# Patient Record
Sex: MALE | Race: WHITE | NOT HISPANIC OR LATINO | Employment: OTHER | URBAN - METROPOLITAN AREA
[De-identification: names, ages, dates, MRNs, and addresses within clinical notes are randomized per-mention and may not be internally consistent; named-entity substitution may affect disease eponyms.]

---

## 2020-09-07 ENCOUNTER — HOSPITAL ENCOUNTER (INPATIENT)
Facility: CLINIC | Age: 70
LOS: 17 days | Discharge: NURSING, REHAB OR LONG-TERM CARE | DRG: 064 | End: 2020-09-24
Attending: HOSPITALIST | Admitting: INTERNAL MEDICINE
Payer: COMMERCIAL

## 2020-09-07 ENCOUNTER — APPOINTMENT (OUTPATIENT)
Dept: CT IMAGING | Facility: CLINIC | Age: 70
End: 2020-09-07
Attending: EMERGENCY MEDICINE
Payer: COMMERCIAL

## 2020-09-07 ENCOUNTER — APPOINTMENT (OUTPATIENT)
Dept: GENERAL RADIOLOGY | Facility: CLINIC | Age: 70
End: 2020-09-07
Attending: EMERGENCY MEDICINE
Payer: COMMERCIAL

## 2020-09-07 ENCOUNTER — APPOINTMENT (OUTPATIENT)
Dept: ULTRASOUND IMAGING | Facility: CLINIC | Age: 70
End: 2020-09-07
Attending: EMERGENCY MEDICINE
Payer: COMMERCIAL

## 2020-09-07 ENCOUNTER — HOSPITAL ENCOUNTER (EMERGENCY)
Facility: CLINIC | Age: 70
Discharge: ANOTHER HOSPITAL | End: 2020-09-07
Attending: EMERGENCY MEDICINE | Admitting: EMERGENCY MEDICINE
Payer: COMMERCIAL

## 2020-09-07 VITALS
TEMPERATURE: 98 F | OXYGEN SATURATION: 91 % | DIASTOLIC BLOOD PRESSURE: 106 MMHG | RESPIRATION RATE: 14 BRPM | HEART RATE: 71 BPM | SYSTOLIC BLOOD PRESSURE: 130 MMHG

## 2020-09-07 DIAGNOSIS — I63.9 CEREBROVASCULAR ACCIDENT (CVA), UNSPECIFIED MECHANISM (H): ICD-10-CM

## 2020-09-07 DIAGNOSIS — I63.031 CEREBROVASCULAR ACCIDENT (CVA) DUE TO THROMBOSIS OF RIGHT CAROTID ARTERY (H): Primary | ICD-10-CM

## 2020-09-07 LAB
ABO + RH BLD: NORMAL
ABO + RH BLD: NORMAL
ANION GAP SERPL CALCULATED.3IONS-SCNC: 8 MMOL/L (ref 3–14)
APTT PPP: 26 SEC (ref 22–37)
BASE EXCESS BLDV CALC-SCNC: 0.7 MMOL/L
BASOPHILS # BLD AUTO: 0 10E9/L (ref 0–0.2)
BASOPHILS NFR BLD AUTO: 0.3 %
BLD GP AB SCN SERPL QL: NORMAL
BLOOD BANK CMNT PATIENT-IMP: NORMAL
BUN SERPL-MCNC: 13 MG/DL (ref 7–30)
CALCIUM SERPL-MCNC: 8.9 MG/DL (ref 8.5–10.1)
CHLORIDE SERPL-SCNC: 103 MMOL/L (ref 94–109)
CK SERPL-CCNC: 148 U/L (ref 30–300)
CO2 SERPL-SCNC: 25 MMOL/L (ref 20–32)
CREAT SERPL-MCNC: 0.85 MG/DL (ref 0.66–1.25)
DIFFERENTIAL METHOD BLD: ABNORMAL
EOSINOPHIL # BLD AUTO: 0 10E9/L (ref 0–0.7)
EOSINOPHIL NFR BLD AUTO: 0 %
ERYTHROCYTE [DISTWIDTH] IN BLOOD BY AUTOMATED COUNT: 12 % (ref 10–15)
GFR SERPL CREATININE-BSD FRML MDRD: 87 ML/MIN/{1.73_M2}
GLUCOSE BLDC GLUCOMTR-MCNC: 170 MG/DL (ref 70–99)
GLUCOSE BLDC GLUCOMTR-MCNC: 184 MG/DL (ref 70–99)
GLUCOSE SERPL-MCNC: 190 MG/DL (ref 70–99)
HCO3 BLDV-SCNC: 26 MMOL/L (ref 21–28)
HCT VFR BLD AUTO: 42.5 % (ref 40–53)
HGB BLD-MCNC: 14.5 G/DL (ref 13.3–17.7)
IMM GRANULOCYTES # BLD: 0.1 10E9/L (ref 0–0.4)
IMM GRANULOCYTES NFR BLD: 0.4 %
INR PPP: 1.03 (ref 0.86–1.14)
LABORATORY COMMENT REPORT: NORMAL
LYMPHOCYTES # BLD AUTO: 1 10E9/L (ref 0.8–5.3)
LYMPHOCYTES NFR BLD AUTO: 9.3 %
MCH RBC QN AUTO: 30.9 PG (ref 26.5–33)
MCHC RBC AUTO-ENTMCNC: 34.1 G/DL (ref 31.5–36.5)
MCV RBC AUTO: 91 FL (ref 78–100)
MONOCYTES # BLD AUTO: 0.6 10E9/L (ref 0–1.3)
MONOCYTES NFR BLD AUTO: 5.2 %
NEUTROPHILS # BLD AUTO: 9.5 10E9/L (ref 1.6–8.3)
NEUTROPHILS NFR BLD AUTO: 84.8 %
NRBC # BLD AUTO: 0 10*3/UL
NRBC BLD AUTO-RTO: 0 /100
O2/TOTAL GAS SETTING VFR VENT: NORMAL %
OXYHGB MFR BLDV: 79 %
PCO2 BLDV: 43 MM HG (ref 40–50)
PH BLDV: 7.39 PH (ref 7.32–7.43)
PLATELET # BLD AUTO: 219 10E9/L (ref 150–450)
PO2 BLDV: 45 MM HG (ref 25–47)
POTASSIUM SERPL-SCNC: 3.9 MMOL/L (ref 3.4–5.3)
RBC # BLD AUTO: 4.69 10E12/L (ref 4.4–5.9)
SARS-COV-2 RNA SPEC QL NAA+PROBE: NEGATIVE
SARS-COV-2 RNA SPEC QL NAA+PROBE: NORMAL
SODIUM SERPL-SCNC: 136 MMOL/L (ref 133–144)
SPECIMEN EXP DATE BLD: NORMAL
SPECIMEN SOURCE: NORMAL
SPECIMEN SOURCE: NORMAL
TROPONIN I SERPL-MCNC: <0.015 UG/L (ref 0–0.04)
WBC # BLD AUTO: 11.2 10E9/L (ref 4–11)

## 2020-09-07 PROCEDURE — 36415 COLL VENOUS BLD VENIPUNCTURE: CPT | Performed by: INTERNAL MEDICINE

## 2020-09-07 PROCEDURE — 25000128 H RX IP 250 OP 636: Performed by: EMERGENCY MEDICINE

## 2020-09-07 PROCEDURE — 82550 ASSAY OF CK (CPK): CPT | Performed by: EMERGENCY MEDICINE

## 2020-09-07 PROCEDURE — 86901 BLOOD TYPING SEROLOGIC RH(D): CPT | Performed by: EMERGENCY MEDICINE

## 2020-09-07 PROCEDURE — 96360 HYDRATION IV INFUSION INIT: CPT | Mod: 59

## 2020-09-07 PROCEDURE — 00000146 ZZHCL STATISTIC GLUCOSE BY METER IP

## 2020-09-07 PROCEDURE — 85730 THROMBOPLASTIN TIME PARTIAL: CPT | Performed by: EMERGENCY MEDICINE

## 2020-09-07 PROCEDURE — 85610 PROTHROMBIN TIME: CPT | Performed by: EMERGENCY MEDICINE

## 2020-09-07 PROCEDURE — 80076 HEPATIC FUNCTION PANEL: CPT | Performed by: INTERNAL MEDICINE

## 2020-09-07 PROCEDURE — 25000132 ZZH RX MED GY IP 250 OP 250 PS 637: Performed by: EMERGENCY MEDICINE

## 2020-09-07 PROCEDURE — 25800030 ZZH RX IP 258 OP 636: Performed by: EMERGENCY MEDICINE

## 2020-09-07 PROCEDURE — 0042T CT HEAD PERFUSION WITH CONTRAST: CPT

## 2020-09-07 PROCEDURE — 93005 ELECTROCARDIOGRAM TRACING: CPT

## 2020-09-07 PROCEDURE — 93970 EXTREMITY STUDY: CPT

## 2020-09-07 PROCEDURE — 85025 COMPLETE CBC W/AUTO DIFF WBC: CPT | Performed by: EMERGENCY MEDICINE

## 2020-09-07 PROCEDURE — 99223 1ST HOSP IP/OBS HIGH 75: CPT | Mod: AI | Performed by: INTERNAL MEDICINE

## 2020-09-07 PROCEDURE — 80048 BASIC METABOLIC PNL TOTAL CA: CPT | Performed by: EMERGENCY MEDICINE

## 2020-09-07 PROCEDURE — 99285 EMERGENCY DEPT VISIT HI MDM: CPT | Mod: 25

## 2020-09-07 PROCEDURE — 86850 RBC ANTIBODY SCREEN: CPT | Performed by: EMERGENCY MEDICINE

## 2020-09-07 PROCEDURE — 82805 BLOOD GASES W/O2 SATURATION: CPT | Performed by: EMERGENCY MEDICINE

## 2020-09-07 PROCEDURE — 20000003 ZZH R&B ICU

## 2020-09-07 PROCEDURE — 84484 ASSAY OF TROPONIN QUANT: CPT | Performed by: EMERGENCY MEDICINE

## 2020-09-07 PROCEDURE — 70450 CT HEAD/BRAIN W/O DYE: CPT

## 2020-09-07 PROCEDURE — 84484 ASSAY OF TROPONIN QUANT: CPT | Performed by: INTERNAL MEDICINE

## 2020-09-07 PROCEDURE — 86900 BLOOD TYPING SEROLOGIC ABO: CPT | Performed by: EMERGENCY MEDICINE

## 2020-09-07 PROCEDURE — 71045 X-RAY EXAM CHEST 1 VIEW: CPT

## 2020-09-07 PROCEDURE — 70498 CT ANGIOGRAPHY NECK: CPT

## 2020-09-07 PROCEDURE — U0003 INFECTIOUS AGENT DETECTION BY NUCLEIC ACID (DNA OR RNA); SEVERE ACUTE RESPIRATORY SYNDROME CORONAVIRUS 2 (SARS-COV-2) (CORONAVIRUS DISEASE [COVID-19]), AMPLIFIED PROBE TECHNIQUE, MAKING USE OF HIGH THROUGHPUT TECHNOLOGIES AS DESCRIBED BY CMS-2020-01-R: HCPCS | Performed by: EMERGENCY MEDICINE

## 2020-09-07 PROCEDURE — 83880 ASSAY OF NATRIURETIC PEPTIDE: CPT | Performed by: INTERNAL MEDICINE

## 2020-09-07 RX ORDER — LABETALOL HYDROCHLORIDE 5 MG/ML
10-40 INJECTION, SOLUTION INTRAVENOUS EVERY 10 MIN PRN
Status: DISCONTINUED | OUTPATIENT
Start: 2020-09-07 | End: 2020-09-08

## 2020-09-07 RX ORDER — NITROGLYCERIN 0.4 MG/1
0.4 TABLET SUBLINGUAL EVERY 5 MIN PRN
COMMUNITY
Start: 2018-10-03 | End: 2022-08-30

## 2020-09-07 RX ORDER — HYDRALAZINE HYDROCHLORIDE 20 MG/ML
10-20 INJECTION INTRAMUSCULAR; INTRAVENOUS
Status: DISCONTINUED | OUTPATIENT
Start: 2020-09-07 | End: 2020-09-10

## 2020-09-07 RX ORDER — ASPIRIN 300 MG/1
150 SUPPOSITORY RECTAL ONCE
Status: DISCONTINUED | OUTPATIENT
Start: 2020-09-07 | End: 2020-09-07 | Stop reason: HOSPADM

## 2020-09-07 RX ORDER — TRAZODONE HYDROCHLORIDE 50 MG/1
50 TABLET, FILM COATED ORAL
Status: ON HOLD | COMMUNITY
Start: 2020-08-25 | End: 2020-10-22

## 2020-09-07 RX ORDER — MULTIVIT WITH MINERALS/LUTEIN
1 TABLET ORAL DAILY
COMMUNITY

## 2020-09-07 RX ORDER — LEVOTHYROXINE SODIUM 50 UG/1
50 TABLET ORAL DAILY
COMMUNITY
Start: 2018-10-03

## 2020-09-07 RX ORDER — DEXTROSE MONOHYDRATE 25 G/50ML
25-50 INJECTION, SOLUTION INTRAVENOUS
Status: DISCONTINUED | OUTPATIENT
Start: 2020-09-07 | End: 2020-09-10

## 2020-09-07 RX ORDER — VALSARTAN 320 MG/1
320 TABLET ORAL DAILY
Status: ON HOLD | COMMUNITY
Start: 2019-01-04 | End: 2020-10-22

## 2020-09-07 RX ORDER — ASPIRIN 300 MG/1
300 SUPPOSITORY RECTAL ONCE
Status: COMPLETED | OUTPATIENT
Start: 2020-09-07 | End: 2020-09-07

## 2020-09-07 RX ORDER — FUROSEMIDE 20 MG
TABLET ORAL
Status: ON HOLD | COMMUNITY
Start: 2019-03-06 | End: 2020-10-22

## 2020-09-07 RX ORDER — LEVOTHYROXINE SODIUM 20 UG/ML
25 INJECTION, SOLUTION INTRAVENOUS DAILY
Status: DISCONTINUED | OUTPATIENT
Start: 2020-09-08 | End: 2020-09-09

## 2020-09-07 RX ORDER — ATORVASTATIN CALCIUM 80 MG/1
40 TABLET, FILM COATED ORAL DAILY
Status: ON HOLD | COMMUNITY
Start: 2019-02-27 | End: 2022-08-31

## 2020-09-07 RX ORDER — METOPROLOL SUCCINATE 50 MG/1
25 TABLET, EXTENDED RELEASE ORAL 2 TIMES DAILY
Status: ON HOLD | COMMUNITY
Start: 2019-10-24 | End: 2020-09-24

## 2020-09-07 RX ORDER — NALOXONE HYDROCHLORIDE 0.4 MG/ML
.1-.4 INJECTION, SOLUTION INTRAMUSCULAR; INTRAVENOUS; SUBCUTANEOUS
Status: DISCONTINUED | OUTPATIENT
Start: 2020-09-07 | End: 2020-09-24 | Stop reason: HOSPADM

## 2020-09-07 RX ORDER — IOPAMIDOL 755 MG/ML
500 INJECTION, SOLUTION INTRAVASCULAR ONCE
Status: COMPLETED | OUTPATIENT
Start: 2020-09-07 | End: 2020-09-07

## 2020-09-07 RX ORDER — ONDANSETRON 4 MG/1
4 TABLET, ORALLY DISINTEGRATING ORAL EVERY 6 HOURS PRN
Status: DISCONTINUED | OUTPATIENT
Start: 2020-09-07 | End: 2020-09-24 | Stop reason: HOSPADM

## 2020-09-07 RX ORDER — AMLODIPINE BESYLATE 10 MG/1
20 TABLET ORAL DAILY
Status: ON HOLD | COMMUNITY
Start: 2018-12-26 | End: 2020-10-22

## 2020-09-07 RX ORDER — SODIUM CHLORIDE 9 MG/ML
INJECTION, SOLUTION INTRAVENOUS CONTINUOUS
Status: DISCONTINUED | OUTPATIENT
Start: 2020-09-07 | End: 2020-09-08

## 2020-09-07 RX ORDER — NICOTINE POLACRILEX 4 MG
15-30 LOZENGE BUCCAL
Status: DISCONTINUED | OUTPATIENT
Start: 2020-09-07 | End: 2020-09-10

## 2020-09-07 RX ORDER — ONDANSETRON 2 MG/ML
4 INJECTION INTRAMUSCULAR; INTRAVENOUS EVERY 6 HOURS PRN
Status: DISCONTINUED | OUTPATIENT
Start: 2020-09-07 | End: 2020-09-24 | Stop reason: HOSPADM

## 2020-09-07 RX ORDER — ASPIRIN 81 MG/1
81 TABLET ORAL DAILY
Status: ON HOLD | COMMUNITY
End: 2020-10-23

## 2020-09-07 RX ORDER — ASPIRIN 300 MG/1
75 SUPPOSITORY RECTAL DAILY
Status: DISCONTINUED | OUTPATIENT
Start: 2020-09-07 | End: 2020-09-08

## 2020-09-07 RX ORDER — EZETIMIBE 10 MG/1
10 TABLET ORAL DAILY
COMMUNITY
Start: 2018-12-06

## 2020-09-07 RX ADMIN — ASPIRIN 300 MG: 300 SUPPOSITORY RECTAL at 19:32

## 2020-09-07 RX ADMIN — IOPAMIDOL 120 ML: 755 INJECTION, SOLUTION INTRAVENOUS at 18:46

## 2020-09-07 RX ADMIN — SODIUM CHLORIDE 95 ML: 9 INJECTION, SOLUTION INTRAVENOUS at 18:46

## 2020-09-07 ASSESSMENT — VISUAL ACUITY
OU: NOT TESTABLE
OU: NOT TESTABLE

## 2020-09-07 NOTE — ED NOTES
Bed: ED32  Expected date: 9/7/20  Expected time:   Means of arrival: Ambulance  Comments:  A316 stroke eval

## 2020-09-07 NOTE — CONSULTS
[facility]    Stroke Telephone Note    I was called by Dr. Eryn Mejia on 09/07/20 at 1826 regarding patient Jorje Teran. The patient is a 70 year old male with no known PMH as a stroke alert for left-sided weakness.    The patient was last known well at 12:30 PM on 9/6/2020.  He lives by himself, and was found by his son on 9/7/2020 at 530-6 PM with left face arm and leg weakness.    He was brought to [facility] ED, and a stroke alert was paged.  On arrival to [facility] ER:  mmHg, afebrile.  Vitals otherwise stable.  He was noted to be awake, slightly drowsy but answering simple questions.  He was reported to have left face arm and leg weakness.  A head CT showed right hyperdense MCA with established infarction with a CT aspect score of 2.  CTA head and neck showed a right M1 occlusion with poor collateral flow.  CT perfusion pending    Stroke Code Data  (for stroke code without tele)  Stroke code activated 09/07/20   1826   First stroke provider response 09/07/20   1828   Last known normal 09/06/20   1200   Time of discovery   (or onset of symptoms) 09/07/20   1750   Head CT read by me 09/07/20       Was stroke code de-escalated? Yes 09/07/20    patient is outside emergent treatment time parameters       TPA Treatment   Not given due to established infarct on imaging.    Endovascular Treatment  Proximal vessel occlusion present, but endovascular treatment not initiated due to Established infarction on head CT    Impression  Ischemic Stroke due to undetermined etiology     Recommendations  Acute Ischemic Stroke (without tPA) Recommendations  - Neurochecks Q 1 hour  - Permissive HTN; labetalol PRN for SBP > 220  - Avoid hypotonic fluid solutions.   - Daily aspirin 81 mg for secondary stroke prevention  - Statin: Atorvastatin 40 mg at bedtime  - MRI Stroke Protocol  - TTE with Bubble Study  - Telemetry, EKG  - Bedside Glucose Monitoring  - A1c, Lipid Panel, Troponin x 3  - PT/OT/SLP  -  "Stroke Education  - Depression Screen  - Apnea Screen  - Euthermia, Euglycemia    Patient will be transferred to [facility] for further monitoring and a ICU.    My [place]s are based my impression of patient's clinical presentation per discussion with the ED physician, and personal review of neuroimaging completed in the ED.  I was not requested to personally see or examine the patient at this time.    The Stroke Staff is Dr. Castaneda.    YANG DAVIS MD  Vascular Neurology Fellow  To page me or covering stroke neurology team member, click here: AMCOM   Choose \"On Call\" tab at top, then search dropdown box for \"Neurology Adult\", select location, press Enter, then look for stroke/neuro ICU/telestroke.         "

## 2020-09-07 NOTE — ED PROVIDER NOTES
History     Chief Complaint:  Weakness, slurred speech    HPI   Jorje Teran is a 70 year old male who presents with weakness and slurred speech.  Per son Donte, patient last seen at baseline yesterday at noon.  Son went to check on patient roughly an hour prior to arrival and found father on the ground, unable to move his L. Side and also slurring his words.  Patient provides minimal history at bedside.  No known history of blood thinners, possible baby ASA per son.     Allergies:  No known drug allergies    Medications:    Desyrel  Toprol-XL  Lasix  Lipitor  Entresto  Diovan  Norvasc  Zetia  Levothyroxine  Nitrostat  Aspirin 81 mg    Past Medical History:    Obstructive sleep apnea  Class 2 severe obesity  Hypertension  Coronary artery disease  Diabetes mellitus type II  Tubular adenoma of colon  Hyperlipidemia  Hypothyroid    Past Surgical History:    [place] teeth removal  CABG x3    Family History:    Heart Disease  Diabetes    Social History:  Smoking status: Former (Quit in 2009)  Alcohol use: Yes (4 drinks/week)  Patient presents with son.   Marital Status:   [2]    Review of Systems   Unable to perform ROS: Acuity of condition       Physical Exam     Patient Vitals for the past 24 hrs:   BP Pulse Resp SpO2   09/07/20 1826 (!) 144/78 72 22 97 %       Physical Exam  General: Well-nourished, nontoxic  Eyes: PERRL, patient unable to follow   ENT:  Moist mucus membranes, posterior oropharynx clear without erythema or exudates. TM normal bilaterally  Neck: Supple with full range of motion  Respiratory:  Lungs clear to auscultation bilaterally, no crackles/rubs/wheezes.  Good air movement  CV: Normal rate and rhythm, no murmurs/rubs/gallops  GI:  Abdomen soft and non-distended.  No tenderness, guarding or rebound  Skin: Warm, dry.  No rashes or petechiae  MSK: LLE greater circumference than RLE. +1 LE edema bilaterally; no calf tenderness. No c/t/l bony tenderness  Neuro: Alert to person only. L. Sided  facial droop; LUE contracture; LLE strength 2/5.  Noted aphasia and mild dysarthria.    Psychiatric: Blunt affect      Emergency Department Course   ECG (18:31:17):  Rate 69 bpm. MI interval 132. QRS duration 98. QT/QTc 444/475. P-R-T axes 71 19 32. Normal sinus rhythm. Inferior infarct, age undetermined. Abnormal EKG. Interpreted at 1836. by Eryn Clarke DO.    Imaging:  Radiology findings were communicated with the family who voiced understanding of the findings.    CTA Head Neck w/ contrast:  HEAD CTA:   1.  Filling defect within the distal right internal carotid artery extending into the right middle cerebral artery with occlusion of the right middle cerebral artery at the M1 segment.     NECK CTA:   1.  Severe plaquing at the right carotid bifurcation with filling defect in the proximal right internal carotid artery suggestive of thrombus contributing to approximately 80% stenosis.   2.  Approximately 60% stenosis of the proximal left internal carotid artery.  Result per radiology.    CT-scan Head w/o contrast:  1.  Dense right middle cerebral artery.   2.  Large area of acute/subacute infarct involving the right middle cerebral artery distribution.   3.  No evidence of hemorrhage.  Result per radiology.      X-ray Chest Port, 1 view:  Previous CABG. Mild cardiomegaly. Pulmonary vessels normal. Lung volumes are low but lungs appear clear.   Result per radiology.     Laboratory:  Laboratory findings were communicated with the family who voiced understanding of the findings.    BMP: Glucose 190 (H), o/w WNL (Creatinine 0.85)  CBC: WBC 11.2 (H), HGB 14.5,    INR: 1.03  Blood gas venous and oxyhgb: WNL  Partial thromboplastin time: 26  1831 Troponin: <0.015  ABO/Rh type and screen: A positive  CK total: 148  Glucose by meter: 184 (H)    Asymptomatic COVID-19 Virus by PCR: In process      Interventions:  1846: NS 95 mL IV Bolus   1932:  mg Rectal    Emergency Department Course:  The patient  arrived in the emergency department via EMS.  Past medical records, nursing notes, and vitals reviewed.   I performed an exam of the patient and obtained history, as documented above.    1830: The patient went to have a CT scan  1833: I talked with stroke Neuro regarding the patient, Dr. Alvares    1833: I talked to the patient's son    1856  I spoke to stroke neurology again; recommended ASA    1900: Family updated    Spoke to [facility] Dr. Phillips who was agreeable to accept the patient.     I personally reviewed the laboratory and imaging results with the Patient and son and answered all related questions prior to transfer.     Impression & Plan   [employer] Stroke Scale  Exam Interval: Baseline   Score    Level of consciousness: (0)   Alert, keenly responsive    LOC questions: (1)   Answers one question correctly    LOC commands: (1)   Performs one task correctly    Best gaze: (0)   Normal    Visual: (0)   No visual loss    Facial palsy: (2)   Partial paralysis (total/near total of lower face)    Motor arm (left): (4)   No movement    Motor arm (right): (0)  No drift    Motor leg (left): (4)   No movement    Motor leg (right): (0)   No drift    Limb ataxia: (2)   Present in two limbs    Sensory: (0)   Normal- no sensory loss    Best language: (1)   Mild to moderate aphasia    Dysarthria: (1)   Mild to moderate dysarthria    Extinction and inattention: (0)   No abnormality        Total Score:  16       Medical Decision Making:  Patient is a 70-year-old male presenting with reported weakness and slurred speech.  Code stroke called on arrival.  Patient noted to have significant left-sided weakness, facial droop and dysarthria.  EKG without focal ischemia.  CT head confirmed large right MCA ischemic stroke.  CTA shows filling defect within the distal right internal carotid artery extending into the right middle cerebral artery.  I spoke to stroke neurology and given last known normal >24 hours,  patient is not a candidate for TPA.  Stroke neurology recommended ASA only.  BPs were stable.  Patient protecting his airway, no indication for emergent intubation.  Patient also had noted calf discrepancy sizes concerning or DVT though fortunately no evidence to suggest DVT on ultrasound.  He was accepted for transfer to [facility] neuro ICU.  Patient's family updated and agreeable to transfer for continuity of care.      Diagnosis:    ICD-10-CM    1. Cerebrovascular accident (CVA), unspecified mechanism (H)  I63.9 Basic metabolic panel     INR     Partial thromboplastin time     Troponin I     ABO/Rh type and screen     CK total     Asymptomatic COVID-19 Virus (Coronavirus) by PCR       Disposition:  Transferred to [facility] ICU.    Scribe Disclosure:  Rogelio PERALTA, am serving as a scribe at 6:31 PM on 9/7/2020 to document services personally performed by Eryn Clarke DO based on my observations and the provider's statements to me.      Rogelio Rodriguez   9/7/2020   [facility] EMERGENCY DEPARTMENT     Eryn Clarke DO  09/07/20 [id number]

## 2020-09-07 NOTE — ED TRIAGE NOTES
A&O x4, ABCs intact. Pt presents with EMS for left sided deficits, and left side gaze. EMS states last known well time is yesterday at approx 1530.

## 2020-09-08 ENCOUNTER — APPOINTMENT (OUTPATIENT)
Dept: CARDIOLOGY | Facility: CLINIC | Age: 70
DRG: 064 | End: 2020-09-08
Attending: INTERNAL MEDICINE
Payer: COMMERCIAL

## 2020-09-08 ENCOUNTER — APPOINTMENT (OUTPATIENT)
Dept: GENERAL RADIOLOGY | Facility: CLINIC | Age: 70
DRG: 064 | End: 2020-09-08
Attending: STUDENT IN AN ORGANIZED HEALTH CARE EDUCATION/TRAINING PROGRAM
Payer: COMMERCIAL

## 2020-09-08 ENCOUNTER — APPOINTMENT (OUTPATIENT)
Dept: SPEECH THERAPY | Facility: CLINIC | Age: 70
DRG: 064 | End: 2020-09-08
Attending: INTERNAL MEDICINE
Payer: COMMERCIAL

## 2020-09-08 ENCOUNTER — APPOINTMENT (OUTPATIENT)
Dept: CT IMAGING | Facility: CLINIC | Age: 70
DRG: 064 | End: 2020-09-08
Attending: PHYSICIAN ASSISTANT
Payer: COMMERCIAL

## 2020-09-08 ENCOUNTER — APPOINTMENT (OUTPATIENT)
Dept: GENERAL RADIOLOGY | Facility: CLINIC | Age: 70
DRG: 064 | End: 2020-09-08
Attending: HOSPITALIST
Payer: COMMERCIAL

## 2020-09-08 ENCOUNTER — APPOINTMENT (OUTPATIENT)
Dept: CT IMAGING | Facility: CLINIC | Age: 70
DRG: 064 | End: 2020-09-08
Attending: HOSPITALIST
Payer: COMMERCIAL

## 2020-09-08 LAB
ALBUMIN SERPL-MCNC: 3.6 G/DL (ref 3.4–5)
ALP SERPL-CCNC: 67 U/L (ref 40–150)
ALT SERPL W P-5'-P-CCNC: 28 U/L (ref 0–70)
ANION GAP SERPL CALCULATED.3IONS-SCNC: 6 MMOL/L (ref 3–14)
AST SERPL W P-5'-P-CCNC: 23 U/L (ref 0–45)
BILIRUB DIRECT SERPL-MCNC: 0.2 MG/DL (ref 0–0.2)
BILIRUB SERPL-MCNC: 0.9 MG/DL (ref 0.2–1.3)
BUN SERPL-MCNC: 13 MG/DL (ref 7–30)
CALCIUM SERPL-MCNC: 8.4 MG/DL (ref 8.5–10.1)
CHLORIDE SERPL-SCNC: 107 MMOL/L (ref 94–109)
CO2 SERPL-SCNC: 26 MMOL/L (ref 20–32)
CREAT SERPL-MCNC: 0.85 MG/DL (ref 0.66–1.25)
ERYTHROCYTE [DISTWIDTH] IN BLOOD BY AUTOMATED COUNT: 12.3 % (ref 10–15)
GFR SERPL CREATININE-BSD FRML MDRD: 87 ML/MIN/{1.73_M2}
GLUCOSE BLDC GLUCOMTR-MCNC: 136 MG/DL (ref 70–99)
GLUCOSE BLDC GLUCOMTR-MCNC: 154 MG/DL (ref 70–99)
GLUCOSE BLDC GLUCOMTR-MCNC: 156 MG/DL (ref 70–99)
GLUCOSE BLDC GLUCOMTR-MCNC: 157 MG/DL (ref 70–99)
GLUCOSE BLDC GLUCOMTR-MCNC: 159 MG/DL (ref 70–99)
GLUCOSE SERPL-MCNC: 162 MG/DL (ref 70–99)
HCT VFR BLD AUTO: 39.8 % (ref 40–53)
HGB BLD-MCNC: 13.6 G/DL (ref 13.3–17.7)
INTERPRETATION ECG - MUSE: NORMAL
MCH RBC QN AUTO: 30.7 PG (ref 26.5–33)
MCHC RBC AUTO-ENTMCNC: 34.2 G/DL (ref 31.5–36.5)
MCV RBC AUTO: 90 FL (ref 78–100)
NT-PROBNP SERPL-MCNC: 228 PG/ML (ref 0–900)
PLATELET # BLD AUTO: 189 10E9/L (ref 150–450)
POTASSIUM SERPL-SCNC: 3.6 MMOL/L (ref 3.4–5.3)
PROT SERPL-MCNC: 7.4 G/DL (ref 6.8–8.8)
RBC # BLD AUTO: 4.43 10E12/L (ref 4.4–5.9)
SODIUM SERPL-SCNC: 139 MMOL/L (ref 133–144)
SODIUM SERPL-SCNC: 141 MMOL/L (ref 133–144)
TROPONIN I SERPL-MCNC: <0.015 UG/L (ref 0–0.04)
TROPONIN I SERPL-MCNC: <0.015 UG/L (ref 0–0.04)
WBC # BLD AUTO: 9.7 10E9/L (ref 4–11)

## 2020-09-08 PROCEDURE — 25000125 ZZHC RX 250: Performed by: INTERNAL MEDICINE

## 2020-09-08 PROCEDURE — 80048 BASIC METABOLIC PNL TOTAL CA: CPT | Performed by: INTERNAL MEDICINE

## 2020-09-08 PROCEDURE — 25000128 H RX IP 250 OP 636: Performed by: STUDENT IN AN ORGANIZED HEALTH CARE EDUCATION/TRAINING PROGRAM

## 2020-09-08 PROCEDURE — 36415 COLL VENOUS BLD VENIPUNCTURE: CPT | Performed by: INTERNAL MEDICINE

## 2020-09-08 PROCEDURE — 99221 1ST HOSP IP/OBS SF/LOW 40: CPT | Performed by: PSYCHIATRY & NEUROLOGY

## 2020-09-08 PROCEDURE — 71045 X-RAY EXAM CHEST 1 VIEW: CPT

## 2020-09-08 PROCEDURE — 25800030 ZZH RX IP 258 OP 636: Performed by: INTERNAL MEDICINE

## 2020-09-08 PROCEDURE — 40000264 ECHOCARDIOGRAM COMPLETE

## 2020-09-08 PROCEDURE — 70450 CT HEAD/BRAIN W/O DYE: CPT

## 2020-09-08 PROCEDURE — 99291 CRITICAL CARE FIRST HOUR: CPT | Mod: GC | Performed by: INTERNAL MEDICINE

## 2020-09-08 PROCEDURE — 00000146 ZZHCL STATISTIC GLUCOSE BY METER IP

## 2020-09-08 PROCEDURE — 99207 ZZC CDG-HISTORY COMP: MEETS EXP. PROBLEM FOCUSED-DOWN CODED LACK OF ROS: CPT | Performed by: PSYCHIATRY & NEUROLOGY

## 2020-09-08 PROCEDURE — 25000131 ZZH RX MED GY IP 250 OP 636 PS 637: Performed by: INTERNAL MEDICINE

## 2020-09-08 PROCEDURE — 92610 EVALUATE SWALLOWING FUNCTION: CPT | Mod: GN | Performed by: SPEECH-LANGUAGE PATHOLOGIST

## 2020-09-08 PROCEDURE — 25500064 ZZH RX 255 OP 636: Performed by: INTERNAL MEDICINE

## 2020-09-08 PROCEDURE — 25000132 ZZH RX MED GY IP 250 OP 250 PS 637: Performed by: STUDENT IN AN ORGANIZED HEALTH CARE EDUCATION/TRAINING PROGRAM

## 2020-09-08 PROCEDURE — 25000125 ZZHC RX 250

## 2020-09-08 PROCEDURE — 99221 1ST HOSP IP/OBS SF/LOW 40: CPT | Performed by: PHYSICIAN ASSISTANT

## 2020-09-08 PROCEDURE — 84295 ASSAY OF SERUM SODIUM: CPT | Performed by: STUDENT IN AN ORGANIZED HEALTH CARE EDUCATION/TRAINING PROGRAM

## 2020-09-08 PROCEDURE — 36556 INSERT NON-TUNNEL CV CATH: CPT | Mod: 25 | Performed by: INTERNAL MEDICINE

## 2020-09-08 PROCEDURE — 99233 SBSQ HOSP IP/OBS HIGH 50: CPT | Performed by: STUDENT IN AN ORGANIZED HEALTH CARE EDUCATION/TRAINING PROGRAM

## 2020-09-08 PROCEDURE — 93005 ELECTROCARDIOGRAM TRACING: CPT

## 2020-09-08 PROCEDURE — 20000003 ZZH R&B ICU

## 2020-09-08 PROCEDURE — 85027 COMPLETE CBC AUTOMATED: CPT | Performed by: INTERNAL MEDICINE

## 2020-09-08 PROCEDURE — 93306 TTE W/DOPPLER COMPLETE: CPT | Mod: 26 | Performed by: INTERNAL MEDICINE

## 2020-09-08 PROCEDURE — 84484 ASSAY OF TROPONIN QUANT: CPT | Performed by: INTERNAL MEDICINE

## 2020-09-08 PROCEDURE — 93010 ELECTROCARDIOGRAM REPORT: CPT | Performed by: INTERNAL MEDICINE

## 2020-09-08 PROCEDURE — 92526 ORAL FUNCTION THERAPY: CPT | Mod: GN | Performed by: SPEECH-LANGUAGE PATHOLOGIST

## 2020-09-08 PROCEDURE — 25000132 ZZH RX MED GY IP 250 OP 250 PS 637

## 2020-09-08 PROCEDURE — 70450 CT HEAD/BRAIN W/O DYE: CPT | Mod: 76

## 2020-09-08 PROCEDURE — 74018 RADEX ABDOMEN 1 VIEW: CPT

## 2020-09-08 RX ORDER — ASPIRIN 81 MG/1
81 TABLET, CHEWABLE ORAL DAILY
Status: DISCONTINUED | OUTPATIENT
Start: 2020-09-08 | End: 2020-09-24 | Stop reason: HOSPADM

## 2020-09-08 RX ORDER — ASPIRIN 300 MG/1
75 SUPPOSITORY RECTAL DAILY
Status: DISCONTINUED | OUTPATIENT
Start: 2020-09-08 | End: 2020-09-16

## 2020-09-08 RX ORDER — POTASSIUM CHLORIDE 1.5 G/1.58G
20-40 POWDER, FOR SOLUTION ORAL
Status: DISCONTINUED | OUTPATIENT
Start: 2020-09-08 | End: 2020-09-24 | Stop reason: HOSPADM

## 2020-09-08 RX ORDER — ASPIRIN 300 MG/1
75 SUPPOSITORY RECTAL DAILY
Status: DISCONTINUED | OUTPATIENT
Start: 2020-09-08 | End: 2020-09-08

## 2020-09-08 RX ORDER — ATORVASTATIN CALCIUM 40 MG/1
40 TABLET, FILM COATED ORAL DAILY
Status: DISCONTINUED | OUTPATIENT
Start: 2020-09-08 | End: 2020-09-24 | Stop reason: HOSPADM

## 2020-09-08 RX ORDER — POTASSIUM CHLORIDE 29.8 MG/ML
20 INJECTION INTRAVENOUS
Status: DISCONTINUED | OUTPATIENT
Start: 2020-09-08 | End: 2020-09-24 | Stop reason: HOSPADM

## 2020-09-08 RX ORDER — POTASSIUM CL/LIDO/0.9 % NACL 10MEQ/0.1L
10 INTRAVENOUS SOLUTION, PIGGYBACK (ML) INTRAVENOUS
Status: DISCONTINUED | OUTPATIENT
Start: 2020-09-08 | End: 2020-09-24 | Stop reason: HOSPADM

## 2020-09-08 RX ORDER — ASPIRIN 81 MG/1
81 TABLET, CHEWABLE ORAL DAILY
Status: DISCONTINUED | OUTPATIENT
Start: 2020-09-08 | End: 2020-09-08

## 2020-09-08 RX ORDER — GINSENG 100 MG
CAPSULE ORAL
Status: DISCONTINUED | OUTPATIENT
Start: 2020-09-08 | End: 2020-09-24 | Stop reason: HOSPADM

## 2020-09-08 RX ORDER — EZETIMIBE 10 MG/1
10 TABLET ORAL DAILY
Status: DISCONTINUED | OUTPATIENT
Start: 2020-09-08 | End: 2020-09-24 | Stop reason: HOSPADM

## 2020-09-08 RX ORDER — LIDOCAINE HYDROCHLORIDE 10 MG/ML
INJECTION, SOLUTION INFILTRATION; PERINEURAL
Status: COMPLETED
Start: 2020-09-08 | End: 2020-09-08

## 2020-09-08 RX ORDER — LABETALOL HYDROCHLORIDE 5 MG/ML
10-40 INJECTION, SOLUTION INTRAVENOUS EVERY 10 MIN PRN
Status: DISCONTINUED | OUTPATIENT
Start: 2020-09-08 | End: 2020-09-10

## 2020-09-08 RX ORDER — CARBOXYMETHYLCELLULOSE SODIUM 5 MG/ML
2 SOLUTION/ DROPS OPHTHALMIC
Status: DISCONTINUED | OUTPATIENT
Start: 2020-09-08 | End: 2020-09-24 | Stop reason: HOSPADM

## 2020-09-08 RX ORDER — MAGNESIUM SULFATE HEPTAHYDRATE 40 MG/ML
4 INJECTION, SOLUTION INTRAVENOUS EVERY 4 HOURS PRN
Status: DISCONTINUED | OUTPATIENT
Start: 2020-09-08 | End: 2020-09-24 | Stop reason: HOSPADM

## 2020-09-08 RX ORDER — METOPROLOL SUCCINATE 25 MG/1
25 TABLET, EXTENDED RELEASE ORAL 2 TIMES DAILY
Status: DISCONTINUED | OUTPATIENT
Start: 2020-09-08 | End: 2020-09-08

## 2020-09-08 RX ORDER — POTASSIUM CHLORIDE 7.45 MG/ML
10 INJECTION INTRAVENOUS
Status: DISCONTINUED | OUTPATIENT
Start: 2020-09-08 | End: 2020-09-24 | Stop reason: HOSPADM

## 2020-09-08 RX ORDER — METOPROLOL TARTRATE 25 MG/1
25 TABLET, FILM COATED ORAL 2 TIMES DAILY
Status: DISCONTINUED | OUTPATIENT
Start: 2020-09-08 | End: 2020-09-11

## 2020-09-08 RX ORDER — LIDOCAINE HYDROCHLORIDE 10 MG/ML
INJECTION, SOLUTION INFILTRATION; PERINEURAL
Status: DISCONTINUED
Start: 2020-09-08 | End: 2020-09-08 | Stop reason: WASHOUT

## 2020-09-08 RX ORDER — POTASSIUM CHLORIDE 1500 MG/1
20-40 TABLET, EXTENDED RELEASE ORAL
Status: DISCONTINUED | OUTPATIENT
Start: 2020-09-08 | End: 2020-09-24 | Stop reason: HOSPADM

## 2020-09-08 RX ORDER — 3% SODIUM CHLORIDE 3 G/100ML
INJECTION, SOLUTION INTRAVENOUS CONTINUOUS
Status: DISCONTINUED | OUTPATIENT
Start: 2020-09-08 | End: 2020-09-10

## 2020-09-08 RX ADMIN — INSULIN ASPART 1 UNITS: 100 INJECTION, SOLUTION INTRAVENOUS; SUBCUTANEOUS at 04:15

## 2020-09-08 RX ADMIN — EZETIMIBE 10 MG: 10 TABLET ORAL at 17:06

## 2020-09-08 RX ADMIN — CARBOXYMETHYLCELLULOSE SODIUM 2 DROP: 5 SOLUTION/ DROPS OPHTHALMIC at 18:32

## 2020-09-08 RX ADMIN — HUMAN ALBUMIN MICROSPHERES AND PERFLUTREN 9 ML: 10; .22 INJECTION, SOLUTION INTRAVENOUS at 15:00

## 2020-09-08 RX ADMIN — SODIUM CHLORIDE: 9 INJECTION, SOLUTION INTRAVENOUS at 00:31

## 2020-09-08 RX ADMIN — ATORVASTATIN CALCIUM 40 MG: 40 TABLET, FILM COATED ORAL at 17:06

## 2020-09-08 RX ADMIN — SODIUM CHLORIDE: 3 INJECTION, SOLUTION INTRAVENOUS at 22:56

## 2020-09-08 RX ADMIN — SODIUM CHLORIDE: 9 INJECTION, SOLUTION INTRAVENOUS at 09:12

## 2020-09-08 RX ADMIN — ASPIRIN 81 MG 81 MG: 81 TABLET ORAL at 17:06

## 2020-09-08 RX ADMIN — LIDOCAINE HYDROCHLORIDE: 10 INJECTION, SOLUTION INFILTRATION; PERINEURAL at 13:13

## 2020-09-08 RX ADMIN — METOPROLOL TARTRATE 25 MG: 25 TABLET, FILM COATED ORAL at 21:06

## 2020-09-08 RX ADMIN — INSULIN ASPART 1 UNITS: 100 INJECTION, SOLUTION INTRAVENOUS; SUBCUTANEOUS at 00:32

## 2020-09-08 RX ADMIN — SODIUM CHLORIDE: 3 INJECTION, SOLUTION INTRAVENOUS at 14:33

## 2020-09-08 RX ADMIN — INSULIN ASPART 1 UNITS: 100 INJECTION, SOLUTION INTRAVENOUS; SUBCUTANEOUS at 09:30

## 2020-09-08 RX ADMIN — LEVOTHYROXINE SODIUM 25 MCG: 20 INJECTION, SOLUTION INTRAVENOUS at 09:26

## 2020-09-08 ASSESSMENT — VISUAL ACUITY
OU: NOT TESTABLE

## 2020-09-08 ASSESSMENT — ACTIVITIES OF DAILY LIVING (ADL)
ADLS_ACUITY_SCORE: 20
ADLS_ACUITY_SCORE: 24
ADLS_ACUITY_SCORE: 20

## 2020-09-08 NOTE — PLAN OF CARE
OT: per discussion with RN, more lethargic. Attempted to see, difficult to wake, MD arrived. Will reschedule for tomorrow- 09/09/2020.

## 2020-09-08 NOTE — PROGRESS NOTES
09/08/20 0930   General Information   Onset Date 09/07/20   Start of Care Date 09/08/20   Referring Physician Dr. Rueda   Patient Profile Review/OT: Additional Occupational Profile Info See Profile for full history and prior level of function   Patient/Family Goals Statement No specific goal stated.  Pt/family agreed to POC goals.   Swallowing Evaluation Bedside swallow evaluation   Behaviorial Observations   (Drowsy)   Mode of current nutrition NPO   Comments Per MD note: Jorje Teran is a 70 year old male who presents with weakness and dysarthria.  He has a medical history markable for coronary disease, hypertension and diabetes.  He was last seen at is normal at noon the day prior to presentation.  The son reports that generally they hear from his father on a regular basis by phone.  However, the day of presentation nobody had heard from them.  His son went to check on him and found him on the ground.  He was not able to move his left side.  He also was slurring his words and could not talk.   CT with filling defect within the distal R ICA extending into the R MCA and occlusion of the R MCA at the M1 segment.   Clinical Swallow Evaluation   Oral Musculature unable to assess due to poor participation/comprehension  (Drooling water on left, tongue pull to left noted)   Laryngeal Function Cough;Throat clear;Swallow;Voicing initiated;Dry swallow palpated  (Hoarse weak voicing, weak cough on command, decreased elevation)   Clinical Swallow Eval: Thin Liquid Texture Trial   Mode of Presentation, Thin Liquids spoon;cup   Volume of Liquid or Food Presented ice chips x 5+, thin by cup x 5   Oral Phase of Swallow Premature pharyngeal entry   Pharyngeal Phase of Swallow reduction in laryngeal movement;repeated swallows  (delay)   Diagnostic Statement throat clearing with ice chips at times, throat clear x 1 during thin by cup trials then extensive overt coughing after all trials completed; suspect initial silent  aspiraton during trials   Clinical Swallow Eval: Nectar Thick Liquid Texture Trial   Mode of Presentation, Nectar spoon;cup   Volume of Nectar Presented tsps x 5, sips by cup x 4   Oral Phase, Nectar Premature pharyngeal entry   Pharyngeal Phase, Nectar reduction in laryngeal movement;repeated swallows  (delay)   Diagnostic Statement throat clear during cup trials   Swallow Compensations   Swallow Compensations Pacing;Reduce amounts;Effortful swallow   Esophageal Phase of Swallow   Patient reports or presents with symptoms of esophageal dysphagia No   Swallow Eval: Clinical Impressions   Skilled Criteria for Therapy Intervention Skilled criteria met.  Treatment indicated.   Functional Assessment Scale (FAS) 2   Treatment Diagnosis moderate-severe oral-pharyngeal dysphagia   Diet texture [place]s NPO   Therapy Frequency Daily   Predicted Duration of Therapy Intervention (days/wks) 1 week   Anticipated Discharge Disposition inpatient rehabilitation facility   Risks and Benefits of Treatment have been explained. Yes   Patient, family and/or staff in agreement with Plan of Care Yes   Clinical Impression Comments A bedside swallow evaluation was completed this am.  Pt presents with moderate-severe oral-pharyngeal dysphagia negatively impacted by fatigue.  Pt is at high risk for aspiration at this time.  Intermittent throat clearing was observed after ice chips and nectar by cup.  Delayed extensive coughing was noted after sips of thin water by cup.  Recommend NPO status except for 1-3 tsps of nectar thick by spoon with RN supervision, sit at 90 degrees, only when alert.  Hold trials if aspiration signs are noted/respiratory status declines.  Plan to continue Tx with po trials to determine safety to initiate a po diet.  Will further assess speech function as indicated.   Total Evaluation Time   Total Evaluation Time (Minutes) 15

## 2020-09-08 NOTE — PLAN OF CARE
No neuro changes tonight. The patient is alert and follows commands with his right side. He neglects the left. Left arm extends to painful stimuli. Left leg withdraws to pain. PERRLA. Patient has left sided visual neglect as well. One word answers. Unable to assess his orientation due to aphasia. He responded appropriately to his son Nacho coming to the bedside to help with admission questions and history. Gave Nacho a stroke book for family education and also left one on the patient's bedside table.   Sinus rhythm on monitor. BP within normal limits.   Lungs sound clear. Patient occasionally has a harsh cough which his son states is his baseline.   Turning Q2hrs to protect skin. Patient has abrasions on left forehead and left leg that were present on admission.   Patient had one very large urine incontinence.   Levi Calloway RN 7:42 AM 09/08/20

## 2020-09-08 NOTE — ED NOTES
Report given to [facility]. Pt transferred to [facility] without difficulty. Pt remained stable at transfer.

## 2020-09-08 NOTE — PHARMACY-ADMISSION MEDICATION HISTORY
Pharmacy Medication History  Admission medication history interview status for the 9/7/2020  admission is complete. See EPIC admission navigator for prior to admission medications     Medication history sources: Pharmacy ([facility] [phone]) and Care Everywhere  Medication history source reliability: Good  Adherence assessment: unable to assess    Additional medication history information:   All meds verified with [facility] [phone].  Confirmed all doses, and that pt has been taking BOTH Entresto and Valsartan for a while.  Pharmacist states they are prescribed by same MD, and they have called about it multiple times.    Medication reconciliation completed by provider prior to medication history? Yes    Time spent in this activity: 20 minutes      Prior to Admission medications    Medication Sig Last Dose Taking? Auth Provider   amLODIPine (NORVASC) 10 MG tablet Take 20 mg by mouth daily   Yes Reported, Patient   aspirin 81 MG EC tablet Take 81 mg by mouth daily  Yes Unknown, Entered By History   atorvastatin (LIPITOR) 80 MG tablet Take 40 mg by mouth daily   Yes Reported, Patient   ezetimibe (ZETIA) 10 MG tablet Take 10 mg by mouth daily   Yes Reported, Patient   furosemide (LASIX) 20 MG tablet TAKE 2 TABLETS BY MOUTH TWICE DAILY, FOR TREATMENT OF BLOOD PRESSURE AND FLUID RETENTION.  Yes Reported, Patient   levothyroxine (SYNTHROID/LEVOTHROID) 50 MCG tablet Take 50 mcg by mouth daily   Yes Reported, Patient   metoprolol succinate ER (TOPROL-XL) 50 MG 24 hr tablet Take 25 mg by mouth 2 times daily   Yes Reported, Patient   multivitamin (CENTRUM SILVER) tablet Take 1 tablet by mouth daily  Yes Unknown, Entered By History   nitroGLYcerin (NITROSTAT) 0.4 MG sublingual tablet Place 0.4 mg under the tongue every 5 minutes as needed   Yes Reported, Patient   sacubitril-valsartan (ENTRESTO) 49-51 MG per tablet Take 1 tablet by mouth 2 times daily   Yes Reported, Patient   traZODone (DESYREL) 50 MG tablet  Take 50 mg by mouth at bedtime as needed, may repeat once   Yes Reported, Patient   valsartan (DIOVAN) 320 MG tablet Take 320 mg by mouth daily   Yes Reported, Patient

## 2020-09-08 NOTE — H&P
[facility]    History and Physical  Hospitalist       Date of Admission:  9/7/2020  Date of Service (when I saw the patient): 09/07/20    Assessment & Plan   Jorje Teran is a 70 year old male who presents with weakness and slurred speech    Asymptomatic COVID-19 sent, low suspicion    CVA  Last baseline known noon on 9/6 (over 24 hours PTA). Son went to check on father afternoon/evening of presentation and he was on ground, unable to move his L side and slurring words. Vitals stable. Labs normal except 11.2. CXR clear.   *CT with filling defect within the distal R ICA extending into the R MCA and occlusion of the R MCA at the M1 segment. CTA with severe plaquing at R carotid bifurcation with filling defect in the prox R ICA suggestive of thrombus contributing to approximately 80% stenosis. 60% stenosis of the proximal L ICA.  - lipids 7/29/20- LDL 78, HDL 56, , T chol 164  - A1C 7/2020 7.1%   - TSH 7/2020 at 2.05   - given  mg x 1 on arrival to ED  - Q1 hour neuro checks  - permissive HTN, labetalol for SBP>220  - ASA 81 mg daily PO/NY  - atorvastatin 40 mg daily when able  - MRI stroke protocol  - TTE with bubble  - telemetry  - q4 hour BS monitoring  - troponin x 3  - PT/OT when able  - bedside swallow, SLP    CAD s/p CABG x 3, x 1 in 2015  HTN  HLD  PTA (needs med rec) amlodipine 20 mg daily, ASA 81 mg daiy, atorvastatin 40 mg daily, Zetia 10 mg daily, furosemide 40 mg BID, metoprolol 25 mg BID, Entresto 49-51 1 tab BID, valsartan 320 mg daily  Follows with Dr. Vincent with [facility]. Some concern re: medical compliance. Recent echo not available.   - hold meds while NPO  - prn labetalol, hydralazine SBP>220  - echo as above  - check BNP    DM II  Not on meds for this. Recent A1C at 7.1 7/2020.   - q4 hour blood sugar checks  - low sliding scale insulin, increase medium if needed    LE edema  US at [facility] negative for DVT    SHELDON  Intermittent treatment as outpatient   - hold CPAP  for now    Hypothyroidism  PTA (needs med rec) levothyroxine 50 mcg daily  TSH checked 7/29/2020 at 2.05  - change levothyroxine to 25 mg IV daily while NPO    Morbid obesity  BMI noted  7/202 at ~38. Will need to encourage healthy lifestyle and weight loss with recovery    DVT Prophylaxis: Pneumatic Compression Devices  Code Status: Full Code, pt was able to communicate this and son at bedside felt this would be within his wishes    Disposition: Expected discharge in 3-5 days pending CVA workup    Mandeep Rueda MD  [phone] (P)  Text Page     Primary Care Physician   Susana Esparza    Chief Complaint   Weakness, dysarthria    History is obtained from the patient's son and medical records.  Patient is unable to provide history at this time.    History of Present Illness   Jorje Teran is a 70 year old male who presents with weakness and dysarthria.  He has a medical history markable for coronary disease, hypertension and diabetes.  He was last seen at is normal at noon the day prior to presentation.  The son reports that generally they hear from his father on a regular basis by phone.  However, the day of presentation nobody had heard from them.  His son went to check on him and found him on the ground.  He was not able to move his left side.  He also was slurring his words and could not talk.  EMS was contacted.  At the time my visit, patient is somnolent but arousable.  He is not reliably able to answer questions.  It does not appear that he has any pain.  Further history is not obtainable    Past Medical History    I have reviewed this patient's medical history and updated it with pertinent information if needed.   Coronary artery disease status post CABG  Hypertension  Hyperlipidemia  Diabetes mellitus type 2  Obstructive sleep apnea  Hypothyroidism  Morbid obesity    Past Surgical History   I have reviewed this patient's surgical history and updated it with pertinent information if  needed.  CABG    Prior to Admission Medications   Prior to Admission Medications   Prescriptions Last Dose Informant Patient Reported? Taking?   Pediatric Multivitamins-Fl (MULTIVITAMINS/FL PO)   Yes No   Sig: Take 1 tablet by mouth   amLODIPine (NORVASC) 10 MG tablet   Yes No   Sig: Take 20 mg by mouth   atorvastatin (LIPITOR) 80 MG tablet   Yes No   Sig: Take 40 mg by mouth   ezetimibe (ZETIA) 10 MG tablet   Yes No   Sig: Take 10 mg by mouth   furosemide (LASIX) 20 MG tablet   Yes No   Sig: TAKE 2 TABLETS BY MOUTH TWICE DAILY, FOR TREATMENT OF BLOOD PRESSURE AND FLUID RETENTION.   levothyroxine (SYNTHROID/LEVOTHROID) 50 MCG tablet   Yes No   Sig: Take 50 mcg by mouth   metoprolol succinate ER (TOPROL-XL) 50 MG 24 hr tablet   Yes No   Sig: Take 25 mg by mouth   nitroGLYcerin (NITROSTAT) 0.4 MG sublingual tablet   Yes No   Sig: Place 0.4 mg under the tongue   sacubitril-valsartan (ENTRESTO) 49-51 MG per tablet   Yes No   Sig: Take 1 tablet by mouth   traZODone (DESYREL) 50 MG tablet   Yes No   Sig: Take 50 mg by mouth   valsartan (DIOVAN) 320 MG tablet   Yes No   Sig: Take 320 mg by mouth      Facility-Administered Medications: None     Allergies   No Known Allergies    Social History   I have reviewed this patient's social history and updated it with pertinent information if needed. Jorje ASHA Teran  reports current alcohol use.  Son states occasionally he will walk cigar.  He was living independently prior to this.    Family History   I have reviewed this patient's family history and updated it with pertinent information if needed.   Heart disease in father mother.  Diabetes in sister.  Son states that her sister recently had a stroke and heart attack.    Review of Systems   The 10 point Review of Systems is unable to be obtained 2/2 pt factors  Physical Exam     Vital Signs with Ranges  0 lbs 0 oz       BP: 131/66 Pulse: 68   Resp: 15 SpO2: 97 %        Constitutional: alert, unable to assess orientation  Eyes:  unable to assess EOM, pupils appear symmetric and reactive  HEENT: KIRK  Respiratory: CTA B without w/c  Cardiovascular: RRR without m/r/g. 1+ edema  GI: soft, nontender, Obese, no HSM  Lymph/Hematologic: no cervical LAD  Genitourinary: deferred  Skin: no rashes or lesions grossly  Musculoskeletal: no deformities or arthritis  Neurologic: unable to fully assess. Is moving his R side and generally following commands. Dense paresis on L  Psychiatric: mood and affect wnl    Data   Data reviewed today:  I personally reviewed the head CT image(s) showing R sided ischemic CVA. EKG without acute ischemia. CXR clear  Recent Labs   Lab 09/07/20  1831   WBC 11.2*   HGB 14.5   MCV 91      INR 1.03      POTASSIUM 3.9   CHLORIDE 103   CO2 25   BUN 13   CR 0.85   ANIONGAP 8   SELENA 8.9   *   TROPI <0.015       Recent Results (from the past 24 hour(s))   CT Head w/o Contrast    Narrative    EXAM: CT HEAD WITHOUT CONTRAST  LOCATION: [facility]  DATE/TIME: 09/07/2020, 6:35 PM    INDICATION: Code stroke.  COMPARISON: None.  TECHNIQUE: Routine without IV contrast. Multiplanar reformats. Dose reduction techniques were used.    FINDINGS:  INTRACRANIAL CONTENTS: Dense right middle cerebral artery is present. Hypoattenuation and loss of gray-white differentiation are present involving the right middle cerebral artery distribution involving a large territory. No evidence of hemorrhage.    VISUALIZED ORBITS/SINUSES/MASTOIDS: No intraorbital abnormality. No paranasal sinus mucosal disease. No middle ear or mastoid effusion.    BONES/SOFT TISSUES: No acute abnormality.      Impression    IMPRESSION:  1.  Dense right middle cerebral artery.  2.  Large area of acute/subacute infarct involving the right middle cerebral artery distribution.  3.  No evidence of hemorrhage.    Images were made available at 6:51 PM on 09/07/2020. Findings were discussed by phone between Dr. Diallo and Dr. Clarke at 6:54 PM on  09/07/2020.     CTA Head Neck with Contrast    Narrative    EXAM: CTA  HEAD NECK WITH CONTRAST  LOCATION: [facility]  DATE/TIME: 09/07/2020, 6:38 PM    INDICATION: Code stroke.  COMPARISON: None.  CONTRAST: 70 mL Isovue-370.  TECHNIQUE: Axial helical CT images of the head and neck vessels obtained during the arterial phase of intravenous contrast administration. Axial 2D reconstructed images and multiplanar 3D MIP reconstructed images of the head and neck vessels were   performed by the technologist. Dose reduction techniques were used. All stenosis measurements made according to NASCET criteria unless otherwise specified.    FINDINGS:     HEAD CTA:  ANTERIOR CIRCULATION: A filling defect is present within the right internal carotid artery extending into the right middle cerebral artery M1 segment with occlusion of the right middle cerebral artery at the M1 segment. The left internal carotid artery,   anterior cerebral arteries, and left middle cerebral artery are patent. Moderate atherosclerotic plaquing is present involving the carotid siphons. 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    Right middle cerebral artery distribution brain infarct is present. Refer to head CT report for additional details.    POSTERIOR CIRCULATION: The vertebral arteries, basilar artery, and posterior cerebral arteries are patent. Fetal origin of the right posterior cerebral artery with poor visualization of the origin of the posterior communicating artery. No aneurysms are   identified. Moderate atherosclerotic plaquing is present involving the vertebral arteries at the V4 segments.    DURAL VENOUS SINUSES: Limited evaluation. No definite abnormality. Linear attenuation within the central aspect of the posterosuperior sagittal sinus presumably represents a flow artifact.    NECK CTA:  RIGHT CAROTID: Severe plaquing at the right carotid bifurcation with filling defect within the proximal  aspect of the right internal carotid artery. Stenosis is approximately 80% related to the filling defect.    LEFT CAROTID: Severe plaquing at the left carotid bifurcation with approximately 60% stenosis of the proximal left internal carotid artery.    VERTEBRAL ARTERIES: Mild stenosis at the left vertebral artery origin. Balanced vertebral arteries.    AORTIC ARCH: Classic aortic arch anatomy with no significant stenosis at the origin of the great vessels.    NONVASCULAR STRUCTURES: CABG change noted. Moderate cervical spine degenerative change is present. At least moderate spinal canal stenosis at C3-C4.      Impression    IMPRESSION:     HEAD CTA:   1.  Filling defect within the distal right internal carotid artery extending into the right middle cerebral artery with occlusion of the right middle cerebral artery at the M1 segment.  2.  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    NECK CTA:  1.  Severe plaquing at the right carotid bifurcation with filling defect in the proximal right internal carotid artery suggestive of thrombus contributing to approximately 80% stenosis.  2.  Approximately 60% stenosis of the proximal left internal carotid artery.     CT Head Perfusion w Contrast    Narrative    EXAM: CT HEAD PERFUSION WITH CONTRAST  LOCATION: [facility]  DATE/TIME: 9/7/2020 6:38 PM    INDICATION: Code stroke  COMPARISON: None.  TECHNIQUE: CT cerebral perfusion was performed utilizing a second contrast bolus. Perfusion data were post processed with generation of standard perfusion maps and estimation of ischemic/infarcted volumes utilizing standard threshold values. Dose   reduction techniques were used.  All stenosis measurements made according to NASCET criteria unless otherwise specified.  CONTRAST: 50 mL Isovue-370  COMPARISON: None.    FINDINGS:     CT PERFUSION:  PERFUSION MAPS: Large right middle cerebral artery distribution perfusion defect is  present.    RAPID ANALYSIS:  CBF<30%: 152 mL  Tmax>6sec: 339 mL  Mismatch volume: 187 mL  Mismatch ratio: 2.2    CT PERFUSION:  1.  Large right middle cerebral artery distribution perfusion defect with significant volume of core infarct.   XR Chest Port 1 View    Narrative    EXAM: XR CHEST PORT 1 VW  LOCATION: [facility]  DATE/TIME: 9/7/2020 7:09 PM    INDICATION: Chest pain  COMPARISON: None.      Impression    IMPRESSION: Previous CABG. Mild cardiomegaly. Pulmonary vessels normal. Lung volumes are low but lungs appear clear.   US Lower Extremity Venous Bilateral Port    Narrative    EXAM: US LOWER EXTREMITY VENOUS DUPLEX BILATERAL PORT  LOCATION: [facility]  DATE/TIME: 9/7/2020 7:44 PM    INDICATION: Leg swelling.  COMPARISON: None.  TECHNIQUE: Venous Duplex ultrasound of bilateral lower extremities with and without compression, augmentation and duplex. Color flow and spectral Doppler with waveform analysis performed.    FINDINGS: Exam includes the common femoral, femoral, popliteal veins as well as segmentally visualized deep calf veins and greater saphenous vein.     RIGHT: No deep vein thrombosis. No superficial thrombophlebitis. No popliteal cyst.    LEFT: No deep vein thrombosis. No superficial thrombophlebitis. No popliteal cyst.      Impression    IMPRESSION:  1.  No deep venous thrombosis in the bilateral lower extremities.

## 2020-09-08 NOTE — PROCEDURES
Diagnosis:  Stroke with significant right hemispheric edema    Procedure:  Left femoral vein CVL    Date:  09/08/20    Description:  Informed consent was obtained.  The left chest and neck was prepped and draped per the usual sterile fashion for a subclavian CVL placement.  The area was anesthetized with Lidocaine.  Multiple attempts at access were made with a needle but were unsuccessful.  The left internal jugular was then attempted to be accessed under US guidance but the vein was extremely collapsible and even after access the wire could not be advanced.  The left groin was then prepped and draped in the usual sterile fashion and anesthetized with Lidocaine.  Using US guidance, the left femoral vein was cannulated using Seldinger technique.  A dilator was advanced followed by insertion of the catheter over the wire.  The catheter was then secured with suture.  All ports flushed and laura.  A sterile dressing was applied.  CXR was ordered to assess for any pneumothorax from the attempts in the internal jugular or subclavian veins.  Patient tolerated the procedure without immediate complications.      Richi White MD on 9/8/2020 at 1:39 PM

## 2020-09-08 NOTE — CONSULTS
[facility]    Neurosurgery Consultation     Date of Admission:  9/7/2020  Date of Consult (When I saw the patient): 09/08/20    Assessment & Plan   Jorje Teran is a 70 year old male who was admitted on 9/7/2020. I was asked to see the patient for CVA.    Active Problems:    CVA (cerebral vascular accident) (H)    Assessment: 70 yr male with right hemispheric stroke, CT showing associated edema. He was found down by his son yesterday evening and transferred to  from [facility]. Currently he is lethargic, does respond to verbal, able to grasp on the right, indicate thumbs up.     Plan: Would consider repeat CT this afternoon. At present, not recommending surgical intervention but will continue to follow closely.       I have discussed the following assessment and plan with Dr. Posada, who is in agreement with the initial plan and will follow up with further consultation [place]s.    Peter Fontaine PA-C  [facility] Neurosurgery  [address] [address]    Tel [phone]  Pager [phone]        Code Status    Full Code    Reason for Consult   Reason for consult: CVA    Primary Care Physician   No primary care provider on file.    Chief Complaint   CVA    History is obtained from the patient and electronic health record    History of Present Illness   Jorje Teran is a 70 year old male who presents with right hemispheric stroke, CT showing associated edema. He was found down by his son yesterday evening and transferred to  from [facility]. Currently he is lethargic, does respond to verbal, able to grasp on the right, indicate thumbs up.     Past Medical History   I have reviewed this patient's medical history and updated it with pertinent information if needed.   No past medical history on file.    Past Surgical History   I have reviewed this patient's surgical history and updated it with pertinent information if needed.  No  past surgical history on file.    Prior to Admission Medications   Prior to Admission Medications   Prescriptions Last Dose Informant Patient Reported? Taking?   amLODIPine (NORVASC) 10 MG tablet   Yes Yes   Sig: Take 20 mg by mouth daily    aspirin 81 MG EC tablet   Yes Yes   Sig: Take 81 mg by mouth daily   atorvastatin (LIPITOR) 80 MG tablet   Yes Yes   Sig: Take 40 mg by mouth daily    ezetimibe (ZETIA) 10 MG tablet   Yes Yes   Sig: Take 10 mg by mouth daily    furosemide (LASIX) 20 MG tablet   Yes Yes   Sig: TAKE 2 TABLETS BY MOUTH TWICE DAILY, FOR TREATMENT OF BLOOD PRESSURE AND FLUID RETENTION.   levothyroxine (SYNTHROID/LEVOTHROID) 50 MCG tablet   Yes Yes   Sig: Take 50 mcg by mouth daily    metoprolol succinate ER (TOPROL-XL) 50 MG 24 hr tablet   Yes Yes   Sig: Take 25 mg by mouth 2 times daily    multivitamin (CENTRUM SILVER) tablet   Yes Yes   Sig: Take 1 tablet by mouth daily   nitroGLYcerin (NITROSTAT) 0.4 MG sublingual tablet   Yes Yes   Sig: Place 0.4 mg under the tongue every 5 minutes as needed    sacubitril-valsartan (ENTRESTO) 49-51 MG per tablet   Yes Yes   Sig: Take 1 tablet by mouth 2 times daily    traZODone (DESYREL) 50 MG tablet   Yes Yes   Sig: Take 50 mg by mouth at bedtime as needed, may repeat once    valsartan (DIOVAN) 320 MG tablet   Yes Yes   Sig: Take 320 mg by mouth daily       Facility-Administered Medications: None     Allergies   No Known Allergies    Social History   I have reviewed this patient's social history and updated it with pertinent information if needed. Jorje Teran  reports current alcohol use.    Family History   I have reviewed this patient's family history and updated it with pertinent information if needed.   No family history on file.    Review of Systems   10 point review of systems is negative with the exception of HPI and PMH.       Physical Exam   Temp: 98.4  F (36.9  C) Temp src: Axillary BP: (!) 161/82 Pulse: 72   Resp: (!) 6 SpO2: 97 % O2 Device: None  (Room air)    Vital Signs with Ranges  Temp:  [98  F (36.7  C)-98.4  F (36.9  C)] 98.4  F (36.9  C)  Pulse:  [60-87] 72  Resp:  [6-29] 6  BP: (115-161)/() 161/82  SpO2:  [91 %-100 %] 97 %  247 lbs 12.75 oz     , Blood pressure (!) 161/82, pulse 72, temperature 98.4  F (36.9  C), temperature source Axillary, resp. rate (!) 6, weight 112.4 kg (247 lb 12.8 oz), SpO2 97 %.  247 lbs 12.75 oz  HEENT:  Normocephalic, atraumatic.  PERRLA.  EOM s intact.   Neck:  Supple, non-tender, without lymphadenopathy.  Heart:  No peripheral edema  Lungs:  No SOB  Abdomen:  Soft, non-tender, non-distended.  Skin:  Warm and dry, good capillary refill.  Extremities:  Good radial and dorsalis pedis pulses bilaterally, no edema, cyanosis or clubbing.    NEUROLOGICAL EXAMINATION:     Mental status:  Alerts to verbal, follows some basic commands  Motor: left paresis. Squeezes on the right.   Sensation:  intact  Reflexes:   Negative Babinski.  Negative Clonus.        Data   All new lab and imaging data was personally reviewed by me.  CT:1. Large evolving right MCA territory ischemic infarct again noted.  Increased edema within the infarcted tissue results in new effacement  of the frontal horn of the right lateral ventricle. Continued  surveillance recommended. No evidence for intracranial hemorrhage.  2. Diffuse cerebral volume loss and cerebral white matter changes  consistent with chronic small vessel ischemic disease.  MRI:  CBC RESULTS:   Recent Labs   Lab Test 09/08/20  0708   WBC 9.7   RBC 4.43   HGB 13.6   HCT 39.8*   MCV 90   MCH 30.7   MCHC 34.2   RDW 12.3        Basic Metabolic Panel:  Lab Results   Component Value Date     09/08/2020      Lab Results   Component Value Date    POTASSIUM 3.6 09/08/2020     Lab Results   Component Value Date    CHLORIDE 107 09/08/2020     Lab Results   Component Value Date    SELENA 8.4 09/08/2020     Lab Results   Component Value Date    CO2 26 09/08/2020     Lab Results   Component  Value Date    BUN 13 09/08/2020     Lab Results   Component Value Date    CR 0.85 09/08/2020     Lab Results   Component Value Date     09/08/2020     INR:  Lab Results   Component Value Date    INR 1.03 09/07/2020

## 2020-09-08 NOTE — CONSULTS
Critical Care Consultation    Name: Jorje Teran MRN#: [id number]   Age: 70 year old YOB: 1950     Reason for Consult: Hypertonic saline, cerebral edema s/p CVA       Requesting Physician: Dr. Castaneda       Level of Consult or Critical Care Time: Consult, follow and place orders  60  min          Assessment and Plan:   Jorje Teran is a 70 y.o. M w/ PMHx of CAD, HTN, prediabetes, admitted on 9/7 for weakness, slurred speech, found to have an evolving, large R MCA ischemic infarct.  Due to concern for malignant MCA syndrome, the invensivist team was consulted for assistance with management of airway and central line placement to facilitate hypertonic saline administration.     NEURO/PSYCH:   1. CVA (R MCA ischemic infarct): Last known normal 12pm 9/6 (>24h PTA), on presentation, unable to move L side, garbled speech. CT 9/8 showed large, multifocal area of acute infract involving R MCA territory.   Filling defect within distal R ICA extending into the R MCA & occlusion of the R MCA at M1. 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 CTA w/ 80% stenosis of prox R ICA @ R carotid bifurcation. 60% stenosis of proximal L ICA. Lipids 7/29: LDL 78, HDL 56, , Tchol 164. A1C 7/2020 7.1%, TSH WNL. Given ASA 300mg x1 @ ED. L side responsive to pain. Concern for worsening neuro exam and cerebral edema; will monitor closely. GCS 13-14. Neurology considering hemicraniectomy which is not indicated so far.  - Neuro following, appreciate recs  - q1h neuro checks  - HOB >30 degrees  - Permissive HTN: labetalol PRN for SBP >220  - Hypertonic saline 50ml/hr, q4h Na check, goal Na 145  - PT/OT when able    CV:  1. CAD s/p CABG x3, x1 (2015), HTN/HLD: PTA amlodipine 20mg, ASA 81mg, atorvastatin 40mg, ezetimibe 10mg, furosemide 40mg BID, metoprolol 25mg BID, sacubitril-valsartan BID, valsartan 320mg. Troponin, pro-BNP on admission WNL.  - Permissive HTN: labetalol PRN for SBP >220  - F/u TTE w/  bubble  - Unable to give PTA PO meds given NPO status (PO atorvastatin 40mg, ezetimibe 10mg, metoprolol succinate 25mg, sacubitril-valsartan ordered)  - ASA 75mg suppository  - Telemetry    PULM: Pt has h/o SHELDON; holding CPAP for now. H/o thick cough per family.  Aspiration likely going to be an issue with his deficits. So far does not require intubation for airway protection.   - CXR 9/8 s/p central line.    GI/FEN:  1. Nutrition: Swallow study 9/8 demonstrated moderate-severe oral-pharyngeal dysphagia; high risk for aspiration. Recommend NPO status except for 1-3 tsps nectar thick by spoon w/ RN supervision, 90 degrees, only when alert.  - NPO for now until NJ placed  - NJ placement  - SLP consulted, appreciate recs    ENDO:  1. DM2: Not on PTA meds, A1C 7/2020 7.1.   - q4h BG checks  - Low sliding scale insulin    2. Hypothyroidism: PTA levo 50mcg. TSH 7/29 2.05.  - Levothyroxine 25mg IV daily while NPO    RENAL/ELECTROLYTES: Hypovolemic on US 9/8, given 500mL NaCl bolus. Was found down, possibly >24hrs at home. CK WNL.  - Monitor I&Os (diaper weights, bedside urinal as able)  - Daily BMP, electrolyte replacement PRN  - Na q4h check    INFECTIOUS DISEASES: No acute issues. WBC on admission 11, pt afebrile.    IV/Access:   1. Central line: R. Femoral  2. Arterial access: None  3. Peripheral IVs: 2 peripheral IV  4. Hayes catheter: Diapers, bedside urinal  5. NG/OG tube: NJ ordered  6. Drains: None     ICU Prophylaxis:   1. DVT: SCDs  2. Feeding: See above.  3. Restraint: NA  4. Family Update: Spoke to son at bedside today  5. Code status: Full code  6. Disposition - TBD       Chief complain and HPI   History is obtained from the patient, pt's family, and chart review.     Pt was found down at home 9/7 by son with L-sided weakness, garbled speech. Last known normal was 9/6 per pt's son. Was supposed to attend a lunch, but missed this. Per son, pt was completely at baseline prior to admission/being found down. Had  just returned home from a hunting trip and was doing well. Upon arrival to the hospital, pt was found to have R MCA CVA; stroke protocol activated. Received ASA in the ED.   Was not a candidate for TPA or mechanical thrombectomy due to last known positive time and established infarct present on head CT.           Past Medical History:   I have reviewed this patient's past medical history.         Past Surgical History:   I have reviewed this patient's past surgical history.          Social History:   I have reviewed this patient's social history. Used to smoke cigars, quit 2009.         Family History:   I have reviewed this patient's family history.          Allergies:   All allergies reviewed and addressed  No Known Allergies          Medications:       aspirin  75 mg Rectal Daily     atorvastatin  40 mg Oral Daily     ezetimibe  10 mg Oral Daily     insulin aspart  1-4 Units Subcutaneous Q4H     levothyroxine  25 mcg Intravenous Daily     metoprolol succinate ER  25 mg Oral BID     sacubitril-valsartan  1 tablet Oral BID          Review of Systems:   Review of systems is limited by patient factors - mental status, speech         Physical Exam:   Vitals were reviewed  Temp:  [98  F (36.7  C)-98.4  F (36.9  C)] 98.4  F (36.9  C)  Pulse:  [60-87] 73  Resp:  [9-29] 17  BP: (115-153)/() 152/78  SpO2:  [91 %-100 %] 97 %    Intake/Output Summary (Last 24 hours) at 9/8/2020 1152  Last data filed at 9/8/2020 1100  Gross per 24 hour   Intake 1310.42 ml   Output --   Net 1310.42 ml     General: Laying in bed. Appears stated age.   HEENT: Normocephalic, atraumatic. Anicteric sclera.  CV: Strong radial pulses. Collapsible internal jugular vein on US.    Lungs: Normal chest rise.  Extremities: WWP, no rashes appreciated.  Neuro: GCS 13-14. L-sided upper and lower extremity weakness. Able to spontaneously move R-side, and occasionally L-ft. Speech coherent, but difficult to hear at times. L-sided facial droop present.  Coherent thought process; tracking conversations, responding accordingly.          Data:   All laboratory and imaging data in the past 24 hours reviewed  All cardiac studies reviewed by me.  All imaging studies reviewed by me.    Resp: 17    Recent Labs   Lab 09/07/20  1831   O2PER Room Air     I saw this patient and discussed the plan of care with Dr. Sow.    Wendy Millan  September 8, 2020    Physician Attestation   I, Meenu Sow, was present with the medical student who participated in the service and in the documentation of the note.  I have verified the history and personally performed the physical exam and medical decision making.  I agree with the assessment and plan of care as documented in the note.      I personally reviewed vital signs, medications, labs and imaging.    Critical care issues:   - acute MCA stroke with concern for malignant MCA syndrome  - altered mental status and concern for ability to protect airway    Critical care interventions  - so far, patient does not require intubation.  Per his son, his mental status is stable as compared to 24 hours prior.   - place central line to facilitate 3% saline, goal Na 145  - continue Q1H neuro checks, appreciate NCC and NSG input on if/when a hemicraniectomy would be indicated    CC time today exclusive of procedures and teaching 40 minutes    Meenu Sow MD  Date of Service (when I saw the patient): 09/08/20

## 2020-09-08 NOTE — PROGRESS NOTES
[facility]    Medicine Progress Note - Hospitalist Service       Date of Admission:  9/7/2020  Date of Service: 09/08/2020    Assessment & Plan The email address for your Yesmail[employer] account has been updated     Jorje Teran is a 70 year old male who presents with weakness and slurred speech    Asymptomatic COVID-19 sent, low suspicion    CVA  Last baseline known noon on 9/6 (over 24 hours PTA). Son went to check on father afternoon/evening of presentation and he was on ground, unable to move his L side and slurring words. Vitals stable. Labs normal except 11.2. CXR clear.   *CT with filling defect within the distal R ICA extending into the R MCA and occlusion of the R MCA at the M1 segment. CTA with severe plaquing at R carotid bifurcation with filling defect in the prox R ICA suggestive of thrombus contributing to approximately 80% stenosis. 60% stenosis of the proximal L ICA. TTE with bubble -> Normal left ventricular size and function. Left ventricular ejection fraction of 55-60%. No segmental wall motion abnormalities noted.  No shunting at the atrial level on suboptimal Bubble study.  - lipids 7/29/20- LDL 78, HDL 56, , T chol 164  - A1C 7/2020 7.1%   - TSH 7/2020 at 2.05   - given  mg x 1 on arrival to ED  Plan:  - Q1 hour neuro checks  - permissive HTN, labetalol for SBP>220  - Neurology and Neurosurgery following  - ASA 81 mg daily PO/AL  - atorvastatin 40 mg daily when able  - MRI stroke protocol and repeat CT head today  - telemetry  - 3% hypertonic saline with goal Na 145  - q4 hour BS monitoring  - PT/OT when able  - bedside swallow, SLP    CAD s/p CABG x 3, x 1 in 2015  HTN  HLD  PTA (needs med rec) amlodipine 20 mg daily, ASA 81 mg daiy, atorvastatin 40 mg daily, Zetia 10 mg daily, furosemide 40 mg BID, metoprolol 25 mg BID, Entresto 49-51 1 tab BID, valsartan 320 mg daily  Follows with Dr. Vincent with [facility]. Some concern re: medical compliance.  TTE with bubble  this admission -> Normal left ventricular size and function. Left ventricular ejection fraction of 55-60%. No segmental wall motion abnormalities noted.  No shunting at the atrial level on suboptimal Bubble study.  Plan:  - hold meds while NPO  - prn labetalol, hydralazine SBP>220    DM II  Not on meds for this. Recent A1C at 7.1 7/2020.   - q4 hour blood sugar checks  - low sliding scale insulin, increase medium if needed    LE edema  US at [facility] negative for DVT    SHELDON  Intermittent treatment as outpatient   - hold CPAP for now    Hypothyroidism  PTA (needs med rec) levothyroxine 50 mcg daily  TSH checked 7/29/2020 at 2.05  - change levothyroxine to 25 mg IV daily while NPO    Morbid obesity  BMI noted  7/202 at ~38. Will need to encourage healthy lifestyle and weight loss with recovery       Diet: NPO for Medical/Clinical Reasons Except for: No Exceptions  Adult Formula Drip Feeding: Continuous Impact Peptide 1.5; Nasojejunal; Goal Rate: 50; mL/hr; Medication - Feeding Tube Flush Frequency: At least 15-30 mL water before and after medication administration and with tube clogging    DVT Prophylaxis: Pneumatic Compression Devices  Hayes Catheter: not present  Code Status: Full Code           Disposition Plan   Expected discharge: 4 - 7 days, recommended to transitional care unit once mental status at baseline, safe disposition plan/ TCU bed available and neurology work up completed.  Entered: Michael Merritt MD 09/08/2020, 5:43 PM       The patient's care was discussed with the Bedside Nurse, Patient and Patient's Family.    Michael Merritt MD  Hospitalist Service  [facility]    ______________________________________________________________________    Interval History     No acute events overnight  CVC placed for 3% hypertonic saline  Patient lethargic but denies CP/SOB. No fevers or chills recorded  Patient has no new complaints  Son at bedside.     Data reviewed today: I reviewed all medications, new  labs and imaging results over the last 24 hours. I personally reviewed no images or EKG's today.    Physical Exam   Vital Signs: Temp: 98.4  F (36.9  C) Temp src: Axillary BP: (!) 147/77 Pulse: 80   Resp: 22 SpO2: 96 % O2 Device: None (Room air)    Weight: 247 lbs 12.75 oz    Constitutional: no apparent distress, lethargu  Respiratory: CTA B without w/c  Cardiovascular: RRR without m/r/g. 1+ edema  GI: soft, nontender, Obese, no HSM  Neurologic: unable to fully assess. Is moving his R side and generally following commands. Dense paresis on L  Psychiatric: mood and affect wnl      Data   Recent Labs   Lab 09/08/20  1517 09/08/20  0708 09/07/20  2355 09/07/20  1831   WBC  --  9.7  --  11.2*   HGB  --  13.6  --  14.5   MCV  --  90  --  91   PLT  --  189  --  219   INR  --   --   --  1.03    139  --  136   POTASSIUM  --  3.6  --  3.9   CHLORIDE  --  107  --  103   CO2  --  26  --  25   BUN  --  13  --  13   CR  --  0.85  --  0.85   ANIONGAP  --  6  --  8   SELENA  --  8.4*  --  8.9   GLC  --  162*  --  190*   ALBUMIN  --   --  3.6  --    PROTTOTAL  --   --  7.4  --    BILITOTAL  --   --  0.9  --    ALKPHOS  --   --  67  --    ALT  --   --  28  --    AST  --   --  23  --    TROPI  --  <0.015 <0.015 <0.015     Recent Results (from the past 24 hour(s))   CT Head w/o Contrast    Narrative    EXAM: CT HEAD WITHOUT CONTRAST  LOCATION: [facility]  DATE/TIME: 09/07/2020, 6:35 PM    INDICATION: Code stroke.  COMPARISON: None.  TECHNIQUE: Routine without IV contrast. Multiplanar reformats. Dose reduction techniques were used.    FINDINGS:  INTRACRANIAL CONTENTS: Dense right middle cerebral artery is present. Hypoattenuation and loss of gray-white differentiation are present involving the right middle cerebral artery distribution involving a large territory. No evidence of hemorrhage.    VISUALIZED ORBITS/SINUSES/MASTOIDS: No intraorbital abnormality. No paranasal sinus mucosal disease. No middle ear or mastoid  effusion.    BONES/SOFT TISSUES: No acute abnormality.      Impression    IMPRESSION:  1.  Dense right middle cerebral artery.  2.  Large area of acute/subacute infarct involving the right middle cerebral artery distribution.  3.  No evidence of hemorrhage.    Images were made available at 6:51 PM on 09/07/2020. Findings were discussed by phone between Dr. Diallo and Dr. Clarke at 6:54 PM on 09/07/2020.     CTA Head Neck with Contrast    Narrative    EXAM: CTA  HEAD NECK WITH CONTRAST  LOCATION: [facility]  DATE/TIME: 09/07/2020, 6:38 PM    INDICATION: Code stroke.  COMPARISON: None.  CONTRAST: 70 mL Isovue-370.  TECHNIQUE: Axial helical CT images of the head and neck vessels obtained during the arterial phase of intravenous contrast administration. Axial 2D reconstructed images and multiplanar 3D MIP reconstructed images of the head and neck vessels were   performed by the technologist. Dose reduction techniques were used. All stenosis measurements made according to NASCET criteria unless otherwise specified.    FINDINGS:     HEAD CTA:  ANTERIOR CIRCULATION: A filling defect is present within the right internal carotid artery extending into the right middle cerebral artery M1 segment with occlusion of the right middle cerebral artery at the M1 segment. The left internal carotid artery,   anterior cerebral arteries, and left middle cerebral artery are patent. Moderate atherosclerotic plaquing is present involving the carotid siphons. 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    Right middle cerebral artery distribution brain infarct is present. Refer to head CT report for additional details.    POSTERIOR CIRCULATION: The vertebral arteries, basilar artery, and posterior cerebral arteries are patent. Fetal origin of the right posterior cerebral artery with poor visualization of the origin of the posterior communicating artery. No aneurysms are   identified. Moderate  atherosclerotic plaquing is present involving the vertebral arteries at the V4 segments.    DURAL VENOUS SINUSES: Limited evaluation. No definite abnormality. Linear attenuation within the central aspect of the posterosuperior sagittal sinus presumably represents a flow artifact.    NECK CTA:  RIGHT CAROTID: Severe plaquing at the right carotid bifurcation with filling defect within the proximal aspect of the right internal carotid artery. Stenosis is approximately 80% related to the filling defect.    LEFT CAROTID: Severe plaquing at the left carotid bifurcation with approximately 60% stenosis of the proximal left internal carotid artery.    VERTEBRAL ARTERIES: Mild stenosis at the left vertebral artery origin. Balanced vertebral arteries.    AORTIC ARCH: Classic aortic arch anatomy with no significant stenosis at the origin of the great vessels.    NONVASCULAR STRUCTURES: CABG change noted. Moderate cervical spine degenerative change is present. At least moderate spinal canal stenosis at C3-C4.      Impression    IMPRESSION:     HEAD CTA:   1.  Filling defect within the distal right internal carotid artery extending into the right middle cerebral artery with occlusion of the right middle cerebral artery at the M1 segment.  2.  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    NECK CTA:  1.  Severe plaquing at the right carotid bifurcation with filling defect in the proximal right internal carotid artery suggestive of thrombus contributing to approximately 80% stenosis.  2.  Approximately 60% stenosis of the proximal left internal carotid artery.     CT Head Perfusion w Contrast    Narrative    EXAM: CT HEAD PERFUSION WITH CONTRAST  LOCATION: [facility]  DATE/TIME: 9/7/2020 6:38 PM    INDICATION: Code stroke  COMPARISON: None.  TECHNIQUE: CT cerebral perfusion was performed utilizing a second contrast bolus. Perfusion data were post processed with generation of standard  perfusion maps and estimation of ischemic/infarcted volumes utilizing standard threshold values. Dose   reduction techniques were used.  All stenosis measurements made according to NASCET criteria unless otherwise specified.  CONTRAST: 50 mL Isovue-370  COMPARISON: None.    FINDINGS:     CT PERFUSION:  PERFUSION MAPS: Large right middle cerebral artery distribution perfusion defect is present.    RAPID ANALYSIS:  CBF<30%: 152 mL  Tmax>6sec: 339 mL  Mismatch volume: 187 mL  Mismatch ratio: 2.2    CT PERFUSION:  1.  Large right middle cerebral artery distribution perfusion defect with significant volume of core infarct.   XR Chest Port 1 View    Narrative    EXAM: XR CHEST PORT 1 VW  LOCATION: [facility]  DATE/TIME: 9/7/2020 7:09 PM    INDICATION: Chest pain  COMPARISON: None.      Impression    IMPRESSION: Previous CABG. Mild cardiomegaly. Pulmonary vessels normal. Lung volumes are low but lungs appear clear.   US Lower Extremity Venous Bilateral Port    Narrative    EXAM: US LOWER EXTREMITY VENOUS DUPLEX BILATERAL PORT  LOCATION: [facility]  DATE/TIME: 9/7/2020 7:44 PM    INDICATION: Leg swelling.  COMPARISON: None.  TECHNIQUE: Venous Duplex ultrasound of bilateral lower extremities with and without compression, augmentation and duplex. Color flow and spectral Doppler with waveform analysis performed.    FINDINGS: Exam includes the common femoral, femoral, popliteal veins as well as segmentally visualized deep calf veins and greater saphenous vein.     RIGHT: No deep vein thrombosis. No superficial thrombophlebitis. No popliteal cyst.    LEFT: No deep vein thrombosis. No superficial thrombophlebitis. No popliteal cyst.      Impression    IMPRESSION:  1.  No deep venous thrombosis in the bilateral lower extremities.   CT Head w/o Contrast    Narrative    CT OF THE HEAD WITHOUT CONTRAST September 8, 2020 7:46 AM     HISTORY: Stroke, follow up.    TECHNIQUE: 5 mm thick axial CT images of the  head were acquired  without IV contrast material. Radiation dose for this scan was reduced  using automated exposure control, adjustment of the mA and/or kV  according to patient size, or iterative reconstruction technique.    COMPARISON: Head CT 9/7/2020.    FINDINGS: Large evolving ischemic infarct involving the majority of  the right middle cerebral artery territory including large portions of  the right frontal, right temporal and right parietal lobes is again  noted without definite change in extent. There has been an interval  increase in edema within the infarcted tissue resulting in new  effacement of the frontal horn of the right lateral ventricle.  Continued surveillance recommended.     There is moderate diffuse cerebral volume loss. There are subtle  patchy areas of decreased density in the cerebral white matter  bilaterally that are consistent with sequela of chronic small vessel  ischemic disease. The ventricles and basal cisterns are within normal  limits in configuration given the degree of cerebral volume loss.  There are no extra-axial fluid collections.    No intracranial hemorrhage or mass.    The visualized paranasal sinuses are well-aerated. There is no  mastoiditis. There are no fractures of the visualized bones.       Impression    IMPRESSION:   1. Large evolving right MCA territory ischemic infarct again noted.  Increased edema within the infarcted tissue results in new effacement  of the frontal horn of the right lateral ventricle. Continued  surveillance recommended. No evidence for intracranial hemorrhage.  2. Diffuse cerebral volume loss and cerebral white matter changes  consistent with chronic small vessel ischemic disease.         MARIBEL MORRISON MD   XR Chest Port 1 View    Narrative    CHEST ONE VIEW  9/8/2020 2:00 PM     HISTORY: Status post CVL placement, concern for pneumothorax.    COMPARISON: September 7, 2020      Impression    IMPRESSION: Orogastric tube tip below the margin  of the study. No  definite pneumothorax or pneumomediastinum demonstrated.    MICHAEL ROMERO MD   XR Abdomen Port 1 View    Narrative    ABDOMEN ONE VIEW PORTABLE  2020 2:10 PM     HISTORY: TF placement verification.    COMPARISON: None.       Impression    IMPRESSION: Feeding tube tip may be in the pylorus vs. distal stomach.  Minimal amount of stool.  Nonobstructed bowel gas pattern.    MICHAEL ROMERO MD   Echocardiogram Complete - Bubble study    Narrative    [id number]  [id number]  [id number]  [id number]^EDGAR^NAYE^VALENTINE           [facility]  Echocardiography Laboratory  [address] [id number]        Name: JENNIFER GANNON  MRN: [id number]  : 1950  Study Date: 2020 02:37 PM  Age: 70 yrs  Gender: Male  [facility]  Reason For Study: CVA  Ordering Physician: NAYE HERNÁNDEZ  Performed By: Jaky Grove     HR: 75  _____________________________________________________________________________  __        Procedure  Complete Portable Echo Adult. Optison (NDC #7573-1330) given intravenously.  _____________________________________________________________________________  __        Interpretation Summary     1. Normal left ventricular size and function. Left ventricular ejection  fraction of 55-60%. No segmental wall motion abnormalities noted.  2. No shunting at the atrial level on suboptimal Bubble study.  3. No hemodynamically significant valvular disease.  No prior study for comparison.  Technically difficult study.  _____________________________________________________________________________  __        Left Ventricle  The left ventricle is normal in size. There is normal left ventricular wall  thickness. Left ventricular systolic function is normal. The visual ejection  fraction is estimated at 55-60%. Left ventricular diastolic function is  indeterminate.     Right Ventricle  The right ventricle is not well visualized.     Atria  The left atrium is  not well visualized. Right atrial size is normal. There is  no color Doppler evidence of an atrial shunt.     Mitral Valve  The mitral valve leaflets appear normal. There is no evidence of stenosis,  fluttering, or prolapse. There is mild mitral annular calcification. There is  trace mitral regurgitation.     Tricuspid Valve  Normal tricuspid valve. There is trace tricuspid regurgitation.        Aortic Valve  Thickened aortic valve leaflets. The aortic valve is not well visualized.  There is trace aortic regurgitation. No hemodynamically significant valvular  aortic stenosis.     Pulmonic Valve  The pulmonic valve is not well visualized. There is trace pulmonic valvular  regurgitation.     Vessels  The aortic root is normal size. Normal size ascending aorta.     Pericardium  There is no pericardial effusion.     Rhythm  Sinus rhythm was noted.     _____________________________________________________________________________  __  MMode/2D Measurements & Calculations  IVSd: 1.1 cm  LVIDd: 5.1 cm  LVIDs: 3.8 cm  LVPWd: 0.91 cm  FS: 25.7 %     LV mass(C)d: 183.6 grams  Ao root diam: 3.2 cm  LA dimension: 4.2 cm  asc Aorta Diam: 3.4 cm  LA/Ao: 1.3  LVOT diam: 2.3 cm  LVOT area: 4.2 cm2  LA Volume (BP): 63.0 ml  RWT: 0.36        Doppler Measurements & Calculations  MV E max nathaniel: 82.3 cm/sec  MV A max nathaniel: 68.4 cm/sec  MV E/A: 1.2  PA acc time: 0.10 sec  E/E' av.8  Lateral E/e': 6.6  Medial E/e': 13.0              _____________________________________________________________________________  __        Report approved by: Julissa Booker 2020 03:33 PM        Medications     - MEDICATION INSTRUCTIONS -       sodium chloride 3% 50 mL/hr at 20 1433       aspirin  81 mg Oral or Feeding Tube Daily    Or     aspirin  75 mg Rectal Daily     atorvastatin  40 mg Oral Daily     ezetimibe  10 mg Oral Daily     insulin aspart  1-4 Units Subcutaneous Q4H     levothyroxine  25 mcg Intravenous Daily     metoprolol  tartrate  25 mg Oral or Feeding Tube BID

## 2020-09-08 NOTE — PROGRESS NOTES
"CLINICAL NUTRITION SERVICES  -  ASSESSMENT NOTE      RECOMMENDATIONS FOR MD/PROVIDER TO ORDER: Recommend change goal of Isosource 1.5 at 60 mL/hr to provide:  2160 kcal (27 kcal/kg), 98 g protein (1.2 g/kg), 253 g CHO, 22 g fiber, 1094 mL H2O  Recommend add flushes 60 mL every 4 hours    Malnutrition: % Weight Loss:  Up to 5% in 1 month (non-severe malnutrition)  % Intake:  Unable to determine   Subcutaneous Fat Loss:  None observed  Muscle Loss:  None observed  Fluid Retention:  None noted    Malnutrition Diagnosis: Unable to determine due to inability to obtain a nutrition history         REASON FOR ASSESSMENT  Jorje Teran is a 70 year old male seen by Registered Dietitian for MD ordered TF for patient       NUTRITION HISTORY  - Unable to obtain nutrition history as patient busy receiving cares.      CURRENT NUTRITION ORDERS  Diet Order:     NPO   Plan to start Impact Peptide 1.5 at 50 mL/hr= 1800 kcal (23 kcal/kg), 113 g protein (1.4 g/kg), 168 g CHO, no fiber, 924 mL H2O    Current Intake/Tolerance:  N/A      NUTRITION FOCUSED PHYSICAL ASSESSMENT FOR DIAGNOSING MALNUTRITION)  Yes (visual only)              Observed:    No nutrition-related physical findings observed    Obtained from Chart/Interdisciplinary Team:  None     ANTHROPOMETRICS  Height: 5'7\" (per Care Everywhere)  Weight: 112.4 kg (248#)(9/8)  BMI:  38.8 kg/m^2  Weight Status:  Obesity Grade II BMI 35-39.9  IBW: 67.3 kg   % IBW: 167%  Weight History:   Noted per Care Everywhere --> Patient was 252# in March 2019 and 255# in July 2020    LABS  Labs reviewed    MEDICATIONS  Medications reviewed      ASSESSED NUTRITION NEEDS PER APPROVED PRACTICE GUIDELINES:    Dosing Weight 78.6 kg (adjusted)  Estimated Energy Needs: 8665-2336 kcals (25-30 Kcal/Kg)  Justification: obese  Estimated Protein Needs:  grams protein (1.2-1.5 g pro/Kg)  Justification: hypercatabolism with acute illness  Estimated Fluid Needs: 0504-5234 mL (1 mL/Kcal)  Justification: " maintenance    MALNUTRITION:  % Weight Loss:  Up to 5% in 1 month (non-severe malnutrition)  % Intake:  Unable to determine   Subcutaneous Fat Loss:  None observed  Muscle Loss:  None observed  Fluid Retention:  None noted    Malnutrition Diagnosis: Unable to determine due to inability to obtain a nutrition history     NUTRITION DIAGNOSIS:  Inadequate protein-energy intake related to NPO, plans for TF to start today per MD order as evidenced by meeting 0% needs    NUTRITION INTERVENTIONS  Recommendations / Nutrition Prescription  Recommend change goal of Isosource 1.5 at 60 mL/hr to provide:  2160 kcal (27 kcal/kg), 98 g protein (1.2 g/kg), 253 g CHO, 22 g fiber, 1094 mL H2O  Recommend add flushes 60 mL every 4 hours     Implementation  Nutrition education: Not appropriate at this time due to patient condition  Collaboration and Referral of Nutrition care:  Patient discussed earlier this morning during interdisciplinary bedside rounds     Nutrition Goals  Patient will meet % needs via TF     MONITORING AND EVALUATION:  Progress towards goals will be monitored and evaluated per protocol and Practice Guidelines    Coby Burrell RD, LD, CNSC   Clinical Dietitian - [facility]

## 2020-09-08 NOTE — PLAN OF CARE
[id number]  Neuro: lethargic, difficult to open eyes. Left neglect. LUE/LLE w/d to pain. RUE/RLE purposeful. Disoriented to place and situation.   CV: SBP<220. SR. Pulses palpable. 2+ edema  Pulm: LS clear, RA. Scant secretions   GI: TF placed, NPO. Speech following  : Incontinent at times/voiding per urinal. No BM  Skin: Lt forehead abrasion  Access: Lt femoral CVC, PIVx2  Plan: q1h neuros, NSG/NCC following  Edilia Mendez RN 09/08/20 6:49 PM

## 2020-09-08 NOTE — ED NOTES
Pt incontinent of urine and stool. Pt cleaned, linens and gown changed. Pt tolerated well. Pt repositioned with pillows under left side.

## 2020-09-08 NOTE — PLAN OF CARE
Discharge Planner SLP   Patient plan for discharge: Did not discuss  Current status: A bedside swallow evaluation was completed this am.  Pt presents with moderate-severe oral-pharyngeal dysphagia negatively impacted by fatigue.  Pt is at high risk for aspiration at this time.  Intermittent throat clearing was observed after ice chips and nectar by cup.  Delayed extensive coughing was noted after sips of thin water by cup.      Recommend NPO status except for 1-3 tsps of nectar thick by spoon with RN supervision, sit at 90 degrees, only when alert.  Hold trials if aspiration signs are noted/respiratory status declines.      Plan to continue Tx with po trials to determine safety to initiate a po diet.  Will further assess speech function as indicated.  Barriers to return to prior living situation: Level of assist, fatigue, communication deficits due to dysarthria  Recommendations for discharge: ARU  Rationale for [place]s: ARU as anticipate pt able to tolerate 3 hours of therapy per day, pt well below baseline, SLP Tx to maximize swallow function for a least restrictive diet and cognitive-linguistic function for daily needs       Entered by: Ghazala Reed 09/08/2020 8:59 AM

## 2020-09-08 NOTE — PROGRESS NOTES
"  [facility]    Stroke Progress Note    History:  Additional history obtained from patient's son, Donte. Son states that patient was Left handed by birth, but learnt to use his right hand for most things. He was expected to meet with another sibling the afternoon of 9/7 for lunch. When patient did not arrive for lunch, Donte went over to his dad's house to check on him. He found him on the floor in his boxers. When Donte told his dad that he thought he was having a stroke, patient responded \"no stroke, no stroke.\" EMS was called and patient was brought to [facility] ED.    Interval Events  Patient was transferred from [facility] to [facility] at 2000 on 9/7.  Bedside speech evaluated on 9/8 with [place] to remain NPO with exception of 1-3 tsps of nectar thick by spoon. Patient's son, Donte, was at bedside of patient in AM (9/8) and updated regarding plan of care. Donte believes patient has a living will and will discuss with family regarding plan of action for patient. Central line placed in left subclavian at 1321 on 9/8. Repeat Head CT performed in AM (9/8) noting increased edema within infarcted tissue.    Impression  Ischemic Stroke due to large-artery atherosclerosis.  Etiology: CT brain without contrast demonstrative of complete R MCA infarction. CTA demonstrative of R M1 occlusion. Etiology likely 2/2 arthrosclerotic plaque from R ICA. Given extent of tissue damage from resulting ischemia, patient is at increased risk of malignant MCA syndrome, and herniation. Patient's son was updated at bedside regarding the tenuous clinical status of patient and potential need for intubation, DCH and potential PEG. Discussed with patient's son that current literature indicates that DCH, given patient's age, will likely not change the functional status of the patient but can reduce the risk of mortality. Given clinical worsening of patients mental status, Central line placed with administration of 3% NS to " "reduce edema.    Plan  - 3% NS, Na goal at 145-150  - Consult Neurosurgery re: potential candidate for craniotomy  - Neurochecks q 1 hour  - Avoid hypotonic fluid solutions  - Daily aspirin 81 mg for secondary stroke prevention  - Statin: Atorvastatin 40 mg at bedtime  - B MRI ordered 9/8  - CT non-contrast follow up on 9/9  - PT/OT/SLP  - Stroke Education  - Depression Screen  - Apnea Screen  - Euthermia, Euglycemia  - TTE with Bubble Study, in process (9/8)  - Bedside Glucose Monitoring      The Stroke Fellow is Dr. Alvares. The Stroke Staff is Dr. Castaneda.    Tod Antony, MS4  Medical Student  To page a member of the stroke/neurocritical care service, click here:  AMCOM   Choose \"On Call\" tab at top, then search dropdown box for \"Neurology Adult\", select location, press Enter, then look for stroke/neuro ICU/telestroke.    Resident/Fellow Attestation   I, YANG ALVARES, was present with the medical student who participated in the service and in the documentation of the note.  I have verified the history and personally performed the physical exam and medical decision making.  I agree with the assessment and plan of care as documented in the note.        YANG ALVARES MD  PGY5  Date of Service (when I saw the patient): 09/08/20  ______________________________________________________    Medications   Home Meds  Prior to Admission medications    Medication Sig Start Date End Date Taking? Authorizing Provider   amLODIPine (NORVASC) 10 MG tablet Take 20 mg by mouth daily  12/26/18  Yes Reported, Patient   aspirin 81 MG EC tablet Take 81 mg by mouth daily   Yes Unknown, Entered By History   atorvastatin (LIPITOR) 80 MG tablet Take 40 mg by mouth daily  2/27/19  Yes Reported, Patient   ezetimibe (ZETIA) 10 MG tablet Take 10 mg by mouth daily  12/6/18  Yes Reported, Patient   furosemide (LASIX) 20 MG tablet TAKE 2 TABLETS BY MOUTH TWICE DAILY, FOR TREATMENT OF BLOOD PRESSURE AND FLUID RETENTION. 3/6/19  Yes " Reported, Patient   levothyroxine (SYNTHROID/LEVOTHROID) 50 MCG tablet Take 50 mcg by mouth daily  10/3/18  Yes Reported, Patient   metoprolol succinate ER (TOPROL-XL) 50 MG 24 hr tablet Take 25 mg by mouth 2 times daily  10/24/19  Yes Reported, Patient   multivitamin (CENTRUM SILVER) tablet Take 1 tablet by mouth daily   Yes Unknown, Entered By History   nitroGLYcerin (NITROSTAT) 0.4 MG sublingual tablet Place 0.4 mg under the tongue every 5 minutes as needed  10/3/18  Yes Reported, Patient   sacubitril-valsartan (ENTRESTO) 49-51 MG per tablet Take 1 tablet by mouth 2 times daily  1/6/19  Yes Reported, Patient   traZODone (DESYREL) 50 MG tablet Take 50 mg by mouth at bedtime as needed, may repeat once  8/25/20  Yes Reported, Patient   valsartan (DIOVAN) 320 MG tablet Take 320 mg by mouth daily  1/4/19  Yes Reported, Patient       Scheduled Meds    aspirin  75 mg Rectal Daily     atorvastatin  40 mg Oral Daily     ezetimibe  10 mg Oral Daily     insulin aspart  1-4 Units Subcutaneous Q4H     levothyroxine  25 mcg Intravenous Daily     metoprolol succinate ER  25 mg Oral BID     sacubitril-valsartan  1 tablet Oral BID       Infusion Meds    - MEDICATION INSTRUCTIONS -       sodium chloride 3%         PRN Meds  glucose **OR** dextrose **OR** glucagon, hydrALAZINE, labetalol, - MEDICATION INSTRUCTIONS -, naloxone, ondansetron **OR** ondansetron       PHYSICAL EXAMINATION  Temp:  [98  F (36.7  C)-98.4  F (36.9  C)] 98.4  F (36.9  C)  Pulse:  [60-87] 78  Resp:  [9-29] 14  BP: (115-153)/() 143/74  SpO2:  [91 %-100 %] 97 %     General:  L handed,  male. in no apparent distress, somnolent on examination. Opens eyes to name and moans. Fluctuating interaction with occasional wincing to palpation on R forehead.    Neurologic  Mental Status: somnolent on examination, oriented to person, place, time when seen in AM. Unable to state name, place, or time when questioned later in AM when rounding with team.   Cranial  Nerves:  PERRL, facial movements symmetric, hearing not formally tested but intact to conversation  Motor: LUE/LLE 0/5, Withdraw to painful stimuli on R.   Sensory: L sided neglect with light touch on both LUE and LLE.  Coordination: deferred  Station/Gait:  deferred     Stroke Scales     NIHSS  Interval baseline (09/08/20 1859)   Interval Comments 9/8 (09/08/20 1859)   1a. Level of Consciousness 2-->Not alert, requires repeated stimulation to attend, or is obtunded and requires strong or painful stimulation to make movements (not stereotyped)   1b. LOC Questions 1-->Answers one question correctly   1c. LOC Commands 1-->Performs one task correctly   2.   Best Gaze 1-->Partial gaze palsy, gaze is abnormal in one or both eyes, but forced deviation or total gaze paresis is not present   3.   Visual 1-->Partial hemianopia   4.   Facial Palsy 2-->Partial paralysis (total or near-total paralysis of lower face)   5a. Motor Arm, Left 4-->No movement   5b. Motor Arm, Right 0-->No drift, limb holds 90 (or 45) degrees for full 10 secs   6a. Motor Leg, Left 4-->No movement   6b. Motor Leg, right 0-->No drift, leg holds 30 degree position for full 5 secs   7.   Limb Ataxia 0-->Absent   8.   Sensory 1-->Mild-to-moderate sensory loss, patient feels pinprick is less sharp or is dull on the affected side, or there is a loss of superficial pain with pinprick, but patient is aware of being touched   9.   Best Language 1-->Mild-to-moderate aphasia, some obvious loss of fluency or facility of comprehension, without significant limitation on ideas expressed or form of expression. Reduction of speech and/or comprehension, however, makes conversation. . . (see row details)   10. Dysarthria 1-->Mild-to-moderate dysarthria, patient slurs at least some words and, at worst, can be understood with some difficulty   11. Extinction and Inattention  0-->No abnormality   Total 19 (09/08/20 1859)       Imaging  I personally reviewed all imaging;  relevant findings per HPI.     Lab Results Data   CBC  Recent Labs   Lab 09/08/20  0708 09/07/20  1831   WBC 9.7 11.2*   RBC 4.43 4.69   HGB 13.6 14.5   HCT 39.8* 42.5    219     Basic Metabolic Panel    Recent Labs   Lab 09/08/20  0708 09/07/20  1831    136   POTASSIUM 3.6 3.9   CHLORIDE 107 103   CO2 26 25   BUN 13 13   CR 0.85 0.85   * 190*   SELENA 8.4* 8.9     Liver Panel  Recent Labs   Lab 09/07/20  2355   PROTTOTAL 7.4   ALBUMIN 3.6   BILITOTAL 0.9   ALKPHOS 67   AST 23   ALT 28     INR    Recent Labs   Lab Test 09/07/20  1831   INR 1.03      Lipid Profile  No lab results found.  A1C  No lab results found.  Troponin I    Recent Labs   Lab 09/08/20  0708 09/07/20  2355 09/07/20  1831   TROPI <0.015 <0.015 <0.015

## 2020-09-09 ENCOUNTER — APPOINTMENT (OUTPATIENT)
Dept: SPEECH THERAPY | Facility: CLINIC | Age: 70
DRG: 064 | End: 2020-09-09
Attending: HOSPITALIST
Payer: COMMERCIAL

## 2020-09-09 ENCOUNTER — APPOINTMENT (OUTPATIENT)
Dept: CT IMAGING | Facility: CLINIC | Age: 70
DRG: 064 | End: 2020-09-09
Attending: HOSPITALIST
Payer: COMMERCIAL

## 2020-09-09 ENCOUNTER — APPOINTMENT (OUTPATIENT)
Dept: PHYSICAL THERAPY | Facility: CLINIC | Age: 70
DRG: 064 | End: 2020-09-09
Attending: INTERNAL MEDICINE
Payer: COMMERCIAL

## 2020-09-09 ENCOUNTER — APPOINTMENT (OUTPATIENT)
Dept: OCCUPATIONAL THERAPY | Facility: CLINIC | Age: 70
DRG: 064 | End: 2020-09-09
Attending: INTERNAL MEDICINE
Payer: COMMERCIAL

## 2020-09-09 LAB
ANION GAP SERPL CALCULATED.3IONS-SCNC: 4 MMOL/L (ref 3–14)
BUN SERPL-MCNC: 9 MG/DL (ref 7–30)
CALCIUM SERPL-MCNC: 7.7 MG/DL (ref 8.5–10.1)
CHLORIDE SERPL-SCNC: 111 MMOL/L (ref 94–109)
CO2 SERPL-SCNC: 25 MMOL/L (ref 20–32)
CREAT SERPL-MCNC: 0.79 MG/DL (ref 0.66–1.25)
ERYTHROCYTE [DISTWIDTH] IN BLOOD BY AUTOMATED COUNT: 12.5 % (ref 10–15)
GFR SERPL CREATININE-BSD FRML MDRD: 90 ML/MIN/{1.73_M2}
GLUCOSE BLDC GLUCOMTR-MCNC: 148 MG/DL (ref 70–99)
GLUCOSE BLDC GLUCOMTR-MCNC: 163 MG/DL (ref 70–99)
GLUCOSE BLDC GLUCOMTR-MCNC: 167 MG/DL (ref 70–99)
GLUCOSE BLDC GLUCOMTR-MCNC: 171 MG/DL (ref 70–99)
GLUCOSE BLDC GLUCOMTR-MCNC: 205 MG/DL (ref 70–99)
GLUCOSE BLDC GLUCOMTR-MCNC: 236 MG/DL (ref 70–99)
GLUCOSE SERPL-MCNC: 178 MG/DL (ref 70–99)
HCT VFR BLD AUTO: 38.7 % (ref 40–53)
HGB BLD-MCNC: 13.3 G/DL (ref 13.3–17.7)
MCH RBC QN AUTO: 31.2 PG (ref 26.5–33)
MCHC RBC AUTO-ENTMCNC: 34.4 G/DL (ref 31.5–36.5)
MCV RBC AUTO: 91 FL (ref 78–100)
PLATELET # BLD AUTO: 181 10E9/L (ref 150–450)
POTASSIUM SERPL-SCNC: 3.5 MMOL/L (ref 3.4–5.3)
RBC # BLD AUTO: 4.26 10E12/L (ref 4.4–5.9)
SODIUM SERPL-SCNC: 140 MMOL/L (ref 133–144)
SODIUM SERPL-SCNC: 140 MMOL/L (ref 133–144)
SODIUM SERPL-SCNC: 141 MMOL/L (ref 133–144)
SODIUM SERPL-SCNC: 145 MMOL/L (ref 133–144)
SODIUM SERPL-SCNC: 147 MMOL/L (ref 133–144)
SODIUM SERPL-SCNC: 147 MMOL/L (ref 133–144)
WBC # BLD AUTO: 9 10E9/L (ref 4–11)

## 2020-09-09 PROCEDURE — 27210437 ZZH NUTRITION PRODUCT SEMIELEM INTERMED LITER

## 2020-09-09 PROCEDURE — 84295 ASSAY OF SERUM SODIUM: CPT | Performed by: STUDENT IN AN ORGANIZED HEALTH CARE EDUCATION/TRAINING PROGRAM

## 2020-09-09 PROCEDURE — 97161 PT EVAL LOW COMPLEX 20 MIN: CPT | Mod: GP | Performed by: PHYSICAL THERAPIST

## 2020-09-09 PROCEDURE — 85027 COMPLETE CBC AUTOMATED: CPT | Performed by: STUDENT IN AN ORGANIZED HEALTH CARE EDUCATION/TRAINING PROGRAM

## 2020-09-09 PROCEDURE — 70450 CT HEAD/BRAIN W/O DYE: CPT

## 2020-09-09 PROCEDURE — 99207 ZZC CDG-CUT & PASTE-POTENTIAL IMPACT ON LEVEL: CPT | Performed by: STUDENT IN AN ORGANIZED HEALTH CARE EDUCATION/TRAINING PROGRAM

## 2020-09-09 PROCEDURE — 25000128 H RX IP 250 OP 636: Performed by: STUDENT IN AN ORGANIZED HEALTH CARE EDUCATION/TRAINING PROGRAM

## 2020-09-09 PROCEDURE — 25000132 ZZH RX MED GY IP 250 OP 250 PS 637: Performed by: STUDENT IN AN ORGANIZED HEALTH CARE EDUCATION/TRAINING PROGRAM

## 2020-09-09 PROCEDURE — 97530 THERAPEUTIC ACTIVITIES: CPT | Mod: GP | Performed by: PHYSICAL THERAPIST

## 2020-09-09 PROCEDURE — 97166 OT EVAL MOD COMPLEX 45 MIN: CPT | Mod: GO

## 2020-09-09 PROCEDURE — 80048 BASIC METABOLIC PNL TOTAL CA: CPT | Performed by: STUDENT IN AN ORGANIZED HEALTH CARE EDUCATION/TRAINING PROGRAM

## 2020-09-09 PROCEDURE — 27210429 ZZH NUTRITION PRODUCT INTERMEDIATE LITER

## 2020-09-09 PROCEDURE — 20000003 ZZH R&B ICU

## 2020-09-09 PROCEDURE — 97530 THERAPEUTIC ACTIVITIES: CPT | Mod: GO

## 2020-09-09 PROCEDURE — 25000132 ZZH RX MED GY IP 250 OP 250 PS 637

## 2020-09-09 PROCEDURE — 00000146 ZZHCL STATISTIC GLUCOSE BY METER IP

## 2020-09-09 PROCEDURE — 99232 SBSQ HOSP IP/OBS MODERATE 35: CPT | Performed by: STUDENT IN AN ORGANIZED HEALTH CARE EDUCATION/TRAINING PROGRAM

## 2020-09-09 PROCEDURE — 99233 SBSQ HOSP IP/OBS HIGH 50: CPT | Performed by: INTERNAL MEDICINE

## 2020-09-09 PROCEDURE — 92526 ORAL FUNCTION THERAPY: CPT | Mod: GN | Performed by: SPEECH-LANGUAGE PATHOLOGIST

## 2020-09-09 RX ORDER — LEVOTHYROXINE SODIUM 50 UG/1
50 TABLET ORAL
Status: DISCONTINUED | OUTPATIENT
Start: 2020-09-09 | End: 2020-09-24 | Stop reason: HOSPADM

## 2020-09-09 RX ORDER — LEVOTHYROXINE SODIUM 50 UG/1
50 TABLET ORAL DAILY
Status: CANCELLED | OUTPATIENT
Start: 2020-09-09

## 2020-09-09 RX ORDER — 3% SODIUM CHLORIDE 3 G/100ML
INJECTION, SOLUTION INTRAVENOUS CONTINUOUS
Status: CANCELLED | OUTPATIENT
Start: 2020-09-09

## 2020-09-09 RX ADMIN — EZETIMIBE 10 MG: 10 TABLET ORAL at 09:29

## 2020-09-09 RX ADMIN — METOPROLOL TARTRATE 25 MG: 25 TABLET, FILM COATED ORAL at 09:29

## 2020-09-09 RX ADMIN — LEVOTHYROXINE SODIUM 50 MCG: 50 TABLET ORAL at 09:29

## 2020-09-09 RX ADMIN — SODIUM CHLORIDE 500 ML: 3 INJECTION, SOLUTION INTRAVENOUS at 17:30

## 2020-09-09 RX ADMIN — INSULIN ASPART 1 UNITS: 100 INJECTION, SOLUTION INTRAVENOUS; SUBCUTANEOUS at 09:55

## 2020-09-09 RX ADMIN — SODIUM CHLORIDE: 3 INJECTION, SOLUTION INTRAVENOUS at 12:10

## 2020-09-09 RX ADMIN — INSULIN ASPART 1 UNITS: 100 INJECTION, SOLUTION INTRAVENOUS; SUBCUTANEOUS at 00:15

## 2020-09-09 RX ADMIN — SODIUM CHLORIDE: 3 INJECTION, SOLUTION INTRAVENOUS at 05:53

## 2020-09-09 RX ADMIN — SODIUM CHLORIDE 500 ML: 3 INJECTION, SOLUTION INTRAVENOUS at 23:20

## 2020-09-09 RX ADMIN — METOPROLOL TARTRATE 25 MG: 25 TABLET, FILM COATED ORAL at 21:06

## 2020-09-09 RX ADMIN — ATORVASTATIN CALCIUM 40 MG: 40 TABLET, FILM COATED ORAL at 09:29

## 2020-09-09 RX ADMIN — ASPIRIN 81 MG 81 MG: 81 TABLET ORAL at 09:29

## 2020-09-09 RX ADMIN — INSULIN ASPART 1 UNITS: 100 INJECTION, SOLUTION INTRAVENOUS; SUBCUTANEOUS at 04:40

## 2020-09-09 ASSESSMENT — ACTIVITIES OF DAILY LIVING (ADL)
ADLS_ACUITY_SCORE: 20
ADLS_ACUITY_SCORE: 24
PREVIOUS_RESPONSIBILITIES: MEAL PREP;HOUSEKEEPING;LAUNDRY;SHOPPING;YARDWORK;MEDICATION MANAGEMENT;FINANCES;DRIVING
ADLS_ACUITY_SCORE: 24
ADLS_ACUITY_SCORE: 24

## 2020-09-09 NOTE — PROGRESS NOTES
Critical Care Progress Note      09/09/2020    Name: Jorje Teran MRN#: [id number]   Age: 70 year old YOB: 1950     Hsptl Day# 2  ICU DAY # 2            Key goals for next 24 hours:   1. Continue 3% hypertonic saline, q4h Na checks  2. Transition back to enteral levothyroxine (via FT)         Interim History:   Pt remains lethargic overnight, Na increased from 136->140 w/ 3% hypertonic saline. GCS 13->12.      Assessment and plan :   Jorje Teran is a 70 y.o. M w/ PMHx of CAD, HTN, prediabetes, admitted on 9/7 for weakness, slurred speech, found to have an evolving, large R MCA ischemic infarct (NIHSS 19). Due to concern for malignant MCA syndrome, the invensivist team was consulted for assistance w/ management of airway & central line placement to facilitate hypertonic saline administration.  He so far has had a reasonably stable neuro exam and does not require intubation at this point.      NEURO/PSYCH:   1. CVA (R MCA ischemic infarct): Last known normal 12pm 9/6 (>24h PTA), on presentation, unable to move L side, garbled speech. CT 9/8 showed large, multifocal area of acute infract involving R MCA territory.   Filling defect within distal R ICA extending into the R MCA & occlusion of the R MCA at M1. 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 CTA w/ 80% stenosis of prox R ICA @ R carotid bifurcation. 60% stenosis of proximal L ICA. Lipids 7/29: LDL 78, HDL 56, , Tchol 164. A1C 7/2020 7.1%, TSH WNL. Given ASA 300mg x1 @ ED. L side responsive to pain. Concern for cerebral edema; will monitor closely (NIHSS 19 9/8). GCS 13->12. Neurology considering hemicraniectomy should pt acutely decompensate; neurosurg following.  - Neuro following, appreciate recs  - q1h neuro checks  - HOB >30 degrees  - Permissive HTN: labetalol PRN for SBP >220  - Hypertonic saline ml/hr per neurology, goal Na 145 or higher  - PT/OT when able     CV:  1. CAD s/p CABG x3, x1 (2015), HTN/HLD: PTA amlodipine  20mg, ASA 81mg, atorvastatin 40mg, ezetimibe 10mg, furosemide 40mg BID, metoprolol 25mg BID, sacubitril-valsartan BID, valsartan 320mg. Troponin, pro-BNP on admission WNL. TTE WNL 9/8.  - Permissive HTN: labetalol PRN for SBP >220  - Continue Atorvastatin 40mg, ezetimibe 10mg, metoprolol succinate 25mg  - ASA 81mg  - Telemetry     PULM: Pt has h/o SHELDON; holding CPAP for now. H/o thick cough per family.  Aspiration likely going to be an issue with his deficits. So far does not require intubation for airway protection. Will continue to monitor.     GI/FEN:  1. Nutrition: Swallow study 9/8 demonstrated moderate-severe oral-pharyngeal dysphagia; high risk for aspiration. Recommend NPO status except for 1-3 tsps nectar thick by spoon w/ RN supervision, 90 degrees, only when alert. Pt now has NJ tube.   - NJ; goal rate 50-60   - Free water 30ml q4h  - SLP consulted, appreciate recs     ENDO:  1. DM2: Not on PTA meds, A1C 7/2020 7.1.   - q4h BG checks  - Increase sliding scale insulin from low -> medium today     2. Hypothyroidism: PTA levo 50mcg. TSH 7/29 2.05.  - Levothyroxine 25mg IV -> switch to 50mg enteral      RENAL/ELECTROLYTES: Hypovolemic on US 9/8, given 500mL NaCl bolus. Was found down, possibly >24hrs at home. CK WNL.  - Monitor I&Os (diaper weights, bedside urinal as able)  - Daily BMP, electrolyte replacement PRN  - Na q4h check  - Hayes placement today    INFECTIOUS DISEASES: No acute issues. WBC on admission 11, pt afebrile.     IV/Access:   1. Central line: R. Femoral  2. Arterial access: None  3. Peripheral IVs: 2 peripheral IV  4. Hayes catheter: Hayes  5. NG/OG tube: NJ  6. Drains: None     ICU Prophylaxis:   1. DVT: SCDs  2. Feeding: See above  3. Restraint: NA  4. Family Update: Spoke to son Donte today  5. Code status: Full code  6. Disposition - TBD          Problem List:   Active Problems:    CVA (cerebral vascular accident) (H)         Summary/Hospital Course:   Jorje Teran is a 70 y.o. M w/  PMHx of CAD, HTN, prediabetes, admitted on 9/7 for weakness, slurred speech, found to have an evolving, large R MCA ischemic infarct. Due to concern for malignant MCA syndrome, the invensivist team was consulted for assistance with management of airway and central line placement to facilitate hypertonic saline administration. So far his mental status has remained largely stable, and he has not required intubation at this time.  However, with his significant deficit, there is concern that aspiration may become an issue.          Key Medications:       aspirin  81 mg Oral or Feeding Tube Daily    Or     aspirin  75 mg Rectal Daily     atorvastatin  40 mg Oral Daily     ezetimibe  10 mg Oral Daily     insulin aspart  1-4 Units Subcutaneous Q4H     levothyroxine  50 mcg Oral Daily     metoprolol tartrate  25 mg Oral or Feeding Tube BID       - MEDICATION INSTRUCTIONS -       sodium chloride 3% 75 mL/hr at 09/09/20 0749             Physical Examination:   Temp:  [98.4  F (36.9  C)-99.4  F (37.4  C)] 99.1  F (37.3  C)  Pulse:  [63-89] 75  Resp:  [6-34] 19  BP: (132-179)/(69-91) 153/82  SpO2:  [94 %-99 %] 96 %    Intake/Output Summary (Last 24 hours) at 9/9/2020 0826  Last data filed at 9/9/2020 0600  Gross per 24 hour   Intake 1940 ml   Output 4611 ml   Net -2671 ml     Wt Readings from Last 4 Encounters:   09/09/20 111.9 kg (246 lb 11.1 oz)     BP - Mean:  [] 105  Resp: 19    Recent Labs   Lab 09/07/20  1831   O2PER Room Air     General: Laying in bed. Appears stated age. Responding to questions but less coherent.  HEENT: Normocephalic, atraumatic. Anicteric sclera.   CV: Strong radial pulses. RRR.   Lungs: Normal chest rise. Lungs largely clear to auscultation.  Extremities: WWP, no rashes appreciated.  Neuro: GCS 12 (3/6/3). Opens both eyes to command. L hemiplegia. Able to spontaneously move R-side. Speech no longer as coherent as yesterday. L-sided facial droop present.         Data:   All data and imaging  reviewed.     ROUTINE ICU LABS (Last four results)  CMP  Recent Labs   Lab 09/09/20  0556 09/09/20  0209 09/08/20  2212 09/08/20  1820  09/08/20  0708 09/07/20  2355 09/07/20  1831    140 141 139   < > 139  --  136   POTASSIUM 3.5  --   --   --   --  3.6  --  3.9   CHLORIDE 111*  --   --   --   --  107  --  103   CO2 25  --   --   --   --  26  --  25   ANIONGAP 4  --   --   --   --  6  --  8   *  --   --   --   --  162*  --  190*   BUN 9  --   --   --   --  13  --  13   CR 0.79  --   --   --   --  0.85  --  0.85   GFRESTIMATED 90  --   --   --   --  87  --  87   GFRESTBLACK >90  --   --   --   --  >90  --  >90   SELENA 7.7*  --   --   --   --  8.4*  --  8.9   PROTTOTAL  --   --   --   --   --   --  7.4  --    ALBUMIN  --   --   --   --   --   --  3.6  --    BILITOTAL  --   --   --   --   --   --  0.9  --    ALKPHOS  --   --   --   --   --   --  67  --    AST  --   --   --   --   --   --  23  --    ALT  --   --   --   --   --   --  28  --     < > = values in this interval not displayed.     CBC  Recent Labs   Lab 09/09/20  0556 09/08/20 0708 09/07/20  1831   WBC 9.0 9.7 11.2*   RBC 4.26* 4.43 4.69   HGB 13.3 13.6 14.5   HCT 38.7* 39.8* 42.5   MCV 91 90 91   MCH 31.2 30.7 30.9   MCHC 34.4 34.2 34.1   RDW 12.5 12.3 12.0    189 219     INR  Recent Labs   Lab 09/07/20  1831   INR 1.03     Arterial Blood Gas  Recent Labs   Lab 09/07/20  1831   O2PER Room Air       All cultures:  No results for input(s): CULT in the last 168 hours.  Recent Results (from the past 24 hour(s))   XR Chest Port 1 View    Narrative    CHEST ONE VIEW  9/8/2020 2:00 PM     HISTORY: Status post CVL placement, concern for pneumothorax.    COMPARISON: September 7, 2020      Impression    IMPRESSION: Orogastric tube tip below the margin of the study. No  definite pneumothorax or pneumomediastinum demonstrated.    MICHAEL ROMERO MD   XR Abdomen Port 1 View    Narrative    ABDOMEN ONE VIEW PORTABLE  9/8/2020 2:10 PM     HISTORY: TF  placement verification.    COMPARISON: None.       Impression    IMPRESSION: Feeding tube tip may be in the pylorus vs. distal stomach.  Minimal amount of stool.  Nonobstructed bowel gas pattern.    MICHAEL ROMERO MD   Echocardiogram Complete - Bubble study    Narrative    [id number]  [id number]  [id number]  [id number]^EDGAR^NAYE^VALENTINE           [facility]  Echocardiography Laboratory  [facility] [place] [address]        Name: JENNIFER GANNON  MRN: [id number]  : 1950  Study Date: 2020 02:37 PM  Age: 70 yrs  Gender: Male  Patient Location: [facility]  Reason For Study: CVA  Ordering Physician: NAYE HERNÁNDEZ  Performed By: Jaky Grove     HR: 75  _____________________________________________________________________________  __        Procedure  Complete Portable Echo Adult. Optison (NDC #2370-4320) given intravenously.  _____________________________________________________________________________  __        Interpretation Summary     1. Normal left ventricular size and function. Left ventricular ejection  fraction of 55-60%. No segmental wall motion abnormalities noted.  2. No shunting at the atrial level on suboptimal Bubble study.  3. No hemodynamically significant valvular disease.  No prior study for comparison.  Technically difficult study.  _____________________________________________________________________________  __        Left Ventricle  The left ventricle is normal in size. There is normal left ventricular wall  thickness. Left ventricular systolic function is normal. The visual ejection  fraction is estimated at 55-60%. Left ventricular diastolic function is  indeterminate.     Right Ventricle  The right ventricle is not well visualized.     Atria  The left atrium is not well visualized. Right atrial size is normal. There is  no color Doppler evidence of an atrial shunt.     Mitral Valve  The mitral valve leaflets appear normal. There is no evidence of  stenosis,  fluttering, or prolapse. There is mild mitral annular calcification. There is  trace mitral regurgitation.     Tricuspid Valve  Normal tricuspid valve. There is trace tricuspid regurgitation.        Aortic Valve  Thickened aortic valve leaflets. The aortic valve is not well visualized.  There is trace aortic regurgitation. No hemodynamically significant valvular  aortic stenosis.     Pulmonic Valve  The pulmonic valve is not well visualized. There is trace pulmonic valvular  regurgitation.     Vessels  The aortic root is normal size. Normal size ascending aorta.     Pericardium  There is no pericardial effusion.     Rhythm  Sinus rhythm was noted.     _____________________________________________________________________________  __  MMode/2D Measurements & Calculations  IVSd: 1.1 cm  LVIDd: 5.1 cm  LVIDs: 3.8 cm  LVPWd: 0.91 cm  FS: 25.7 %     LV mass(C)d: 183.6 grams  Ao root diam: 3.2 cm  LA dimension: 4.2 cm  asc Aorta Diam: 3.4 cm  LA/Ao: 1.3  LVOT diam: 2.3 cm  LVOT area: 4.2 cm2  LA Volume (BP): 63.0 ml  RWT: 0.36        Doppler Measurements & Calculations  MV E max nathaniel: 82.3 cm/sec  MV A max nathaniel: 68.4 cm/sec  MV E/A: 1.2  PA acc time: 0.10 sec  E/E' av.8  Lateral E/e': 6.6  Medial E/e': 13.0              _____________________________________________________________________________  __        Report approved by: Julissa Booker 2020 03:33 PM      CT Head w/o Contrast    Narrative    CT SCAN OF THE HEAD WITHOUT CONTRAST   2020 8:36 PM     HISTORY: Stroke, follow up    TECHNIQUE:  Axial images of the head and coronal reformations without  IV contrast material. Radiation dose for this scan was reduced using  automated exposure control, adjustment of the mA and/or kV according  to patient size, or iterative reconstruction technique.    COMPARISON: CT dated 2020 0735 hours.    FINDINGS:     Intracranial contents: Again noted is an evolving infarct in the right  middle cerebral  artery distribution. There is been no significant  change in the appearance when compared to the earlier study. There is  approximately 3 mm of midline shift. This is unchanged. No definite  hemorrhagic transformation.    Visualized orbits/sinuses/mastoids:  The visualized portions of the  sinuses and mastoids appear normal.    Osseous structures/soft tissues:  There is no evidence of trauma.      Impression    IMPRESSION: Large evolving right middle cerebral artery infarct. There  is mass effect on the right lateral ventricle and slight midline  shift. When compared to the earlier scan, there has not been any  significant change.    FELICIA BOJORQUEZ MD     I saw this patient and discussed the care plan with Dr. Sow.    Wendy Millan, MS4    Physician Attestation   I, Meenu Sow, was present with the medical student who participated in the service and in the documentation of the note.  I have verified the history and personally performed the physical exam and medical decision making.  I agree with the assessment and plan of care as documented in the note.      I personally reviewed vital signs, medications, labs and imaging.    Key findings:   - largely unchanged neuro exam today, no indications for intubation or hemicraniectomy at this point in time.   - will defer ongoing management to hospitalist team, NCC, and NSg.  We will continue to follow this patient peripherally should any issues come up.     Meenu Sow MD  Date of Service (when I saw the patient): 09/09/20

## 2020-09-09 NOTE — PROGRESS NOTES
[facility]    Neurosurgery Progress Note    Date of Service (when I saw the patient): 09/09/2020     Assessment & Plan   Jorje Teran is a 70 year old male who was admitted on 9/7/2020. He presented to [facility] ED after being found down by son. Imaging revealed large area of acute/subacute infarct with edema. Repeat scan this AM with evolving large right MCA infarct with edema and 4 mm of leftward midline shift, stable. Today he was seen lying in bed. He denies pain, dizziness, nausea/vomiting, and vision changes. Continues with left-sided hemiparesis. Follows commands on the right.     Active Problems:    CVA (cerebral vascular accident) (H)    Assessment: evolving large right MCA infarct with edema and 4 mm of leftward midline shift, stable    Plan:   -No surgical intervention recommended at this time  -Will continue to follow closely  -Appreciate assistance from other services      I have discussed the following assessment and plan Dr. Posada who is in agreement with initial plan and will follow up with further consultation [place]s.    Jaky Nair, [facility] Neurosurgery  [place] [address]    Tel [phone]  Pager [phone]      Interval History   Stable. Improving slowly.    Physical Exam   Temp: 99.4  F (37.4  C) Temp src: Axillary BP: (!) 148/99 Pulse: 73   Resp: 11 SpO2: 97 % O2 Device: None (Room air)    Vitals:    09/08/20 0000 09/09/20 0600   Weight: 112.4 kg (247 lb 12.8 oz) 111.9 kg (246 lb 11.1 oz)     Vital Signs with Ranges  Temp:  [98.4  F (36.9  C)-99.4  F (37.4  C)] 99.4  F (37.4  C)  Pulse:  [63-89] 73  Resp:  [6-34] 11  BP: (132-179)/(67-99) 148/99  SpO2:  [94 %-99 %] 97 %  I/O last 3 completed shifts:  In: 2875.42 [I.V.:2650.42; NG/GT:225]  Out: 4611 [Urine:4611]     , Blood pressure (!) 148/99, pulse 73, temperature 99.4  F (37.4  C), temperature source Axillary, resp. rate 11,  "height 1.778 m (5' 10\"), weight 111.9 kg (246 lb 11.1 oz), SpO2 97 %.  246 lbs 11.12 oz  HEENT:  Normocephalic.  PERRLA.  EOM s intact.    Heart:  No peripheral edema  Lungs:  No SOB  Skin:  Warm and dry, good capillary refill.  Extremities:  Good radial and dorsalis pedis pulses bilaterally, no edema, cyanosis or clubbing.    NEUROLOGICAL EXAMINATION:   Mental status:  Alert   Motor:  Left hemiparesis, follows commands on right     Medications     - MEDICATION INSTRUCTIONS -       sodium chloride 3% 100 mL/hr at 09/09/20 1041       aspirin  81 mg Oral or Feeding Tube Daily    Or     aspirin  75 mg Rectal Daily     atorvastatin  40 mg Oral Daily     ezetimibe  10 mg Oral Daily     insulin aspart  1-6 Units Subcutaneous Q4H     levothyroxine  50 mcg Oral QAM AC     metoprolol tartrate  25 mg Oral or Feeding Tube BID       Data     CBC RESULTS:   Recent Labs   Lab Test 09/09/20  0556   WBC 9.0   RBC 4.26*   HGB 13.3   HCT 38.7*   MCV 91   MCH 31.2   MCHC 34.4   RDW 12.5        Basic Metabolic Panel:  Lab Results   Component Value Date     09/09/2020      Lab Results   Component Value Date    POTASSIUM 3.5 09/09/2020     Lab Results   Component Value Date    CHLORIDE 111 09/09/2020     Lab Results   Component Value Date    SELENA 7.7 09/09/2020     Lab Results   Component Value Date    CO2 25 09/09/2020     Lab Results   Component Value Date    BUN 9 09/09/2020     Lab Results   Component Value Date    CR 0.79 09/09/2020     Lab Results   Component Value Date     09/09/2020     INR:  Lab Results   Component Value Date    INR 1.03 09/07/2020         "

## 2020-09-09 NOTE — CONSULTS
Chart reviewed, patient was seen for a complete assessment yesterday - see note dated 9/8/20 for details.    TF was initiated last night and is currently running as Impact Peptide 1.5 at 30 mL/hr with plans to increase to 50 mL/hr goal which would provide: 1800 kcal (23 kcal/kg), 113 g protein (1.4 g/kg), 168 g CHO, no fiber, 924 mL H2O   Flushes added today 30 mL every 4 hours     ASSESSED NUTRITION NEEDS PER APPROVED PRACTICE GUIDELINES:     Dosing Weight 78.6 kg (adjusted)  Estimated Energy Needs: 9818-9834 kcals (25-30 Kcal/Kg)  Justification: obese  Estimated Protein Needs:  grams protein (1.2-1.5 g pro/Kg)  Justification: hypercatabolism with acute illness    Discussed during rounds today - Dr. Cindy CAMP with us adjusting TF goal to better meet needs.  Orders placed to change TF formula to Isosource 1.5 and run at 30 mL/hr x 4 hours and then increase every 8 hours by 15 mL to goal of 60 mL/hr to provide:  2160 kcal (27 kcal/kg), 98 g protein (1.2 g/kg), 253 g CHO, 22 g fiber, 1094 mL H2O     Coby Burrell RD, LD, [facility]   Clinical Dietitian - [facility]

## 2020-09-09 NOTE — PROGRESS NOTES
09/09/20 0954   Quick Adds   Type of Visit Initial Occupational Therapy Evaluation   Living Environment   Lives With alone   Living Arrangements house  (rambler)   Home Accessibility stairs to enter home   Number of Stairs, Main Entrance 1   Transportation Anticipated family or friend will provide   Self-Care   Usual Activity Tolerance good   Current Activity Tolerance fair   Regular Exercise No   Equipment Currently Used at Home none   Functional Level   Ambulation 0-->independent   Transferring 0-->independent   Toileting 0-->independent   Bathing 0-->independent   Dressing 0-->independent   Eating 0-->independent   Communication 0-->understands/communicates without difficulty   Swallowing 0-->swallows foods/liquids without difficulty   Cognition 0 - no cognition issues reported   Fall history within last six months no   Which of the above functional risks had a recent onset or change? ambulation;transferring;toileting;bathing;dressing;eating;swallowing;cognition;communication/speech   General Information   Onset of Illness/Injury or Date of Surgery - Date 09/07/20   Referring Physician Mandeep Rueda MD    Patient/Family Goals Statement get stronger   Additional Occupational Profile Info/Pertinent History of Current Problem 70 year old male who presents with right hemispheric stroke, CT showing associated edema. He was found down by his son yesterday evening and transferred to  from [facility]   Precautions/Limitations fall precautions   General Observations Activity: Up with Assist    Cognitive Status Examination   Orientation person;place   Visual Perception   Visual Perception Comments Patient has contacts in, sensative to light. Eyes closed majoriity of sesion, unable to track object to L side past midline. Vision appears impaired    Sensory Examination   Sensory Comments deficits observed in L UE    Pain Assessment   Patient Currently in Pain No   Integumentary/Edema   Integumentary/Edema Comments  generalized swelling    Posture   Posture not impaired   Range of Motion (ROM)   ROM Comment L UE PROM=WFL, no purposeful or spontanous movement observed    Strength   Strength Comments L UE flaccid, no movement observed, R UE -WFL    Coordination   Upper Extremity Coordination Left UE impaired   Mobility   Bed Mobility Bed mobility skill: Sit to supine;Bed mobility skill: Supine to sit   Bed Mobility Skill: Sit to Supine   Level of [place]: Sit/Supine maximum assist (25% patients effort)   Physical Assist/Nonphysical Assist: Sit/Supine 2 persons   Bed Mobility Skill: Supine to Sit   Level of [place]: Supine/Sit maximum assist (25% patients effort)   Physical Assist/Nonphysical Assist: Supine/Sit set-up required;supervision;verbal cues;nonverbal cues (demo/gestures);1 person assist   Transfer Skill: Sit to Stand   Level of [place]: Sit/Stand dependent (less than 25% patients effort)   Upper Body Dressing   Level of [place]: Dress Upper Body maximum assist (25% patients effort)   Lower Body Dressing   Level of [place]: Dress Lower Body dependent (less than 25% patients effort)   Instrumental Activities of Daily Living (IADL)   Previous Responsibilities meal prep;housekeeping;laundry;shopping;yardwork;medication management;finances;driving   Activities of Daily Living Analysis   Impairments Contributing to Impaired Activities of Daily Living balance impaired;cognition impaired;coordination impaired;fear and anxiety;flexibility decreased;pain;motor control impaired;postural control impaired;ROM decreased;sensation decreased;sensory feedback impaired;strength decreased   General Therapy Interventions   Planned Therapy Interventions ADL retraining;IADL retraining;balance training;fine motor coordination training;cognition;neuromuscular re-education;ROM;strengthening;stretching;transfer training;visual perception   Clinical Impression   Criteria for Skilled Therapeutic Interventions Met yes,  "treatment indicated   OT Diagnosis decreased independence in ADLs/IADLS    Influenced by the following impairments deconditioning, decreased coordination, decreased postural control, cognitive deficits, visual deficits    Assessment of Occupational Performance 3-5 Performance Deficits   Identified Performance Deficits dressing, bathing, toileting, home management, social skills    Clinical Decision Making (Complexity) Moderate complexity   Therapy Frequency Daily   Predicted Duration of Therapy Intervention (days/wks) 4 days    Anticipated Discharge Disposition Acute Rehabilitation Facility   Risks and Benefits of Treatment have been explained. Yes   Patient, Family & other staff in agreement with plan of care Yes   [facility] TM \"6 Clicks\"   2016, Trustees of [facility], under license to [employer].  All rights reserved.   6 Clicks Short Forms Daily Activity Inpatient Short Form   [facility]  \"6 Clicks\" Daily Activity Inpatient Short Form   1. Putting on and taking off regular lower body clothing? 2 - A Lot   2. Bathing (including washing, rinsing, drying)? 2 - A Lot   3. Toileting, which includes using toilet, bedpan or urinal? 1 - Total   4. Putting on and taking off regular upper body clothing? 2 - A Lot   5. Taking care of personal grooming such as brushing teeth? 3 - A Little   6. Eating meals? 3 - A Little   Daily Activity Raw Score (Score out of 24.Lower scores equate to lower levels of function) 13   Total Evaluation Time   Total Evaluation Time (Minutes) 10     "

## 2020-09-09 NOTE — PROGRESS NOTES
"  [facility]    Stroke Progress Note    Interval Events  Clinically unchanged to slightly more readily responsive this am. Sons have discussed and would want to proceed with decompression if clinically indicated. Continues on 3% NaCl, not yet at goal >145.    Impression  Ischemic Stroke due to large-artery atherosclerosis.  Etiology: CT brain without contrast demonstrative of complete R MCA infarction. CTA demonstrative of R M1 occlusion. Repeat head CT 9/8, 9/9 - slight midline shift without significant changes. Etiology likely 2/2 arthrosclerotic plaque from R ICA. Given size of infarct, patient is at increased risk of malignant MCA syndrome. Now day 3 post-infarct, so at the height of potential edema risk.    Plan  - 3% NS, Na goal at 145-150  - Neurosurgery consulted, appreciate their assistance - surgery not recommended at this time  - Continue q1 hour neurochecks  - Avoid hypotonic fluid solutions  - Daily aspirin 81 mg for secondary stroke prevention  - Statin: Atorvastatin 40 mg at bedtime  - repeat non-con head CT 9/10  - PT/OT/SLP  - Stroke Education  - Depression Screen  - Apnea Screen  - Euthermia, Euglycemia  - TTE with Bubble Study - 55-60% EF - unremarkable study  - Bedside Glucose Monitoring    The Stroke Fellow is Dr. Prakash. The Stroke Staff is Dr. Castaneda.    Tod Antony, MS4  Medical Student functioned as a scribe in the preparation of this record. I interviewed and examined the patient independently and made edits to the record as necessary.    Selene Prakash MD  Vascular Neurology Fellow, PGY-5    To page a member of the stroke/neurocritical care service, click here:  [facility]   Choose \"On Call\" tab at top, then search dropdown box for \"Neurology Adult\", select location, press Enter, then look for stroke/neuro ICU/telestroke.    ______________________________________________________    Medications   Home Meds  Prior to Admission medications    Medication Sig Start Date End Date " Taking? Authorizing Provider   amLODIPine (NORVASC) 10 MG tablet Take 20 mg by mouth daily  12/26/18  Yes Reported, Patient   aspirin 81 MG EC tablet Take 81 mg by mouth daily   Yes Unknown, Entered By History   atorvastatin (LIPITOR) 80 MG tablet Take 40 mg by mouth daily  2/27/19  Yes Reported, Patient   ezetimibe (ZETIA) 10 MG tablet Take 10 mg by mouth daily  12/6/18  Yes Reported, Patient   furosemide (LASIX) 20 MG tablet TAKE 2 TABLETS BY MOUTH TWICE DAILY, FOR TREATMENT OF BLOOD PRESSURE AND FLUID RETENTION. 3/6/19  Yes Reported, Patient   levothyroxine (SYNTHROID/LEVOTHROID) 50 MCG tablet Take 50 mcg by mouth daily  10/3/18  Yes Reported, Patient   metoprolol succinate ER (TOPROL-XL) 50 MG 24 hr tablet Take 25 mg by mouth 2 times daily  10/24/19  Yes Reported, Patient   multivitamin (CENTRUM SILVER) tablet Take 1 tablet by mouth daily   Yes Unknown, Entered By History   nitroGLYcerin (NITROSTAT) 0.4 MG sublingual tablet Place 0.4 mg under the tongue every 5 minutes as needed  10/3/18  Yes Reported, Patient   sacubitril-valsartan (ENTRESTO) 49-51 MG per tablet Take 1 tablet by mouth 2 times daily  1/6/19  Yes Reported, Patient   traZODone (DESYREL) 50 MG tablet Take 50 mg by mouth at bedtime as needed, may repeat once  8/25/20  Yes Reported, Patient   valsartan (DIOVAN) 320 MG tablet Take 320 mg by mouth daily  1/4/19  Yes Reported, Patient       Scheduled Meds    aspirin  81 mg Oral or Feeding Tube Daily    Or     aspirin  75 mg Rectal Daily     atorvastatin  40 mg Oral Daily     ezetimibe  10 mg Oral Daily     insulin aspart  1-6 Units Subcutaneous Q4H     levothyroxine  50 mcg Oral QAM AC     metoprolol tartrate  25 mg Oral or Feeding Tube BID       Infusion Meds    - MEDICATION INSTRUCTIONS -       sodium chloride 3% 100 mL/hr at 09/09/20 1555       PRN Meds  bacitracin, artificial tears ophthalmic solution, glucose **OR** dextrose **OR** glucagon, hydrALAZINE, labetalol, magnesium sulfate, - MEDICATION  INSTRUCTIONS -, naloxone, ondansetron **OR** ondansetron, potassium chloride, potassium chloride with lidocaine, potassium chloride, potassium chloride, potassium chloride, potassium phosphate (KPHOS) in D5W IV, potassium phosphate (KPHOS) in D5W IV, potassium phosphate (KPHOS) in D5W IV, potassium phosphate (KPHOS) in D5W IV       PHYSICAL EXAMINATION  Temp:  [99.1  F (37.3  C)-99.9  F (37.7  C)] 99.9  F (37.7  C)  Pulse:  [63-89] 69  Resp:  [11-34] 15  BP: (136-179)/(67-99) 162/80  SpO2:  [94 %-99 %] 98 %     General:  L handed,  male, lying in bed in no acute distress. NG tube placed in R nare.    Neurologic  Mental Status: Eyes closed, briefly opens to voice but does not maintain alertness. Oriented to self and place. Minimal verbal output, but entirely sensical and fluent, intact comprehension of simple commands.  Cranial Nerves: VFF, PERRL, EOMI, face symmetric, sensation intact, hearing intact to conversation, tongue midline, moderately dysarthric.   Motor: LUE extension to noxious, LLE: weak non-antigravity withdrawal to noxious   RUE/RLE: 5/5  Reflexes: briskly 2+ and symmetric throughout; toes mute   Sensory: does not detect sensation on LUE and LLE, intact on R   Coordination: not tested  Station/Gait:  Not tested    Stroke Scales    NIHSS  Interval (post-stroke day 3) (09/09/20 1623)   Interval Comments daily exam (09/09/20 1623)   1a. Level of Consciousness 2-->Not alert, requires repeated stimulation to attend, or is obtunded and requires strong or painful stimulation to make movements (not stereotyped)   1b. LOC Questions 0-->Answers both questions correctly   1c. LOC Commands 0-->Performs both tasks correctly   2.   Best Gaze 0-->Normal   3.   Visual 1-->Partial hemianopia   4.   Facial Palsy 1-->Minor paralysis (flattened nasolabial fold, asymmetry on smiling)   5a. Motor Arm, Left 3-->No effort against gravity, limb falls   5b. Motor Arm, Right 0-->No drift, limb holds 90 (or 45) degrees  for full 10 secs   6a. Motor Leg, Left 3-->No effort against gravity, leg falls to bed immediately   6b. Motor Leg, right 0-->No drift, leg holds 30 degree position for full 5 secs   7.   Limb Ataxia 0-->Absent   8.   Sensory 1-->Mild-to-moderate sensory loss, patient feels pinprick is less sharp or is dull on the affected side, or there is a loss of superficial pain with pinprick, but patient is aware of being touched   9.   Best Language 0-->No aphasia, normal   10. Dysarthria 1-->Mild-to-moderate dysarthria, patient slurs at least some words and, at worst, can be understood with some difficulty   11. Extinction and Inattention  0-->No abnormality   Total 12 (09/09/20 1623)       Imaging  I personally reviewed all imaging; relevant findings per HPI.     Lab Results Data   CBC  Recent Labs   Lab 09/09/20  0556 09/08/20  0708 09/07/20  1831   WBC 9.0 9.7 11.2*   RBC 4.26* 4.43 4.69   HGB 13.3 13.6 14.5   HCT 38.7* 39.8* 42.5    189 219     Basic Metabolic Panel    Recent Labs   Lab 09/09/20  1415 09/09/20  0950 09/09/20  0556  09/08/20  0708 09/07/20  1831   * 141 140   < > 139 136   POTASSIUM  --   --  3.5  --  3.6 3.9   CHLORIDE  --   --  111*  --  107 103   CO2  --   --  25  --  26 25   BUN  --   --  9  --  13 13   CR  --   --  0.79  --  0.85 0.85   GLC  --   --  178*  --  162* 190*   SELENA  --   --  7.7*  --  8.4* 8.9    < > = values in this interval not displayed.     Liver Panel  Recent Labs   Lab 09/07/20  2355   PROTTOTAL 7.4   ALBUMIN 3.6   BILITOTAL 0.9   ALKPHOS 67   AST 23   ALT 28     INR    Recent Labs   Lab Test 09/07/20  1831   INR 1.03      Lipid Profile  No lab results found.  A1C  No lab results found.  Troponin I    Recent Labs   Lab 09/08/20  0708 09/07/20  2355 09/07/20  0803   TROPI <0.015 <0.015 <0.015

## 2020-09-09 NOTE — PROGRESS NOTES
[facility]    Medicine Progress Note - Hospitalist Service       Date of Admission:  9/7/2020  Date of Service: 09/09/2020    Assessment & Plan The email address for your creadst account has been updated     Jorje Teran is a 70 year old male who presents with weakness and slurred speech    Asymptomatic COVID-19 sent, low suspicion    CVA  Last baseline known noon on 9/6 (over 24 hours PTA). Son went to check on father afternoon/evening of presentation and he was on ground, unable to move his L side and slurring words. Vitals stable. Labs normal except 11.2. CXR clear.   *CT with filling defect within the distal R ICA extending into the R MCA and occlusion of the R MCA at the M1 segment. CTA with severe plaquing at R carotid bifurcation with filling defect in the prox R ICA suggestive of thrombus contributing to approximately 80% stenosis. 60% stenosis of the proximal L ICA. TTE with bubble -> Normal left ventricular size and function. Left ventricular ejection fraction of 55-60%. No segmental wall motion abnormalities noted.  No shunting at the atrial level on suboptimal Bubble study.  - lipids 7/29/20- LDL 78, HDL 56, , T chol 164  - A1C 7/2020 7.1%   - TSH 7/2020 at 2.05   - given  mg x 1 on arrival to ED  Plan:  - Keep in ICU today  - Q1 hour neuro checks  - permissive HTN, labetalol as needed for SBP>220  - Neurology and Neurosurgery following  - ASA 81 mg daily PO/DC  - atorvastatin 40 mg daily when able  - telemetry  - Continue TFs  - 3% hypertonic saline with goal Na 145 per Neuro, Q4H Na checks  - Intensivist following given hypertonic saline and CVC present.   - PT/OT and bedside swallow, SLP    REPEAT CT HEAD   IMPRESSION:   1. Evolving large right MCA territory ischemic infarct again noted.  Edema within the infarct results in effacement of the right lateral  ventricle and 4 mm leftward midline shift of the septum pellucidum  which is not appreciably changed from the  comparison study. No  definite new infarcts. No intracranial hemorrhage.  2. Diffuse cerebral volume loss and cerebral white matter changes  consistent with chronic small vessel ischemic disease.     CAD s/p CABG x 3, x 1 in 2015  HTN  HLD  PTA (needs med rec) amlodipine 20 mg daily, ASA 81 mg daiy, atorvastatin 40 mg daily, Zetia 10 mg daily, furosemide 40 mg BID, metoprolol 25 mg BID, Entresto 49-51 1 tab BID, valsartan 320 mg daily  Follows with Dr. Vincent with [facility]. Some concern re: medical compliance.  TTE with bubble this admission -> Normal left ventricular size and function. Left ventricular ejection fraction of 55-60%. No segmental wall motion abnormalities noted.  No shunting at the atrial level on suboptimal Bubble study.  Plan:  - hold meds while NPO  - prn labetalol, hydralazine SBP>220    DM II  Not on meds for this. Recent A1C at 7.1 7/2020.   - q4 hour blood sugar checks  - low sliding scale insulin, increase medium if needed    LE edema  US at [facility] negative for DVT    SHELDON  Intermittent treatment as outpatient   - hold CPAP for now    Hypothyroidism  PTA (needs med rec) levothyroxine 50 mcg daily  TSH checked 7/29/2020 at 2.05  - change levothyroxine to 25 mg IV daily while NPO    Morbid obesity  BMI noted  7/202 at ~38. Will need to encourage healthy lifestyle and weight loss with recovery     Diet: NPO for Medical/Clinical Reasons Except for: No Exceptions  Adult Formula Drip Feeding: Continuous Isosource 1.5; Nasojejunal; Goal Rate: 60; mL/hr; Medication - Feeding Tube Flush Frequency: At least 15-30 mL water before and after medication administration and with tube clogging; Amount to Send (Nutritio...    DVT Prophylaxis: Pneumatic Compression Devices  Hayes Catheter: in place, indication: Strict 1-2 Hour I&O  Code Status: Full Code           Disposition Plan   Expected discharge: 4 - 7 days, recommended to transitional care unit once mental status at baseline, safe disposition plan/ TCU bed  available and neurology work up completed.  Entered: Michael Merritt MD 09/09/2020, 4:15 PM       The patient's care was discussed with the Bedside Nurse, Patient and Patient's Family.    Michael Merritt MD  Hospitalist Service  [facility]    ______________________________________________________________________    Interval History     No acute events overnight  More alert today.   Patient denies CP/SOB. No fevers or chills recorded  Patient has no new complaints  Son at bedside.     Data reviewed today: I reviewed all medications, new labs and imaging results over the last 24 hours. I personally reviewed no images or EKG's today.    Physical Exam   Vital Signs: Temp: 99.9  F (37.7  C) Temp src: Axillary BP: (!) 162/80 Pulse: 69   Resp: 15 SpO2: 98 % O2 Device: None (Room air)    Weight: 246 lbs 11.12 oz    Constitutional: no apparent distress, lethargu  Respiratory: CTA B without w/c  Cardiovascular: RRR without m/r/g. 1+ edema  GI: soft, nontender, Obese, no HSM  Neurologic: unable to fully assess. Is moving his R side and generally following commands. Dense paresis on L  Psychiatric: mood and affect wnl    Data   Recent Labs   Lab 09/09/20  1415 09/09/20  0950 09/09/20  0556  09/08/20  0708 09/07/20  2355 09/07/20  1831   WBC  --   --  9.0  --  9.7  --  11.2*   HGB  --   --  13.3  --  13.6  --  14.5   MCV  --   --  91  --  90  --  91   PLT  --   --  181  --  189  --  219   INR  --   --   --   --   --   --  1.03   * 141 140   < > 139  --  136   POTASSIUM  --   --  3.5  --  3.6  --  3.9   CHLORIDE  --   --  111*  --  107  --  103   CO2  --   --  25  --  26  --  25   BUN  --   --  9  --  13  --  13   CR  --   --  0.79  --  0.85  --  0.85   ANIONGAP  --   --  4  --  6  --  8   SELENA  --   --  7.7*  --  8.4*  --  8.9   GLC  --   --  178*  --  162*  --  190*   ALBUMIN  --   --   --   --   --  3.6  --    PROTTOTAL  --   --   --   --   --  7.4  --    BILITOTAL  --   --   --   --   --  0.9  --    ALKPHOS  --    --   --   --   --  67  --    ALT  --   --   --   --   --  28  --    AST  --   --   --   --   --  23  --    TROPI  --   --   --   --  <0.015 <0.015 <0.015    < > = values in this interval not displayed.     Recent Results (from the past 24 hour(s))   CT Head w/o Contrast    Narrative    CT SCAN OF THE HEAD WITHOUT CONTRAST   9/8/2020 8:36 PM     HISTORY: Stroke, follow up    TECHNIQUE:  Axial images of the head and coronal reformations without  IV contrast material. Radiation dose for this scan was reduced using  automated exposure control, adjustment of the mA and/or kV according  to patient size, or iterative reconstruction technique.    COMPARISON: CT dated 9/8/2020 0735 hours.    FINDINGS:     Intracranial contents: Again noted is an evolving infarct in the right  middle cerebral artery distribution. There is been no significant  change in the appearance when compared to the earlier study. There is  approximately 3 mm of midline shift. This is unchanged. No definite  hemorrhagic transformation.    Visualized orbits/sinuses/mastoids:  The visualized portions of the  sinuses and mastoids appear normal.    Osseous structures/soft tissues:  There is no evidence of trauma.      Impression    IMPRESSION: Large evolving right middle cerebral artery infarct. There  is mass effect on the right lateral ventricle and slight midline  shift. When compared to the earlier scan, there has not been any  significant change.    FELICIA BOJORQUEZ MD   CT Head w/o Contrast    Narrative    CT OF THE HEAD WITHOUT CONTRAST 9/9/2020 7:04 AM     COMPARISON: Head CT 9/8/2020    HISTORY: Stroke, follow-up.    TECHNIQUE: 5 mm thick axial CT images of the head were acquired  without IV contrast material.    FINDINGS: Loss of gray-white differentiation and hypodensity involving  a large portion of the right cerebral hemisphere consistent with a  large right MCA territory ischemic infarct is again noted. This  infarct involves large portions of the  right frontal, right temporal  and right parietal lobes. Edema within the infarcted tissue is again  noted resulting in effacement of the right lateral ventricle and 4 mm  leftward midline shift of the septum pellucidum. No definite new  infarcts noted. No intracranial hemorrhage.    There is mild diffuse cerebral volume loss. There are subtle patchy  areas of decreased density in the cerebral white matter bilaterally  that are consistent with sequela of chronic small vessel ischemic  disease. The basal cisterns are within normal limits in configuration  given the degree of cerebral volume loss. There are no extra-axial  fluid collections.     No intracranial mass.    The visualized paranasal sinuses are well-aerated. There is no  mastoiditis. There are no fractures of the visualized bones.       Impression    IMPRESSION:   1. Evolving large right MCA territory ischemic infarct again noted.  Edema within the infarct results in effacement of the right lateral  ventricle and 4 mm leftward midline shift of the septum pellucidum  which is not appreciably changed from the comparison study. No  definite new infarcts. No intracranial hemorrhage.  2. Diffuse cerebral volume loss and cerebral white matter changes  consistent with chronic small vessel ischemic disease.       Radiation dose for this scan was reduced using automated exposure  control, adjustment of the mA and/or kV according to patient size, or  iterative reconstruction technique.    MARIBEL MORRISON MD     Medications     - MEDICATION INSTRUCTIONS -       sodium chloride 3% 100 mL/hr at 09/09/20 1065       aspirin  81 mg Oral or Feeding Tube Daily    Or     aspirin  75 mg Rectal Daily     atorvastatin  40 mg Oral Daily     ezetimibe  10 mg Oral Daily     insulin aspart  1-6 Units Subcutaneous Q4H     levothyroxine  50 mcg Oral QAM AC     metoprolol tartrate  25 mg Oral or Feeding Tube BID

## 2020-09-09 NOTE — PLAN OF CARE
The patient remains lethargic. He responds to voice and/or gentle touch stimulus to his right side. Right side follows commands and has no deficits. Left arm extends slightly to painful stimuli. Left leg withdraws from painful stimuli. Left sided neglect. PERRLA. Patient appears to understand questions and commands. He gives only yes and no answers or single word answers.   Lungs clear. O2 saturations>92% on room air. Occasional cough.   Sinus rhythm on monitor. SBP<180.   Blood sugars monitored.   Tube feeding started overnight. Started at trickle feed of 15cc's/hr.   Patient voiding large amounts of urine.   Levi Calloway RN 7:08 AM 09/09/20

## 2020-09-09 NOTE — PLAN OF CARE
"Initial OT evaluation completed and treatment initiated. Patient 70 year old male who presents with right hemispheric stroke, CT showing associated edema. He was found down by his son yesterday evening and transferred to  from [facility]. Per patient's son- patient independent in ADLs/IADLs at baseline. Patient lives alone in rambler style house with 1 KIERRA.     Discharge Planner OT   Patient plan for discharge: therapist educated pt's son on rehab after hospitalization and son appeared open to ARU    Current status: eyes closed majority of session, cues to attend to task and remain alert. Patient c/o pain and sensitivity to light in eyes, no tracking to L observed. Oriented to self, \"Fairivew\" and month. Following 1 step commands consistently with repetition and increased time. BP supine 152/61. Patient transferred supine-sit EOB maxA with increased time/effort, verbal cues for hand placement. Patient SBA for static seated balance with cues for hand placement and to maintain midline. BP seated 149/87. patient fearful, with fatigue static seated balance modA, leaning to affected L side and pushing. attempted x1 sit-stand totalA x2. sit-supine maxA x2. Patient appearing fatigued and lethargic after. BP supine 152/78    Barriers to return to prior living situation: deconditioning, increased level of A for ADLs/IADLs, visual deficits, cognitive deficits, decreased balance, decreased postural control, lives alone    Recommendations for discharge: ARU    Rationale for [place]s: Patient would benefit from daily/intensive therapy. Anticipate patient to tolerate 3+ hrs /day. Patient well below baseline. Has supportive family.        Entered by: Thalia Johnson 09/09/2020 10:09 AM       "

## 2020-09-09 NOTE — PLAN OF CARE
Discharge Planner PT   Patient plan for discharge: Not stated.  Current status: Evaluation completed and treatment initiated. Noted pt preferred to keep eyes closed throughout session requiring frequent cues to keep eyes open to participate in session. Pt transferred to bedside combilizer chair with use of reposition sheet and ceiling lift. Pt required assist for positioning of L UE on pillows. Pt unable to keep eyes open to participate in strengthening exercises once up in chair.   Barriers to return to prior living situation: Lives alone, Level of assist, L sided weakness, Fall risk   Recommendations for discharge: ARC  Rationale for [place]s: Pt will benefit from continued skilled PT intervention while IP and at [facility] in order to progress independence and safety with mobility. Pt was independent at baseline. Anticipate pt will be able to tolerate 3 hours of therapy/day at time of discharge.        Entered by: Rin Blancas 09/09/2020 3:45 PM

## 2020-09-09 NOTE — PROGRESS NOTES
09/09/20 1404   Quick Adds   Type of Visit Initial PT Evaluation   Living Environment   Lives With alone   Living Arrangements house   Home Accessibility stairs to enter home   Number of Stairs, Main Entrance 1   Transportation Anticipated car, drives self   Living Environment Comment Pt unable to provide subjective history.    Self-Care   Usual Activity Tolerance good   Current Activity Tolerance poor   Regular Exercise No   Equipment Currently Used at Home none   Functional Level Prior   Ambulation 0-->independent   Transferring 0-->independent   Fall history within last six months yes   Number of times patient has fallen within last six months 1  (Found down prior to admission)   General Information   Onset of Illness/Injury or Date of Surgery - Date 09/07/20   Referring Physician Mandeep Rueda MD    Patient/Family Goals Statement Not stated.    Pertinent History of Current Problem (include personal factors and/or comorbidities that impact the POC) 71 y/o male found down at home by son, noted to have evolving large R MCA infarct with edema and midline shift. PMH including SHELDON, class 2 severe obesity, HTN, CAD, DM type 2, hyperlipidemia.    Precautions/Limitations fall precautions   General Observations Pt in supine upon arrival of therapist.    General Info Comments HOB > 30 degrees, Up with assist.    Cognitive Status Examination   Orientation person;place   Level of Consciousness lethargic/somnolent   Follows Commands and Answers Questions 50% of the time   Personal Safety and Judgment impaired   Pain Assessment   Patient Currently in Pain No   Integumentary/Edema   Integumentary/Edema Comments No deficits noted.    Posture    Posture Comments Noted forward head and shoulder posture sitting up in comobilizer chair.    Range of Motion (ROM)   ROM Comment B LEs WFL.    Strength   Strength Comments Not formally assessed. R LE grossly 4/5, L LE 0/5.    Bed Mobility   Bed Mobility Comments NT.  "  Transfer Skills   Transfer Comments Pt transferred to bedside combilizer with use of reposition sheet and ceiling lift.    Gait   Gait Comments NT.   Balance   Balance Comments Noted good balance sitting up in combilizer chair.    Sensory Examination   Sensory Perception Comments NT.   General Therapy Interventions   Planned Therapy Interventions balance training;bed mobility training;gait training;ROM;strengthening;transfer training   Clinical Impression   Criteria for Skilled Therapeutic Intervention yes, treatment indicated   PT Diagnosis Difficulty with functional mobility.    Influenced by the following impairments L sided weakness, Decreased activity tolerance, Impaired balance   Functional limitations due to impairments Limited functional mobility requiring AD and assist.    Clinical Presentation Evolving/Changing   Clinical Presentation Rationale Based on PMH, current presentation, and social support.    Clinical Decision Making (Complexity) Low complexity   Therapy Frequency Daily   Predicted Duration of Therapy Intervention (days/wks) 4 days   Anticipated Discharge Disposition Acute Rehabilitation Facility   Risk & Benefits of therapy have been explained Yes   Patient, Family & other staff in agreement with plan of care Yes   [facility] TM \"6 Clicks\"   2016, Trustees of [facility], under license to [employer].  All rights reserved.   6 Clicks Short Forms Basic Mobility Inpatient Short Form   [facility] AM-PAC  \"6 Clicks\" V.2 Basic Mobility Inpatient Short Form   1. Turning from your back to your side while in a flat bed without using bedrails? 2 - A Lot   2. Moving from lying on your back to sitting on the side of a flat bed without using bedrails? 2 - A Lot   3. Moving to and from a bed to a chair (including a wheelchair)? 1 - Total   4. Standing up from a chair using your arms (e.g., wheelchair, or bedside chair)? 1 - Total   5. To walk in hospital room? 1 - Total   6. " Climbing 3-5 steps with a railing? 1 - Total   Basic Mobility Raw Score (Score out of 24.Lower scores equate to lower levels of function) 8   Total Evaluation Time   Total Evaluation Time (Minutes) 10

## 2020-09-09 NOTE — PLAN OF CARE
Discharge Planner SLP   Patient plan for discharge: Not addressed.   Current status: Patient continues to present with moderate severe to severe oral and pharyngeal dysphagia. Patient not tolerating own secretions at bedside. He was able to complete a few lingual exercises and did not have awareness of the spoon in his mouth and needed verbal cues to close his mouth around the spoon but bit down on it. Incomplete swallow response noted that was delayed.   Recommend: 1. NPO with TF and frequent oral cares with suctioning. Encourage patient to cough hard.   Barriers to return to prior living situation: Dysphagia  Recommendations for discharge: ARC  Rationale for [place]s: Anticipate patient will be able to tolerate 3 hours of therapy at time of discharge. He participates in therapy sessions and has good family support and will need intense interdisciplinary intervention. Dysphagia and speech and language intervention. Patient is significantly below his baseline.         Entered by: Jaky Egan 09/09/2020 4:21 PM

## 2020-09-10 ENCOUNTER — APPOINTMENT (OUTPATIENT)
Dept: OCCUPATIONAL THERAPY | Facility: CLINIC | Age: 70
DRG: 064 | End: 2020-09-10
Attending: HOSPITALIST
Payer: COMMERCIAL

## 2020-09-10 ENCOUNTER — APPOINTMENT (OUTPATIENT)
Dept: CT IMAGING | Facility: CLINIC | Age: 70
DRG: 064 | End: 2020-09-10
Attending: STUDENT IN AN ORGANIZED HEALTH CARE EDUCATION/TRAINING PROGRAM
Payer: COMMERCIAL

## 2020-09-10 ENCOUNTER — APPOINTMENT (OUTPATIENT)
Dept: SPEECH THERAPY | Facility: CLINIC | Age: 70
DRG: 064 | End: 2020-09-10
Attending: HOSPITALIST
Payer: COMMERCIAL

## 2020-09-10 LAB
ANION GAP SERPL CALCULATED.3IONS-SCNC: 5 MMOL/L (ref 3–14)
BUN SERPL-MCNC: 14 MG/DL (ref 7–30)
CALCIUM SERPL-MCNC: 8 MG/DL (ref 8.5–10.1)
CHLORIDE SERPL-SCNC: 119 MMOL/L (ref 94–109)
CHOLEST SERPL-MCNC: 119 MG/DL
CO2 SERPL-SCNC: 25 MMOL/L (ref 20–32)
CREAT SERPL-MCNC: 0.85 MG/DL (ref 0.66–1.25)
ERYTHROCYTE [DISTWIDTH] IN BLOOD BY AUTOMATED COUNT: 12.7 % (ref 10–15)
GFR SERPL CREATININE-BSD FRML MDRD: 87 ML/MIN/{1.73_M2}
GLUCOSE BLDC GLUCOMTR-MCNC: 226 MG/DL (ref 70–99)
GLUCOSE BLDC GLUCOMTR-MCNC: 231 MG/DL (ref 70–99)
GLUCOSE BLDC GLUCOMTR-MCNC: 235 MG/DL (ref 70–99)
GLUCOSE BLDC GLUCOMTR-MCNC: 239 MG/DL (ref 70–99)
GLUCOSE BLDC GLUCOMTR-MCNC: 240 MG/DL (ref 70–99)
GLUCOSE BLDC GLUCOMTR-MCNC: 244 MG/DL (ref 70–99)
GLUCOSE BLDC GLUCOMTR-MCNC: 244 MG/DL (ref 70–99)
GLUCOSE SERPL-MCNC: 256 MG/DL (ref 70–99)
HBA1C MFR BLD: 7.5 % (ref 0–5.6)
HCT VFR BLD AUTO: 36.3 % (ref 40–53)
HDLC SERPL-MCNC: 45 MG/DL
HGB BLD-MCNC: 12.4 G/DL (ref 13.3–17.7)
INTERPRETATION ECG - MUSE: NORMAL
LDLC SERPL CALC-MCNC: 53 MG/DL
MCH RBC QN AUTO: 31.6 PG (ref 26.5–33)
MCHC RBC AUTO-ENTMCNC: 34.2 G/DL (ref 31.5–36.5)
MCV RBC AUTO: 92 FL (ref 78–100)
NONHDLC SERPL-MCNC: 74 MG/DL
PLATELET # BLD AUTO: 189 10E9/L (ref 150–450)
POTASSIUM SERPL-SCNC: 3.6 MMOL/L (ref 3.4–5.3)
RBC # BLD AUTO: 3.93 10E12/L (ref 4.4–5.9)
SODIUM SERPL-SCNC: 148 MMOL/L (ref 133–144)
SODIUM SERPL-SCNC: 149 MMOL/L (ref 133–144)
SODIUM SERPL-SCNC: 153 MMOL/L (ref 133–144)
SODIUM SERPL-SCNC: 155 MMOL/L (ref 133–144)
SODIUM SERPL-SCNC: 156 MMOL/L (ref 133–144)
SODIUM SERPL-SCNC: 158 MMOL/L (ref 133–144)
TRIGL SERPL-MCNC: 105 MG/DL
WBC # BLD AUTO: 8.8 10E9/L (ref 4–11)

## 2020-09-10 PROCEDURE — 84295 ASSAY OF SERUM SODIUM: CPT | Performed by: STUDENT IN AN ORGANIZED HEALTH CARE EDUCATION/TRAINING PROGRAM

## 2020-09-10 PROCEDURE — 25000128 H RX IP 250 OP 636: Performed by: STUDENT IN AN ORGANIZED HEALTH CARE EDUCATION/TRAINING PROGRAM

## 2020-09-10 PROCEDURE — 70450 CT HEAD/BRAIN W/O DYE: CPT | Mod: 76

## 2020-09-10 PROCEDURE — 27211441 ZZ H KIT SHRLOCK 5FR POWER PICC TRIPLE LUMEN

## 2020-09-10 PROCEDURE — 92526 ORAL FUNCTION THERAPY: CPT | Mod: GN | Performed by: SPEECH-LANGUAGE PATHOLOGIST

## 2020-09-10 PROCEDURE — 36569 INSJ PICC 5 YR+ W/O IMAGING: CPT

## 2020-09-10 PROCEDURE — 25000132 ZZH RX MED GY IP 250 OP 250 PS 637: Performed by: STUDENT IN AN ORGANIZED HEALTH CARE EDUCATION/TRAINING PROGRAM

## 2020-09-10 PROCEDURE — 00000146 ZZHCL STATISTIC GLUCOSE BY METER IP

## 2020-09-10 PROCEDURE — 70450 CT HEAD/BRAIN W/O DYE: CPT

## 2020-09-10 PROCEDURE — 25000125 ZZHC RX 250: Performed by: HOSPITALIST

## 2020-09-10 PROCEDURE — 83036 HEMOGLOBIN GLYCOSYLATED A1C: CPT | Performed by: STUDENT IN AN ORGANIZED HEALTH CARE EDUCATION/TRAINING PROGRAM

## 2020-09-10 PROCEDURE — 25000131 ZZH RX MED GY IP 250 OP 636 PS 637: Performed by: STUDENT IN AN ORGANIZED HEALTH CARE EDUCATION/TRAINING PROGRAM

## 2020-09-10 PROCEDURE — 25000125 ZZHC RX 250: Performed by: STUDENT IN AN ORGANIZED HEALTH CARE EDUCATION/TRAINING PROGRAM

## 2020-09-10 PROCEDURE — 99233 SBSQ HOSP IP/OBS HIGH 50: CPT | Mod: GC | Performed by: PSYCHIATRY & NEUROLOGY

## 2020-09-10 PROCEDURE — 85027 COMPLETE CBC AUTOMATED: CPT | Performed by: STUDENT IN AN ORGANIZED HEALTH CARE EDUCATION/TRAINING PROGRAM

## 2020-09-10 PROCEDURE — 25000132 ZZH RX MED GY IP 250 OP 250 PS 637

## 2020-09-10 PROCEDURE — 99233 SBSQ HOSP IP/OBS HIGH 50: CPT | Performed by: STUDENT IN AN ORGANIZED HEALTH CARE EDUCATION/TRAINING PROGRAM

## 2020-09-10 PROCEDURE — 80048 BASIC METABOLIC PNL TOTAL CA: CPT | Performed by: STUDENT IN AN ORGANIZED HEALTH CARE EDUCATION/TRAINING PROGRAM

## 2020-09-10 PROCEDURE — 27210429 ZZH NUTRITION PRODUCT INTERMEDIATE LITER

## 2020-09-10 PROCEDURE — 99207 ZZC CDG-CUT & PASTE-POTENTIAL IMPACT ON LEVEL: CPT | Performed by: STUDENT IN AN ORGANIZED HEALTH CARE EDUCATION/TRAINING PROGRAM

## 2020-09-10 PROCEDURE — 97110 THERAPEUTIC EXERCISES: CPT | Mod: GO | Performed by: OCCUPATIONAL THERAPIST

## 2020-09-10 PROCEDURE — 20000003 ZZH R&B ICU

## 2020-09-10 PROCEDURE — 25000128 H RX IP 250 OP 636: Performed by: INTERNAL MEDICINE

## 2020-09-10 PROCEDURE — 25800030 ZZH RX IP 258 OP 636: Performed by: HOSPITALIST

## 2020-09-10 PROCEDURE — 80061 LIPID PANEL: CPT | Performed by: STUDENT IN AN ORGANIZED HEALTH CARE EDUCATION/TRAINING PROGRAM

## 2020-09-10 RX ORDER — HYDRALAZINE HYDROCHLORIDE 20 MG/ML
10-20 INJECTION INTRAMUSCULAR; INTRAVENOUS
Status: DISCONTINUED | OUTPATIENT
Start: 2020-09-10 | End: 2020-09-24 | Stop reason: HOSPADM

## 2020-09-10 RX ORDER — DEXTROSE MONOHYDRATE 25 G/50ML
25-50 INJECTION, SOLUTION INTRAVENOUS
Status: DISCONTINUED | OUTPATIENT
Start: 2020-09-10 | End: 2020-09-14

## 2020-09-10 RX ORDER — LIDOCAINE 40 MG/G
CREAM TOPICAL
Status: ACTIVE | OUTPATIENT
Start: 2020-09-10 | End: 2020-09-13

## 2020-09-10 RX ORDER — ACETAMINOPHEN 325 MG/1
650-1000 TABLET ORAL EVERY 6 HOURS PRN
Status: DISCONTINUED | OUTPATIENT
Start: 2020-09-10 | End: 2020-09-24 | Stop reason: HOSPADM

## 2020-09-10 RX ORDER — HEPARIN SODIUM,PORCINE 10 UNIT/ML
2-5 VIAL (ML) INTRAVENOUS
Status: ACTIVE | OUTPATIENT
Start: 2020-09-10 | End: 2020-09-13

## 2020-09-10 RX ORDER — HEPARIN SODIUM 10000 [USP'U]/ML
7500 INJECTION, SOLUTION INTRAVENOUS; SUBCUTANEOUS EVERY 12 HOURS
Status: DISCONTINUED | OUTPATIENT
Start: 2020-09-10 | End: 2020-09-24 | Stop reason: HOSPADM

## 2020-09-10 RX ORDER — NICOTINE POLACRILEX 4 MG
15-30 LOZENGE BUCCAL
Status: DISCONTINUED | OUTPATIENT
Start: 2020-09-10 | End: 2020-09-14

## 2020-09-10 RX ORDER — 3% SODIUM CHLORIDE 3 G/100ML
INJECTION, SOLUTION INTRAVENOUS CONTINUOUS
Status: DISCONTINUED | OUTPATIENT
Start: 2020-09-10 | End: 2020-09-12

## 2020-09-10 RX ORDER — LABETALOL HYDROCHLORIDE 5 MG/ML
10-40 INJECTION, SOLUTION INTRAVENOUS EVERY 10 MIN PRN
Status: DISCONTINUED | OUTPATIENT
Start: 2020-09-10 | End: 2020-09-24 | Stop reason: HOSPADM

## 2020-09-10 RX ADMIN — METOPROLOL TARTRATE 25 MG: 25 TABLET, FILM COATED ORAL at 09:50

## 2020-09-10 RX ADMIN — ACETAMINOPHEN 975 MG: 325 TABLET, FILM COATED ORAL at 20:00

## 2020-09-10 RX ADMIN — INSULIN GLARGINE 6 UNITS: 100 INJECTION, SOLUTION SUBCUTANEOUS at 22:27

## 2020-09-10 RX ADMIN — LEVOTHYROXINE SODIUM 50 MCG: 50 TABLET ORAL at 09:49

## 2020-09-10 RX ADMIN — HYDRALAZINE HYDROCHLORIDE 10 MG: 20 INJECTION INTRAMUSCULAR; INTRAVENOUS at 19:17

## 2020-09-10 RX ADMIN — HEPARIN SODIUM 7500 UNITS: 10000 INJECTION INTRAVENOUS; SUBCUTANEOUS at 14:53

## 2020-09-10 RX ADMIN — ACETAMINOPHEN 650 MG: 325 TABLET, FILM COATED ORAL at 13:18

## 2020-09-10 RX ADMIN — LIDOCAINE HYDROCHLORIDE ANHYDROUS 1 ML: 10 INJECTION, SOLUTION INFILTRATION at 14:44

## 2020-09-10 RX ADMIN — HYDRALAZINE HYDROCHLORIDE 10 MG: 20 INJECTION INTRAMUSCULAR; INTRAVENOUS at 19:06

## 2020-09-10 RX ADMIN — SODIUM CHLORIDE 2.5 MG/HR: 900 INJECTION, SOLUTION INTRAVENOUS at 21:08

## 2020-09-10 RX ADMIN — METOPROLOL TARTRATE 25 MG: 25 TABLET, FILM COATED ORAL at 20:00

## 2020-09-10 RX ADMIN — LABETALOL HYDROCHLORIDE 10 MG: 5 INJECTION, SOLUTION INTRAVENOUS at 19:53

## 2020-09-10 RX ADMIN — ASPIRIN 81 MG 81 MG: 81 TABLET ORAL at 09:49

## 2020-09-10 RX ADMIN — SODIUM CHLORIDE 500 ML: 3 INJECTION, SOLUTION INTRAVENOUS at 23:18

## 2020-09-10 RX ADMIN — LABETALOL HYDROCHLORIDE 20 MG: 5 INJECTION, SOLUTION INTRAVENOUS at 20:08

## 2020-09-10 RX ADMIN — EZETIMIBE 10 MG: 10 TABLET ORAL at 09:49

## 2020-09-10 RX ADMIN — SODIUM CHLORIDE 500 ML: 3 INJECTION, SOLUTION INTRAVENOUS at 10:09

## 2020-09-10 RX ADMIN — ONDANSETRON 4 MG: 2 INJECTION INTRAMUSCULAR; INTRAVENOUS at 21:03

## 2020-09-10 RX ADMIN — SODIUM CHLORIDE 500 ML: 3 INJECTION, SOLUTION INTRAVENOUS at 16:03

## 2020-09-10 RX ADMIN — LABETALOL HYDROCHLORIDE 40 MG: 5 INJECTION, SOLUTION INTRAVENOUS at 20:26

## 2020-09-10 RX ADMIN — SODIUM CHLORIDE 500 ML: 3 INJECTION, SOLUTION INTRAVENOUS at 04:43

## 2020-09-10 RX ADMIN — ATORVASTATIN CALCIUM 40 MG: 40 TABLET, FILM COATED ORAL at 09:50

## 2020-09-10 ASSESSMENT — ACTIVITIES OF DAILY LIVING (ADL)
ADLS_ACUITY_SCORE: 20

## 2020-09-10 ASSESSMENT — MIFFLIN-ST. JEOR: SCORE: 1845.25

## 2020-09-10 NOTE — PROCEDURES
[facility]    Triple Lumen PICC Placement    Date/Time: 9/10/2020 2:57 PM  Performed by: Joslyn Sheikh RN  Authorized by: Michael Merritt MD   Indications: vascular access    UNIVERSAL PROTOCOL   Site Marked: Yes  Prior Images Obtained and Reviewed:  Yes  Required items: Required blood products, implants, devices and special equipment available    Patient identity confirmed:  Arm band and hospital-assigned identification number  NA - No sedation, light sedation, or local anesthesia  Confirmation Checklist:  Patient's identity using two indicators, relevant allergies, procedure was appropriate and matched the consent or emergent situation and correct equipment/implants were available  Time out: Immediately prior to the procedure a time out was called    Universal Protocol: the Joint Commission Universal Protocol was followed    Preparation: Patient was prepped and draped in usual sterile fashion           ANESTHESIA    Local Anesthetic: Lidocaine 1% without epinephrine  Anesthetic Total (mL):  2      SEDATION    Patient Sedated: No        Preparation: skin prepped with 2% chlorhexidine  Skin prep agent: skin prep agent completely dried prior to procedure  Sterile barriers: maximum sterile barriers were used: cap, mask, sterile gown, sterile gloves, and large sterile sheet  Hand hygiene: hand hygiene performed prior to central venous catheter insertion  Type of line used: PICC  Catheter type: triple lumen  Lumen type: valved  Catheter size: 5 Fr  Brand: Bard  Lot number: [id number]  Placement method: MST and ultrasound  Number of attempts: 1  Successful placement: yes  Orientation: right  Location: basilic vein  Arm circumference: adults 10 cm  Extremity circumference: 36  Visible catheter length: 8  Internal length: 37 cm  Total catheter length: 45  Dressing and securement: blood removed, blood cleaned with CHG, antibiotic disc placed, chlorhexidine disc applied, dressing applied, occlusive dressing  applied, site cleaned and securement device  Post procedure assessment: blood return through all ports (Confirmed by ECG)  PROCEDURE Describe Procedure: Right upper arm picc placed  Ready for immediate use

## 2020-09-10 NOTE — PROGRESS NOTES
[facility]    Medicine Progress Note - Hospitalist Service       Date of Admission:  9/7/2020  Date of Service: 09/10/2020    Assessment & Plan The email address for your [employer] account has been updated       Jorje Teran is a 70 year old male who presents with weakness and slurred speech    CVA  Last baseline known noon on 9/6 (over 24 hours PTA). Son went to check on father afternoon/evening of presentation and he was on ground, unable to move his L side and slurring words. Vitals stable. Labs normal except 11.2. CXR clear.   *CT with filling defect within the distal R ICA extending into the R MCA and occlusion of the R MCA at the M1 segment. CTA with severe plaquing at R carotid bifurcation with filling defect in the prox R ICA suggestive of thrombus contributing to approximately 80% stenosis. 60% stenosis of the proximal L ICA. TTE with bubble -> Normal left ventricular size and function. Left ventricular ejection fraction of 55-60%. No segmental wall motion abnormalities noted.  No shunting at the atrial level on suboptimal Bubble study.  - lipids 7/29/20- LDL 78, HDL 56, , T chol 164  - A1C 7/2020 7.1%   - TSH 7/2020 at 2.05   - given  mg x 1 on arrival to ED  - Head CTs have been stable for the most part.  Plan:  - Keep in ICU today  - Q1 hour neuro checks continued today  - permissive HTN, labetalol as needed for SBP>220  - Neurology following  - ASA 81 mg daily PO/AL  - atorvastatin 40 mg daily when able  - telemetry  - Continue TFs  - 3% hypertonic saline with goal Na 145-155 per Neuro, Q4H Na checks  - PICC placed today and removed 09/10  - PT/OT and bedside swallow, SLP    CAD s/p CABG x 3, x 1 in 2015  HTN  HLD  PTA (needs med rec) amlodipine 20 mg daily, ASA 81 mg daiy, atorvastatin 40 mg daily, Zetia 10 mg daily, furosemide 40 mg BID, metoprolol 25 mg BID, Entresto 49-51 1 tab BID, valsartan 320 mg daily  Follows with Dr. Vincent with [facility]. Some concern re:  medical compliance.  TTE with bubble this admission -> Normal left ventricular size and function. Left ventricular ejection fraction of 55-60%. No segmental wall motion abnormalities noted.  No shunting at the atrial level on suboptimal Bubble study.  Plan:  - hold meds while NPO  - prn labetalol, hydralazine SBP>220    DM II  Not on meds for this. Recent A1C at 7.1 7/2020.   - q4 hour blood sugar checks  - Start Low lantus 6 U at bedtime  - medium sliding scale insulin as needed    LE edema  US at [facility] negative for DVT    SHELDON  Intermittent treatment as outpatient   - hold CPAP for now    Hypothyroidism  PTA (needs med rec) levothyroxine 50 mcg daily  TSH checked 7/29/2020 at 2.05  - change levothyroxine to 25 mg IV daily while NPO    Morbid obesity  BMI noted  7/202 at ~38. Will need to encourage healthy lifestyle and weight loss with recovery     Diet: NPO for Medical/Clinical Reasons Except for: No Exceptions  Adult Formula Drip Feeding: Continuous Isosource 1.5; Nasojejunal; Goal Rate: 60; mL/hr; Medication - Feeding Tube Flush Frequency: At least 15-30 mL water before and after medication administration and with tube clogging; Amount to Send (Nutritio...    DVT Prophylaxis: Pneumatic Compression Devices  Hayes Catheter: in place, indication: Strict 1-2 Hour I&O  Code Status: Full Code           Disposition Plan   Expected discharge: 4 - 7 days, recommended to transitional care unit once mental status at baseline, safe disposition plan/ TCU bed available and neurology work up completed.  Entered: Michael Merritt MD 09/10/2020, 5:14 PM       The patient's care was discussed with the Bedside Nurse, Patient and Patient's Family.    Michael Merritt MD  Hospitalist Service  [facility]    ______________________________________________________________________    Interval History     No acute events overnight  Son reports continued improvement  Some neck discomfort  Patient denies CP/SOB. No fevers or chills  recorded  Patient has no new complaints  Son at bedside.     Data reviewed today: I reviewed all medications, new labs and imaging results over the last 24 hours. I personally reviewed no images or EKG's today.    Physical Exam   Vital Signs: Temp: 98.8  F (37.1  C) Temp src: Oral BP: 138/76 Pulse: 68   Resp: (!) 31 SpO2: 98 % O2 Device: None (Room air)    Weight: 238 lbs 15.66 oz    Constitutional: no apparent distress, lethargu  Respiratory: CTA B without w/c  Cardiovascular: RRR without m/r/g. 1+ edema  GI: soft, nontender, Obese, no HSM  Neurologic: unable to fully assess. Is moving his R side and generally following commands. Dense paresis on L  Psychiatric: mood and affect wnl    Data   Recent Labs   Lab 09/10/20  1420 09/10/20  1015 09/10/20  0610  09/09/20  0556  09/08/20  0708 09/07/20  2355 09/07/20  1831   WBC  --   --  8.8  --  9.0  --  9.7  --  11.2*   HGB  --   --  12.4*  --  13.3  --  13.6  --  14.5   MCV  --   --  92  --  91  --  90  --  91   PLT  --   --  189  --  181  --  189  --  219   INR  --   --   --   --   --   --   --   --  1.03   * 153* 149*   < > 140   < > 139  --  136   POTASSIUM  --   --  3.6  --  3.5  --  3.6  --  3.9   CHLORIDE  --   --  119*  --  111*  --  107  --  103   CO2  --   --  25  --  25  --  26  --  25   BUN  --   --  14  --  9  --  13  --  13   CR  --   --  0.85  --  0.79  --  0.85  --  0.85   ANIONGAP  --   --  5  --  4  --  6  --  8   SELENA  --   --  8.0*  --  7.7*  --  8.4*  --  8.9   GLC  --   --  256*  --  178*  --  162*  --  190*   ALBUMIN  --   --   --   --   --   --   --  3.6  --    PROTTOTAL  --   --   --   --   --   --   --  7.4  --    BILITOTAL  --   --   --   --   --   --   --  0.9  --    ALKPHOS  --   --   --   --   --   --   --  67  --    ALT  --   --   --   --   --   --   --  28  --    AST  --   --   --   --   --   --   --  23  --    TROPI  --   --   --   --   --   --  <0.015 <0.015 <0.015    < > = values in this interval not displayed.     Recent Results  (from the past 24 hour(s))   CT Head w/o Contrast    Narrative    CT OF THE HEAD WITHOUT CONTRAST September 10, 2020 7:55 AM     HISTORY: Stroke, follow up. Malignant edema watch.    TECHNIQUE: 5 mm thick axial CT images of the head were acquired  without IV contrast material. Radiation dose for this scan was reduced  using automated exposure control, adjustment of the mA and/or kV  according to patient size, or iterative reconstruction technique.    COMPARISON: Head CT 9/9/2020.    FINDINGS: A recent large ischemic infarct involving the majority of  the right middle cerebral artery territory is again noted including  the majority of the right temporal lobe, large portions of the right  frontal and right parietal lobes and a large portion of the right  basal ganglia. Edema within the infarcted tissue continues to result  in mass effect with effacement of the right lateral ventricle and 5 mm  of leftward midline shift of the septum pellucidum which has increased  minimally from approximately 4 mm on the comparison study. Continued  surveillance recommended. No evidence for intracranial hemorrhage.  There is mild diffuse cerebral volume loss. There are subtle patchy  areas of decreased density in the cerebral white matter bilaterally  that are consistent with sequela of chronic small vessel ischemic  disease. The basal cisterns are within normal limits in configuration  given the degree of cerebral volume loss. There are no extra-axial  fluid collections.    No intracranial mass.    The visualized paranasal sinuses are well-aerated. There is no  mastoiditis. There are no fractures of the visualized bones.      Impression    IMPRESSION:   1. Evolving large right MCA territory ischemic infarct. Edema within  the infarcted tissue results in 5 mm leftward midline shift of the  septum pellucidum which has increased slightly from 4 mm on the  previous study. Continued surveillance recommended.  2. No evidence for  intracranial hemorrhage.  3. Diffuse cerebral volume loss and cerebral white matter changes  consistent with chronic small vessel ischemic disease.             MARIBEL MORRISON MD     Medications     - MEDICATION INSTRUCTIONS -       sodium chloride 3% 100 mL/hr at 09/10/20 1501       aspirin  81 mg Oral or Feeding Tube Daily    Or     aspirin  75 mg Rectal Daily     atorvastatin  40 mg Oral Daily     ezetimibe  10 mg Oral Daily     heparin ANTICOAGULANT  7,500 Units Subcutaneous Q12H     insulin aspart  1-7 Units Subcutaneous TID AC     insulin aspart  1-5 Units Subcutaneous At Bedtime     insulin glargine  6 Units Subcutaneous At Bedtime     levothyroxine  50 mcg Oral QAM AC     metoprolol tartrate  25 mg Oral or Feeding Tube BID

## 2020-09-10 NOTE — PLAN OF CARE
Discharge Planner SLP   Patient plan for discharge: Did not state  Current status: SLP: Pt alert, though kept his eyes shut and tolerated 60 minute SLP session targeting swallow exercises and PO trials. Continued delayed with honey thick liquids by teaspoon. Difficult to determine baseline cough vs penetration/aspiration. No change in respiratory status. Pt not ready to advance diet, however, anticipate continued improvement and hopeful to avoid PEG tube based on function today.     Continue NPO with 1-5 tsp of honey thick liquids with RN only to practice swallowing throughout the day.     Barriers to return to prior living situation: Dysphagia; dysarthria  Recommendations for discharge: ARU  Rationale for [place]s: Continue ST daily; Plan Video Swallow Study  in the next 2-4 days with continued progress in secretion management; speech/language evaluation when pt out of the ICU       Entered by: Li Johnson 09/10/2020 10:35 AM

## 2020-09-10 NOTE — PROGRESS NOTES
[facility]    Stroke Progress Note    Interval Events  Clinically unchanged to slightly improved today. Repeat scan minimally changed, now 5 mm midline shift compared to 4 yesterday.    Impression  #R frontal, parietal, and temporal lobe cerebral infarction secondary to R MCA M1 occlusion causing complete R MCA syndrome- stroke mechanism is atheroembolism from high grade R ICA stenosis (80%) with presumed associated thrombus    #Cerebral edema secondary to #1, now post stroke day 4, within highest risk window of time for malignant MCA syndrome and possible herniation. Since we are beyond the first 48 hours post-stroke for pre-emptive decompressive hemicraniectomy, it is reasonable to continue to monitor closely both clinically and radiographically to determine need for further ICP management, whether medical or surgical    Stroke Evaluation summarized:  MRI/Head CT: R MCA distribution infarct  Intracranial Vascular Imaging: R M1 occlusion, incidentally noted 3 mm L MCA bifurcation aneurysm, unruptured  Cervical Carotid and Vertebral Artery Vascular Imaging: R ICA stenosis 80%, L ICA stenosis 60%  Echocardiogram: No PFO, preserved LVEF, no RWMA, no appreciable intracardiac thrombus  EKG/Telemetry: NSR  LDL: 82 (7/2020)  A1c: 7.5  Troponin: <0.015  Other testing: Not Applicable    Stroke Education  -Patient/family advised regarding the signs and symptoms of stroke and that immediate presentation to an ED for evaluation is critical.    Plan  - continue 3% NS infusion, titrated by RN to maintain Na goal 145-155, currently at goal  - SBP goal normotension- stroke is completed, no penumbra to salvage. Will change PRNs to give for SBP>160, and appreciate restart/titration of PTA antihypertensive regimen to achieve eventual long term goal BP<130/80 or lower if tolerated for overall reduced cardiovascular risk.  - Neurosurgery consulted, appreciate their assistance - surgery not recommended at this time  -  "Continue q1 hour neurochecks  - Avoid hypotonic fluid solutions  - Daily aspirin 81 mg for secondary stroke prevention  - Statin: Atorvastatin 40 mg at bedtime  - no need for am head CT unless clinically worse  - PT/OT/SLP as tolerated  - Stroke Education  - Depression Screen  - Apnea Screen  - Euthermia, Euglycemia  - Bedside Glucose Monitoring    The Stroke Fellow is Dr. Prkaash. The Stroke Staff is Dr. Castaneda.    Tod Antony, MS4  Medical Student functioned as a scribe in the preparation of this record. I interviewed and examined the patient independently and made edits to the record as necessary.    Selene Prakash MD  Vascular Neurology Fellow, PGY-5    To page a member of the stroke/neurocritical care service, click here:  AMCOM   Choose \"On Call\" tab at top, then search dropdown box for \"Neurology Adult\", select location, press Enter, then look for stroke/neuro ICU/telestroke.    ______________________________________________________    Medications   Home Meds  Prior to Admission medications    Medication Sig Start Date End Date Taking? Authorizing Provider   amLODIPine (NORVASC) 10 MG tablet Take 20 mg by mouth daily  12/26/18  Yes Reported, Patient   aspirin 81 MG EC tablet Take 81 mg by mouth daily   Yes Unknown, Entered By History   atorvastatin (LIPITOR) 80 MG tablet Take 40 mg by mouth daily  2/27/19  Yes Reported, Patient   ezetimibe (ZETIA) 10 MG tablet Take 10 mg by mouth daily  12/6/18  Yes Reported, Patient   furosemide (LASIX) 20 MG tablet TAKE 2 TABLETS BY MOUTH TWICE DAILY, FOR TREATMENT OF BLOOD PRESSURE AND FLUID RETENTION. 3/6/19  Yes Reported, Patient   levothyroxine (SYNTHROID/LEVOTHROID) 50 MCG tablet Take 50 mcg by mouth daily  10/3/18  Yes Reported, Patient   metoprolol succinate ER (TOPROL-XL) 50 MG 24 hr tablet Take 25 mg by mouth 2 times daily  10/24/19  Yes Reported, Patient   multivitamin (CENTRUM SILVER) tablet Take 1 tablet by mouth daily   Yes Unknown, Entered By History "   nitroGLYcerin (NITROSTAT) 0.4 MG sublingual tablet Place 0.4 mg under the tongue every 5 minutes as needed  10/3/18  Yes Reported, Patient   sacubitril-valsartan (ENTRESTO) 49-51 MG per tablet Take 1 tablet by mouth 2 times daily  1/6/19  Yes Reported, Patient   traZODone (DESYREL) 50 MG tablet Take 50 mg by mouth at bedtime as needed, may repeat once  8/25/20  Yes Reported, Patient   valsartan (DIOVAN) 320 MG tablet Take 320 mg by mouth daily  1/4/19  Yes Reported, Patient       Scheduled Meds    aspirin  81 mg Oral or Feeding Tube Daily    Or     aspirin  75 mg Rectal Daily     atorvastatin  40 mg Oral Daily     ezetimibe  10 mg Oral Daily     heparin ANTICOAGULANT  7,500 Units Subcutaneous Q12H     insulin aspart  1-7 Units Subcutaneous TID AC     insulin aspart  1-5 Units Subcutaneous At Bedtime     insulin glargine  6 Units Subcutaneous At Bedtime     levothyroxine  50 mcg Oral QAM AC     metoprolol tartrate  25 mg Oral or Feeding Tube BID       Infusion Meds    - MEDICATION INSTRUCTIONS -       sodium chloride 3% 100 mL/hr at 09/10/20 1501       PRN Meds  acetaminophen, bacitracin, artificial tears ophthalmic solution, glucose **OR** dextrose **OR** glucagon, heparin lock flush, hydrALAZINE, labetalol, lidocaine 4%, lidocaine (buffered or not buffered), magnesium sulfate, - MEDICATION INSTRUCTIONS -, naloxone, ondansetron **OR** ondansetron, potassium chloride, potassium chloride with lidocaine, potassium chloride, potassium chloride, potassium chloride, potassium phosphate (KPHOS) in D5W IV, potassium phosphate (KPHOS) in D5W IV, potassium phosphate (KPHOS) in D5W IV, potassium phosphate (KPHOS) in D5W IV, sodium chloride (PF)       PHYSICAL EXAMINATION  Temp:  [98.7  F (37.1  C)-100.4  F (38  C)] 98.8  F (37.1  C)  Pulse:  [] 68  Resp:  [10-35] 31  BP: (131-178)/() 138/76  SpO2:  [93 %-100 %] 98 %     General:  L handed,  male, lying in bed in no acute distress. NG tube placed in R  nare.    Neurologic  Mental Status: Eyes closed, briefly opens to voice but does not maintain alertness. Oriented to self and place. Minimal verbal output, but entirely sensical and fluent, intact comprehension of simple commands.  Cranial Nerves: VFF, PERRL, EOMI, face symmetric, sensation intact, hearing intact to conversation, tongue midline, moderately dysarthric.   Motor: LUE extension to noxious, LLE: weak non-antigravity withdrawal to noxious   RUE/RLE: 5/5  Reflexes: briskly 2+ and symmetric throughout; toes mute   Sensory: does not detect sensation on LUE and LLE, intact on R   Coordination: not tested  Station/Gait:  Not tested    Stroke Scales    NIHSS  Interval (post-stroke day 4) (09/10/20 1709)   Interval Comments daily exam (09/10/20 1709)   1a. Level of Consciousness 1-->Not alert, but arousable by minor stimulation to obey, answer, or respond   1b. LOC Questions 0-->Answers both questions correctly   1c. LOC Commands 0-->Performs both tasks correctly   2.   Best Gaze 0-->Normal   3.   Visual 1-->Partial hemianopia   4.   Facial Palsy 1-->Minor paralysis (flattened nasolabial fold, asymmetry on smiling)   5a. Motor Arm, Left 3-->No effort against gravity, limb falls   5b. Motor Arm, Right 0-->No drift, limb holds 90 (or 45) degrees for full 10 secs   6a. Motor Leg, Left 3-->No effort against gravity, leg falls to bed immediately   6b. Motor Leg, right 0-->No drift, leg holds 30 degree position for full 5 secs   7.   Limb Ataxia 0-->Absent   8.   Sensory 1-->Mild-to-moderate sensory loss, patient feels pinprick is less sharp or is dull on the affected side, or there is a loss of superficial pain with pinprick, but patient is aware of being touched   9.   Best Language 0-->No aphasia, normal   10. Dysarthria 1-->Mild-to-moderate dysarthria, patient slurs at least some words and, at worst, can be understood with some difficulty   11. Extinction and Inattention  0-->No abnormality   Total 11 (09/10/20  1709)       Imaging  I personally reviewed all imaging; relevant findings per HPI.     Lab Results Data   CBC  Recent Labs   Lab 09/10/20  0610 09/09/20  0556 09/08/20  0708   WBC 8.8 9.0 9.7   RBC 3.93* 4.26* 4.43   HGB 12.4* 13.3 13.6   HCT 36.3* 38.7* 39.8*    181 189     Basic Metabolic Panel    Recent Labs   Lab 09/10/20  1420 09/10/20  1015 09/10/20  0610  09/09/20  0556  09/08/20  0708   * 153* 149*   < > 140   < > 139   POTASSIUM  --   --  3.6  --  3.5  --  3.6   CHLORIDE  --   --  119*  --  111*  --  107   CO2  --   --  25  --  25  --  26   BUN  --   --  14  --  9  --  13   CR  --   --  0.85  --  0.79  --  0.85   GLC  --   --  256*  --  178*  --  162*   SELENA  --   --  8.0*  --  7.7*  --  8.4*    < > = values in this interval not displayed.     Liver Panel  Recent Labs   Lab 09/07/20  2355   PROTTOTAL 7.4   ALBUMIN 3.6   BILITOTAL 0.9   ALKPHOS 67   AST 23   ALT 28     INR    Recent Labs   Lab Test 09/07/20  1831   INR 1.03      Lipid Profile  No lab results found.  A1C    Recent Labs   Lab Test 09/10/20  0610   A1C 7.5*     Troponin I    Recent Labs   Lab 09/08/20  0708 09/07/20  2355 09/07/20  1831   TROPI <0.015 <0.015 <0.015

## 2020-09-10 NOTE — PROGRESS NOTES
[facility]    Neurosurgery  Daily Note    Assessment & Plan   Jorje Teran is a 70 year old male who was admitted on 9/7/2020. He presented to [facility] ED after being found down by son. Imaging revealed large area of acute/subacute infarct with edema. Repeat scan this AM with evolving large right MCA infarct with edema and 4 mm of leftward midline shift, stable. Today he was seen lying in bed. He denies pain, dizziness, nausea/vomiting, and vision changes. Continues with left-sided hemiparesis. Follows commands on the right.   Denies headache. Head CT has been stable.     Plan:  -will defer to primary service and continue to follow patient.         Peter Fontaine    Interval History   Stable  No fevers.     Physical Exam   Temp: 98.8  F (37.1  C) Temp src: Oral BP: 138/76 Pulse: 68   Resp: (!) 31 SpO2: 98 % O2 Device: None (Room air)    Vitals:    09/08/20 0000 09/09/20 0600 09/10/20 0600   Weight: 112.4 kg (247 lb 12.8 oz) 111.9 kg (246 lb 11.1 oz) 108.4 kg (238 lb 15.7 oz)     Vital Signs with Ranges  Temp:  [98.7  F (37.1  C)-100.4  F (38  C)] 98.8  F (37.1  C)  Pulse:  [] 68  Resp:  [10-35] 31  BP: (131-178)/() 138/76  SpO2:  [93 %-100 %] 98 %  I/O last 3 completed shifts:  In: 4135 [I.V.:2400; NG/GT:380]  Out: 4725 [Urine:4725]    Alerts to voice and follows commands on the right.   Left paresis          Medications     - MEDICATION INSTRUCTIONS -       sodium chloride 3% 100 mL/hr at 09/10/20 1501        aspirin  81 mg Oral or Feeding Tube Daily    Or     aspirin  75 mg Rectal Daily     atorvastatin  40 mg Oral Daily     ezetimibe  10 mg Oral Daily     heparin ANTICOAGULANT  7,500 Units Subcutaneous Q12H     insulin aspart  1-7 Units Subcutaneous TID AC     insulin aspart  1-5 Units Subcutaneous At Bedtime     insulin glargine  6 Units Subcutaneous At Bedtime     levothyroxine  50 mcg Oral QAM AC     metoprolol tartrate  25 mg Oral or Feeding Tube BID       Data         Peter YORK  Zhen POE  [facility] Neurosurgery  [facility]  [address] [place] [address]  [address] [place] [id number]    Tel [phone]  Pager [phone]

## 2020-09-10 NOTE — PLAN OF CARE
No change in neuro status overnight.   Room air. Lungs coarse.   BP within normals. Sinus rhythm on monitor.  Hayes in place. Good output. Cares done.   Low grade temp max 100.5 F.   Tube feeding in use. Blood sugars elevated. Treating with subcutaneous insulin.  3% infusing. Serial sodium checks.   Levi Calloway RN 7:59 AM 09/10/20

## 2020-09-10 NOTE — PLAN OF CARE
"[id number]  Neuro: Awakens slowly.  Slow, slurred, garbled verbal response. Oriented to person, place, month, year and \"stroke.\"  Pt did not completely smile for me but did not see droop. Tongue midline.  LUE responds by flexion to pain only on thumb.  No response LLE to pain on nail bed.  Pt answered, responded more quickly as day progressed although remained lethargic.  When at 30 degrees, suctioned orally for scant amts in throat.  Complained of pain in R side of neck.  Tender to touch.  I: ice per request.  E: continued to moan at times and holding ice on muscle.  I: Tylenol and turned to back for PICC placement.  E: slept during procedure then asked for ice.  SBP< 180, up to 170 with pain, decreased with better pain control  GI: Tolerating tube feeding.  U/O adequate.  SonDonte at bedside and updated by physicians.    "

## 2020-09-10 NOTE — PLAN OF CARE
Discharge Planner OT   Patient plan for discharge: Not stated  Current status: Pt. sleeping upon arrival, unable to open eyes for more than a few seconds, son present. Pt. kept head turned to R throughout; Provided ed.to son/pt. in LUE positioning/edema reduction, as L hand edematous;Completed L scap mob. + PROM throughout  all planes X 10 reps.--tolerated well, noted some resistance/reflex response during movement, remained sleepy througout. LUE positioned w/ pillows in elevation to assist w/ edema reduction. Answered son's questons, provided furhter ed. in OT role.  Barriers to return to prior living situation: deconditioning, increased level of A for ADLs/IADLs, visual deficits, cognitive deficits, decreased balance, decreased postural control, lives alone  Recommendations for discharge: ARU  Rationale for [place]s: Patient would benefit from daily/intensive therapy. Anticipate patient to tolerate 3+ hrs /day. Patient well below baseline. Has supportive family.        Entered by: Qian Cowart 09/10/2020 12:56 PM

## 2020-09-10 NOTE — PROGRESS NOTES
Brief Consult F/U Note:      Patient stable and doing better today.  ICU team will follow peripherally if questions arise, otherwise hospitalist and neuro crit will continue to drive care.         Richi White MD on 9/10/2020 at 8:32 AM

## 2020-09-10 NOTE — PLAN OF CARE
Pt more responsive/cooperative today, worked with PT/OT/ST, up in chair with ceiling lift today, continue hourly neuro checks, no significant change, TF adjusted, 3% NS IV infusion adjusted per orders, see MAR, plan repeat CT in a.m. possible MRI, will stay in ICU overnight.

## 2020-09-11 ENCOUNTER — APPOINTMENT (OUTPATIENT)
Dept: PHYSICAL THERAPY | Facility: CLINIC | Age: 70
DRG: 064 | End: 2020-09-11
Attending: HOSPITALIST
Payer: COMMERCIAL

## 2020-09-11 ENCOUNTER — APPOINTMENT (OUTPATIENT)
Dept: OCCUPATIONAL THERAPY | Facility: CLINIC | Age: 70
DRG: 064 | End: 2020-09-11
Attending: HOSPITALIST
Payer: COMMERCIAL

## 2020-09-11 ENCOUNTER — APPOINTMENT (OUTPATIENT)
Dept: SPEECH THERAPY | Facility: CLINIC | Age: 70
DRG: 064 | End: 2020-09-11
Attending: HOSPITALIST
Payer: COMMERCIAL

## 2020-09-11 LAB
GLUCOSE BLDC GLUCOMTR-MCNC: 215 MG/DL (ref 70–99)
GLUCOSE BLDC GLUCOMTR-MCNC: 263 MG/DL (ref 70–99)
GLUCOSE BLDC GLUCOMTR-MCNC: 280 MG/DL (ref 70–99)
GLUCOSE BLDC GLUCOMTR-MCNC: 288 MG/DL (ref 70–99)
MAGNESIUM SERPL-MCNC: 2.5 MG/DL (ref 1.6–2.3)
PHOSPHATE SERPL-MCNC: 2.6 MG/DL (ref 2.5–4.5)
SODIUM SERPL-SCNC: 155 MMOL/L (ref 133–144)
SODIUM SERPL-SCNC: 155 MMOL/L (ref 133–144)
SODIUM SERPL-SCNC: 156 MMOL/L (ref 133–144)
SODIUM SERPL-SCNC: 156 MMOL/L (ref 133–144)
SODIUM SERPL-SCNC: 157 MMOL/L (ref 133–144)
SODIUM SERPL-SCNC: 159 MMOL/L (ref 133–144)

## 2020-09-11 PROCEDURE — 97530 THERAPEUTIC ACTIVITIES: CPT | Mod: GP

## 2020-09-11 PROCEDURE — 84100 ASSAY OF PHOSPHORUS: CPT | Performed by: STUDENT IN AN ORGANIZED HEALTH CARE EDUCATION/TRAINING PROGRAM

## 2020-09-11 PROCEDURE — 20000003 ZZH R&B ICU

## 2020-09-11 PROCEDURE — 25000131 ZZH RX MED GY IP 250 OP 636 PS 637: Performed by: STUDENT IN AN ORGANIZED HEALTH CARE EDUCATION/TRAINING PROGRAM

## 2020-09-11 PROCEDURE — 25000125 ZZHC RX 250: Performed by: HOSPITALIST

## 2020-09-11 PROCEDURE — 40000239 ZZH STATISTIC VAT ROUNDS

## 2020-09-11 PROCEDURE — 27210429 ZZH NUTRITION PRODUCT INTERMEDIATE LITER

## 2020-09-11 PROCEDURE — 25000132 ZZH RX MED GY IP 250 OP 250 PS 637

## 2020-09-11 PROCEDURE — 25000132 ZZH RX MED GY IP 250 OP 250 PS 637: Performed by: STUDENT IN AN ORGANIZED HEALTH CARE EDUCATION/TRAINING PROGRAM

## 2020-09-11 PROCEDURE — 84295 ASSAY OF SERUM SODIUM: CPT | Performed by: STUDENT IN AN ORGANIZED HEALTH CARE EDUCATION/TRAINING PROGRAM

## 2020-09-11 PROCEDURE — 83735 ASSAY OF MAGNESIUM: CPT | Performed by: STUDENT IN AN ORGANIZED HEALTH CARE EDUCATION/TRAINING PROGRAM

## 2020-09-11 PROCEDURE — 99232 SBSQ HOSP IP/OBS MODERATE 35: CPT | Mod: GC | Performed by: PSYCHIATRY & NEUROLOGY

## 2020-09-11 PROCEDURE — 97112 NEUROMUSCULAR REEDUCATION: CPT | Mod: GO | Performed by: OCCUPATIONAL THERAPIST

## 2020-09-11 PROCEDURE — 92526 ORAL FUNCTION THERAPY: CPT | Mod: GN | Performed by: SPEECH-LANGUAGE PATHOLOGIST

## 2020-09-11 PROCEDURE — 99233 SBSQ HOSP IP/OBS HIGH 50: CPT | Performed by: STUDENT IN AN ORGANIZED HEALTH CARE EDUCATION/TRAINING PROGRAM

## 2020-09-11 PROCEDURE — 00000146 ZZHCL STATISTIC GLUCOSE BY METER IP

## 2020-09-11 PROCEDURE — 99207 ZZC CDG-CUT & PASTE-POTENTIAL IMPACT ON LEVEL: CPT | Performed by: STUDENT IN AN ORGANIZED HEALTH CARE EDUCATION/TRAINING PROGRAM

## 2020-09-11 PROCEDURE — 93005 ELECTROCARDIOGRAM TRACING: CPT

## 2020-09-11 PROCEDURE — 25000128 H RX IP 250 OP 636: Performed by: STUDENT IN AN ORGANIZED HEALTH CARE EDUCATION/TRAINING PROGRAM

## 2020-09-11 PROCEDURE — 93010 ELECTROCARDIOGRAM REPORT: CPT | Performed by: INTERNAL MEDICINE

## 2020-09-11 PROCEDURE — 25800030 ZZH RX IP 258 OP 636: Performed by: HOSPITALIST

## 2020-09-11 RX ORDER — METOPROLOL SUCCINATE 25 MG/1
25 TABLET, EXTENDED RELEASE ORAL 2 TIMES DAILY
Status: DISCONTINUED | OUTPATIENT
Start: 2020-09-11 | End: 2020-09-12

## 2020-09-11 RX ORDER — AMLODIPINE BESYLATE 10 MG/1
20 TABLET ORAL DAILY
Status: DISCONTINUED | OUTPATIENT
Start: 2020-09-11 | End: 2020-09-24 | Stop reason: HOSPADM

## 2020-09-11 RX ORDER — VALSARTAN 160 MG/1
320 TABLET ORAL DAILY
Status: DISCONTINUED | OUTPATIENT
Start: 2020-09-11 | End: 2020-09-24 | Stop reason: HOSPADM

## 2020-09-11 RX ADMIN — HEPARIN SODIUM 7500 UNITS: 10000 INJECTION INTRAVENOUS; SUBCUTANEOUS at 12:52

## 2020-09-11 RX ADMIN — SODIUM CHLORIDE 10 MG/HR: 900 INJECTION, SOLUTION INTRAVENOUS at 20:55

## 2020-09-11 RX ADMIN — ACETAMINOPHEN 650 MG: 325 TABLET, FILM COATED ORAL at 20:55

## 2020-09-11 RX ADMIN — HEPARIN SODIUM 7500 UNITS: 10000 INJECTION INTRAVENOUS; SUBCUTANEOUS at 00:33

## 2020-09-11 RX ADMIN — EZETIMIBE 10 MG: 10 TABLET ORAL at 08:09

## 2020-09-11 RX ADMIN — ATORVASTATIN CALCIUM 40 MG: 40 TABLET, FILM COATED ORAL at 08:09

## 2020-09-11 RX ADMIN — SODIUM CHLORIDE 10 MG/HR: 900 INJECTION, SOLUTION INTRAVENOUS at 03:35

## 2020-09-11 RX ADMIN — ACETAMINOPHEN 975 MG: 325 TABLET, FILM COATED ORAL at 14:22

## 2020-09-11 RX ADMIN — SODIUM CHLORIDE 7.5 MG/HR: 900 INJECTION, SOLUTION INTRAVENOUS at 12:17

## 2020-09-11 RX ADMIN — SODIUM CHLORIDE 10 MG/HR: 900 INJECTION, SOLUTION INTRAVENOUS at 16:19

## 2020-09-11 RX ADMIN — METOPROLOL TARTRATE 25 MG: 25 TABLET, FILM COATED ORAL at 08:09

## 2020-09-11 RX ADMIN — ACETAMINOPHEN 975 MG: 325 TABLET, FILM COATED ORAL at 02:44

## 2020-09-11 RX ADMIN — ASPIRIN 81 MG 81 MG: 81 TABLET ORAL at 08:09

## 2020-09-11 RX ADMIN — SACUBITRIL AND VALSARTAN 1 TABLET: 49; 51 TABLET, FILM COATED ORAL at 20:55

## 2020-09-11 RX ADMIN — METOPROLOL SUCCINATE 25 MG: 25 TABLET, EXTENDED RELEASE ORAL at 20:55

## 2020-09-11 RX ADMIN — SODIUM CHLORIDE 12.5 MG/HR: 900 INJECTION, SOLUTION INTRAVENOUS at 06:28

## 2020-09-11 RX ADMIN — AMLODIPINE BESYLATE 20 MG: 10 TABLET ORAL at 10:10

## 2020-09-11 RX ADMIN — INSULIN GLARGINE 10 UNITS: 100 INJECTION, SOLUTION SUBCUTANEOUS at 21:58

## 2020-09-11 RX ADMIN — ACETAMINOPHEN 975 MG: 325 TABLET, FILM COATED ORAL at 08:08

## 2020-09-11 RX ADMIN — VALSARTAN 320 MG: 160 TABLET ORAL at 12:52

## 2020-09-11 RX ADMIN — LEVOTHYROXINE SODIUM 50 MCG: 50 TABLET ORAL at 08:09

## 2020-09-11 ASSESSMENT — ACTIVITIES OF DAILY LIVING (ADL)
ADLS_ACUITY_SCORE: 20

## 2020-09-11 NOTE — PLAN OF CARE
Discharge Planner SLP   Patient plan for discharge: Son in agreement with ARU  Current status: SLP: Improved secretion management with minimal dried secretions and improved vocal quality. Pt appeared more alert and was able to keep his eyes open/follow conversation. Reported difficulty with ice chips and poor oral control and coughing noted with trials of ice chips. Improved oral control with nectar thick liquids and puree textures. Anterior spillage and cough with impuslive/large gulping noted. Pt resistive to hand over hand assist, however, improved tolerance with constant cues. Education provided to pt and son on limited insight into deficits and impulsive behavior.     Cautiously started dysphagia diet level 1 and nectar thick liquids in small amounts/snacks throughout the day. Pt must be alert and fully upright. Hand over hand assist with cup and 1:1 to verify swallows. Avoid distractions and talking while eating. Hold PO if overt s/sx of aspiration.     Barriers to return to prior living situation: Dysphagia; dysarthria  Recommendations for discharge: ARU  Rationale for [place]s: Continue ST daily for swallowing. Evaluate speech/lang when pt out of the ICU and increase to BID as appropriate. Again, pt tolerated session over an hour long.        Entered by: Li Johnson 09/11/2020 1:26 PM

## 2020-09-11 NOTE — PROGRESS NOTES
[facility]    Stroke Progress Note    Interval Events  Significant HTN requiring initiation of nicardipine gtt, still requiring this am, despite some of PTA antihypertensives being restarted. Irregular breathing pattern and mildly worsened somnolence lead to repeat head CT overnight demonstrating minimal change in R to L midline shift(5.3mm --> 5.8 mm). Clinically more somnolent early this am but overall more consistently awake and interactivewhen evaluated later in am, the best he has looked this admission.     Impression  #R frontal, parietal, and temporal lobe cerebral infarction secondary to R MCA M1 occlusion causing complete R MCA syndrome- stroke mechanism is atheroembolism from high grade R ICA stenosis (80%) with presumed associated thrombus    #Cerebral edema secondary to #1, now post stroke day 5, within highest risk window of time for malignant MCA syndrome and possible herniation. Since we are beyond the first 48 hours post-stroke for pre-emptive decompressive hemicraniectomy, it is reasonable to continue to monitor closely both clinically and radiographically to determine need for further ICP management. Given clinical improvement today, it is likely that we have avoided the need for surgical intervention.    Stroke Evaluation summarized:  MRI/Head CT: R MCA distribution infarct (progression of midline shift 3mm (9/8) >  4mm (9/9) > 5 mm (9/10) > 5-6 mm in PM (9/10)  Intracranial Vascular Imaging: R M1 occlusion, incidentally noted 3 mm L MCA bifurcation aneurysm, unruptured  Cervical Carotid and Vertebral Artery Vascular Imaging: R ICA stenosis 80%, L ICA stenosis 60%  Echocardiogram: No PFO, preserved LVEF, no RWMA, no appreciable intracardiac thrombus  EKG/Telemetry: NSR  LDL: 82 (7/2020)  A1c: 7.5  Troponin: <0.015  Other testing: Not Applicable    Stroke Education  -Patient/family advised regarding the signs and symptoms of stroke and that immediate presentation to an ED for  "evaluation is critical.    Plan  - continue 3% NS infusion, titrated by RN to maintain Na goal 145-155, currently not infusing.  - SBP goal normotension- stroke is completed, no penumbra to salvage. PRNs to give for SBP>160, restart/titrate PTA antihypertensive regimen to achieve decreased dependence on PRNs and eventual long term goal BP <130/80 or lower if tolerated for overall reduced cardiovascular risk   - PTA antihypertensive meds include (amlodipine 10mg, furosemide 20mg, metoprolol 50mg, entresto 49mg, valsartan 320 mg  - Neurosurgery consulted, appreciate their assistance  - Okay to liberalize to q2 hour neurochecks  - Avoid hypotonic fluid solutions  - Continue aspirin 81 mg daily for secondary stroke prevention  - Statin: Atorvastatin 40 mg at bedtime  - PT/OT/SLP as tolerated  - Stroke Education  - Depression Screen  - Apnea Screen  - Euthermia, Euglycemia. Blood glucose goal < 180. Defer to hospitalist team for management  - Bedside Glucose Monitoring    The Stroke Fellow is Dr. Prakash. The Stroke Staff is Dr. Castaneda.    Tod Antony, MS4  Medical Student functioned as a scribe in the preparation of this record. I interviewed and examined the patient independently and made edits to the record as necessary.    Selene Prakash MD  Vascular Neurology Fellow, PGY-5    To page a member of the stroke/neurocritical care service, click here:  AMCOM   Choose \"On Call\" tab at top, then search dropdown box for \"Neurology Adult\", select location, press Enter, then look for stroke/neuro ICU/telestroke.    ______________________________________________________    Medications   Home Meds  Prior to Admission medications    Medication Sig Start Date End Date Taking? Authorizing Provider   amLODIPine (NORVASC) 10 MG tablet Take 20 mg by mouth daily  12/26/18  Yes Reported, Patient   aspirin 81 MG EC tablet Take 81 mg by mouth daily   Yes Unknown, Entered By History   atorvastatin (LIPITOR) 80 MG tablet Take 40 mg by mouth " daily  2/27/19  Yes Reported, Patient   ezetimibe (ZETIA) 10 MG tablet Take 10 mg by mouth daily  12/6/18  Yes Reported, Patient   furosemide (LASIX) 20 MG tablet TAKE 2 TABLETS BY MOUTH TWICE DAILY, FOR TREATMENT OF BLOOD PRESSURE AND FLUID RETENTION. 3/6/19  Yes Reported, Patient   levothyroxine (SYNTHROID/LEVOTHROID) 50 MCG tablet Take 50 mcg by mouth daily  10/3/18  Yes Reported, Patient   metoprolol succinate ER (TOPROL-XL) 50 MG 24 hr tablet Take 25 mg by mouth 2 times daily  10/24/19  Yes Reported, Patient   multivitamin (CENTRUM SILVER) tablet Take 1 tablet by mouth daily   Yes Unknown, Entered By History   nitroGLYcerin (NITROSTAT) 0.4 MG sublingual tablet Place 0.4 mg under the tongue every 5 minutes as needed  10/3/18  Yes Reported, Patient   sacubitril-valsartan (ENTRESTO) 49-51 MG per tablet Take 1 tablet by mouth 2 times daily  1/6/19  Yes Reported, Patient   traZODone (DESYREL) 50 MG tablet Take 50 mg by mouth at bedtime as needed, may repeat once  8/25/20  Yes Reported, Patient   valsartan (DIOVAN) 320 MG tablet Take 320 mg by mouth daily  1/4/19  Yes Reported, Patient       Scheduled Meds    amLODIPine  20 mg Oral Daily     aspirin  81 mg Oral or Feeding Tube Daily    Or     aspirin  75 mg Rectal Daily     atorvastatin  40 mg Oral Daily     ezetimibe  10 mg Oral Daily     heparin ANTICOAGULANT  7,500 Units Subcutaneous Q12H     insulin aspart  1-7 Units Subcutaneous TID AC     insulin aspart  1-5 Units Subcutaneous At Bedtime     insulin glargine  6 Units Subcutaneous At Bedtime     levothyroxine  50 mcg Oral QAM AC     metoprolol tartrate  25 mg Oral or Feeding Tube BID     sodium chloride (PF)  3 mL Intracatheter Q8H     sodium chloride (PF)  3 mL Intracatheter Q8H     valsartan  320 mg Oral Daily       Infusion Meds    - MEDICATION INSTRUCTIONS -       niCARdipine 40 mg in 200 mL 0.9% NaCl 10 mg/hr (09/11/20 1300)     sodium chloride 3% Stopped (09/11/20 0700)       PRN Meds  acetaminophen,  bacitracin, artificial tears ophthalmic solution, glucose **OR** dextrose **OR** glucagon, heparin lock flush, hydrALAZINE, labetalol, lidocaine 4%, lidocaine (buffered or not buffered), magnesium sulfate, - MEDICATION INSTRUCTIONS -, naloxone, ondansetron **OR** ondansetron, potassium chloride, potassium chloride with lidocaine, potassium chloride, potassium chloride, potassium chloride, potassium phosphate (KPHOS) in D5W IV, potassium phosphate (KPHOS) in D5W IV, potassium phosphate (KPHOS) in D5W IV, potassium phosphate (KPHOS) in D5W IV, sodium chloride (PF)       PHYSICAL EXAMINATION  Temp:  [98.4  F (36.9  C)-100.4  F (38  C)] 99.5  F (37.5  C)  Pulse:  [] 81  Resp:  [0-37] 19  BP: (131-188)/() 155/71  SpO2:  [89 %-100 %] 94 %     General:  L handed,  male, lying in bed in no acute distress. Feeding tube in L nare.    Neurologic  Mental Status: Eyes closed, opens to voice and maintains open, alertness to conversation in the room, and largely attentive. Oriented to self, place, season but not date, and circumstance. Minimal verbal output, but entirely sensical and fluent, intact comprehension of simple commands.  Cranial Nerves: L homonomous hemianopia, PERRL, EOMI, L lower facial droop, sensation intact, hearing intact to conversation, tongue midline, moderately dysarthric, mostly intelligible.   Motor: LUE extension to noxious, LLE: weak non-antigravity withdrawal to noxious   RUE/RLE: 5/5  Reflexes: 3+ LUE, 2+LLE, RUE, RLE, toes mute bilaterally  Sensory: does not detect sensation on LUE, grimaces to noxious in LLE, intact on R   Coordination: not tested, no ataxia of observable movements  Station/Gait:  Not tested    Stroke Scales    NIHSS     Interval (day 5 post stroke) (09/11/20 1119)   Interval Comments daily exam (09/11/20 1119)   1a. Level of Consciousness 1-->Not alert, but arousable by minor stimulation to obey, answer, or respond   1b. LOC Questions 0-->Answers both questions  correctly   1c. LOC Commands 0-->Performs both tasks correctly   2.   Best Gaze 0-->Normal   3.   Visual 1-->Partial hemianopia   4.   Facial Palsy 1-->Minor paralysis (flattened nasolabial fold, asymmetry on smiling)   5a. Motor Arm, Left 3-->No effort against gravity, limb falls   5b. Motor Arm, Right 0-->No drift, limb holds 90 (or 45) degrees for full 10 secs   6a. Motor Leg, Left 3-->No effort against gravity, leg falls to bed immediately   6b. Motor Leg, right 0-->No drift, leg holds 30 degree position for full 5 secs   7.   Limb Ataxia 0-->Absent   8.   Sensory 1-->Mild-to-moderate sensory loss, patient feels pinprick is less sharp or is dull on the affected side, or there is a loss of superficial pain with pinprick, but patient is aware of being touched   9.   Best Language 0-->No aphasia, normal   10. Dysarthria 1-->Mild-to-moderate dysarthria, patient slurs at least some words and, at worst, can be understood with some difficulty   11. Extinction and Inattention  0-->No abnormality   Total 11 (09/11/20 1119)       Imaging  I personally reviewed all imaging; relevant findings per HPI.     Lab Results Data   CBC  Recent Labs   Lab 09/10/20  0610 09/09/20  0556 09/08/20  0708   WBC 8.8 9.0 9.7   RBC 3.93* 4.26* 4.43   HGB 12.4* 13.3 13.6   HCT 36.3* 38.7* 39.8*    181 189     Basic Metabolic Panel    Recent Labs   Lab 09/11/20  0955 09/11/20  0610 09/11/20  0240  09/10/20  0610  09/09/20  0556  09/08/20  0708   * 159* 157*   < > 149*   < > 140   < > 139   POTASSIUM  --   --   --   --  3.6  --  3.5  --  3.6   CHLORIDE  --   --   --   --  119*  --  111*  --  107   CO2  --   --   --   --  25  --  25  --  26   BUN  --   --   --   --  14  --  9  --  13   CR  --   --   --   --  0.85  --  0.79  --  0.85   GLC  --   --   --   --  256*  --  178*  --  162*   SELENA  --   --   --   --  8.0*  --  7.7*  --  8.4*    < > = values in this interval not displayed.     Liver Panel  Recent Labs   Lab 09/07/20  6058    PROTTOTAL 7.4   ALBUMIN 3.6   BILITOTAL 0.9   ALKPHOS 67   AST 23   ALT 28     INR    Recent Labs   Lab Test 09/07/20  1831   INR 1.03      Lipid Profile    Recent Labs   Lab Test 09/10/20  1420   CHOL 119   HDL 45   LDL 53   TRIG 105     A1C    Recent Labs   Lab Test 09/10/20  0610   A1C 7.5*     Troponin I    Recent Labs   Lab 09/08/20  0708 09/07/20  2355 09/07/20  1831   TROPI <0.015 <0.015 <0.015

## 2020-09-11 NOTE — CONSULTS
[facility]    Stroke Consult Note    Reason for Consult: Stroke Code    Chief Complaint: No chief complaint on file.      HPI  Jorje Teran is a 70 year old male with no known PMH as a stroke alert for left-sided weakness.     The patient was last known well at 12:30 PM on 9/6/2020.  He lives by himself, and was found by his son on 9/7/2020 at 530-6 PM with left face arm and leg weakness.     He was brought to [facility] ED, and a stroke alert was paged.  On arrival to [facility] ER:  mmHg, afebrile.  Vitals otherwise stable.  He was noted to be awake, slightly drowsy but answering simple questions.  He was reported to have left face arm and leg weakness.  A head CT showed right hyperdense MCA with established infarction with a CT aspect score of 2.  CTA head and neck showed a right M1 occlusion with poor collateral flow.     Stroke Code Data  (for stroke code without tele)  Stroke code activated 09/07/20   1826   First stroke provider response 09/07/20   1828   Last known normal 09/06/20   1200   Time of discovery   (or onset of symptoms) 09/07/20   1750   Head CT read by me 09/07/20       Was stroke code de-escalated? Yes 09/07/20    patient is outside emergent treatment time parameters            TPA Treatment   Not given due to established infarct.    Endovascular Treatment  Proximal vessel occlusion present, but endovascular treatment not initiated due to established infarct    Impression  R MCA Stroke    Plan  - 3% NS, Na goal at 145-150  - Consult Neurosurgery re: potential candidate for craniotomy  - Neurochecks q 1 hour  - Avoid hypotonic fluid solutions  - Daily aspirin 81 mg for secondary stroke prevention  - Statin: Atorvastatin 40 mg at bedtime  - B MRI ordered 9/8  - CT non-contrast follow up on 9/9  - PT/OT/SLP  - Stroke Education  - Depression Screen  - Apnea Screen  - Euthermia, Euglycemia  - TTE with Bubble Study, in process (9/8)  - Bedside Glucose Monitoring       Thank you  "for this consult. We will continue to follow.     The Stroke Staff is Dr. Castaneda.    YANG DAVIS MD  Vascular Neurology Fellow  To page me or covering stroke neurology team member, click here: AMCOM   Choose \"On Call\" tab at top, then search dropdown box for \"Neurology Adult\", select location, press Enter, then look for stroke/neuro ICU/telestroke.    ______________________________________________________    Past Medical History   No past medical history on file.  Past Surgical History   No past surgical history on file.  Medications   Home Meds  Prior to Admission medications    Medication Sig Start Date End Date Taking? Authorizing Provider   amLODIPine (NORVASC) 10 MG tablet Take 20 mg by mouth daily  12/26/18  Yes Reported, Patient   aspirin 81 MG EC tablet Take 81 mg by mouth daily   Yes Unknown, Entered By History   atorvastatin (LIPITOR) 80 MG tablet Take 40 mg by mouth daily  2/27/19  Yes Reported, Patient   ezetimibe (ZETIA) 10 MG tablet Take 10 mg by mouth daily  12/6/18  Yes Reported, Patient   furosemide (LASIX) 20 MG tablet TAKE 2 TABLETS BY MOUTH TWICE DAILY, FOR TREATMENT OF BLOOD PRESSURE AND FLUID RETENTION. 3/6/19  Yes Reported, Patient   levothyroxine (SYNTHROID/LEVOTHROID) 50 MCG tablet Take 50 mcg by mouth daily  10/3/18  Yes Reported, Patient   metoprolol succinate ER (TOPROL-XL) 50 MG 24 hr tablet Take 25 mg by mouth 2 times daily  10/24/19  Yes Reported, Patient   multivitamin (CENTRUM SILVER) tablet Take 1 tablet by mouth daily   Yes Unknown, Entered By History   nitroGLYcerin (NITROSTAT) 0.4 MG sublingual tablet Place 0.4 mg under the tongue every 5 minutes as needed  10/3/18  Yes Reported, Patient   sacubitril-valsartan (ENTRESTO) 49-51 MG per tablet Take 1 tablet by mouth 2 times daily  1/6/19  Yes Reported, Patient   traZODone (DESYREL) 50 MG tablet Take 50 mg by mouth at bedtime as needed, may repeat once  8/25/20  Yes Reported, Patient   valsartan (DIOVAN) 320 MG tablet Take 320 mg " by mouth daily  1/4/19  Yes Reported, Patient       Scheduled Meds    amLODIPine  20 mg Oral Daily     aspirin  81 mg Oral or Feeding Tube Daily    Or     aspirin  75 mg Rectal Daily     atorvastatin  40 mg Oral Daily     ezetimibe  10 mg Oral Daily     heparin ANTICOAGULANT  7,500 Units Subcutaneous Q12H     insulin aspart  1-7 Units Subcutaneous TID AC     insulin aspart  1-5 Units Subcutaneous At Bedtime     insulin glargine  10 Units Subcutaneous At Bedtime     levothyroxine  50 mcg Oral QAM AC     metoprolol succinate ER  25 mg Oral BID     sacubitril-valsartan  1 tablet Oral BID     sodium chloride (PF)  10 mL Intracatheter Q8H     sodium chloride (PF)  10 mL Intracatheter Q8H     valsartan  320 mg Oral Daily       Infusion Meds    - MEDICATION INSTRUCTIONS -       niCARdipine 40 mg in 200 mL 0.9% NaCl 10 mg/hr (09/11/20 1619)     sodium chloride 3% Stopped (09/11/20 0700)       PRN Meds  acetaminophen, bacitracin, artificial tears ophthalmic solution, glucose **OR** dextrose **OR** glucagon, heparin lock flush, hydrALAZINE, labetalol, lidocaine 4%, lidocaine (buffered or not buffered), magnesium sulfate, - MEDICATION INSTRUCTIONS -, naloxone, ondansetron **OR** ondansetron, potassium chloride, potassium chloride with lidocaine, potassium chloride, potassium chloride, potassium chloride, potassium phosphate (KPHOS) in D5W IV, potassium phosphate (KPHOS) in D5W IV, potassium phosphate (KPHOS) in D5W IV, potassium phosphate (KPHOS) in D5W IV, sodium chloride (PF)    Allergies   No Known Allergies  Family History   No family history on file.  Social History   Social History     Tobacco Use     Smoking status: Not on file   Substance Use Topics     Alcohol use: Yes     Drug use: Not on file       Review of Systems   The 10 point Review of Systems is negative other than noted in the HPI or here.        PHYSICAL EXAMINATION  Temp:  [98.4  F (36.9  C)-100.4  F (38  C)] 99.7  F (37.6  C)  Pulse:  [] 105  Resp:   [0-37] 21  BP: (121-188)/() 145/71  SpO2:  [89 %-98 %] 95 %     Neurologic  Mental Status: somnolent on examination, oriented to person, place, time when seen in AM. Unable to state name, place, or time when questioned later in AM when rounding with team.   Cranial Nerves:  PERRL, facial movements symmetric, hearing not formally tested but intact to conversation  Motor: LUE/LLE 0/5, Withdraw to painful stimuli on R.   Sensory: L sided neglect with light touch on both LUE and LLE.  Coordination: deferred  Station/Gait:  deferred      Stroke Scales      NIHSS  Interval baseline (09/08/20 1859)   Interval Comments 9/8 (09/08/20 1859)   1a. Level of Consciousness 2-->Not alert, requires repeated stimulation to attend, or is obtunded and requires strong or painful stimulation to make movements (not stereotyped)   1b. LOC Questions 1-->Answers one question correctly   1c. LOC Commands 1-->Performs one task correctly   2.   Best Gaze 1-->Partial gaze palsy, gaze is abnormal in one or both eyes, but forced deviation or total gaze paresis is not present   3.   Visual 1-->Partial hemianopia   4.   Facial Palsy 2-->Partial paralysis (total or near-total paralysis of lower face)   5a. Motor Arm, Left 4-->No movement   5b. Motor Arm, Right 0-->No drift, limb holds 90 (or 45) degrees for full 10 secs   6a. Motor Leg, Left 4-->No movement   6b. Motor Leg, right 0-->No drift, leg holds 30 degree position for full 5 secs   7.   Limb Ataxia 0-->Absent   8.   Sensory 1-->Mild-to-moderate sensory loss, patient feels pinprick is less sharp or is dull on the affected side, or there is a loss of superficial pain with pinprick, but patient is aware of being touched   9.   Best Language 1-->Mild-to-moderate aphasia, some obvious loss of fluency or facility of comprehension, without significant limitation on ideas expressed or form of expression. Reduction of speech and/or comprehension, however, makes conversation. . . (see row  details)   10. Dysarthria 1-->Mild-to-moderate dysarthria, patient slurs at least some words and, at worst, can be understood with some difficulty   11. Extinction and Inattention  0-->No abnormality   Total 19 (09/08/20 1859)        Imaging  I personally reviewed all imaging; relevant findings per HPI.     Lab Results Data   CBC  Recent Labs   Lab 09/10/20  0610 09/09/20  0556 09/08/20  0708   WBC 8.8 9.0 9.7   RBC 3.93* 4.26* 4.43   HGB 12.4* 13.3 13.6   HCT 36.3* 38.7* 39.8*    181 189     Basic Metabolic Panel    Recent Labs   Lab 09/11/20  1743 09/11/20  1330 09/11/20  0955  09/10/20  0610  09/09/20  0556  09/08/20  0708   * 155* 156*   < > 149*   < > 140   < > 139   POTASSIUM  --   --   --   --  3.6  --  3.5  --  3.6   CHLORIDE  --   --   --   --  119*  --  111*  --  107   CO2  --   --   --   --  25  --  25  --  26   BUN  --   --   --   --  14  --  9  --  13   CR  --   --   --   --  0.85  --  0.79  --  0.85   GLC  --   --   --   --  256*  --  178*  --  162*   SELENA  --   --   --   --  8.0*  --  7.7*  --  8.4*    < > = values in this interval not displayed.     Liver Panel  Recent Labs   Lab 09/07/20 2355   PROTTOTAL 7.4   ALBUMIN 3.6   BILITOTAL 0.9   ALKPHOS 67   AST 23   ALT 28     INR    Recent Labs   Lab Test 09/07/20  1831   INR 1.03      Lipid Profile    Recent Labs   Lab Test 09/10/20  1420   CHOL 119   HDL 45   LDL 53   TRIG 105     A1C    Recent Labs   Lab Test 09/10/20  0610   A1C 7.5*     Troponin I    Recent Labs   Lab 09/08/20  0708 09/07/20  2355 09/07/20  1831   TROPI <0.015 <0.015 <0.015

## 2020-09-11 NOTE — PROGRESS NOTES
Neuro:Drowsy, answers some questions, mostly yes/no. Moans Follows commands inconsistently. Left side completely out. PERRLA. Due to change in VS and RR, CT obtained. No major changed. Updated MD.  CV:SR to ST. Fluctuating frequently at beginning of shift, stabilized since. BP elevated, uncontrolled with PRNs, Nicardipine started. BP improved.   Resp:Slightly course and diminished. 2L NC for sleeping.   GI/Nutrition:TF infusing, no residuals  :Hayes output good, MD paged about increased UO over 400/hr. Awaiting call back.   Skin: Intact  Critical Labs:Q4 Na to titrate 3%  ID:NA  Social/Psych:Spoke and updated son  Therapy:NA overnight  Plan:Continuing to monitor    Rachael Sahni RN

## 2020-09-11 NOTE — PROGRESS NOTES
CLINICAL NUTRITION SERVICES - REASSESSMENT NOTE      RECOMMENDATIONS FOR MD/PROVIDER TO ORDER:   - Consider bowel regimen while on TF (no BM since 9/06)   - Na is 155 (H), recommend IVF vs RD to increase in free water flushes vis TF   Recommendations Ordered by Registered Dietitian (RD):   - Add-on Mg/Phos for TF monitoring    Malnutrition: (9/8)  % Weight Loss:  Up to 5% in 1 month (non-severe malnutrition)  % Intake:  Unable to determine   Subcutaneous Fat Loss:  None observed  Muscle Loss:  None observed  Fluid Retention:  None noted     Malnutrition Diagnosis: Unable to determine due to inability to obtain a nutrition history        EVALUATION OF PROGRESS TOWARD GOALS   Diet:  DD1, NTL per SLP today     Nutrition Support:  TF started on 9/8 per MD. Formula and goal changed per RD on 9/9 and goal rate was achieved later that evening as ordered below ~    Nutrition Support Enteral:  Type of Feeding Tube: ?NGT   Enteral Frequency:  Continuous  Enteral Regimen: Isosource 1.5 at 60 mL/hr  Total Enteral Provisions:  2160 kcal (27 kcal/kg), 98 g protein (1.2 g/kg), 253 g CHO, 22 g fiber, 1094 mL H2O   Free Water Flush: 30 mL q 4 hrs       Intake/Tolerance:   Patient busy with swallow eval this AM, writer unable to touch base at bedside  Chart reviewed, patient has been somnolent yet consistently more awake each day    Labs reviewed: Na 155 (H), K 3.6 (NL) no baseline Mg/Phos --> add-ons ordered and received later = Mg 2.5 (H), Phos 2.6 (NL)   Recent Labs   Lab 09/11/20  1256 09/11/20  0750 09/10/20  2227 09/10/20  1738 09/10/20  1401 09/10/20  1019  09/10/20  0610  09/09/20  0556  09/08/20  0708  09/07/20  1831   GLC  --   --   --   --   --   --   --  256*  --  178*  --  162*  --  190*   * 263* 226* 231* 244* 240*   < >  --    < >  --    < >  --    < >  --     < > = values in this interval not displayed.     Medications reviewed: Nicardapine, no IVF running   Stooling: No BM since 9/06    Vitals:    09/08/20  0000 09/09/20 0600 09/10/20 0600   Weight: 112.4 kg (247 lb 12.8 oz) 111.9 kg (246 lb 11.1 oz) 108.4 kg (238 lb 15.7 oz)       ASSESSED NUTRITION NEEDS:  Dosing Weight 78.6 kg (adjusted)  Estimated Energy Needs: 9866-4071 kcals (25-30 Kcal/Kg)  Justification: obese  Estimated Protein Needs:  grams protein (1.2-1.5 g pro/Kg)  Justification: hypercatabolism with acute illness      NEW FINDINGS:   SLP eval today --> Cautiously started dysphagia diet level 1 and nectar thick liquids in small amounts/snacks throughout the day. Pt must be alert and fully upright. Hand over hand assist with cup and 1:1 to verify swallows. Avoid distractions and talking while eating. Hold PO if overt s/sx of aspiration    Patient was transferred to hospitalist service     Previous Goals:   Patient will meet % needs via TF   Evaluation: Met    Previous Nutrition Diagnosis:   Inadequate protein-energy intake related to NPO, plans for TF to start today per MD order as evidenced by meeting 0% needs  Evaluation: Completed       CURRENT NUTRITION DIAGNOSIS  Swallowing Difficulty related to dysphagia from CVA as evidenced by DD1 diet with NTL     INTERVENTIONS  Recommendations / Nutrition Prescription  Continue TF as ordered  If no IVF planned (sodium level 155), consider increasing free water flushes via TF  Recommend bowel regimen while on TF     Implementation  Collaboration and Referral of Nutrition care: patient was discussed during ICU rounds    Goals  EN will meet % estimated needs while taking minimal PO intake      MONITORING AND EVALUATION:  Progress towards goals will be monitored and evaluated per protocol and Practice Guidelines      Angela Alvarez, RD, LD  Clinical Dietitian

## 2020-09-11 NOTE — PLAN OF CARE
Discharge Planner PT     Patient plan for discharge: Not stated.    Current status: RN agreed to PT interventions. Pt found in bed, lethargic and son at bs. Pt was able to wake up to sternal rub. Required 3 person max assist and max cues with supine>sit, as pt tends to resist due to fear of falling. Required max assist x 2 with scooting to the EOB. Initially required max assist x 2 with maintaining static sitting balance due to pushing to the left. Pt was assisted to R forearm and elbow x 1 with max assist x 2 to achieve improved proprioception and balance. Pt was able to progress sitting balance to CGA-min assist x 1. Pt sat at EOB for a total of 12 min. Pt was assisted back to bed with 2 person max assist and scooted to the HOB with 2 person total assist. Pt required 2 person max assist to rolling to the left to reposition. Pt is left in the care of the RN.     Barriers to return to prior living situation: Lives alone, Heavy assist of 2-3, L sided weakness, Fall risk     Recommendations for discharge: ARC    Rationale for [place]s: Pt will benefit from continued skilled PT intervention while IP and at [facility] in order to progress independence and safety with mobility. Pt was independent at baseline. Anticipate pt will be able to tolerate 3 hours of therapy/day at time of discharge.          Entered by: Aly Hoskins 09/11/2020 12:50 PM

## 2020-09-11 NOTE — PLAN OF CARE
Discharge Planner OT   Patient plan for discharge: Not discussed  Current status: Pt in ICU, up in combilizer chair after PT, son present. Pt not able to demo any AROM of LUE; noted that he does not attend to LUE or L side. Not able to track objects with L eye; minimally able to track on R eye. Did a visual scan of room from R to L: unable to correctly identify any objects in room past midline. With R hand, able to reach to target on R or midline, but needs cues and assist to cross midline. OT completed PROM to LUE from fingers to shoulder. Inconsistent in reports of sensation on LUE. Instructed on Self ROM for LUE; needs max cues and at least Min A to perform it. Educated pt and son on L spatial inattention and dispensed educational handout.  Barriers to return to prior living situation: Significant L inattention, current level of assist, lift dependent, impaired activity tolerance, impaired cognition and command following  Recommendations for discharge: ARU  Rationale for [place]s: Pt is significantly below baseline post stroke. He is motivated and would tolerate 3+ hours of rehab, is currently appropriate for PT/OT/SLP. He has a supportive family who is involved in his care.       Entered by: Charleen Rascon 09/11/2020 3:02 PM

## 2020-09-11 NOTE — PROGRESS NOTES
[facility]    Medicine Progress Note - Hospitalist Service       Date of Admission:  9/7/2020  Date of Service: 09/11/2020    Assessment & Plan The email address for your [facility] account has been updated       Jorje Teran is a 70 year old male who presents with weakness and slurred speech    CVA  Last baseline known noon on 9/6 (over 24 hours PTA). Son went to check on father afternoon/evening of presentation and he was on ground, unable to move his L side and slurring words. Vitals stable. Labs normal except 11.2. CXR clear.   *CT with filling defect within the distal R ICA extending into the R MCA and occlusion of the R MCA at the M1 segment. CTA with severe plaquing at R carotid bifurcation with filling defect in the prox R ICA suggestive of thrombus contributing to approximately 80% stenosis. 60% stenosis of the proximal L ICA. TTE with bubble -> Normal left ventricular size and function. Left ventricular ejection fraction of 55-60%. No segmental wall motion abnormalities noted.  No shunting at the atrial level on suboptimal Bubble study.  - lipids 7/29/20- LDL 78, HDL 56, , T chol 164  - A1C 7/2020 7.1%   - TSH 7/2020 at 2.05   - given  mg x 1 on arrival to ED  - Head CTs have been stable for the most part.  Plan:  - Keep in ICU today  - Q2H hour neuro checks continued today  - permissive HTN, labetalol as needed for SBP>160  - Neurology following  - ASA 81 mg daily PO/SD  - atorvastatin 40 mg daily when able  - telemetry  - Continue TFs  - 3% hypertonic saline with goal Na 145-155 per Neuro, Q4H Na checks  - PICC placed 09/10 and CVC removed 09/10  - PT/OT and bedside swallow, SLP    CAD s/p CABG x 3, x 1 in 2015  HTN  HLD  PTA (needs med rec) amlodipine 20 mg daily, ASA 81 mg daiy, atorvastatin 40 mg daily, Zetia 10 mg daily, furosemide 40 mg BID, metoprolol 25 mg BID, Entresto 49-51 1 tab BID, valsartan 320 mg daily  Follows with Dr. Vincent with [facility]. Some  concern re: medical compliance.  TTE with bubble this admission -> Normal left ventricular size and function. Left ventricular ejection fraction of 55-60%. No segmental wall motion abnormalities noted.  No shunting at the atrial level on suboptimal Bubble study.  Plan:  - Resume PTA Norvasc /Entresto / Metoprolol and Valsartan  - Hold hydrochlorothiazide given Na goals  - Wean of Nicardipine gtt  - prn labetalol, hydralazine SBP>160    DM II  Not on meds for this. Recent A1C at 7.1 7/2020.  BG goal is < 180  - q4 hour blood sugar checks  - Continue lantus increased to 10 U at bedtime today, titrate as needed  - medium sliding scale insulin as needed    LE edema  US at [facility] negative for DVT    SHELDON  Intermittent treatment as outpatient   - hold CPAP for now    Hypothyroidism  PTA (needs med rec) levothyroxine 50 mcg daily  TSH checked 7/29/2020 at 2.05  - Continue PTA Levothyroxine    Morbid obesity  BMI noted  7/202 at ~38. Will need to encourage healthy lifestyle and weight loss with recovery     Diet: Adult Formula Drip Feeding: Continuous Isosource 1.5; Nasojejunal; Goal Rate: 60; mL/hr; Medication - Feeding Tube Flush Frequency: At least 15-30 mL water before and after medication administration and with tube clogging; Amount to Send (Nutritio...  Combination Diet Dysphagia Diet Level 1: Pureed; Nectar Thickened Liquids (pre-thickened or use instant food thickener)    DVT Prophylaxis: Pneumatic Compression Devices  Hayes Catheter: in place, indication: Strict 1-2 Hour I&O  Code Status: Full Code           Disposition Plan   Expected discharge: 4 - 7 days, recommended to transitional care unit once mental status at baseline, safe disposition plan/ TCU bed available and neurology work up completed.  Entered: Michael Merritt MD 09/11/2020, 5:28 PM       The patient's care was discussed with the Bedside Nurse, Patient and Patient's Family.    iMchael Merritt MD  Hospitalist Service  [facility]  Hospital    ______________________________________________________________________    Interval History     No acute events overnight  Son reports continued improvement  More alert and interactive  Patient denies CP/SOB. No fevers or chills recorded  Patient has no new complaints, no changes in Left sided weakness  Son at bedside.     Data reviewed today: I reviewed all medications, new labs and imaging results over the last 24 hours. I personally reviewed no images or EKG's today.    Physical Exam   Vital Signs: Temp: 99.7  F (37.6  C) Temp src: Axillary BP: 134/66 Pulse: 82   Resp: 10 SpO2: 95 % O2 Device: None (Room air)    Weight: 238 lbs 15.66 oz    Constitutional: no apparent distress, lethargu  Respiratory: CTA B without w/c  Cardiovascular: RRR without m/r/g. 1+ edema  GI: soft, nontender, Obese, no HSM  Neurologic: unable to fully assess. Is moving his R side and generally following commands. Dense paresis on L  Psychiatric: mood and affect wnl    Data   Recent Labs   Lab 09/11/20  1330 09/11/20  0955 09/11/20  0610  09/10/20  0610  09/09/20  0556  09/08/20  0708 09/07/20  2355 09/07/20  1831   WBC  --   --   --   --  8.8  --  9.0  --  9.7  --  11.2*   HGB  --   --   --   --  12.4*  --  13.3  --  13.6  --  14.5   MCV  --   --   --   --  92  --  91  --  90  --  91   PLT  --   --   --   --  189  --  181  --  189  --  219   INR  --   --   --   --   --   --   --   --   --   --  1.03   * 156* 159*   < > 149*   < > 140   < > 139  --  136   POTASSIUM  --   --   --   --  3.6  --  3.5  --  3.6  --  3.9   CHLORIDE  --   --   --   --  119*  --  111*  --  107  --  103   CO2  --   --   --   --  25  --  25  --  26  --  25   BUN  --   --   --   --  14  --  9  --  13  --  13   CR  --   --   --   --  0.85  --  0.79  --  0.85  --  0.85   ANIONGAP  --   --   --   --  5  --  4  --  6  --  8   SELENA  --   --   --   --  8.0*  --  7.7*  --  8.4*  --  8.9   GLC  --   --   --   --  256*  --  178*  --  162*  --  190*   ALBUMIN   --   --   --   --   --   --   --   --   --  3.6  --    PROTTOTAL  --   --   --   --   --   --   --   --   --  7.4  --    BILITOTAL  --   --   --   --   --   --   --   --   --  0.9  --    ALKPHOS  --   --   --   --   --   --   --   --   --  67  --    ALT  --   --   --   --   --   --   --   --   --  28  --    AST  --   --   --   --   --   --   --   --   --  23  --    TROPI  --   --   --   --   --   --   --   --  <0.015 <0.015 <0.015    < > = values in this interval not displayed.     Recent Results (from the past 24 hour(s))   CT Head w/o Contrast    Narrative    CT SCAN OF THE HEAD WITHOUT CONTRAST   9/10/2020 8:59 PM     HISTORY: Malignant cerebral edema, now irregular breathing.    TECHNIQUE:  Axial images of the head and coronal reformations without  IV contrast material. Radiation dose for this scan was reduced using  automated exposure control, adjustment of the mA and/or kV according  to patient size, or iterative reconstruction technique.    COMPARISON: CT head dated 9/10/2020 at 07:39 hours.    FINDINGS: Overall, there has been no significant change in the  appearance of the evolving large right middle cerebral artery  territory ischemic infarct with associated parenchymal edema, right  cerebral hemisphere sulcal effacement, and mass effect resulting in  5-6 mm of right-to-left midline shift. There is also medialization of  the right uncus/early right uncal herniation, unchanged from the  previous scan. Mild narrowing of the bilateral ambient cistern is  unchanged. Moderate narrowing of the right lateral ventricle and third  ventricle. No evidence for hydrocephalus or ventricular entrapment. No  gross significant intracranial hemorrhage. No extra-axial fluid  collection. Unchanged partial appearance of the sella.    Orbits appear within normal limits. The paranasal sinuses are free of  significant disease. The mastoid and middle ear cavities are clear.  Partially visualized left-sided nasoenteric tube.       Impression    IMPRESSION: No significant change in the evolving large  acute-to-subacute right middle cerebral artery territory ischemic  infarct, with similar degree of associated parenchymal edema, mass  effect, right-to-left midline shift, mild medialization of the right  uncus/early right uncal herniation, and narrowing of the right lateral  and third ventricles. No definite new abnormality.    MICHAEL GRACE MD     Medications     - MEDICATION INSTRUCTIONS -       niCARdipine 40 mg in 200 mL 0.9% NaCl 10 mg/hr (09/11/20 1619)     sodium chloride 3% Stopped (09/11/20 0700)       amLODIPine  20 mg Oral Daily     aspirin  81 mg Oral or Feeding Tube Daily    Or     aspirin  75 mg Rectal Daily     atorvastatin  40 mg Oral Daily     ezetimibe  10 mg Oral Daily     heparin ANTICOAGULANT  7,500 Units Subcutaneous Q12H     insulin aspart  1-7 Units Subcutaneous TID AC     insulin aspart  1-5 Units Subcutaneous At Bedtime     insulin glargine  10 Units Subcutaneous At Bedtime     levothyroxine  50 mcg Oral QAM AC     metoprolol tartrate  25 mg Oral or Feeding Tube BID     sodium chloride (PF)  10 mL Intracatheter Q8H     sodium chloride (PF)  10 mL Intracatheter Q8H     valsartan  320 mg Oral Daily

## 2020-09-11 NOTE — PROVIDER NOTIFICATION
Cyndee PHILLIPS of neurology team paged and called back regarding 156 NA value, no new orders received.

## 2020-09-11 NOTE — PROGRESS NOTES
"[facility]    Neurosurgery Progress Note    Date of Service (when I saw the patient): 09/11/2020     Assessment & Plan   Jorje Teran is a 70 year old male who was admitted on 9/7/2020. He presented to [facility] ED after being found down by son. Imaging revealed large area of acute/subacute infarct with edema. Repeat scan this AM with evolving large right MCA infarct with edema and 4 mm of leftward midline shift, stable. Today he was seen lying in bed. He denies pain, dizziness, nausea/vomiting, and vision changes. Continues with left-sided hemiparesis. Follows commands on the right.     Active Problems:    CVA (cerebral vascular accident) (H)    Assessment: evolving large right MCA infarct with edema and 4 mm of leftward midline shift, stable    Plan:   -No surgical intervention recommended at this time  -Will continue to follow closely  -Appreciate assistance from other services    Jaky Nair [facility]  [facility]  [address]  [address] [place] [id number]    Tel [phone]  Pager [phone]      Interval History   Stable    Physical Exam   Temp: 99.5  F (37.5  C) Temp src: Axillary BP: (!) 166/76 Pulse: 79   Resp: 23 SpO2: 92 % O2 Device: None (Room air)    Vitals:    09/08/20 0000 09/09/20 0600 09/10/20 0600   Weight: 112.4 kg (247 lb 12.8 oz) 111.9 kg (246 lb 11.1 oz) 108.4 kg (238 lb 15.7 oz)     Vital Signs with Ranges  Temp:  [98.4  F (36.9  C)-100.4  F (38  C)] 99.5  F (37.5  C)  Pulse:  [] 79  Resp:  [0-37] 23  BP: (131-188)/() 166/76  SpO2:  [89 %-100 %] 92 %  I/O last 3 completed shifts:  In: 3455.84 [I.V.:1635.84; NG/GT:500]  Out: 4800 [Urine:4800]     , Blood pressure (!) 166/76, pulse 79, temperature 99.5  F (37.5  C), temperature source Axillary, resp. rate 23, height 1.778 m (5' 10\"), weight 108.4 kg (238 lb 15.7 oz), SpO2 92 %.  238 lbs 15.66 oz  HEENT:  Normocephalic.  PERRLA.  EOM s intact.    Heart:  " No peripheral edema  Lungs:  No SOB  Skin:  Warm and dry, good capillary refill.  Extremities:  Good radial and dorsalis pedis pulses bilaterally, no edema, cyanosis or clubbing.    NEUROLOGICAL EXAMINATION:   Mental status:  Alert   Motor:  Left hemiparesis, follows commands on right     Medications     - MEDICATION INSTRUCTIONS -       niCARdipine 40 mg in 200 mL 0.9% NaCl 7.5 mg/hr (09/11/20 1217)     sodium chloride 3% Stopped (09/11/20 0700)       amLODIPine  20 mg Oral Daily     aspirin  81 mg Oral or Feeding Tube Daily    Or     aspirin  75 mg Rectal Daily     atorvastatin  40 mg Oral Daily     ezetimibe  10 mg Oral Daily     heparin ANTICOAGULANT  7,500 Units Subcutaneous Q12H     insulin aspart  1-7 Units Subcutaneous TID AC     insulin aspart  1-5 Units Subcutaneous At Bedtime     insulin glargine  6 Units Subcutaneous At Bedtime     levothyroxine  50 mcg Oral QAM AC     metoprolol tartrate  25 mg Oral or Feeding Tube BID     sodium chloride (PF)  3 mL Intracatheter Q8H     sodium chloride (PF)  3 mL Intracatheter Q8H     valsartan  320 mg Oral Daily       Data     CBC RESULTS:   Recent Labs   Lab Test 09/09/20  0556   WBC 9.0   RBC 4.26*   HGB 13.3   HCT 38.7*   MCV 91   MCH 31.2   MCHC 34.4   RDW 12.5        Basic Metabolic Panel:  Lab Results   Component Value Date     09/09/2020      Lab Results   Component Value Date    POTASSIUM 3.5 09/09/2020     Lab Results   Component Value Date    CHLORIDE 111 09/09/2020     Lab Results   Component Value Date    SELENA 7.7 09/09/2020     Lab Results   Component Value Date    CO2 25 09/09/2020     Lab Results   Component Value Date    BUN 9 09/09/2020     Lab Results   Component Value Date    CR 0.79 09/09/2020     Lab Results   Component Value Date     09/09/2020     INR:  Lab Results   Component Value Date    INR 1.03 09/07/2020

## 2020-09-11 NOTE — PLAN OF CARE
Pt more awake and able to follow commands. Slow and slurred with speech. Right facial droop present. Left sided neglect.  SEE [facility] for stroke assessment.   SBP goal is <160. Titrated Nicardipine gtt. Home b/p meds added today.   EKG done. NSR with BBB.   Speech consulted. DD3 diet with thickened liquids. Pt tires easily with meals.   Insulin sliding scale.   3% NA remains off. NA within goal. CTM  Up in chair via mechanical lift. Tolerated well.  Son at bedside and questions answered.

## 2020-09-11 NOTE — PLAN OF CARE
Pt. Remains in RA with neuro exam unchanged this 4H shift, clear lung sounds, good perfusion with BP in desired range, nicardipine drip unchanged, some PO intake with thickened fluid, good U/O through per pt's son at bedside and was updated.

## 2020-09-12 ENCOUNTER — APPOINTMENT (OUTPATIENT)
Dept: OCCUPATIONAL THERAPY | Facility: CLINIC | Age: 70
DRG: 064 | End: 2020-09-12
Attending: HOSPITALIST
Payer: COMMERCIAL

## 2020-09-12 ENCOUNTER — APPOINTMENT (OUTPATIENT)
Dept: SPEECH THERAPY | Facility: CLINIC | Age: 70
DRG: 064 | End: 2020-09-12
Attending: HOSPITALIST
Payer: COMMERCIAL

## 2020-09-12 ENCOUNTER — APPOINTMENT (OUTPATIENT)
Dept: PHYSICAL THERAPY | Facility: CLINIC | Age: 70
DRG: 064 | End: 2020-09-12
Attending: HOSPITALIST
Payer: COMMERCIAL

## 2020-09-12 LAB
GLUCOSE BLDC GLUCOMTR-MCNC: 253 MG/DL (ref 70–99)
GLUCOSE BLDC GLUCOMTR-MCNC: 260 MG/DL (ref 70–99)
GLUCOSE BLDC GLUCOMTR-MCNC: 275 MG/DL (ref 70–99)
GLUCOSE BLDC GLUCOMTR-MCNC: 280 MG/DL (ref 70–99)
SODIUM SERPL-SCNC: 152 MMOL/L (ref 133–144)
SODIUM SERPL-SCNC: 154 MMOL/L (ref 133–144)

## 2020-09-12 PROCEDURE — 25800030 ZZH RX IP 258 OP 636: Performed by: HOSPITALIST

## 2020-09-12 PROCEDURE — 25000125 ZZHC RX 250: Performed by: HOSPITALIST

## 2020-09-12 PROCEDURE — 12000000 ZZH R&B MED SURG/OB

## 2020-09-12 PROCEDURE — 97110 THERAPEUTIC EXERCISES: CPT | Mod: GO

## 2020-09-12 PROCEDURE — 99232 SBSQ HOSP IP/OBS MODERATE 35: CPT | Performed by: STUDENT IN AN ORGANIZED HEALTH CARE EDUCATION/TRAINING PROGRAM

## 2020-09-12 PROCEDURE — 25000132 ZZH RX MED GY IP 250 OP 250 PS 637

## 2020-09-12 PROCEDURE — 84295 ASSAY OF SERUM SODIUM: CPT | Performed by: STUDENT IN AN ORGANIZED HEALTH CARE EDUCATION/TRAINING PROGRAM

## 2020-09-12 PROCEDURE — 25000128 H RX IP 250 OP 636: Performed by: STUDENT IN AN ORGANIZED HEALTH CARE EDUCATION/TRAINING PROGRAM

## 2020-09-12 PROCEDURE — 25000131 ZZH RX MED GY IP 250 OP 636 PS 637: Performed by: STUDENT IN AN ORGANIZED HEALTH CARE EDUCATION/TRAINING PROGRAM

## 2020-09-12 PROCEDURE — 92507 TX SP LANG VOICE COMM INDIV: CPT | Mod: GN | Performed by: SPEECH-LANGUAGE PATHOLOGIST

## 2020-09-12 PROCEDURE — 92526 ORAL FUNCTION THERAPY: CPT | Mod: GN | Performed by: SPEECH-LANGUAGE PATHOLOGIST

## 2020-09-12 PROCEDURE — 99207 ZZC CDG-CUT & PASTE-POTENTIAL IMPACT ON LEVEL: CPT | Performed by: STUDENT IN AN ORGANIZED HEALTH CARE EDUCATION/TRAINING PROGRAM

## 2020-09-12 PROCEDURE — 25000132 ZZH RX MED GY IP 250 OP 250 PS 637: Performed by: STUDENT IN AN ORGANIZED HEALTH CARE EDUCATION/TRAINING PROGRAM

## 2020-09-12 PROCEDURE — 97530 THERAPEUTIC ACTIVITIES: CPT | Mod: GP | Performed by: PHYSICAL THERAPIST

## 2020-09-12 PROCEDURE — 40000239 ZZH STATISTIC VAT ROUNDS

## 2020-09-12 PROCEDURE — 92522 EVALUATE SPEECH PRODUCTION: CPT | Mod: GN | Performed by: SPEECH-LANGUAGE PATHOLOGIST

## 2020-09-12 PROCEDURE — 27210429 ZZH NUTRITION PRODUCT INTERMEDIATE LITER

## 2020-09-12 PROCEDURE — 00000146 ZZHCL STATISTIC GLUCOSE BY METER IP

## 2020-09-12 PROCEDURE — 99232 SBSQ HOSP IP/OBS MODERATE 35: CPT | Mod: GC | Performed by: PSYCHIATRY & NEUROLOGY

## 2020-09-12 RX ORDER — METOPROLOL SUCCINATE 50 MG/1
50 TABLET, EXTENDED RELEASE ORAL 2 TIMES DAILY
Status: DISCONTINUED | OUTPATIENT
Start: 2020-09-12 | End: 2020-09-13

## 2020-09-12 RX ORDER — POLYETHYLENE GLYCOL 3350 17 G/17G
17 POWDER, FOR SOLUTION ORAL 2 TIMES DAILY PRN
Status: DISCONTINUED | OUTPATIENT
Start: 2020-09-12 | End: 2020-09-24 | Stop reason: HOSPADM

## 2020-09-12 RX ORDER — HYDRALAZINE HYDROCHLORIDE 10 MG/1
10 TABLET, FILM COATED ORAL EVERY 8 HOURS SCHEDULED
Status: DISCONTINUED | OUTPATIENT
Start: 2020-09-12 | End: 2020-09-13

## 2020-09-12 RX ORDER — HYDRALAZINE HYDROCHLORIDE 10 MG/1
10 TABLET, FILM COATED ORAL 4 TIMES DAILY
Status: DISCONTINUED | OUTPATIENT
Start: 2020-09-12 | End: 2020-09-12

## 2020-09-12 RX ORDER — HYDRALAZINE HYDROCHLORIDE 10 MG/1
10 TABLET, FILM COATED ORAL EVERY 8 HOURS SCHEDULED
Status: DISCONTINUED | OUTPATIENT
Start: 2020-09-12 | End: 2020-09-12

## 2020-09-12 RX ORDER — SENNOSIDES 8.6 MG
1-2 TABLET ORAL 2 TIMES DAILY PRN
Status: DISCONTINUED | OUTPATIENT
Start: 2020-09-12 | End: 2020-09-24 | Stop reason: HOSPADM

## 2020-09-12 RX ADMIN — INSULIN GLARGINE 8 UNITS: 100 INJECTION, SOLUTION SUBCUTANEOUS at 23:03

## 2020-09-12 RX ADMIN — SODIUM CHLORIDE 10 MG/HR: 900 INJECTION, SOLUTION INTRAVENOUS at 00:57

## 2020-09-12 RX ADMIN — SACUBITRIL AND VALSARTAN 1 TABLET: 49; 51 TABLET, FILM COATED ORAL at 08:27

## 2020-09-12 RX ADMIN — HYDRALAZINE HYDROCHLORIDE 10 MG: 10 TABLET ORAL at 16:55

## 2020-09-12 RX ADMIN — ASPIRIN 81 MG 81 MG: 81 TABLET ORAL at 08:28

## 2020-09-12 RX ADMIN — POLYETHYLENE GLYCOL 3350 17 G: 17 POWDER, FOR SOLUTION ORAL at 16:27

## 2020-09-12 RX ADMIN — HEPARIN SODIUM 7500 UNITS: 10000 INJECTION INTRAVENOUS; SUBCUTANEOUS at 11:59

## 2020-09-12 RX ADMIN — EZETIMIBE 10 MG: 10 TABLET ORAL at 08:26

## 2020-09-12 RX ADMIN — INSULIN ASPART 5 UNITS: 100 INJECTION, SOLUTION INTRAVENOUS; SUBCUTANEOUS at 16:51

## 2020-09-12 RX ADMIN — VALSARTAN 320 MG: 160 TABLET ORAL at 08:27

## 2020-09-12 RX ADMIN — METOPROLOL SUCCINATE 25 MG: 25 TABLET, EXTENDED RELEASE ORAL at 08:41

## 2020-09-12 RX ADMIN — SODIUM CHLORIDE 7.5 MG/HR: 900 INJECTION, SOLUTION INTRAVENOUS at 06:20

## 2020-09-12 RX ADMIN — HYDRALAZINE HYDROCHLORIDE 10 MG: 10 TABLET ORAL at 21:46

## 2020-09-12 RX ADMIN — METOPROLOL SUCCINATE 50 MG: 50 TABLET, EXTENDED RELEASE ORAL at 21:46

## 2020-09-12 RX ADMIN — SACUBITRIL AND VALSARTAN 1 TABLET: 49; 51 TABLET, FILM COATED ORAL at 22:45

## 2020-09-12 RX ADMIN — AMLODIPINE BESYLATE 20 MG: 10 TABLET ORAL at 08:27

## 2020-09-12 RX ADMIN — HEPARIN SODIUM 7500 UNITS: 10000 INJECTION INTRAVENOUS; SUBCUTANEOUS at 00:24

## 2020-09-12 RX ADMIN — SENNOSIDES 1 TABLET: 8.6 TABLET, FILM COATED ORAL at 13:04

## 2020-09-12 RX ADMIN — LABETALOL HYDROCHLORIDE 10 MG: 5 INJECTION, SOLUTION INTRAVENOUS at 16:21

## 2020-09-12 RX ADMIN — ATORVASTATIN CALCIUM 40 MG: 40 TABLET, FILM COATED ORAL at 08:27

## 2020-09-12 RX ADMIN — HYDRALAZINE HYDROCHLORIDE 10 MG: 20 INJECTION INTRAMUSCULAR; INTRAVENOUS at 17:52

## 2020-09-12 RX ADMIN — LEVOTHYROXINE SODIUM 50 MCG: 50 TABLET ORAL at 08:26

## 2020-09-12 RX ADMIN — INSULIN ASPART 6 UNITS: 100 INJECTION, SOLUTION INTRAVENOUS; SUBCUTANEOUS at 12:12

## 2020-09-12 ASSESSMENT — ACTIVITIES OF DAILY LIVING (ADL)
ADLS_ACUITY_SCORE: 20

## 2020-09-12 ASSESSMENT — MIFFLIN-ST. JEOR: SCORE: 1843.25

## 2020-09-12 NOTE — PLAN OF CARE
Discharge Planner SLP   Patient plan for discharge: Did not state; pt reported that he lives alone in a rambler with 3 sons in close proximity   Current status: Swallow: Pt alert and up in the chair for SLP session. Improved oral control and timing with nectar thick liquids. Trialed ice chips with adequate mastication and no overt s/sx of aspiration. Increased anterior spillage and throat clear with water trials. Pt tolerating purees well. Trialed scrambled eggs with adequate mastication. Pt required max cues to alternate solids and liquids, clear oral residue and throat clearing noted as pt became fatigued.     Pt/son verbalized agreement that pt is not ready to advance liquids or solids, however, given improved tolerance today, would try more purees throughout the day. Will page Dietician to add supplements with pt/family wanting to remove NG asap.     Recommend: Continue dysphagia diet level 1 and nectar thick liquids with pt feeding himself as able. Pt will need 1:1 supervision and assist with all intake to slow rate of intake, take small sips and bites, alternate solids and liquids, and check mouth between bites. Hold PO when fatigued or pt demonstrating s/sx of aspiration.     Barriers to return to prior living situation: Dysphagia; dysarthria; ?cognition  Recommendations for discharge: ARU  Rationale for [place]s: Continue ST daily and increase to BID as indicated. Again, pt tolerated session over 60 minutes       Entered by: Li Johnson 09/12/2020 12:06 PM     Dysarthria evaluation completed. Pt presents with mild to moderate apraxia and dysarthria. Pt able to communicate all wants/needs, answer family history, medical history and participate in social conversation with no obvious aphasia. Speech intelligibility fluctuated with no awareness of communication breakdown. Pt was responsive to cues for dysarthria strateiges. Education provided on dysarthria and plan to complete formal WAB-R language  testing.

## 2020-09-12 NOTE — PLAN OF CARE
Pt. Remains in RA with neuro exam unchanged this 12H shift, clear lung sounds, good perfusion with BP in desired range with occasional borderline high that responded down mildy to labetalol X1 and hydralazine X1 after nicardipine drip was turned off earlier in shift., improved PO intake this shift with diet advanced to DD3, ate with speech service X1, too tired for lunch, and ate 100% of dinner, tube feeding remains and bowel regimen meds given to promote pt. Stooling, several attempts on bed pan this shift with no result, good U/O through per, pt's son at bedside and called to be aware of plan for pt. Transfer to the 7th floor as soon as tonight.

## 2020-09-12 NOTE — PLAN OF CARE
Discharge Planner PT   Patient plan for discharge: Not stated    Current status: Pt received in bed, son at bedside. Rolling to left, pt dependent to initiate but able to grasp bedrail and maintain L sidelying for dressing change by RN, CGA. Pt dependent for rolling to right and maintaining right sidelying. Overhead sling for dangling at EOB. Pt with significant left trunk lean, dependent to maintain midline. Reaching across midline. Dangled x10 minutes, progressing to Jose Alfredo x1 with use of bedrail. EOB>recliner with overhead sling. Propped with pillows to maintain midline. Pt in recliner with son and SLP present upon PT exit.    Barriers to return to prior living situation: Level of assist, lives alone, L sided weakness, fall risk    Recommendations for discharge: acute rehab    Rationale for [place]s: Pt will benefit from continued skilled PT services and anticipate will tolerate 3 hours of therapy/day in acute rehab to increase functional activity tolerance and independence. Pt independent at baseline.       Entered by: Evelin Neumann 09/12/2020 10:15 AM

## 2020-09-12 NOTE — PLAN OF CARE
Discharge Planner OT   Patient plan for discharge: did not state  Current status: pt seen in PM, required Max A of 2 for repositioning in bed to midline as leans far right. Participated in SROM teaching, requiring hand over hand assist to find L hand and to place hand/wrist in R hand. Mod-Max cues to move RUE across midline and to scan L to locate objects.   Barriers to return to prior living situation:  Significant L inattention, current level of assist, lift dependent, impaired activity tolerance, impaired cognition and command following  Recommendations for discharge: ARU  Rationale for [place]s: Pt is significantly below baseline post stroke. He is motivated and would tolerate 3+ hours of rehab, is currently appropriate for PT/OT/SLP. He has a supportive family who is involved in his care.       Entered by: Jermaine Beaver 09/12/2020 4:20 PM

## 2020-09-12 NOTE — PROGRESS NOTES
[facility]    Medicine Progress Note - Hospitalist Service       Date of Admission:  9/7/2020  Date of Service: 09/12/2020    Assessment & Plan The email address for your [employer] account has been updated       Jorje Teran is a 70 year old male who presents with weakness and slurred speech    CVA  Last baseline known noon on 9/6 (over 24 hours PTA). Son went to check on father afternoon/evening of presentation and he was on ground, unable to move his L side and slurring words. Vitals stable. Labs normal except 11.2. CXR clear.   *CT with filling defect within the distal R ICA extending into the R MCA and occlusion of the R MCA at the M1 segment. CTA with severe plaquing at R carotid bifurcation with filling defect in the prox R ICA suggestive of thrombus contributing to approximately 80% stenosis. 60% stenosis of the proximal L ICA. TTE with bubble -> Normal left ventricular size and function. Left ventricular ejection fraction of 55-60%. No segmental wall motion abnormalities noted.  No shunting at the atrial level on suboptimal Bubble study.  - lipids 7/29/20- LDL 78, HDL 56, , T chol 164  - A1C 7/2020 7.1%   - TSH 7/2020 at 2.05   - given  mg x 1 on arrival to ED  - Head CTs have been stable for the most part.  Plan:  - Can transfer to floor today if SBPs < 160 without nicardipine  - Q4H hour neuro checks starting this evening  - Labetalol as needed for SBP>160  - Neurology following  - ASA 81 mg daily PO/GA  - atorvastatin 40 mg daily when able  - telemetry  - Continue TFs   - 3% hypertonic stopped today  - PICC placed 09/10 and CVC removed 09/10  - PT/OT and bedside swallow, SLP    CAD s/p CABG x 3, x 1 in 2015  HTN  HLD  PTA (needs med rec) amlodipine 20 mg daily, ASA 81 mg daiy, atorvastatin 40 mg daily, Zetia 10 mg daily, furosemide 40 mg BID, metoprolol 25 mg BID, Entresto 49-51 1 tab BID, valsartan 320 mg daily  Follows with Dr. Vincent with [facility]. Some  concern re: medical compliance.  TTE with bubble this admission -> Normal left ventricular size and function. Left ventricular ejection fraction of 55-60%. No segmental wall motion abnormalities noted.  No shunting at the atrial level on suboptimal Bubble study.  Plan:  - Resume PTA Norvasc /Entresto / Metoprolol 50 mg bID and Valsartan  - Hydralazine PO started  - Hold hydrochlorothiazide given Na goals  - Wean of Nicardipine gtt  - prn labetalol, hydralazine SBP>160    DM II  Not on meds for this. Recent A1C at 7.1 7/2020.  BG goal is < 180  - q4 hour blood sugar checks  - Continue lantus increased to 10 U at bedtime today, titrate as needed  - medium sliding scale insulin as needed    LE edema  US at [facility] negative for DVT    SHELDON  Intermittent treatment as outpatient   - hold CPAP for now    Hypothyroidism  PTA (needs med rec) levothyroxine 50 mcg daily  TSH checked 7/29/2020 at 2.05  - Continue PTA Levothyroxine    Morbid obesity  BMI noted  7/202 at ~38. Will need to encourage healthy lifestyle and weight loss with recovery     Diet: Adult Formula Drip Feeding: Continuous Isosource 1.5; Nasojejunal; Goal Rate: 60; mL/hr; Medication - Feeding Tube Flush Frequency: At least 15-30 mL water before and after medication administration and with tube clogging; Amount to Send (Nutritio...  Combination Diet Dysphagia Diet Level 1: Pureed; Nectar Thickened Liquids (pre-thickened or use instant food thickener)  Snacks/Supplements Adult: Other; thickened smoothie with meals  (RD); With Meals    DVT Prophylaxis: Pneumatic Compression Devices  Hayes Catheter: in place, indication: Strict 1-2 Hour I&O  Code Status: Full Code           Disposition Plan   Expected discharge: 4 - 7 days, recommended to transitional care unit once mental status at baseline, safe disposition plan/ TCU bed available and neurology work up completed.  Entered: Michael Merritt MD 09/12/2020, 3:43 PM       The patient's care was discussed with the Bedside  Nurse, Patient and Patient's Family.    Michael Merritt MD  Hospitalist Service  [facility]    ______________________________________________________________________    Interval History     No acute events overnight  More alert and interactive  Patient denies CP/SOB. No fevers or chills recorded  Patient has no new complaints, no changes in Left sided weakness    Data reviewed today: I reviewed all medications, new labs and imaging results over the last 24 hours. I personally reviewed no images or EKG's today.    Physical Exam   Vital Signs: Temp: 99.6  F (37.6  C) Temp src: Axillary BP: (!) 146/123 Pulse: 81   Resp: 15 SpO2: 92 % O2 Device: Nasal cannula Oxygen Delivery: 1 LPM  Weight: 238 lbs 8.6 oz    Constitutional: no apparent distress, lethargu  Respiratory: CTABL without w/c  Cardiovascular: RRR without m/r/g. 1+ edema  GI: soft, nontender, Obese, no HSM  Neurologic: unable to fully assess. Is moving his R side and generally following commands. Dense paresis on L  Psychiatric: mood and affect wnl    Data   Recent Labs   Lab 09/12/20  0543 09/12/20  0200 09/11/20  1743  09/10/20  0610  09/09/20  0556  09/08/20  0708 09/07/20  2355 09/07/20  1831   WBC  --   --   --   --  8.8  --  9.0  --  9.7  --  11.2*   HGB  --   --   --   --  12.4*  --  13.3  --  13.6  --  14.5   MCV  --   --   --   --  92  --  91  --  90  --  91   PLT  --   --   --   --  189  --  181  --  189  --  219   INR  --   --   --   --   --   --   --   --   --   --  1.03   * 154* 156*   < > 149*   < > 140   < > 139  --  136   POTASSIUM  --   --   --   --  3.6  --  3.5  --  3.6  --  3.9   CHLORIDE  --   --   --   --  119*  --  111*  --  107  --  103   CO2  --   --   --   --  25  --  25  --  26  --  25   BUN  --   --   --   --  14  --  9  --  13  --  13   CR  --   --   --   --  0.85  --  0.79  --  0.85  --  0.85   ANIONGAP  --   --   --   --  5  --  4  --  6  --  8   SELENA  --   --   --   --  8.0*  --  7.7*  --  8.4*  --  8.9   GLC   --   --   --   --  256*  --  178*  --  162*  --  190*   ALBUMIN  --   --   --   --   --   --   --   --   --  3.6  --    PROTTOTAL  --   --   --   --   --   --   --   --   --  7.4  --    BILITOTAL  --   --   --   --   --   --   --   --   --  0.9  --    ALKPHOS  --   --   --   --   --   --   --   --   --  67  --    ALT  --   --   --   --   --   --   --   --   --  28  --    AST  --   --   --   --   --   --   --   --   --  23  --    TROPI  --   --   --   --   --   --   --   --  <0.015 <0.015 <0.015    < > = values in this interval not displayed.     No results found for this or any previous visit (from the past 24 hour(s)).  Medications     - MEDICATION INSTRUCTIONS -         amLODIPine  20 mg Oral Daily     aspirin  81 mg Oral or Feeding Tube Daily    Or     aspirin  75 mg Rectal Daily     atorvastatin  40 mg Oral Daily     ezetimibe  10 mg Oral Daily     heparin ANTICOAGULANT  7,500 Units Subcutaneous Q12H     hydrALAZINE  10 mg Oral Q8H TORI     insulin aspart  1-10 Units Subcutaneous TID AC     insulin aspart  1-7 Units Subcutaneous At Bedtime     insulin aspart  5 Units Subcutaneous Once     insulin glargine  8 Units Subcutaneous BID     levothyroxine  50 mcg Oral QAM AC     metoprolol succinate ER  50 mg Oral BID     sacubitril-valsartan  1 tablet Oral BID     sodium chloride (PF)  10 mL Intracatheter Q8H     sodium chloride (PF)  10 mL Intracatheter Q8H     valsartan  320 mg Oral Daily

## 2020-09-12 NOTE — PLAN OF CARE
Pt more awake and able to follow some commands. Slow and slurred with speech. Right facial droop present. Left sided neglect.  See Epic for stroke assessment. VSS, on 1 L O 2 overnight,  SBP goal is <160. Titrated Nicardipine gtt down. NSR with R BBB. Pt tolerate  DD3 thickened liquids well. Insulin sliding scale.  NA within goal, still Q 2 H recheck. Hayes in place, patent with adequate output. Was repositioning Q 2 H. Pt slept on and off overnight.

## 2020-09-12 NOTE — PROGRESS NOTES
[facility]    Stroke Progress Note    Interval Events  More awake in the afternoon, doing well with neurochecks spaced to 2 hours. Still requiring nicardipine gtt to achieve systolic BP goal.     Impression  #R frontal, parietal, and temporal lobe cerebral infarction secondary to R MCA M1 occlusion causing complete R MCA syndrome- stroke mechanism is atheroembolism from high grade R ICA stenosis (80%) with presumed associated thrombus    #Cerebral edema secondary to #1, now post stroke day 6, likely out of highest risk for worsening edema window.    Stroke Evaluation summarized:  MRI/Head CT: R MCA distribution infarct (progression of midline shift 3mm (9/8) >  4mm (9/9) > 5 mm (9/10) > 5-6 mm in PM (9/10)  Intracranial Vascular Imaging: R M1 occlusion, incidentally noted 3 mm L MCA bifurcation aneurysm, unruptured  Cervical Carotid and Vertebral Artery Vascular Imaging: R ICA stenosis 80%, L ICA stenosis 60%  Echocardiogram: No PFO, preserved LVEF, no RWMA, no appreciable intracardiac thrombus  EKG/Telemetry: NSR  LDL: 82 (7/2020)  A1c: 7.5  Troponin: <0.015  Other testing: Not Applicable    Stroke Education  -Patient/family advised regarding the signs and symptoms of stroke and that immediate presentation to an ED for evaluation is critical.    Plan  - discontinue 3% saline infusion, permit Na to drifting down slowly, goal for this 24 hours is >145, okay to change free water flushes or NS so long as this goal is met  - SBP goal normotension- stroke is completed, no penumbra to salvage. PRNs to give for SBP>160, restart/titrate PTA antihypertensive regimen to achieve decreased dependence on PRNs and eventual long term goal BP <130/80 or lower if tolerated for overall reduced cardiovascular risk   - PTA antihypertensive meds include (amlodipine 10mg, furosemide 20mg, metoprolol 50mg, entresto 49mg, valsartan 320 mg  - Neurosurgery consulted, appreciate their assistance  - Continue q2 hour  "neurochecks, will reevaluate later this afternoon and if doing well, okay to space to q4 hours starting at 2200  - Avoid hypotonic fluid solutions  - Continue aspirin 81 mg daily for secondary stroke prevention  - Continue subQ heparin for DVT ppx  - Statin: Atorvastatin 40 mg at bedtime  - PT/OT/SLP as tolerated  - Stroke Education  - Depression Screen  - Apnea Screen  - Euthermia, Euglycemia. Blood glucose goal < 180. Defer to hospitalist team for management  - Bedside Glucose Monitoring    The Stroke Staff is Dr. Castaneda.    Selene Prakash MD  Vascular Neurology Fellow, PGY-5  To page a member of the stroke/neurocritical care service, click here:  [facility]   Choose \"On Call\" tab at top, then search dropdown box for \"Neurology Adult\", select location, press Enter, then look for stroke/neuro ICU/telestroke.    ______________________________________________________    Medications   Home Meds  Prior to Admission medications    Medication Sig Start Date End Date Taking? Authorizing Provider   amLODIPine (NORVASC) 10 MG tablet Take 20 mg by mouth daily  12/26/18  Yes Reported, Patient   aspirin 81 MG EC tablet Take 81 mg by mouth daily   Yes Unknown, Entered By History   atorvastatin (LIPITOR) 80 MG tablet Take 40 mg by mouth daily  2/27/19  Yes Reported, Patient   ezetimibe (ZETIA) 10 MG tablet Take 10 mg by mouth daily  12/6/18  Yes Reported, Patient   furosemide (LASIX) 20 MG tablet TAKE 2 TABLETS BY MOUTH TWICE DAILY, FOR TREATMENT OF BLOOD PRESSURE AND FLUID RETENTION. 3/6/19  Yes Reported, Patient   levothyroxine (SYNTHROID/LEVOTHROID) 50 MCG tablet Take 50 mcg by mouth daily  10/3/18  Yes Reported, Patient   metoprolol succinate ER (TOPROL-XL) 50 MG 24 hr tablet Take 25 mg by mouth 2 times daily  10/24/19  Yes Reported, Patient   multivitamin (CENTRUM SILVER) tablet Take 1 tablet by mouth daily   Yes Unknown, Entered By History   nitroGLYcerin (NITROSTAT) 0.4 MG sublingual tablet Place 0.4 mg under the tongue every 5 " minutes as needed  10/3/18  Yes Reported, Patient   sacubitril-valsartan (ENTRESTO) 49-51 MG per tablet Take 1 tablet by mouth 2 times daily  1/6/19  Yes Reported, Patient   traZODone (DESYREL) 50 MG tablet Take 50 mg by mouth at bedtime as needed, may repeat once  8/25/20  Yes Reported, Patient   valsartan (DIOVAN) 320 MG tablet Take 320 mg by mouth daily  1/4/19  Yes Reported, Patient       Scheduled Meds    amLODIPine  20 mg Oral Daily     aspirin  81 mg Oral or Feeding Tube Daily    Or     aspirin  75 mg Rectal Daily     atorvastatin  40 mg Oral Daily     ezetimibe  10 mg Oral Daily     heparin ANTICOAGULANT  7,500 Units Subcutaneous Q12H     insulin aspart  1-7 Units Subcutaneous TID AC     insulin aspart  1-5 Units Subcutaneous At Bedtime     insulin glargine  10 Units Subcutaneous At Bedtime     levothyroxine  50 mcg Oral QAM AC     metoprolol succinate ER  25 mg Oral BID     sacubitril-valsartan  1 tablet Oral BID     sodium chloride (PF)  10 mL Intracatheter Q8H     sodium chloride (PF)  10 mL Intracatheter Q8H     valsartan  320 mg Oral Daily       Infusion Meds    - MEDICATION INSTRUCTIONS -       niCARdipine 40 mg in 200 mL 0.9% NaCl 7.5 mg/hr (09/12/20 0740)     sodium chloride 3% Stopped (09/11/20 0700)       PRN Meds  acetaminophen, bacitracin, artificial tears ophthalmic solution, glucose **OR** dextrose **OR** glucagon, heparin lock flush, hydrALAZINE, labetalol, lidocaine 4%, lidocaine (buffered or not buffered), magnesium sulfate, - MEDICATION INSTRUCTIONS -, naloxone, ondansetron **OR** ondansetron, potassium chloride, potassium chloride with lidocaine, potassium chloride, potassium chloride, potassium chloride, potassium phosphate (KPHOS) in D5W IV, potassium phosphate (KPHOS) in D5W IV, potassium phosphate (KPHOS) in D5W IV, potassium phosphate (KPHOS) in D5W IV, sodium chloride (PF)       PHYSICAL EXAMINATION  Temp:  [98.6  F (37  C)-99.8  F (37.7  C)] 99.6  F (37.6  C)  Pulse:  []  84  Resp:  [10-31] 17  BP: (121-180)/(57-95) 150/70  SpO2:  [90 %-99 %] 96 %     General:  L handed,  male, lying in bed in no acute distress. Feeding tube in L nare.    Neurologic  Mental Status: Awake, alert, attentive, oriented to self, time, place, and circumstance. Language is fluent and coherent. Mild visuospatial neglect, no alien limb phenomenon, neglects L with double simultaneous auditory stimuli.  Cranial Nerves: L homonomous hemianopia, PERRL, EOMI, L lower facial droop, sensation intact, hearing intact to single stimuli bilaterally, tongue midline, mildly dysarthric, entirely intelligible.   Motor: LUE extension to noxious, LLE: weak non-antigravity withdrawal to noxious   RUE/RLE: 5/5  Reflexes: 3+ LUE, 2+LLE, RUE, RLE, toes mute bilaterally  Sensory: does not detect sensation on LUE, detects sensation LLE, intact on R   Coordination: not tested, no ataxia of observable movements  Station/Gait:  Not tested    Stroke Scales    NIHSS     Interval (daily exam) (09/12/20 1039)   Interval Comments daily exam (09/12/20 1039)   1a. Level of Consciousness 0-->Alert, keenly responsive   1b. LOC Questions 0-->Answers both questions correctly   1c. LOC Commands 0-->Performs both tasks correctly   2.   Best Gaze 0-->Normal   3.   Visual 2-->Complete hemianopia   4.   Facial Palsy 2-->Partial paralysis (total or near-total paralysis of lower face)   5a. Motor Arm, Left 3-->No effort against gravity, limb falls   5b. Motor Arm, Right 0-->No drift, limb holds 90 (or 45) degrees for full 10 secs   6a. Motor Leg, Left 3-->No effort against gravity, leg falls to bed immediately   6b. Motor Leg, right 0-->No drift, leg holds 30 degree position for full 5 secs   7.   Limb Ataxia 0-->Absent   8.   Sensory 1-->Mild-to-moderate sensory loss, patient feels pinprick is less sharp or is dull on the affected side, or there is a loss of superficial pain with pinprick, but patient is aware of being touched   9.   Best  Language 0-->No aphasia, normal   10. Dysarthria 1-->Mild-to-moderate dysarthria, patient slurs at least some words and, at worst, can be understood with some difficulty   11. Extinction and Inattention  1-->Visual, tactile, auditory, spatial, or personal inattention or extinction to bilateral simultaneous stimulation in one of the sensory modalities   Total 13 (09/12/20 1039)       Imaging  I personally reviewed all imaging; relevant findings per HPI.     Lab Results Data   CBC  Recent Labs   Lab 09/10/20  0610 09/09/20  0556 09/08/20  0708   WBC 8.8 9.0 9.7   RBC 3.93* 4.26* 4.43   HGB 12.4* 13.3 13.6   HCT 36.3* 38.7* 39.8*    181 189     Basic Metabolic Panel    Recent Labs   Lab 09/12/20  0543 09/12/20  0200 09/11/20  1743  09/10/20  0610  09/09/20  0556  09/08/20  0708   * 154* 156*   < > 149*   < > 140   < > 139   POTASSIUM  --   --   --   --  3.6  --  3.5  --  3.6   CHLORIDE  --   --   --   --  119*  --  111*  --  107   CO2  --   --   --   --  25  --  25  --  26   BUN  --   --   --   --  14  --  9  --  13   CR  --   --   --   --  0.85  --  0.79  --  0.85   GLC  --   --   --   --  256*  --  178*  --  162*   SELENA  --   --   --   --  8.0*  --  7.7*  --  8.4*    < > = values in this interval not displayed.     Liver Panel  Recent Labs   Lab 09/07/20  2355   PROTTOTAL 7.4   ALBUMIN 3.6   BILITOTAL 0.9   ALKPHOS 67   AST 23   ALT 28     INR    Recent Labs   Lab Test 09/07/20  1831   INR 1.03      Lipid Profile    Recent Labs   Lab Test 09/10/20  1420   CHOL 119   HDL 45   LDL 53   TRIG 105     A1C    Recent Labs   Lab Test 09/10/20  0610   A1C 7.5*     Troponin I    Recent Labs   Lab 09/08/20  0708 09/07/20  2355 09/07/20  1836   TROPI <0.015 <0.015 <0.015

## 2020-09-13 ENCOUNTER — APPOINTMENT (OUTPATIENT)
Dept: PHYSICAL THERAPY | Facility: CLINIC | Age: 70
DRG: 064 | End: 2020-09-13
Attending: HOSPITALIST
Payer: COMMERCIAL

## 2020-09-13 ENCOUNTER — APPOINTMENT (OUTPATIENT)
Dept: OCCUPATIONAL THERAPY | Facility: CLINIC | Age: 70
DRG: 064 | End: 2020-09-13
Attending: HOSPITALIST
Payer: COMMERCIAL

## 2020-09-13 ENCOUNTER — APPOINTMENT (OUTPATIENT)
Dept: PHYSICAL THERAPY | Facility: CLINIC | Age: 70
DRG: 064 | End: 2020-09-13
Payer: COMMERCIAL

## 2020-09-13 ENCOUNTER — APPOINTMENT (OUTPATIENT)
Dept: SPEECH THERAPY | Facility: CLINIC | Age: 70
DRG: 064 | End: 2020-09-13
Attending: HOSPITALIST
Payer: COMMERCIAL

## 2020-09-13 LAB
GLUCOSE BLDC GLUCOMTR-MCNC: 297 MG/DL (ref 70–99)
GLUCOSE BLDC GLUCOMTR-MCNC: 305 MG/DL (ref 70–99)
GLUCOSE BLDC GLUCOMTR-MCNC: 314 MG/DL (ref 70–99)
GLUCOSE BLDC GLUCOMTR-MCNC: 320 MG/DL (ref 70–99)
GLUCOSE BLDC GLUCOMTR-MCNC: 343 MG/DL (ref 70–99)
GLUCOSE BLDC GLUCOMTR-MCNC: 354 MG/DL (ref 70–99)
PLATELET # BLD AUTO: 207 10E9/L (ref 150–450)
SODIUM SERPL-SCNC: 148 MMOL/L (ref 133–144)
SODIUM SERPL-SCNC: 149 MMOL/L (ref 133–144)

## 2020-09-13 PROCEDURE — 97112 NEUROMUSCULAR REEDUCATION: CPT | Mod: GP

## 2020-09-13 PROCEDURE — 99232 SBSQ HOSP IP/OBS MODERATE 35: CPT | Performed by: STUDENT IN AN ORGANIZED HEALTH CARE EDUCATION/TRAINING PROGRAM

## 2020-09-13 PROCEDURE — 25000128 H RX IP 250 OP 636: Performed by: STUDENT IN AN ORGANIZED HEALTH CARE EDUCATION/TRAINING PROGRAM

## 2020-09-13 PROCEDURE — 84295 ASSAY OF SERUM SODIUM: CPT | Performed by: STUDENT IN AN ORGANIZED HEALTH CARE EDUCATION/TRAINING PROGRAM

## 2020-09-13 PROCEDURE — 40000239 ZZH STATISTIC VAT ROUNDS

## 2020-09-13 PROCEDURE — 99232 SBSQ HOSP IP/OBS MODERATE 35: CPT | Mod: GC | Performed by: PSYCHIATRY & NEUROLOGY

## 2020-09-13 PROCEDURE — 25000132 ZZH RX MED GY IP 250 OP 250 PS 637

## 2020-09-13 PROCEDURE — 25000131 ZZH RX MED GY IP 250 OP 636 PS 637: Performed by: STUDENT IN AN ORGANIZED HEALTH CARE EDUCATION/TRAINING PROGRAM

## 2020-09-13 PROCEDURE — 97112 NEUROMUSCULAR REEDUCATION: CPT | Mod: GO | Performed by: REHABILITATION PRACTITIONER

## 2020-09-13 PROCEDURE — 36415 COLL VENOUS BLD VENIPUNCTURE: CPT | Performed by: STUDENT IN AN ORGANIZED HEALTH CARE EDUCATION/TRAINING PROGRAM

## 2020-09-13 PROCEDURE — 27210429 ZZH NUTRITION PRODUCT INTERMEDIATE LITER

## 2020-09-13 PROCEDURE — 92526 ORAL FUNCTION THERAPY: CPT | Mod: GN

## 2020-09-13 PROCEDURE — 85049 AUTOMATED PLATELET COUNT: CPT | Performed by: STUDENT IN AN ORGANIZED HEALTH CARE EDUCATION/TRAINING PROGRAM

## 2020-09-13 PROCEDURE — 25000132 ZZH RX MED GY IP 250 OP 250 PS 637: Performed by: STUDENT IN AN ORGANIZED HEALTH CARE EDUCATION/TRAINING PROGRAM

## 2020-09-13 PROCEDURE — 12000000 ZZH R&B MED SURG/OB

## 2020-09-13 PROCEDURE — 99207 ZZC CDG-CUT & PASTE-POTENTIAL IMPACT ON LEVEL: CPT | Performed by: STUDENT IN AN ORGANIZED HEALTH CARE EDUCATION/TRAINING PROGRAM

## 2020-09-13 PROCEDURE — 00000146 ZZHCL STATISTIC GLUCOSE BY METER IP

## 2020-09-13 RX ORDER — METOPROLOL TARTRATE 50 MG
50 TABLET ORAL 2 TIMES DAILY
Status: DISCONTINUED | OUTPATIENT
Start: 2020-09-13 | End: 2020-09-24 | Stop reason: HOSPADM

## 2020-09-13 RX ORDER — BISACODYL 10 MG
10 SUPPOSITORY, RECTAL RECTAL DAILY PRN
Status: DISCONTINUED | OUTPATIENT
Start: 2020-09-13 | End: 2020-09-24 | Stop reason: HOSPADM

## 2020-09-13 RX ORDER — HYDRALAZINE HYDROCHLORIDE 25 MG/1
25 TABLET, FILM COATED ORAL EVERY 8 HOURS SCHEDULED
Status: DISCONTINUED | OUTPATIENT
Start: 2020-09-13 | End: 2020-09-23

## 2020-09-13 RX ADMIN — LEVOTHYROXINE SODIUM 50 MCG: 50 TABLET ORAL at 08:44

## 2020-09-13 RX ADMIN — ASPIRIN 81 MG 81 MG: 81 TABLET ORAL at 08:44

## 2020-09-13 RX ADMIN — VALSARTAN 320 MG: 160 TABLET ORAL at 08:44

## 2020-09-13 RX ADMIN — HYDRALAZINE HYDROCHLORIDE 25 MG: 25 TABLET ORAL at 14:01

## 2020-09-13 RX ADMIN — INSULIN GLARGINE 16 UNITS: 100 INJECTION, SOLUTION SUBCUTANEOUS at 21:14

## 2020-09-13 RX ADMIN — INSULIN ASPART 7 UNITS: 100 INJECTION, SOLUTION INTRAVENOUS; SUBCUTANEOUS at 16:27

## 2020-09-13 RX ADMIN — HYDRALAZINE HYDROCHLORIDE 25 MG: 25 TABLET ORAL at 21:16

## 2020-09-13 RX ADMIN — SACUBITRIL AND VALSARTAN 1 TABLET: 49; 51 TABLET, FILM COATED ORAL at 21:15

## 2020-09-13 RX ADMIN — SENNOSIDES 2 TABLET: 8.6 TABLET, FILM COATED ORAL at 16:52

## 2020-09-13 RX ADMIN — HEPARIN SODIUM 7500 UNITS: 10000 INJECTION INTRAVENOUS; SUBCUTANEOUS at 00:38

## 2020-09-13 RX ADMIN — HEPARIN SODIUM 7500 UNITS: 10000 INJECTION INTRAVENOUS; SUBCUTANEOUS at 13:06

## 2020-09-13 RX ADMIN — INSULIN ASPART 9 UNITS: 100 INJECTION, SOLUTION INTRAVENOUS; SUBCUTANEOUS at 08:44

## 2020-09-13 RX ADMIN — SACUBITRIL AND VALSARTAN 1 TABLET: 49; 51 TABLET, FILM COATED ORAL at 08:44

## 2020-09-13 RX ADMIN — METOPROLOL TARTRATE 50 MG: 50 TABLET, FILM COATED ORAL at 10:23

## 2020-09-13 RX ADMIN — AMLODIPINE BESYLATE 20 MG: 10 TABLET ORAL at 08:44

## 2020-09-13 RX ADMIN — INSULIN ASPART 7 UNITS: 100 INJECTION, SOLUTION INTRAVENOUS; SUBCUTANEOUS at 11:33

## 2020-09-13 RX ADMIN — INSULIN GLARGINE 8 UNITS: 100 INJECTION, SOLUTION SUBCUTANEOUS at 10:23

## 2020-09-13 RX ADMIN — METOPROLOL TARTRATE 50 MG: 50 TABLET, FILM COATED ORAL at 21:16

## 2020-09-13 RX ADMIN — ATORVASTATIN CALCIUM 40 MG: 40 TABLET, FILM COATED ORAL at 08:44

## 2020-09-13 RX ADMIN — ACETAMINOPHEN 975 MG: 325 TABLET, FILM COATED ORAL at 16:52

## 2020-09-13 RX ADMIN — INSULIN GLARGINE 8 UNITS: 100 INJECTION, SOLUTION SUBCUTANEOUS at 08:45

## 2020-09-13 RX ADMIN — EZETIMIBE 10 MG: 10 TABLET ORAL at 08:44

## 2020-09-13 RX ADMIN — LABETALOL HYDROCHLORIDE 10 MG: 5 INJECTION, SOLUTION INTRAVENOUS at 04:16

## 2020-09-13 RX ADMIN — HYDRALAZINE HYDROCHLORIDE 10 MG: 10 TABLET ORAL at 06:07

## 2020-09-13 ASSESSMENT — ACTIVITIES OF DAILY LIVING (ADL)
ADLS_ACUITY_SCORE: 20

## 2020-09-13 NOTE — PROVIDER NOTIFICATION
"Paged Weekend dietian, \"Pt has a diet. BG have been elevated because he has TF running as well. Wondering if you want to adjust feedings. Was told he ate 100% of meal yesterday. Thanks  "

## 2020-09-13 NOTE — PLAN OF CARE
Nursing shift note   Patient here with Rt frontal, parietal and temporal CVA.Lethargic Oriented x3.forgetful.VSS except BP hydralazine givenx1 Tachy at times HR [id number] s but asymptomatic back to normal within seconds.Neuros intact except Lt side hemiplegic,Lt droop,Lt field cut, slurred speech.On DD1 pureed and nector thick diet.Tube feed running at goal rate 60 mi/hour with 30 ml flushes every 4 hours.Sodium checked back as 148, next recheck will be morning.BS elevated.On bed rest T&R every 2 hours.Hayes intact.Plan to continue to monitor.          Behavior & Aggression Tool color:green      Pt's belongings:  laptop,phone,contact lens,glass,rosary are in the room     Home medications:    (N/A)

## 2020-09-13 NOTE — PROGRESS NOTES
[facility]    Neurosurgery  Daily Note    Assessment & Plan   Patient with large right side ischemic stroke. CT scans have been stable, with no surgical [place].   Patient is alert and following commands on the right, continues with left neglect and paresis.     Plan:  -NSG will sign off for now, please do not hesitate to re consult for any additional concerns.     Peter Fontaine    Interval History   Stable.    Physical Exam   Temp: 98.5  F (36.9  C) Temp src: Oral BP: (!) 155/84 Pulse: 80   Resp: 18 SpO2: 93 % O2 Device: Nasal cannula Oxygen Delivery: 2 LPM  Vitals:    09/09/20 0600 09/10/20 0600 09/12/20 0400   Weight: 111.9 kg (246 lb 11.1 oz) 108.4 kg (238 lb 15.7 oz) 108.2 kg (238 lb 8.6 oz)     Vital Signs with Ranges  Temp:  [97.7  F (36.5  C)-100.1  F (37.8  C)] 98.5  F (36.9  C)  Pulse:  [70-85] 80  Resp:  [15-29] 18  BP: (137-166)/() 155/84  SpO2:  [92 %-96 %] 93 %  I/O last 3 completed shifts:  In: 3150 [P.O.:980; I.V.:300; NG/GT:1030]  Out: 2170 [Urine:2170]    Alert and oriented.  Left neglect and paresis        Medications     - MEDICATION INSTRUCTIONS -          amLODIPine  20 mg Oral Daily     aspirin  81 mg Oral or Feeding Tube Daily    Or     aspirin  75 mg Rectal Daily     atorvastatin  40 mg Oral Daily     ezetimibe  10 mg Oral Daily     heparin ANTICOAGULANT  7,500 Units Subcutaneous Q12H     hydrALAZINE  25 mg Oral Q8H TORI     insulin aspart  6 Units Subcutaneous Once     insulin aspart  1-10 Units Subcutaneous TID AC     insulin aspart  1-7 Units Subcutaneous At Bedtime     insulin glargine  16 Units Subcutaneous BID     insulin glargine  8 Units Subcutaneous Once     levothyroxine  50 mcg Oral QAM AC     metoprolol tartrate  50 mg Oral BID     sacubitril-valsartan  1 tablet Oral BID     sodium chloride (PF)  10 mL Intracatheter Q8H     sodium chloride (PF)  10 mL Intracatheter Q8H     valsartan  320 mg Oral Daily             Peter Fontaine PATylerAdams County Hospital  [facility]  [facility]  [address] [place] [address]  [address]  [place] [address]    Tel [phone]  Pager [phone]

## 2020-09-13 NOTE — PLAN OF CARE
Discharge Planner SLP   Patient plan for discharge: Not addressed this session  Current status: Swallow Treatment provided. This AM patient had significant episode of aspiration with coffee, per RN report. Swallow treatment provided. Patient tolerated 2 4oz cups of nectar thick apple juice with frequent cues for single sips taken slowly. one instance of throat clear with liquids noted, no other s/s of aspiration. Mild-mod anterior loss when drinking from cup, however was able to reduce with cues to hold cup more to left side of mouth.     Recommend: Continue dysphagia diet level 1 and nectar thick liquids with pt feeding himself as able. Pt will need 1:1 supervision and assist with all intake to slow rate of intake, take small sips and bites, alternate solids and liquids, and check mouth between bites. Hold PO when fatigued or pt demonstrating s/sx of aspiration.     Barriers to return to prior living situation: Cognition. Dysphagia. Weakness.   Recommendations for discharge: ARU  Rationale for [place]s: Continue ST daily and increase to BID as indicated. Again, pt tolerated session over 60 minutes       Entered by: Kathy Galicia 09/13/2020 4:11 PM

## 2020-09-13 NOTE — PROGRESS NOTES
[facility]    Stroke Progress Note    Interval Events  Paroxysms of tachycardia overnight, but denies symptoms. Later in the am was a little impulsive and aspirated some coffee. Denies complaints.    Impression  #R frontal, parietal, and temporal lobe cerebral infarction secondary to R MCA M1 occlusion causing complete R MCA syndrome- stroke mechanism is atheroembolism from high grade R ICA stenosis (80%) with presumed associated thrombus    #Cerebral edema secondary to #1, now post stroke day 7, likely out of most of the edema window.    Stroke Evaluation summarized:  MRI/Head CT: R MCA distribution infarct (progression of midline shift 3mm (9/8) >  4mm (9/9) > 5 mm (9/10) > 5-6 mm in PM (9/10)  Intracranial Vascular Imaging: R M1 occlusion, incidentally noted 3 mm L MCA bifurcation aneurysm, unruptured  Cervical Carotid and Vertebral Artery Vascular Imaging: R ICA stenosis 80%, L ICA stenosis 60%  Echocardiogram: No PFO, preserved LVEF, no RWMA, no appreciable intracardiac thrombus  EKG/Telemetry: NSR  LDL: 82 (7/2020)  A1c: 7.5  Troponin: <0.015  Other testing: Not Applicable    Stroke Education  -Patient/family advised regarding the signs and symptoms of stroke and that immediate presentation to an ED for evaluation is critical.    Plan  - continue to permit Na to drift down slowly, goal is to maintain normonatremia and no greater drop than 10 points in 1 24 hour period, okay to change free water flushes or NS so long as this goal is met  - SBP goal normotension- stroke is completed, no penumbra to salvage. PRNs to give for SBP>160, restart/titrate PTA antihypertensive regimen to achieve decreased dependence on PRNs and eventual long term goal BP <130/80 or lower if tolerated for overall reduced cardiovascular risk   - PTA antihypertensive meds include (amlodipine 10mg, furosemide 20mg, metoprolol 50mg, entresto 49mg, valsartan 320 mg  - Neurosurgery signed off  - okay for q4 hour  "neurochecks  - Avoid hypotonic fluid solutions  - Continue aspirin 81 mg daily for secondary stroke prevention  - Continue subQ heparin for DVT ppx  - Statin: Atorvastatin 40 mg at bedtime  - PT/OT/SLP as tolerated  - Stroke Education  - Depression Screen  - Apnea Screen  - Euthermia, Euglycemia. Blood glucose goal < 180. Defer to hospitalist team for management  - Bedside Glucose Monitoring    The Stroke Staff is Dr. Castaneda.    Selene Prakash MD  Vascular Neurology Fellow, PGY-5  To page a member of the stroke/neurocritical care service, click here:  AMCOM   Choose \"On Call\" tab at top, then search dropdown box for \"Neurology Adult\", select location, press Enter, then look for stroke/neuro ICU/telestroke.    ______________________________________________________    Medications   Home Meds  Prior to Admission medications    Medication Sig Start Date End Date Taking? Authorizing Provider   amLODIPine (NORVASC) 10 MG tablet Take 20 mg by mouth daily  12/26/18  Yes Reported, Patient   aspirin 81 MG EC tablet Take 81 mg by mouth daily   Yes Unknown, Entered By History   atorvastatin (LIPITOR) 80 MG tablet Take 40 mg by mouth daily  2/27/19  Yes Reported, Patient   ezetimibe (ZETIA) 10 MG tablet Take 10 mg by mouth daily  12/6/18  Yes Reported, Patient   furosemide (LASIX) 20 MG tablet TAKE 2 TABLETS BY MOUTH TWICE DAILY, FOR TREATMENT OF BLOOD PRESSURE AND FLUID RETENTION. 3/6/19  Yes Reported, Patient   levothyroxine (SYNTHROID/LEVOTHROID) 50 MCG tablet Take 50 mcg by mouth daily  10/3/18  Yes Reported, Patient   metoprolol succinate ER (TOPROL-XL) 50 MG 24 hr tablet Take 25 mg by mouth 2 times daily  10/24/19  Yes Reported, Patient   multivitamin (CENTRUM SILVER) tablet Take 1 tablet by mouth daily   Yes Unknown, Entered By History   nitroGLYcerin (NITROSTAT) 0.4 MG sublingual tablet Place 0.4 mg under the tongue every 5 minutes as needed  10/3/18  Yes Reported, Patient   sacubitril-valsartan (ENTRESTO) 49-51 MG per " tablet Take 1 tablet by mouth 2 times daily  1/6/19  Yes Reported, Patient   traZODone (DESYREL) 50 MG tablet Take 50 mg by mouth at bedtime as needed, may repeat once  8/25/20  Yes Reported, Patient   valsartan (DIOVAN) 320 MG tablet Take 320 mg by mouth daily  1/4/19  Yes Reported, Patient       Scheduled Meds    amLODIPine  20 mg Oral Daily     aspirin  81 mg Oral or Feeding Tube Daily    Or     aspirin  75 mg Rectal Daily     atorvastatin  40 mg Oral Daily     ezetimibe  10 mg Oral Daily     heparin ANTICOAGULANT  7,500 Units Subcutaneous Q12H     hydrALAZINE  25 mg Oral Q8H TORI     insulin aspart  1-10 Units Subcutaneous TID AC     insulin aspart  1-7 Units Subcutaneous At Bedtime     insulin glargine  16 Units Subcutaneous BID     levothyroxine  50 mcg Oral QAM AC     metoprolol tartrate  50 mg Oral BID     sacubitril-valsartan  1 tablet Oral BID     sodium chloride (PF)  10 mL Intracatheter Q8H     sodium chloride (PF)  10 mL Intracatheter Q8H     valsartan  320 mg Oral Daily       Infusion Meds    - MEDICATION INSTRUCTIONS -         PRN Meds  acetaminophen, bacitracin, artificial tears ophthalmic solution, glucose **OR** dextrose **OR** glucagon, hydrALAZINE, labetalol, magnesium sulfate, - MEDICATION INSTRUCTIONS -, naloxone, ondansetron **OR** ondansetron, polyethylene glycol, potassium chloride, potassium chloride with lidocaine, potassium chloride, potassium chloride, potassium chloride, potassium phosphate (KPHOS) in D5W IV, potassium phosphate (KPHOS) in D5W IV, potassium phosphate (KPHOS) in D5W IV, potassium phosphate (KPHOS) in D5W IV, sennosides       PHYSICAL EXAMINATION  Temp:  [97.7  F (36.5  C)-99.7  F (37.6  C)] 98.4  F (36.9  C)  Pulse:  [75-85] 76  Resp:  [15-29] 16  BP: (128-166)/() 128/82  SpO2:  [92 %-96 %] 94 %     General:  Adult male patient, lying in bed in no acute distress. Feeding tube in L nare.    Neurologic  Mental Status: Awake, alert, attentive, oriented to self, time,  place, and circumstance. Language is fluent and coherent. Mild visuospatial neglect, no alien limb phenomenon, neglects L with double simultaneous auditory stimuli.  Cranial Nerves: L homonomous hemianopia, PERRL, L horizontal gaze impaired, VOR able to cross midline, L lower facial droop, sensation intact, hearing intact to single stimuli bilaterally, tongue midline, mildly dysarthric, entirely intelligible.   Motor: LUE extension to noxious, LLE: weak non-antigravity withdrawal to noxious, no volitional movement or tone on L   RUE/RLE: 5/5  Reflexes: 3+ LUE, 2+LLE, RUE, RLE, toes mute bilaterally  Sensory: does not detect sensation on LUE, detects sensation LLE, intact on R   Coordination: not tested, no ataxia of observable movements  Station/Gait:  Not tested    Stroke Scales    NIHSS     Interval (daily exam) (09/13/20 1440)   Interval Comments daily exam (09/13/20 1440)   1a. Level of Consciousness 0-->Alert, keenly responsive   1b. LOC Questions 0-->Answers both questions correctly   1c. LOC Commands 0-->Performs both tasks correctly   2.   Best Gaze 1-->Partial gaze palsy, gaze is abnormal in one or both eyes, but forced deviation or total gaze paresis is not present   3.   Visual 2-->Complete hemianopia   4.   Facial Palsy 1-->Minor paralysis (flattened nasolabial fold, asymmetry on smiling)   5a. Motor Arm, Left 3-->No effort against gravity, limb falls   5b. Motor Arm, Right 0-->No drift, limb holds 90 (or 45) degrees for full 10 secs   6a. Motor Leg, Left 3-->No effort against gravity, leg falls to bed immediately   6b. Motor Leg, right 0-->No drift, leg holds 30 degree position for full 5 secs   7.   Limb Ataxia 0-->Absent   8.   Sensory 1-->Mild-to-moderate sensory loss, patient feels pinprick is less sharp or is dull on the affected side, or there is a loss of superficial pain with pinprick, but patient is aware of being touched   9.   Best Language 0-->No aphasia, normal   10. Dysarthria  1-->Mild-to-moderate dysarthria, patient slurs at least some words and, at worst, can be understood with some difficulty   11. Extinction and Inattention  1-->Visual, tactile, auditory, spatial, or personal inattention or extinction to bilateral simultaneous stimulation in one of the sensory modalities   Total 13 (09/13/20 1440)       Imaging  I personally reviewed all imaging; relevant findings per HPI.     Lab Results Data   CBC  Recent Labs   Lab 09/13/20  0828 09/10/20  0610 09/09/20  0556 09/08/20  0708   WBC  --  8.8 9.0 9.7   RBC  --  3.93* 4.26* 4.43   HGB  --  12.4* 13.3 13.6   HCT  --  36.3* 38.7* 39.8*    189 181 189     Basic Metabolic Panel    Recent Labs   Lab 09/13/20  0828 09/13/20  0127 09/12/20  0543  09/10/20  0610  09/09/20  0556  09/08/20  0708   * 148* 152*   < > 149*   < > 140   < > 139   POTASSIUM  --   --   --   --  3.6  --  3.5  --  3.6   CHLORIDE  --   --   --   --  119*  --  111*  --  107   CO2  --   --   --   --  25  --  25  --  26   BUN  --   --   --   --  14  --  9  --  13   CR  --   --   --   --  0.85  --  0.79  --  0.85   GLC  --   --   --   --  256*  --  178*  --  162*   SELENA  --   --   --   --  8.0*  --  7.7*  --  8.4*    < > = values in this interval not displayed.     Liver Panel  Recent Labs   Lab 09/07/20  2355   PROTTOTAL 7.4   ALBUMIN 3.6   BILITOTAL 0.9   ALKPHOS 67   AST 23   ALT 28     INR    Recent Labs   Lab Test 09/07/20  1831   INR 1.03      Lipid Profile    Recent Labs   Lab Test 09/10/20  1420   CHOL 119   HDL 45   LDL 53   TRIG 105     A1C    Recent Labs   Lab Test 09/10/20  0610   A1C 7.5*     Troponin I    Recent Labs   Lab 09/08/20  0708 09/07/20  2355 09/07/20  1831   TROPI <0.015 <0.015 <0.015

## 2020-09-13 NOTE — PROVIDER NOTIFICATION
Paged neurologist regarding weather we can change neuro checks to 4 hours ( per rounding MD note says change to 4 hours after 2200) and also clarification regarding sodium checks.

## 2020-09-13 NOTE — PROGRESS NOTES
[facility]    Medicine Progress Note - Hospitalist Service       Date of Admission:  9/7/2020  Date of Service: 09/13/2020    Assessment & Plan The email address for your [employer] account has been updated       Jorje Teran is a 70 year old male who presents with weakness and slurred speech    CVA  Last baseline known noon on 9/6 (over 24 hours PTA). Son went to check on father afternoon/evening of presentation and he was on ground, unable to move his L side and slurring words. Vitals stable. Labs normal except 11.2. CXR clear.   *CT with filling defect within the distal R ICA extending into the R MCA and occlusion of the R MCA at the M1 segment. CTA with severe plaquing at R carotid bifurcation with filling defect in the prox R ICA suggestive of thrombus contributing to approximately 80% stenosis. 60% stenosis of the proximal L ICA. TTE with bubble -> Normal left ventricular size and function. Left ventricular ejection fraction of 55-60%. No segmental wall motion abnormalities noted.  No shunting at the atrial level on suboptimal Bubble study.  - lipids 7/29/20- LDL 78, HDL 56, , T chol 164  - A1C 7/2020 7.1%   - TSH 7/2020 at 2.05   - given  mg x 1 on arrival to ED  - Head CTs have been stable for the most part.  Plan:  - Can transfer to floor today if SBPs < 160 without nicardipine  - Q4H hour neuro checks starting this evening  - Labetalol as needed for SBP>160  - Neurology following  - ASA 81 mg daily PO/NC  - atorvastatin 40 mg daily when able  - telemetry  - Continue TFs   - PICC placed 09/10 and CVC removed 09/10  - PT/OT and bedside swallow, SLP    CAD s/p CABG x 3, x 1 in 2015  HTN  HLD  PTA (needs med rec) amlodipine 20 mg daily, ASA 81 mg daiy, atorvastatin 40 mg daily, Zetia 10 mg daily, furosemide 40 mg BID, metoprolol 25 mg BID, Entresto 49-51 1 tab BID, valsartan 320 mg daily  Follows with Dr. Vincent with [facility]. Some concern re: medical compliance.  TTE with bubble  this admission -> Normal left ventricular size and function. Left ventricular ejection fraction of 55-60%. No segmental wall motion abnormalities noted.  No shunting at the atrial level on suboptimal Bubble study.  Plan:  - Resume PTA Norvasc /Entresto / Metoprolol 50 mg bID and Valsartan  - Hydralazine PO started  - Hold hydrochlorothiazide given Na goals  - prn labetalol, hydralazine SBP>160    DM II  Not on meds for this. Recent A1C at 7.1 7/2020.  BG goal is < 180. BG not currently well controlled  Plan:  - q4 hour blood sugar checks  - Continue lantus increased to 16 U BID today, titrate as needed  - highsliding scale insulin as needed    LE edema  US at [facility] negative for DVT    SHEDLON  Intermittent treatment as outpatient   - hold CPAP for now    Hypothyroidism  PTA (needs med rec) levothyroxine 50 mcg daily  TSH checked 7/29/2020 at 2.05  - Continue PTA Levothyroxine    Morbid obesity  BMI noted  7/202 at ~38. Will need to encourage healthy lifestyle and weight loss with recovery     Diet: Adult Formula Drip Feeding: Continuous Isosource 1.5; Nasojejunal; Goal Rate: 60; mL/hr; Medication - Feeding Tube Flush Frequency: At least 15-30 mL water before and after medication administration and with tube clogging; Amount to Send (Nutritio...  Combination Diet Dysphagia Diet Level 1: Pureed; Nectar Thickened Liquids (pre-thickened or use instant food thickener)  Snacks/Supplements Adult: Other; thickened smoothie with meals  (RD); With Meals    DVT Prophylaxis: Pneumatic Compression Devices  Hayes Catheter: in place, indication: Strict 1-2 Hour I&O  Code Status: Full Code           Disposition Plan   Expected discharge: 2 - 3 days, recommended to transitional care unit once mental status at baseline, safe disposition plan/ TCU bed available and neurology work up completed.  Entered: Michael Merritt MD 09/13/2020, 1:23 PM       The patient's care was discussed with the Bedside Nurse, Patient and Patient's Family.    Michael  MD Shaina  Hospitalist Service  [facility]    ______________________________________________________________________    Interval History     No acute events overnight  Patient denies CP/SOB. No fevers or chills recorded  Patient has no new complaints, no changes in Left sided weakness  Aspirated some coffee this morning    Data reviewed today: I reviewed all medications, new labs and imaging results over the last 24 hours. I personally reviewed no images or EKG's today.    Physical Exam   Vital Signs: Temp: 98.4  F (36.9  C) Temp src: Oral BP: 128/82 Pulse: 76   Resp: 16 SpO2: 94 % O2 Device: None (Room air) Oxygen Delivery: 2 LPM  Weight: 238 lbs 8.6 oz    Constitutional: no apparent distress, lethargu  Respiratory: CTABL without w/c  Cardiovascular: RRR without m/r/g. 1+ edema  GI: soft, nontender, Obese, no HSM  Neurologic: unable to fully assess. Is moving his R side and generally following commands. Dense paresis on L  Psychiatric: mood and affect wnl    Data   Recent Labs   Lab 09/13/20  0828 09/13/20  0127 09/12/20  0543  09/10/20  0610  09/09/20  0556  09/08/20  0708 09/07/20  2355 09/07/20  1831   WBC  --   --   --   --  8.8  --  9.0  --  9.7  --  11.2*   HGB  --   --   --   --  12.4*  --  13.3  --  13.6  --  14.5   MCV  --   --   --   --  92  --  91  --  90  --  91     --   --   --  189  --  181  --  189  --  219   INR  --   --   --   --   --   --   --   --   --   --  1.03   * 148* 152*   < > 149*   < > 140   < > 139  --  136   POTASSIUM  --   --   --   --  3.6  --  3.5  --  3.6  --  3.9   CHLORIDE  --   --   --   --  119*  --  111*  --  107  --  103   CO2  --   --   --   --  25  --  25  --  26  --  25   BUN  --   --   --   --  14  --  9  --  13  --  13   CR  --   --   --   --  0.85  --  0.79  --  0.85  --  0.85   ANIONGAP  --   --   --   --  5  --  4  --  6  --  8   SELENA  --   --   --   --  8.0*  --  7.7*  --  8.4*  --  8.9   GLC  --   --   --   --  256*  --  178*  --  162*   --  190*   ALBUMIN  --   --   --   --   --   --   --   --   --  3.6  --    PROTTOTAL  --   --   --   --   --   --   --   --   --  7.4  --    BILITOTAL  --   --   --   --   --   --   --   --   --  0.9  --    ALKPHOS  --   --   --   --   --   --   --   --   --  67  --    ALT  --   --   --   --   --   --   --   --   --  28  --    AST  --   --   --   --   --   --   --   --   --  23  --    TROPI  --   --   --   --   --   --   --   --  <0.015 <0.015 <0.015    < > = values in this interval not displayed.     No results found for this or any previous visit (from the past 24 hour(s)).  Medications     - MEDICATION INSTRUCTIONS -         amLODIPine  20 mg Oral Daily     aspirin  81 mg Oral or Feeding Tube Daily    Or     aspirin  75 mg Rectal Daily     atorvastatin  40 mg Oral Daily     ezetimibe  10 mg Oral Daily     heparin ANTICOAGULANT  7,500 Units Subcutaneous Q12H     hydrALAZINE  25 mg Oral Q8H TORI     insulin aspart  1-10 Units Subcutaneous TID AC     insulin aspart  1-7 Units Subcutaneous At Bedtime     insulin glargine  16 Units Subcutaneous BID     levothyroxine  50 mcg Oral QAM AC     metoprolol tartrate  50 mg Oral BID     sacubitril-valsartan  1 tablet Oral BID     sodium chloride (PF)  10 mL Intracatheter Q8H     sodium chloride (PF)  10 mL Intracatheter Q8H     valsartan  320 mg Oral Daily

## 2020-09-13 NOTE — PROVIDER NOTIFICATION
"Paged Katie RONDON, \"Attempted to page providerx2 before 1800 with no response. No documented BM since admission. Son said he hasn't had one. Could I get an order for a suppository? thanks \"  "

## 2020-09-13 NOTE — PLAN OF CARE
Discharge Planner PT     Large right ischemic ICA & MCA/M1 stroke.    Patient plan for discharge: Not stated.    Current status:   AM: Total assist supine to/from sitting to EOBed on right as pt resisting movement (strongly) despite cues, sitting balance maximum assist initially improving to min assist with instances of standby assist with biofeedback and use of chair armrest in front of his right hand, weaning right upper extremity assist & integrating gaze stabilization into his left field towards the end. Left lower extremity motor control re-ed in supine with leg over EOBed to facilitate recruitment via GTO with >= 3/5 strength DF, < 3/5 knee ext and adduction.  Finished with alarm armed, son edu to have HOBed up if having pt drink fluid to limit risk of aspiration.    PM: First BID PT rx. Supine-sit Max-total assist to left side of the bed with heavy guidance/facilitation given confusion. Now leaning right with standby assist-min assist sitting balance with attention to left visual field again, considered standing trial with Gretchen Steady & Ax2 but left knee ext MMT 1/5 this afternoon with pt fatigue with eyes closed part of session - held for pt & staff safety. Sit-supine Total assist with pt resisting, pt unable to lift left leg onto bed with less motor recruitment this afternoon (DF only), supine scoot total assist.    Barriers to return to prior living situation: Unsafe: Lives alone, Heavy assist of 2-3, L sided weakness, Fall risk     Recommendations for discharge: Acute rehab    Rationale for [place]s: Good rehab candidate. Strong gains, motivated, supportive family. Pt will benefit from continued skilled PT intervention on a full rehab team in order to progress independence and safety with mobility. Pt was independent at baseline. Anticipate pt will be able to tolerate 3 hours of therapy/day at time of discharge.          Entered by: Brittany Dressler 09/13/2020 1:00 PM

## 2020-09-13 NOTE — PLAN OF CARE
Nursing shift note  Pt here with stroke. A&Ox3, d/o time. Neuros intact ex L facial droop, L field cut, on tracks midline to right, L neglect, LUE/LLE hemiplegia & absent sensation, L tongue deviation, & slurred/garbled SLP. VSS ex HTN within parameters (SBP<160). Tele NSR. DD1 diet, NTL liquids. Takes pills through [place]. Had aspiration episode today when pt drank coffee by himself--MD & SLP aware. Tolerated lunch with total assistance via spoon with no coughing episodes. Albarado patent--plan to remove per neuro. BS hypoactive, -BM--plan for bowel meds this afternoon. Abdomen soft/nontender. L groin site WNL--dressing removed. Up with lift & Ax3 with repositioning. Denies pain. Pt scoring green on the Aggression Stop Light Tool. Discharge to ARU when medically cleared.      ADDENDUM [id number]: No changes in neuros. BP borderline with . Remains VSS on RA. Tolerated reassessment with SLP therapy no change in diet. Stool softener given. Awaiting orders for suppository. Tylenol given prior to albarado removal per pt request. Plan to ask dietian tomorrow regarding if TF rate could be changed d/t diet.   Behavior & Aggression Tool color: Green      Pt's belongings:   (Belongings from admission day present in pt's room)                09/12/20 2107   Initial Information   Patient Belongings Remaining with Patient cell phone/electronics;glasses;laptop computer;vision aids;other (see comments)  (contact lens, rosary; glassess; sunglasses; glasses case;)       Home medications: (N)

## 2020-09-13 NOTE — PROVIDER NOTIFICATION
"Paged Dr. Merritt, \"Can you please change metoprolol dosing so it can be crushed? Currently 24hr tab. Also pt BG 350s. been in the 300s overnight. Do you want to adjust insulin needs. Paged dietary if they want to adjust TF since he has a diet.\"    ADDENDUM: New metoprolol & insulin orders placed  "

## 2020-09-13 NOTE — PLAN OF CARE
Pt more awake and able to follow some commands. Slow and slurred with speech. Right facial droop present. Left sided neglect.  See Epic for stroke assessment. VSS, SBP goal is <160. NSR with R BBB. Pt tolerate  DD3 thickened liquids well. Insulin sliding scale.  NA within goal, still Q 2 H recheck. Hayes in place, patent with adequate output. Was repositioning Q 2 H. Order for transfer to step down unit place. Rapport to unit nurse Jose given. At  2030 pt was transfer to 73 room 13.

## 2020-09-13 NOTE — PROVIDER NOTIFICATION
"Paged Dr. Merritt, \"Pt has no recorded BM since admission. Could you please order suppository PRN?\"    ADDENDUM 9411: No response. Repaged.     No response.  "

## 2020-09-13 NOTE — PLAN OF CARE
Discharge Planner OT   Patient plan for discharge: Per son, is ARU  Current status: Pt able to reposition in bed to midline with Max A of 1 today, able to A therapist with rolling in both directions. Pt carried over teaching from yesterday (SROM), and was able to expand SROM techniques today. He also demonstrated some visual tracking across midline to the left, as well as able to maintain some brief convergence at midline.   Barriers to return to prior living situation:  Significant L inattention, current level of assist, lift dependent, impaired activity tolerance, impaired cognition and command following  Recommendations for discharge: ARU  Rationale for [place]s: Pt is significantly below baseline post stroke. He is motivated and would tolerate 3+ hours of rehab, is currently appropriate for PT/OT/SLP. He has a supportive family who is involved in his care.       Entered by: Chetna Bran 09/13/2020 9:07 AM

## 2020-09-14 ENCOUNTER — APPOINTMENT (OUTPATIENT)
Dept: OCCUPATIONAL THERAPY | Facility: CLINIC | Age: 70
DRG: 064 | End: 2020-09-14
Attending: HOSPITALIST
Payer: COMMERCIAL

## 2020-09-14 ENCOUNTER — APPOINTMENT (OUTPATIENT)
Dept: PHYSICAL THERAPY | Facility: CLINIC | Age: 70
DRG: 064 | End: 2020-09-14
Attending: HOSPITALIST
Payer: COMMERCIAL

## 2020-09-14 ENCOUNTER — APPOINTMENT (OUTPATIENT)
Dept: SPEECH THERAPY | Facility: CLINIC | Age: 70
DRG: 064 | End: 2020-09-14
Attending: HOSPITALIST
Payer: COMMERCIAL

## 2020-09-14 LAB
ERYTHROCYTE [DISTWIDTH] IN BLOOD BY AUTOMATED COUNT: 12.8 % (ref 10–15)
ERYTHROCYTE [DISTWIDTH] IN BLOOD BY AUTOMATED COUNT: NORMAL % (ref 10–15)
GLUCOSE BLDC GLUCOMTR-MCNC: 103 MG/DL (ref 70–99)
GLUCOSE BLDC GLUCOMTR-MCNC: 108 MG/DL (ref 70–99)
GLUCOSE BLDC GLUCOMTR-MCNC: 109 MG/DL (ref 70–99)
GLUCOSE BLDC GLUCOMTR-MCNC: 116 MG/DL (ref 70–99)
GLUCOSE BLDC GLUCOMTR-MCNC: 125 MG/DL (ref 70–99)
GLUCOSE BLDC GLUCOMTR-MCNC: 175 MG/DL (ref 70–99)
GLUCOSE BLDC GLUCOMTR-MCNC: 208 MG/DL (ref 70–99)
GLUCOSE BLDC GLUCOMTR-MCNC: 242 MG/DL (ref 70–99)
GLUCOSE BLDC GLUCOMTR-MCNC: 250 MG/DL (ref 70–99)
GLUCOSE BLDC GLUCOMTR-MCNC: 268 MG/DL (ref 70–99)
GLUCOSE BLDC GLUCOMTR-MCNC: 292 MG/DL (ref 70–99)
GLUCOSE BLDC GLUCOMTR-MCNC: 293 MG/DL (ref 70–99)
GLUCOSE BLDC GLUCOMTR-MCNC: 294 MG/DL (ref 70–99)
GLUCOSE BLDC GLUCOMTR-MCNC: 298 MG/DL (ref 70–99)
GLUCOSE BLDC GLUCOMTR-MCNC: 320 MG/DL (ref 70–99)
HCT VFR BLD AUTO: 38.5 % (ref 40–53)
HCT VFR BLD AUTO: NORMAL % (ref 40–53)
HGB BLD-MCNC: 12.4 G/DL (ref 13.3–17.7)
HGB BLD-MCNC: NORMAL G/DL (ref 13.3–17.7)
MAGNESIUM SERPL-MCNC: 2.7 MG/DL (ref 1.6–2.3)
MCH RBC QN AUTO: 30.8 PG (ref 26.5–33)
MCH RBC QN AUTO: NORMAL PG (ref 26.5–33)
MCHC RBC AUTO-ENTMCNC: 32.2 G/DL (ref 31.5–36.5)
MCHC RBC AUTO-ENTMCNC: NORMAL G/DL (ref 31.5–36.5)
MCV RBC AUTO: 96 FL (ref 78–100)
MCV RBC AUTO: NORMAL FL (ref 78–100)
PHOSPHATE SERPL-MCNC: 3.7 MG/DL (ref 2.5–4.5)
PLATELET # BLD AUTO: 192 10E9/L (ref 150–450)
PLATELET # BLD AUTO: NORMAL 10E9/L (ref 150–450)
POTASSIUM SERPL-SCNC: 4.6 MMOL/L (ref 3.4–5.3)
RBC # BLD AUTO: 4.02 10E12/L (ref 4.4–5.9)
RBC # BLD AUTO: NORMAL 10E12/L (ref 4.4–5.9)
SODIUM SERPL-SCNC: 146 MMOL/L (ref 133–144)
WBC # BLD AUTO: 9.1 10E9/L (ref 4–11)
WBC # BLD AUTO: NORMAL 10E9/L (ref 4–11)

## 2020-09-14 PROCEDURE — 99232 SBSQ HOSP IP/OBS MODERATE 35: CPT | Performed by: INTERNAL MEDICINE

## 2020-09-14 PROCEDURE — 25000132 ZZH RX MED GY IP 250 OP 250 PS 637: Performed by: PHYSICIAN ASSISTANT

## 2020-09-14 PROCEDURE — 84295 ASSAY OF SERUM SODIUM: CPT | Performed by: INTERNAL MEDICINE

## 2020-09-14 PROCEDURE — 12000000 ZZH R&B MED SURG/OB

## 2020-09-14 PROCEDURE — 25000132 ZZH RX MED GY IP 250 OP 250 PS 637: Performed by: STUDENT IN AN ORGANIZED HEALTH CARE EDUCATION/TRAINING PROGRAM

## 2020-09-14 PROCEDURE — 99232 SBSQ HOSP IP/OBS MODERATE 35: CPT | Mod: GC | Performed by: PSYCHIATRY & NEUROLOGY

## 2020-09-14 PROCEDURE — 25000131 ZZH RX MED GY IP 250 OP 636 PS 637: Performed by: INTERNAL MEDICINE

## 2020-09-14 PROCEDURE — 25000128 H RX IP 250 OP 636: Performed by: INTERNAL MEDICINE

## 2020-09-14 PROCEDURE — 00000146 ZZHCL STATISTIC GLUCOSE BY METER IP

## 2020-09-14 PROCEDURE — 97530 THERAPEUTIC ACTIVITIES: CPT | Mod: GO | Performed by: OCCUPATIONAL THERAPIST

## 2020-09-14 PROCEDURE — 40000239 ZZH STATISTIC VAT ROUNDS

## 2020-09-14 PROCEDURE — 97112 NEUROMUSCULAR REEDUCATION: CPT | Mod: GP

## 2020-09-14 PROCEDURE — 92526 ORAL FUNCTION THERAPY: CPT | Mod: GN | Performed by: SPEECH-LANGUAGE PATHOLOGIST

## 2020-09-14 PROCEDURE — 97535 SELF CARE MNGMENT TRAINING: CPT | Mod: GO | Performed by: OCCUPATIONAL THERAPIST

## 2020-09-14 PROCEDURE — 85027 COMPLETE CBC AUTOMATED: CPT | Performed by: INTERNAL MEDICINE

## 2020-09-14 PROCEDURE — 83735 ASSAY OF MAGNESIUM: CPT | Performed by: INTERNAL MEDICINE

## 2020-09-14 PROCEDURE — 25000125 ZZHC RX 250: Performed by: INTERNAL MEDICINE

## 2020-09-14 PROCEDURE — 97530 THERAPEUTIC ACTIVITIES: CPT | Mod: GP

## 2020-09-14 PROCEDURE — 27210429 ZZH NUTRITION PRODUCT INTERMEDIATE LITER

## 2020-09-14 PROCEDURE — 84132 ASSAY OF SERUM POTASSIUM: CPT | Performed by: INTERNAL MEDICINE

## 2020-09-14 PROCEDURE — 25000132 ZZH RX MED GY IP 250 OP 250 PS 637

## 2020-09-14 PROCEDURE — 25000131 ZZH RX MED GY IP 250 OP 636 PS 637: Performed by: STUDENT IN AN ORGANIZED HEALTH CARE EDUCATION/TRAINING PROGRAM

## 2020-09-14 PROCEDURE — 25800030 ZZH RX IP 258 OP 636: Performed by: INTERNAL MEDICINE

## 2020-09-14 PROCEDURE — 25000128 H RX IP 250 OP 636: Performed by: STUDENT IN AN ORGANIZED HEALTH CARE EDUCATION/TRAINING PROGRAM

## 2020-09-14 PROCEDURE — 36593 DECLOT VASCULAR DEVICE: CPT

## 2020-09-14 PROCEDURE — 84100 ASSAY OF PHOSPHORUS: CPT | Performed by: INTERNAL MEDICINE

## 2020-09-14 RX ORDER — DEXTROSE MONOHYDRATE 100 MG/ML
INJECTION, SOLUTION INTRAVENOUS CONTINUOUS PRN
Status: DISCONTINUED | OUTPATIENT
Start: 2020-09-14 | End: 2020-09-16

## 2020-09-14 RX ORDER — DEXTROSE MONOHYDRATE 25 G/50ML
25-50 INJECTION, SOLUTION INTRAVENOUS
Status: DISCONTINUED | OUTPATIENT
Start: 2020-09-14 | End: 2020-09-16

## 2020-09-14 RX ORDER — LIDOCAINE HYDROCHLORIDE 20 MG/ML
JELLY TOPICAL ONCE
Status: COMPLETED | OUTPATIENT
Start: 2020-09-14 | End: 2020-09-14

## 2020-09-14 RX ORDER — LIDOCAINE HYDROCHLORIDE 20 MG/ML
JELLY TOPICAL ONCE
Status: DISCONTINUED | OUTPATIENT
Start: 2020-09-14 | End: 2020-09-19 | Stop reason: CLARIF

## 2020-09-14 RX ORDER — NICOTINE POLACRILEX 4 MG
15-30 LOZENGE BUCCAL
Status: DISCONTINUED | OUTPATIENT
Start: 2020-09-14 | End: 2020-09-16

## 2020-09-14 RX ORDER — SODIUM CHLORIDE 9 MG/ML
INJECTION, SOLUTION INTRAVENOUS CONTINUOUS
Status: DISCONTINUED | OUTPATIENT
Start: 2020-09-14 | End: 2020-09-18

## 2020-09-14 RX ORDER — WATER 10 ML/10ML
INJECTION INTRAMUSCULAR; INTRAVENOUS; SUBCUTANEOUS
Status: DISPENSED
Start: 2020-09-14 | End: 2020-09-15

## 2020-09-14 RX ORDER — TAMSULOSIN HYDROCHLORIDE 0.4 MG/1
0.4 CAPSULE ORAL DAILY
Status: DISCONTINUED | OUTPATIENT
Start: 2020-09-14 | End: 2020-09-15

## 2020-09-14 RX ADMIN — BISACODYL 10 MG: 10 SUPPOSITORY RECTAL at 00:11

## 2020-09-14 RX ADMIN — ATORVASTATIN CALCIUM 40 MG: 40 TABLET, FILM COATED ORAL at 08:39

## 2020-09-14 RX ADMIN — METOPROLOL TARTRATE 50 MG: 50 TABLET, FILM COATED ORAL at 20:23

## 2020-09-14 RX ADMIN — INSULIN ASPART 8 UNITS: 100 INJECTION, SOLUTION INTRAVENOUS; SUBCUTANEOUS at 08:39

## 2020-09-14 RX ADMIN — LIDOCAINE HYDROCHLORIDE: 20 JELLY TOPICAL at 06:32

## 2020-09-14 RX ADMIN — SODIUM CHLORIDE 12 UNITS/HR: 9 INJECTION, SOLUTION INTRAVENOUS at 14:37

## 2020-09-14 RX ADMIN — HEPARIN SODIUM 7500 UNITS: 10000 INJECTION INTRAVENOUS; SUBCUTANEOUS at 12:24

## 2020-09-14 RX ADMIN — HYDRALAZINE HYDROCHLORIDE 25 MG: 25 TABLET ORAL at 06:08

## 2020-09-14 RX ADMIN — SODIUM CHLORIDE 12 UNITS/HR: 9 INJECTION, SOLUTION INTRAVENOUS at 23:15

## 2020-09-14 RX ADMIN — SODIUM CHLORIDE 8 UNITS/HR: 9 INJECTION, SOLUTION INTRAVENOUS at 12:15

## 2020-09-14 RX ADMIN — METOPROLOL TARTRATE 50 MG: 50 TABLET, FILM COATED ORAL at 08:38

## 2020-09-14 RX ADMIN — EZETIMIBE 10 MG: 10 TABLET ORAL at 08:38

## 2020-09-14 RX ADMIN — INSULIN GLARGINE 16 UNITS: 100 INJECTION, SOLUTION SUBCUTANEOUS at 08:38

## 2020-09-14 RX ADMIN — ALTEPLASE 2 MG: 2.2 INJECTION, POWDER, LYOPHILIZED, FOR SOLUTION INTRAVENOUS at 14:52

## 2020-09-14 RX ADMIN — SODIUM CHLORIDE 12 UNITS/HR: 9 INJECTION, SOLUTION INTRAVENOUS at 14:11

## 2020-09-14 RX ADMIN — ASPIRIN 81 MG 81 MG: 81 TABLET ORAL at 08:38

## 2020-09-14 RX ADMIN — SODIUM CHLORIDE 10 UNITS/HR: 9 INJECTION, SOLUTION INTRAVENOUS at 21:57

## 2020-09-14 RX ADMIN — HEPARIN SODIUM 7500 UNITS: 10000 INJECTION INTRAVENOUS; SUBCUTANEOUS at 00:05

## 2020-09-14 RX ADMIN — SODIUM CHLORIDE 10 UNITS/HR: 9 INJECTION, SOLUTION INTRAVENOUS at 22:41

## 2020-09-14 RX ADMIN — SACUBITRIL AND VALSARTAN 1 TABLET: 49; 51 TABLET, FILM COATED ORAL at 08:39

## 2020-09-14 RX ADMIN — HYDRALAZINE HYDROCHLORIDE 25 MG: 25 TABLET ORAL at 16:30

## 2020-09-14 RX ADMIN — LEVOTHYROXINE SODIUM 50 MCG: 50 TABLET ORAL at 08:39

## 2020-09-14 RX ADMIN — SACUBITRIL AND VALSARTAN 1 TABLET: 49; 51 TABLET, FILM COATED ORAL at 20:23

## 2020-09-14 RX ADMIN — SODIUM CHLORIDE 6 UNITS/HR: 9 INJECTION, SOLUTION INTRAVENOUS at 15:17

## 2020-09-14 RX ADMIN — VALSARTAN 320 MG: 160 TABLET ORAL at 08:38

## 2020-09-14 RX ADMIN — AMLODIPINE BESYLATE 20 MG: 10 TABLET ORAL at 08:38

## 2020-09-14 RX ADMIN — SODIUM CHLORIDE: 9 INJECTION, SOLUTION INTRAVENOUS at 10:55

## 2020-09-14 RX ADMIN — ACETAMINOPHEN 975 MG: 325 TABLET, FILM COATED ORAL at 12:24

## 2020-09-14 RX ADMIN — SODIUM CHLORIDE 3.5 UNITS/HR: 9 INJECTION, SOLUTION INTRAVENOUS at 11:05

## 2020-09-14 RX ADMIN — SODIUM CHLORIDE 10 UNITS/HR: 9 INJECTION, SOLUTION INTRAVENOUS at 13:21

## 2020-09-14 ASSESSMENT — ACTIVITIES OF DAILY LIVING (ADL)
ADLS_ACUITY_SCORE: 24
ADLS_ACUITY_SCORE: 24
ADLS_ACUITY_SCORE: 20
ADLS_ACUITY_SCORE: 19
ADLS_ACUITY_SCORE: 20
ADLS_ACUITY_SCORE: 24

## 2020-09-14 NOTE — PLAN OF CARE
Discharge Planner OT   Patient plan for discharge: Did not verbalize  Current status: Focus on sitting EOB, trunk balance, L neuro re-ed, independence in ADLs.  Patient total assist for ADLs- dressing, toileting.  Attempted standing with co-tx with PT- A x 3-4 with use of kecia steady, A x 3 to return to supine and repositioning.  Barriers to return to prior living situation: L hemiparesis, weakness  Recommendations for discharge: ARU  Rationale for [place]s: Patient would benefit from 3 hours of interdisciplinary therapy for maximum independence in ADLs/mobility.  Patient was independent prior to CVA, has good family support.       Entered by: Bianca Shankar 09/14/2020 10:56 AM

## 2020-09-14 NOTE — PROGRESS NOTES
CLINICAL NUTRITION SERVICES - REASSESSMENT NOTE      RECOMMENDATIONS FOR MD/PROVIDER TO ORDER:   - Consider PRN bowel regimen    Recommendations Ordered by Registered Dietitian (RD):   - Change to cyclic TF tonight   - Will increase fluid flushes via TF while on thickened liquids   - Start calorie counts to assess readiness to wean from TF (would justify discontinuation of TF if able to take ~60% estimated nutrient needs orally)   - Not appropriate for room service status    Malnutrition: (9/8)  % Weight Loss:  Up to 5% in 1 month (non-severe malnutrition)  % Intake:  Unable to determine   Subcutaneous Fat Loss:  None observed  Muscle Loss:  None observed  Fluid Retention:  None noted     Malnutrition Diagnosis: Unable to determine due to inability to obtain a nutrition history        EVALUATION OF PROGRESS TOWARD GOALS   Diet: DD1; NTL per SLP + thickened smoothie w/ all meals     Nutrition Support:  TF started on 9/09 and goal rate achieved later that evening as ordered below ~    Nutrition Support Enteral:  Type of Feeding Tube: NGT   Enteral Frequency:  Continuous  Enteral Regimen: Isosource 1.5 at 60 mL/hr  Total Enteral Provisions:  2160 kcal (27 kcal/kg), 98 g protein (1.2 g/kg), 253 g CHO, 22 g fiber, 1094 mL H2O   Free Water Flush: 30 mL q 4 hrs     Intake/Tolerance:    Sticky note left for MD on 9/11 regarding bowel regimen and increased fluid needs -- not addressed   Suppository requested per RN on 9/13 with good result     Diet advanced on 9/12 and patient has taken % of 2 meals/day per flow sheets   Patient requiring total feeding assistance      Labs reviewed: Na 149 (H), no other new labs --> add-on weekly monitoring q Monday (BMP, Mg, Phos) while on TF     Recent Labs   Lab 09/14/20  0745 09/14/20  0603 09/14/20  0229 09/13/20  2055 09/13/20  1620 09/13/20  1111  09/10/20  0610  09/09/20  0556  09/08/20  0708  09/07/20  1831   GLC  --   --   --   --   --   --   --  256*  --  178*  --  162*  --   190*   * 298* 294* 305* 297* 314*   < >  --    < >  --    < >  --    < >  --     < > = values in this interval not displayed.       ASSESSED NUTRITION NEEDS:  Dosing Weight 78.6 kg (adjusted)  Estimated Energy Needs: 0980-4534 kcals (25-30 Kcal/Kg)  Justification: obese  Estimated Protein Needs:  grams protein (1.2-1.5 g pro/Kg)  Justification: hypercatabolism with acute illness      NEW FINDINGS:   Discharge to ARU when medically ready     Previous Goals:   EN will meet % estimated needs while taking minimal PO intake  Evaluation: Met    Previous Nutrition Diagnosis:   Swallowing Difficulty related to dysphagia from CVA as evidenced by DD1 diet with NTL   Evaluation: No change      CURRENT NUTRITION DIAGNOSIS  Swallowing Difficulty related to dysphagia from CVA as evidenced by DD1 diet with NTL     INTERVENTIONS  Recommendations / Nutrition Prescription  - Change to cyclic TF tonight   - Will increase fluid flushes via TF while on thickened liquids   - Start calorie counts to assess readiness to wean from TF (would justify discontinuation of TF if able to take ~60% estimated nutrient needs orally)   - Not appropriate for room service status     Implementation  EN Schedule, Feeding Tube Flush: as above   Collaboration and Referral of Nutrition care: discussed plan with RN     Goals  Pt will meet % estimated needs via TF  Pt will take at least 50% meals and supplements TID, or at least 60% estimated nutrient needs orally       MONITORING AND EVALUATION:  Progress towards goals will be monitored and evaluated per protocol and Practice Guidelines      Angela Alvarez RD, LD  Clinical Dietitian

## 2020-09-14 NOTE — PROGRESS NOTES
[facility]    Medicine Progress Note - Hospitalist Service       Date of Admission:  9/7/2020  Assessment & Plan       Jorje Teran is a 70 year old male who presents with weakness and slurred speech     CVA  Last baseline known noon on 9/6 (over 24 hours PTA). Son went to check on father afternoon/evening of presentation and he was on ground, unable to move his L side and slurring words. Vitals stable. Labs normal except 11.2. CXR clear.   *CT with filling defect within the distal R ICA extending into the R MCA and occlusion of the R MCA at the M1 segment. CTA with severe plaquing at R carotid bifurcation with filling defect in the prox R ICA suggestive of thrombus contributing to approximately 80% stenosis. 60% stenosis of the proximal L ICA. TTE with bubble -> Normal left ventricular size and function. Left ventricular ejection fraction of 55-60%. No segmental wall motion abnormalities noted.  No shunting at the atrial level on suboptimal Bubble study.  - lipids 7/29/20- LDL 78, HDL 56, , T chol 164  - A1C 7/2020 7.1%   - TSH 7/2020 at 2.05   - given  mg x 1 on arrival to ED  - Head CTs have been stable for the most part.  Plan:  - Can transfer to floor today if SBPs < 160 without nicardipine  - Q4H hour neuro checks starting this evening  - Labetalol as needed for SBP>160  - Neurology following and appreciate their assistance   - ASA 81 mg daily PO/PA  - atorvastatin 40 mg daily when able  - telemetry  - Continue TFs   - PICC placed 09/10 and CVC removed 09/10.  Had some trouble with withdrawing today so Alteplase ordered for both lumens  - PT/OT and bedside swallow, SLP.  Recommending ARU      CAD s/p CABG x 3, x 1 in 2015  HTN  HLD  PTA (needs med rec) amlodipine 20 mg daily, ASA 81 mg daiy, atorvastatin 40 mg daily, Zetia 10 mg daily, furosemide 40 mg BID, metoprolol 25 mg BID, Entresto 49-51 1 tab BID, valsartan 320 mg daily  Follows with Dr. Vincent with [facility]. Some  concern re: medical compliance.  TTE with bubble this admission -> Normal left ventricular size and function. Left ventricular ejection fraction of 55-60%. No segmental wall motion abnormalities noted.  No shunting at the atrial level on suboptimal Bubble study.  Plan:  - PTA Norvasc /Entresto / Metoprolol 50 mg BID and Valsartan  - Hydralazine PO started  - Holding hydrochlorothiazide given Na goals  - PRN labetalol, hydralazine SBP>160    Hypernatremia  Sodiums peaked at 159.  Now down to 146 with free water/tube feeds  - Continue to monitor      DM II  Not on meds for this. Recent A1C at 7.1 7/2020.  BG goal is < 180. BG not currently well controlled.  Blood sugars have been difficult to control with sugars in the 300s while on TF   Plan:  - Switched to insulin drip to see what daily requirements are      LE edema  [facility] at [facility] negative for DVT     SHELDON  Intermittent treatment as outpatient   - hold CPAP for now     Hypothyroidism  PTA (needs med rec) levothyroxine 50 mcg daily  TSH checked 7/29/2020 at 2.05  - Continue PTA Levothyroxine     Morbid obesity  BMI noted  7/202 at ~38. Will need to encourage healthy lifestyle and weight loss with recovery       Diet: Combination Diet Dysphagia Diet Level 1: Pureed; Nectar Thickened Liquids (pre-thickened or use instant food thickener)  Snacks/Supplements Adult: Other; thickened smoothie with meals  (RD); With Meals  Adult Formula Drip Feeding: Continuous Isosource 1.5; Nasogastric tube; Goal Rate: 120; mL/hr; From: 8:00 PM; 8:00 AM; Medication - Feeding Tube Flush Frequency: At least 15-30 mL water before and after medication administration and with tube clog...  Calorie Counts  Room Service    DVT Prophylaxis: Heparin SQ  Hayes Catheter: not present  Code Status: Full Code           Disposition Plan   Expected discharge: 2 - 3 days, recommended to ARU once blood sugars are controlled and accepted to ARU with insurance authorization.  Entered: David Kirk DO  09/14/2020, 12:49 PM       The patient's care was discussed with the Bedside Nurse, Care Coordinator/ and Patient.    David Kirk, DO  Hospitalist Service  [facility]    ______________________________________________________________________    Interval History   Patient seen and examined. No acute events over night.  No fevers or chills.  No pain or difficulty breathing at this time.  Has started dysphagia diet and tolerating this well so far.    Data reviewed today: I reviewed all medications, new labs and imaging results over the last 24 hours. I personally reviewed no images or EKG's today.    Physical Exam   Vital Signs: Temp: 97.9  F (36.6  C) Temp src: Oral BP: (!) 158/87 Pulse: 77   Resp: 16 SpO2: 95 % O2 Device: None (Room air)    Weight: 238 lbs 8.6 oz  General Appearance: Resting comfortably.  NAD  Respiratory: Clear to auscultation.  No respiratory distress  Cardiovascular: RRR.  No murmurs  GI: Bowel sounds present.  Non-tender  Skin: No rashes.  No cyanosis  Other: Alert.  Pleasant      Data   Recent Labs   Lab 09/14/20  1005 09/13/20  0828 09/13/20  0127  09/10/20  0610  09/09/20  0556  09/08/20  0708 09/07/20  2355 09/07/20  1831   WBC Canceled, Test credited, specimen discarded  --   --   --  8.8  --  9.0  --  9.7  --  11.2*   HGB Canceled, Test credited, specimen discarded  --   --   --  12.4*  --  13.3  --  13.6  --  14.5   MCV Canceled, Test credited, specimen discarded  --   --   --  92  --  91  --  90  --  91   PLT Canceled, Test credited, specimen discarded 207  --   --  189  --  181  --  189  --  219   INR  --   --   --   --   --   --   --   --   --   --  1.03   * 149* 148*   < > 149*   < > 140   < > 139  --  136   POTASSIUM 4.6  --   --   --  3.6  --  3.5  --  3.6  --  3.9   CHLORIDE  --   --   --   --  119*  --  111*  --  107  --  103   CO2  --   --   --   --  25  --  25  --  26  --  25   BUN  --   --   --   --  14  --  9  --  13  --  13   CR  --    --   --   --  0.85  --  0.79  --  0.85  --  0.85   ANIONGAP  --   --   --   --  5  --  4  --  6  --  8   SELENA  --   --   --   --  8.0*  --  7.7*  --  8.4*  --  8.9   GLC  --   --   --   --  256*  --  178*  --  162*  --  190*   ALBUMIN  --   --   --   --   --   --   --   --   --  3.6  --    PROTTOTAL  --   --   --   --   --   --   --   --   --  7.4  --    BILITOTAL  --   --   --   --   --   --   --   --   --  0.9  --    ALKPHOS  --   --   --   --   --   --   --   --   --  67  --    ALT  --   --   --   --   --   --   --   --   --  28  --    AST  --   --   --   --   --   --   --   --   --  23  --    TROPI  --   --   --   --   --   --   --   --  <0.015 <0.015 <0.015    < > = values in this interval not displayed.     No results found for this or any previous visit (from the past 24 hour(s)).

## 2020-09-14 NOTE — PROVIDER NOTIFICATION
"Text page sent to hospitalist: \"Pt has been unable to void since albarado was removed yesterday. Do you want to re-order albarado or continue to straight cath PRN? Please advise. Thank you.\"  "

## 2020-09-14 NOTE — PLAN OF CARE
Pt here with stroke. A&Ox4. Neuros intact ex L facial droop, L field cut, on tracks midline to right, L neglect, LUE/LLE hemiplegia & absent sensation, L tongue deviation, & slurred/garbled/slow to respond. VSS ex HTN within parameters (SBP<160)- No PRN's given on this shift. Tele NSR. DD1 diet, Nectar thickened liquids.TF at goal rate of 60 with Q4- 30 ml/hour. Takes pills through [place]. Up with lift & Ax2-3 with repositioning, frequent oral cares. Denies pain. Straight cath'd at 0615: 850 ml. Bladder scan at 1015. Gave suppository- + BM. Talked to son (Nacho) on phone last night- would like to facetime patient via ipad today: OK sharing information per patient- phone number on patient whiteboard in room. Scoring green on the Aggression Stop Light Tool. Discharge to ARU when medically cleared.

## 2020-09-14 NOTE — PLAN OF CARE
Discharge Planner PT     Large right ischemic ICA & MCA/M1 stroke.    Patient plan for discharge: Not stated.    Current status:   AM: Total assist supine to/from sitting to EOBed on right as pt resisting movement given confusion, sitting balance min assist with right rail and cues. Then initiated co-rx with OT for EOB sitting balance while pt washed his face (min assist), 2 sit-stand Max-total assist with Gretchen Steady and Ax3-4 (one at right arm as moves it, one at trunk given left lean, one at hips with sheet for hip ext power assist to stand, one at right foot as pt moves it). Standing tolerance 10-20sec 2 reps with midline cues. Sitting in Gretchen Steady maximum assist with heavy left lean (bar in front less helpful). Finished with alarm armed and RN handoff for eating. PT rec of lift to recliner.    PM: Total assist supine to/from sitting again given confusion, sitting balance min assist with right rail and cues for technique & attention to task, total assist transitions into alternating resting on elbows with pt resisting then pt able to hold position standby assist right with perturbations and contact guard assist left statically with shoulder engaging.     Barriers to return to prior living situation: Unsafe: Lives alone, Heavy assist of 2-3, L sided weakness, Fall risk     Recommendations for discharge: Acute rehab    Rationale for [place]s: Good rehab candidate. Strong gains, motivated, supportive family. Pt will benefit from continued skilled PT intervention on a full rehab team in order to progress independence and safety with mobility. Pt was independent at baseline. Anticipate pt will be able to tolerate 3 hours of therapy/day at time of discharge.          Entered by: Brittany Dressler 09/14/2020 10:14 AM

## 2020-09-14 NOTE — PLAN OF CARE
Discharge Planner SLP   Patient plan for discharge: Did not discuss  Current status: Swallow Tx was provided this pm.  Pt was sleeping initially, but woke up and maintained alertness for swallow Tx.  Pt demonstrated delayed swallows across trials of nectar thick and puree, but no signs of aspiration were noted.  Mod-max assist/cues were provided.  Gradual progress made; however, length of swallow delay impacts safety for upgrades.      Recommend completion of a video swallow study to determine full aspiration risk and safety for upgrades.      Recommend a continued dysphagia diet level 1 and nectar thick liquids by spoon with 1:1 assist/supervision, sit at 90 degrees, only when alert, verify/cue swallows, crush meds and give with puree, hold po if aspiration signs noted/respiratory status declines.    Further results and [place]s to follow.  Will complete a speech-language evaluation as able.  Barriers to return to prior living situation: Dysphagia; ? cognition  Recommendations for discharge: ARC  Rationale for [place]s: Anticipate patient will be able to tolerate 3 hours of therapy at time of discharge. He participates in therapy sessions and has good family support and will need intense interdisciplinary intervention. Dysphagia and speech and language intervention. Patient is significantly below his baseline.           Entered by: Ghazala Reed 09/14/2020 2:40 PM

## 2020-09-14 NOTE — PROGRESS NOTES
[facility]    Stroke Progress Note    Interval Events  Required PRN antiHTNs x3 in the past 24 hours to maintain goal SBP<160. Hyperglycemia worsens, now consistently in 300s. Started on an insulin gtt.    Impression  #R frontal, parietal, and temporal lobe cerebral infarction secondary to R MCA M1 occlusion causing complete R MCA syndrome- stroke mechanism is atheroembolism from high grade R ICA stenosis (80%) with presumed associated thrombus    #Cerebral edema secondary to #1, now post stroke day 8, now beyond the edema window.    Stroke Evaluation summarized:  MRI/Head CT: complete R MCA distribution infarct (progression of midline shift 3mm (9/8) >  4mm (9/9) > 5 mm (9/10) > 5-6 mm in PM (9/10)  Intracranial Vascular Imaging: R M1 occlusion, incidentally noted 3 mm L MCA bifurcation aneurysm, unruptured  Cervical Carotid and Vertebral Artery Vascular Imaging: R ICA stenosis 80%, L ICA stenosis 60%  Echocardiogram: No PFO, preserved LVEF, no RWMA, no appreciable intracardiac thrombus  EKG/Telemetry: NSR  LDL: 82 (7/2020)  A1c: 7.5  Troponin: <0.015  Other testing: Not Applicable    Stroke Education  -Patient/family advised regarding the signs and symptoms of stroke and that immediate presentation to an ED for evaluation is critical.    Plan  - continue to permit Na to drift down slowly, goal is to maintain normonatremia and no greater drop than 10 points in 1 24 hour period, okay to change free water flushes or NS so long as this goal is met  - SBP goal normotension- stroke is completed, no penumbra to salvage. PRNs to give for SBP>160, restart/titrate PTA antihypertensive regimen to achieve decreased dependence on PRNs and eventual long term goal BP <130/80 or lower if tolerated for overall reduced cardiovascular risk   - PTA antihypertensive meds include (amlodipine 10mg, furosemide 20mg, metoprolol 50mg, entresto 49mg, valsartan 320 mg  - continue q4 hour neurochecks  - Avoid hypotonic  "fluid solutions  - Continue aspirin 81 mg daily for secondary stroke prevention  - Continue subQ heparin for DVT ppx  - Continue atorvastatin 40 mg at bedtime  - PT/OT/SLP as tolerated  - Stroke Education  - Depression Screen  - Apnea Screen  - Euthermia, Euglycemia. Blood glucose goal < 180. Defer to hospitalist team for management  - Bedside Glucose Monitoring    Vascular Neurology will sign off at this time. Please do not hesitate to contact us with questions or concerns, should they arise. We will be available to speak with family and check back in as his hospital stay progresses.    The Stroke Staff is Dr. Cano.    Selene Prakash MD  Vascular Neurology Fellow, PGY-5  To page a member of the stroke/neurocritical care service, click here:  [facility]   Choose \"On Call\" tab at top, then search dropdown box for \"Neurology Adult\", select location, press Enter, then look for stroke/neuro ICU/telestroke.    ______________________________________________________    Medications   Home Meds  Prior to Admission medications    Medication Sig Start Date End Date Taking? Authorizing Provider   amLODIPine (NORVASC) 10 MG tablet Take 20 mg by mouth daily  12/26/18  Yes Reported, Patient   aspirin 81 MG EC tablet Take 81 mg by mouth daily   Yes Unknown, Entered By History   atorvastatin (LIPITOR) 80 MG tablet Take 40 mg by mouth daily  2/27/19  Yes Reported, Patient   ezetimibe (ZETIA) 10 MG tablet Take 10 mg by mouth daily  12/6/18  Yes Reported, Patient   furosemide (LASIX) 20 MG tablet TAKE 2 TABLETS BY MOUTH TWICE DAILY, FOR TREATMENT OF BLOOD PRESSURE AND FLUID RETENTION. 3/6/19  Yes Reported, Patient   levothyroxine (SYNTHROID/LEVOTHROID) 50 MCG tablet Take 50 mcg by mouth daily  10/3/18  Yes Reported, Patient   metoprolol succinate ER (TOPROL-XL) 50 MG 24 hr tablet Take 25 mg by mouth 2 times daily  10/24/19  Yes Reported, Patient   multivitamin (CENTRUM SILVER) tablet Take 1 tablet by mouth daily   Yes Unknown, " Entered By History   nitroGLYcerin (NITROSTAT) 0.4 MG sublingual tablet Place 0.4 mg under the tongue every 5 minutes as needed  10/3/18  Yes Reported, Patient   sacubitril-valsartan (ENTRESTO) 49-51 MG per tablet Take 1 tablet by mouth 2 times daily  1/6/19  Yes Reported, Patient   traZODone (DESYREL) 50 MG tablet Take 50 mg by mouth at bedtime as needed, may repeat once  8/25/20  Yes Reported, Patient   valsartan (DIOVAN) 320 MG tablet Take 320 mg by mouth daily  1/4/19  Yes Reported, Patient       Scheduled Meds    alteplase  2 mg Intravenous Once     amLODIPine  20 mg Oral Daily     aspirin  81 mg Oral or Feeding Tube Daily    Or     aspirin  75 mg Rectal Daily     atorvastatin  40 mg Oral Daily     ezetimibe  10 mg Oral Daily     heparin ANTICOAGULANT  7,500 Units Subcutaneous Q12H     hydrALAZINE  25 mg Oral Q8H TORI     levothyroxine  50 mcg Oral QAM AC     lidocaine   Urethral Once     metoprolol tartrate  50 mg Oral BID     sacubitril-valsartan  1 tablet Oral BID     sodium chloride (PF)  10 mL Intracatheter Q8H     sodium chloride (PF)  10 mL Intracatheter Q8H     sterile water (preservative free)         tamsulosin  0.4 mg Oral Daily     valsartan  320 mg Oral Daily       Infusion Meds    dextrose       insulin (regular) 2 Units/hr (09/14/20 1817)     - MEDICATION INSTRUCTIONS -       sodium chloride 10 mL/hr at 09/14/20 1055       PRN Meds  acetaminophen, bacitracin, bisacodyl, artificial tears ophthalmic solution, dextrose, glucose **OR** dextrose **OR** glucagon, hydrALAZINE, labetalol, magnesium sulfate, - MEDICATION INSTRUCTIONS -, miconazole, naloxone, ondansetron **OR** ondansetron, polyethylene glycol, potassium chloride, potassium chloride with lidocaine, potassium chloride, potassium chloride, potassium chloride, potassium phosphate (KPHOS) in D5W IV, potassium phosphate (KPHOS) in D5W IV, potassium phosphate (KPHOS) in D5W IV, potassium phosphate (KPHOS) in D5W IV, sennosides       PHYSICAL  EXAMINATION  Temp:  [97.8  F (36.6  C)-100  F (37.8  C)] 98.6  F (37  C)  Pulse:  [67-78] 70  Resp:  [16-20] 16  BP: (138-176)/(72-90) 148/85  SpO2:  [93 %-95 %] 95 %     General:  Adult male patient, lying in bed in no acute distress. Feeding tube in L nare.    Neurologic  Mental Status: Awake, alert, attentive, oriented to self, time, place, and circumstance. Language is fluent and coherent. Mild visuospatial neglect, no alien limb phenomenon, neglects L with double simultaneous auditory stimuli.  Cranial Nerves: L homonomous hemianopia, PERRL, L horizontal gaze impaired, VOR able to cross midline, L lower facial droop, sensation intact, hearing intact to single stimuli bilaterally, tongue midline, mildly dysarthric, entirely intelligible.   Motor: LUE extension to noxious, LLE: weak non-antigravity withdrawal to noxious, no volitional movement or tone on L   RUE/RLE: 5/5  Reflexes: 3+ LUE, 2+LLE, RUE, RLE, toes mute bilaterally  Sensory: does not detect sensation on LUE, detects sensation LLE, intact on R   Coordination: not tested, no ataxia of observable movements  Station/Gait:  Not tested    Stroke Scales    NIHSS     Interval (daily exam) (09/14/20 1910)   Interval Comments daily exam (09/14/20 1910)   1a. Level of Consciousness 0-->Alert, keenly responsive   1b. LOC Questions 0-->Answers both questions correctly   1c. LOC Commands 0-->Performs both tasks correctly   2.   Best Gaze 1-->Partial gaze palsy, gaze is abnormal in one or both eyes, but forced deviation or total gaze paresis is not present   3.   Visual 2-->Complete hemianopia   4.   Facial Palsy 1-->Minor paralysis (flattened nasolabial fold, asymmetry on smiling)   5a. Motor Arm, Left 3-->No effort against gravity, limb falls   5b. Motor Arm, Right 0-->No drift, limb holds 90 (or 45) degrees for full 10 secs   6a. Motor Leg, Left 3-->No effort against gravity, leg falls to bed immediately   6b. Motor Leg, right 0-->No drift, leg holds 30 degree  position for full 5 secs   7.   Limb Ataxia 0-->Absent   8.   Sensory 1-->Mild-to-moderate sensory loss, patient feels pinprick is less sharp or is dull on the affected side, or there is a loss of superficial pain with pinprick, but patient is aware of being touched   9.   Best Language 0-->No aphasia, normal   10. Dysarthria 1-->Mild-to-moderate dysarthria, patient slurs at least some words and, at worst, can be understood with some difficulty   11. Extinction and Inattention  1-->Visual, tactile, auditory, spatial, or personal inattention or extinction to bilateral simultaneous stimulation in one of the sensory modalities   Total 13 (09/14/20 1910)       Imaging  I personally reviewed all imaging; relevant findings per HPI.     Lab Results Data   CBC  Recent Labs   Lab 09/14/20  1445 09/14/20  1005 09/13/20  0828 09/10/20  0610   WBC 9.1 Canceled, Test credited, specimen discarded  --  8.8   RBC 4.02* Canceled, Test credited, specimen discarded  --  3.93*   HGB 12.4* Canceled, Test credited, specimen discarded  --  12.4*   HCT 38.5* Canceled, Test credited, specimen discarded  --  36.3*    Canceled, Test credited, specimen discarded [id number]     Basic Metabolic Panel    Recent Labs   Lab 09/14/20  1005 09/13/20  0828 09/13/20  0127  09/10/20  0610  09/09/20  0556  09/08/20  0708   * 149* 148*   < > 149*   < > 140   < > 139   POTASSIUM 4.6  --   --   --  3.6  --  3.5  --  3.6   CHLORIDE  --   --   --   --  119*  --  111*  --  107   CO2  --   --   --   --  25  --  25  --  26   BUN  --   --   --   --  14  --  9  --  13   CR  --   --   --   --  0.85  --  0.79  --  0.85   GLC  --   --   --   --  256*  --  178*  --  162*   SELENA  --   --   --   --  8.0*  --  7.7*  --  8.4*    < > = values in this interval not displayed.     Liver Panel  Recent Labs   Lab 09/07/20  2355   PROTTOTAL 7.4   ALBUMIN 3.6   BILITOTAL 0.9   ALKPHOS 67   AST 23   ALT 28     INR    Recent Labs   Lab Test 09/07/20  1831   INR 1.03       Lipid Profile    Recent Labs   Lab Test 09/10/20  1420   CHOL 119   HDL 45   LDL 53   TRIG 105     A1C    Recent Labs   Lab Test 09/10/20  0610   A1C 7.5*     Troponin I    Recent Labs   Lab 09/08/20  0708 09/07/20  2355   TROPI <0.015 <0.015

## 2020-09-14 NOTE — PLAN OF CARE
Pt here with R frontal/temporal CVA. Alert and oriented x3, sleeping between cares. BP elevated in the morning (171/85), WDL after receiving scheduled antihypertensive meds. VS otherwise WDL on room air. Tele NSR. Neuros with L facial droop, L field cut, L neglect, R tongue deviation, insensate on L side of body, and L sided hemiplegia. Pt did move L foot 1x to command. JULIA and LLEs with +1-2 edema. Lung sounds diminshed/clear. Incontinent of bowel and bladder. Initially appeared unable to void, but now incontinent with post void bladder scans < 300 ml. Tube feeds changed to overnights. Tolerating DD1 diet with nectar thick liquids, requires supervision with meals. Repositioned q 2 hrs. BG elevated - started on insulin gtt. BGs now trending down. Plan to discharge to ARU pending better BG and BP control.

## 2020-09-15 ENCOUNTER — APPOINTMENT (OUTPATIENT)
Dept: GENERAL RADIOLOGY | Facility: CLINIC | Age: 70
DRG: 064 | End: 2020-09-15
Attending: INTERNAL MEDICINE
Payer: COMMERCIAL

## 2020-09-15 ENCOUNTER — APPOINTMENT (OUTPATIENT)
Dept: PHYSICAL THERAPY | Facility: CLINIC | Age: 70
DRG: 064 | End: 2020-09-15
Attending: HOSPITALIST
Payer: COMMERCIAL

## 2020-09-15 ENCOUNTER — APPOINTMENT (OUTPATIENT)
Dept: SPEECH THERAPY | Facility: CLINIC | Age: 70
DRG: 064 | End: 2020-09-15
Attending: HOSPITALIST
Payer: COMMERCIAL

## 2020-09-15 LAB
GLUCOSE BLDC GLUCOMTR-MCNC: 120 MG/DL (ref 70–99)
GLUCOSE BLDC GLUCOMTR-MCNC: 121 MG/DL (ref 70–99)
GLUCOSE BLDC GLUCOMTR-MCNC: 124 MG/DL (ref 70–99)
GLUCOSE BLDC GLUCOMTR-MCNC: 131 MG/DL (ref 70–99)
GLUCOSE BLDC GLUCOMTR-MCNC: 143 MG/DL (ref 70–99)
GLUCOSE BLDC GLUCOMTR-MCNC: 146 MG/DL (ref 70–99)
GLUCOSE BLDC GLUCOMTR-MCNC: 149 MG/DL (ref 70–99)
GLUCOSE BLDC GLUCOMTR-MCNC: 153 MG/DL (ref 70–99)
GLUCOSE BLDC GLUCOMTR-MCNC: 164 MG/DL (ref 70–99)
GLUCOSE BLDC GLUCOMTR-MCNC: 170 MG/DL (ref 70–99)
GLUCOSE BLDC GLUCOMTR-MCNC: 172 MG/DL (ref 70–99)
GLUCOSE BLDC GLUCOMTR-MCNC: 177 MG/DL (ref 70–99)
GLUCOSE BLDC GLUCOMTR-MCNC: 180 MG/DL (ref 70–99)
GLUCOSE BLDC GLUCOMTR-MCNC: 189 MG/DL (ref 70–99)
GLUCOSE BLDC GLUCOMTR-MCNC: 189 MG/DL (ref 70–99)
GLUCOSE BLDC GLUCOMTR-MCNC: 190 MG/DL (ref 70–99)
GLUCOSE BLDC GLUCOMTR-MCNC: 204 MG/DL (ref 70–99)
GLUCOSE BLDC GLUCOMTR-MCNC: 211 MG/DL (ref 70–99)
GLUCOSE BLDC GLUCOMTR-MCNC: 211 MG/DL (ref 70–99)
GLUCOSE BLDC GLUCOMTR-MCNC: 221 MG/DL (ref 70–99)
GLUCOSE BLDC GLUCOMTR-MCNC: 224 MG/DL (ref 70–99)
GLUCOSE BLDC GLUCOMTR-MCNC: 242 MG/DL (ref 70–99)
GLUCOSE BLDC GLUCOMTR-MCNC: 245 MG/DL (ref 70–99)
GLUCOSE BLDC GLUCOMTR-MCNC: 257 MG/DL (ref 70–99)
INTERPRETATION ECG - MUSE: NORMAL

## 2020-09-15 PROCEDURE — 25000132 ZZH RX MED GY IP 250 OP 250 PS 637: Performed by: INTERNAL MEDICINE

## 2020-09-15 PROCEDURE — 27210429 ZZH NUTRITION PRODUCT INTERMEDIATE LITER

## 2020-09-15 PROCEDURE — 74230 X-RAY XM SWLNG FUNCJ C+: CPT

## 2020-09-15 PROCEDURE — 25000132 ZZH RX MED GY IP 250 OP 250 PS 637

## 2020-09-15 PROCEDURE — 12000000 ZZH R&B MED SURG/OB

## 2020-09-15 PROCEDURE — 00000146 ZZHCL STATISTIC GLUCOSE BY METER IP

## 2020-09-15 PROCEDURE — 25000132 ZZH RX MED GY IP 250 OP 250 PS 637: Performed by: STUDENT IN AN ORGANIZED HEALTH CARE EDUCATION/TRAINING PROGRAM

## 2020-09-15 PROCEDURE — 25800030 ZZH RX IP 258 OP 636: Performed by: INTERNAL MEDICINE

## 2020-09-15 PROCEDURE — 25000131 ZZH RX MED GY IP 250 OP 636 PS 637: Performed by: INTERNAL MEDICINE

## 2020-09-15 PROCEDURE — 92526 ORAL FUNCTION THERAPY: CPT | Mod: GN | Performed by: SPEECH-LANGUAGE PATHOLOGIST

## 2020-09-15 PROCEDURE — 97530 THERAPEUTIC ACTIVITIES: CPT | Mod: GP | Performed by: PHYSICAL THERAPIST

## 2020-09-15 PROCEDURE — 40000239 ZZH STATISTIC VAT ROUNDS

## 2020-09-15 PROCEDURE — 25000128 H RX IP 250 OP 636: Performed by: STUDENT IN AN ORGANIZED HEALTH CARE EDUCATION/TRAINING PROGRAM

## 2020-09-15 PROCEDURE — 99231 SBSQ HOSP IP/OBS SF/LOW 25: CPT | Performed by: INTERNAL MEDICINE

## 2020-09-15 PROCEDURE — 97112 NEUROMUSCULAR REEDUCATION: CPT | Mod: GP | Performed by: PHYSICAL THERAPIST

## 2020-09-15 PROCEDURE — 92611 MOTION FLUOROSCOPY/SWALLOW: CPT | Mod: GN | Performed by: SPEECH-LANGUAGE PATHOLOGIST

## 2020-09-15 RX ORDER — BARIUM SULFATE 400 MG/ML
SUSPENSION ORAL ONCE
Status: COMPLETED | OUTPATIENT
Start: 2020-09-15 | End: 2020-09-15

## 2020-09-15 RX ADMIN — SODIUM CHLORIDE 12 UNITS/HR: 9 INJECTION, SOLUTION INTRAVENOUS at 06:40

## 2020-09-15 RX ADMIN — HYDRALAZINE HYDROCHLORIDE 25 MG: 25 TABLET ORAL at 00:21

## 2020-09-15 RX ADMIN — SODIUM CHLORIDE 6 UNITS/HR: 9 INJECTION, SOLUTION INTRAVENOUS at 16:11

## 2020-09-15 RX ADMIN — HYDRALAZINE HYDROCHLORIDE 25 MG: 25 TABLET ORAL at 17:30

## 2020-09-15 RX ADMIN — SODIUM CHLORIDE 14 UNITS/HR: 9 INJECTION, SOLUTION INTRAVENOUS at 01:31

## 2020-09-15 RX ADMIN — ASPIRIN 81 MG 81 MG: 81 TABLET ORAL at 08:42

## 2020-09-15 RX ADMIN — LEVOTHYROXINE SODIUM 50 MCG: 50 TABLET ORAL at 08:42

## 2020-09-15 RX ADMIN — METOPROLOL TARTRATE 50 MG: 50 TABLET, FILM COATED ORAL at 21:14

## 2020-09-15 RX ADMIN — SODIUM CHLORIDE 14 UNITS/HR: 9 INJECTION, SOLUTION INTRAVENOUS at 04:01

## 2020-09-15 RX ADMIN — HYDRALAZINE HYDROCHLORIDE 25 MG: 25 TABLET ORAL at 08:45

## 2020-09-15 RX ADMIN — HEPARIN SODIUM 7500 UNITS: 10000 INJECTION INTRAVENOUS; SUBCUTANEOUS at 00:25

## 2020-09-15 RX ADMIN — SODIUM CHLORIDE 8 UNITS/HR: 9 INJECTION, SOLUTION INTRAVENOUS at 14:05

## 2020-09-15 RX ADMIN — MICONAZOLE NITRATE: 20 POWDER TOPICAL at 08:46

## 2020-09-15 RX ADMIN — ATORVASTATIN CALCIUM 40 MG: 40 TABLET, FILM COATED ORAL at 08:45

## 2020-09-15 RX ADMIN — HEPARIN SODIUM 7500 UNITS: 10000 INJECTION INTRAVENOUS; SUBCUTANEOUS at 12:16

## 2020-09-15 RX ADMIN — SODIUM CHLORIDE 4 UNITS/HR: 9 INJECTION, SOLUTION INTRAVENOUS at 17:06

## 2020-09-15 RX ADMIN — METOPROLOL TARTRATE 50 MG: 50 TABLET, FILM COATED ORAL at 08:45

## 2020-09-15 RX ADMIN — SODIUM CHLORIDE 8 UNITS/HR: 9 INJECTION, SOLUTION INTRAVENOUS at 22:30

## 2020-09-15 RX ADMIN — VALSARTAN 320 MG: 160 TABLET ORAL at 08:45

## 2020-09-15 RX ADMIN — EZETIMIBE 10 MG: 10 TABLET ORAL at 08:43

## 2020-09-15 RX ADMIN — ACETAMINOPHEN 975 MG: 325 TABLET, FILM COATED ORAL at 19:32

## 2020-09-15 RX ADMIN — SACUBITRIL AND VALSARTAN 1 TABLET: 49; 51 TABLET, FILM COATED ORAL at 21:14

## 2020-09-15 RX ADMIN — AMLODIPINE BESYLATE 20 MG: 10 TABLET ORAL at 08:45

## 2020-09-15 RX ADMIN — SODIUM CHLORIDE 2 UNITS/HR: 9 INJECTION, SOLUTION INTRAVENOUS at 10:01

## 2020-09-15 RX ADMIN — BARIUM SULFATE 20 ML: 400 SUSPENSION ORAL at 09:47

## 2020-09-15 RX ADMIN — SACUBITRIL AND VALSARTAN 1 TABLET: 49; 51 TABLET, FILM COATED ORAL at 08:45

## 2020-09-15 ASSESSMENT — ACTIVITIES OF DAILY LIVING (ADL)
ADLS_ACUITY_SCORE: 24

## 2020-09-15 NOTE — PLAN OF CARE
Discharge Planner SLP   Patient plan for discharge: Not addressed.   Current status: Video swallow study completed. Patient presents with moderate to severe oral and pharyngeal dysphagia secondary to a large right MCA stroke, characterized by poor bolus control with premature entry of thin liquids to the pyriform sinuses, no epiglottic inversion and delayed swallow response. Delayed moderately deep laryngeal penetration and moderate vallecular pyriform sinus residue. First swallow of nectar thick liquids by the spoon was incomplete. Following swallows there was moderate vallecular and pyriform sinus residue and no epiglottic residue. Oral and increased pharyngeal residue with pudding texture without penetration. Patient is at a moderately high risk for aspiration given delayed swallow, no epilgottic inversion and oral/pharyngeal residue.     Recommend: Downgrade diet to a full liquid diet and continue with nectar thick liquids by the spoon. Feed only when fully alert, liquids by spoon, double swallow or alternate liquids/solids. Hold diet if Sx of aspiration present or changes in his respiratory status.   Barriers to return to prior living situation: Dysphagia and cognition.   Recommendations for discharge: ARC  Rationale for [place]s: Patient will need continued ST needs for dysphagia management for diet tolerance and advancement. Repeat video swallow study when indicated. Patient continues to make progress towards stated goals, and will be able to tolerate 3 hours of therapy at time of discharge. Patient is well below his baseline.        Entered by: Jaky Egan 09/15/2020 10:11 AM

## 2020-09-15 NOTE — PROVIDER NOTIFICATION
"Text page sent to hospitalist: \"FYI pt currently receiving 13 units/hr insulin on insulin pump. BG was well controlled when pump was off during the day, high again NOC during TF infusion. Is there a more appropriate TF product for pt with DM? Please advise. Thank you.\"  "

## 2020-09-15 NOTE — PLAN OF CARE
Pt here with R MCA CVA. A&Ox3, disoriented to time. Neuros with slurred speech, L field cut, L visual neglect, L hemiplegia, absent L sensation, does not track Left. VSS on RA. Tele SR with PACs.  TF infusing at 120 mL/hr- stop at 0800, otherwise NPO due to cough. Up with A2-3 lift. Turn and reposition. BG elevated- insulin drip infusing and titrated as needed, 0615 blood sugar 211. Denies pain. Pt scoring green on the Aggression Stop Light Tool. PICC to RUE. Incontinent of urine and stool. Multiple loose stools overnight. Plan for discharge to ARU when stable.

## 2020-09-15 NOTE — PROGRESS NOTES
09/15/20 0954   General Information   Onset Date 09/07/20   Start of Care Date 09/15/20   Referring Physician Dr. Kirk   Patient Profile Review/OT: Additional Occupational Profile Info See Profile for full history and prior level of function   Patient/Family Goals Statement Patient did not state.    Swallowing Evaluation Videofluoroscopic evaluation   Behaviorial Observations Confused;Distractible;Lethargic   Mode of current nutrition Oral diet   Type of oral diet Dysphagia diet level 1;Nectar - thick liquid   Respiratory Status Room air   Comments Per MD note: Jorje Teran is a 70 year old male who presents with weakness and dysarthria.  He has a medical history markable for coronary disease, hypertension and diabetes.  He was last seen at is normal at noon the day prior to presentation.  The son reports that generally they hear from his father on a regular basis by phone.  However, the day of presentation nobody had heard from them.  His son went to check on him and found him on the ground.  He was not able to move his left side.  He also was slurring his words and could not talk.   CT with filling defect within the distal R ICA extending into the R MCA and occlusion of the R MCA at the M1 segment.   VFSS Eval: Radiology   Radiologist Dr. Ureña   Views Taken left lateral   Physical Location of Procedure [facility]   VFSS Eval: Thin Liquid Texture Trial   Mode of Presentation, Thin Liquid cup;spoon;self-fed;fed by clinician   Order of Presentation 5 6   Preparatory Phase Poor bolus control   Oral Phase, Thin Liquid Poor AP movement;Residue in oral cavity;Premature pharyngeal entry   Pharyngeal Phase, Thin Liquid Delayed swallow reflex;Residue in valleculae;Residue in pyriform sinus   Rosenbek's Penetration Aspiration Scale: Thin Liquid Trial Results 3 - contrast remains above the vocal cords, visible residue remains (penetration)   Diagnostic Statement Delayed moderately deep penetration of the vestibule.    VFSS  Eval: Nectar Thick Liquid Texture Trial   Mode of Presentation, Nectar cup;spoon;fed by clinician;self-fed   Order of Presentation 1 2 3 4 8   Preparatory Phase Poor bolus control   Oral Phase, [place] Poor AP movement;Residue in oral cavity;Premature pharyngeal entry   Pharyngeal Phase, Nectar Delayed swallow reflex;Residue in valleculae;Residue in pyriform sinus   Rosenbek's Penetration Aspiration Scale: Nectar-Thick Liquid Trial Results 2 - contrast enters airway, remains above the vocal cords, no residue remains (penetration)   Diagnostic Statement Penetration via the cup after a pudding texture with residue. No definite penetration of nectar.    VFSS Eval: Puree Solid Texture Trial   Mode of Presentation, Puree spoon;self-fed   Order of Presentation 7   Preparatory Phase Poor bolus control   Oral Phase, Puree Poor AP movement;Residue in oral cavity;Premature pharyngeal entry   Pharyngeal Phase, Puree Delayed swallow reflex;Residue in valleculae;Residue in pyriform sinus   Rosenbek's Penetration Aspiration Scale: Puree Food Trial Results 1 - no aspiration, contrast does not enter airway   Diagnostic Statement Moderate oral, vallecular and pyriform sinus residue.    General Therapy Interventions   Planned Therapy Interventions Dysphagia Treatment   Dysphagia treatment Oropharyngeal exercise training;Modified diet education;Instruction of safe swallow strategies   Swallow Eval: Clinical Impressions   Skilled Criteria for Therapy Intervention Skilled criteria met.  Treatment indicated.   Functional Assessment Scale (FAS) 2   Dysphagia Outcome Severity Scale (GIOVANNY) Level 2 - GIOVANNY   Diet texture [place]s Full liquid;Nectar thick liquids   Recommended Feeding/Eating Techniques alternate between small bites and sips of food/liquid;check mouth frequently for oral residue/pocketing;hard swallow w/ each bite or sip;maintain upright posture during/after eating for 30 mins;no straws;small sips/bites   Therapy  Frequency Daily   Predicted Duration of Therapy Intervention (days/wks) 1 week   Anticipated Discharge Disposition inpatient rehabilitation facility   Risks and Benefits of Treatment have been explained. Yes   Patient, family and/or staff in agreement with Plan of Care Yes   Clinical Impression Comments Patient presents with moderate to severe oral and pharyngeal dysphagia secondary to a large right MCA stroke, characterized by poor bolus control with premature entry of thin liquids to the pyriform sinuses, no epiglottic inversion and delayed swallow response. Delayed moderately deep laryngeal penetration and moderate vallecular pyriform sinus residue. First swallow of nectar thick liquids by the spoon was incomplete. Following swallows there was moderate vallecular and pyriform sinus residue and no epiglottic residue. Oral and increased pharyngeal residue with pudding texture without penetration. Patient is at a moderately high risk for aspiration given delayed swallow, no epilgottic inversion and oral/pharyngeal residue. Recommend: Downgrade diet to a full liquid diet and continue with nectar thick liquids by the spoon. Feed only when fully alert, liquids by spoon, double swallow or alternate liquids/solids.    Total Evaluation Time   Total Evaluation Time (Minutes) 20

## 2020-09-15 NOTE — PLAN OF CARE
Pt here with R sided CVA. A&Ox4. Neuros garbled speech, L droop, garbled speech, L field cut, L tongue deviation, L hemiplegia, L leg moved slightly during first assessment, patient more lethargic for second assessment and did not move LLE. Sensation decreased on L side. Patient on insulin drip currently at 12 units/hr after starting tube feeding which is currently at 90. VSS. Tele SR. DD1 diet, nectar thick liquids. Takes pills crushed, or through [place] tube. Up with lift. Denies pain. Pt scoring green on the Aggression Stop Light Tool.

## 2020-09-15 NOTE — PLAN OF CARE
Discharge Planner PT     Patient plan for discharge: Not stated.  Current status: PT: Pt very lethargic at visit onset, slowly alerted. Pt required MODA x 2 rolling to L, attempted R roll but pt required very heavy total assist to initiate partial roll, unable to progress to full roll. With HOB raise and R lower rail which pt grasped with R UE pt was able to pull self to sitting in bed for sling placement and later removal. Total assist required for transfer to chair. Initial two standing trials from chair with R UE on R raised bedrail were unsuccessful but pt was able to progress to standing with need for assist of 2 at either side and 3rd person to help facilliated hip extension and buttocks rise off chair. Pt was able to stand at least 2 min, needed cues to lean to R to center (prone to heavy L lean consistently in sitting and standing). Hoping to locate recliner chair so pt can sit up later this PM out of bed.   Barriers to return to prior living situation: Unsafe: Lives alone, Heavy assist of 2-3, L sided weakness, Fall risk   Recommendations for discharge: ARU  Rationale for [place]s: Good rehab candidate. Strong gains, motivated, supportive family. Pt will benefit from continued skilled PT intervention on a full rehab team in order to progress independence and safety with mobility. Pt was independent at baseline. Anticipate pt will be able to tolerate 3 hours of therapy/day at time of discharge but may require longer course of therapy. Therefore if not meeting ARU criteria could be a good TCU candidate.             Entered by: Tami Espitia 09/15/2020 10:41 AM

## 2020-09-15 NOTE — PLAN OF CARE
OT:  Attempted OT, patient sleeping heavily.  Attempted to awaken with touch/voice.    Upon 2nd attempt, patient having nursing cares.

## 2020-09-15 NOTE — PLAN OF CARE
Nursing shift note  Pt Here with R MCA CVA. Alert and oriented x3 . VSS on RA. Tele NSR. Neuros stable with Left facial droop, field cut, neglect, JULIA and LLE hemiplegia, insensate on L side of body and slight R tongue deviation. Pt did move L foot a little in the morning 1x. On insulin gtt with hourly  Lung sounds clear. Diet downgraded to Full liquid nectar thick diet after video swallow. Incontinent of bowel and bladder. Denies pain. Repositioned q 2 hrs. Recliner chair ordered. Therapies recommending ARU at discharge.    Behavior & Aggression Tool color:      Pt's belongings:   (Belongings from admission day present in pt's room)               09/15/20 4523   Initial Information   Patient Belongings Remaining with Patient cell phone/electronics;glasses       Home medications:   (N)

## 2020-09-15 NOTE — PROGRESS NOTES
CALORIE COUNT      Approximate Oral Intake for:    (9/14)  Calories: 1262 cals  Protein: 52 gm pro    TF changed to cyclic last night:  9/14: Iso 1.5 at 120 mL/hr x 12 hrs (8p-8a) =  2160 kcal (27 kcal/kg), 98 g protein (1.2 g/kg), 253 g CHO (21 gm CHO/hr), 22 g fiber, 1094 mL H2O     9/14: 60 mL flush before and after cycle + 120 mL every 4 hrs while TF off     Estimated Needs:    Dosing Weight 78.6 kg (adjusted)  Estimated Energy Needs: 4614-9293 kcals (25-30 Kcal/Kg)  Estimated Protein Needs:  grams protein (1.2-1.5 g pro/Kg)      Summary:   Diet: DD1, NTL (per SLP)          Thickened smoothie with meals for added cals/pro    Note loose stools (pt did receive dulcolax yesterday)    BGM: 242, 245, 221, 204, 211, 224, 172  Pt on insulin drip    **  There is no special DM TF formula available.    As pt is tolerating po intake, will plan to decrease TF rate this evening:  Isosource 1.5 @ 60 mL/hr x 12 hrs (8pm-8am) = 1080 cals (55% est needs), 49 gm pro (52% est needs), 11 gm fiber, 547 ml free water, 126 gm CHO (10 gm/hr)      Jonna Banegas RD, LD  Clinical Dietitian - [facility]   Pager - [phone]

## 2020-09-15 NOTE — PROGRESS NOTES
[facility]    Medicine Progress Note - Hospitalist Service       Date of Admission:  9/7/2020  Assessment & Plan             Jorje Teran is a 70 year old male who presents with weakness and slurred speech     CVA  Last baseline known noon on 9/6 (over 24 hours PTA). Son went to check on father afternoon/evening of presentation and he was on ground, unable to move his L side and slurring words. Vitals stable. Labs normal except 11.2. CXR clear.   *CT with filling defect within the distal R ICA extending into the R MCA and occlusion of the R MCA at the M1 segment. CTA with severe plaquing at R carotid bifurcation with filling defect in the prox R ICA suggestive of thrombus contributing to approximately 80% stenosis. 60% stenosis of the proximal L ICA. TTE with bubble -> Normal left ventricular size and function. Left ventricular ejection fraction of 55-60%. No segmental wall motion abnormalities noted.  No shunting at the atrial level on suboptimal Bubble study.  - Lipids 7/29/20- LDL 78, HDL 56, , T chol 164  - A1C 7/2020 7.1%   - TSH 7/2020 at 2.05   - Given  mg x 1 on arrival to ED  - Head CTs have been stable for the most part.  Plan:  - Can transfer to floor today if SBPs < 160 without nicardipine  - Q4H hour neuro checks starting this evening  - Labetalol as needed for SBP>160  - Neurology following and appreciate their assistance   - ASA 81 mg daily PO/NC  - atorvastatin 40 mg daily when able  - telemetry  - Continue TFs.  Nutrition decreasing rate due to blood sugars   - PICC placed 09/10 and CVC removed 09/10   - PT/OT and bedside swallow, SLP.  Recommending ARU      CAD s/p CABG x 3, x 1 in 2015  HTN  HLD  PTA (needs med rec) amlodipine 20 mg daily, ASA 81 mg daiy, atorvastatin 40 mg daily, Zetia 10 mg daily, furosemide 40 mg BID, metoprolol 25 mg BID, Entresto 49-51 1 tab BID, valsartan 320 mg daily  Follows with Dr. Vincent with [facility]. Some concern re: medical  compliance.  TTE with bubble this admission -> Normal left ventricular size and function. Left ventricular ejection fraction of 55-60%. No segmental wall motion abnormalities noted.  No shunting at the atrial level on suboptimal Bubble study.  Plan:  - PTA Norvasc /Entresto / Metoprolol 50 mg BID and Valsartan  - Hydralazine PO started  - Holding hydrochlorothiazide given Na goals  - PRN labetalol, hydralazine SBP>160    Hypernatremia  Sodiums peaked at 159.  Now down to 146 with free water/tube feeds  - Continue to monitor   - Repeat BMP this AM pending      DM II  Not on meds for this. Recent A1C at 7.1 7/2020.  BG goal is < 180. BG not currently well controlled.  Blood sugars have been difficult to control with sugars in the 300s while on TF   Patient was having good control with insulin drip at 1-2 U/hr until TF started back up yesterday evening.  At that time he was requiring 14 U/hr.  TF being adjusted   - Continue insulin drip for now as patient seems to have significant requirements in the evenings when on TF      LE edema  US at [facility] negative for DVT     SHELDON  Intermittent treatment as outpatient   - hold CPAP for now     Hypothyroidism  PTA (needs med rec) levothyroxine 50 mcg daily  TSH checked 7/29/2020 at 2.05  - Continue PTA Levothyroxine     Morbid obesity  BMI noted  7/202 at ~38. Will need to encourage healthy lifestyle and weight loss with recovery         Diet: Snacks/Supplements Adult: Other; thickened smoothie with meals  (RD); With Meals  Calorie Counts  Room Service  Adult Formula Drip Feeding: Continuous Isosource 1.5; Nasogastric tube; Goal Rate: 60; mL/hr; From: 8:00 PM; 8:00 AM; Medication - Feeding Tube Flush Frequency: At least 15-30 mL water before and after medication administration and with tube clogg...  Combination Diet Full Liquid Diet    DVT Prophylaxis: Pneumatic Compression Devices  Hayes Catheter: not present  Code Status: Full Code           Disposition Plan   Expected  discharge: 2 - 3 days, recommended to ARU vs TCU once safe disposition plan/ TCU bed available and blood sugars stable off insulin drip   Entered: David Kirk DO 09/15/2020, 11:23 AM       The patient's care was discussed with the Patient and Patient's Family.    David Kirk DO  Hospitalist Service  [facility]    ______________________________________________________________________    Interval History   Patient seen and examined.  No acute events over night.  Sleeping on my evaluation and will briefly open eyes with verbal stimuli.  No complaints of pain.  Breathing stable.      Data reviewed today: I reviewed all medications, new labs and imaging results over the last 24 hours. I personally reviewed no images or EKG's today.    Physical Exam   Vital Signs: Temp: 99.5  F (37.5  C) Temp src: Oral BP: (!) 150/68 Pulse: 78   Resp: 20 SpO2: 94 % O2 Device: None (Room air)    Weight: 238 lbs 8.6 oz  General Appearance: Sleeping.  Minimally arousable to voice.  Will briefly open eyes but falls back asleep.  NAD  Respiratory: Clear to auscultation.  No respiratory distress  Cardiovascular: RRR.  No murmurs  GI: Bowel sounds present.  Non-distended.  Soft  Skin: No rashes.  No cyanosis  Other: NG in place.  No lower extremity edema     Data   Recent Labs   Lab 09/14/20  1445 09/14/20  1005 09/13/20  0828 09/13/20  0127  09/10/20  0610  09/09/20  0556   WBC 9.1 Canceled, Test credited, specimen discarded  --   --   --  8.8  --  9.0   HGB 12.4* Canceled, Test credited, specimen discarded  --   --   --  12.4*  --  13.3   MCV 96 Canceled, Test credited, specimen discarded  --   --   --  92  --  91    Canceled, Test credited, specimen discarded 207  --   --  189  --  181   NA  --  146* 149* 148*   < > 149*   < > 140   POTASSIUM  --  4.6  --   --   --  3.6  --  3.5   CHLORIDE  --   --   --   --   --  119*  --  111*   CO2  --   --   --   --   --  25  --  25   BUN  --   --   --   --   --  14   --  9   CR  --   --   --   --   --  0.85  --  0.79   ANIONGAP  --   --   --   --   --  5  --  4   SELENA  --   --   --   --   --  8.0*  --  7.7*   GLC  --   --   --   --   --  256*  --  178*    < > = values in this interval not displayed.     Recent Results (from the past 24 hour(s))   XR Video Swallow with SLP or OT    Narrative    VIDEO SWALLOWING EVALUATION   9/15/2020 9:48 AM     HISTORY: Dysphagia.    COMPARISON: None.    FLUOROSCOPY TIME: 2.9 minutes     FINDINGS:    Solid: Not tested.    Semisolid: Not tested.    Pudding: No aspiration or penetration.    Honey: Not tested.    Nectar: Decreased oral transit. Premature spillage. Moderate  retention. Delayed penetration. No aspiration.    Thin: Decreased oral transit. Premature spillage. Moderate retention.  Delayed penetration. No aspiration.    This study only includes the cervical esophagus. For more detailed  information, please see speech therapy report.

## 2020-09-16 ENCOUNTER — APPOINTMENT (OUTPATIENT)
Dept: SPEECH THERAPY | Facility: CLINIC | Age: 70
DRG: 064 | End: 2020-09-16
Attending: HOSPITALIST
Payer: COMMERCIAL

## 2020-09-16 ENCOUNTER — APPOINTMENT (OUTPATIENT)
Dept: OCCUPATIONAL THERAPY | Facility: CLINIC | Age: 70
DRG: 064 | End: 2020-09-16
Attending: HOSPITALIST
Payer: COMMERCIAL

## 2020-09-16 ENCOUNTER — APPOINTMENT (OUTPATIENT)
Dept: PHYSICAL THERAPY | Facility: CLINIC | Age: 70
DRG: 064 | End: 2020-09-16
Attending: HOSPITALIST
Payer: COMMERCIAL

## 2020-09-16 LAB
ANION GAP SERPL CALCULATED.3IONS-SCNC: 7 MMOL/L (ref 3–14)
BUN SERPL-MCNC: 28 MG/DL (ref 7–30)
CALCIUM SERPL-MCNC: 8.2 MG/DL (ref 8.5–10.1)
CHLORIDE SERPL-SCNC: 113 MMOL/L (ref 94–109)
CO2 SERPL-SCNC: 26 MMOL/L (ref 20–32)
CREAT SERPL-MCNC: 0.94 MG/DL (ref 0.66–1.25)
GFR SERPL CREATININE-BSD FRML MDRD: 81 ML/MIN/{1.73_M2}
GLUCOSE BLDC GLUCOMTR-MCNC: 110 MG/DL (ref 70–99)
GLUCOSE BLDC GLUCOMTR-MCNC: 114 MG/DL (ref 70–99)
GLUCOSE BLDC GLUCOMTR-MCNC: 115 MG/DL (ref 70–99)
GLUCOSE BLDC GLUCOMTR-MCNC: 115 MG/DL (ref 70–99)
GLUCOSE BLDC GLUCOMTR-MCNC: 121 MG/DL (ref 70–99)
GLUCOSE BLDC GLUCOMTR-MCNC: 122 MG/DL (ref 70–99)
GLUCOSE BLDC GLUCOMTR-MCNC: 125 MG/DL (ref 70–99)
GLUCOSE BLDC GLUCOMTR-MCNC: 127 MG/DL (ref 70–99)
GLUCOSE BLDC GLUCOMTR-MCNC: 139 MG/DL (ref 70–99)
GLUCOSE BLDC GLUCOMTR-MCNC: 139 MG/DL (ref 70–99)
GLUCOSE BLDC GLUCOMTR-MCNC: 149 MG/DL (ref 70–99)
GLUCOSE BLDC GLUCOMTR-MCNC: 153 MG/DL (ref 70–99)
GLUCOSE BLDC GLUCOMTR-MCNC: 154 MG/DL (ref 70–99)
GLUCOSE BLDC GLUCOMTR-MCNC: 159 MG/DL (ref 70–99)
GLUCOSE BLDC GLUCOMTR-MCNC: 161 MG/DL (ref 70–99)
GLUCOSE BLDC GLUCOMTR-MCNC: 162 MG/DL (ref 70–99)
GLUCOSE BLDC GLUCOMTR-MCNC: 165 MG/DL (ref 70–99)
GLUCOSE BLDC GLUCOMTR-MCNC: 166 MG/DL (ref 70–99)
GLUCOSE BLDC GLUCOMTR-MCNC: 175 MG/DL (ref 70–99)
GLUCOSE BLDC GLUCOMTR-MCNC: 180 MG/DL (ref 70–99)
GLUCOSE SERPL-MCNC: 176 MG/DL (ref 70–99)
LACTATE BLD-SCNC: 1.2 MMOL/L (ref 0.7–2)
PLATELET # BLD AUTO: 152 10E9/L (ref 150–450)
POTASSIUM SERPL-SCNC: 3.2 MMOL/L (ref 3.4–5.3)
POTASSIUM SERPL-SCNC: 3.7 MMOL/L (ref 3.4–5.3)
SODIUM SERPL-SCNC: 146 MMOL/L (ref 133–144)

## 2020-09-16 PROCEDURE — 25000128 H RX IP 250 OP 636: Performed by: STUDENT IN AN ORGANIZED HEALTH CARE EDUCATION/TRAINING PROGRAM

## 2020-09-16 PROCEDURE — 80048 BASIC METABOLIC PNL TOTAL CA: CPT | Performed by: INTERNAL MEDICINE

## 2020-09-16 PROCEDURE — 99233 SBSQ HOSP IP/OBS HIGH 50: CPT | Performed by: INTERNAL MEDICINE

## 2020-09-16 PROCEDURE — 92526 ORAL FUNCTION THERAPY: CPT | Mod: GN | Performed by: SPEECH-LANGUAGE PATHOLOGIST

## 2020-09-16 PROCEDURE — 25800030 ZZH RX IP 258 OP 636: Performed by: INTERNAL MEDICINE

## 2020-09-16 PROCEDURE — 27210429 ZZH NUTRITION PRODUCT INTERMEDIATE LITER

## 2020-09-16 PROCEDURE — 25000131 ZZH RX MED GY IP 250 OP 636 PS 637: Performed by: INTERNAL MEDICINE

## 2020-09-16 PROCEDURE — 25000128 H RX IP 250 OP 636: Performed by: INTERNAL MEDICINE

## 2020-09-16 PROCEDURE — 40000239 ZZH STATISTIC VAT ROUNDS

## 2020-09-16 PROCEDURE — 12000000 ZZH R&B MED SURG/OB

## 2020-09-16 PROCEDURE — 97110 THERAPEUTIC EXERCISES: CPT | Mod: GO | Performed by: OCCUPATIONAL THERAPY ASSISTANT

## 2020-09-16 PROCEDURE — 25000132 ZZH RX MED GY IP 250 OP 250 PS 637: Performed by: STUDENT IN AN ORGANIZED HEALTH CARE EDUCATION/TRAINING PROGRAM

## 2020-09-16 PROCEDURE — 85049 AUTOMATED PLATELET COUNT: CPT | Performed by: STUDENT IN AN ORGANIZED HEALTH CARE EDUCATION/TRAINING PROGRAM

## 2020-09-16 PROCEDURE — 97112 NEUROMUSCULAR REEDUCATION: CPT | Mod: GP | Performed by: PHYSICAL THERAPIST

## 2020-09-16 PROCEDURE — 00000146 ZZHCL STATISTIC GLUCOSE BY METER IP

## 2020-09-16 PROCEDURE — 97535 SELF CARE MNGMENT TRAINING: CPT | Mod: GO | Performed by: OCCUPATIONAL THERAPY ASSISTANT

## 2020-09-16 PROCEDURE — 97530 THERAPEUTIC ACTIVITIES: CPT | Mod: GP | Performed by: PHYSICAL THERAPIST

## 2020-09-16 PROCEDURE — 25000132 ZZH RX MED GY IP 250 OP 250 PS 637

## 2020-09-16 PROCEDURE — 84132 ASSAY OF SERUM POTASSIUM: CPT | Performed by: INTERNAL MEDICINE

## 2020-09-16 PROCEDURE — 83605 ASSAY OF LACTIC ACID: CPT | Performed by: INTERNAL MEDICINE

## 2020-09-16 RX ORDER — DEXTROSE MONOHYDRATE 25 G/50ML
25-50 INJECTION, SOLUTION INTRAVENOUS
Status: DISCONTINUED | OUTPATIENT
Start: 2020-09-16 | End: 2020-09-18

## 2020-09-16 RX ORDER — NICOTINE POLACRILEX 4 MG
15-30 LOZENGE BUCCAL
Status: DISCONTINUED | OUTPATIENT
Start: 2020-09-16 | End: 2020-09-18

## 2020-09-16 RX ADMIN — AMLODIPINE BESYLATE 20 MG: 10 TABLET ORAL at 09:07

## 2020-09-16 RX ADMIN — POTASSIUM CHLORIDE 20 MEQ: 29.8 INJECTION, SOLUTION INTRAVENOUS at 06:13

## 2020-09-16 RX ADMIN — SODIUM CHLORIDE 6 UNITS/HR: 9 INJECTION, SOLUTION INTRAVENOUS at 08:14

## 2020-09-16 RX ADMIN — METOPROLOL TARTRATE 50 MG: 50 TABLET, FILM COATED ORAL at 20:28

## 2020-09-16 RX ADMIN — ASPIRIN 81 MG 81 MG: 81 TABLET ORAL at 09:08

## 2020-09-16 RX ADMIN — HYDRALAZINE HYDROCHLORIDE 25 MG: 25 TABLET ORAL at 09:07

## 2020-09-16 RX ADMIN — ATORVASTATIN CALCIUM 40 MG: 40 TABLET, FILM COATED ORAL at 09:07

## 2020-09-16 RX ADMIN — SODIUM CHLORIDE 4 UNITS/HR: 9 INJECTION, SOLUTION INTRAVENOUS at 15:17

## 2020-09-16 RX ADMIN — SODIUM CHLORIDE 2 UNITS/HR: 9 INJECTION, SOLUTION INTRAVENOUS at 11:19

## 2020-09-16 RX ADMIN — METOPROLOL TARTRATE 50 MG: 50 TABLET, FILM COATED ORAL at 09:05

## 2020-09-16 RX ADMIN — SACUBITRIL AND VALSARTAN 1 TABLET: 49; 51 TABLET, FILM COATED ORAL at 09:06

## 2020-09-16 RX ADMIN — HEPARIN SODIUM 7500 UNITS: 10000 INJECTION INTRAVENOUS; SUBCUTANEOUS at 00:26

## 2020-09-16 RX ADMIN — POTASSIUM CHLORIDE 20 MEQ: 29.8 INJECTION, SOLUTION INTRAVENOUS at 05:11

## 2020-09-16 RX ADMIN — ACETAMINOPHEN 650 MG: 325 TABLET, FILM COATED ORAL at 09:06

## 2020-09-16 RX ADMIN — VALSARTAN 320 MG: 160 TABLET ORAL at 09:05

## 2020-09-16 RX ADMIN — EZETIMIBE 10 MG: 10 TABLET ORAL at 09:07

## 2020-09-16 RX ADMIN — LEVOTHYROXINE SODIUM 50 MCG: 50 TABLET ORAL at 09:07

## 2020-09-16 RX ADMIN — HYDRALAZINE HYDROCHLORIDE 25 MG: 25 TABLET ORAL at 15:59

## 2020-09-16 RX ADMIN — HYDRALAZINE HYDROCHLORIDE 25 MG: 25 TABLET ORAL at 00:26

## 2020-09-16 RX ADMIN — SODIUM CHLORIDE 4 UNITS/HR: 9 INJECTION, SOLUTION INTRAVENOUS at 14:30

## 2020-09-16 RX ADMIN — HEPARIN SODIUM 7500 UNITS: 10000 INJECTION INTRAVENOUS; SUBCUTANEOUS at 12:30

## 2020-09-16 RX ADMIN — INSULIN GLARGINE 14 UNITS: 100 INJECTION, SOLUTION SUBCUTANEOUS at 22:22

## 2020-09-16 RX ADMIN — SACUBITRIL AND VALSARTAN 1 TABLET: 49; 51 TABLET, FILM COATED ORAL at 20:28

## 2020-09-16 RX ADMIN — INSULIN HUMAN 8 UNITS: 100 INJECTION, SUSPENSION SUBCUTANEOUS at 19:03

## 2020-09-16 RX ADMIN — SODIUM CHLORIDE 6 UNITS/HR: 9 INJECTION, SOLUTION INTRAVENOUS at 03:06

## 2020-09-16 ASSESSMENT — ACTIVITIES OF DAILY LIVING (ADL)
ADLS_ACUITY_SCORE: 22
ADLS_ACUITY_SCORE: 24
ADLS_ACUITY_SCORE: 22

## 2020-09-16 ASSESSMENT — MIFFLIN-ST. JEOR: SCORE: 1868.03

## 2020-09-16 NOTE — PLAN OF CARE
Discharge Planner OT   Patient plan for discharge: none stated  Current status: pt sitting up in chair upon OT arrival, completed LUE AAROM all planes, no active movement noted, flaccid, educated on completing ADLS with hand over hand self assist, pt required setup of object in left hand and assist to clasp hands then pt participated with 2 ADL task with min/mod A.   Barriers to return to prior living situation: L hemiparesis, weakness  Recommendations for discharge: ARU per plan established by the Occupational Therapist  Rationale for [place]s: Patient would benefit from 3 hours of interdisciplinary therapy for maximum independence in ADLs/mobility.  Patient was independent prior to CVA, has good family support.       Entered by: Katerina Ardon 09/16/2020 3:31 PM

## 2020-09-16 NOTE — PLAN OF CARE
Nursing shift note  Pt Here with R MCA CVA. Alert and oriented x3 . VSS on RA. Tele NSR. Neuros stable with Left facial droop, field cut, neglect, JULIA and LLE hemiplegia, insensate on L side of body and slight L tongue deviation. Eyes do not track past the midline to the left.  On insulin gtt with hourly  BG checks. BG remained between 100-160s after tube feed was turned off at 0800. Lung sounds clear. Diet full liquid nectar thick - needs to be efed by spoon.  Incontinent of bowel and bladder. Denies pain. Repositioned q 2 hrs. Up to recliner chair 2x via mechanical lift. Therapies recommending ARU at discharge.     Behavior & Aggression Tool color:        Pt's belongings:   (Belongings from admission day present in pt's room)                09/15/20 1422   Initial Information   Patient Belongings Remaining with Patient cell phone/electronics;glasses         Home medications:   (N)

## 2020-09-16 NOTE — PLAN OF CARE
Discharge Planner SLP   Patient plan for discharge: Not addressed.   Current status: Patient seen for swallow treatment with his breakfast. he was fatigued and required moderate to max verbal cues to maintain alertness. He demonstrated decreased bolus coordination and anterior to posterior movement of the bolus. Delayed swallow response with max verbal cues to swallow hard x2. Suspect pharyngeal residue with intermittent cough response likely penetration to the cords. Patient continues with moderate to severe dysphagia and high risk for aspiration. Fatigue is impacting safety of his swallow function. Would not worry about calorie count at this time.   Recommend: 1. Cautiously continue on a full liquids nectar thick by spoon only. 2. Feed only when fully alert, hard double x2, slow pace, liquids by spoon, alternate liquids/solid. If increased coughing, wet vocal quality stop feeding. 3. SLP will continue to follow for PO tolerance and advancement as indicated.   Barriers to return to prior living situation: Dysphagia  Recommendations for discharge: ARC  Rationale for [place]s: Patient will need on going ST needs for dysphagia management for return to all PO diet. Barriers to ARC is endurance/fatigue, but anticipate improvement by time her is ready for discharge to tolerate 3 hours of therapy daily. He does have good participation and family support. Cognitive linguistic evaluation can be deferred to next level of care. Patient is significantly  below his baseline.        Entered by: Jaky Egan 09/16/2020 9:25 AM

## 2020-09-16 NOTE — PROGRESS NOTES
[facility]  Hospitalist Progress Note  Jose Murphy MD  09/16/2020    Assessment & Plan   Jorje Teran is a 70 year old male who presents with weakness and slurred speech     CVA  Last baseline known noon on 9/6 (over 24 hours PTA). Son went to check on father afternoon/evening of presentation and he was on ground, unable to move his L side and slurring words. Vitals stable. Labs normal except 11.2. CXR clear.   *CT with filling defect within the distal R ICA extending into the R MCA and occlusion of the R MCA at the M1 segment. CTA with severe plaquing at R carotid bifurcation with filling defect in the prox R ICA suggestive of thrombus contributing to approximately 80% stenosis. 60% stenosis of the proximal L ICA. TTE with bubble -> Normal left ventricular size and function. Left ventricular ejection fraction of 55-60%. No segmental wall motion abnormalities noted.  No shunting at the atrial level on suboptimal Bubble study.  - Lipids 7/29/20- LDL 78, HDL 56, , T chol 164  - A1C 7/2020 7.1%   - TSH 7/2020 at 2.05   - Given  mg x 1 on arrival to ED  - Head CTs have been stable for the most part.  Plan:  - goal for SBPs < 160    - Q4H hour neuro checks    - Labetalol as needed for SBP>160  - Neurology following and appreciate their assistance   - ASA 81 mg daily PO/MT  - atorvastatin 40 mg daily when able  - poor oral intake and significant dysphagia and on modified diet  - Continue TFs.    - PICC placed 09/10 and CVC removed 09/10   - PT/OT and bedside swallow, SLP.  Recommending ARU      CAD s/p CABG x 3, x 1 in 2015  HTN  HLD  PTA (needs med rec) amlodipine 20 mg daily, ASA 81 mg daiy, atorvastatin 40 mg daily, Zetia 10 mg daily, furosemide 40 mg BID, metoprolol 25 mg BID, Entresto 49-51 1 tab BID, valsartan 320 mg daily  Follows with Dr. Vincent with [facility]. Some concern re: medical compliance.  TTE with bubble this admission -> Normal left ventricular size and function.  Left ventricular ejection fraction of 55-60%. No segmental wall motion abnormalities noted.  No shunting at the atrial level on suboptimal Bubble study.  Plan:  - PTA Norvasc /Entresto / Metoprolol 50 mg BID and Valsartan  - Hydralazine PO started  - Holding hydrochlorothiazide given Na goals  - PRN labetalol, hydralazine SBP>160     Hypernatremia  Sodiums peaked at 159.  Now down to 146 with free water/tube feeds  - Continue to monitor   - Repeat BMP this AM pending      DM II  Not on meds for this. Recent A1C at 7.1 7/2020.  BG goal is < 180. BG not currently well controlled.  Blood sugars have been difficult to control with sugars in the 300s while on TF   Patient was having good control with insulin drip at 1-2 U/hr until  - TF started with hyperglycemia  - will transition with NPH and to lantus bid, will give 14 units at bedtime and 6 units q AM but will attempt BG q4 instead of with meals due to poor oral intake.     LE edema  US at [facility] negative for DVT     SHELDON  Intermittent treatment as outpatient   - hold CPAP for now     Hypothyroidism  PTA (needs med rec) levothyroxine 50 mcg daily  TSH checked 7/29/2020 at 2.05  - Continue PTA Levothyroxine     Morbid obesity  BMI noted  7/202 at ~38. Will need to encourage healthy lifestyle and weight loss with recovery     Diet: Snacks/Supplements Adult: Other; thickened smoothie with meals  (RD); With Meals  Calorie Counts  Room Service  Adult Formula Drip Feeding: Continuous Isosource 1.5; Nasogastric tube; Goal Rate: 60; mL/hr; From: 8:00 PM; 8:00 AM; Medication - Feeding Tube Flush Frequency: At least 15-30 mL water before and after medication administration and with tube clogg...  Combination Diet Full Liquid Diet    DVT Prophylaxis: Pneumatic Compression Devices  Hayes Catheter: not present  Code Status: Full Code         Disposition Plan     Expected discharge: 2 - 3 days, recommended to ARU vs TCU     Interval History   -- chart reviewed  -- discussed with  "RN  -- noted poor oral intake    -Data reviewed today: I reviewed all new labs and imaging over the last 24 hours. I personally reviewed no images or EKG's today.    Physical Exam    , Blood pressure (!) 169/80, pulse 65, temperature 98.3  F (36.8  C), temperature source Axillary, resp. rate 16, height 1.778 m (5' 10\"), weight 110.7 kg (244 lb), SpO2 97 %.  Vitals:    09/10/20 0600 09/12/20 0400 09/16/20 0629   Weight: 108.4 kg (238 lb 15.7 oz) 108.2 kg (238 lb 8.6 oz) 110.7 kg (244 lb)     Vital Signs with Ranges  Temp:  [97.7  F (36.5  C)-99.1  F (37.3  C)] 98.3  F (36.8  C)  Pulse:  [] 65  Resp:  [16-24] 16  BP: (130-169)/(74-86) 169/80  SpO2:  [93 %-97 %] 97 %  I/O's Last 24 hours  I/O last 3 completed shifts:  In: 1380 [P.O.:120; NG/GT:1260]  Out: -     Constitutional: Awake, alert, cooperative, dysarthria, facial droop and dense left sided hemiparesis  Respiratory: Clear to auscultation bilaterally, no crackles or wheezing  Cardiovascular: Regular rate and rhythm, normal S1 and S2, and no murmur noted  GI: Normal bowel sounds, soft, non-distended, non-tender  Skin/Integumen: No rashes, no cyanosis, ++ edema  Other:      Medications   All medications were reviewed.    dextrose       - MEDICATION INSTRUCTIONS -       sodium chloride 10 mL/hr at 09/14/20 1055       alteplase  2 mg Intravenous Once     amLODIPine  20 mg Oral Daily     aspirin  81 mg Oral or Feeding Tube Daily     atorvastatin  40 mg Oral Daily     ezetimibe  10 mg Oral Daily     heparin ANTICOAGULANT  7,500 Units Subcutaneous Q12H     hydrALAZINE  25 mg Oral Q8H TORI     insulin aspart  1-7 Units Subcutaneous TID AC     insulin aspart  1-5 Units Subcutaneous At Bedtime     insulin glargine  14 Units Subcutaneous At Bedtime     [START ON 9/17/2020] insulin glargine  6 Units Subcutaneous QAM AC     insulin isophane human  8 Units Subcutaneous Once     levothyroxine  50 mcg Oral QAM AC     lidocaine   Urethral Once     metoprolol tartrate  50 mg " Oral BID     sacubitril-valsartan  1 tablet Oral BID     sodium chloride (PF)  10 mL Intracatheter Q8H     sodium chloride (PF)  10 mL Intracatheter Q8H     valsartan  320 mg Oral Daily        Data   Recent Labs   Lab 09/16/20  0950 09/16/20  0623 09/16/20  0030 09/14/20  1445 09/14/20  1005 09/13/20  0828  09/10/20  0610   WBC  --   --   --  9.1 Canceled, Test credited, specimen discarded  --   --  8.8   HGB  --   --   --  12.4* Canceled, Test credited, specimen discarded  --   --  12.4*   MCV  --   --   --  96 Canceled, Test credited, specimen discarded  --   --  92   PLT  --  152  --  192 Canceled, Test credited, specimen discarded 207  --  189   NA  --   --  146*  --  146* 149*   < > 149*   POTASSIUM 3.7  --  3.2*  --  4.6  --   --  3.6   CHLORIDE  --   --  113*  --   --   --   --  119*   CO2  --   --  26  --   --   --   --  25   BUN  --   --  28  --   --   --   --  14   CR  --   --  0.94  --   --   --   --  0.85   ANIONGAP  --   --  7  --   --   --   --  5   SELENA  --   --  8.2*  --   --   --   --  8.0*   GLC  --   --  176*  --   --   --   --  256*    < > = values in this interval not displayed.       No results found for this or any previous visit (from the past 24 hour(s)).    Jose Murphy MD  Text Page  (7am to 6pm)

## 2020-09-16 NOTE — CONSULTS
Care Transition Initial Assessment -      Met with: Patient  And son Donte. Spoke with sonJeyson by phone   Active Problems:    CVA (cerebral vascular accident) (H)       DATA  Lives With: alone   Living Arrangements: house  Quality of Family Relationships: supportive  Description of Support System: Supportive  Who is your support system?: Children Donte lives in [place]; Jeyson lives in [place] and Kai lives in [place].   Support Assessment: Adequate family and caregiver support. Pt has a LTC policy and the three sons would assist and hire assistance to support pt at home post rehab.    Identified issues/concerns regarding health management:  Pt hospitalized as result of stroke. Pt is independent at baseline.   Pt's family agrees that pt's desire is to return to his home as soon as he is able. Pt's sons are committed to assist pt at home post rehab..  Quality of Family Relationships: supportive.    Transportation Anticipated: Will need transportation at discharge.    ASSESSMENT  Cognitive Status:  awake and alert. Currently pt is needing 2 people and lift for mobility.  Concerns to be addressed: Pt is recommended by therapies for ARU placement . Referral      PLAN  Financial costs for the patient includes TBD  Patient Goals and Preferences:  ARU vs TCU with eventual return home.  Patient anticipates discharging to: [facility]  [facility]  [phone]

## 2020-09-16 NOTE — PROGRESS NOTES
CALORIE COUNT      Approximate Oral Intake for:    9/15/20  Calories:  596 kcal  Protein:  24 grams       Intake from TF/PN:       Nocturnal TF continues as follows -->    Isosource 1.5 @ 60 mL/hr x 12 hrs (8pm-8am) = 1080 cals (55% est needs), 49 gm pro (52% est needs), 11 gm fiber, 547 ml free water, 126 gm CHO (10 gm/hr)   Flushes 60 mL before and after cycle + 120 mL every 4 hrs while TF off (480 mL)     Total (Oral + TF)= 1676 kcal (21 kcal/kg and 80% needs), 73 g protein (0.9 g/kg and 75% needs)    Noted blood sugars improved 149-176 on Insulin drip   BM x 2 today and x 3 yesterday       Estimated Needs:    DW: 78.6 kg   Energy: 8898-5405 kcal (25-30)   Protein:  g (1.2-1.5)     Coby Burrell RD, LD, CNSC   Clinical Dietitian - [facility]

## 2020-09-16 NOTE — PROVIDER NOTIFICATION
Brief update:    Paged regarding patient on 4+ algorithm for insulin drip    Patient had consecutive blood glucose increasing despite algorithm 4.  With increase to 4+ algorithm, blood glucose has started to trend down.    It appears that patient has had difficult to control blood glucose levels during tube feeding, though also note that he was on insulin drip throughout the day despite tube feedings being for 12 hours overnight.    Consider initiation of long-acting insulin today.  Will defer to rounding provider if this is preferred to initiate in a.m. or at night.  Patient will also likely require intermediate acting insulin at nighttime for tube feedings or transition to continuous tube feedings throughout the day.  Per nursing, disposition to ARU likely pending ability to better control blood glucose.    Lino Colindres MD  5:37 AM

## 2020-09-16 NOTE — PLAN OF CARE
Nursing shift note  Pt here with R MCA. A&Ox4. Neuros  L field cut L hemiplegia and absent sensation, L facial droop, L tongue deviation. Patient was up in chair for 1.5 hours today. Tube feeding infusing at 60 starting at 2000 which is goal rate. Insulin drip infusing. voiding adequate amount incontinent. VSS. Tele SR. Full liquid diet nectar thick. Up with lift. Denies pain.     Behavior & Aggression Tool color: green      Pt's belongings:   (Belongings from admission day present in pt's room)

## 2020-09-16 NOTE — PLAN OF CARE
Discharge Planner PT     Patient plan for discharge: Not stated.  Current status: PT: Pt was initially asleep, alerted during visit further though more tired for AM visit. Pt required less assist today rolling L: MACKENZIE x 2 rolling to L, MAXA-total assist to R. Pt was lifted to chair with total assist. Mirror used to provided pt feedback to reduce L lean. Pt required MAXA x 2 and 3rd person behind to assist hips with attempt to stand from chair with R railing. Unable today (pt potentially more fatigued. chair different than yesterday, surface height may be lower). Requested cushion for chair for next time. VSS. Pt up in recliner chair at visit end.   Barriers to return to prior living situation: Unsafe: Lives alone, Heavy assist of 2-3, L sided weakness, Fall risk   Recommendations for discharge: ARU  Rationale for [place]s: Good rehab candidate. Strong gains, motivated, supportive family. Pt will benefit from continued skilled PT intervention on a full rehab team in order to progress independence and safety with mobility. Pt was independent at baseline. Anticipate pt will be able to tolerate 3 hours of therapy/day at time of discharge but may require longer course of therapy therefore if not meeting ARU criteria could be a good TCU candidate.           Entered by: Tami Espitia 09/16/2020 9:46 AM

## 2020-09-16 NOTE — PLAN OF CARE
Pt here with R MCA CVA. A&Ox3, disoriented to time. Neuros with slurred speech, L field cut, L visual neglect, L hemiplegia, absent L sensation, does not track Left. VSS on RA. Tele SR with PACs.  TF infusing at 60 mL/hr- stop at 0800, otherwise nectar thick full liquid. Up with A2 lift. BG elevated- insulin drip infusing currently. 0715 , infusing adjusted appropriately. Denies pain. Pt scoring green on the Aggression Stop Light Tool. PICC to RUE. Potassium replaced- Recheck at 2100. Incontinent of urine. Incontinent of stool. Plan for discharge to ARU when stable.

## 2020-09-17 ENCOUNTER — APPOINTMENT (OUTPATIENT)
Dept: SPEECH THERAPY | Facility: CLINIC | Age: 70
DRG: 064 | End: 2020-09-17
Attending: HOSPITALIST
Payer: COMMERCIAL

## 2020-09-17 ENCOUNTER — APPOINTMENT (OUTPATIENT)
Dept: PHYSICAL THERAPY | Facility: CLINIC | Age: 70
DRG: 064 | End: 2020-09-17
Attending: HOSPITALIST
Payer: COMMERCIAL

## 2020-09-17 ENCOUNTER — APPOINTMENT (OUTPATIENT)
Dept: OCCUPATIONAL THERAPY | Facility: CLINIC | Age: 70
DRG: 064 | End: 2020-09-17
Attending: HOSPITALIST
Payer: COMMERCIAL

## 2020-09-17 LAB
ANION GAP SERPL CALCULATED.3IONS-SCNC: 6 MMOL/L (ref 3–14)
BUN SERPL-MCNC: 24 MG/DL (ref 7–30)
CALCIUM SERPL-MCNC: 8.3 MG/DL (ref 8.5–10.1)
CHLORIDE SERPL-SCNC: 111 MMOL/L (ref 94–109)
CO2 SERPL-SCNC: 23 MMOL/L (ref 20–32)
CREAT SERPL-MCNC: 0.87 MG/DL (ref 0.66–1.25)
ERYTHROCYTE [DISTWIDTH] IN BLOOD BY AUTOMATED COUNT: 12.5 % (ref 10–15)
GFR SERPL CREATININE-BSD FRML MDRD: 87 ML/MIN/{1.73_M2}
GLUCOSE BLDC GLUCOMTR-MCNC: 194 MG/DL (ref 70–99)
GLUCOSE BLDC GLUCOMTR-MCNC: 212 MG/DL (ref 70–99)
GLUCOSE BLDC GLUCOMTR-MCNC: 217 MG/DL (ref 70–99)
GLUCOSE BLDC GLUCOMTR-MCNC: 218 MG/DL (ref 70–99)
GLUCOSE BLDC GLUCOMTR-MCNC: 218 MG/DL (ref 70–99)
GLUCOSE BLDC GLUCOMTR-MCNC: 222 MG/DL (ref 70–99)
GLUCOSE BLDC GLUCOMTR-MCNC: 233 MG/DL (ref 70–99)
GLUCOSE SERPL-MCNC: 224 MG/DL (ref 70–99)
HCT VFR BLD AUTO: 37.9 % (ref 40–53)
HGB BLD-MCNC: 12.6 G/DL (ref 13.3–17.7)
MCH RBC QN AUTO: 30.9 PG (ref 26.5–33)
MCHC RBC AUTO-ENTMCNC: 33.2 G/DL (ref 31.5–36.5)
MCV RBC AUTO: 93 FL (ref 78–100)
PLATELET # BLD AUTO: 188 10E9/L (ref 150–450)
POTASSIUM SERPL-SCNC: 3.8 MMOL/L (ref 3.4–5.3)
RBC # BLD AUTO: 4.08 10E12/L (ref 4.4–5.9)
SODIUM SERPL-SCNC: 140 MMOL/L (ref 133–144)
WBC # BLD AUTO: 10 10E9/L (ref 4–11)

## 2020-09-17 PROCEDURE — 92526 ORAL FUNCTION THERAPY: CPT | Mod: GN | Performed by: SPEECH-LANGUAGE PATHOLOGIST

## 2020-09-17 PROCEDURE — 25000132 ZZH RX MED GY IP 250 OP 250 PS 637: Performed by: STUDENT IN AN ORGANIZED HEALTH CARE EDUCATION/TRAINING PROGRAM

## 2020-09-17 PROCEDURE — 80048 BASIC METABOLIC PNL TOTAL CA: CPT | Performed by: INTERNAL MEDICINE

## 2020-09-17 PROCEDURE — 40000239 ZZH STATISTIC VAT ROUNDS

## 2020-09-17 PROCEDURE — 97530 THERAPEUTIC ACTIVITIES: CPT | Mod: GP | Performed by: PHYSICAL THERAPIST

## 2020-09-17 PROCEDURE — 25000128 H RX IP 250 OP 636: Performed by: STUDENT IN AN ORGANIZED HEALTH CARE EDUCATION/TRAINING PROGRAM

## 2020-09-17 PROCEDURE — 12000000 ZZH R&B MED SURG/OB

## 2020-09-17 PROCEDURE — 00000146 ZZHCL STATISTIC GLUCOSE BY METER IP

## 2020-09-17 PROCEDURE — 25000125 ZZHC RX 250: Performed by: INTERNAL MEDICINE

## 2020-09-17 PROCEDURE — 92507 TX SP LANG VOICE COMM INDIV: CPT | Mod: GN | Performed by: SPEECH-LANGUAGE PATHOLOGIST

## 2020-09-17 PROCEDURE — 27210429 ZZH NUTRITION PRODUCT INTERMEDIATE LITER

## 2020-09-17 PROCEDURE — 97535 SELF CARE MNGMENT TRAINING: CPT | Mod: GO | Performed by: OCCUPATIONAL THERAPY ASSISTANT

## 2020-09-17 PROCEDURE — 97530 THERAPEUTIC ACTIVITIES: CPT | Mod: GO | Performed by: OCCUPATIONAL THERAPY ASSISTANT

## 2020-09-17 PROCEDURE — 85027 COMPLETE CBC AUTOMATED: CPT | Performed by: INTERNAL MEDICINE

## 2020-09-17 PROCEDURE — 25000132 ZZH RX MED GY IP 250 OP 250 PS 637

## 2020-09-17 PROCEDURE — 40000257 ZZH STATISTIC CONSULT NO CHARGE VASC ACCESS

## 2020-09-17 PROCEDURE — 97110 THERAPEUTIC EXERCISES: CPT | Mod: GO | Performed by: OCCUPATIONAL THERAPY ASSISTANT

## 2020-09-17 PROCEDURE — 99232 SBSQ HOSP IP/OBS MODERATE 35: CPT | Performed by: INTERNAL MEDICINE

## 2020-09-17 PROCEDURE — 25000131 ZZH RX MED GY IP 250 OP 636 PS 637: Performed by: INTERNAL MEDICINE

## 2020-09-17 RX ORDER — CARBOXYMETHYLCELLULOSE SODIUM 5 MG/ML
1 SOLUTION/ DROPS OPHTHALMIC
Status: DISCONTINUED | OUTPATIENT
Start: 2020-09-17 | End: 2020-09-17

## 2020-09-17 RX ORDER — LIDOCAINE HYDROCHLORIDE 20 MG/ML
JELLY TOPICAL ONCE
Status: COMPLETED | OUTPATIENT
Start: 2020-09-17 | End: 2020-09-17

## 2020-09-17 RX ADMIN — HEPARIN SODIUM 7500 UNITS: 10000 INJECTION INTRAVENOUS; SUBCUTANEOUS at 12:14

## 2020-09-17 RX ADMIN — INSULIN GLARGINE 6 UNITS: 100 INJECTION, SOLUTION SUBCUTANEOUS at 06:18

## 2020-09-17 RX ADMIN — LEVOTHYROXINE SODIUM 50 MCG: 50 TABLET ORAL at 08:21

## 2020-09-17 RX ADMIN — METOPROLOL TARTRATE 50 MG: 50 TABLET, FILM COATED ORAL at 21:02

## 2020-09-17 RX ADMIN — METOPROLOL TARTRATE 50 MG: 50 TABLET, FILM COATED ORAL at 08:22

## 2020-09-17 RX ADMIN — INSULIN GLARGINE 14 UNITS: 100 INJECTION, SOLUTION SUBCUTANEOUS at 21:49

## 2020-09-17 RX ADMIN — LIDOCAINE HYDROCHLORIDE: 20 JELLY TOPICAL at 02:47

## 2020-09-17 RX ADMIN — HYDRALAZINE HYDROCHLORIDE 25 MG: 25 TABLET ORAL at 00:47

## 2020-09-17 RX ADMIN — SACUBITRIL AND VALSARTAN 1 TABLET: 49; 51 TABLET, FILM COATED ORAL at 08:22

## 2020-09-17 RX ADMIN — HEPARIN SODIUM 7500 UNITS: 10000 INJECTION INTRAVENOUS; SUBCUTANEOUS at 00:47

## 2020-09-17 RX ADMIN — HYDRALAZINE HYDROCHLORIDE 25 MG: 25 TABLET ORAL at 08:18

## 2020-09-17 RX ADMIN — ACETAMINOPHEN 975 MG: 325 TABLET, FILM COATED ORAL at 18:06

## 2020-09-17 RX ADMIN — ATORVASTATIN CALCIUM 40 MG: 40 TABLET, FILM COATED ORAL at 08:18

## 2020-09-17 RX ADMIN — AMLODIPINE BESYLATE 20 MG: 10 TABLET ORAL at 08:18

## 2020-09-17 RX ADMIN — VALSARTAN 320 MG: 160 TABLET ORAL at 08:18

## 2020-09-17 RX ADMIN — EZETIMIBE 10 MG: 10 TABLET ORAL at 08:18

## 2020-09-17 RX ADMIN — ASPIRIN 81 MG 81 MG: 81 TABLET ORAL at 08:22

## 2020-09-17 RX ADMIN — SACUBITRIL AND VALSARTAN 1 TABLET: 49; 51 TABLET, FILM COATED ORAL at 21:02

## 2020-09-17 RX ADMIN — VALSARTAN 320 MG: 160 TABLET ORAL at 08:20

## 2020-09-17 RX ADMIN — HYDRALAZINE HYDROCHLORIDE 25 MG: 25 TABLET ORAL at 18:06

## 2020-09-17 RX ADMIN — ACETAMINOPHEN 975 MG: 325 TABLET, FILM COATED ORAL at 04:39

## 2020-09-17 ASSESSMENT — ACTIVITIES OF DAILY LIVING (ADL)
ADLS_ACUITY_SCORE: 24

## 2020-09-17 ASSESSMENT — MIFFLIN-ST. JEOR: SCORE: 1878.46

## 2020-09-17 NOTE — PLAN OF CARE
Discharge Planner PT   Patient plan for discharge: not stated  Current status: Patient needs max assist of 2 to transfers to sitting position. Once sitting eventually able to maintain sitting balance without assist for at least 8 min. Able to self correct by reaching with right arm when losing balance to the left. Unable to transfer to standing from edge of bed even with max assist of 2.   Barriers to return to prior living situation: Unsafe: Lives alone, Heavy assist of 2-3, L sided weakness, Fall risk   Recommendations for discharge: ARU  Rationale for [place]s: Good rehab candidate. Strong gains, motivated, supportive family. Pt will benefit from continued skilled PT intervention on a full rehab team in order to progress independence and safety with mobility. Pt was independent at baseline. Anticipate pt will be able to tolerate 3 hours of therapy/day at time of discharge but may require longer course of therapy therefore if not meeting ARU criteria could be a good TCU candidate.       Entered by: Crystal Mix 09/17/2020 10:06 AM

## 2020-09-17 NOTE — PROGRESS NOTES
[facility]  Hospitalist Progress Note  Jose Murphy MD  09/17/2020    Assessment & Plan   Jorje Teran is a 70 year old male who presents with weakness and slurred speech     CVA  Last baseline known noon on 9/6 (over 24 hours PTA). Son went to check on father afternoon/evening of presentation and he was on ground, unable to move his L side and slurring words. Vitals stable. Labs normal except 11.2. CXR clear.   *CT with filling defect within the distal R ICA extending into the R MCA and occlusion of the R MCA at the M1 segment. CTA with severe plaquing at R carotid bifurcation with filling defect in the prox R ICA suggestive of thrombus contributing to approximately 80% stenosis. 60% stenosis of the proximal L ICA. TTE with bubble -> Normal left ventricular size and function. Left ventricular ejection fraction of 55-60%. No segmental wall motion abnormalities noted.  No shunting at the atrial level on suboptimal Bubble study.  - Lipids 7/29/20- LDL 78, HDL 56, , T chol 164  - A1C 7/2020 7.1%   - TSH 7/2020 at 2.05   - Given  mg x 1 on arrival to ED  - Head CTs have been stable for the most part.  Plan:  - goal for SBPs < 160    - Q4H hour neuro checks    - Labetalol as needed for SBP>160  - Neurology following and appreciate their assistance   - ASA 81 mg daily PO/AL  - atorvastatin 40 mg daily when able  - poor oral intake and significant dysphagia and on modified diet  - Continue TFs.    - PICC placed 09/10 and CVC removed 09/10   - PT/OT and bedside swallow, SLP.  Recommending ARU      CAD s/p CABG x 3, x 1 in 2015  HTN  HLD  PTA (needs med rec) amlodipine 20 mg daily, ASA 81 mg daiy, atorvastatin 40 mg daily, Zetia 10 mg daily, furosemide 40 mg BID, metoprolol 25 mg BID, Entresto 49-51 1 tab BID, valsartan 320 mg daily  Follows with Dr. Vincent with [facility]. Some concern re: medical compliance.  TTE with bubble this admission -> Normal left ventricular size and function.  Left ventricular ejection fraction of 55-60%. No segmental wall motion abnormalities noted.  No shunting at the atrial level on suboptimal Bubble study.  Plan:  - continue Norvasc /Entresto / Metoprolol 50 mg BID and Valsartan  - Hydralazine PO started  - Holding hydrochlorothiazide given Na goals  - PRN labetalol, hydralazine SBP>160     Hypernatremia  Sodiums peaked at 159 > 146 > 140  with free water/tube feeds  - Continue to monitor      DM II  Not on meds for this. Recent A1C at 7.1 7/2020.  BG goal is < 180. BG not currently well controlled.  Blood sugars have been difficult to control with sugars in the 300s while on TF   - previously on insulin gtt  - TF started with hyperglycemia, 200+ range  - 6/14 units of lantus bid, will change to 14/14 units BID  - continue med SSI     LE edema  [facility] at [facility] negative for DVT     SHELDON  Intermittent treatment as outpatient   - hold CPAP for now     Hypothyroidism  PTA (needs med rec) levothyroxine 50 mcg daily  TSH checked 7/29/2020 at 2.05  - Continue PTA Levothyroxine     Morbid obesity  BMI noted  7/202 at ~38. Will need to encourage healthy lifestyle and weight loss with recovery     Diet: Snacks/Supplements Adult: Other; thickened smoothie with meals  (RD); With Meals  Calorie Counts  Room Service  Adult Formula Drip Feeding: Continuous Isosource 1.5; Nasogastric tube; Goal Rate: 60; mL/hr; From: 8:00 PM; 8:00 AM; Medication - Feeding Tube Flush Frequency: At least 15-30 mL water before and after medication administration and with tube clogg...  Combination Diet Full Liquid Diet    DVT Prophylaxis: Pneumatic Compression Devices  Hayes Catheter: not present  Code Status: Full Code         Disposition Plan     Expected discharge  to ARU on 9/18    Interval History   -- Donte harrison at bedside  -- discussed with RN  -- noted eye irritation    -Data reviewed today: I reviewed all new labs and imaging over the last 24 hours. I personally reviewed no images or EKG's  "today.    Physical Exam    , Blood pressure 139/76, pulse 68, temperature 97.9  F (36.6  C), temperature source Oral, resp. rate 16, height 1.778 m (5' 10\"), weight 111.7 kg (246 lb 4.8 oz), SpO2 97 %.  Vitals:    09/12/20 0400 09/16/20 0629 09/17/20 0624   Weight: 108.2 kg (238 lb 8.6 oz) 110.7 kg (244 lb) 111.7 kg (246 lb 4.8 oz)     Vital Signs with Ranges  Temp:  [97.3  F (36.3  C)-99.1  F (37.3  C)] 97.9  F (36.6  C)  Pulse:  [63-75] 68  Resp:  [16-18] 16  BP: (136-166)/(70-81) 139/76  SpO2:  [96 %-98 %] 97 %  I/O's Last 24 hours  I/O last 3 completed shifts:  In: 1230 [P.O.:780; NG/GT:450]  Out: 550 [Urine:550]    Constitutional: Awake, alert, cooperative, dysarthria, facial droop and dense left sided hemiparesis  Respiratory: Clear to auscultation bilaterally, no crackles or wheezing  Cardiovascular: Regular rate and rhythm, normal S1 and S2   GI: Normal bowel sounds, soft, non-distended, non-tender  Skin/Integumen: No rashes, no cyanosis, ++ edema  Other: Field cut, neglect     Medications   All medications were reviewed.    - MEDICATION INSTRUCTIONS -       sodium chloride 10 mL/hr at 09/14/20 1055       alteplase  2 mg Intravenous Once     amLODIPine  20 mg Oral Daily     aspirin  81 mg Oral or Feeding Tube Daily     atorvastatin  40 mg Oral Daily     ezetimibe  10 mg Oral Daily     heparin ANTICOAGULANT  7,500 Units Subcutaneous Q12H     hydrALAZINE  25 mg Oral Q8H TORI     insulin aspart  1-6 Units Subcutaneous Q4H     [START ON 9/18/2020] insulin glargine  14 Units Subcutaneous QAM AC     insulin glargine  14 Units Subcutaneous At Bedtime     levothyroxine  50 mcg Oral QAM AC     lidocaine   Urethral Once     metoprolol tartrate  50 mg Oral BID     sacubitril-valsartan  1 tablet Oral BID     sodium chloride (PF)  10 mL Intracatheter Q8H     sodium chloride (PF)  10 mL Intracatheter Q8H     valsartan  320 mg Oral Daily        Data   Recent Labs   Lab 09/17/20  0445 09/16/20  0950 09/16/20  0623 " 09/16/20  0030 09/14/20  1445 09/14/20  1005   WBC 10.0  --   --   --  9.1 Canceled, Test credited, specimen discarded   HGB 12.6*  --   --   --  12.4* Canceled, Test credited, specimen discarded   MCV 93  --   --   --  96 Canceled, Test credited, specimen discarded     --  152  --  192 Canceled, Test credited, specimen discarded     --   --  146*  --  146*   POTASSIUM 3.8 3.7  --  3.2*  --  4.6   CHLORIDE 111*  --   --  113*  --   --    CO2 23  --   --  26  --   --    BUN 24  --   --  28  --   --    CR 0.87  --   --  0.94  --   --    ANIONGAP 6  --   --  7  --   --    SELENA 8.3*  --   --  8.2*  --   --    *  --   --  176*  --   --        No results found for this or any previous visit (from the past 24 hour(s)).    Jose Murphy MD  Text Page  (7am to 6pm)

## 2020-09-17 NOTE — PLAN OF CARE
Pt here with righ MCA stroke. Disoriented to time. Left facial droop. Left side hemiplegia with weak dorsi and plantarflexion. Slow slurred speech. Left visual field cut and neglect. Tongue deviation to left. High SBP decreased with schedule bp med. BLE and BUE edema. Tele SR. Full liquid nectar diet with nectar liquids. Takes pills crush in NG tube. Tube feeding started at 2000. Tube feeding running at goal rate of 60ml/hr with water flush of 60ml q 4 hrs. Tube feeding to be stopped at 0800 tomorrow. Up with 2 with lift. Incontinent of bowel and bladder. Turned and repositioned q 2hrs. Posterior neck pain decreased with ice pack. Insulin pump discontinued. Bedtime blood sugar 180. Gave bedtime lantus. Rig arm picc line patent. Pt scoring green on the Aggression Stop Light Tool. Plan labs tomorrow. Discharge to ARU pending.

## 2020-09-17 NOTE — PROGRESS NOTES
"CLINICAL NUTRITION SERVICES - REASSESSMENT NOTE      Recommendations Ordered by Registered Dietitian (RD):     As po intake has decreased, will increase cycle of TF to 14 hrs ---> Isosource 1.5 @ 60 mL/hr x 14 hrs = 1260 cals (64% est needs), 57 gm pro (60% est needs), 13 gm fiber, 638 mL free water, 148 gm CHO (10.5 gm/hr).     Future/Additional Recommendations:     Encouraged pt to take meals as able to provide additional calories    Pt to consume at least 700 cals and 40 gm pro per day via meals    If pt unable to increase po intake, may need to resume continuous 24 hrs cycle to meet needs     Malnutrition:   % Weight Loss:  Up to 5% in 1 month (non-severe malnutrition) - 4% from 2 months ago  % Intake:  Decreased intake does not meet criteria for malnutrition  - po + TF meeting 80-90% est needs past 3 days  Subcutaneous Fat Loss:  None observed  Muscle Loss:  None observed  Fluid Retention:  None noted    Malnutrition Diagnosis: Patient does not meet two of the above criteria necessary for diagnosing malnutrition        EVALUATION OF PROGRESS TOWARD GOALS   Diet:    Full liquid, NTL (per SLP)  Thickened smoothie with meals    Nutrition Support:    9/15:  Isosource 1.5 @ 60 mL/hr x 12 hrs (8pm-8am) = 1080 cals (55% est needs), 49 gm pro (52% est needs), 11 gm fiber, 547 ml free water, 126 gm CHO (10 gm/hr)     9/14: 60 mL flush before and after cycle + 120 mL every 4 hrs while TF off (480 mL)    Intake/Tolerance:    Calorie Count:  9/14: 1262 cals, 52 gm pro   9/15: 596 carol, 24 g pro  9/16: 302 cals, 9 gm pro    3day AVERAGE = 720 cals (36% needs), 28 gm pro (29% needs)    Spoke briefly with pt this morning  States he does like the smoothies  Decreased appetite  Ordering only a few items at meals (broth, soup, yogurt)  Note that breakfast tray is sitting by the window - has not eaten yet    9/16: SLP --> \"Patient continues with moderate to severe dysphagia and high risk for aspiration. Fatigue is impacting safety " "of his swallow function\".     Pt is tolerating night TF  BM x5 past 24 hrs    BGM: 212, 224, 218  Medium sliding scale insulin, 14 units lantus at bedtime, 6 units lantus at breakfast      ASSESSED NUTRITION NEEDS:  Dosing Weight 78.6 kg (adjusted)  Estimated Energy Needs: 2112-5470 kcals (25-30 Kcal/Kg)  Estimated Protein Needs:  grams protein (1.2-1.5 g pro/Kg)        NEW FINDINGS:   Vitals:    09/08/20 0000 09/09/20 0600 09/10/20 0600 09/12/20 0400   Weight: 112.4 kg (247 lb 12.8 oz) 111.9 kg (246 lb 11.1 oz) 108.4 kg (238 lb 15.7 oz) 108.2 kg (238 lb 8.6 oz)    09/16/20 0629 09/17/20 0624   Weight: 110.7 kg (244 lb) 111.7 kg (246 lb 4.8 oz)     July 2020: 255 lbs  Wt is down ~4% past 2 months    Previous Goals(9/14) :   Pt will meet % estimated needs via TF  Evaluation: Not met - TF switched to noc cycle to promote incr po intake  Pt will take at least 50% meals and supplements TID, or at least 60% estimated nutrient needs orally   Evaluation: Not met    Previous Nutrition Diagnosis (9/14):   Swallowing Difficulty related to dysphagia from CVA as evidenced by DD1 diet with NTL  Evaluation: No change, modified below      MALNUTRITION  % Weight Loss:  Up to 5% in 1 month (non-severe malnutrition) - 4% from 2 months ago  % Intake:  Decreased intake does not meet criteria for malnutrition  - po + TF meeting 80-90% est needs past 3 days  Subcutaneous Fat Loss:  None observed  Muscle Loss:  None observed  Fluid Retention:  None noted    Malnutrition Diagnosis: Patient does not meet two of the above criteria necessary for diagnosing malnutrition      CURRENT NUTRITION DIAGNOSIS  Inadequate oral intake related to fatigue, dysphagia as evidenced by pt meeting 30-35% est needs orally and need for continued EN    INTERVENTIONS  Recommendations / Nutrition Prescription  Full liquids, NTL (per SLP)  Nutrition supplement    As po intake has decreased, will increase cycle of TF to 14 hrs ---> Isosource 1.5 @ 60 " mL/hr x 14 hrs = 1260 cals (64% est needs), 57 gm pro (60% est needs), 13 gm fiber, 638 mL free water, 148 gm CHO (10.5 gm/hr).    If pt unable to increase po intake, may need to resume continuous 24 hrs cycle to meet needs    Implementation  EN Schedule ---> orders modified in Epic  Continue to send a thickened smoothie with meals (each provides 245 cals, 16 gm pro)  Encouraged pt to take meals as able to provide additional calories     Goals  Pt to consume at least 700 cals and 40 gm pro per day via meals  EN + po intake to meet % est needs      MONITORING AND EVALUATION:  Progress towards goals will be monitored and evaluated per protocol and Practice Guidelines

## 2020-09-17 NOTE — PLAN OF CARE
Pt here with R MCA CVA. A&Ox3, disoriented to time. Neuros with slurred speech,L droop, L field cut, L visual neglect, L hemiplegia, absent L sensation, does not track Left. At 0430 pt was having painful L leg and L arm spasms. Pt reported that the spasms were painful and he felt tingling. Pt withdrew to pain in arm at this time but writer unsure if it was a spasm or withdraw. Pt reported all movements at this time were involuntary. VSS on RA-  BP parameters less than 160 systolic. Tele SR with PACs.  TF infusing at 60 mL/hr- stop at 0800, otherwise nectar thick full liquid. Up with A2 lift. BG elevated, s/s insulin changed to q4hr due to elevated blood sugars. Pain at PICC site- topical lidocaine applied- PICC to RUE flushes and draws. Pt scoring green on the Aggression Stop Light Tool.Incontinent of urine- condom cath applied. Incontinent of stool. Redness to groin and open in skin folds. Plan for discharge to ARU when stable.

## 2020-09-17 NOTE — PLAN OF CARE
Discharge Planner SLP   Patient plan for discharge: ARU  Current status: SLP: Pt alert and in a chair after PT session. Reviewed VFSS, diet levels, and SLP goals. Pt/son reported goal to return to pureed foods and pt requesting more assistance with feeding as he is hungry, but is neglecting any food/liquids on the left side. Pt reported previous aspiration event was with thin/improperly thickened coffee. Pt agreeable to trials of nectar thick liquids by cup with hand over hand assist and puree solids. Slow, but adequate a/p propulsion, no overt s/sx of aspiration and no overt s/sx of aspiration. Pt receptive to cues for double swallows with every bite.     Pt/son in agreement to upgrade to dysphagia diet level 1 and nectar thick liquids with 1:1 assist for feeding, hand over hand assist to drink by the cup, cues for double swallows. Please offer foods/liquids throughout the day when pt is alert.     Barriers to return to prior living situation: Dysphagia; dysarthria  Recommendations for discharge: ARU  Rationale for [place]s: Continue ST daily for swallow and speech       Entered by: Li Johnson 09/17/2020 3:40 PM       Addendum:Returned with meal this pm. Pt continued to demonstrate no overt s/sx of aspiration by teaspoon and cup. Good response to double swallows. Targeted dysarthria goals, strategies, and exercises. Encouraged strong breath support. Brush teeth 3x/day.

## 2020-09-17 NOTE — PLAN OF CARE
Discharge Planner OT   Patient plan for discharge: none stated  Current status: pt in bed, sleeping, lethargic, completed LUE PROM all planes, flaccid, pt with HOB elevated participated with facial hygiene task with cool cloth, pt became more alert with this task, pt completed supine to sit EOB with max A of 2, with cues and assist for BUE hand placement on bed pt able to maintain static sitting SBA, SBA to Jose Alfredo with dynamic sitting while pt participated with scanning to left and reaching past midline. Pt dependent of ceiling lift to complete bed to chair transfer.    Barriers to return to prior living situation: L hemiparesis, weakness  Recommendations for discharge: ARU per plan established by the Occupational Therapist  Rationale for [place]s: Patient would benefit from 3 hours of interdisciplinary therapy for maximum independence in ADLs/mobility.  Patient was independent prior to CVA, has good family support.       Entered by: Katerina Ardon 09/17/2020 2:25 PM

## 2020-09-17 NOTE — PROVIDER NOTIFICATION
"Brief update    Changed sliding scale insulin to q4h BG checks and correction given largely NPO status (tube feeds, mostly)    Has some \"pins and needles\" arm discomfort around PICC site. Flushing well, no redness or swelling per nursing    Try lidocaine cream to site of discomfort, further assessment per rounding team in AM    Lino Colindres MD  2:40 AM    "

## 2020-09-18 ENCOUNTER — APPOINTMENT (OUTPATIENT)
Dept: OCCUPATIONAL THERAPY | Facility: CLINIC | Age: 70
DRG: 064 | End: 2020-09-18
Attending: HOSPITALIST
Payer: COMMERCIAL

## 2020-09-18 ENCOUNTER — APPOINTMENT (OUTPATIENT)
Dept: SPEECH THERAPY | Facility: CLINIC | Age: 70
DRG: 064 | End: 2020-09-18
Attending: HOSPITALIST
Payer: COMMERCIAL

## 2020-09-18 ENCOUNTER — APPOINTMENT (OUTPATIENT)
Dept: PHYSICAL THERAPY | Facility: CLINIC | Age: 70
DRG: 064 | End: 2020-09-18
Attending: HOSPITALIST
Payer: COMMERCIAL

## 2020-09-18 LAB
ANION GAP SERPL CALCULATED.3IONS-SCNC: 6 MMOL/L (ref 3–14)
BUN SERPL-MCNC: 22 MG/DL (ref 7–30)
CALCIUM SERPL-MCNC: 7.9 MG/DL (ref 8.5–10.1)
CHLORIDE SERPL-SCNC: 109 MMOL/L (ref 94–109)
CO2 SERPL-SCNC: 23 MMOL/L (ref 20–32)
CREAT SERPL-MCNC: 0.8 MG/DL (ref 0.66–1.25)
ERYTHROCYTE [DISTWIDTH] IN BLOOD BY AUTOMATED COUNT: 12.5 % (ref 10–15)
GFR SERPL CREATININE-BSD FRML MDRD: 90 ML/MIN/{1.73_M2}
GLUCOSE BLDC GLUCOMTR-MCNC: 168 MG/DL (ref 70–99)
GLUCOSE BLDC GLUCOMTR-MCNC: 179 MG/DL (ref 70–99)
GLUCOSE BLDC GLUCOMTR-MCNC: 188 MG/DL (ref 70–99)
GLUCOSE BLDC GLUCOMTR-MCNC: 202 MG/DL (ref 70–99)
GLUCOSE BLDC GLUCOMTR-MCNC: 227 MG/DL (ref 70–99)
GLUCOSE BLDC GLUCOMTR-MCNC: 244 MG/DL (ref 70–99)
GLUCOSE SERPL-MCNC: 263 MG/DL (ref 70–99)
HCT VFR BLD AUTO: 34.6 % (ref 40–53)
HGB BLD-MCNC: 11.6 G/DL (ref 13.3–17.7)
MCH RBC QN AUTO: 30.9 PG (ref 26.5–33)
MCHC RBC AUTO-ENTMCNC: 33.5 G/DL (ref 31.5–36.5)
MCV RBC AUTO: 92 FL (ref 78–100)
PLATELET # BLD AUTO: 176 10E9/L (ref 150–450)
POTASSIUM SERPL-SCNC: 3.8 MMOL/L (ref 3.4–5.3)
RBC # BLD AUTO: 3.75 10E12/L (ref 4.4–5.9)
SODIUM SERPL-SCNC: 138 MMOL/L (ref 133–144)
WBC # BLD AUTO: 9.7 10E9/L (ref 4–11)

## 2020-09-18 PROCEDURE — 25000131 ZZH RX MED GY IP 250 OP 636 PS 637: Performed by: INTERNAL MEDICINE

## 2020-09-18 PROCEDURE — 00000146 ZZHCL STATISTIC GLUCOSE BY METER IP

## 2020-09-18 PROCEDURE — 25000132 ZZH RX MED GY IP 250 OP 250 PS 637: Performed by: STUDENT IN AN ORGANIZED HEALTH CARE EDUCATION/TRAINING PROGRAM

## 2020-09-18 PROCEDURE — 80048 BASIC METABOLIC PNL TOTAL CA: CPT | Performed by: INTERNAL MEDICINE

## 2020-09-18 PROCEDURE — 92507 TX SP LANG VOICE COMM INDIV: CPT | Mod: GN | Performed by: SPEECH-LANGUAGE PATHOLOGIST

## 2020-09-18 PROCEDURE — 97530 THERAPEUTIC ACTIVITIES: CPT | Mod: GO

## 2020-09-18 PROCEDURE — 40000239 ZZH STATISTIC VAT ROUNDS

## 2020-09-18 PROCEDURE — 97535 SELF CARE MNGMENT TRAINING: CPT | Mod: GO

## 2020-09-18 PROCEDURE — 85027 COMPLETE CBC AUTOMATED: CPT | Performed by: INTERNAL MEDICINE

## 2020-09-18 PROCEDURE — 25000128 H RX IP 250 OP 636: Performed by: STUDENT IN AN ORGANIZED HEALTH CARE EDUCATION/TRAINING PROGRAM

## 2020-09-18 PROCEDURE — 25000132 ZZH RX MED GY IP 250 OP 250 PS 637

## 2020-09-18 PROCEDURE — 12000000 ZZH R&B MED SURG/OB

## 2020-09-18 PROCEDURE — 92526 ORAL FUNCTION THERAPY: CPT | Mod: GN | Performed by: SPEECH-LANGUAGE PATHOLOGIST

## 2020-09-18 PROCEDURE — 97530 THERAPEUTIC ACTIVITIES: CPT | Mod: GP | Performed by: PHYSICAL THERAPIST

## 2020-09-18 PROCEDURE — 99233 SBSQ HOSP IP/OBS HIGH 50: CPT | Performed by: INTERNAL MEDICINE

## 2020-09-18 RX ORDER — NICOTINE POLACRILEX 4 MG
15-30 LOZENGE BUCCAL
Status: DISCONTINUED | OUTPATIENT
Start: 2020-09-18 | End: 2020-09-24 | Stop reason: HOSPADM

## 2020-09-18 RX ORDER — DEXTROSE MONOHYDRATE 25 G/50ML
25-50 INJECTION, SOLUTION INTRAVENOUS
Status: DISCONTINUED | OUTPATIENT
Start: 2020-09-18 | End: 2020-09-24 | Stop reason: HOSPADM

## 2020-09-18 RX ADMIN — SACUBITRIL AND VALSARTAN 1 TABLET: 49; 51 TABLET, FILM COATED ORAL at 09:05

## 2020-09-18 RX ADMIN — INSULIN ASPART 1 UNITS: 100 INJECTION, SOLUTION INTRAVENOUS; SUBCUTANEOUS at 13:02

## 2020-09-18 RX ADMIN — CARBOXYMETHYLCELLULOSE SODIUM 2 DROP: 5 SOLUTION/ DROPS OPHTHALMIC at 00:37

## 2020-09-18 RX ADMIN — METOPROLOL TARTRATE 50 MG: 50 TABLET, FILM COATED ORAL at 20:05

## 2020-09-18 RX ADMIN — INSULIN GLARGINE 14 UNITS: 100 INJECTION, SOLUTION SUBCUTANEOUS at 06:05

## 2020-09-18 RX ADMIN — HEPARIN SODIUM 7500 UNITS: 10000 INJECTION INTRAVENOUS; SUBCUTANEOUS at 13:03

## 2020-09-18 RX ADMIN — HYDRALAZINE HYDROCHLORIDE 25 MG: 25 TABLET ORAL at 17:50

## 2020-09-18 RX ADMIN — VALSARTAN 320 MG: 160 TABLET ORAL at 09:05

## 2020-09-18 RX ADMIN — HEPARIN SODIUM 7500 UNITS: 10000 INJECTION INTRAVENOUS; SUBCUTANEOUS at 00:37

## 2020-09-18 RX ADMIN — EZETIMIBE 10 MG: 10 TABLET ORAL at 09:04

## 2020-09-18 RX ADMIN — MICONAZOLE NITRATE: 20 POWDER TOPICAL at 00:23

## 2020-09-18 RX ADMIN — AMLODIPINE BESYLATE 20 MG: 10 TABLET ORAL at 09:05

## 2020-09-18 RX ADMIN — HYDRALAZINE HYDROCHLORIDE 25 MG: 25 TABLET ORAL at 09:04

## 2020-09-18 RX ADMIN — LEVOTHYROXINE SODIUM 50 MCG: 50 TABLET ORAL at 09:05

## 2020-09-18 RX ADMIN — HYDRALAZINE HYDROCHLORIDE 25 MG: 25 TABLET ORAL at 00:23

## 2020-09-18 RX ADMIN — INSULIN GLARGINE 10 UNITS: 100 INJECTION, SOLUTION SUBCUTANEOUS at 22:14

## 2020-09-18 RX ADMIN — SACUBITRIL AND VALSARTAN 1 TABLET: 49; 51 TABLET, FILM COATED ORAL at 20:05

## 2020-09-18 RX ADMIN — ATORVASTATIN CALCIUM 40 MG: 40 TABLET, FILM COATED ORAL at 09:04

## 2020-09-18 RX ADMIN — METOPROLOL TARTRATE 50 MG: 50 TABLET, FILM COATED ORAL at 09:05

## 2020-09-18 RX ADMIN — INSULIN ASPART 1 UNITS: 100 INJECTION, SOLUTION INTRAVENOUS; SUBCUTANEOUS at 17:50

## 2020-09-18 RX ADMIN — ASPIRIN 81 MG 81 MG: 81 TABLET ORAL at 09:04

## 2020-09-18 ASSESSMENT — ACTIVITIES OF DAILY LIVING (ADL)
ADLS_ACUITY_SCORE: 23
ADLS_ACUITY_SCORE: 25
ADLS_ACUITY_SCORE: 25
ADLS_ACUITY_SCORE: 24
ADLS_ACUITY_SCORE: 25
ADLS_ACUITY_SCORE: 23

## 2020-09-18 NOTE — CODE/RAPID RESPONSE
[facility]    House NP Note  9/18/2020   Time Called: 522pm    called for: fall    Assessment & Plan   IMPRESSION & PLAN:    Jorje Teran is a 70 year old male w/ PMH of HTN, DM2, SHELDON, hypothyroidism, morbid obesity, hypertension, hyperlipidemia, CAD with CABG x3 vessels who was admitted on 9/7/2020 for right ICA extending into the MCA occlusion of the M1 segment ischemic stroke presenting more than 24 hours prior to admission.  He has residual left hemiplegia.    Patient was sitting in the chair this afternoon when the nursing assistant went to go and check on him.  That staff member found patient essentially half off the chair sitting on the foot rest.  However there was enough weight still on the seat portion of the chair to prevent the chair alarm from sounding.  Staff assist button was pushed by nursing assistant.  RN came to assist and made the determination that the safest way to proceed to reposition patient would be to lower him to the floor so as then to be able to safely get the sling under the patient to utilize the ceiling lift.  Was called to evaluate the patient after an assisted lowering down to the ground.    On my arrival patient is awake.  His speech is difficult to comprehend though some words are comprehensible but RN staff tell me that this is his new baseline.  He denies any  complaints specifically shortness of breath, or chest pain.  Staff deny that there is any loss of consciousness or head strike and it was just a slow slide down the chair.  Heart rate is 77 sinus rhythm on telemetry, SPO2 96% on room air, /71.    Impression: Witnessed/assisted down to the ground type fall    INTERVENTIONS:  -Vital signs as above  -Glucose 168  - Nursing staff lifted patient back to bed using the ceiling lift  -I Examineed patient no obvious bony joint deformities or elicitable pain so will hold off on any imaging.     Working diagnosis: Controlled assisted lowering down to the  ground    At the end of the RRT patient was returned to bed and remains hemodynamically stable and without complaints    disposition: Patient may remain on this unit    Discussed with and defer further cares to rounding hospitalist attending physician .    Code Status: Full Code    Allergies   No Known Allergies    Physical Exam   Vital Signs with Ranges:  Temp:  [97.6  F (36.4  C)-98.6  F (37  C)] 98.6  F (37  C)  Pulse:  [65-72] 72  Resp:  [18] 18  BP: (103-143)/(55-79) 129/73  SpO2:  [95 %-97 %] 97 %  I/O last 3 completed shifts:  In: 480 [P.O.:20; NG/GT:460]  Out: 1150 [Urine:1150]    Constitutional:no acute distress  Neuro: +follows commands wiggle toes and show 2 fingers on the right only, slurred speech   HEENT:  mouth moist oral mucosa  Neck: supple  Heart: S1S2, no murmurs, sinus rhythm on the monitor, mild HTN  Lungs: CTAB upper lobes  Abd:normoactive bowel sounds, soft, nontender, nondisteded  Ext:+1 bilateral lower extremity edema    Data   CBC with Diff:  Recent Labs   Lab Test 09/18/20  0555  09/07/20  1831   WBC 9.7   < > 11.2*   HGB 11.6*   < > 14.5   MCV 92   < > 91      < > 219   INR  --   --  1.03    < > = values in this interval not displayed.        Comprehensive Metabolic Panel:  Recent Labs   Lab 09/18/20  0555  09/14/20  1005      < > 146*   POTASSIUM 3.8   < > 4.6   CHLORIDE 109   < >  --    CO2 23   < >  --    ANIONGAP 6   < >  --    *   < >  --    BUN 22   < >  --    CR 0.80   < >  --    GFRESTIMATED 90   < >  --    GFRESTBLACK >90   < >  --    SELENA 7.9*   < >  --    MAG  --   --  2.7*   PHOS  --   --  3.7    < > = values in this interval not displayed.       INR:    Recent Labs   Lab Test 09/07/20  1831   INR 1.03       Time Spent on this Encounter   I spent 10 minutes on the unit/floor managing the care of Jorje Teran. Over 50% of my time was spent counseling the patient and/or coordinating care regarding services listed in this note.    Nelly Delong,  [facility]  Hospitalist [place] [facility]  [phone]

## 2020-09-18 NOTE — PLAN OF CARE
Discharge Planner PT   Patient plan for discharge: Didn't state  Current status: Son present and very supportive. Patient able to plantarflex and dorsiflex left foot on command today. No other active movement noted left LE or UE.   Transferred sup to sit with multiple verbal and manual cues for technique (difficulty following so manual cues work best) and mod assist of 2. Sitting balance fluctuates but able to correct left lean by reaching right with cues (sometimes self corrects).   Practiced sit to stand from bed times 2 with therapist supporting hips with a blanket under his hips and the therapist blocking left knee. Able to fully extend right knee and get to full standing with max assist of 2 and with right hand on hemiwalker.   Returned to supine with lift and informed nurse patient needed to be cleaned up.   Barriers to return to prior living situation: Unsafe: Lives alone, Heavy assist of 2-3, L sided weakness, Fall risk   Recommendations for discharge: ARU  Rationale for [place]s: Patient has made consistent progress, has excellent family support and was independent prior to admission. Patient needs intense interdisciplinary approach of all 3 therapies to maximize his independence.        Entered by: Crystal Mix 09/18/2020 10:24 AM

## 2020-09-18 NOTE — PROGRESS NOTES
[facility]  Hospitalist Progress Note  Jose Murphy MD  09/18/2020    Assessment & Plan   Jorje Teran is a 70 year old male who presents with weakness and slurred speech     CVA with dense left hemiparesisLast baseline known noon on 9/6 (over 24 hours PTA). Son went to check on father afternoon/evening of presentation and he was on ground, unable to move his L side and slurring words. Vitals stable. Labs normal except 11.2. CXR clear.   *CT with filling defect within the distal R ICA extending into the R MCA and occlusion of the R MCA at the M1 segment. CTA with severe plaquing at R carotid bifurcation with filling defect in the prox R ICA suggestive of thrombus contributing to approximately 80% stenosis. 60% stenosis of the proximal L ICA. TTE with bubble -> Normal left ventricular size and function. Left ventricular ejection fraction of 55-60%. No segmental wall motion abnormalities noted.  No shunting at the atrial level on suboptimal Bubble study.  - Lipids 7/29/20- LDL 78, HDL 56, , T chol 164  - A1C 7/2020 7.1%   - TSH 7/2020 at 2.05   - Given  mg x 1 on arrival to ED  - Head CTs have been stable for the most part.  Plan:  - goal for SBPs < 160    - Q4H hour neuro checks    - Labetalol as needed for SBP>160  - Neurology following and appreciate their assistance   - ASA 81 mg daily PO/NY  - atorvastatin 40 mg daily when able    FEN  - has dysphagia and on modified diet and being followed by speech daily  - poor oral intake and significant dysphagia and on modified diet (eating 30%)  - discontinue NG FT as patient not able to tolerate  - calorie counts over the weekend, and if not eating 70% of daily requirements, then plan for PEG tube placement    - PICC placed 09/10 and CVC removed 09/10   - PT/OT and bedside swallow, SLP.  Recommending ARU   -  Patient still intermittently heavy assist of 2-3  -  Will monitor over the weekend and plan for ARU on 9/21     CAD s/p CABG  x 3, x 1 in 2015  HTN  HLD  PTA (needs med rec) amlodipine 20 mg daily, ASA 81 mg daiy, atorvastatin 40 mg daily, Zetia 10 mg daily, furosemide 40 mg BID, metoprolol 25 mg BID, Entresto 49-51 1 tab BID, valsartan 320 mg daily  Follows with Dr. Vincent with [facility]. Some concern re: medical compliance.  TTE with bubble this admission -> Normal left ventricular size and function. Left ventricular ejection fraction of 55-60%. No segmental wall motion abnormalities noted.  No shunting at the atrial level on suboptimal Bubble study.  Plan:  - continue Norvasc /Entresto / Metoprolol 50 mg BID and Valsartan  - Hydralazine PO started  - Holding hydrochlorothiazide given Na goals  - PRN labetalol, hydralazine SBP>160     Hypernatremia- resolved  Sodiums peaked at 159 > 146 > 140  with free water/tube feeds  - Continue to monitor      DM II  Not on meds for this. Recent A1C at 7.1 7/2020.  BG goal is < 180. BG not currently well controlled.  Blood sugars have been difficult to control with sugars in the 300s while on TF   - previously on insulin gtt  - TF started with hyperglycemia, 200+ range  - 6/14 units of lantus bid, will change to 10 units BID with stopping tube feedings and letting patient eat on his own.  - continue med SSI     LE edema  US at [facility] negative for DVT     SHELDON  Intermittent treatment as outpatient   - hold CPAP for now     Hypothyroidism  PTA (needs med rec) levothyroxine 50 mcg daily  TSH checked 7/29/2020 at 2.05  - Continue PTA Levothyroxine     Morbid obesity  BMI noted  7/202 at ~38. Will need to encourage healthy lifestyle and weight loss with recovery     Diet: Snacks/Supplements Adult: Other; thickened smoothie with meals  (RD); With Meals  Calorie Counts  Room Service  Adult Formula Drip Feeding: Continuous Isosource 1.5; Nasogastric tube; Goal Rate: 60; mL/hr; From: 8:00 PM; 8:00 AM; Medication - Feeding Tube Flush Frequency: At least 15-30 mL water before and after medication administration  "and with tube clogg...  Combination Diet Full Liquid Diet    DVT Prophylaxis: Pneumatic Compression Devices  Hayes Catheter: not present  Code Status: Full Code         Disposition Plan     Expected discharge  to ARU on 9/21 or 9/22    Interval History   -- patient states NGFT making it difficult for him to breath and have much of an appetite  -- discussed with RN, OT, Speech and nutrition    -Data reviewed today: I reviewed all new labs and imaging over the last 24 hours. I personally reviewed no images or EKG's today.    Physical Exam    , Blood pressure 129/73, pulse 72, temperature 98.6  F (37  C), temperature source Axillary, resp. rate 18, height 1.778 m (5' 10\"), weight 111.7 kg (246 lb 4.8 oz), SpO2 97 %.  Vitals:    09/12/20 0400 09/16/20 0629 09/17/20 0624   Weight: 108.2 kg (238 lb 8.6 oz) 110.7 kg (244 lb) 111.7 kg (246 lb 4.8 oz)     Vital Signs with Ranges  Temp:  [97.6  F (36.4  C)-98.6  F (37  C)] 98.6  F (37  C)  Pulse:  [65-72] 72  Resp:  [18] 18  BP: (103-143)/(55-79) 129/73  SpO2:  [95 %-97 %] 97 %  I/O's Last 24 hours  I/O last 3 completed shifts:  In: 480 [P.O.:20; NG/GT:460]  Out: 1150 [Urine:1150]    Constitutional: Awake, alert, cooperative, dysarthria, facial droop and dense left sided hemiparesis  Respiratory: Clear to auscultation bilaterally, no crackles or wheezing  Cardiovascular: Regular rate and rhythm, normal S1 and S2   GI: Normal bowel sounds, soft, non-distended, non-tender  Skin/Integumen: No rashes, no cyanosis, ++ edema  Other: Field cut, neglect     Medications   All medications were reviewed.    - MEDICATION INSTRUCTIONS -         alteplase  2 mg Intravenous Once     amLODIPine  20 mg Oral Daily     aspirin  81 mg Oral or Feeding Tube Daily     atorvastatin  40 mg Oral Daily     ezetimibe  10 mg Oral Daily     heparin ANTICOAGULANT  7,500 Units Subcutaneous Q12H     hydrALAZINE  25 mg Oral Q8H TORI     insulin aspart  1-7 Units Subcutaneous TID AC     insulin aspart  1-5 " Units Subcutaneous At Bedtime     insulin glargine  10 Units Subcutaneous At Bedtime     [START ON 9/19/2020] insulin glargine  10 Units Subcutaneous QAM AC     levothyroxine  50 mcg Oral QAM AC     lidocaine   Urethral Once     metoprolol tartrate  50 mg Oral BID     sacubitril-valsartan  1 tablet Oral BID     sodium chloride (PF)  10 mL Intracatheter Q8H     sodium chloride (PF)  10 mL Intracatheter Q8H     valsartan  320 mg Oral Daily        Data   Recent Labs   Lab 09/18/20  0555 09/17/20  0445 09/16/20  0950 09/16/20  0623 09/16/20  0030 09/14/20  1445   WBC 9.7 10.0  --   --   --  9.1   HGB 11.6* 12.6*  --   --   --  12.4*   MCV 92 93  --   --   --  96    188  --  152  --  192    140  --   --  146*  --    POTASSIUM 3.8 3.8 3.7  --  3.2*  --    CHLORIDE 109 111*  --   --  113*  --    CO2 23 23  --   --  26  --    BUN 22 24  --   --  28  --    CR 0.80 0.87  --   --  0.94  --    ANIONGAP 6 6  --   --  7  --    SELENA 7.9* 8.3*  --   --  8.2*  --    * 224*  --   --  176*  --        No results found for this or any previous visit (from the past 24 hour(s)).    Jose Murphy MD  Text Page  (7am to 6pm)

## 2020-09-18 NOTE — PLAN OF CARE
Discharge Planner SLP   Patient plan for discharge: ARU then home with as much assist from family/aides as needed  Current status: Swallow: Coordinated with Pt/son/RN/RD/MD. Pt wanting to remove NG asap. Pt currently consuming approximately 30% of needs with intake impacted by fatigue with BID sessions, not feeling as hungry, and left neglect with pt reporting if water/food in on the left side, he is not aware it is present. Pt accepted trials of nectar thick liquids by spoon and cup. Good timing and no overt s/sx of aspiration. Per MD, otilia for NG removal.     Continue dysphagia diet level 1 and nectar thick liquids with 1:1 assist - liquids by teaspoon or hand over hand assist with liquids by cup. Crush meds. Continue supplements/room service per GABI.     Speech: Pt quite lethargic initially and son concerned for potential neuro change. After MD notified, pt appeared to be more alert and back to similar performance as previous sessions. Targeted dysarthria strategies and education. Continued moderate dysarthria.     Barriers to return to prior living situation: Dysarthria; dysphagia  Recommendations for discharge: ARU  Rationale for [place]s: Given rest breaks, pt was able to tolerate 50 minute session. Great motivation and strong family support. Continue BID sessions for both swallow and speech.        Entered by: Li Johnson 09/18/2020 1:03 PM     Addendum: Excellent participation in PM session despite initially sleeping and being lethargic. Pt consumed 50% of supplement and 4oz of nectar thick water by cup with improved oral control, minimal spillage and no overt s/sx of aspiration. Improved tolerance and ability to participate in dysarthria strategies and exercises.

## 2020-09-18 NOTE — PLAN OF CARE
Pt here with R MCA on CT. A&ox3-4, difficulty with exact date. L field cut, R gaze preference/cannot overcome midline, L facial droop, slurred speech, L arm and leg hemiplegia/absent sensation. VSS. Tele NSR. DD1 diet, nectar thick liquids, supplemental tube feeding 6pm to 8am. Takes pills through tube, may be able to trial meds by mouth when more awake. Up with two and lift, turn and reposition q2. Male Bee replaced for incontinence. Denies pain. Pt scoring green on the Aggression Stop Light Tool. Plan to discharge to ARU when bed available/medically ready, possibly this weekend.

## 2020-09-18 NOTE — PROGRESS NOTES
JAMIL    I: SW spoke w/ealth [facility] liaison, updating her that family reports they will be hiring 24/7 assistance for pt post ARU. Per liaison, they still  require that pt advance to an assist of 2 before they could admit (currently a 3 person assist); pt will also need a plan regarding NG tube in place.    Per liaison, they will send for [employer] auth today, should pt reach their parameters over the weekend.    P: Continue to assist as needed.    GAMAL Vargas   [facility]  [phone]

## 2020-09-18 NOTE — PLAN OF CARE
Discharge Planner OT   Patient plan for discharge: ARU  Current status: Pt max A for sup>sit EOB, heavy CGA to min A for dynamic seated balance without supporting self. Pt able to maintain static seated balance w/SBA and support of RUE on bed. , hand over hand assist for washing face and shaving while seated EOB using pt's L hand. TYRONE lee, Pt max x2 for rolling side to side in bed for changing brief, dependent of ceiling lift to transfer to chair.   Barriers to return to prior living situation: L hemiparesis, weakness  Recommendations for discharge: ARU per plan established by the Occupational Therapist  Rationale for [place]s: Patient would benefit from 3 hours of interdisciplinary therapy for maximum independence in ADLs/mobility.  Patient was independent prior to CVA, has good family support.       Entered by: Desire Buchanan 09/18/2020 12:36 PM

## 2020-09-18 NOTE — PLAN OF CARE
A&Ox 3, disoriented to time  VSS on RA  Tele:NSR  Denies Pain  Abnormal Labs:   Neuros:Slurred/garbled speech, L hemiparesis, L visual neglect, L field cut, L droop  Lungs:dim  ABD/BS: obese, +BS, +flatus  Bowel and Bladder: Inc  Pulses/CMS:+2, Diminished to absent feeling on L side  Extremities/strength:L sided hemiparesis  Skin:Scrotum red, blanchable. Powder applied  Diet:DD1 with Nectar thick, Austin count, total feed. Encourage feeding self  Mobility:Ax2 with lift  Drains/Devices: RUE PICC  Procedures/Tests:None  Discharge Plan:Pending  Other:Pt had assisted fall to the floor from chair. Pt sliding down,  partially on seat and footrest. Chair alarm not engaged until Pt was lowered to the floor to safely lift with sling. House MD called. Hospitalist notified. Pt refused for family to be notified. No imaging needed. Compass report done.

## 2020-09-19 ENCOUNTER — APPOINTMENT (OUTPATIENT)
Dept: OCCUPATIONAL THERAPY | Facility: CLINIC | Age: 70
DRG: 064 | End: 2020-09-19
Attending: HOSPITALIST
Payer: COMMERCIAL

## 2020-09-19 ENCOUNTER — APPOINTMENT (OUTPATIENT)
Dept: SPEECH THERAPY | Facility: CLINIC | Age: 70
DRG: 064 | End: 2020-09-19
Attending: HOSPITALIST
Payer: COMMERCIAL

## 2020-09-19 ENCOUNTER — APPOINTMENT (OUTPATIENT)
Dept: PHYSICAL THERAPY | Facility: CLINIC | Age: 70
DRG: 064 | End: 2020-09-19
Attending: HOSPITALIST
Payer: COMMERCIAL

## 2020-09-19 LAB
ANION GAP SERPL CALCULATED.3IONS-SCNC: 6 MMOL/L (ref 3–14)
BUN SERPL-MCNC: 21 MG/DL (ref 7–30)
CALCIUM SERPL-MCNC: 8.2 MG/DL (ref 8.5–10.1)
CHLORIDE SERPL-SCNC: 109 MMOL/L (ref 94–109)
CO2 SERPL-SCNC: 24 MMOL/L (ref 20–32)
CREAT SERPL-MCNC: 0.75 MG/DL (ref 0.66–1.25)
ERYTHROCYTE [DISTWIDTH] IN BLOOD BY AUTOMATED COUNT: 12.5 % (ref 10–15)
GFR SERPL CREATININE-BSD FRML MDRD: >90 ML/MIN/{1.73_M2}
GLUCOSE BLDC GLUCOMTR-MCNC: 144 MG/DL (ref 70–99)
GLUCOSE BLDC GLUCOMTR-MCNC: 155 MG/DL (ref 70–99)
GLUCOSE BLDC GLUCOMTR-MCNC: 159 MG/DL (ref 70–99)
GLUCOSE BLDC GLUCOMTR-MCNC: 174 MG/DL (ref 70–99)
GLUCOSE BLDC GLUCOMTR-MCNC: 185 MG/DL (ref 70–99)
GLUCOSE SERPL-MCNC: 174 MG/DL (ref 70–99)
HCT VFR BLD AUTO: 33.9 % (ref 40–53)
HGB BLD-MCNC: 11.2 G/DL (ref 13.3–17.7)
MCH RBC QN AUTO: 30.5 PG (ref 26.5–33)
MCHC RBC AUTO-ENTMCNC: 33 G/DL (ref 31.5–36.5)
MCV RBC AUTO: 92 FL (ref 78–100)
PLATELET # BLD AUTO: 174 10E9/L (ref 150–450)
POTASSIUM SERPL-SCNC: 3.9 MMOL/L (ref 3.4–5.3)
RBC # BLD AUTO: 3.67 10E12/L (ref 4.4–5.9)
SODIUM SERPL-SCNC: 139 MMOL/L (ref 133–144)
WBC # BLD AUTO: 9.5 10E9/L (ref 4–11)

## 2020-09-19 PROCEDURE — 25000131 ZZH RX MED GY IP 250 OP 636 PS 637: Performed by: INTERNAL MEDICINE

## 2020-09-19 PROCEDURE — 25000132 ZZH RX MED GY IP 250 OP 250 PS 637

## 2020-09-19 PROCEDURE — 27210339 ZZH CIRCUIT HUMIDITY W/CPAP BIP

## 2020-09-19 PROCEDURE — 25000132 ZZH RX MED GY IP 250 OP 250 PS 637: Performed by: STUDENT IN AN ORGANIZED HEALTH CARE EDUCATION/TRAINING PROGRAM

## 2020-09-19 PROCEDURE — 97530 THERAPEUTIC ACTIVITIES: CPT | Mod: GP | Performed by: PHYSICAL THERAPIST

## 2020-09-19 PROCEDURE — 92526 ORAL FUNCTION THERAPY: CPT | Mod: GN | Performed by: SPEECH-LANGUAGE PATHOLOGIST

## 2020-09-19 PROCEDURE — 97110 THERAPEUTIC EXERCISES: CPT | Mod: GO | Performed by: OCCUPATIONAL THERAPY ASSISTANT

## 2020-09-19 PROCEDURE — 40000275 ZZH STATISTIC RCP TIME EA 10 MIN

## 2020-09-19 PROCEDURE — 99231 SBSQ HOSP IP/OBS SF/LOW 25: CPT | Performed by: INTERNAL MEDICINE

## 2020-09-19 PROCEDURE — 27210338 ZZH CIRCUIT HUMID FACE/TRACH MSK

## 2020-09-19 PROCEDURE — 12000000 ZZH R&B MED SURG/OB

## 2020-09-19 PROCEDURE — 80048 BASIC METABOLIC PNL TOTAL CA: CPT | Performed by: INTERNAL MEDICINE

## 2020-09-19 PROCEDURE — 85027 COMPLETE CBC AUTOMATED: CPT | Performed by: INTERNAL MEDICINE

## 2020-09-19 PROCEDURE — 25000128 H RX IP 250 OP 636: Performed by: STUDENT IN AN ORGANIZED HEALTH CARE EDUCATION/TRAINING PROGRAM

## 2020-09-19 PROCEDURE — 40000239 ZZH STATISTIC VAT ROUNDS

## 2020-09-19 PROCEDURE — 27211316 ZZ H MASK, CPAP TOTAL HEADGEAR, LATEX FREE

## 2020-09-19 PROCEDURE — 92507 TX SP LANG VOICE COMM INDIV: CPT | Mod: GN | Performed by: SPEECH-LANGUAGE PATHOLOGIST

## 2020-09-19 PROCEDURE — 00000146 ZZHCL STATISTIC GLUCOSE BY METER IP

## 2020-09-19 RX ADMIN — INSULIN ASPART 1 UNITS: 100 INJECTION, SOLUTION INTRAVENOUS; SUBCUTANEOUS at 16:32

## 2020-09-19 RX ADMIN — LEVOTHYROXINE SODIUM 50 MCG: 50 TABLET ORAL at 08:33

## 2020-09-19 RX ADMIN — INSULIN GLARGINE 10 UNITS: 100 INJECTION, SOLUTION SUBCUTANEOUS at 08:32

## 2020-09-19 RX ADMIN — INSULIN ASPART 1 UNITS: 100 INJECTION, SOLUTION INTRAVENOUS; SUBCUTANEOUS at 13:05

## 2020-09-19 RX ADMIN — ATORVASTATIN CALCIUM 40 MG: 40 TABLET, FILM COATED ORAL at 08:32

## 2020-09-19 RX ADMIN — SACUBITRIL AND VALSARTAN 1 TABLET: 49; 51 TABLET, FILM COATED ORAL at 08:32

## 2020-09-19 RX ADMIN — ASPIRIN 81 MG 81 MG: 81 TABLET ORAL at 08:33

## 2020-09-19 RX ADMIN — AMLODIPINE BESYLATE 20 MG: 10 TABLET ORAL at 08:32

## 2020-09-19 RX ADMIN — HEPARIN SODIUM 7500 UNITS: 10000 INJECTION INTRAVENOUS; SUBCUTANEOUS at 00:04

## 2020-09-19 RX ADMIN — HYDRALAZINE HYDROCHLORIDE 25 MG: 25 TABLET ORAL at 16:25

## 2020-09-19 RX ADMIN — VALSARTAN 320 MG: 160 TABLET ORAL at 08:32

## 2020-09-19 RX ADMIN — EZETIMIBE 10 MG: 10 TABLET ORAL at 08:33

## 2020-09-19 RX ADMIN — METOPROLOL TARTRATE 50 MG: 50 TABLET, FILM COATED ORAL at 20:40

## 2020-09-19 RX ADMIN — INSULIN ASPART 1 UNITS: 100 INJECTION, SOLUTION INTRAVENOUS; SUBCUTANEOUS at 08:32

## 2020-09-19 RX ADMIN — HYDRALAZINE HYDROCHLORIDE 25 MG: 25 TABLET ORAL at 08:33

## 2020-09-19 RX ADMIN — HEPARIN SODIUM 7500 UNITS: 10000 INJECTION INTRAVENOUS; SUBCUTANEOUS at 13:02

## 2020-09-19 RX ADMIN — METOPROLOL TARTRATE 50 MG: 50 TABLET, FILM COATED ORAL at 08:33

## 2020-09-19 RX ADMIN — INSULIN GLARGINE 10 UNITS: 100 INJECTION, SOLUTION SUBCUTANEOUS at 22:45

## 2020-09-19 RX ADMIN — SACUBITRIL AND VALSARTAN 1 TABLET: 49; 51 TABLET, FILM COATED ORAL at 20:40

## 2020-09-19 RX ADMIN — HYDRALAZINE HYDROCHLORIDE 25 MG: 25 TABLET ORAL at 00:04

## 2020-09-19 RX ADMIN — MICONAZOLE NITRATE: 20 POWDER TOPICAL at 10:19

## 2020-09-19 ASSESSMENT — ACTIVITIES OF DAILY LIVING (ADL)
ADLS_ACUITY_SCORE: 23
ADLS_ACUITY_SCORE: 24
ADLS_ACUITY_SCORE: 23
ADLS_ACUITY_SCORE: 24
ADLS_ACUITY_SCORE: 24
ADLS_ACUITY_SCORE: 23

## 2020-09-19 ASSESSMENT — MIFFLIN-ST. JEOR: SCORE: 1871.2

## 2020-09-19 NOTE — PROGRESS NOTES
CM    I: Per eal [facility] liaison, they have received insurance auth on pt from [facility]. Pt is reportedly still not ready for discharge.    P: Continue to assist as needed.    GAMAL Vargas   [facility]  [phone]

## 2020-09-19 NOTE — PROGRESS NOTES
CALORIE COUNT    Current Diet Order:   DD1 + NTL    Supplement Order:   Thickened smoothie TID w/ meals    Approximate Oral Intake for 9/18:   Calories: 1068 kcal  Protein: 43 g pro    Number of Meals Recorded: 2    Enteral NS Order:   Off since yesterday.     ASSESSED NUTRITION NEEDS:  Dosing Weight 78.6 kg (adjusted)  Estimated Energy Needs: 8674-8207 kcals (25-30 Kcal/Kg)  Estimated Protein Needs:  grams protein (1.2-1.5 g pro/Kg)    Summary:   - Met 54% est energy needs and 45% est protein needs yesterday in 2 recorded meals.     - Austin cts to continue through tomorrow, with 3-day average to be available on Monday 9/21.    - Recommend pt meet 66-75% est needs on average to avoid TF replacement.     Arlet Garza RD, LD  [facility] [place], 65, 54, [facility]   Pager: [phone]  Weekend Pager: [phone]

## 2020-09-19 NOTE — PLAN OF CARE
Discharge Planner SLP   Patient plan for discharge: Not addressed.   Current status: Patient seen for swallow and speech/language treatment attempted to complete oral motor exercises, but was unable due to suspect oral apraxia. He was thirsty and was given trials of nectar thick liquids and had delayed intermittent throat clearing via the spoon and self feed by the cup. No further trials given due to fatigue impacting his swallow function.   Speech/language tx focused on verbal goals to complete sentences with focus on compensatory strategies to improve overall speech intelligibility with max cues and 50%.   Recommend: 1. Cautiously continue on the DDL 1 with nectar thick liquids. 2. Feed only when fully alert, 1;1 assistance, small bites/sips, slow rate, alternate liquids/solids  Hold meals if to tired.   Barriers to return to prior living situation: Dysphagia   Recommendations for discharge: ARC  Rationale for [place]s: Patient will need continued ST needs for dysphagia management and speech/language deficits. Anticipate patient will be able to tolerate 3 hours of therapy at time of discharge. Patient is motivated and has good family support.         Entered by: Jaky Egan 09/19/2020 12:26 PM

## 2020-09-19 NOTE — PROGRESS NOTES
[facility]  Hospitalist Progress Note  Merle Arciniega MD  09/19/2020    Assessment & Plan   Jorje Teran is a 70 year old male who presents with weakness and slurred speech     CVA with dense left hemiparesis  - Last baseline known noon on 9/6 (over 24 hours PTA). Son went to check on father afternoon/evening of presentation and he was on ground, unable to move his L side and slurring words. Vitals stable. Labs normal except 11.2. CXR clear.   *CT with filling defect within the distal R ICA extending into the R MCA and occlusion of the R MCA at the M1 segment. CTA with severe plaquing at R carotid bifurcation with filling defect in the prox R ICA suggestive of thrombus contributing to approximately 80% stenosis. 60% stenosis of the proximal L ICA. TTE with bubble -> Normal left ventricular size and function. Left ventricular ejection fraction of 55-60%. No segmental wall motion abnormalities noted.  No shunting at the atrial level on suboptimal Bubble study.  - Lipids 7/29/20- LDL 78, HDL 56, , T chol 164  - A1C 7/2020 7.1%   - TSH 7/2020 at 2.05   - Given  mg x 1 on arrival to ED  - Head CTs have been stable for the most part.  Plan:  - goal for SBPs < 160    - Q4H hour neuro checks    - Labetalol as needed for SBP>160  - Neurology signed off  - ASA 81 mg daily PO/CT  - atorvastatin 40 mg daily when able  - PT/OT- rec ACU    Dysphagia  - SLP following  - had NG FT for few days- now removed  - calorie counts over the weekend, and if not eating 70% of daily requirements, then plan for PEG tube placement   - currently on DD1 with nectar thickened liquids   - SLP -rec ARU  - PICC placed 09/10 and CVC removed 09/10      CAD s/p CABG x 3, x 1 in 2015  HTN  HLD  PTA (needs med rec) amlodipine 20 mg daily, ASA 81 mg daiy, atorvastatin 40 mg daily, Zetia 10 mg daily, furosemide 40 mg BID, metoprolol 25 mg BID, Entresto 49-51 1 tab BID, valsartan 320 mg daily  Follows with Dr. Vincent  with [facility]. Some concern re: medical compliance.  TTE with bubble this admission -> Normal left ventricular size and function. Left ventricular ejection fraction of 55-60%. No segmental wall motion abnormalities noted.  No shunting at the atrial level on suboptimal Bubble study.  Plan:  - continue Norvasc /Entresto / Metoprolol 50 mg BID and Valsartan  - Hydralazine 25 mg po TID started  - Holding hydrochlorothiazide given Na goals  - PRN labetalol, hydralazine SBP>160     Hypernatremia- resolved  Sodiums peaked at 159 > 146 > 140  with free water/tube feeds  - Continue to monitor now since NGT is out     DM II  Not on meds for this. Recent A1C at 7.1 7/2020.  BG goal is < 180. BG not currently well controlled.  Blood sugars have been difficult to control with sugars in the 300s while on TF   - previously on insulin gtt  - TF started with hyperglycemia, 200+ range  - started on lantus 14 units BID- changed to 10 units BID  On 09/18 with stopping tube feedings and letting patient eat on his own.  - continue med sliding scale insulin  - BS- 185--155     LE edema  US at [facility] negative for DVT     SHELDON  - states he could not sleep with CPAP as outpatient  - he would benefit for treating SHELDON  - will order CPAP for now to see if he tolerates it    Hypothyroidism  - resumed PTA Levothyroxine 50 mcg daily  TSH checked 7/29/2020 at 2.05     Morbid obesity  BMI noted  7/202 at ~38. Will need to encourage healthy lifestyle and weight loss with recovery     Diet: Snacks/Supplements Adult: Other; thickened smoothie with meals  (RD); With Meals  Calorie Counts  Room Service  Adult Formula Drip Feeding: Continuous Isosource 1.5; Nasogastric tube; Goal Rate: 60; mL/hr; From: 8:00 PM; 8:00 AM; Medication - Feeding Tube Flush Frequency: At least 15-30 mL water before and after medication administration and with tube clogg...  Combination Diet Full Liquid Diet    DVT Prophylaxis: Pneumatic Compression Devices  Hayes Catheter: not  "present  Code Status: Full Code         Disposition Plan     Expected discharge  to ARU on 9/21 or 9/22    Interval History    - NGT removed; SLP rec DD1 with nectar thick liquids; was up in the recliner, now back in bed; looks tired; persistent left sided hemiparesis, garbled speech and left sided facial droop.  - denies chest pain, denies SOB, no N/V, no abd pain   - discussed with RN    -Data reviewed today: I reviewed all new labs and imaging over the last 24 hours. I personally reviewed no images or EKG's today.    Physical Exam    , Blood pressure 125/66, pulse 68, temperature 98.3  F (36.8  C), temperature source Oral, resp. rate 18, height 1.778 m (5' 10\"), weight 111 kg (244 lb 11.2 oz), SpO2 97 %.  Vitals:    09/16/20 0629 09/17/20 0624 09/19/20 0607   Weight: 110.7 kg (244 lb) 111.7 kg (246 lb 4.8 oz) 111 kg (244 lb 11.2 oz)     Vital Signs with Ranges  Temp:  [98.3  F (36.8  C)-99.4  F (37.4  C)] 98.3  F (36.8  C)  Pulse:  [63-69] 68  Resp:  [16-20] 18  BP: (125-155)/(64-85) 125/66  SpO2:  [95 %-98 %] 97 %  I/O's Last 24 hours  I/O last 3 completed shifts:  In: 100 [P.O.:100]  Out: -     Constitutional: Awake, alert, cooperative, dysarthria, facial droop and dense left sided hemiparesis  Respiratory: Clear to auscultation bilaterally, no crackles or wheezing  Cardiovascular: Regular rate and rhythm, normal S1 and S2   GI: Normal bowel sounds, soft, non-distended, non-tender  Skin/Integumen: No rashes, no cyanosis, ++ edema  Other: Field cut, neglect     Medications   All medications were reviewed.    - MEDICATION INSTRUCTIONS -         amLODIPine  20 mg Oral Daily     aspirin  81 mg Oral or Feeding Tube Daily     atorvastatin  40 mg Oral Daily     ezetimibe  10 mg Oral Daily     heparin ANTICOAGULANT  7,500 Units Subcutaneous Q12H     hydrALAZINE  25 mg Oral Q8H TORI     insulin aspart  1-7 Units Subcutaneous TID AC     insulin aspart  1-5 Units Subcutaneous At Bedtime     insulin glargine  10 Units " Subcutaneous At Bedtime     insulin glargine  10 Units Subcutaneous QAM AC     levothyroxine  50 mcg Oral QAM AC     metoprolol tartrate  50 mg Oral BID     sacubitril-valsartan  1 tablet Oral BID     sodium chloride (PF)  10 mL Intracatheter Q8H     sodium chloride (PF)  10 mL Intracatheter Q8H     valsartan  320 mg Oral Daily        Data   Recent Labs   Lab 09/19/20  0635 09/18/20  0555 09/17/20  0445   WBC 9.5 9.7 10.0   HGB 11.2* 11.6* 12.6*   MCV 92 92 93    176 188    138 140   POTASSIUM 3.9 3.8 3.8   CHLORIDE 109 109 111*   CO2 24 23 23   BUN 21 22 24   CR 0.75 0.80 0.87   ANIONGAP 6 6 6   SELENA 8.2* 7.9* 8.3*   * 263* 224*       No results found for this or any previous visit (from the past 24 hour(s)).    Jose Murphy MD  Text Page  (7am to 6pm)

## 2020-09-19 NOTE — PLAN OF CARE
Discharge Planner PT   Patient plan for discharge: Didn't state  Current status: Patient continues to need multiple verbal and manual cues for technique with sup to sidelying and mod to max assist for getting to full sitting position. Able to maintain static sitting balance with just CGA for several minutes at a time. Transferred to chair using overhead lift. Sit to stand from chair with max assist of 2 with therapist supporting patients hips with a blanket and blocking his left knee. Left up in chair with bed alarm on and legs extended.   Barriers to return to prior living situation: Unsafe: Lives alone, Heavy assist of 2-3, L sided weakness, Fall risk   Recommendations for discharge: ARU  Rationale for [place]s: Patient has made consistent progress, has excellent family support and was independent prior to admission. Patient needs intense interdisciplinary approach of all 3 therapies to maximize his independence.       Entered by: Crystal Mix 09/19/2020 3:55 PM

## 2020-09-19 NOTE — PLAN OF CARE
Discharge Planner OT   Patient plan for discharge: none stated  Current status:  pt lethargic, just got done with self cares from nursing and up to chair, pt opens eyes briefly when name spoken but then closes again, completed LUE PROM all planes, no active movement noted at this time.     Barriers to return to prior living situation: L hemiparesis, weakness  Recommendations for discharge: ARU per plan established by the Occupational Therapist  Rationale for [place]s: Patient would benefit from 3 hours of interdisciplinary therapy for maximum independence in ADLs/mobility.  Patient was independent prior to CVA, has good family support.       Entered by: Katerina Ardon 09/19/2020 10:58 AM

## 2020-09-19 NOTE — PROGRESS NOTES
SPIRITUAL HEALTH SERVICES Progress Note  [facility]    Visited pt during on-call. I entered room and introduced myself and pt was able to slightly nod in the affirmative when I asked if it was a good time to visit. I told him I was available for prayer, listening, or just to stay present. Pt was able to respond with some noises and head nod in what seemed like understanding but then his eyes would look off slowly and then back to me. I offered brief prayer of blessing. I check in with nurse following visit to update her on my visit and that I wasn't sure if I was able to convey information to pt.     [facility] remains available.      Samina Flores  Chaplain Resident

## 2020-09-19 NOTE — PLAN OF CARE
Pt here with R MCA CVA. A&Ox3 disoriented to date. Neuros L hemiplegia, L field cut, L facial droop, L neglect, slurred/garbled speech, absent sensation on left side. VSS. Tele NSR. DD1 diet, nectar thick liquids. Takes pills crushed in pudding. Up with A2/lift overnight. Incontinent of bladder/bowel. Turn and repo q2h. Denies pain. Pt scoring green on the Aggression Stop Light Tool. Continue to monitor. Plan to discharge to ARU after calorie count.  Belongings in room at bedside.

## 2020-09-19 NOTE — PLAN OF CARE
Pt here with R MCA. A&O x4. Neuros slurred/garbled speech, left facial droop, left hemiplegic, left visual neglect, left hemiplegic and absent sensation on left. VSS on RA. BP within parameters. Tele NSR. Denies chest pain or SOB. L/s diminished. Tolerating DD1 diet, nectar liquids. Takes pills crushed with pudding.Total feed. Up in chair for meals. Up with lift. +BS. Incontinent of urine/stool. Turn and repo q2hrs. Denies pain. JOSEF PICC SL. Pt scoring green on the Aggression Stop Light Tool. PT/OT/SLP following. Discharge ARU pending progress.

## 2020-09-20 ENCOUNTER — APPOINTMENT (OUTPATIENT)
Dept: SPEECH THERAPY | Facility: CLINIC | Age: 70
DRG: 064 | End: 2020-09-20
Attending: HOSPITALIST
Payer: COMMERCIAL

## 2020-09-20 ENCOUNTER — APPOINTMENT (OUTPATIENT)
Dept: PHYSICAL THERAPY | Facility: CLINIC | Age: 70
DRG: 064 | End: 2020-09-20
Attending: HOSPITALIST
Payer: COMMERCIAL

## 2020-09-20 LAB
ANION GAP SERPL CALCULATED.3IONS-SCNC: 5 MMOL/L (ref 3–14)
BUN SERPL-MCNC: 22 MG/DL (ref 7–30)
CALCIUM SERPL-MCNC: 8.5 MG/DL (ref 8.5–10.1)
CHLORIDE SERPL-SCNC: 110 MMOL/L (ref 94–109)
CO2 SERPL-SCNC: 25 MMOL/L (ref 20–32)
CREAT SERPL-MCNC: 0.76 MG/DL (ref 0.66–1.25)
GFR SERPL CREATININE-BSD FRML MDRD: >90 ML/MIN/{1.73_M2}
GLUCOSE BLDC GLUCOMTR-MCNC: 149 MG/DL (ref 70–99)
GLUCOSE BLDC GLUCOMTR-MCNC: 156 MG/DL (ref 70–99)
GLUCOSE BLDC GLUCOMTR-MCNC: 178 MG/DL (ref 70–99)
GLUCOSE BLDC GLUCOMTR-MCNC: 204 MG/DL (ref 70–99)
GLUCOSE BLDC GLUCOMTR-MCNC: 210 MG/DL (ref 70–99)
GLUCOSE SERPL-MCNC: 165 MG/DL (ref 70–99)
POTASSIUM SERPL-SCNC: 4 MMOL/L (ref 3.4–5.3)
SODIUM SERPL-SCNC: 140 MMOL/L (ref 133–144)

## 2020-09-20 PROCEDURE — 25000132 ZZH RX MED GY IP 250 OP 250 PS 637

## 2020-09-20 PROCEDURE — 97535 SELF CARE MNGMENT TRAINING: CPT | Mod: GO | Performed by: OCCUPATIONAL THERAPY ASSISTANT

## 2020-09-20 PROCEDURE — 25000132 ZZH RX MED GY IP 250 OP 250 PS 637: Performed by: STUDENT IN AN ORGANIZED HEALTH CARE EDUCATION/TRAINING PROGRAM

## 2020-09-20 PROCEDURE — 12000000 ZZH R&B MED SURG/OB

## 2020-09-20 PROCEDURE — 92507 TX SP LANG VOICE COMM INDIV: CPT | Mod: GN | Performed by: SPEECH-LANGUAGE PATHOLOGIST

## 2020-09-20 PROCEDURE — 99231 SBSQ HOSP IP/OBS SF/LOW 25: CPT | Performed by: INTERNAL MEDICINE

## 2020-09-20 PROCEDURE — 97530 THERAPEUTIC ACTIVITIES: CPT | Mod: GP

## 2020-09-20 PROCEDURE — 25000128 H RX IP 250 OP 636: Performed by: STUDENT IN AN ORGANIZED HEALTH CARE EDUCATION/TRAINING PROGRAM

## 2020-09-20 PROCEDURE — 00000146 ZZHCL STATISTIC GLUCOSE BY METER IP

## 2020-09-20 PROCEDURE — 25000131 ZZH RX MED GY IP 250 OP 636 PS 637: Performed by: INTERNAL MEDICINE

## 2020-09-20 PROCEDURE — 40000239 ZZH STATISTIC VAT ROUNDS

## 2020-09-20 PROCEDURE — 80048 BASIC METABOLIC PNL TOTAL CA: CPT | Performed by: INTERNAL MEDICINE

## 2020-09-20 PROCEDURE — 92526 ORAL FUNCTION THERAPY: CPT | Mod: GN | Performed by: SPEECH-LANGUAGE PATHOLOGIST

## 2020-09-20 RX ADMIN — LEVOTHYROXINE SODIUM 50 MCG: 50 TABLET ORAL at 09:11

## 2020-09-20 RX ADMIN — SACUBITRIL AND VALSARTAN 1 TABLET: 49; 51 TABLET, FILM COATED ORAL at 20:27

## 2020-09-20 RX ADMIN — INSULIN ASPART 1 UNITS: 100 INJECTION, SOLUTION INTRAVENOUS; SUBCUTANEOUS at 12:56

## 2020-09-20 RX ADMIN — EZETIMIBE 10 MG: 10 TABLET ORAL at 09:11

## 2020-09-20 RX ADMIN — VALSARTAN 320 MG: 160 TABLET ORAL at 09:12

## 2020-09-20 RX ADMIN — HYDRALAZINE HYDROCHLORIDE 25 MG: 25 TABLET ORAL at 09:12

## 2020-09-20 RX ADMIN — HEPARIN SODIUM 7500 UNITS: 10000 INJECTION INTRAVENOUS; SUBCUTANEOUS at 00:32

## 2020-09-20 RX ADMIN — INSULIN GLARGINE 10 UNITS: 100 INJECTION, SOLUTION SUBCUTANEOUS at 22:25

## 2020-09-20 RX ADMIN — METOPROLOL TARTRATE 50 MG: 50 TABLET, FILM COATED ORAL at 09:11

## 2020-09-20 RX ADMIN — METOPROLOL TARTRATE 50 MG: 50 TABLET, FILM COATED ORAL at 20:27

## 2020-09-20 RX ADMIN — SACUBITRIL AND VALSARTAN 1 TABLET: 49; 51 TABLET, FILM COATED ORAL at 09:11

## 2020-09-20 RX ADMIN — ASPIRIN 81 MG 81 MG: 81 TABLET ORAL at 09:12

## 2020-09-20 RX ADMIN — AMLODIPINE BESYLATE 20 MG: 10 TABLET ORAL at 09:11

## 2020-09-20 RX ADMIN — ATORVASTATIN CALCIUM 40 MG: 40 TABLET, FILM COATED ORAL at 09:12

## 2020-09-20 RX ADMIN — INSULIN GLARGINE 10 UNITS: 100 INJECTION, SOLUTION SUBCUTANEOUS at 10:12

## 2020-09-20 RX ADMIN — HYDRALAZINE HYDROCHLORIDE 25 MG: 25 TABLET ORAL at 00:32

## 2020-09-20 RX ADMIN — INSULIN ASPART 2 UNITS: 100 INJECTION, SOLUTION INTRAVENOUS; SUBCUTANEOUS at 18:38

## 2020-09-20 RX ADMIN — HEPARIN SODIUM 7500 UNITS: 10000 INJECTION INTRAVENOUS; SUBCUTANEOUS at 14:20

## 2020-09-20 RX ADMIN — HYDRALAZINE HYDROCHLORIDE 25 MG: 25 TABLET ORAL at 16:36

## 2020-09-20 RX ADMIN — INSULIN ASPART 1 UNITS: 100 INJECTION, SOLUTION INTRAVENOUS; SUBCUTANEOUS at 09:10

## 2020-09-20 ASSESSMENT — ACTIVITIES OF DAILY LIVING (ADL)
ADLS_ACUITY_SCORE: 24

## 2020-09-20 NOTE — PLAN OF CARE
"Discharge Planner PT   Patient plan for discharge: Didn't state  Current status: Patient slow to respond and mumbles, but agreeable to participate. He was seated in chair on PT arrival. Attempted sit to stand x2 reps from recliner and bariatric FWW, unable to clear glutes from chair despite maxA x2. Attempted sit to  SaraStedy x4 reps; progressed to clearing glutes 3\" from recliner with maxA x2 and max cues for patient to assist with transfer. Maintains upright sitting posture ~5 minutes with Jose Alfredo-CGA and R UE support. continues to need multiple verbal and manual cues for technique with sup to sidelying and mod to max assist for getting to full sitting position. Able to maintain static sitting balance with just CGA for several minutes at a time. Transferred to chair using overhead lift. Sit to stand from chair with max assist of 2 with therapist supporting patients hips with a blanket and blocking his left knee. Left up in chair with bed alarm on and legs extended.   Barriers to return to prior living situation: Unsafe: Lives alone, Heavy assist of 2-3, L sided weakness, Fall risk   Recommendations for discharge: ARU  Rationale for [place]s: Patient continues to make progress, has excellent family support, and was independent prior to admission. Unable to come to full standing despite maxA x2 this session but previously stood with therapist assisting hip extension with blanket and blocking L knee. Patient needs intense interdisciplinary approach of all 3 therapies to maximize his independence.       Entered by: Vandana Maurice 09/20/2020 11:17 AM     "

## 2020-09-20 NOTE — PROGRESS NOTES
[facility]  Hospitalist Progress Note  Merle Arciniega MD  09/20/2020    Assessment & Plan   Jorje Teran is a 70 year old male who presents with weakness and slurred speech     CVA with dense left hemiparesis  - Last baseline known noon on 9/6 (over 24 hours PTA). Son went to check on father afternoon/evening of presentation and he was on ground, unable to move his L side and slurring words. Vitals stable. Labs normal except 11.2. CXR clear.   *CT with filling defect within the distal R ICA extending into the R MCA and occlusion of the R MCA at the M1 segment. CTA with severe plaquing at R carotid bifurcation with filling defect in the prox R ICA suggestive of thrombus contributing to approximately 80% stenosis. 60% stenosis of the proximal L ICA. TTE with bubble -> Normal left ventricular size and function. Left ventricular ejection fraction of 55-60%. No segmental wall motion abnormalities noted.  No shunting at the atrial level on suboptimal Bubble study.  - Lipids 7/29/20- LDL 78, HDL 56, , T chol 164  - A1C 7/2020 7.1%   - TSH 7/2020 at 2.05   - Given  mg x 1 on arrival to ED  - Head CTs have been stable for the most part.  - remains with significant dysarthria, facial droop and dense left sided hemiparesis  - Neurology signed off  - ASA 81 mg daily PO/SD  - atorvastatin 40 mg daily, Zetia 10 mg po daily  - BP management as below; - goal for SBPs < 160   - Labetalol as needed for SBP>160  - PT/OT/SLP- rec ARU    Dysphagia  - SLP following  - had NG FT for few days- now removed  - calorie counts over the weekend, nutritionist recommends 66-75% est needs on average to avoid TF replacement.   - currently on DD1 with nectar thickened liquids   - SLP -rec ARU  - PICC placed 09/10 and CVC removed 09/10      CAD s/p CABG x 3, x 1 in 2015  HTN  HLD  PTA (needs med rec) amlodipine 20 mg daily, ASA 81 mg daiy, atorvastatin 40 mg daily, Zetia 10 mg daily, furosemide 40 mg BID,  metoprolol 25 mg BID, Entresto 49-51 1 tab BID, valsartan 320 mg daily  Follows with Dr. Vincent with [facility]. Some concern re: medical compliance.  TTE with bubble this admission -> Normal left ventricular size and function. Left ventricular ejection fraction of 55-60%. No segmental wall motion abnormalities noted.  No shunting at the atrial level on suboptimal Bubble study.  Plan:  - continue Norvasc /Entresto / Metoprolol 50 mg BID and Valsartan  - Hydralazine 25 mg po TID started  - Holding Lasix given Na goals  - PRN labetalol, hydralazine SBP>160     Hypernatremia- resolved  Sodiums peaked at 159 > 146 > 140  with free water/tube feeds  - Continue to monitor now since NGT is out     DM II  Not on meds for this. Recent A1C at 7.1 7/2020.  BG goal is < 180. BG not currently well controlled.  Blood sugars have been difficult to control with sugars in the 300s while on TF   - previously on insulin gtt  - TF started with hyperglycemia, 200+ range  - started on lantus 14 units BID- changed to 10 units BID on 09/18 with stopping tube feedings and letting patient eat on his own.  - continue med sliding scale insulin  - BS- 165--178     LE edema  US at [facility] negative for DVT     SHELDON  - states he could not sleep with CPAP as outpatient  - he would benefit for treating SHELDON  - CPAP ordered last night but he did not sleep well    Hypothyroidism  - resumed PTA Levothyroxine 50 mcg daily  TSH checked 7/29/2020 at 2.05     Morbid obesity  BMI noted  7/202 at ~38. Will need to encourage healthy lifestyle and weight loss with recovery     Diet: Snacks/Supplements Adult: Other; thickened smoothie with meals  (RD); With Meals  Calorie Counts  Room Service  Adult Formula Drip Feeding: Continuous Isosource 1.5; Nasogastric tube; Goal Rate: 60; mL/hr; From: 8:00 PM; 8:00 AM; Medication - Feeding Tube Flush Frequency: At least 15-30 mL water before and after medication administration and with tube clogg...  Combination Diet Full  "Liquid Diet    DVT Prophylaxis: Pneumatic Compression Devices  Hayes Catheter: not present  Code Status: Full Code         Disposition Plan     Expected discharge  to ARU on 9/21 or 9/22    Interval History    No events overnight, did not tolerate CPAP last night; remains with   dense left sided hemiparesis, garbled speech and left sided facial droop.  - denies chest pain, denies SOB, no N/V, no abd pain   - discussed with RN    -Data reviewed today: I reviewed all new labs and imaging over the last 24 hours. I personally reviewed no images or EKG's today.    Physical Exam    , Blood pressure 122/70, pulse 79, temperature 98.5  F (36.9  C), temperature source Oral, resp. rate 16, height 1.778 m (5' 10\"), weight 111 kg (244 lb 11.2 oz), SpO2 97 %.  Vitals:    09/16/20 0629 09/17/20 0624 09/19/20 0607   Weight: 110.7 kg (244 lb) 111.7 kg (246 lb 4.8 oz) 111 kg (244 lb 11.2 oz)     Vital Signs with Ranges  Temp:  [98  F (36.7  C)-98.8  F (37.1  C)] 98.5  F (36.9  C)  Pulse:  [63-79] 79  Resp:  [16-20] 16  BP: (122-159)/(66-72) 122/70  SpO2:  [97 %-99 %] 97 %  I/O's Last 24 hours  I/O last 3 completed shifts:  In: 100 [P.O.:100]  Out: -     Constitutional: Sleepy, NAD, dysarthria, facial droop and dense left sided hemiparesis  Respiratory:Clear to auscultation bilaterally, no crackles or wheezing  Cardiovascular: Regular rate and rhythm, normal S1 and S2   GI: Normal bowel sounds, soft, non-distended, non-tender  Skin/Integumen: No rashes, no cyanosis, ++ edema  Other: Field cut, neglect     Medications   All medications were reviewed.    - MEDICATION INSTRUCTIONS -         amLODIPine  20 mg Oral Daily     aspirin  81 mg Oral or Feeding Tube Daily     atorvastatin  40 mg Oral Daily     ezetimibe  10 mg Oral Daily     heparin ANTICOAGULANT  7,500 Units Subcutaneous Q12H     hydrALAZINE  25 mg Oral Q8H TORI     insulin aspart  1-7 Units Subcutaneous TID AC     insulin aspart  1-5 Units Subcutaneous At Bedtime     insulin " glargine  10 Units Subcutaneous At Bedtime     insulin glargine  10 Units Subcutaneous QAM AC     levothyroxine  50 mcg Oral QAM AC     metoprolol tartrate  50 mg Oral BID     sacubitril-valsartan  1 tablet Oral BID     sodium chloride (PF)  10 mL Intracatheter Q8H     sodium chloride (PF)  10 mL Intracatheter Q8H     valsartan  320 mg Oral Daily        Data   Recent Labs   Lab 09/20/20  0630 09/19/20  0635 09/18/20  0555 09/17/20  0445   WBC  --  9.5 9.7 10.0   HGB  --  11.2* 11.6* 12.6*   MCV  --  92 92 93   PLT  --  174 176 188    139 138 140   POTASSIUM 4.0 3.9 3.8 3.8   CHLORIDE 110* 109 109 111*   CO2 25 24 23 23   BUN 22 21 22 24   CR 0.76 0.75 0.80 0.87   ANIONGAP 5 6 6 6   SELENA 8.5 8.2* 7.9* 8.3*   * 174* 263* 224*       No results found for this or any previous visit (from the past 24 hour(s)).    Jose Murphy MD  Text Page  (7am to 6pm)

## 2020-09-20 NOTE — PLAN OF CARE
Discharge Planner SLP   Patient plan for discharge: Not addressed.   Current status: Patient seen for treatment at bedside and needed max verbal cues to implement compensatory strategies to improve speech intelligibility in structured tasks approximately 40% understood. He demonstrated difficulty with sentence and phrase completions with 40%. Not sure if he did not understand the task. Able to name common objects with 100%. Fatigue impacting performance.   Barriers to return to prior living situation: dysarthria, apraxia, aphasia and dysphagia.  Recommendations for discharge: ARC  Rationale for [place]s: Patient needs intense ST needs for communication and dysphagia. Patient participates despite fatigue. Anticipate he will be able to tolerate 3 hours of therapy daily. Patient is significantly below his baseline.        Entered by: Jaky Egan 09/20/2020 4:29 PM

## 2020-09-20 NOTE — PLAN OF CARE
A&OX4,VSS on RA except tachycardiac in the evening. Neuros unchanged, still with left facial droop, left sided hemiplegia, left neglect, left field cut.Speech very slurred and garbled, unable to track to the left. Denies pain. Up AX2 with lift.  Turned and repoed Q 2hrs. [place] male catheter on. DD1+ nectar thick diet. BG checks.Feeding assistance provided. On calorie count. Tele has been ST. PIV Sled. Discharge expected to ARU when clinically ready.Continue to monitor.

## 2020-09-20 NOTE — PROGRESS NOTES
CALORIE COUNT    Current Diet Order:   DD2 + NTL    Supplement Order:   Thickened smoothie w/ meals TID     Approximate Oral Intake for 9/19:   Calories: 791 kcal  Protein: 23 g pro     Number of Meals Recorded: 2    ASSESSED NUTRITION NEEDS:  Dosing Weight 78.6 kg (adjusted)  Estimated Energy Needs: 6239-7084 kcals (25-30 Kcal/Kg)  Estimated Protein Needs:  grams protein (1.2-1.5 g pro/Kg)    Summary:   - Met 40% of estimated energy needs and 24% estimated protein needs yesterday.     - [place] cts to continue through today, with 3-day average to be available on Monday 9/21.     - Recommend pt meet 66-75% est needs on average to avoid TF replacement.     Arlet Garza, GABI, LD

## 2020-09-20 NOTE — PLAN OF CARE
Discharge Planner SLP   Patient plan for discharge: Not addressed.   Current status: Patient seen for both swallowing and speech/language treatment. Patient seen with part of breakfast and was able to feed himself with assistance. He was able to tolerate nectar thick liquids via the cup with small single sips of a smoothie and orange juice. He needed moderate verbal cues to complete a double swallow. Slow pacing and verify each swallow. He was better able to complete oral motor exercises today with decreased ROM/strength.   He needs moderate cues to implement compensatory strategies to improve speech intelligibility to direct questions requiring 1-3 word responses at 60%.   Continue on the DDL 1 with nectar thick liquids. Upright 1;1 assistance with feeding, slow pace, double swallow and alternate liquids/solids.    Barriers to return to prior living situation: dysphagia and dysarthria.   Recommendations for discharge: ARC  Rationale for [place]s: Patient will need intense ST needs for dysphagia management and speech/language deficits. He is motivated and anticipate will be able to tolerate 3 hours of therapy daily. He is well below his baseline. Fatigue maybe his only barrier  to ARC.       Entered by: Jaky Egan 09/20/2020 9:11 AM

## 2020-09-20 NOTE — PLAN OF CARE
Pt here with R MCA CVA. A&Ox4. Neuros L hemiplegia, L field cut, L facial droop, L neglect, slurred/garbled speech, absent sensation on left side. VSS. Tele NSR. DD1 diet, nectar thick liquids. Takes pills crushed in pudding. Up with A2/lift overnight. Incontinent of bladder/bowel. Turn and repo q2h. PICC in RUE. Denies pain. Pt scoring green on the Aggression Stop Light Tool. Continue to monitor. Not able to sleep much overnight, only tolerated CPAP for a few hours in evening. Plan to discharge to ARU after calorie count. Belongings in room.

## 2020-09-20 NOTE — PLAN OF CARE
OT: pt was with nursing upon OT arrival, after locating a recliner chair and setup nursing stated pt needed to be cleaned up first.

## 2020-09-21 ENCOUNTER — APPOINTMENT (OUTPATIENT)
Dept: PHYSICAL THERAPY | Facility: CLINIC | Age: 70
DRG: 064 | End: 2020-09-21
Attending: HOSPITALIST
Payer: COMMERCIAL

## 2020-09-21 ENCOUNTER — APPOINTMENT (OUTPATIENT)
Dept: OCCUPATIONAL THERAPY | Facility: CLINIC | Age: 70
DRG: 064 | End: 2020-09-21
Attending: HOSPITALIST
Payer: COMMERCIAL

## 2020-09-21 ENCOUNTER — APPOINTMENT (OUTPATIENT)
Dept: SPEECH THERAPY | Facility: CLINIC | Age: 70
DRG: 064 | End: 2020-09-21
Attending: HOSPITALIST
Payer: COMMERCIAL

## 2020-09-21 LAB
ANION GAP SERPL CALCULATED.3IONS-SCNC: 5 MMOL/L (ref 3–14)
BUN SERPL-MCNC: 21 MG/DL (ref 7–30)
CALCIUM SERPL-MCNC: 8.6 MG/DL (ref 8.5–10.1)
CHLORIDE SERPL-SCNC: 111 MMOL/L (ref 94–109)
CO2 SERPL-SCNC: 25 MMOL/L (ref 20–32)
CREAT SERPL-MCNC: 0.78 MG/DL (ref 0.66–1.25)
GFR SERPL CREATININE-BSD FRML MDRD: >90 ML/MIN/{1.73_M2}
GLUCOSE BLDC GLUCOMTR-MCNC: 168 MG/DL (ref 70–99)
GLUCOSE BLDC GLUCOMTR-MCNC: 169 MG/DL (ref 70–99)
GLUCOSE BLDC GLUCOMTR-MCNC: 173 MG/DL (ref 70–99)
GLUCOSE BLDC GLUCOMTR-MCNC: 204 MG/DL (ref 70–99)
GLUCOSE BLDC GLUCOMTR-MCNC: 232 MG/DL (ref 70–99)
GLUCOSE SERPL-MCNC: 178 MG/DL (ref 70–99)
INR PPP: 1.17 (ref 0.86–1.14)
MAGNESIUM SERPL-MCNC: 2.4 MG/DL (ref 1.6–2.3)
PHOSPHATE SERPL-MCNC: 3.4 MG/DL (ref 2.5–4.5)
POTASSIUM SERPL-SCNC: 4.1 MMOL/L (ref 3.4–5.3)
SODIUM SERPL-SCNC: 141 MMOL/L (ref 133–144)

## 2020-09-21 PROCEDURE — 00000146 ZZHCL STATISTIC GLUCOSE BY METER IP

## 2020-09-21 PROCEDURE — 92526 ORAL FUNCTION THERAPY: CPT | Mod: GN | Performed by: SPEECH-LANGUAGE PATHOLOGIST

## 2020-09-21 PROCEDURE — 97530 THERAPEUTIC ACTIVITIES: CPT | Mod: GP | Performed by: PHYSICAL THERAPIST

## 2020-09-21 PROCEDURE — 83735 ASSAY OF MAGNESIUM: CPT | Performed by: INTERNAL MEDICINE

## 2020-09-21 PROCEDURE — 99233 SBSQ HOSP IP/OBS HIGH 50: CPT | Performed by: INTERNAL MEDICINE

## 2020-09-21 PROCEDURE — 25000131 ZZH RX MED GY IP 250 OP 636 PS 637: Performed by: INTERNAL MEDICINE

## 2020-09-21 PROCEDURE — 97535 SELF CARE MNGMENT TRAINING: CPT | Mod: GO | Performed by: OCCUPATIONAL THERAPY ASSISTANT

## 2020-09-21 PROCEDURE — 85610 PROTHROMBIN TIME: CPT | Performed by: NURSE PRACTITIONER

## 2020-09-21 PROCEDURE — 84100 ASSAY OF PHOSPHORUS: CPT | Performed by: INTERNAL MEDICINE

## 2020-09-21 PROCEDURE — 40000239 ZZH STATISTIC VAT ROUNDS

## 2020-09-21 PROCEDURE — 25000132 ZZH RX MED GY IP 250 OP 250 PS 637: Performed by: STUDENT IN AN ORGANIZED HEALTH CARE EDUCATION/TRAINING PROGRAM

## 2020-09-21 PROCEDURE — 97110 THERAPEUTIC EXERCISES: CPT | Mod: GO | Performed by: OCCUPATIONAL THERAPY ASSISTANT

## 2020-09-21 PROCEDURE — 12000000 ZZH R&B MED SURG/OB

## 2020-09-21 PROCEDURE — 36415 COLL VENOUS BLD VENIPUNCTURE: CPT | Performed by: NURSE PRACTITIONER

## 2020-09-21 PROCEDURE — 97112 NEUROMUSCULAR REEDUCATION: CPT | Mod: GP | Performed by: PHYSICAL THERAPIST

## 2020-09-21 PROCEDURE — 25000132 ZZH RX MED GY IP 250 OP 250 PS 637

## 2020-09-21 PROCEDURE — 25000128 H RX IP 250 OP 636: Performed by: STUDENT IN AN ORGANIZED HEALTH CARE EDUCATION/TRAINING PROGRAM

## 2020-09-21 PROCEDURE — 80048 BASIC METABOLIC PNL TOTAL CA: CPT | Performed by: INTERNAL MEDICINE

## 2020-09-21 RX ADMIN — SACUBITRIL AND VALSARTAN 1 TABLET: 49; 51 TABLET, FILM COATED ORAL at 09:45

## 2020-09-21 RX ADMIN — HYDRALAZINE HYDROCHLORIDE 25 MG: 25 TABLET ORAL at 09:45

## 2020-09-21 RX ADMIN — INSULIN ASPART 1 UNITS: 100 INJECTION, SOLUTION INTRAVENOUS; SUBCUTANEOUS at 09:47

## 2020-09-21 RX ADMIN — INSULIN GLARGINE 10 UNITS: 100 INJECTION, SOLUTION SUBCUTANEOUS at 09:47

## 2020-09-21 RX ADMIN — METOPROLOL TARTRATE 50 MG: 50 TABLET, FILM COATED ORAL at 20:31

## 2020-09-21 RX ADMIN — INSULIN GLARGINE 10 UNITS: 100 INJECTION, SOLUTION SUBCUTANEOUS at 21:35

## 2020-09-21 RX ADMIN — ATORVASTATIN CALCIUM 40 MG: 40 TABLET, FILM COATED ORAL at 09:45

## 2020-09-21 RX ADMIN — HYDRALAZINE HYDROCHLORIDE 25 MG: 25 TABLET ORAL at 17:29

## 2020-09-21 RX ADMIN — HYDRALAZINE HYDROCHLORIDE 25 MG: 25 TABLET ORAL at 00:23

## 2020-09-21 RX ADMIN — LEVOTHYROXINE SODIUM 50 MCG: 50 TABLET ORAL at 09:45

## 2020-09-21 RX ADMIN — AMLODIPINE BESYLATE 20 MG: 10 TABLET ORAL at 09:46

## 2020-09-21 RX ADMIN — EZETIMIBE 10 MG: 10 TABLET ORAL at 09:45

## 2020-09-21 RX ADMIN — INSULIN ASPART 2 UNITS: 100 INJECTION, SOLUTION INTRAVENOUS; SUBCUTANEOUS at 12:41

## 2020-09-21 RX ADMIN — ASPIRIN 81 MG 81 MG: 81 TABLET ORAL at 09:47

## 2020-09-21 RX ADMIN — VALSARTAN 320 MG: 160 TABLET ORAL at 09:46

## 2020-09-21 RX ADMIN — SACUBITRIL AND VALSARTAN 1 TABLET: 49; 51 TABLET, FILM COATED ORAL at 20:31

## 2020-09-21 RX ADMIN — HEPARIN SODIUM 7500 UNITS: 10000 INJECTION INTRAVENOUS; SUBCUTANEOUS at 12:41

## 2020-09-21 RX ADMIN — METOPROLOL TARTRATE 50 MG: 50 TABLET, FILM COATED ORAL at 09:47

## 2020-09-21 RX ADMIN — HEPARIN SODIUM 7500 UNITS: 10000 INJECTION INTRAVENOUS; SUBCUTANEOUS at 00:23

## 2020-09-21 RX ADMIN — INSULIN ASPART 1 UNITS: 100 INJECTION, SOLUTION INTRAVENOUS; SUBCUTANEOUS at 18:19

## 2020-09-21 ASSESSMENT — ACTIVITIES OF DAILY LIVING (ADL)
ADLS_ACUITY_SCORE: 24

## 2020-09-21 ASSESSMENT — MIFFLIN-ST. JEOR: SCORE: 1857.14

## 2020-09-21 NOTE — PROGRESS NOTES
[facility]  Hospitalist Progress Note  Bianca Hoskins MD  09/21/2020    Assessment & Plan   Jorje Teran is a 70 year old male who presents with weakness and slurred speech     CVA with dense left hemiparesis  - Last baseline known noon on 9/6 (over 24 hours PTA). Son went to check on father afternoon/evening of presentation and he was on ground, unable to move his L side and slurring words. Vitals stable. Labs normal except 11.2. CXR clear.   *CT with filling defect within the distal R ICA extending into the R MCA and occlusion of the R MCA at the M1 segment. CTA with severe plaquing at R carotid bifurcation with filling defect in the prox R ICA suggestive of thrombus contributing to approximately 80% stenosis. 60% stenosis of the proximal L ICA. TTE with bubble -> Normal left ventricular size and function. Left ventricular ejection fraction of 55-60%. No segmental wall motion abnormalities noted.  No shunting at the atrial level on suboptimal Bubble study.  - Lipids 7/29/20- LDL 78, HDL 56, , T chol 164, hemoglobin A1c 7.1% H, TSH is 2.05.  - Head CTs have been stable for the most part.  - remains with significant dysarthria, facial droop and dense left sided hemiparesis  - Neurology signed off  - ASA 81 mg daily PO/DC  - atorvastatin 40 mg daily, Zetia 10 mg po daily  - BP management as below; - goal for SBPs < 160   - Labetalol as needed for SBP>160  - PT/OT/SLP- rec ARU  -Patient was supposed to go to AR U, he had a improvement in his dysphagia and started on dysphagia diet with the poor oral intake, currently calorie counting going on, once that is complete and if he does not meet the requirement will have to discuss PEG tube placement.  Possibly discharge to AR U once that decision is made.    Dysphagia  - SLP following  - had NG FT for few days- now removed  - calorie counts over the weekend, nutritionist recommends 66-75% est needs on average to avoid TF replacement.   -  currently on DD1 with nectar thickened liquids   - SLP -rec ARU, discussed with family due to poor oral intake will request for PEG tube placement, order placed, n.p.o. from midnight.  - PICC placed 09/10 and CVC removed 09/10      CAD s/p CABG x 3, x 1 in 2015  HTN  HLD  PTA (needs med rec) amlodipine 20 mg daily, ASA 81 mg daiy, atorvastatin 40 mg daily, Zetia 10 mg daily, furosemide 40 mg BID, metoprolol 25 mg BID, Entresto 49-51 1 tab BID, valsartan 320 mg daily  Follows with Dr. Vincent with [facility]. Some concern re: medical compliance.  TTE with bubble this admission -> Normal left ventricular size and function. Left ventricular ejection fraction of 55-60%. No segmental wall motion abnormalities noted.  No shunting at the atrial level on suboptimal Bubble study.  - continue Norvasc /Entresto / Metoprolol 50 mg BID and Valsartan  - Hydralazine 25 mg po TID started  - Holding Lasix given Na goals  - PRN labetalol, hydralazine SBP>160     Hypernatremia- resolved       DM II  Not on meds for this. Recent A1C at 7.1 7/2020.  BG goal is < 180. BG not currently well controlled.  Blood sugars have been difficult to control with sugars in the 300s while on TF   - previously on insulin gtt  - TF started with hyperglycemia, 200+ range  - started on lantus 14 units BID- changed to 10 units BID on 09/18 with stopping tube feedings and letting patient eat on his own.  - continue med sliding scale insulin  - BS- 165--178  -Will discuss insulin once her G-tube is placed.  LE edema  US at [facility] negative for DVT     SHELDON  - states he could not sleep with CPAP as outpatient  - he would benefit for treating SHELDON  - CPAP ordered last night but he did not sleep well    Hypothyroidism  - resumed PTA Levothyroxine 50 mcg daily  TSH checked 7/29/2020 at 2.05     Morbid obesity  BMI noted  7/202 at ~38. Will need to encourage healthy lifestyle and weight loss with recovery     Diet: Snacks/Supplements Adult: Other; thickened smoothie  "with meals  (RD); With Meals  Calorie Counts    DVT Prophylaxis: Pneumatic Compression Devices  Code Status: Full Code         Disposition Plan   Total time spend 35 min >50% spend on coordination of care including discussion with social work, nursing, and family regarding PEG tube placement.    Expected discharge  to ARU on 9/22 or 23    Interval History    Patient was very sleepy in the morning, he later wake up and at the assisted feeding by his son, his nutritional intake does not meet his requirement, we discussed about G-tube placement, will place orders, no other concerns noted overnight.  Blood pressure is on the lower side.    -Data reviewed today: I reviewed all new labs and imaging over the last 24 hours. I personally reviewed no images or EKG's today.    Physical Exam    , Blood pressure 104/63, pulse 71, temperature 98.3  F (36.8  C), temperature source Oral, resp. rate 14, height 1.778 m (5' 10\"), weight 109.6 kg (241 lb 9.6 oz), SpO2 98 %.  Vitals:    09/17/20 0624 09/19/20 0607 09/21/20 0601   Weight: 111.7 kg (246 lb 4.8 oz) 111 kg (244 lb 11.2 oz) 109.6 kg (241 lb 9.6 oz)     Vital Signs with Ranges  Temp:  [98.3  F (36.8  C)-98.9  F (37.2  C)] 98.3  F (36.8  C)  Pulse:  [] 71  Resp:  [14-18] 14  BP: (104-146)/(63-80) 104/63  SpO2:  [91 %-98 %] 98 %  I/O's Last 24 hours  I/O last 3 completed shifts:  In: 330 [P.O.:330]  Out: 1025 [Urine:1025]    Constitutional: Sleepy in the morning  Respiratory: Clear to auscultation bilaterally, no crackles or wheezing  Cardiovascular: Regular rate and rhythm, normal S1 and S2, and no murmur noted  GI: Normal bowel sounds, soft, non-distended, non-tender  Skin/Integumen: No rashes, trace lower extremity edema noted  Neuro : Dense left-sided hemiplegia with neglect noted      Medications   All medications were reviewed.    - MEDICATION INSTRUCTIONS -         amLODIPine  20 mg Oral Daily     aspirin  81 mg Oral or Feeding Tube Daily     atorvastatin  40 mg " Oral Daily     ezetimibe  10 mg Oral Daily     heparin ANTICOAGULANT  7,500 Units Subcutaneous Q12H     hydrALAZINE  25 mg Oral Q8H TORI     insulin aspart  1-7 Units Subcutaneous TID AC     insulin aspart  1-5 Units Subcutaneous At Bedtime     insulin glargine  10 Units Subcutaneous At Bedtime     insulin glargine  10 Units Subcutaneous QAM AC     levothyroxine  50 mcg Oral QAM AC     metoprolol tartrate  50 mg Oral BID     sacubitril-valsartan  1 tablet Oral BID     sodium chloride (PF)  10 mL Intracatheter Q8H     sodium chloride (PF)  10 mL Intracatheter Q8H     valsartan  320 mg Oral Daily        Data   Recent Labs   Lab 09/21/20  0620 09/20/20  0630 09/19/20  0635 09/18/20  0555 09/17/20  0445   WBC  --   --  9.5 9.7 10.0   HGB  --   --  11.2* 11.6* 12.6*   MCV  --   --  92 92 93   PLT  --   --  174 176 188    140 139 138 140   POTASSIUM 4.1 4.0 3.9 3.8 3.8   CHLORIDE 111* 110* 109 109 111*   CO2 25 25 24 23 23   BUN 21 22 21 22 24   CR 0.78 0.76 0.75 0.80 0.87   ANIONGAP 5 5 6 6 6   SELENA 8.6 8.5 8.2* 7.9* 8.3*   * 165* 174* 263* 224*       No results found for this or any previous visit (from the past 24 hour(s)).

## 2020-09-21 NOTE — PLAN OF CARE
Pt here with R MCA CVA. A&Ox4. Neuros L hemiplegia, L field cut, L facial droop, L neglect, slurred/garbled speech, absent sensation on left side. VSS. Tele NSR. DD1 diet, nectar thick liquids, limited intake. Takes pills crushed in pudding. Up with A2/lift overnight. Incontinent of bladder/bowel. [place] catheter in place. Turn and repo q2h. PICC in [facility]. Denies pain. Pt scoring green on the Aggression Stop Light Tool. Continue to monitor. Refusing CPAP overnight. Plan to discharge to ARU after calorie count or PEG. Belongings in room.

## 2020-09-21 NOTE — PLAN OF CARE
Pt here with R MCA. A&O x4. Neuros slurred/garbled speech, left facial droop, left hemiplegic, left visual neglect, left hemiplegic and absent sensation on left. VSS on RA. BP within parameters. Tele NSR. Denies chest pain or SOB. L/s diminished. Tolerating DD1 diet, nectar liquids. Takes pills crushed with pudding. Total feed. Up with lift. Up in chair this morning. +BS. Incontinent of urine/stool. Turn and repo q2hrs. Denies pain. JOSEF PICC SL. Pt scoring green on the Aggression Stop Light Tool. PT/OT/SLP following. NPO at midnight.Possible PEG placement tomorrow.Discharge ARU pending progress.

## 2020-09-21 NOTE — PLAN OF CARE
Discharge Planner PT   Patient plan for discharge: Did not state    Current status: Pt received in bed. Slow to wake/respond, mumbles throughout session with some intelligible speech. Agreeable to PT and to get to chair for breakfast. Pt follows single step, verbal cues to turn turn head. MaxA x2 for bed mobility. Arjun-CGA with sitting balance at EOB x15 minutes. Pt with left trunk lean. Facilitation at back and shoulders for upright posture. Used mirror to promote forward gaze, correct left trunk lean. Support provided at left shoulder after pt report of pain. ModA for seated balance activities touching elbow to bed. Greater difficulty on left. Seated reaching activities with facilitation. Difficulty and increased time with left head turn and gaze. Pt dependent with pericares, requiring increased time. Overhead lift for bed to chair. Pt in chair, OT in room upon PT exit.     Barriers to return to prior living situation: Current level of assist, lives alone, fall risk, left sided weakness and neglect    Recommendations for discharge: Acute Rehab    Rationale for [place]s: Patient is significantly below baseline as was independent prior to admission. Strong family support. Patient would benefit from continued intense skilled rehab services with all 3 therapies to maximize independence and safety.         Entered by: Evelin Neumann 09/21/2020 9:42 AM

## 2020-09-21 NOTE — PLAN OF CARE
SLP: Attempted to see patient for swallow treatment, but was unable to arouse him from a sleep with voice and sternal rub.

## 2020-09-21 NOTE — PROGRESS NOTES
"CLINICAL NUTRITION SERVICES - REASSESSMENT NOTE      Future/Additional Recommendations:     Calorie Count:  9/18: 1068 kcal, 43 gm pro  9/19: 791 kcal, 23 gm pro  9/20: 889 cals, 23 gm pro  3 DAY AVERAGE = 916 cals (46% est needs), 30 gm pro (32% est needs)     Would consider alternate means of nutrition until able to support nutrition orally    Will modify supplement order to magic cup with meals, for variety     Malnutrition: (9/17)  % Weight Loss:  Up to 5% in 1 month (non-severe malnutrition) - 4% from 2 months ago  % Intake:  Decreased intake does not meet criteria for malnutrition  - po + TF meeting 80-90% est needs past 3 days  Subcutaneous Fat Loss:  None observed  Muscle Loss:  None observed  Fluid Retention:  None noted     Malnutrition Diagnosis: Patient does not meet two of the above criteria necessary for diagnosing malnutrition         EVALUATION OF PROGRESS TOWARD GOALS   Diet:    DD1, NTL  Thickened smoothie with meals  Not Appropriate for Room Service    Intake/Tolerance:    Chart reviewed  Per SLP, \"fatigue impacting performance\"    Pt sleeping soundly this morning  Note small amount of breakfast consumed    Calorie Count:  9/18: 1068 kcal, 43 gm pro  9/19: 791 kcal, 23 gm pro  9/20: 889 cals, 23 gm pro  3 DAY AVERAGE = 916 cals (46% est needs), 30 gm pro (32% est needs)       ASSESSED NUTRITION NEEDS:  Dosing Weight 78.6 kg (adjusted)  Estimated Energy Needs: 9317-9540 kcals (25-30 Kcal/Kg)  Estimated Protein Needs:  grams protein (1.2-1.5 g pro/Kg)      NEW FINDINGS:   9/21: Na 141           K 4.1           Mg 2.4 (H)           Phos 3.4    BGM: 169, 178, 173        Previous Goals (9/17):   Pt to consume at least 700 cals and 40 gm pro per day via meals  Evaluation: Met    EN + po intake to meet % est needs  Evaluation:Does not apply as FT pulled last Friday    Previous Nutrition Diagnosis (9/17):   Inadequate oral intake related to fatigue, dysphagia as evidenced by pt meeting 30-35% " est needs orally and need for continued EN  Evaluation: No change, modified below      CURRENT NUTRITION DIAGNOSIS  Inadequate oral intake related to decreased appetite and fatigue as evidenced by calorie count reflects pt averaging <50% est nutrition needs    INTERVENTIONS  Recommendations / Nutrition Prescription  DD1, NTL (per SLP)  Nutrition supplements    Pt unable to meet goal of consuming 66-75% est needs consistently  Would consider alternate means of nutrition until able to support nutrition orally    Implementation  Will modify supplement order to magic cup with meals, for variety    Goals  Pt to receive adequate nutrition in the next 2-3 days      MONITORING AND EVALUATION:  Progress towards goals will be monitored and evaluated per protocol and Practice Guidelines

## 2020-09-21 NOTE — PROGRESS NOTES
Patient is on IR schedule Tuesday 9/22/2020 for a gastrostomy tube placement.   -Labs WNL for procedure.    -Orders for NPO have been entered.  -Consent will be done prior to procedure.     The subcutaneous Heparin will be held at midnight and noon tomorrow.     Please contact the IR department at [phone] with questions. Procedure ~ 1300 9/22/2020.     Cathryn Leitschuh CNP Interventional Radiology ([phone])

## 2020-09-21 NOTE — PLAN OF CARE
Discharge Planner OT   Patient plan for discharge: none stated  Current status:  pt lethargic, just up to chair with PT, pt not responding to verbal cues to open eyes, did open eyes when had a cool washcloth placed in hand and assisted with bringing up to wash face, pt able to complete minimal facial hygiene task then eyes closed again and not responding, pt max to dependent for ADL task today, completed LUE PROM all planes, no movement noted.    Barriers to return to prior living situation: L hemiparesis, weakness  Recommendations for discharge: ARU per plan established by the Occupational Therapist  Rationale for [place]s: Patient would benefit from 3 hours of interdisciplinary therapy for maximum independence in ADLs/mobility.  Patient was independent prior to CVA, has good family support       Entered by: Katerina Ardon 09/21/2020 9:29 AM

## 2020-09-21 NOTE — PLAN OF CARE
Discharge Planner SLP   Patient plan for discharge: Not addressed.   Current status: Patient seen for swallow treatment with his son present. He was up in the chair and is able to feed himself with assistance. He continues to have a delayed swallow response with nectar thick liquids by the cup without overt Sx of aspiration. Decreased bolus control and manipulation of pureed texture, minimal to mild oral residue, that clears with a liquid rinse. Noted to have breath holding when swallowing. Mild throat clearing with pureed textures likely pharyngeal residue. Question possible thrush on his tongue, brush his tongue to see if it clears.  Recommend: Continue on the DDL 1 with nectar thick liquids. Upright 1;1 assistance with feeding, slow pace, double swallow and alternate liquids/solids.    Barriers to return to prior living situation: aphasia, dysarthria and dysphagia  Recommendations for discharge: ARC  Rationale for [place]s: Patient will need intense ST needs for both dysphagia and communication deficits. Patient with good participation and family support. Anticipate he will be able to tolerate 3 hours of therapy daily. He is significantly below his baseline.        Entered by: Jaky Egan 09/21/2020 3:03 PM

## 2020-09-22 ENCOUNTER — APPOINTMENT (OUTPATIENT)
Dept: INTERVENTIONAL RADIOLOGY/VASCULAR | Facility: CLINIC | Age: 70
DRG: 064 | End: 2020-09-22
Attending: INTERNAL MEDICINE
Payer: COMMERCIAL

## 2020-09-22 ENCOUNTER — APPOINTMENT (OUTPATIENT)
Dept: PHYSICAL THERAPY | Facility: CLINIC | Age: 70
DRG: 064 | End: 2020-09-22
Attending: HOSPITALIST
Payer: COMMERCIAL

## 2020-09-22 LAB
GLUCOSE BLDC GLUCOMTR-MCNC: 132 MG/DL (ref 70–99)
GLUCOSE BLDC GLUCOMTR-MCNC: 132 MG/DL (ref 70–99)
GLUCOSE BLDC GLUCOMTR-MCNC: 152 MG/DL (ref 70–99)
GLUCOSE BLDC GLUCOMTR-MCNC: 164 MG/DL (ref 70–99)
GLUCOSE BLDC GLUCOMTR-MCNC: 213 MG/DL (ref 70–99)
PLATELET # BLD AUTO: 295 10E9/L (ref 150–450)

## 2020-09-22 PROCEDURE — 40000239 ZZH STATISTIC VAT ROUNDS

## 2020-09-22 PROCEDURE — 27210735 ZZH ACCESSORY CR12

## 2020-09-22 PROCEDURE — 85049 AUTOMATED PLATELET COUNT: CPT | Performed by: STUDENT IN AN ORGANIZED HEALTH CARE EDUCATION/TRAINING PROGRAM

## 2020-09-22 PROCEDURE — 25000128 H RX IP 250 OP 636: Performed by: NURSE PRACTITIONER

## 2020-09-22 PROCEDURE — 25000132 ZZH RX MED GY IP 250 OP 250 PS 637: Performed by: STUDENT IN AN ORGANIZED HEALTH CARE EDUCATION/TRAINING PROGRAM

## 2020-09-22 PROCEDURE — 00000146 ZZHCL STATISTIC GLUCOSE BY METER IP

## 2020-09-22 PROCEDURE — 0DH63UZ INSERTION OF FEEDING DEVICE INTO STOMACH, PERCUTANEOUS APPROACH: ICD-10-PCS | Performed by: RADIOLOGY

## 2020-09-22 PROCEDURE — 25000131 ZZH RX MED GY IP 250 OP 636 PS 637: Performed by: INTERNAL MEDICINE

## 2020-09-22 PROCEDURE — 12000000 ZZH R&B MED SURG/OB

## 2020-09-22 PROCEDURE — 27210915 ZZ H TUBE GASTRO CR4

## 2020-09-22 PROCEDURE — 97110 THERAPEUTIC EXERCISES: CPT | Mod: GP | Performed by: PHYSICAL THERAPIST

## 2020-09-22 PROCEDURE — 25000128 H RX IP 250 OP 636: Performed by: RADIOLOGY

## 2020-09-22 PROCEDURE — 99232 SBSQ HOSP IP/OBS MODERATE 35: CPT | Performed by: INTERNAL MEDICINE

## 2020-09-22 PROCEDURE — 49440 PLACE GASTROSTOMY TUBE PERC: CPT

## 2020-09-22 PROCEDURE — 97530 THERAPEUTIC ACTIVITIES: CPT | Mod: GP | Performed by: PHYSICAL THERAPIST

## 2020-09-22 PROCEDURE — 97112 NEUROMUSCULAR REEDUCATION: CPT | Mod: GP | Performed by: PHYSICAL THERAPIST

## 2020-09-22 PROCEDURE — 25000132 ZZH RX MED GY IP 250 OP 250 PS 637

## 2020-09-22 PROCEDURE — 27210738 ZZH ACCESSORY CR2

## 2020-09-22 PROCEDURE — 25000125 ZZHC RX 250: Performed by: RADIOLOGY

## 2020-09-22 PROCEDURE — 25000125 ZZHC RX 250: Performed by: NURSE PRACTITIONER

## 2020-09-22 RX ORDER — FLUMAZENIL 0.1 MG/ML
0.2 INJECTION, SOLUTION INTRAVENOUS
Status: DISCONTINUED | OUTPATIENT
Start: 2020-09-22 | End: 2020-09-22

## 2020-09-22 RX ORDER — NALOXONE HYDROCHLORIDE 0.4 MG/ML
.1-.4 INJECTION, SOLUTION INTRAMUSCULAR; INTRAVENOUS; SUBCUTANEOUS
Status: DISCONTINUED | OUTPATIENT
Start: 2020-09-22 | End: 2020-09-22

## 2020-09-22 RX ORDER — FENTANYL CITRATE 50 UG/ML
25-50 INJECTION, SOLUTION INTRAMUSCULAR; INTRAVENOUS EVERY 5 MIN PRN
Status: DISCONTINUED | OUTPATIENT
Start: 2020-09-22 | End: 2020-09-22

## 2020-09-22 RX ADMIN — MIDAZOLAM HYDROCHLORIDE 1 MG: 1 INJECTION, SOLUTION INTRAMUSCULAR; INTRAVENOUS at 10:42

## 2020-09-22 RX ADMIN — VALSARTAN 320 MG: 160 TABLET ORAL at 17:46

## 2020-09-22 RX ADMIN — HYDRALAZINE HYDROCHLORIDE 25 MG: 25 TABLET ORAL at 17:46

## 2020-09-22 RX ADMIN — EZETIMIBE 10 MG: 10 TABLET ORAL at 17:46

## 2020-09-22 RX ADMIN — AMLODIPINE BESYLATE 20 MG: 10 TABLET ORAL at 17:46

## 2020-09-22 RX ADMIN — LIDOCAINE HYDROCHLORIDE 1 ML: 10; .005 INJECTION, SOLUTION EPIDURAL; INFILTRATION; INTRACAUDAL; PERINEURAL at 10:48

## 2020-09-22 RX ADMIN — INSULIN GLARGINE 12 UNITS: 100 INJECTION, SOLUTION SUBCUTANEOUS at 21:19

## 2020-09-22 RX ADMIN — HYDRALAZINE HYDROCHLORIDE 25 MG: 25 TABLET ORAL at 00:08

## 2020-09-22 RX ADMIN — FENTANYL CITRATE 50 MCG: 50 INJECTION, SOLUTION INTRAMUSCULAR; INTRAVENOUS at 10:30

## 2020-09-22 RX ADMIN — ATORVASTATIN CALCIUM 40 MG: 40 TABLET, FILM COATED ORAL at 17:46

## 2020-09-22 RX ADMIN — GLUCAGON HYDROCHLORIDE 1 MG: KIT at 10:40

## 2020-09-22 RX ADMIN — MIDAZOLAM HYDROCHLORIDE 1 MG: 1 INJECTION, SOLUTION INTRAMUSCULAR; INTRAVENOUS at 10:30

## 2020-09-22 RX ADMIN — METOPROLOL TARTRATE 50 MG: 50 TABLET, FILM COATED ORAL at 21:19

## 2020-09-22 RX ADMIN — LIDOCAINE HYDROCHLORIDE 10 ML: 10 INJECTION, SOLUTION INFILTRATION; PERINEURAL at 10:46

## 2020-09-22 RX ADMIN — ASPIRIN 81 MG 81 MG: 81 TABLET ORAL at 17:46

## 2020-09-22 RX ADMIN — LEVOTHYROXINE SODIUM 50 MCG: 50 TABLET ORAL at 17:46

## 2020-09-22 RX ADMIN — FENTANYL CITRATE 50 MCG: 50 INJECTION, SOLUTION INTRAMUSCULAR; INTRAVENOUS at 10:42

## 2020-09-22 RX ADMIN — SACUBITRIL AND VALSARTAN 1 TABLET: 49; 51 TABLET, FILM COATED ORAL at 21:19

## 2020-09-22 ASSESSMENT — ACTIVITIES OF DAILY LIVING (ADL)
ADLS_ACUITY_SCORE: 20

## 2020-09-22 ASSESSMENT — MIFFLIN-ST. JEOR: SCORE: 1858.95

## 2020-09-22 NOTE — PLAN OF CARE
SLP: Attempted to see patient for treatment, but was heading down to have a PEG tube placed. Will cancel am session.

## 2020-09-22 NOTE — PLAN OF CARE
Pt here with right MCA CVA. A&O x4. Neuros include garbled speech, left sided hemiplegia, left facial droop, and left field cut. VSS. Tele SR. NPO for PEG placement. Takes pills crushed through g tube. Up with A2 + lift. Denies pain. Pt scoring green on the Aggression Stop Light Tool. Plan to discharge to ARU when medically cleared.

## 2020-09-22 NOTE — CONSULTS
Patient was seen for a complete reassessment yesterday - see note dated 9/21/20 for details.     Diet = Currently NPO for FT placement, otherwise has been on a DD1, Nectar thick diet     Average of 3 day calorie count --> 916 kcal (46% needs) and 30 g protein (32% needs)    ASSESSED NUTRITION NEEDS:  Dosing Weight 78.6 kg (adjusted)  Estimated Energy Needs: 5937-8947 kcals (25-30 Kcal/Kg)  Estimated Protein Needs:  grams protein (1.2-1.5 g pro/Kg)    Will run TF at night in hopes to stimulate appetite during the day     Orders written for Isosource 1.5 at 60 mL per hour from 6 pm to 8 am (14 hours total) to provide:  1260 kcal (64% needs), 57 g protein (60% needs), 13 g fiber, 638 mL H2O, 148 g CHO   60 mL flush before and after cycle + 120 mL every 4 hrs while TF off (480 mL)     Will resume at 30 mL/hr x for the first 7 hours and then increase to goal as above.    Coby Burrell, RD, LD, [facility]   Clinical Dietitian - [facility]

## 2020-09-22 NOTE — IR NOTE
Interventional Radiology Intra-procedural Nursing Note    Patient Name: Jorje Teran  Medical Record Number: [id number]  Today's Date: September 22, 2020    Start Time: 1038  End of procedure time: 1049   Procedure: Gastrostomy Tube Placement  Report given to: Pao MCDONNELL, station 73  Time pt departs:  1101    Other Notes: Patient into IR suite 3 via cart. NGT placed to right nare. Patient to table in supine position, prepped and draped with 2% clorhexidine to abdomen. VSS, normal sinus rhythm. Dr. Gupta in room, timeout and procedure started. New 14Fr gastrostomy tube placed to LUQ. Okay to use at 1700. Patient tolerated procedure well. Debrief with Dr. Gupta, no complications. Dressing clean, dry and intact. Report called to station 73. Patient back to floor via cart and radiology transport.    MEDICATIONS:  Versed 2 mg  Fentanyl 100 mcg  Lidocaine 9 ml  Lidocaine with Epi 1 ml  Glucagon 1 mg    Michelle Callahan RN

## 2020-09-22 NOTE — PROGRESS NOTES
[facility]  Hospitalist Progress Note  Bianca Hoskins MD  09/22/2020    Assessment & Plan   Jorje Teran is a 70 year old male who presents with weakness and slurred speech     CVA with dense left hemiparesis  - Last baseline known noon on 9/6 (over 24 hours PTA). Son went to check on father afternoon/evening of presentation and he was on ground, unable to move his L side and slurring words. Vitals stable. Labs normal except 11.2. CXR clear.   *CT with filling defect within the distal R ICA extending into the R MCA and occlusion of the R MCA at the M1 segment. CTA with severe plaquing at R carotid bifurcation with filling defect in the prox R ICA suggestive of thrombus contributing to approximately 80% stenosis. 60% stenosis of the proximal L ICA. TTE with bubble -> Normal left ventricular size and function. Left ventricular ejection fraction of 55-60%. No segmental wall motion abnormalities noted.  No shunting at the atrial level on suboptimal Bubble study.  - Lipids 7/29/20- LDL 78, HDL 56, , T chol 164, hemoglobin A1c 7.1% H, TSH is 2.05.  - Head CTs have been stable for the most part.  - remains with significant dysarthria, facial droop and dense left sided hemiparesis  - Neurology signed off  - ASA 81 mg daily PO/DC  - atorvastatin 40 mg daily, Zetia 10 mg po daily  - BP management as below; - goal for SBPs < 160   - Labetalol as needed for SBP>160  - PT/OT/SLP- rec ARU  -Patient did not have adequate intake as per calorie count, discussed with family and the plan is to do PEG tube placement today 9/22.  Nutrition consulted for tube feeding, will start tube feeding later today and advance, possible transfer to [facility] once it is at goal.  Possibly 9/23.    Dysphagia  - SLP following  - had NG FT for few days- now removed  -Patient failed calorie counting requirements  - currently on DD1 with nectar thickened liquids   -PEG tube to be placed on 9/22, possible discharge to [facility] on  9/23     CAD s/p CABG x 3, x 1 in 2015  HTN  HLD  PTA (needs med rec) amlodipine 20 mg daily, ASA 81 mg daiy, atorvastatin 40 mg daily, Zetia 10 mg daily, furosemide 40 mg BID, metoprolol 25 mg BID, Entresto 49-51 1 tab BID, valsartan 320 mg daily  Follows with Dr. Vincent with [facility]. Some concern re: medical compliance.  TTE with bubble this admission -> Normal left ventricular size and function. Left ventricular ejection fraction of 55-60%. No segmental wall motion abnormalities noted.  No shunting at the atrial level on suboptimal Bubble study.  - continue Norvasc /Entresto / Metoprolol 50 mg BID and Valsartan  - Hydralazine 25 mg po TID started  - Holding Lasix given Na goals  - PRN labetalol, hydralazine SBP>160     Hypernatremia- resolved       DM II  Not on meds for this. Recent A1C at 7.1 7/2020.  BG goal is < 180. BG not currently well controlled.  Blood sugars have been difficult to control with sugars in the 300s while on TF   - previously on insulin gtt  - TF started with hyperglycemia, 200+ range  - started on lantus 14 units BID- changed to 10 units BID on 09/18   -Blood sugars are elevated today, we will have to adjust medications once tube feeding started.  Will increase  Lantus to 12 units twice daily  US at [facility] negative for DVT     SHELDON  - states he could not sleep with CPAP as outpatient  - he would benefit for treating SHELDON  - CPAP ordered last night but he did not sleep well    Hypothyroidism  - resumed PTA Levothyroxine 50 mcg daily  TSH checked 7/29/2020 at 2.05     Morbid obesity  BMI noted  7/202 at ~38. Will need to encourage healthy lifestyle and weight loss with recovery     Diet: Snacks/Supplements Adult: Other; thickened smoothie with meals  (RD); With Meals  Calorie Counts    DVT Prophylaxis: Pneumatic Compression Devices  Code Status: Full Code         Disposition Plan      Expected discharge  to ARU on 9/22 or 23    Interval History    Patient had PEG tube placement under moderate  "sedation, patient is sleepy, was n.p.o. for the procedure, no new issues noted.    -Data reviewed today: I reviewed all new labs and imaging over the last 24 hours. I personally reviewed no images or EKG's today.    Physical Exam    , Blood pressure 115/56, pulse 64, temperature 98.3  F (36.8  C), temperature source Oral, resp. rate 24, height 1.778 m (5' 10\"), weight 109.8 kg (242 lb), SpO2 95 %.  Vitals:    09/19/20 0607 09/21/20 0601 09/22/20 0322   Weight: 111 kg (244 lb 11.2 oz) 109.6 kg (241 lb 9.6 oz) 109.8 kg (242 lb)     Vital Signs with Ranges  Temp:  [97.8  F (36.6  C)-98.6  F (37  C)] 98.3  F (36.8  C)  Pulse:  [] 64  Resp:  [9-25] 24  BP: ()/(51-95) 115/56  SpO2:  [93 %-100 %] 95 %  I/O's Last 24 hours  I/O last 3 completed shifts:  In: 100 [P.O.:100]  Out: 925 [Urine:925]    Constitutional: Sleepy in the morning  Respiratory: Clear to auscultation bilaterally, no crackles or wheezing  Cardiovascular: Regular rate and rhythm, normal S1 and S2, and no murmur noted  GI: Normal bowel sounds, soft, non-distended, non-tender  Skin/Integumen: No rashes, trace lower extremity edema noted  Neuro : Dense left-sided hemiplegia with neglect noted      Medications   All medications were reviewed.    - MEDICATION INSTRUCTIONS -       - MEDICATION INSTRUCTIONS -       sodium chloride 0.9%         amLODIPine  20 mg Oral Daily     aspirin  81 mg Oral or Feeding Tube Daily     atorvastatin  40 mg Oral Daily     ezetimibe  10 mg Oral Daily     heparin ANTICOAGULANT  7,500 Units Subcutaneous Q12H     hydrALAZINE  25 mg Oral Q8H TORI     insulin aspart  1-7 Units Subcutaneous TID AC     insulin aspart  1-5 Units Subcutaneous At Bedtime     insulin glargine  10 Units Subcutaneous At Bedtime     insulin glargine  10 Units Subcutaneous QAM AC     levothyroxine  50 mcg Oral QAM AC     metoprolol tartrate  50 mg Oral BID     sacubitril-valsartan  1 tablet Oral BID     sodium chloride (PF)  10 mL Intracatheter Q8H     " sodium chloride (PF)  10 mL Intracatheter Q8H     valsartan  320 mg Oral Daily        Data   Recent Labs   Lab 09/22/20  0629 09/21/20  1553 09/21/20  0620 09/20/20  0630 09/19/20  0635 09/18/20  0555 09/17/20  0445   WBC  --   --   --   --  9.5 9.7 10.0   HGB  --   --   --   --  11.2* 11.6* 12.6*   MCV  --   --   --   --  92 92 93     --   --   --  174 176 188   INR  --  1.17*  --   --   --   --   --    NA  --   --  141 140 139 138 140   POTASSIUM  --   --  4.1 4.0 3.9 3.8 3.8   CHLORIDE  --   --  111* 110* 109 109 111*   CO2  --   --  25 25 24 23 23   BUN  --   --  21 22 21 22 24   CR  --   --  0.78 0.76 0.75 0.80 0.87   ANIONGAP  --   --  5 5 6 6 6   SELENA  --   --  8.6 8.5 8.2* 7.9* 8.3*   GLC  --   --  178* 165* 174* 263* 224*       Recent Results (from the past 24 hour(s))   IR Gastrostomy Tube Percutaneous Plcmnt    Narrative    G-TUBE PLACEMENT 9/22/2020 10:50 AM     HISTORY: Requires nutritional support. Unable to take adequate oral  intake.    DESCRIPTION OF PROCEDURE: After obtaining informed consent, the  patient was placed in a supine position on the fluoroscopy table. The  liver edge was marked. A nasogastric tube was placed into the stomach.  1 mg glucagon was given intravenously. The stomach was inflated with  air.     Two T-TAC suture anchors were used to enter the stomach. A small skin  incision was made in between the T-TAC entry sites. An 18-gauge needle  was used to enter the stomach through the incision. A wire was passed  and curled in the stomach. A tract for the G-tube was dilated. An 18  Persian peel-away sheath was placed. Through the peel-away sheath, a 14  Persian G-tube was placed. The balloon was inflated with a mixture of 1  mL contrast and 9 mL saline. Balloon was pulled back so that it was  against the anterior stomach wall. Contrast was injected to document  adequate positioning. A fluoroscopic image was saved to document tube  position.     I determined this patient to be an  appropriate candidate for the  planned sedation and procedure and reassessed the patient immediately  prior to sedation and procedure. Moderate intravenous conscious  sedation was supervised by me. The patient was independently monitored  by a registered nurse assigned to the Department of radiology using  automated blood pressure, EKG and pulse oximetry. The patient  tolerated the procedure well. There were no immediate postprocedure  complications. The patient's vital signs were monitored by radiology  nursing staff under my supervision and remained stable throughout the  study. Radiation dose for this scan was reduced using automated  exposure control, adjustment of the mA and/or kV according to patient  size, or iterative reconstruction technique.    MEDICATIONS: 1 mg glucagon, 2 mg Versed, 100 mcg fentanyl    SEDATION TIME: 11 minutes    FLUOROSCOPY TIME: 0.9 minutes    RADIATION DOSE: 26.84 mGy Air Kerma     NUMBER OF FLUOROSCOPIC IMAGES: 1      Impression    IMPRESSION: G-tube placed as above.    GRACIELA COUCH MD

## 2020-09-22 NOTE — PLAN OF CARE
SLP: Attempted to see patient for treatment, but was not able to arouse from G-tube procedure and sedation. Will reschedule for 9/23/20.

## 2020-09-22 NOTE — PLAN OF CARE
Discharge Planner PT   Patient plan for discharge: Did not state    Current status: Pt received in bed. MaxA to roll in bed. Overhead lift to combilizer chair. BP monitored throughout (see VS flowsheet). Pt upright in combilizer x15 min, initially 60 degrees. Drop in BP, decreased incline to 45 degrees. Denies lightheadedness or dizziness. LE exercises and UE reaching activities. In recliner, all needs in reach, alarm on upon PT exit.    Barriers to return to prior living situation: Current level of assist, llives alone, fall risk, left sided weakness and neglect    Recommendations for discharge: Acute rehab    Rationale for [place]s: Patient is significantly below baseline as was independent prior to admission. Strong family support. Patient would benefit from continued intense skilled rehab services with all 3 therapies to maximize independence and safety.         Entered by: Evelin Neumann 09/22/2020 9:40 AM

## 2020-09-22 NOTE — PROGRESS NOTES
[facility] Progress Note  Chart Reviewed, Pt discussed in Interdisciplinary Rounds.   Pt had PEG tube placed today. Anticipate pt will be ready for discharge as soon as tomorrow or Thursday.    Intervention:   Per bedside RN, pt's goal rate for tube feeding should be running by tonight.  [facility] called  ARU liaison to update;  ARU is continuing to follow pt.    BEFORE PT CAN ADMIT TO  ARU:  1. Pt needs to be able to transfer with the assist of 2. They cannot accommodate if pt needs 3 people for transfer.  2. Pt needs to be fully participating in all therapy sessions and able to tolerate this participation level.    Team Members notified:   MARTHA, RN,    Plan: Discharge to  ARU when able to transfer with two and participate in therapies consistently.  Anticipated discharge placement:  ARU. Pt's family fully supporting ARU placement. a  Follow up plan: [facility] continuing.    GAMAL Valdovinos  [facility]  [phone]

## 2020-09-22 NOTE — PLAN OF CARE
Nursing shift note  Pt here with R MCA. Lethargic in the starting of the of the shift then awake in between arouse to voice.A&O x3-4 Neuros slurred/garbled speech, left facial droop, left hemiplegic, left visual neglect and absent sensation on left. VSS on RA. BP within parameters. Tele NSR. Denies chest pain or SOB. L/s diminished.Up with lift. . +BS. Incontinent of urine/stool. Condam cath intact, loose stool x 2.Turn and repo q2hrs. Denies pain. RUE PICC SL. Pt scoring green on the Aggression Stop Light Tool. PT/OT/SLP following.NPO from midnight.Possible PEG placement today.Discharge ARU pending progress.    Behavior & Aggression Tool color:green      Pt's belongings:   (Belongings from admission day present in pt's room)              (    Home medications:   (N/A)

## 2020-09-22 NOTE — IR NOTE
G-TUBE PLACEMENT 9/22/2020 10:50 AM     HISTORY: Requires nutritional support. Unable to take adequate oral intake.    DESCRIPTION OF PROCEDURE: After obtaining informed consent, the patient was placed in a supine position on the fluoroscopy table. The liver edge was marked. A nasogastric tube was placed into the stomach. 1 mg glucagon was given intravenously. The stomach was inflated with air.     Two T-TAC suture anchors were used to enter the stomach. A small skin incision was made in between the T-TAC entry sites. An 18-gauge needle was used to enter the stomach through the incision. A wire was passed and curled in the stomach. A tract for the G-tube was dilated. An 18 [place]serratian peel-away sheath was placed. Through the peel-away sheath, a 14 [place]atian G-tube was placed. The balloon was inflated with a mixture of 1 mL contrast and 9 mL saline. Balloon was pulled back so that it was against the anterior stomach wall. Contrast was injected to document adequate positioning. A fluoroscopic image was saved to document tube position.     I determined this patient to be an appropriate candidate for the planned sedation and procedure and reassessed the patient immediately prior to sedation and procedure. Moderate intravenous conscious sedation was supervised by me. The patient was independently monitored by a registered nurse assigned to the Department of radiology using automated blood pressure, EKG and pulse oximetry. The patient tolerated the procedure well. There were no immediate postprocedure complications. The patient's vital signs were monitored by radiology nursing staff under my supervision and remained stable throughout the study. Radiation dose for this scan was reduced using automated exposure control, adjustment of the mA and/or kV according to patient size, or iterative reconstruction technique.    MEDICATIONS: 1 mg glucagon, 2 mg Versed, 100 mcg fentanyl    SEDATION TIME: 11 minutes    FLUOROSCOPY TIME: 0.9  minutes    RADIATION DOSE: 26.84 mGy Air Kerma     NUMBER OF FLUOROSCOPIC IMAGES: 1    IMPRESSION: G-tube placed as above.

## 2020-09-23 ENCOUNTER — APPOINTMENT (OUTPATIENT)
Dept: PHYSICAL THERAPY | Facility: CLINIC | Age: 70
DRG: 064 | End: 2020-09-23
Attending: HOSPITALIST
Payer: COMMERCIAL

## 2020-09-23 ENCOUNTER — APPOINTMENT (OUTPATIENT)
Dept: SPEECH THERAPY | Facility: CLINIC | Age: 70
DRG: 064 | End: 2020-09-23
Attending: HOSPITALIST
Payer: COMMERCIAL

## 2020-09-23 ENCOUNTER — APPOINTMENT (OUTPATIENT)
Dept: OCCUPATIONAL THERAPY | Facility: CLINIC | Age: 70
DRG: 064 | End: 2020-09-23
Attending: HOSPITALIST
Payer: COMMERCIAL

## 2020-09-23 LAB
GLUCOSE BLDC GLUCOMTR-MCNC: 155 MG/DL (ref 70–99)
GLUCOSE BLDC GLUCOMTR-MCNC: 166 MG/DL (ref 70–99)
GLUCOSE BLDC GLUCOMTR-MCNC: 169 MG/DL (ref 70–99)
GLUCOSE BLDC GLUCOMTR-MCNC: 197 MG/DL (ref 70–99)
GLUCOSE BLDC GLUCOMTR-MCNC: 235 MG/DL (ref 70–99)
GLUCOSE BLDC GLUCOMTR-MCNC: 246 MG/DL (ref 70–99)

## 2020-09-23 PROCEDURE — 12000000 ZZH R&B MED SURG/OB

## 2020-09-23 PROCEDURE — 25000132 ZZH RX MED GY IP 250 OP 250 PS 637: Performed by: STUDENT IN AN ORGANIZED HEALTH CARE EDUCATION/TRAINING PROGRAM

## 2020-09-23 PROCEDURE — 00000146 ZZHCL STATISTIC GLUCOSE BY METER IP

## 2020-09-23 PROCEDURE — 25000132 ZZH RX MED GY IP 250 OP 250 PS 637

## 2020-09-23 PROCEDURE — 92526 ORAL FUNCTION THERAPY: CPT | Mod: GN | Performed by: SPEECH-LANGUAGE PATHOLOGIST

## 2020-09-23 PROCEDURE — 97112 NEUROMUSCULAR REEDUCATION: CPT | Mod: GP

## 2020-09-23 PROCEDURE — 99232 SBSQ HOSP IP/OBS MODERATE 35: CPT | Performed by: INTERNAL MEDICINE

## 2020-09-23 PROCEDURE — 27210429 ZZH NUTRITION PRODUCT INTERMEDIATE LITER

## 2020-09-23 PROCEDURE — 97530 THERAPEUTIC ACTIVITIES: CPT | Mod: GO | Performed by: OCCUPATIONAL THERAPY ASSISTANT

## 2020-09-23 PROCEDURE — 97112 NEUROMUSCULAR REEDUCATION: CPT | Mod: GP | Performed by: PHYSICAL THERAPIST

## 2020-09-23 PROCEDURE — 97530 THERAPEUTIC ACTIVITIES: CPT | Mod: GP

## 2020-09-23 PROCEDURE — 92507 TX SP LANG VOICE COMM INDIV: CPT | Mod: GN | Performed by: SPEECH-LANGUAGE PATHOLOGIST

## 2020-09-23 PROCEDURE — 97530 THERAPEUTIC ACTIVITIES: CPT | Mod: GP | Performed by: PHYSICAL THERAPIST

## 2020-09-23 PROCEDURE — 25000131 ZZH RX MED GY IP 250 OP 636 PS 637: Performed by: INTERNAL MEDICINE

## 2020-09-23 PROCEDURE — 97535 SELF CARE MNGMENT TRAINING: CPT | Mod: GO | Performed by: OCCUPATIONAL THERAPY ASSISTANT

## 2020-09-23 PROCEDURE — 40000239 ZZH STATISTIC VAT ROUNDS

## 2020-09-23 PROCEDURE — 25000132 ZZH RX MED GY IP 250 OP 250 PS 637: Performed by: INTERNAL MEDICINE

## 2020-09-23 PROCEDURE — 97110 THERAPEUTIC EXERCISES: CPT | Mod: GO | Performed by: OCCUPATIONAL THERAPY ASSISTANT

## 2020-09-23 PROCEDURE — 25000128 H RX IP 250 OP 636: Performed by: STUDENT IN AN ORGANIZED HEALTH CARE EDUCATION/TRAINING PROGRAM

## 2020-09-23 RX ORDER — NYSTATIN 100000/ML
500000 SUSPENSION, ORAL (FINAL DOSE FORM) ORAL 4 TIMES DAILY
Status: DISCONTINUED | OUTPATIENT
Start: 2020-09-23 | End: 2020-09-24 | Stop reason: HOSPADM

## 2020-09-23 RX ADMIN — INSULIN ASPART 1 UNITS: 100 INJECTION, SOLUTION INTRAVENOUS; SUBCUTANEOUS at 12:39

## 2020-09-23 RX ADMIN — HYDRALAZINE HYDROCHLORIDE 25 MG: 25 TABLET ORAL at 08:53

## 2020-09-23 RX ADMIN — AMLODIPINE BESYLATE 20 MG: 10 TABLET ORAL at 08:53

## 2020-09-23 RX ADMIN — INSULIN GLARGINE 12 UNITS: 100 INJECTION, SOLUTION SUBCUTANEOUS at 08:51

## 2020-09-23 RX ADMIN — LEVOTHYROXINE SODIUM 50 MCG: 50 TABLET ORAL at 08:54

## 2020-09-23 RX ADMIN — HYDRALAZINE HYDROCHLORIDE 25 MG: 25 TABLET ORAL at 00:35

## 2020-09-23 RX ADMIN — HEPARIN SODIUM 7500 UNITS: 10000 INJECTION INTRAVENOUS; SUBCUTANEOUS at 00:35

## 2020-09-23 RX ADMIN — METOPROLOL TARTRATE 50 MG: 50 TABLET, FILM COATED ORAL at 20:42

## 2020-09-23 RX ADMIN — EZETIMIBE 10 MG: 10 TABLET ORAL at 08:53

## 2020-09-23 RX ADMIN — ACETAMINOPHEN 975 MG: 325 TABLET, FILM COATED ORAL at 14:21

## 2020-09-23 RX ADMIN — METOPROLOL TARTRATE 50 MG: 50 TABLET, FILM COATED ORAL at 08:53

## 2020-09-23 RX ADMIN — ACETAMINOPHEN 650 MG: 325 TABLET, FILM COATED ORAL at 20:42

## 2020-09-23 RX ADMIN — ATORVASTATIN CALCIUM 40 MG: 40 TABLET, FILM COATED ORAL at 08:53

## 2020-09-23 RX ADMIN — INSULIN ASPART 3 UNITS: 100 INJECTION, SOLUTION INTRAVENOUS; SUBCUTANEOUS at 08:51

## 2020-09-23 RX ADMIN — SACUBITRIL AND VALSARTAN 1 TABLET: 49; 51 TABLET, FILM COATED ORAL at 08:54

## 2020-09-23 RX ADMIN — NYSTATIN 500000 UNITS: 100000 SUSPENSION ORAL at 17:34

## 2020-09-23 RX ADMIN — INSULIN ASPART 1 UNITS: 100 INJECTION, SOLUTION INTRAVENOUS; SUBCUTANEOUS at 17:33

## 2020-09-23 RX ADMIN — NYSTATIN 500000 UNITS: 100000 SUSPENSION ORAL at 21:15

## 2020-09-23 RX ADMIN — VALSARTAN 320 MG: 160 TABLET ORAL at 08:53

## 2020-09-23 RX ADMIN — HEPARIN SODIUM 7500 UNITS: 10000 INJECTION INTRAVENOUS; SUBCUTANEOUS at 12:40

## 2020-09-23 RX ADMIN — SACUBITRIL AND VALSARTAN 1 TABLET: 49; 51 TABLET, FILM COATED ORAL at 20:42

## 2020-09-23 RX ADMIN — INSULIN GLARGINE 12 UNITS: 100 INJECTION, SOLUTION SUBCUTANEOUS at 21:15

## 2020-09-23 RX ADMIN — ASPIRIN 81 MG 81 MG: 81 TABLET ORAL at 08:53

## 2020-09-23 ASSESSMENT — ACTIVITIES OF DAILY LIVING (ADL)
ADLS_ACUITY_SCORE: 20

## 2020-09-23 ASSESSMENT — MIFFLIN-ST. JEOR: SCORE: 1852.15

## 2020-09-23 NOTE — PLAN OF CARE
Discharge Planner SLP   Patient plan for discharge: ARC  Current status: Patient seen for speech/language and swallow treatment with his son present. He was able to follow 2 step directions with 85%. Speech intelligibility  with short 2-3 word phrases needs mod to max cue to increase breath support overticulate and slow rate 70% if the phrase known. Attempted oral reading with left field cut. Could read some words but will asses with single words and phrases on a card. He continues to have possible oral thrush and nurse updated. Improved mastication and bolus control with single small size ice chips. Coughed x1 with larger chip size. Patient demonstrating improvement and better endurance/tolerance.   Barriers to return to prior living situation: Dysphagia, dysarthria and aphasia  Recommendations for discharge: ARC  Rationale for [place]s: Patient needs intense ST needs for both speech/language and dysphagia management. Patient is motivated and has strong family support. Tolerance is improving and would be able to tolerate 3 hours of therapy daily.        Entered by: Jaky Egan 09/23/2020 3:39 PM

## 2020-09-23 NOTE — INTERIM SUMMARY
[facility] Pre-Admission Screen    Referral Source:  [place] [address] [facility]  [address] [facility]  Admit date to referring facility: 9/7/2020    Physical Medicine and Rehab Consult Completed: No    Rehab Diagnosis:    Stroke Ischemic 01.1 (L) Body Involvement (R) Brain Stroke; L sided weakness due to MCA infarct, R MCA occlusion, and R carotid atherosclerosis    Justification for Acute Inpatient Rehabilitation  Jorje Teran is a 69 y/o male with past medical history of HTN, HLD, DM II, SHELDON, hypothyroidism, and morbid obesity who was found down by his son after not hearing from his father throughout the day. The patient was unable to move his L side, and EMS was activated. Pt was found to have a R MCA infarct and R carotid atherosclerosis. The patient was not appropriate for tPA or endovascular treatment as the infarct was already established. His hospital course is notable for dysphagia, BP management needs, hypernatremia (resolved), DM II management, and extensive therapies. He had PEG placed on 9/22/2020, and is now medically ready for discharge to [facility] for intensive therapies, ongoing medical management, and rehabilitative nursing care.  Patient requires an intensive inpatient rehab program to address the following acute impairments:impaired ROM, impaired strength, impaired activity tolerance, impaired tone, impaired balance, impaired coordination, impaired cognition, impaired judgement and safety awareness, impulsiveness, impaired weight shifting, dysphagia, aphasia and dysarthria    Current Active Medical Management Needs/Risks for Clinical Complications  The patient requires the high level of rehabilitation physician supervision that accompanies the provision of intensive rehabilitation therapy.  The patient needs the services of the rehabilitation physician to assess the patient medically and functionally and to modify the course of treatment as needed to  maximize the patient's capacity to benefit from the rehabilitation process. The patient requires physician involvement at least 3 days per week to manage:   Neurologic: assess neuro signs and symptoms in pt at risk for further strokes; manage anticoagulation therapy to prevent further strokes-- currently on aspirin, heparin  Cardiac: in setting of new stroke and pt with CAD, HTN, manage BP and HLD-- currently on Norvasc, hydralazine, metoprolol, Diovan, Lipitor, Zetia, Entresto  Endocrine: in patient with baseline DM II diet controlled but A1C of 7.1, swallow, nutrition, and activity deficits-- currently on sliding scale insulin and scheduled insulin   Pain: in patient with generalized pain, manage to optimize participation in therapies-- currently on Tylenol  Body Mass Index 34.51    Past Medical/Surgical History   Surgery in the past 100 days: No   Additional relevant past medical history:  HTN, HLD, DM II, SHELDON, hypothyroidism, and morbid obesity    Level of Functioning prior to Admission:  Home Environment  Lives with: alone  Living arrangements: house  Home accessibility: stairs to enter home  Stairs to enter home: 1  Stairs to negotiate within home:    Stair railings at home:    Living environment comments:Pt unable to provide subjective history.   Equipment currently used at home: none  Activity/exercise/self-care comment:      Ambulation: 0-->independent  Transferrin-->independent  Toiletin-->independent  Bathin-->independent  Dressin-->independent   Eatin-->independent  Communication: 0-->understands/communicates without difficulty  Swallowin-->swallows foods/liquids without difficulty  Cognition: 0 - no cognition issues reported  Prior Functional Level Comment:    Additional Comments: Pt has 3 sons who are very motivated to ensure pt will be able to return home. Per sons, they are able to provide or hire 24 hr assist upon discharge from [facility].     Level of Function: GG Scale (Section  GG Functional Ability and Goals; CMS's VASQUEZ Version 3.0 Manual effective 10.1.2019):  PT Current Function Goals for Rehab   Bed Rolling 1 Dependent 3 Partial/moderate assistance   Supine to Sit 1 Dependent 3 Partial/moderate assistance   Sit to Stand 1 Dependent 3 Partial/moderate assistance   Transfer 1 Dependent 3 Partial/moderate assistance   Ambulation 1 Dependent 3 Partial/moderate assistance   Stairs Not completed 3 Partial/moderate assistance     OT Current Function Goals for Rehab   Feeding 2 Substantial/maximal assistance 3 Partial/moderate assistance   Grooming 1 Dependent 3 Partial/moderate assistance   Bathing 1 Dependent 3 Partial/moderate assistance   Upper Body Dressing 1 Dependent 3 Partial/moderate assistance   Lower Body Dressing 1 Dependent 3 Partial/moderate assistance   Toileting 1 Dependent 3 Partial/moderate assistance   Toilet Transfer 1 Dependent 3 Partial/moderate assistance   Tub/Shower Transfer Not completed 3 Partial/moderate assistance   Cognition Impaired Supervision     SLP Current Function Goals for Rehab   Swallow Impaired Tolerate least restrictive diet without signs & symptoms of aspiration and adhere to safe swallow strategies   Communication Impaired Communicate basic needs effectively       Current Diet:  NDD1, Nectar thickened liquids and Cycled tube feeds    Summary Statement:  The patient is participating well in PT/OT/SLP and will tolerate 3 hours of therapy per day. The patient currently performs bed mobility with Max A x 2 and bed rail. Once sitting at EOB the pt is able to sit with anywhere from SBA/CGA, to Mod A x 2 depending on fatigue and task. The patient is able to use Gretchen Stedy with Max A x 2, however unable to achieve a full stand. The patient is currently dependent on mechanical lift for bed to and from chair. The patient's alertness and participation is improving and he will tolerate intensive therapies in the ARC setting. He is able to follow 2 step directions  with 85%, and intelligibility with short 2-3 word phrases needs mod/max cues to increase breath support    Expected Therapies and Services required during Inpatient Rehab admission  Intensity of Therapy: Patient requires intensive therapies not available in a lesser level of care. Patient is motivated, making gains, and can tolerate 3 hours of therapy a day.  Physical Therapy: 60 minutes per day, at least 5 days a week for 28 days  Occupational Therapy: 60 minutes per day, at least 5 days a week for 28 days  Speech and Language Therapy: 60 minutes per day, at least 5 days a week for 28 days  Rehabilitation Nursing Needs: Patient requires 24 hour Rehab Nursing to manage bowel program, bladder program, vitals, medication education, tone management, positioning, carryover of new rehab techniques, care coordination, skin integrity, blood sugar management, diabetes education, assess neurologic status, pain management, stroke education, provide safe environment for patient at falls risk and monitor nutritional intake.    Precautions/restrictions/special needs:   Precautions: fall precautions   Restrictions: DDL 1, nectar thick liquid diet   Special Needs: lift and TF via PEG tube; designated visitor: Patient's son Jeyson    Expected Level of Improvement: Anticipate with intensive therapies, close medical management, and rehabilitative nursing care the patient will improve strength, balance, tolerance to activity, communication, swallow, cognition, safety to ensure A x 1 with all mobility and ADLs for return home with 24 hr support of family and ongoing home therapies/cares  Expected Length of time to achieve: 28 days    Anticipated Discharge Needs:  Anticipated Discharge Destination: Home  Anticipated Discharge Support: Family member  24/7 support available : Yes  Identified caregiver(s):  3 sons - Donte, Jeyson, and Kai  Anticipated Discharge Needs: Home with homecare and Tube Feeds    Identified  challenges/barriers:  Will need 24 hour support, which sons are able to provide. 1 KIERRA home.     Admissions Liaison Signature/Date/Time:         Physician statement of review and agreement:  I have reviewed and am in agreement of the need for IRF stay to address above functional and medical needs. In addition to above statements address, Patient requires intensive active and ongoing therapeutic intervention and multiple therapies; Patient requires medical supervision; Expected to actively participate in the intensive rehab program; Sufficiently stable to actively participate; Expectation for measurable improvement in functional capacity or adaption to impairments.      Physician Signature/Date/Time:

## 2020-09-23 NOTE — PLAN OF CARE
Discharge Planner PT     Patient plan for discharge: Did not state     Current status: Pt received sitting in recliner chair. Pt required  max assist x 2 and max cues for activation of abd/trunk musculatures to reach forward to reach and grab bar on Gretchen steady. Attempted STS using Gretchen steady with 2 person max assist and max cues. However pt unable and noted poor initiation. Pt was assisted from recliner chair>sitting at EOB using overhead lift. Pt sat at EOB x 14 min with mod assist x 2 initially progressing to SBA. Pt was able to maintain static sitting balance for 1 min x 2 with verbal cues. Initially noted with heavy leaning to the left which improved with verbal and visual cues. Pt was assisted to supine using overhead lift and required mod assist x 2 with rolling to the left and 2 person total assist with rolling to the right. Pt is repositioned on his left side for pressure relief. Left with all needs within reach and alarm engaged.     PM Session: Pt able to stand x 4 reps with Max A x 2. Stood 20-30 seconds each time with Max A for balance, Min A for balance on th 4th trial. L knee does buckle- responded well to visual feedback with use of mirror.      Barriers to return to prior living situation: Current level of assist, llives alone, fall risk, left sided weakness and neglect     Recommendations for discharge: Acute rehab     Rationale for [place]s: Patient is significantly below baseline as was independent prior to admission. Strong family support. Patient would benefit from continued intense skilled rehab services with all 3 therapies to maximize independence and safety.           Entered by: Aly Hoskins 09/23/2020 12:04 PM

## 2020-09-23 NOTE — PLAN OF CARE
Discharge Planner OT   Patient plan for discharge: none stated  Current status:  Pt more alert today, completed supine to sit EOB with use of bed rail and max A of 2, once sitting balance established at EOB and LUE supported on bedside table pt sat with SBA to CGA, cues with assist needed to attend to left side and locate items past midline to left for ADL task. Pt sat x 10 mins. Pt dependent of ceiling lift for bed to chair transfer.   Barriers to return to prior living situation: L hemiparesis, weakness  Recommendations for discharge: ARU per plan established by the Occupational Therapist  Rationale for [place]s: Patient would benefit from 3 hours of interdisciplinary therapy for maximum independence in ADLs/mobility.  Patient was independent prior to CVA, has good family support       Entered by: Katerina Ardon 09/23/2020 9:46 AM

## 2020-09-23 NOTE — PROVIDER NOTIFICATION
MD Notification    Notified Person: MD    Notified Person Name: Gato    Notification Date/Time: 9/23 1530    Notification Interaction: web page    Purpose of Notification: Noted some possible thrush in patient's mouth- could you order nystatin    Orders Received: Nystatin ordered    Comments:

## 2020-09-23 NOTE — PLAN OF CARE
Nursing shift note  Pt here with R MCA CVA. A&Ox4. Neuros ex slurred speech, L facial droop, L field cut, L neglect, L hemiplegia, absent sensation in LUE/LLE. VSS on R/A. Tele NSR. DD1 diet, NT liquids. TF running at goal rate 60mL/hr, to be turned off at 0800. Takes pills crushed through PEG. Up with lift. Turn and repo. Condom cath in place. BM X1. RUE PICC in place, patent. Denies pain. Pt scoring green on the Aggression Stop Light Tool. Plan for potential discharge to ARU today.     Behavior & Aggression Tool color: Green      Pt's belongings:   (Belongings from admission day present in pt's room)        Home medications: N

## 2020-09-23 NOTE — PLAN OF CARE
Here for R MCA CVA- AOx4, lethargic and slow to respond. L sided hemiplegia, absent sensation LLE/LUE, L facial droop. Tele ST. C/o abdominal pain, Tylenol given x1 with relief. Up in chair x1 with PT. A2/lift and repositioning Q2. Pills crushed through PEG tube- dressing CDI. DD1 nectar thick, poor appetite. TF started at 1800 at goal rate of 60ml/hr, Q4hr 120ml flushes. Condom cath changed. JOSEF PICC GAVINO'd. Plan for discharge tomorrow to ARU.

## 2020-09-23 NOTE — PROGRESS NOTES
[facility]  Hospitalist Progress Note  Bianca Hoskins MD  09/23/2020    Assessment & Plan   Jorje Teran is a 70 year old male who presents with weakness and slurred speech     CVA with dense left hemiparesis  - Last baseline known noon on 9/6 (over 24 hours PTA). Son went to check on father afternoon/evening of presentation and he was on ground, unable to move his L side and slurring words. Vitals stable. Labs normal except 11.2. CXR clear.   *CT with filling defect within the distal R ICA extending into the R MCA and occlusion of the R MCA at the M1 segment. CTA with severe plaquing at R carotid bifurcation with filling defect in the prox R ICA suggestive of thrombus contributing to approximately 80% stenosis. 60% stenosis of the proximal L ICA. TTE with bubble -> Normal left ventricular size and function. Left ventricular ejection fraction of 55-60%. No segmental wall motion abnormalities noted.  No shunting at the atrial level on suboptimal Bubble study.  - Lipids 7/29/20- LDL 78, HDL 56, , T chol 164, hemoglobin A1c 7.1% H, TSH is 2.05.  - Head CTs have been stable for the most part.  - remains with significant dysarthria, facial droop and dense left sided hemiparesis  - Neurology signed off  - ASA 81 mg daily PO/TX  - atorvastatin 40 mg daily, Zetia 10 mg po daily  - BP management as below; - goal for SBPs < 160   - Labetalol as needed for SBP>160  - PT/OT/SLP- rec ARU  -Patient did not have adequate intake as per calorie count, discussed with family and the plan is to do PEG tube placement today 9/22.    -Appreciate input from nutrition team, patient is started on tube feeding, he would possibly reach his goal later today, discussed with social work, patient does not appear to be motivated to participate in PT for long time, they are yet to decide if they would have the patient otherwise will have to look into TCU options.    Dysphagia  - SLP following  - had NG FT for few days-  now removed  -Patient failed calorie counting requirements  - currently on DD1 with nectar thickened liquids   -PEG tube to be placed on 9/22, currently patient is on tube feeding     CAD s/p CABG x 3, x 1 in 2015  HTN  HLD  PTA (needs med rec) amlodipine 20 mg daily, ASA 81 mg daiy, atorvastatin 40 mg daily, Zetia 10 mg daily, furosemide 40 mg BID, metoprolol 25 mg BID, Entresto 49-51 1 tab BID, valsartan 320 mg daily  Follows with Dr. Vincent with [facility]. Some concern re: medical compliance.  TTE with bubble this admission -> Normal left ventricular size and function. Left ventricular ejection fraction of 55-60%. No segmental wall motion abnormalities noted.  No shunting at the atrial level on suboptimal Bubble study.  - continue Norvasc /Entresto / Metoprolol 50 mg BID and Valsartan  - Hydralazine 25 mg po TID started, blood pressure seems to be on the lower side on 9/23, will stop hydralazine.  - Holding Lasix given Na goals  - PRN labetalol, hydralazine SBP>160     Hypernatremia- resolved       DM II  Not on meds for this. Recent A1C at 7.1 7/2020.  BG goal is < 180. BG not currently well controlled.  Blood sugars have been difficult to control with sugars in the 300s while on TF   - previously on insulin gtt  - TF started with hyperglycemia, 200+ range  - started on lantus 14 units BID- changed to 10 units BID on 09/18   -Blood sugars are elevated today, we will have to adjust medications once tube feeding started.  Will increase  Lantus to 14 units twice daily  US at [facility] negative for DVT     SHELDON  - states he could not sleep with CPAP as outpatient  - he would benefit for treating SHELDON  - CPAP ordered last night but he did not sleep well    Hypothyroidism  - resumed PTA Levothyroxine 50 mcg daily  TSH checked 7/29/2020 at 2.05     Morbid obesity  BMI noted  7/202 at ~38. Will need to encourage healthy lifestyle and weight loss with recovery     Diet: Snacks/Supplements Adult: Other; thickened smoothie with  "meals  (RD); With Meals  Calorie Counts    DVT Prophylaxis: Pneumatic Compression Devices  Code Status: Full Code         Disposition Plan      Expected discharge  to ARU versus TCU on 9/24 if patient does not qualify for ARU.    Interval History    Patient is more alert, sitting in a chair, he does communicate but very slow to respond, he has neglect to the left side, tube feeds ongoing, it seems he is tolerating it well.    -Data reviewed today: I reviewed all new labs and imaging over the last 24 hours. I personally reviewed no images or EKG's today.    Physical Exam    , Blood pressure 96/69, pulse 113, temperature 98.3  F (36.8  C), temperature source Oral, resp. rate 18, height 1.778 m (5' 10\"), weight 109.1 kg (240 lb 8 oz), SpO2 96 %.  Vitals:    09/21/20 0601 09/22/20 0322 09/23/20 0556   Weight: 109.6 kg (241 lb 9.6 oz) 109.8 kg (242 lb) 109.1 kg (240 lb 8 oz)     Vital Signs with Ranges  Temp:  [97.9  F (36.6  C)-98.6  F (37  C)] 98.3  F (36.8  C)  Pulse:  [] 113  Resp:  [18-20] 18  BP: ()/(49-84) 96/69  SpO2:  [94 %-98 %] 96 %  I/O's Last 24 hours  I/O last 3 completed shifts:  In: 120 [NG/GT:120]  Out: 1050 [Urine:1050]    Constitutional: Alert oriented x3  Respiratory: Clear to auscultation bilaterally, no crackles or wheezing  Cardiovascular: Regular rate and rhythm, normal S1 and S2, and no murmur noted  GI: Normal bowel sounds, soft, non-distended, non-tender  Skin/Integumen: No rashes, trace lower extremity edema noted  Neuro : Dense left-sided hemiplegia with neglect noted      Medications   All medications were reviewed.    - MEDICATION INSTRUCTIONS -       - MEDICATION INSTRUCTIONS -       sodium chloride 0.9%         amLODIPine  20 mg Oral Daily     aspirin  81 mg Oral or Feeding Tube Daily     atorvastatin  40 mg Oral Daily     ezetimibe  10 mg Oral Daily     heparin ANTICOAGULANT  7,500 Units Subcutaneous Q12H     hydrALAZINE  25 mg Oral Q8H TORI     insulin aspart  1-7 Units " Subcutaneous TID AC     insulin aspart  1-5 Units Subcutaneous At Bedtime     insulin glargine  12 Units Subcutaneous At Bedtime     insulin glargine  12 Units Subcutaneous QAM AC     levothyroxine  50 mcg Oral QAM AC     metoprolol tartrate  50 mg Oral BID     sacubitril-valsartan  1 tablet Oral BID     sodium chloride (PF)  10 mL Intracatheter Q8H     sodium chloride (PF)  10 mL Intracatheter Q8H     valsartan  320 mg Oral Daily        Data   Recent Labs   Lab 09/22/20  0629 09/21/20  1553 09/21/20  0620 09/20/20  0630 09/19/20  0635 09/18/20  0555 09/17/20  0445   WBC  --   --   --   --  9.5 9.7 10.0   HGB  --   --   --   --  11.2* 11.6* 12.6*   MCV  --   --   --   --  92 92 93     --   --   --  174 176 188   INR  --  1.17*  --   --   --   --   --    NA  --   --  141 140 139 138 140   POTASSIUM  --   --  4.1 4.0 3.9 3.8 3.8   CHLORIDE  --   --  111* 110* 109 109 111*   CO2  --   --  25 25 24 23 23   BUN  --   --  21 22 21 22 24   CR  --   --  0.78 0.76 0.75 0.80 0.87   ANIONGAP  --   --  5 5 6 6 6   SELENA  --   --  8.6 8.5 8.2* 7.9* 8.3*   GLC  --   --  178* 165* 174* 263* 224*       No results found for this or any previous visit (from the past 24 hour(s)).

## 2020-09-23 NOTE — PROGRESS NOTES
CM    [facility], as long as pt's last therapy session goes well. ARU asks for a 1:30-2pm ride to be arranged. SW will await callback from [facility] before approaching family tomorrow.    P: Continue to assist as needed.    GAMAL Vargas   [facility]  [phone]

## 2020-09-23 NOTE — PROGRESS NOTES
CM    I: SW spoke w/ARU liaison who reports they may take patient tomorrow if pt remains a consistent 2 person assist today AND if pt participates w/therapy every session. SW updated bedside and charge RN.    P: Continue to assist as needed.      Samantha Foley [facility]  [phone]    UPDATE@[id number]: SW met w/patient and son, per request. They would like an additional referral sent to [facility] for possible placement as well as [facility], which was done thru [facility].

## 2020-09-24 ENCOUNTER — APPOINTMENT (OUTPATIENT)
Dept: PHYSICAL THERAPY | Facility: CLINIC | Age: 70
DRG: 064 | End: 2020-09-24
Attending: HOSPITALIST
Payer: COMMERCIAL

## 2020-09-24 ENCOUNTER — HOSPITAL ENCOUNTER (INPATIENT)
Facility: CLINIC | Age: 70
LOS: 29 days | Discharge: NURSING, REHAB OR LONG-TERM CARE | DRG: 057 | End: 2020-10-23
Attending: PHYSICAL MEDICINE & REHABILITATION | Admitting: PHYSICAL MEDICINE & REHABILITATION
Payer: COMMERCIAL

## 2020-09-24 ENCOUNTER — APPOINTMENT (OUTPATIENT)
Dept: SPEECH THERAPY | Facility: CLINIC | Age: 70
DRG: 064 | End: 2020-09-24
Attending: HOSPITALIST
Payer: COMMERCIAL

## 2020-09-24 ENCOUNTER — APPOINTMENT (OUTPATIENT)
Dept: OCCUPATIONAL THERAPY | Facility: CLINIC | Age: 70
DRG: 064 | End: 2020-09-24
Attending: HOSPITALIST
Payer: COMMERCIAL

## 2020-09-24 VITALS
RESPIRATION RATE: 18 BRPM | WEIGHT: 240.5 LBS | BODY MASS INDEX: 34.43 KG/M2 | DIASTOLIC BLOOD PRESSURE: 76 MMHG | OXYGEN SATURATION: 92 % | HEART RATE: 113 BPM | SYSTOLIC BLOOD PRESSURE: 118 MMHG | HEIGHT: 70 IN | TEMPERATURE: 98.5 F

## 2020-09-24 DIAGNOSIS — I63.031 CEREBROVASCULAR ACCIDENT (CVA) DUE TO THROMBOSIS OF RIGHT CAROTID ARTERY (H): ICD-10-CM

## 2020-09-24 DIAGNOSIS — E11.9 CONTROLLED TYPE 2 DIABETES MELLITUS WITHOUT COMPLICATION, WITHOUT LONG-TERM CURRENT USE OF INSULIN (H): ICD-10-CM

## 2020-09-24 DIAGNOSIS — R09.82 POST-NASAL DRIP: ICD-10-CM

## 2020-09-24 DIAGNOSIS — I10 ESSENTIAL HYPERTENSION: ICD-10-CM

## 2020-09-24 DIAGNOSIS — I63.511 RIGHT MIDDLE CEREBRAL ARTERY STROKE (H): ICD-10-CM

## 2020-09-24 DIAGNOSIS — N39.0 UTI (URINARY TRACT INFECTION), UNCOMPLICATED: Primary | ICD-10-CM

## 2020-09-24 DIAGNOSIS — K21.9 GASTROESOPHAGEAL REFLUX DISEASE WITHOUT ESOPHAGITIS: ICD-10-CM

## 2020-09-24 LAB
GLUCOSE BLDC GLUCOMTR-MCNC: 153 MG/DL (ref 70–99)
GLUCOSE BLDC GLUCOMTR-MCNC: 164 MG/DL (ref 70–99)
GLUCOSE BLDC GLUCOMTR-MCNC: 173 MG/DL (ref 70–99)
GLUCOSE BLDC GLUCOMTR-MCNC: 212 MG/DL (ref 70–99)
GLUCOSE BLDC GLUCOMTR-MCNC: 216 MG/DL (ref 70–99)

## 2020-09-24 PROCEDURE — 12800006 ZZH R&B REHAB

## 2020-09-24 PROCEDURE — 25000132 ZZH RX MED GY IP 250 OP 250 PS 637: Performed by: STUDENT IN AN ORGANIZED HEALTH CARE EDUCATION/TRAINING PROGRAM

## 2020-09-24 PROCEDURE — 25000131 ZZH RX MED GY IP 250 OP 636 PS 637: Performed by: PHYSICAL MEDICINE & REHABILITATION

## 2020-09-24 PROCEDURE — 92526 ORAL FUNCTION THERAPY: CPT | Mod: GN | Performed by: SPEECH-LANGUAGE PATHOLOGIST

## 2020-09-24 PROCEDURE — 25000132 ZZH RX MED GY IP 250 OP 250 PS 637: Performed by: PHYSICAL MEDICINE & REHABILITATION

## 2020-09-24 PROCEDURE — 25000128 H RX IP 250 OP 636: Performed by: STUDENT IN AN ORGANIZED HEALTH CARE EDUCATION/TRAINING PROGRAM

## 2020-09-24 PROCEDURE — 25000132 ZZH RX MED GY IP 250 OP 250 PS 637: Performed by: INTERNAL MEDICINE

## 2020-09-24 PROCEDURE — 97530 THERAPEUTIC ACTIVITIES: CPT | Mod: GO | Performed by: OCCUPATIONAL THERAPY ASSISTANT

## 2020-09-24 PROCEDURE — 00000146 ZZHCL STATISTIC GLUCOSE BY METER IP

## 2020-09-24 PROCEDURE — 25000128 H RX IP 250 OP 636: Performed by: PHYSICAL MEDICINE & REHABILITATION

## 2020-09-24 PROCEDURE — 92507 TX SP LANG VOICE COMM INDIV: CPT | Mod: GN | Performed by: SPEECH-LANGUAGE PATHOLOGIST

## 2020-09-24 PROCEDURE — 25000132 ZZH RX MED GY IP 250 OP 250 PS 637

## 2020-09-24 PROCEDURE — 40000257 ZZH STATISTIC CONSULT NO CHARGE VASC ACCESS

## 2020-09-24 PROCEDURE — 99239 HOSP IP/OBS DSCHRG MGMT >30: CPT | Performed by: INTERNAL MEDICINE

## 2020-09-24 PROCEDURE — 25000131 ZZH RX MED GY IP 250 OP 636 PS 637: Performed by: INTERNAL MEDICINE

## 2020-09-24 PROCEDURE — 97110 THERAPEUTIC EXERCISES: CPT | Mod: GO | Performed by: OCCUPATIONAL THERAPY ASSISTANT

## 2020-09-24 PROCEDURE — 40000239 ZZH STATISTIC VAT ROUNDS

## 2020-09-24 PROCEDURE — 97530 THERAPEUTIC ACTIVITIES: CPT | Mod: GP | Performed by: PHYSICAL THERAPIST

## 2020-09-24 RX ORDER — GINSENG 100 MG
CAPSULE ORAL
Status: ON HOLD | DISCHARGE
Start: 2020-09-24 | End: 2020-10-22

## 2020-09-24 RX ORDER — NICOTINE POLACRILEX 4 MG
15-30 LOZENGE BUCCAL
Status: DISCONTINUED | OUTPATIENT
Start: 2020-09-24 | End: 2020-09-28

## 2020-09-24 RX ORDER — NYSTATIN 100000/ML
500000 SUSPENSION, ORAL (FINAL DOSE FORM) ORAL 4 TIMES DAILY
Status: DISPENSED | OUTPATIENT
Start: 2020-09-24 | End: 2020-10-22

## 2020-09-24 RX ORDER — NITROGLYCERIN 0.4 MG/1
0.4 TABLET SUBLINGUAL EVERY 5 MIN PRN
Status: DISCONTINUED | OUTPATIENT
Start: 2020-09-24 | End: 2020-10-23 | Stop reason: HOSPADM

## 2020-09-24 RX ORDER — HEPARIN SODIUM,PORCINE 10 UNIT/ML
2-5 VIAL (ML) INTRAVENOUS
Status: DISPENSED | OUTPATIENT
Start: 2020-09-24 | End: 2020-09-27

## 2020-09-24 RX ORDER — DEXTROSE MONOHYDRATE 25 G/50ML
25-50 INJECTION, SOLUTION INTRAVENOUS
Status: DISCONTINUED | OUTPATIENT
Start: 2020-09-24 | End: 2020-09-28

## 2020-09-24 RX ORDER — FUROSEMIDE 20 MG
40 TABLET ORAL
Status: DISCONTINUED | OUTPATIENT
Start: 2020-09-24 | End: 2020-10-09

## 2020-09-24 RX ORDER — CARBOXYMETHYLCELLULOSE SODIUM 5 MG/ML
2 SOLUTION/ DROPS OPHTHALMIC
DISCHARGE
Start: 2020-09-24

## 2020-09-24 RX ORDER — DEXTROSE MONOHYDRATE 100 MG/ML
INJECTION, SOLUTION INTRAVENOUS CONTINUOUS PRN
Status: DISCONTINUED | OUTPATIENT
Start: 2020-09-24 | End: 2020-10-13

## 2020-09-24 RX ORDER — VALSARTAN 160 MG/1
320 TABLET ORAL DAILY
Status: DISCONTINUED | OUTPATIENT
Start: 2020-09-25 | End: 2020-10-09

## 2020-09-24 RX ORDER — METOPROLOL TARTRATE 50 MG
50 TABLET ORAL 2 TIMES DAILY
Status: DISCONTINUED | OUTPATIENT
Start: 2020-09-24 | End: 2020-10-09

## 2020-09-24 RX ORDER — LEVOTHYROXINE SODIUM 25 UG/1
50 TABLET ORAL DAILY
Status: DISCONTINUED | OUTPATIENT
Start: 2020-09-25 | End: 2020-10-23 | Stop reason: HOSPADM

## 2020-09-24 RX ORDER — CARBOXYMETHYLCELLULOSE SODIUM 5 MG/ML
2 SOLUTION/ DROPS OPHTHALMIC
Status: DISCONTINUED | OUTPATIENT
Start: 2020-09-24 | End: 2020-10-23 | Stop reason: HOSPADM

## 2020-09-24 RX ORDER — POLYETHYLENE GLYCOL 3350 17 G/17G
17 POWDER, FOR SOLUTION ORAL 2 TIMES DAILY PRN
Status: ON HOLD | DISCHARGE
Start: 2020-09-24 | End: 2022-08-31

## 2020-09-24 RX ORDER — TRAZODONE HYDROCHLORIDE 50 MG/1
50 TABLET, FILM COATED ORAL
Status: DISCONTINUED | OUTPATIENT
Start: 2020-09-24 | End: 2020-10-23 | Stop reason: HOSPADM

## 2020-09-24 RX ORDER — BISACODYL 10 MG
10 SUPPOSITORY, RECTAL RECTAL DAILY PRN
Status: DISCONTINUED | OUTPATIENT
Start: 2020-09-24 | End: 2020-10-23 | Stop reason: HOSPADM

## 2020-09-24 RX ORDER — METOPROLOL TARTRATE 50 MG
50 TABLET ORAL 2 TIMES DAILY
DISCHARGE
Start: 2020-09-24

## 2020-09-24 RX ORDER — HEPARIN SODIUM 10000 [USP'U]/ML
7500 INJECTION, SOLUTION INTRAVENOUS; SUBCUTANEOUS EVERY 12 HOURS
Status: DISCONTINUED | OUTPATIENT
Start: 2020-09-24 | End: 2020-10-01 | Stop reason: CLARIF

## 2020-09-24 RX ORDER — POLYETHYLENE GLYCOL 3350 17 G/17G
17 POWDER, FOR SOLUTION ORAL 2 TIMES DAILY PRN
Status: DISCONTINUED | OUTPATIENT
Start: 2020-09-24 | End: 2020-10-23 | Stop reason: HOSPADM

## 2020-09-24 RX ORDER — MULTIPLE VITAMINS W/ MINERALS TAB 9MG-400MCG
1 TAB ORAL DAILY
Status: DISCONTINUED | OUTPATIENT
Start: 2020-09-24 | End: 2020-10-06

## 2020-09-24 RX ORDER — EZETIMIBE 10 MG/1
10 TABLET ORAL DAILY
Status: DISCONTINUED | OUTPATIENT
Start: 2020-09-25 | End: 2020-10-23 | Stop reason: HOSPADM

## 2020-09-24 RX ORDER — GINSENG 100 MG
CAPSULE ORAL
Status: DISCONTINUED | OUTPATIENT
Start: 2020-09-24 | End: 2020-10-23 | Stop reason: HOSPADM

## 2020-09-24 RX ORDER — BISACODYL 10 MG
10 SUPPOSITORY, RECTAL RECTAL DAILY PRN
Status: ON HOLD | DISCHARGE
Start: 2020-09-24 | End: 2022-08-31

## 2020-09-24 RX ORDER — ATORVASTATIN CALCIUM 40 MG/1
40 TABLET, FILM COATED ORAL DAILY
Status: DISCONTINUED | OUTPATIENT
Start: 2020-09-25 | End: 2020-10-23 | Stop reason: HOSPADM

## 2020-09-24 RX ORDER — NYSTATIN 100000/ML
500000 SUSPENSION, ORAL (FINAL DOSE FORM) ORAL 4 TIMES DAILY
Status: ON HOLD | DISCHARGE
Start: 2020-09-24 | End: 2020-10-22

## 2020-09-24 RX ORDER — AMLODIPINE BESYLATE 10 MG/1
20 TABLET ORAL DAILY
Status: DISCONTINUED | OUTPATIENT
Start: 2020-09-25 | End: 2020-10-09

## 2020-09-24 RX ORDER — ASPIRIN 81 MG/1
81 TABLET ORAL DAILY
Status: DISCONTINUED | OUTPATIENT
Start: 2020-09-25 | End: 2020-10-23

## 2020-09-24 RX ADMIN — INSULIN GLARGINE 12 UNITS: 100 INJECTION, SOLUTION SUBCUTANEOUS at 10:00

## 2020-09-24 RX ADMIN — HEPARIN, PORCINE (PF) 10 UNIT/ML INTRAVENOUS SYRINGE 5 ML: at 21:45

## 2020-09-24 RX ADMIN — ASPIRIN 81 MG 81 MG: 81 TABLET ORAL at 09:19

## 2020-09-24 RX ADMIN — INSULIN ASPART 2 UNITS: 100 INJECTION, SOLUTION INTRAVENOUS; SUBCUTANEOUS at 10:00

## 2020-09-24 RX ADMIN — HEPARIN SODIUM 7500 UNITS: 10000 INJECTION INTRAVENOUS; SUBCUTANEOUS at 00:03

## 2020-09-24 RX ADMIN — FUROSEMIDE 40 MG: 20 TABLET ORAL at 17:01

## 2020-09-24 RX ADMIN — SACUBITRIL AND VALSARTAN 1 TABLET: 49; 51 TABLET, FILM COATED ORAL at 21:30

## 2020-09-24 RX ADMIN — HEPARIN SODIUM 7500 UNITS: 10000 INJECTION INTRAVENOUS; SUBCUTANEOUS at 14:22

## 2020-09-24 RX ADMIN — INSULIN GLARGINE 12 UNITS: 100 INJECTION, SOLUTION SUBCUTANEOUS at 21:30

## 2020-09-24 RX ADMIN — NYSTATIN 500000 UNITS: 500000 SUSPENSION ORAL at 20:38

## 2020-09-24 RX ADMIN — ACETAMINOPHEN 975 MG: 325 TABLET, FILM COATED ORAL at 02:28

## 2020-09-24 RX ADMIN — EZETIMIBE 10 MG: 10 TABLET ORAL at 09:19

## 2020-09-24 RX ADMIN — VALSARTAN 320 MG: 160 TABLET ORAL at 09:19

## 2020-09-24 RX ADMIN — METOPROLOL TARTRATE 50 MG: 50 TABLET, FILM COATED ORAL at 09:19

## 2020-09-24 RX ADMIN — MULTIPLE VITAMINS W/ MINERALS TAB 1 TABLET: TAB at 17:01

## 2020-09-24 RX ADMIN — INSULIN ASPART 1 UNITS: 100 INJECTION, SOLUTION INTRAVENOUS; SUBCUTANEOUS at 13:58

## 2020-09-24 RX ADMIN — LEVOTHYROXINE SODIUM 50 MCG: 50 TABLET ORAL at 09:19

## 2020-09-24 RX ADMIN — SACUBITRIL AND VALSARTAN 1 TABLET: 49; 51 TABLET, FILM COATED ORAL at 09:19

## 2020-09-24 RX ADMIN — HEPARIN SODIUM 7500 UNITS: 10000 INJECTION INTRAVENOUS; SUBCUTANEOUS at 21:45

## 2020-09-24 RX ADMIN — AMLODIPINE BESYLATE 20 MG: 10 TABLET ORAL at 09:19

## 2020-09-24 RX ADMIN — INSULIN ASPART 1 UNITS: 100 INJECTION, SOLUTION INTRAVENOUS; SUBCUTANEOUS at 18:34

## 2020-09-24 RX ADMIN — ACETAMINOPHEN 975 MG: 325 TABLET, FILM COATED ORAL at 10:09

## 2020-09-24 RX ADMIN — METOPROLOL TARTRATE 50 MG: 50 TABLET, FILM COATED ORAL at 20:38

## 2020-09-24 RX ADMIN — NYSTATIN 500000 UNITS: 500000 SUSPENSION ORAL at 17:01

## 2020-09-24 RX ADMIN — NYSTATIN 500000 UNITS: 100000 SUSPENSION ORAL at 10:01

## 2020-09-24 RX ADMIN — ATORVASTATIN CALCIUM 40 MG: 40 TABLET, FILM COATED ORAL at 09:19

## 2020-09-24 ASSESSMENT — ACTIVITIES OF DAILY LIVING (ADL)
RETIRED_EATING: 0-->INDEPENDENT
ADLS_ACUITY_SCORE: 18
ADLS_ACUITY_SCORE: 18
FALL_HISTORY_WITHIN_LAST_SIX_MONTHS: YES
TRANSFERRING: 0-->INDEPENDENT
ADLS_ACUITY_SCORE: 18
SWALLOWING: 0-->SWALLOWS FOODS/LIQUIDS WITHOUT DIFFICULTY
AMBULATION: 0-->INDEPENDENT
WHICH_OF_THE_ABOVE_FUNCTIONAL_RISKS_HAD_A_RECENT_ONSET_OR_CHANGE?: AMBULATION;TRANSFERRING;TOILETING;BATHING;DRESSING;EATING;SWALLOWING;COGNITION;COMMUNICATION/SPEECH;FALL HISTORY
COGNITION: 0 - NO COGNITION ISSUES REPORTED
TOILETING: 0-->INDEPENDENT
RETIRED_COMMUNICATION: 0-->UNDERSTANDS/COMMUNICATES WITHOUT DIFFICULTY
ADLS_ACUITY_SCORE: 20
BATHING: 0-->INDEPENDENT
NUMBER_OF_TIMES_PATIENT_HAS_FALLEN_WITHIN_LAST_SIX_MONTHS: 1
DRESS: 0-->INDEPENDENT

## 2020-09-24 ASSESSMENT — MIFFLIN-ST. JEOR: SCORE: 1809.97

## 2020-09-24 NOTE — PLAN OF CARE
Pt A/O x 4. Pain in abdomen covered by TYL. PEG tube in place. Tube feed at night. Poor appetite for DD1 w/ nectar liquids. No change in neuros. Hemeplegia in left side. Left facial droop. Left neglect. Left field cut. Absence of sensation on left side. Total care. Total feed. Incontinent of bowel and bladder. PICC in place RUE. Report given to ARU. All belongings with pt. Sent on stretcher to ARU.

## 2020-09-24 NOTE — PLAN OF CARE
Discharge Planner PT   Patient plan for discharge: ARU  Current status: Patient sitting in chair leaning to the left. Placed mirrow in front of patient and with mod assist to lean away from the back of the chair, patient able to self correct and shift shoulders to the right. Needed verbal cues to scan to the left to see himself in the mirror. Able to maintain this positiion for at least 3 min. Sit to stand times 3 with patinet tolerating standing for about 4 min each time. Assist of 2 with therapist in front of him providing mod assist at hips using a sheet. Needed repeated cues and manual assist to get shoulders forward. Mod assist of aide to get to full standing and patient now able to reach for the hemiwalker on his own. Once patient standing, therapist able to relax support at his hips and able to take stop blocking his left knee. Patient able to shift weight to the right with min assist and verbal cues.   Barriers to return to prior living situation: Current level of assist, llives alone, fall risk, left sided weakness and neglect  Recommendations for discharge: ARU  Rationale for [place]s:  Patient is significantly below baseline as was independent prior to admission. Strong family support. Patient would benefit from continued intense skilled rehab services with all 3 therapies to maximize independence and safety.         Entered by: Crystal Mix 09/24/2020 11:58 AM    Physical Therapy Discharge Summary    Reason for therapy discharge:    Discharged to acute rehabilitation facility.    Progress towards therapy goal(s). See goals on [facility] Plan [facility] health record for goal details.  Goals not met.  Barriers to achieving goals:   discharge from facility.    Therapy [place](s):    Continued therapy is recommended.  Rationale/Recommendations:  Patient would benefit form continued skilled PT to improve left UE and LE strength, increased independence in all functional mobility and intiate  w/c mobility. .

## 2020-09-24 NOTE — PLAN OF CARE
Discharge Planner SLP   Patient plan for discharge: ARC  Current status: Patient seen for swallowing and speech/language treatment. Patient seen for swallow treatment with improved ability to tolerate the DDL with nectar thick liquids and self feed with assistance to place fork in his hand (due to visual deficits). Improved bolus coordination and manipulation with minimal to no oral residue. Tolerating nectar thick liquids via the cup. Continues to tolerate small single ice chips up to 5 without Sx of aspiration. Anticipate DDL 2 in the next 1-2 days.   Continues to needs moderate to max verbal cues to implement compensatory strategies to improve speech intelligibility in words, sentences and conversation.   Barriers to return to prior living situation: Dysphagia, dysarthria and aphasia  Recommendations for discharge: ARC  Rationale for [place]s: Patient demonstrating the most improvement in the last 2 days. Will be able to tolerate 3 hours of therapy daily. Well below his baseline.     Speech Language Therapy Discharge Summary    Reason for therapy discharge:    Discharged to acute rehabilitation facility.    Progress towards therapy goal(s). See goals on Care Plan in [facility] electronic health record for goal details.  Goals not met.  Barriers to achieving goals:   discharge from facility.    Therapy [place](s):    Continued therapy is recommended.  Rationale/Recommendations:  Continue ST needs for dysphagia management and speech/language deficits. .           Entered by: Jaky Egan 09/24/2020 11:11 AM

## 2020-09-24 NOTE — PLAN OF CARE
Discharge Planner OT   Patient plan for discharge: none stated  Current status:  pt alert, completed supine to sit EOB with max A of 2 and cues to complete log roll and utilize bed rail with transfer, pt completed static sitting EOB with min to occasional SBA, when attempting to engage a ADL task when sitting EOB pt has LOB backward and cues with min/mod assist needed to bring self back to upright sitting. Attempted sit to stand with use of kecia stedy and max A of 2, pt not able to clear bottom off bed, pt dependent of ceiling lift for bed to chair transfer.   Barriers to return to prior living situation: L hemiparesis, weakness  Recommendations for discharge: ARU per plan established by the Occupational Therapist  Rationale for [place]s: Patient would benefit from 3 hours of interdisciplinary therapy for maximum independence in ADLs/mobility.  Patient was independent prior to CVA, has good family support       Entered by: Katerina Ardon 09/24/2020 9:45 AM      Pt has discharged to ARU, GOALS NOT MET, see discharge summary

## 2020-09-24 NOTE — DISCHARGE INSTRUCTIONS
You are discharging to:    [facility]  [facility] [address] [place]  [address] [phone]

## 2020-09-24 NOTE — PROGRESS NOTES
CM    I[facility], asking for a 1:30 or 2 pm ride to be arranged. PETER set up [facility] stretcher for: 2pm (trunk instability, requires supervision)  PCS form completed, faxed to [facility] and placed on front of chart. [facility] still reviewing. PETER will update son once Abbot returns call w/their answer about possible placement today.     P: Continue to assist as needed.    GAMAL Vargas   [facility]  [phone]    UPDATE@9:14: [facility] can take patient today but requires a COVID test prior. PETER spoke w/son Donte who agrees to [facility]U (based on visitor policies between facilities). PETER updated son of possible costs for stretcher transport; son understands and asks SW to proceed. PETER updated [facility] and staff.  Awaiting orders.     LATE ENTRY@1643: Orders and discharge summary completed. No further SW interventions anticipated.

## 2020-09-24 NOTE — H&P
[facility]   Acute Rehabilitation Unit  Admission History and Physical    CHIEF COMPLAINT   Stroke Ischemic 01.1 (L) Body Involvement (R) Brain Stroke; L sided weakness due to MCA infarct, R MCA occlusion, and R carotid atherosclerosis    HISTORY OF PRESENT ILLNESS  Jorje Teran is a 71 y/o male with past medical history of HTN, HLD, DM II, SHELODN, hypothyroidism, and morbid obesity who was found down by his son after not hearing from his father throughout the day. The patient was unable to move his L side, and EMS was activated. Pt was found to have a R MCA infarct and R carotid atherosclerosis. The patient was not appropriate for tPA or endovascular treatment as the infarct was already established. His hospital course is notable for dysphagia, BP management needs, hypernatremia (resolved), DM II management, and extensive therapies. He had PEG placed on 9/22/2020, and is now medically ready for discharge to [facility] for intensive therapies, ongoing medical management, and rehabilitative nursing care.  Patient requires an intensive inpatient rehab program to address the following acute impairments:impaired ROM, impaired strength, impaired activity tolerance, impaired tone, impaired balance, impaired coordination, impaired cognition, impaired judgement and safety awareness, impulsiveness, impaired weight shifting, dysphagia, aphasia and dysarthria    During his acute hospitalization, patient was seen and evaluated by  PT, OT, and SLP.  All specialties collectively recommended that patient would benefit from ongoing therapies in the acute inpatient rehabilitation setting.     The patient currently performs bed mobility with Max A x 2 and bed rail. Once sitting at EOB the pt is able to sit with anywhere from SBA/CGA, to Mod A x 2 depending on fatigue and task. The patient is able to use Gretchen Stedy with Max A x 2, however unable to achieve a full stand. The patient is currently  dependent on mechanical lift for bed to and from chair. The patient's alertness and participation is improving and he will tolerate intensive therapies in the ARC setting. He is able to follow 2 step directions with 85%, and intelligibility with short 2-3 word phrases needs mod/max cues to increase breath support    Currently, the patient is medically appropriate and is assessed to have needs and will benefit from an inpatient acute rehabilitation comprehensive program working with PT, OT and SLP, and will also benefit from supervision and management of Rehab Nursing and Rehab MD.       PAST MEDICAL HISTORY  CAD, HTN, manage BP and HLD-- currently on Norvasc, hydralazine, metoprolol, Diovan, Lipitor, Zetia, Entresto  Endocrine: in patient with baseline DM II diet controlled but A1C of 7.1, swallow, nutrition, and activity deficits-- currently on sliding scale insulin and scheduled insulin   Pain: in patient with generalized pain, manage to optimize participation in therapies-- currently on Tylenol  Body Mass Index 34.51    SURGICAL HISTORY  Past Surgical History:   Procedure Laterality Date     IR GASTROSTOMY TUBE PERCUTANEOUS PLCMNT  2020       SOCIAL HISTORY  Lives with: alone  Living arrangements: house  Home accessibility: stairs to enter home  Stairs to enter home: 1  Stairs to negotiate within home:    Stair railings at home:    Living environment comments:Pt unable to provide subjective history.   Equipment currently used at home: none  Activity/exercise/self-care comment:     Prior Functional Level   Ambulation: 0-->independent  Transferrin-->independent  Toiletin-->independent  Bathin-->independent  Dressin-->independent              Eatin-->independent  Communication: 0-->understands/communicates without difficulty  Swallowin-->swallows foods/liquids without difficulty  Cognition: 0 - no cognition issues reported      Social History     Socioeconomic History     Marital status:       Spouse name: Not on file     Number of children: Not on file     Years of education: Not on file     Highest education level: Not on file   Occupational History     Not on file   Social Needs     Financial resource strain: Not on file     Food insecurity     Worry: Not on file     Inability: Not on file     Transportation needs     Medical: Not on file     Non-medical: Not on file   Tobacco Use     Smoking status: Not on file   Substance and Sexual Activity     Alcohol use: Yes     Drug use: Not on file     Sexual activity: Not on file   Lifestyle     Physical activity     Days per week: Not on file     Minutes per session: Not on file     Stress: Not on file   Relationships     Social connections     Talks on phone: Not on file     Gets together: Not on file     Attends [employer] witness service: Not on file     Active member of club or organization: Not on file     Attends meetings of clubs or organizations: Not on file     Relationship status: Not on file     Intimate partner violence     Fear of current or ex partner: Not on file     Emotionally abused: Not on file     Physically abused: Not on file     Forced sexual activity: Not on file   Other Topics Concern     Not on file   Social History Narrative     Not on file         FAMILY HISTORY  No family history on file.  Unable to contribute.      MEDICATIONS  Medications Prior to Admission   Medication Sig Dispense Refill Last Dose     amLODIPine (NORVASC) 10 MG tablet Take 20 mg by mouth daily         aspirin 81 MG EC tablet Take 81 mg by mouth daily        atorvastatin (LIPITOR) 80 MG tablet Take 40 mg by mouth daily         bacitracin 500 UNIT/GM OINT Apply topically every hour as needed for other (topical dermatitis)        bisacodyl (DULCOLAX) 10 MG suppository Place 1 suppository (10 mg) rectally daily as needed for constipation        carboxymethylcellulose PF (REFRESH PLUS) 0.5 % ophthalmic solution Apply 2 drops to eye every hour as needed for dry  "eyes        ezetimibe (ZETIA) 10 MG tablet Take 10 mg by mouth daily         furosemide (LASIX) 20 MG tablet TAKE 2 TABLETS BY MOUTH TWICE DAILY, FOR TREATMENT OF BLOOD PRESSURE AND FLUID RETENTION.        insulin aspart (NOVOLOG PEN) 100 UNIT/ML pen Inject 1-7 Units Subcutaneous 3 times daily (before meals)        insulin aspart (NOVOLOG PEN) 100 UNIT/ML pen Inject 1-5 Units Subcutaneous At Bedtime        insulin glargine (LANTUS PEN) 100 UNIT/ML pen Inject 12 Units Subcutaneous At Bedtime        [START ON 9/25/2020] insulin glargine (LANTUS PEN) 100 UNIT/ML pen Inject 12 Units Subcutaneous every morning (before breakfast)        levothyroxine (SYNTHROID/LEVOTHROID) 50 MCG tablet Take 50 mcg by mouth daily         metoprolol tartrate (LOPRESSOR) 50 MG tablet Take 1 tablet (50 mg) by mouth 2 times daily        miconazole (MICATIN) 2 % external powder Apply topically every hour as needed for other (topical candidiasis)        multivitamin (CENTRUM SILVER) tablet Take 1 tablet by mouth daily        nitroGLYcerin (NITROSTAT) 0.4 MG sublingual tablet Place 0.4 mg under the tongue every 5 minutes as needed         nystatin (MYCOSTATIN) 608170 UNIT/ML suspension Swish and spit 5 mLs (500,000 Units) in mouth 4 times daily        polyethylene glycol (MIRALAX) 17 g packet Take 17 g by mouth 2 times daily as needed (constipation)        sacubitril-valsartan (ENTRESTO) 49-51 MG per tablet Take 1 tablet by mouth 2 times daily         traZODone (DESYREL) 50 MG tablet Take 50 mg by mouth at bedtime as needed, may repeat once         valsartan (DIOVAN) 320 MG tablet Take 320 mg by mouth daily           ALLERGIES   No Known Allergies      REVIEW OF SYSTEMS  A 10 point ROS was performed and negative unless otherwise noted in HPI.     As above otherwise unable to contribute.    PHYSICAL EXAM  VITAL SIGNS:  /86 (BP Location: Left arm)   Pulse 116   Temp 98.4  F (36.9  C) (Oral)   Resp 16   Ht 1.753 m (5' 9\")   Wt 106.5 kg " "(234 lb 11.2 oz)   SpO2 94%   BMI 34.66 kg/m    BMI:  Estimated body mass index is 34.51 kg/m  as calculated from the following:    Height as of 9/9/20: 1.778 m (5' 10\").    Weight as of 9/23/20: 109.1 kg (240 lb 8 oz).     Constitutional: Alert oriented x3  Respiratory: Clear to auscultation bilaterally, no crackles or wheezing  Cardiovascular: Regular rate and rhythm, normal S1 and S2, and no murmur noted  GI: Normal bowel sounds, soft, non-distended, non-tender  Skin/Integumen: No rashes, trace lower extremity edema noted  Neuro : Left lower Facial droop + Left Field cut +  Dense left-sided hemiplegia with neglect noted  Toes mute  Vocalizes, low volume, some dysarthria +  Extremities. Moves right side well. Edema +      LABS      Lab Results   Component Value Date    WBC 9.5 09/19/2020    HGB 11.2 (L) 09/19/2020    HCT 33.9 (L) 09/19/2020    MCV 92 09/19/2020     09/22/2020     Lab Results   Component Value Date     09/21/2020    POTASSIUM 4.1 09/21/2020    CHLORIDE 111 (H) 09/21/2020    CO2 25 09/21/2020     (H) 09/21/2020     Lab Results   Component Value Date    GFRESTIMATED >90 09/21/2020    GFRESTBLACK >90 09/21/2020     Lab Results   Component Value Date    AST 23 09/07/2020    ALT 28 09/07/2020    ALKPHOS 67 09/07/2020    BILITOTAL 0.9 09/07/2020     Lab Results   Component Value Date    INR 1.17 (H) 09/21/2020     Lab Results   Component Value Date    BUN 21 09/21/2020    CR 0.78 09/21/2020     Glucose Values Latest Ref Rng & Units 9/23/2020 9/23/2020 9/23/2020 9/23/2020 9/24/2020 9/24/2020 9/24/2020   Bedside Glucose (mg/dl )  - -- -- -- -- -- -- --   GLUCOSE 70 - 99 mg/dL 246(H) 155(H) 169(H) 197(H) 212(H) 216(H) 164(H)   Some recent data might be hidden   ASSESSMENT/PLAN:  Jorje Teran is a 70 year old  hand dominant male with Stroke Ischemic 01.1 (L) Body Involvement (R) Brain Stroke; L sided weakness due to MCA infarct, R MCA occlusion, and R carotid " atherosclerosis    1. PT, OT and SLP 60 minutes of each on a daily basis, in addition to rehab nursing and close management of physiatrist.      2. Impairment of ADL's: OT for 60 minutes daily to work on ADL re-training such as grooming, self cares and bathing.      3. Impairment of mobility:  PT for 60 minutes daily to work on neuromuscular re-education focusing on strength, balance, coordination, and endurance.      4. Impairment of cognition/language/swallow:  SLP for 60 minutes daily to work on cognitive and linguistic deficits.    5. Rehab RN for med administration, bowel regimen, glucose monitoring and wound care.      1. Medical Conditions  Jorje Teran is a 70 year old male who presents with weakness and slurred speech     CVA with dense left hemiparesis  - Last baseline known noon on 9/6 (over 24 hours PTA). Son went to check on father afternoon/evening of presentation and he was on ground, unable to move his L side and slurring words. Vitals stable. Labs normal except 11.2. CXR clear.   *CT with filling defect within the distal R ICA extending into the R MCA and occlusion of the R MCA at the M1 segment. CTA with severe plaquing at R carotid bifurcation with filling defect in the prox R ICA suggestive of thrombus contributing to approximately 80% stenosis. 60% stenosis of the proximal L ICA. TTE with bubble -> Normal left ventricular size and function. Left ventricular ejection fraction of 55-60%. No segmental wall motion abnormalities noted.  No shunting at the atrial level on suboptimal Bubble study.  - Lipids 7/29/20- LDL 78, HDL 56, , T chol 164, hemoglobin A1c 7.1% H, TSH is 2.05.  - Head CTs have been stable for the most part.  - remains with significant dysarthria, facial droop and dense left sided hemiparesis  - Neurology signed off  - ASA 81 mg daily PO/HI  - atorvastatin 40 mg daily, Zetia 10 mg po daily  - BP management as below; - goal for SBPs < 160   - Labetalol as needed for  SBP>160  - PT/OT/SLP- rec ARU  -Patient did not have adequate intake as per calorie count, discussed with family and the plan is to do PEG tube placement today 9/22.    -Appreciate input from nutrition team, patient is started on tube feeding, he would possibly reach his goal later today, discussed with social work, patient does not appear to be motivated to participate in PT for long time, they are yet to decide if they would have the patient otherwise will have to look into TCU options.     Dysphagia  - SLP following  - had NG FT for few days- now removed  -Patient failed calorie counting requirements  - currently on DD1 with nectar thickened liquids   -PEG tube to be placed on 9/22, currently patient is on tube feeding  If questions or problems arise regarding tube function (e.g. leaking, dislodges, etc.) Contact Interventional Radiology department 24 hours a day.      For procedures that were done at [facility]:  8 AM - 4 PM Monday through Friday Contact   [phone]  For afterhours and weekends: [phone]   Ask for the Interventional Radiologist on call.      CAD s/p CABG x 3, x 1 in 2015  HTN  HLD  PTA (needs med rec) amlodipine 20 mg daily, ASA 81 mg daiy, atorvastatin 40 mg daily, Zetia 10 mg daily, furosemide 40 mg BID, metoprolol 25 mg BID, Entresto 49-51 1 tab BID, valsartan 320 mg daily  Follows with Dr. Vincent with [facility]. Some concern re: medical compliance.  TTE with bubble this admission -> Normal left ventricular size and function. Left ventricular ejection fraction of 55-60%. No segmental wall motion abnormalities noted.  No shunting at the atrial level on suboptimal Bubble study.  - continue Norvasc /Entresto / Metoprolol 50 mg BID and Valsartan  - Hydralazine 25 mg po TID started, blood pressure seems to be on the lower side on 9/23, will stop hydralazine.  - Holding Lasix given Na goals  - PRN labetalol, hydralazine SBP>160     Hypernatremia- resolved        DM II  Not on meds for  this. Recent A1C at 7.1 7/2020.  BG goal is < 180. BG not currently well controlled.  Blood sugars have been difficult to control with sugars in the 300s while on TF   - previously on insulin gtt  - TF started with hyperglycemia, 200+ range  - started on lantus 14 units BID- changed to 10 units BID on 09/18   -Blood sugars are elevated today, we will have to adjust medications once tube feeding started.  Will increase  Lantus to 14 units twice daily  US at [facility] [place] negative for DVT     SHELDON  - states he could not sleep with CPAP as outpatient  - he would benefit for treating SHELDON  - CPAP ordered last night but he did not sleep well     Hypothyroidism  - resumed PTA Levothyroxine 50 mcg daily  TSH checked 7/29/2020 at 2.05     Morbid obesity  BMI noted  7/202 at ~38. Will need to encourage healthy lifestyle and weight loss with recovery     Diet: Snacks/Supplements Adult: Other; thickened smoothie with meals  (RD); With Meals  Calorie Counts     DVT Prophylaxis: Pneumatic Compression Devices  Code Status: Full Code     2. Adjustment to disability:  Clinical psychology to eval and treat when ready  3. FEN: On NDD 1 [place] +TF  4. Bowel: Javed meds  5. Bladder: Monitor  6. GI Prophylaxis:   7. Code: Full  8. Disposition: Home with family and home care  9. ELOS:  28 days  10. Rehab prognosis:  Fair  11. Follow up Appointments on Discharge: The following labs/tests are recommended: cbc ,basic metabolic panel in 4-5 days, also get mag and phos in 3-4 days .need follow up with neurology as scheduled.    Sudarshan Fuentes MD   Physical Medicine & Rehabilitation    I spent a total of 100 minutes face to face and coordinating care of Jorje Teran.  Over 50% of my time on the unit was spent counseling the patient and /or coordinating care regarding above issues.      Post Admission Physician Evaluation:    I have compared Jorje Teran 's condition on admission to acute rehabilitation to that outlined in the preadmission  screen. History and physical exam performed by me.  No significant differences are identified and the patient remains appropriate for an inpatient rehabilitation facility level of care to manage medical issues and address functional impairments due to Stroke Ischemic 01.1 (L) Body Involvement (R) Brain Stroke; L sided weakness due to MCA infarct, R MCA occlusion, and R carotid atherosclerosis    Comorbid medical conditions being managed: dysphagia, BP management needs, hypernatremia (resolved), DM II management, and extensive therapies. He had PEG placed on 2020    Prior functional level:     Present function: Dependent for cares, mobility, self cares and impaired swallowing and cognition. The patient is participating well in PT/OT/SLP and will tolerate 3 hours of therapy per day. The patient currently performs bed mobility with Max A x 2 and bed rail. Once sitting at EOB the pt is able to sit with anywhere from SBA/CGA, to Mod A x 2 depending on fatigue and task. The patient is able to use Gretchen Stedy with Max A x 2, however unable to achieve a full stand. The patient is currently dependent on mechanical lift for bed to and from chair.     Anticipated rehabilitation course: The patient's alertness and participation is improving and he will tolerate intensive therapies in the ARC setting. He is able to follow 2 step directions with 85%, and intelligibility with short 2-3 word phrases needs mod/max cues to increase breath support    Will benefit from intensive rehabilitation includin minutes each of PT, OT and SLP    Rehabilitation nursing  Close management by physiatry    Prognosis: fair    Estimated length of stay: 28 days

## 2020-09-24 NOTE — PLAN OF CARE
Nursing shift note  Pt here with R MCA CVA. A&Ox4. Neuros ex slurred speech, L facial droop, L field cut, L neglect, L hemiplegia, absent sensation in LUE/LLE. VSS on R/A. Tele NSR. DD1 diet, NT liquids. TF running at goal rate 60mL/hr, to be turned off at 0800. Takes pills crushed through PEG. Up with lift. Turn and repo. Condom cath in place. RUE PICC in place, patent. Tylenol given for comfort. Pt scoring green on the Aggression Stop Light Tool. Plan for discharge to ARU today.      Behavior & Aggression Tool color: Green        Pt's belongings:   (Belongings from admission day present in pt's room)          Home medications: N

## 2020-09-24 NOTE — DISCHARGE SUMMARY
[facility]    Discharge Summary  Hospitalist    Date of Admission:  9/7/2020  Date of Discharge:  9/24/2020  Discharging Provider: Bianca Hoskins MD    Discharge Diagnoses   CVA    History of Present Illness   Please review admission history and physical.    Hospital Course   Jorje Teran was admitted on 9/7/2020.  The following problems were addressed during his hospitalization:    Active Problems:    CVA (cerebral vascular accident) (H)    Jorje Teran is a 70 year old male who presents with weakness and slurred speech     CVA with dense left hemiparesis  - Last baseline known noon on 9/6 (over 24 hours PTA). Son went to check on father afternoon/evening of presentation and he was on ground, unable to move his L side and slurring words. Vitals stable. Labs normal except 11.2. CXR clear.   *CT with filling defect within the distal R ICA extending into the R MCA and occlusion of the R MCA at the M1 segment. CTA with severe plaquing at R carotid bifurcation with filling defect in the prox R ICA suggestive of thrombus contributing to approximately 80% stenosis. 60% stenosis of the proximal L ICA. TTE with bubble -> Normal left ventricular size and function. Left ventricular ejection fraction of 55-60%. No segmental wall motion abnormalities noted.  No shunting at the atrial level on suboptimal Bubble study.  His lipid profile was noted, hemoglobin A1c was 7.1, TSH 2.05.  His head CT was repeated and it was stable.  Patient continues to have dysarthria, facial droop and dense left sided hemiparesis with neglect.  Neurology had seen the patient and currently has signed off, plan is to continue aspirin 81 mg p.o. daily, statin 40 mg daily-atorvastatin and Zetia 10 mg p.o. daily.  His goal blood pressure is less than 160 systolic, need to continue PT/OT/speech therapy in the acute rehab facility.       Dysphagia  Patient was seen by speech therapy, he was placed on NG tube feedings, later on it  was removed and he was on dysphagia level 1 diet, he is showing good improvement on a regular basis, since his nutritional intake was not adequate as per calorie count PEG tube was placed on 9/22 and currently he is on tube feeding, taper it down depending on improvement of his dysphagia.    CAD s/p CABG x 3, x 1 in 2015  HTN  HLD  PTA (needs med rec) amlodipine 20 mg daily, ASA 81 mg daiy, atorvastatin 40 mg daily, Zetia 10 mg daily, furosemide 40 mg BID, metoprolol 25 mg BID, Entresto 49-51 1 tab BID, valsartan 320 mg dailyFollows with Dr. Vincent with [facility]. Some concern re: medical compliance.  TTE with bubble this admission -> Normal left ventricular size and function. Left ventricular ejection fraction of 55-60%. No segmental wall motion abnormalities noted.  No shunting at the atrial level on suboptimal Bubble study.continue his PTA medications Norvasc /Entresto / Metoprolol 50 mg BID and Valsartan.  He was started on hydralazine 25 mg 3 times daily due to low blood pressures that was discontinued.  Will restart Lasix upon discharge.  Blood pressures are well controlled at the time of discharge.       Hypernatremia- resolved        DM II  Not on meds for this. Recent A1C at 7.1 7/2020.  BG goal is < 180. BG not currently well controlled.  Blood sugars have been difficult to control with sugars in the 300s while on TF   - previously on insulin gtt, since tube feeds were started his blood sugars have been in 1  range, and adjust insulin as needed, currently he is on 14 units of Lantus twice daily and sliding scale coverage.    US at [facility] negative for DVT     SHELDON  - states he could not sleep with CPAP as outpatient, patient would benefit from getting treated for SHELDON, encouraged using CPAP.       Hypothyroidism  resumed PTA Levothyroxine 50 mcg daily  TSH checked 7/29/2020 at 2.05     Morbid obesity  BMI noted  7/202 at ~38. Will need to encourage healthy lifestyle and weight loss with  recovery      Bianca Hoskins MD, MD    Significant Results and Procedures       Pending Results     Unresulted Labs Ordered in the Past 30 Days of this Admission     No orders found from 8/8/2020 to 9/8/2020.          Code Status   Full Code       [facility] Physician   [facility]    Physical Exam   Temp: 98.9  F (37.2  C) Temp src: Oral BP: (!) 151/97 Pulse: 152   Resp: 18 SpO2: 92 % O2 Device: None (Room air)    Vitals:    09/21/20 0601 09/22/20 0322 09/23/20 0556   Weight: 109.6 kg (241 lb 9.6 oz) 109.8 kg (242 lb) 109.1 kg (240 lb 8 oz)     Vital Signs with Ranges  Temp:  [98.3  F (36.8  C)-98.9  F (37.2  C)] 98.9  F (37.2  C)  Pulse:  [] 152  Resp:  [18] 18  BP: ()/(65-97) 151/97  SpO2:  [92 %-99 %] 92 %  I/O last 3 completed shifts:  In: 420 [NG/GT:420]  Out: 200 [Urine:200]    The patient was examined on the day of discharge.    Discharge Disposition   Discharged to rehabilitation facility  Condition at discharge: Stable    Consultations This Hospital Stay   NEUROLOGY IP CONSULT  PHYSICAL THERAPY ADULT IP CONSULT  OCCUPATIONAL THERAPY ADULT IP CONSULT  SWALLOW EVAL SPEECH PATH AT BEDSIDE IP CONSULT  PATIENT LEARNING CENTER IP CONSULT  SPEECH LANGUAGE PATH ADULT IP CONSULT  NUTRITION SERVICES ADULT IP CONSULT  VASCULAR ACCESS ADULT IP CONSULT  CARE TRANSITION RN/SW IP CONSULT  SOCIAL WORK IP CONSULT  CARE TRANSITION RN/SW IP CONSULT  PHARMACY IP CONSULT  NUTRITION SERVICES ADULT IP CONSULT  PHYSICAL THERAPY ADULT IP CONSULT  OCCUPATIONAL THERAPY ADULT IP CONSULT  SPEECH LANGUAGE PATH ADULT IP CONSULT    Time Spent on this Encounter   IBianca MD, personally saw the patient today and spent greater than 30 minutes discharging this patient.    Discharge Orders      Follow-up and recommended labs and tests     Please follow up with neurology in 4-6 weeks. You may call one of the following neurology clinics for an appointment or a clinic of your choice. Tell them you were  recently hospitalized and need follow up as recommended at discharge.    1. [facility]   [address], [address]   [place] [address]   [phone]  2. [facility]   [address], [address]   [place] [address]   [phone]     Discharge Instructions    If questions or problems arise regarding tube function (e.g. leaking, dislodges, etc.) Contact Interventional Radiology department 24 hours a day.     For procedures that were done at [facility]:  8 AM - 4 PM Monday through Friday Contact   [phone]  For afterhours and weekends: [phone]   Ask for the Interventional Radiologist on call.     If DIRECTED by the RADIOLOGIST, related to specific problems with the tube functioning,  go to the Emergency Department.     Mantoux instructions    Give two-step Mantoux (PPD) Per Facility Policy Yes     Reason for your hospital stay    Cerebrovascular accident     Glucose monitor nursing POCT    Before meals and at bedtime     Intake and output    Every shift     Daily weights    Call Provider for weight gain of more than 2 pounds per day or 5 pounds per week.     Follow Up and recommended labs and tests    Follow up with rehab  physician.  The following labs/tests are recommended: cbc ,basic metabolic panel in 4-5 days, also get mag and phos in 3-4 days .need follow up with neurology as scheduled.     Activity - Up with nursing assistance     Activity - Up with assistive device     Physical Therapy Adult Consult    Evaluate and treat as clinically indicated.    Reason: cva     Occupational Therapy Adult Consult    Evaluate and treat as clinically indicated.    Reason:  cva     Speech Language Path Adult Consult    Evaluate and treat as clinically indicated.    Reason:  cva     Fall precautions     Advance Diet as Tolerated    Follow this diet upon discharge: Orders Placed This Encounter      Calorie Counts      Room Service      Calorie Counts       Snacks/Supplements Adult: Magic Cup; With Meals      Adult Formula Drip Feeding: Specified Time Isosource 1.5; Gastrostomy; Goal Rate: 60; mL/hr; From: 6:00 PM; 8:00 AM; Medication - Feeding Tube Flush Frequency: At least 15-30 mL water before and after medication administration and with tube cloggi...      Dysphagia Diet L     Discharge Medications   Current Discharge Medication List      START taking these medications    Details   bacitracin 500 UNIT/GM OINT Apply topically every hour as needed for other (topical dermatitis)  Qty:      Associated Diagnoses: 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 (H)      bisacodyl (DULCOLAX) 10 MG suppository Place 1 suppository (10 mg) rectally daily as needed for constipation  Qty:      Associated Diagnoses: 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 (H)      carboxymethylcellulose PF (REFRESH PLUS) 0.5 % ophthalmic solution Apply 2 drops to eye every hour as needed for dry eyes  Qty:      Associated Diagnoses: 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 (H)      !! insulin aspart (NOVOLOG PEN) 100 UNIT/ML pen Inject 1-7 Units Subcutaneous 3 times daily (before meals)  Qty:      Associated Diagnoses: 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 (H)      !! insulin aspart (NOVOLOG PEN) 100 UNIT/ML pen Inject 1-5 Units Subcutaneous At Bedtime  Qty:      Associated Diagnoses: 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 (H)      !! insulin glargine (LANTUS PEN) 100 UNIT/ML pen Inject 12 Units Subcutaneous At Bedtime  Qty:      Comments: If Lantus is not covered by insurance, may substitute Basaglar at same dose and frequency.    Associated Diagnoses: 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 (H)      !! insulin glargine (LANTUS PEN) 100 UNIT/ML pen Inject 12 Units Subcutaneous every morning (before  breakfast)  Qty:      Comments: If Lantus is not covered by insurance, may substitute Basaglar at same dose and frequency.    Associated Diagnoses: 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 (H)      metoprolol tartrate (LOPRESSOR) 50 MG tablet Take 1 tablet (50 mg) by mouth 2 times daily  Qty:      Associated Diagnoses: 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 (H)      miconazole (MICATIN) 2 % external powder Apply topically every hour as needed for other (topical candidiasis)  Qty:      Associated Diagnoses: 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 (H)      nystatin (MYCOSTATIN) 821823 UNIT/ML suspension Swish and spit 5 mLs (500,000 Units) in mouth 4 times daily  Qty:      Associated Diagnoses: 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 (H)      polyethylene glycol (MIRALAX) 17 g packet Take 17 g by mouth 2 times daily as needed (constipation)  Qty:      Associated Diagnoses: 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 (H)       !! - Potential duplicate medications found. Please discuss with provider.      CONTINUE these medications which have NOT CHANGED    Details   amLODIPine (NORVASC) 10 MG tablet Take 20 mg by mouth daily       aspirin 81 MG EC tablet Take 81 mg by mouth daily      atorvastatin (LIPITOR) 80 MG tablet Take 40 mg by mouth daily       ezetimibe (ZETIA) 10 MG tablet Take 10 mg by mouth daily       furosemide (LASIX) 20 MG tablet TAKE 2 TABLETS BY MOUTH TWICE DAILY, FOR TREATMENT OF BLOOD PRESSURE AND FLUID RETENTION.      levothyroxine (SYNTHROID/LEVOTHROID) 50 MCG tablet Take 50 mcg by mouth daily       multivitamin (CENTRUM SILVER) tablet Take 1 tablet by mouth daily      nitroGLYcerin (NITROSTAT) 0.4 MG sublingual tablet Place 0.4 mg under the tongue every 5 minutes as needed       sacubitril-valsartan (ENTRESTO) 49-51 MG per tablet Take 1  tablet by mouth 2 times daily       traZODone (DESYREL) 50 MG tablet Take 50 mg by mouth at bedtime as needed, may repeat once       valsartan (DIOVAN) 320 MG tablet Take 320 mg by mouth daily          STOP taking these medications       metoprolol succinate ER (TOPROL-XL) 50 MG 24 hr tablet Comments:   Reason for Stopping:             Allergies   No Known Allergies  Data   Most Recent 3 CBC's:  Recent Labs   Lab Test 09/22/20  0629 09/19/20  0635 09/18/20  0555 09/17/20  0445   WBC  --  9.5 9.7 10.0   HGB  --  11.2* 11.6* 12.6*   MCV  --  92 92 93    174 176 188      Most Recent 3 BMP's:  Recent Labs   Lab Test 09/21/20  0620 09/20/20  0630 09/19/20  0635    140 139   POTASSIUM 4.1 4.0 3.9   CHLORIDE 111* 110* 109   CO2 25 25 24   BUN 21 22 21   CR 0.78 0.76 0.75   ANIONGAP 5 5 6   SELENA 8.6 8.5 8.2*   * 165* 174*     Most Recent 2 LFT's:  Recent Labs   Lab Test 09/07/20  2355   AST 23   ALT 28   ALKPHOS 67   BILITOTAL 0.9     Most Recent INR's and Anticoagulation Dosing History:  Anticoagulation Dose History     Recent Dosing and Labs Latest Ref Rng & Units 9/7/2020 9/21/2020    INR 0.86 - 1.14 1.03 1.17(H)        Most Recent 3 Troponin's:  Recent Labs   Lab Test 09/08/20  0708 09/07/20  2355 09/07/20  1831   TROPI <0.015 <0.015 <0.015     Most Recent Cholesterol Panel:  Recent Labs   Lab Test 09/10/20  1420   CHOL 119   LDL 53   HDL 45   TRIG 105     Most Recent 6 Bacteria Isolates From Any Culture (See EPIC Reports for Culture Details):No lab results found.  Most Recent TSH, T4 and A1c Labs:  Recent Labs   Lab Test 09/10/20  0610   A1C 7.5*     Results for orders placed or performed during the hospital encounter of 09/07/20   CT Head w/o Contrast    Narrative    CT OF THE HEAD WITHOUT CONTRAST September 8, 2020 7:46 AM     HISTORY: Stroke, follow up.    TECHNIQUE: 5 mm thick axial CT images of the head were acquired  without IV contrast material. Radiation dose for this scan was reduced  using  automated exposure control, adjustment of the mA and/or kV  according to patient size, or iterative reconstruction technique.    COMPARISON: Head CT 9/7/2020.    FINDINGS: Large evolving ischemic infarct involving the majority of  the right middle cerebral artery territory including large portions of  the right frontal, right temporal and right parietal lobes is again  noted without definite change in extent. There has been an interval  increase in edema within the infarcted tissue resulting in new  effacement of the frontal horn of the right lateral ventricle.  Continued surveillance recommended.     There is moderate diffuse cerebral volume loss. There are subtle  patchy areas of decreased density in the cerebral white matter  bilaterally that are consistent with sequela of chronic small vessel  ischemic disease. The ventricles and basal cisterns are within normal  limits in configuration given the degree of cerebral volume loss.  There are no extra-axial fluid collections.    No intracranial hemorrhage or mass.    The visualized paranasal sinuses are well-aerated. There is no  mastoiditis. There are no fractures of the visualized bones.       Impression    IMPRESSION:   1. Large evolving right MCA territory ischemic infarct again noted.  Increased edema within the infarcted tissue results in new effacement  of the frontal horn of the right lateral ventricle. Continued  surveillance recommended. No evidence for intracranial hemorrhage.  2. Diffuse cerebral volume loss and cerebral white matter changes  consistent with chronic small vessel ischemic disease.         MARIBEL MORRISON MD   XR Chest Port 1 View    Narrative    CHEST ONE VIEW  9/8/2020 2:00 PM     HISTORY: Status post CVL placement, concern for pneumothorax.    COMPARISON: September 7, 2020      Impression    IMPRESSION: Orogastric tube tip below the margin of the study. No  definite pneumothorax or pneumomediastinum demonstrated.    MICHAEL ROMERO MD   XR  Abdomen Port 1 View    Narrative    ABDOMEN ONE VIEW PORTABLE  9/8/2020 2:10 PM     HISTORY: TF placement verification.    COMPARISON: None.       Impression    IMPRESSION: Feeding tube tip may be in the pylorus vs. distal stomach.  Minimal amount of stool.  Nonobstructed bowel gas pattern.    MICHAEL ROMERO MD   CT Head w/o Contrast    Narrative    CT SCAN OF THE HEAD WITHOUT CONTRAST   9/8/2020 8:36 PM     HISTORY: Stroke, follow up    TECHNIQUE:  Axial images of the head and coronal reformations without  IV contrast material. Radiation dose for this scan was reduced using  automated exposure control, adjustment of the mA and/or kV according  to patient size, or iterative reconstruction technique.    COMPARISON: CT dated 9/8/2020 0735 hours.    FINDINGS:     Intracranial contents: Again noted is an evolving infarct in the right  middle cerebral artery distribution. There is been no significant  change in the appearance when compared to the earlier study. There is  approximately 3 mm of midline shift. This is unchanged. No definite  hemorrhagic transformation.    Visualized orbits/sinuses/mastoids:  The visualized portions of the  sinuses and mastoids appear normal.    Osseous structures/soft tissues:  There is no evidence of trauma.      Impression    IMPRESSION: Large evolving right middle cerebral artery infarct. There  is mass effect on the right lateral ventricle and slight midline  shift. When compared to the earlier scan, there has not been any  significant change.    FELICIA BOJORQUEZ MD   CT Head w/o Contrast    Narrative    CT OF THE HEAD WITHOUT CONTRAST 9/9/2020 7:04 AM     COMPARISON: Head CT 9/8/2020    HISTORY: Stroke, follow-up.    TECHNIQUE: 5 mm thick axial CT images of the head were acquired  without IV contrast material.    FINDINGS: Loss of gray-white differentiation and hypodensity involving  a large portion of the right cerebral hemisphere consistent with a  large right MCA territory ischemic infarct  is again noted. This  infarct involves large portions of the right frontal, right temporal  and right parietal lobes. Edema within the infarcted tissue is again  noted resulting in effacement of the right lateral ventricle and 4 mm  leftward midline shift of the septum pellucidum. No definite new  infarcts noted. No intracranial hemorrhage.    There is mild diffuse cerebral volume loss. There are subtle patchy  areas of decreased density in the cerebral white matter bilaterally  that are consistent with sequela of chronic small vessel ischemic  disease. The basal cisterns are within normal limits in configuration  given the degree of cerebral volume loss. There are no extra-axial  fluid collections.     No intracranial mass.    The visualized paranasal sinuses are well-aerated. There is no  mastoiditis. There are no fractures of the visualized bones.       Impression    IMPRESSION:   1. Evolving large right MCA territory ischemic infarct again noted.  Edema within the infarct results in effacement of the right lateral  ventricle and 4 mm leftward midline shift of the septum pellucidum  which is not appreciably changed from the comparison study. No  definite new infarcts. No intracranial hemorrhage.  2. Diffuse cerebral volume loss and cerebral white matter changes  consistent with chronic small vessel ischemic disease.       Radiation dose for this scan was reduced using automated exposure  control, adjustment of the mA and/or kV according to patient size, or  iterative reconstruction technique.    MARIBEL MORRISON MD   CT Head w/o Contrast    Narrative    CT OF THE HEAD WITHOUT CONTRAST September 10, 2020 7:55 AM     HISTORY: Stroke, follow up. Malignant edema watch.    TECHNIQUE: 5 mm thick axial CT images of the head were acquired  without IV contrast material. Radiation dose for this scan was reduced  using automated exposure control, adjustment of the mA and/or kV  according to patient size, or iterative  reconstruction technique.    COMPARISON: Head CT 9/9/2020.    FINDINGS: A recent large ischemic infarct involving the majority of  the right middle cerebral artery territory is again noted including  the majority of the right temporal lobe, large portions of the right  frontal and right parietal lobes and a large portion of the right  basal ganglia. Edema within the infarcted tissue continues to result  in mass effect with effacement of the right lateral ventricle and 5 mm  of leftward midline shift of the septum pellucidum which has increased  minimally from approximately 4 mm on the comparison study. Continued  surveillance recommended. No evidence for intracranial hemorrhage.  There is mild diffuse cerebral volume loss. There are subtle patchy  areas of decreased density in the cerebral white matter bilaterally  that are consistent with sequela of chronic small vessel ischemic  disease. The basal cisterns are within normal limits in configuration  given the degree of cerebral volume loss. There are no extra-axial  fluid collections.    No intracranial mass.    The visualized paranasal sinuses are well-aerated. There is no  mastoiditis. There are no fractures of the visualized bones.      Impression    IMPRESSION:   1. Evolving large right MCA territory ischemic infarct. Edema within  the infarcted tissue results in 5 mm leftward midline shift of the  septum pellucidum which has increased slightly from 4 mm on the  previous study. Continued surveillance recommended.  2. No evidence for intracranial hemorrhage.  3. Diffuse cerebral volume loss and cerebral white matter changes  consistent with chronic small vessel ischemic disease.             MARIBEL MORRISON MD   CT Head w/o Contrast    Narrative    CT SCAN OF THE HEAD WITHOUT CONTRAST   9/10/2020 8:59 PM     HISTORY: Malignant cerebral edema, now irregular breathing.    TECHNIQUE:  Axial images of the head and coronal reformations without  IV contrast material.  Radiation dose for this scan was reduced using  automated exposure control, adjustment of the mA and/or kV according  to patient size, or iterative reconstruction technique.    COMPARISON: CT head dated 9/10/2020 at 07:39 hours.    FINDINGS: Overall, there has been no significant change in the  appearance of the evolving large right middle cerebral artery  territory ischemic infarct with associated parenchymal edema, right  cerebral hemisphere sulcal effacement, and mass effect resulting in  5-6 mm of right-to-left midline shift. There is also medialization of  the right uncus/early right uncal herniation, unchanged from the  previous scan. Mild narrowing of the bilateral ambient cistern is  unchanged. Moderate narrowing of the right lateral ventricle and third  ventricle. No evidence for hydrocephalus or ventricular entrapment. No  gross significant intracranial hemorrhage. No extra-axial fluid  collection. Unchanged partial appearance of the sella.    Orbits appear within normal limits. The paranasal sinuses are free of  significant disease. The mastoid and middle ear cavities are clear.  Partially visualized left-sided nasoenteric tube.      Impression    IMPRESSION: No significant change in the evolving large  acute-to-subacute right middle cerebral artery territory ischemic  infarct, with similar degree of associated parenchymal edema, mass  effect, right-to-left midline shift, mild medialization of the right  uncus/early right uncal herniation, and narrowing of the right lateral  and third ventricles. No definite new abnormality.    MICHAEL GRACE MD   XR Video Swallow with SLP or OT    Narrative    VIDEO SWALLOWING EVALUATION   9/15/2020 9:48 AM     HISTORY: Dysphagia.    COMPARISON: None.    FLUOROSCOPY TIME: 2.9 minutes     FINDINGS:    Solid: Not tested.    Semisolid: Not tested.    Pudding: No aspiration or penetration.    Honey: Not tested.    Nectar: Decreased oral transit. Premature spillage.  Moderate  retention. Delayed penetration. No aspiration.    Thin: Decreased oral transit. Premature spillage. Moderate retention.  Delayed penetration. No aspiration.    This study only includes the cervical esophagus. For more detailed  information, please see speech therapy report.    DORA WADE MD   IR Gastrostomy Tube Percutaneous Plcmnt    Narrative    G-TUBE PLACEMENT 9/22/2020 10:50 AM     HISTORY: Requires nutritional support. Unable to take adequate oral  intake.    DESCRIPTION OF PROCEDURE: After obtaining informed consent, the  patient was placed in a supine position on the fluoroscopy table. The  liver edge was marked. A nasogastric tube was placed into the stomach.  1 mg glucagon was given intravenously. The stomach was inflated with  air.     Two T-TAC suture anchors were used to enter the stomach. A small skin  incision was made in between the T-TAC entry sites. An 18-gauge needle  was used to enter the stomach through the incision. A wire was passed  and curled in the stomach. A tract for the G-tube was dilated. An 18  Northern Irish peel-away sheath was placed. Through the peel-away sheath, a 14  Northern Irish G-tube was placed. The balloon was inflated with a mixture of 1  mL contrast and 9 mL saline. Balloon was pulled back so that it was  against the anterior stomach wall. Contrast was injected to document  adequate positioning. A fluoroscopic image was saved to document tube  position.     I determined this patient to be an appropriate candidate for the  planned sedation and procedure and reassessed the patient immediately  prior to sedation and procedure. Moderate intravenous conscious  sedation was supervised by me. The patient was independently monitored  by a registered nurse assigned to the Department of radiology using  automated blood pressure, EKG and pulse oximetry. The patient  tolerated the procedure well. There were no immediate postprocedure  complications. The patient's vital signs were  monitored by radiology  nursing staff under my supervision and remained stable throughout the  study. Radiation dose for this scan was reduced using automated  exposure control, adjustment of the mA and/or kV according to patient  size, or iterative reconstruction technique.    MEDICATIONS: 1 mg glucagon, 2 mg Versed, 100 mcg fentanyl    SEDATION TIME: 11 minutes    FLUOROSCOPY TIME: 0.9 minutes    RADIATION DOSE: 26.84 mGy Air Kerma     NUMBER OF FLUOROSCOPIC IMAGES: 1      Impression    IMPRESSION: G-tube placed as above.    GRACIELA COUCH MD   Echocardiogram Complete - Bubble study    Narrative    [id number]  [id number]  [id number]  [id number]^EDGAR^NAYE^VALENTINE           [facility]  Echocardiography Laboratory  [address] [place] [address]        Name: JENNIFER GANNON  MRN: [id number]  : 1950  Study Date: 2020 02:37 PM  Age: 70 yrs  Gender: Male  Patient Location: [facility]  Reason For Study: CVA  Ordering Physician: NAYE HERNÁNDEZ  Performed By: Jaky Grove     HR: 75  _____________________________________________________________________________  __        Procedure  Complete Portable Echo Adult. Optison (NDC #5074-1735) given intravenously.  _____________________________________________________________________________  __        Interpretation Summary     1. Normal left ventricular size and function. Left ventricular ejection  fraction of 55-60%. No segmental wall motion abnormalities noted.  2. No shunting at the atrial level on suboptimal Bubble study.  3. No hemodynamically significant valvular disease.  No prior study for comparison.  Technically difficult study.  _____________________________________________________________________________  __        Left Ventricle  The left ventricle is normal in size. There is normal left ventricular wall  thickness. Left ventricular systolic function is normal. The visual ejection  fraction is estimated at 55-60%. Left  ventricular diastolic function is  indeterminate.     Right Ventricle  The right ventricle is not well visualized.     Atria  The left atrium is not well visualized. Right atrial size is normal. There is  no color Doppler evidence of an atrial shunt.     Mitral Valve  The mitral valve leaflets appear normal. There is no evidence of stenosis,  fluttering, or prolapse. There is mild mitral annular calcification. There is  trace mitral regurgitation.     Tricuspid Valve  Normal tricuspid valve. There is trace tricuspid regurgitation.        Aortic Valve  Thickened aortic valve leaflets. The aortic valve is not well visualized.  There is trace aortic regurgitation. No hemodynamically significant valvular  aortic stenosis.     Pulmonic Valve  The pulmonic valve is not well visualized. There is trace pulmonic valvular  regurgitation.     Vessels  The aortic root is normal size. Normal size ascending aorta.     Pericardium  There is no pericardial effusion.     Rhythm  Sinus rhythm was noted.     _____________________________________________________________________________  __  MMode/2D Measurements & Calculations  IVSd: 1.1 cm  LVIDd: 5.1 cm  LVIDs: 3.8 cm  LVPWd: 0.91 cm  FS: 25.7 %     LV mass(C)d: 183.6 grams  Ao root diam: 3.2 cm  LA dimension: 4.2 cm  asc Aorta Diam: 3.4 cm  LA/Ao: 1.3  LVOT diam: 2.3 cm  LVOT area: 4.2 cm2  LA Volume (BP): 63.0 ml  RWT: 0.36        Doppler Measurements & Calculations  MV E max nathaniel: 82.3 cm/sec  MV A max nathaniel: 68.4 cm/sec  MV E/A: 1.2  PA acc time: 0.10 sec  E/E' av.8  Lateral E/e': 6.6  Medial E/e': 13.0              _____________________________________________________________________________  __        Report approved by: Julissa Booker 2020 03:33 PM

## 2020-09-24 NOTE — PHARMACY-CONSULT NOTE
Pharmacy Tube Feeding Consult    Pharmacy Tube Feeding Consult    Medication reviewed for administration by feeding tube and for potential food/drug interactions.    Recommendation: Recommend changing the following medications to a liquid dosage form  if they are to be administered via FT:   Aspirin EC -or chewable form    Pharmacy will continue to follow as new medications are ordered.  Lidia [facility]j [facility]  PharmD,BCPS  September 24, 2020

## 2020-09-25 ENCOUNTER — APPOINTMENT (OUTPATIENT)
Dept: OCCUPATIONAL THERAPY | Facility: CLINIC | Age: 70
End: 2020-09-25
Payer: COMMERCIAL

## 2020-09-25 ENCOUNTER — APPOINTMENT (OUTPATIENT)
Dept: PHYSICAL THERAPY | Facility: CLINIC | Age: 70
End: 2020-09-25
Payer: COMMERCIAL

## 2020-09-25 ENCOUNTER — APPOINTMENT (OUTPATIENT)
Dept: SPEECH THERAPY | Facility: CLINIC | Age: 70
End: 2020-09-25
Payer: COMMERCIAL

## 2020-09-25 PROBLEM — I63.511 RIGHT MIDDLE CEREBRAL ARTERY STROKE (H): Status: ACTIVE | Noted: 2020-09-25

## 2020-09-25 LAB
GLUCOSE BLDC GLUCOMTR-MCNC: 147 MG/DL (ref 70–99)
GLUCOSE BLDC GLUCOMTR-MCNC: 163 MG/DL (ref 70–99)
GLUCOSE BLDC GLUCOMTR-MCNC: 203 MG/DL (ref 70–99)
GLUCOSE BLDC GLUCOMTR-MCNC: 205 MG/DL (ref 70–99)
GLUCOSE BLDC GLUCOMTR-MCNC: 218 MG/DL (ref 70–99)
GLUCOSE BLDC GLUCOMTR-MCNC: 247 MG/DL (ref 70–99)

## 2020-09-25 PROCEDURE — 25000131 ZZH RX MED GY IP 250 OP 636 PS 637: Performed by: PHYSICAL MEDICINE & REHABILITATION

## 2020-09-25 PROCEDURE — 12800006 ZZH R&B REHAB

## 2020-09-25 PROCEDURE — 92523 SPEECH SOUND LANG COMPREHEN: CPT | Mod: GN | Performed by: SPEECH-LANGUAGE PATHOLOGIST

## 2020-09-25 PROCEDURE — 25000132 ZZH RX MED GY IP 250 OP 250 PS 637: Performed by: PHYSICAL MEDICINE & REHABILITATION

## 2020-09-25 PROCEDURE — 00000146 ZZHCL STATISTIC GLUCOSE BY METER IP

## 2020-09-25 PROCEDURE — 97530 THERAPEUTIC ACTIVITIES: CPT | Mod: GP | Performed by: PHYSICAL THERAPIST

## 2020-09-25 PROCEDURE — 97535 SELF CARE MNGMENT TRAINING: CPT | Mod: GO

## 2020-09-25 PROCEDURE — 97163 PT EVAL HIGH COMPLEX 45 MIN: CPT | Mod: GP | Performed by: PHYSICAL THERAPIST

## 2020-09-25 PROCEDURE — 25000128 H RX IP 250 OP 636: Performed by: PHYSICAL MEDICINE & REHABILITATION

## 2020-09-25 PROCEDURE — 97110 THERAPEUTIC EXERCISES: CPT | Mod: GO

## 2020-09-25 PROCEDURE — 97167 OT EVAL HIGH COMPLEX 60 MIN: CPT | Mod: GO

## 2020-09-25 PROCEDURE — 97112 NEUROMUSCULAR REEDUCATION: CPT | Mod: GO

## 2020-09-25 PROCEDURE — 92610 EVALUATE SWALLOWING FUNCTION: CPT | Mod: GN | Performed by: SPEECH-LANGUAGE PATHOLOGIST

## 2020-09-25 RX ADMIN — AMLODIPINE BESYLATE 20 MG: 10 TABLET ORAL at 07:50

## 2020-09-25 RX ADMIN — EZETIMIBE 10 MG: 10 TABLET ORAL at 07:49

## 2020-09-25 RX ADMIN — METOPROLOL TARTRATE 50 MG: 50 TABLET, FILM COATED ORAL at 21:21

## 2020-09-25 RX ADMIN — ATORVASTATIN CALCIUM 40 MG: 40 TABLET, FILM COATED ORAL at 07:50

## 2020-09-25 RX ADMIN — NYSTATIN 500000 UNITS: 500000 SUSPENSION ORAL at 21:22

## 2020-09-25 RX ADMIN — NYSTATIN 500000 UNITS: 500000 SUSPENSION ORAL at 16:27

## 2020-09-25 RX ADMIN — LEVOTHYROXINE SODIUM 50 MCG: 25 TABLET ORAL at 07:49

## 2020-09-25 RX ADMIN — HEPARIN SODIUM 7500 UNITS: 10000 INJECTION INTRAVENOUS; SUBCUTANEOUS at 10:58

## 2020-09-25 RX ADMIN — ASPIRIN 81 MG: 81 TABLET, COATED ORAL at 07:50

## 2020-09-25 RX ADMIN — METOPROLOL TARTRATE 50 MG: 50 TABLET, FILM COATED ORAL at 08:02

## 2020-09-25 RX ADMIN — INSULIN GLARGINE 12 UNITS: 100 INJECTION, SOLUTION SUBCUTANEOUS at 21:22

## 2020-09-25 RX ADMIN — SACUBITRIL AND VALSARTAN 1 TABLET: 49; 51 TABLET, FILM COATED ORAL at 08:02

## 2020-09-25 RX ADMIN — INSULIN ASPART 2 UNITS: 100 INJECTION, SOLUTION INTRAVENOUS; SUBCUTANEOUS at 08:00

## 2020-09-25 RX ADMIN — HEPARIN SODIUM 7500 UNITS: 10000 INJECTION INTRAVENOUS; SUBCUTANEOUS at 21:22

## 2020-09-25 RX ADMIN — INSULIN ASPART 1 UNITS: 100 INJECTION, SOLUTION INTRAVENOUS; SUBCUTANEOUS at 21:22

## 2020-09-25 RX ADMIN — INSULIN ASPART 2 UNITS: 100 INJECTION, SOLUTION INTRAVENOUS; SUBCUTANEOUS at 18:48

## 2020-09-25 RX ADMIN — NYSTATIN 500000 UNITS: 500000 SUSPENSION ORAL at 13:41

## 2020-09-25 RX ADMIN — NYSTATIN 500000 UNITS: 500000 SUSPENSION ORAL at 07:50

## 2020-09-25 RX ADMIN — MULTIPLE VITAMINS W/ MINERALS TAB 1 TABLET: TAB at 18:47

## 2020-09-25 RX ADMIN — SACUBITRIL AND VALSARTAN 1 TABLET: 49; 51 TABLET, FILM COATED ORAL at 21:21

## 2020-09-25 RX ADMIN — INSULIN ASPART 1 UNITS: 100 INJECTION, SOLUTION INTRAVENOUS; SUBCUTANEOUS at 12:57

## 2020-09-25 RX ADMIN — VALSARTAN 320 MG: 160 TABLET ORAL at 07:49

## 2020-09-25 RX ADMIN — FUROSEMIDE 40 MG: 20 TABLET ORAL at 16:27

## 2020-09-25 RX ADMIN — FUROSEMIDE 40 MG: 20 TABLET ORAL at 07:50

## 2020-09-25 RX ADMIN — INSULIN GLARGINE 12 UNITS: 100 INJECTION, SOLUTION SUBCUTANEOUS at 07:49

## 2020-09-25 ASSESSMENT — ACTIVITIES OF DAILY LIVING (ADL): PREVIOUS_RESPONSIBILITIES: MEAL PREP;HOUSEKEEPING;LAUNDRY;SHOPPING;YARDWORK;MEDICATION MANAGEMENT;FINANCES;DRIVING

## 2020-09-25 ASSESSMENT — MIFFLIN-ST. JEOR: SCORE: 1794.55

## 2020-09-25 NOTE — PLAN OF CARE
Discharge Planner OT   Patient plan for discharge: to home with 24 hr supervision assist  Current status: pt in am unable to follw simple adl gross motor commands pt in pm able to follow all one step commands and able to give info on home environment work history and family  Barriers to return to prior living situation: non functional l U/E and L /e, decrease propreception to where body is in space with pushing to l, and dep bed mobility and tranfers  Recommendations for discharge: home with 24 hr supervision assist and home OT         Entered by: Mara Chappell 09/25/2020 4:55 PM

## 2020-09-25 NOTE — PROGRESS NOTES
09/25/20 1800   General Information, SLP   Type of Evaluation Speech and Language;Dysarthria   Type of Visit Initial   Start of Care Date 09/25/20   Onset of Illness/Injury or Date of Surgery - Date 09/07/20   Referring Physician    Pertinent History of Current Problem Jorje Teran is a 69 y/o male with past medical history of HTN, HLD, DM II, SHELDON, hypothyroidism, and morbid obesity who was found down by his son after not hearing from his father throughout the day. The patient was unable to move his L side, and EMS was activated. Pt was found to have a R MCA infarct and R carotid atherosclerosis. The patient was not appropriate for tPA or endovascular treatment as the infarct was already established. His hospital course is notable for dysphagia, BP management needs, hypernatremia (resolved), DM II management, and extensive therapies. He had PEG placed on 9/22/2020   Precautions/Limitations fall precautions;swallowing precautions   General Info Comments SLP: pt seen during hospitalization for swallowing and speech/language deficits   Oral Motor Sensory Function   Completed on Swallow Evaluation Completed Clinical Bedside Swallow Evaluation   Speech   Deficits in Phonation Breathy quality;Loudness (soft)   Deficits in Articulation Flaccid dysarthria;Other (Comment)  (?apraxia, limited output)   Speech Comments SLP: moderate severe dysarthria, short responses, often with reduced volume, significant impaired articulation   Language: Auditory Comprehension (understanding of spoken language)   Tests were administered at the following levels Basic (rote activities);Moderate (routine daily activities)   One Step Commands (out of 10 total) 10   Y/N Simple Questions (out of 10 total) 8   Two Step Commands (out of 5 total) 5   Yes/No Sentence and Simple Paragraph; Prosperity Diagnostic Aphasia Exam 3 short (out of 6 total) 3  (difficult to determine answer at times)   Functional Assessment Scale (Auditory  "Comprehension) Moderate Impairment   Comments (Auditory Comprehension) SLP: appears to have difficulty with more complex questions, but needing ongoing assessment, as verbal responses hard to determine, even when cued to nod head or just say \"yes/no\"   Language: Verbal Expression (use of spoken language to express information)   Comments (Verbal Expression) SLP: limited verbalizations, when intelligible to determine response, appears word finding grossly in tact    Reading Comprehension (understanding of written language)   Comments (Reading Comprehension) SLP: unable to read, did not point to letters written no yes/no, appears attends to right 1/4 of page at best   Written Expression (use of writing to express information)   Comments (Written Expression) SLP: illegible with non dominant hand, and right margin only   Cognitive Status Examination   Cognitive Status Exam Comments to be assessed as speech/language improve   General Therapy Interventions   Planned Therapy Interventions Dysphagia Treatment;Language;Communication   Dysphagia treatment Oropharyngeal exercise training;Modified diet education;Instruction of safe swallow strategies;Compensatory strategies for swallowing   Language Auditory comprehension;Reading comprehension   Communication Improve speech intelligibility   Clinical Impression, SLP Eval   Criteria for Skilled Therapeutic Interventions Met Yes   SLP Diagnosis moderate severe speech(dysarthria), moderate deficits for auditory comprehension, severe visual perceptual deficits 2/2 CVA   Influenced by the following factors/impairments   (vision deficits, speech impairment)   Rehab Potential Good, to achieve stated therapy goals   Therapy Frequency Daily   Predicted Duration of Therapy Intervention (days/wks) estimated 2-3 weeks   Anticipated Discharge Disposition Home with Home Therapy   Risks and Benefits of Treatment have been explained. Yes   Patient, Family & other staff in agreement with plan " of care Yes   Clinical Impression Comments SLP: pt seen for communication evaluation.  Presents with severely reduced speech intellibility - dysarthria with reduced volume/imprecise articulation (?apraxia as well,) with limited verbalizations.  Noting difficulty with auditory comprehension for more complex information, but also affected but difficulty understanding his responses. Visual perceptual skills impaired with difficulty perceiving items/pictures/words beyond right 1/4 of field.  Will further assess word finding, cognitive skills as able. pt is below baseline, recommending skilled intervention to improve communication skills for improved ability to understand/express self for increased safety and independence to return home.   Total Evaluation Time      Total Evaluation Time (Minutes) 20

## 2020-09-25 NOTE — PLAN OF CARE
SLP: pt seen for communication evaluation.  Presents with severely reduced speech intellibility - dysarthria with reduced volume/imprecise articulation (?apraxia as well,) with limited verbalizations.  Noting difficulty with auditory comprehension for more complex information, but also affected but difficulty understanding his responses. Visual perceptual skills impaired with difficulty perceiving items/pictures/words beyond right 1/4 of field.  Will further assess word finding, cognitive skills as able. pt is below baseline, recommending skilled intervention to improve communication skills for improved ability to understand/express self for increased safety and independence to return home.

## 2020-09-25 NOTE — PROGRESS NOTES
No SW consult placed. This note is in place of a consult. Son Jeyson is the designated visitor during ARU stay.      20 0800   Living Arrangements   Lives With alone   Living Arrangements house   Able to Return to Prior Arrangements yes   Living Arrangement Comments 1 [address]   Home Safety   Patient Feels Safe Living in Home? yes   Discharge Planning   Patient/Family Anticipates Transition to home;home with help/services   Disposition Comments Plan to hire in help, pt has LTC insurance policy    Concerns to be Addressed cognitive/perceptual;discharge planning   Plan   Plan See above    Patient/Family in Agreement with Plan unable to assess       Social Work: Initial Assessment with Discharge Plan    Patient Name: Jorje Teran  : 1950  Age: 70 year old  MRN: [id number]  Completed assessment with: Chart review and patient   Admitted to ARU: 2020    Presenting Information   Date of SW assessment: 2020  Health Care Directive: Health Care Directive Agent (if patient not able to make decisions)  Primary Health Care Agent: Patient/self until further assessed   Secondary Health Care Agent: Pt children, NOK   Living Situation: Alone in a home with 1 K[address]RA in [place], [place].   Previous Functional Status: Per H&P- indep with ADLs and IADLs PTA.   DME available: None reported  Patient and family understanding of hospitalization: Difficult to assess, slurred speech and delay in response.   Cultural/Language/Spiritual Considerations: 71 y/o male, english-speaking,  and Confucianism-rod.       Physical Health  Reason for admission: Stroke Ischemic 01.1 (L) Body Involvement (R) Brain Stroke; L sided weakness due to MCA infarct, R MCA occlusion, and R carotid atherosclerosis    Jorje Teran is a 71 y/o male with past medical history of HTN, HLD, DM II, SHELDON, hypothyroidism, and morbid obesity who was found down by his son after not hearing from his father throughout the day. The patient was  "unable to move his L side, and EMS was activated. Pt was found to have a R MCA infarct and R carotid atherosclerosis.    Provider Information   Primary Care Physician:Susana Esparza  [facility] [address] [facility] [address]  PH: [phone]  : None reported     Mental Health/Chemical Dependency:   Diagnosis: Denied   Alcohol/Tobacco/Narcotis: \"once in a while\"   Support/Services in Place: None reported   Services Needed/Recommended: Supportive services available by consult   Sexuality/Intimacy: Not discussed     Support System  Marital Status:    Family support: Pt adult sons--Donte lives in [place]; Jeyson lives in [place] and Kai lives in [place].   Other support available: Not discussed     Community Resources  Current in home services: None reported   Previous services: None reported     Financial/Employment/Education  Employment Status: Retired   Income Source: Social security income (also has LTC policy)   Education: Not discussed   Financial Concerns:  None reported   Insurance: [facility] Medicare       Discharge Plan   Patient and family discharge goal: Home with family support and hired in help. HHC vs OP. Possible TCU placement, pending pt progress.   Provided Education on discharge plan: NO--not discussed at this time. Early in rehab stay, anticipate 28 days.   Patient agreeable to discharge plan:  Not discussed.   Provided education and attained signature for Medicare IM and IRF Patient Rights and Privacy Information provided to patient : Discussed with pt, difficult to assess pt cognition at this time. Discuss with pt son.   Provided patient with [facility] Resources: Information added to AVS. Will discuss closer to discharge.   Barriers to discharge: Pt progress     Discharge Recommendations   Disposition: See above   Transportation Needs: Family A, if safe and appropriate   Name of Transportation Company and Phone: N/A at this " time     Additional comments   ELOS 28 days per admissions note. PEG tube placed. Will need RN CC consult. MD notified.     Per OSH SW note--Pt has a LTC policy and the three sons would assist and hire assistance to support pt at home post rehab. Pt is independent at baseline.   Pt's family agrees that pt's desire is to return to his home as soon as he is able. Pt's sons are committed to assist pt at home post rehab..    Please invite to Care Conference:  Pt three sons listed on face sheet       PU[facility] [place], [facility] Acute Rehab Unit   Phone: [phone]  I   Pager: [phone]

## 2020-09-25 NOTE — PROGRESS NOTES
09/25/20 1700   General Information   Onset Date 09/07/20   Start of Care Date 09/25/20   Referring Physician    Patient Profile Review/OT: Additional Occupational Profile Info See Profile for full history and prior level of function   Swallowing Evaluation Bedside swallow evaluation   Mode of current nutrition Oral diet;PEG   Type of oral diet Dysphagia diet level 1;Nectar - thick liquid   Respiratory Status Room air   Comments SLP: pt has been seen during hospitalization for swallowing and speech/language goalsJorje Teran is a 69 y/o male with past medical history of HTN, HLD, DM II, SHELDON, hypothyroidism, and morbid obesity who was found down by his son after not hearing from his father throughout the day. The patient was unable to move his L side, and EMS was activated. Pt was found to have a R MCA infarct and R carotid atherosclerosis. The patient was not appropriate for tPA or endovascular treatment as the infarct was already established. His hospital course is notable for dysphagia, BP management needs, hypernatremia (resolved), DM II management, and extensive therapies. He had PEG placed on 9/22/2020   Clinical Swallow Evaluation   Oral Musculature anomalies present   Structural Abnormalities present   Dentition present and adequate   Secretion Management left corner drooling   Mandibular Strength and Mobility impaired   Oral Labial Strength and Mobility impaired retraction;impaired pursing;impaired seal;impaired coordination  (left side weak)   Lingual Strength and Mobility impaired protrusion;impaired coordination   Laryngeal Function Cough;Throat clear;Swallow;Voicing initiated  (weak voice and cough)   Clinical Swallow Eval: Nectar Thick Liquid Texture Trial   Mode of Presentation, Nectar cup;self-fed   Oral Phase, Nectar Residue in oral cavity   Oral Residue, Nectar left lip drooling   Pharyngeal Phase, Nectar other (see comments)  (recent VFSS revealed pharyngeal residue)    Diagnostic Statement SLP: pt did not demonstrate overt sx aspiration, noting delayed swallow at times, as well as oral leakage, on left, and oral residue (prior VFSS revealed poor bolus control, oral and pharyngeal residue. did not trial thin 2/2 VFSS results, deep penetration   Clinical Swallow Eval: Puree Solid Texture Trial   Mode of Presentation, Puree spoon;self-fed;fed by clinician   Oral Phase, Puree Poor AP movement;Residue in oral cavity   Oral Residue, Puree left lip drooling;mid posterior tongue   Pharyngeal Phase, Puree other (see comments)  (VFSS revealed pharyngeal residue, premature entry)   Diagnostic Statement SLP: pt needed assist to feed, noting delayed swallow at times, with intermittent oral stasis/oral leakage out of mouth (vfss revealed poor bolus control, premature pharyngeal entry, oral and pharyngeal stasis    Esophageal Phase of Swallow   Patient reports or presents with symptoms of esophageal dysphagia No   General Therapy Interventions   Planned Therapy Interventions Dysphagia Treatment;Language;Communication   Dysphagia treatment Oropharyngeal exercise training;Modified diet education;Instruction of safe swallow strategies;Compensatory strategies for swallowing   Language Auditory comprehension;Reading comprehension   Communication Improve speech intelligibility   Swallow Eval: Clinical Impressions   Skilled Criteria for Therapy Intervention Skilled criteria met.  Treatment indicated.   Functional Assessment Scale (FAS) 2   Treatment Diagnosis moderate severe oropharyngeal dysphagia 2/2 CVA   Diet texture [place]s Dysphagia diet level 1;Nectar thick liquids   Recommended Feeding/Eating Techniques check mouth frequently for oral residue/pocketing;maintain upright posture during/after eating for 30 mins;no straws;small sips/bites;other (see comments)  (assist to feed, items to right visual field)   Therapy Frequency Daily   Anticipated Discharge Disposition home w/ home health    Risks and Benefits of Treatment have been explained. Yes   Patient, family and/or staff in agreement with Plan of Care Yes   Clinical Impression Comments SLP: Pt demonstates moderate severe dysphagia during bedside evaluation and recent VFSS   Pt on NDD1/nectar diet, noting difficulty with self feeding (reduced initiation, use of utensil non dominant hand, visual perceptual deficits) nectar swallowed with intermittent delay, oral leakage, no overt sx aspiration.  Puree/NDD1, also with intermittent hold/ing/delayed swallow, oral leakage/residue at times without overt sx aspiration.  REcommend continue current NDD1/nectar diet , will work towards diet advancement, with likely repeat VFSS to r/o silent aspiration especially for thin fluids.  PT to have 1:1 for po, sitting up, assist to feed, slow pace, small bites and sips, check for oral residue.  PT is below baseline and would benefit from skilled intervention to improve swallowing on least restrictive diet.   Total Evaluation Time   Total Evaluation Time (Minutes) 40

## 2020-09-25 NOTE — PROGRESS NOTES
Individualized Overall Plan Of Care (IOPOC)      Rehab diagnosis/Impairment Group Code: Stroke ischemic 01.1 (l) body involvement (r) brain stroke; l sided weakness due to mca infarct, r mca occlusion, and r carotid atherosclerosis  Right middle cerebral artery stroke (h)     Expected functional outcome:Anticipate with intensive therapies, close medical management, and rehabilitative nursing care the patient will improve strength, balance, tolerance to activity, communication, swallow, cognition, safety to ensure A x 1 with all mobility and ADLs for return home with 24 hr support of family and ongoing home therapies/cares  Expected Length of time to achieve: 28 days    Clinical Impression Comments:    Mobility:     Barriers to return to prior living situation: Current level of assist, llives alone, fall risk, left sided weakness and neglect    ADL:      Communication/Cognition/Swallow:        Intensity of therapy:   PT 60 minutes 5 days per week 28 days  OT 60 minutes 5 days per week 28 days  SLP 60 minutes 5 days per week 28 days    Orthotics AFO  Education stroke  Neuropsychology Testing: No  Other:  TBD      Medical Prognosis: Anticipate that he will remain stable, tolerate therapies, and improve function to return home with assistance.    Physician summary statement: The patient is participating well in PT/OT/SLP and will tolerate 3 hours of therapy per day. The patient currently performs bed mobility with Max A x 2 and bed rail. Once sitting at EOB the pt is able to sit with anywhere from SBA/CGA, to Mod A x 2 depending on fatigue and task. The patient is able to use Gretchen Stedy with Max A x 2, however unable to achieve a full stand. The patient is currently dependent on mechanical lift for bed to and from chair. The patient's alertness and participation is improving and he will tolerate intensive therapies in the ARC setting. He is able to follow 2 step directions with 85%, and intelligibility with short 2-3  word phrases needs mod/max cues to increase breath support       Discharge destination: family  Discharge rehabilitation needs: home care, RN, PT, OT, SLP and HHA, SW      Estimated length of stay: 28  days      Rehabilitation Physician Sudarshan Fuentes MD

## 2020-09-25 NOTE — PROGRESS NOTES
[facility]   Acute Rehabilitation Unit  Daily progress note  CC:   Stroke Ischemic 01.1 (L) Body Involvement (R) Brain Stroke; L sided weakness due to MCA infarct, R MCA occlusion, and R carotid atherosclerosis    The patient currently performs bed mobility with Max A x 2 and bed rail. Once sitting at EOB the pt is able to sit with anywhere from SBA/CGA, to Mod A x 2 depending on fatigue and task. The patient is able to use Gretchen Stedy with Max A x 2, however unable to achieve a full stand. The patient is currently dependent on mechanical lift for bed to and from chair. The patient's alertness and participation is improving and he will tolerate intensive therapies in the [facility] setting. He is able to follow 2 step directions with 85%, and intelligibility with short 2-3 word phrases needs mod/max cues to increase breath support    S:   Speaking better. Dense left hemiplegia, left field cut +. On TF.     ROS: Denies HA, nausea, pain. Hayes +      [unfilled]   Scheduled meds    amLODIPine  20 mg Oral Daily     aspirin  81 mg Oral Daily     atorvastatin  40 mg Oral Daily     ezetimibe  10 mg Oral Daily     furosemide  40 mg Oral BID     heparin ANTICOAGULANT  7,500 Units Subcutaneous Q12H     insulin aspart  1-5 Units Subcutaneous At Bedtime     insulin aspart  1-7 Units Subcutaneous TID w/meals     insulin glargine  12 Units Subcutaneous At Bedtime     insulin glargine  12 Units Subcutaneous QAM AC     levothyroxine  50 mcg Oral Daily     metoprolol tartrate  50 mg Oral BID     multivitamin w/minerals  1 tablet Oral Daily     nystatin  500,000 Units Swish & Spit 4x Daily     sacubitril-valsartan  1 tablet Oral BID     valsartan  320 mg Oral Daily       PRN meds:  bacitracin, bisacodyl, carboxymethylcellulose PF, dextrose, glucose **OR** dextrose **OR** glucagon, heparin lock flush, miconazole, nitroGLYcerin, - MEDICATION INSTRUCTIONS -, polyethylene glycol, sodium chloride (PF),  "traZODone      PHYSICAL EXAM  /56 (BP Location: Left arm)   Pulse 82   Temp 99  F (37.2  C) (Oral)   Resp 16   Ht 1.753 m (5' 9\")   Wt 104.9 kg (231 lb 4.8 oz)   SpO2 95%   BMI 34.16 kg/m    Alert oriented x3  Respiratory: Clear to auscultation bilaterally, no crackles or wheezing  Cardiovascular: Regular rate and rhythm, normal S1 and S2, and no murmur noted  GI: Normal bowel sounds, soft, non-distended, non-tender. G tube +  Skin/Integumen: No rashes, trace lower extremity edema noted  Neuro : Left lower Facial droop + Left Field cut +  Dense left-sided hemiplegia with neglect noted  Toes mute  Vocalizes, low volume   Extremities. Moves right side well. Edema +  LABS  Last Comprehensive Metabolic Panel:  Sodium   Date Value Ref Range Status   09/21/2020 141 133 - 144 mmol/L Final     Potassium   Date Value Ref Range Status   09/21/2020 4.1 3.4 - 5.3 mmol/L Final     Chloride   Date Value Ref Range Status   09/21/2020 111 (H) 94 - 109 mmol/L Final     Carbon Dioxide   Date Value Ref Range Status   09/21/2020 25 20 - 32 mmol/L Final     Anion Gap   Date Value Ref Range Status   09/21/2020 5 3 - 14 mmol/L Final     Glucose   Date Value Ref Range Status   09/21/2020 178 (H) 70 - 99 mg/dL Final     Urea Nitrogen   Date Value Ref Range Status   09/21/2020 21 7 - 30 mg/dL Final     Creatinine   Date Value Ref Range Status   09/21/2020 0.78 0.66 - 1.25 mg/dL Final     GFR Estimate   Date Value Ref Range Status   09/21/2020 >90 >60 mL/min/[1.73_m2] Final     Comment:     Non  GFR Calc  Starting 12/18/2018, serum creatinine based estimated GFR (eGFR) will be   calculated using the Chronic Kidney Disease Epidemiology Collaboration   (CKD-EPI) equation.       Calcium   Date Value Ref Range Status   09/21/2020 8.6 8.5 - 10.1 mg/dL Final        CBC RESULTS:   Recent Labs   Lab Test 09/22/20  0629 09/19/20  0635   WBC  --  9.5   RBC  --  3.67*   HGB  --  11.2*   HCT  --  33.9*   MCV  --  92   MCH  -- "  30.5   MCHC  --  33.0   RDW  --  12.5    174        ASSESSMENT AND PLAN    Jorje Teran is a 70 year old  hand dominant male with Stroke Ischemic 01.1 (L) Body Involvement (R) Brain Stroke; L sided weakness due to MCA infarct, R MCA occlusion, and R carotid atherosclerosis  1. PT, OT and SLP 60 minutes of each on a daily basis, in addition to rehab nursing and close management of physiatrist.       2. Impairment of ADL's: OT for 60 minutes daily to work on ADL re-training such as grooming, self cares and bathing.       3. Impairment of mobility:  PT for 60 minutes daily to work on neuromuscular re-education focusing on strength, balance, coordination, and endurance.       4. Impairment of cognition/language/swallow:  SLP for 60 minutes daily to work on cognitive and linguistic deficits.     1. Rehab RN for med administration, bowel regimen, glucose monitoring and wound care.  2.   3. Medical Conditions  CVA with dense left hemiparesis  CT with filling defect within the distal R ICA extending into the R MCA and occlusion of the R MCA at the M1 segment. CTA with severe plaquing at R carotid bifurcation with filling defect in the prox R ICA suggestive of thrombus contributing to approximately 80% stenosis. 60% stenosis of the proximal L ICA. TTE with bubble -> Normal left ventricular size and function. Left ventricular ejection fraction of 55-60%. No segmental wall motion abnormalities noted.   - Lipids 7/29/20- LDL 78, HDL 56, , T chol 164, hemoglobin A1c 7.1% H, TSH is 2.05.  - remains with significant dysarthria, facial droop and dense left sided hemiparesis  - ASA 81 mg daily PO/VA  - atorvastatin 40 mg daily, Zetia 10 mg po daily  - BP management as below; - goal for SBPs < 160     -Patient did not have adequate intake as per calorie count, PEG tube placement 9/22.       Dysphagia  - currently on DD1 with nectar thickened liquids   -PEG tube to be placed on 9/22, currently patient is on tube  feeding  If questions or problems arise regarding tube function (e.g. leaking, dislodges, etc.) Contact Interventional Radiology department 24 hours a day.   For procedures that were done at [facility]:  8 AM - 4 PM Monday through Friday Contact   [phone]  For afterhours and weekends: [phone]   Ask for the Interventional Radiologist on call.      CAD s/p CABG x 3, x 1 in 2015  HTN  HLD  Follows with Dr. Vincent with [facility].   TTE Normal left ventricular size and function. Left ventricular ejection fraction of 55-60%. No segmental wall motion abnormalities noted.  No shunting at the atrial level on suboptimal Bubble study.  - continue Norvasc /Entresto / Metoprolol 50 mg BID and Valsartan      Hypernatremia- resolved     DM II  Recent A1C at 7.1 7/2020.  BG goal is < 180.- started on lantus   US at [facility] negative for DVT     SHELDON  - states he could not sleep with CPAP as outpatient  - he would benefit for treating SHELDON  - CPAP ordered last night but he did not sleep well     Hypothyroidism  - resumed PTA Levothyroxine 50 mcg daily  TSH checked 7/29/2020 at 2.05     Morbid obesity  BMI noted  7/202 at ~38. Will need to encourage healthy lifestyle and weight loss with recovery     Diet: Snacks/Supplements Adult: Other; thickened smoothie with meals  (RD); With Meals  Calorie Counts     DVT Prophylaxis: Pneumatic Compression Devices  Code Status: Full Code      2. Adjustment to disability:  Clinical psychology to eval and treat when ready  3. FEN: On NDD 1 [place] +TF  4. Bowel: Javed meds  5. Bladder: Monitor  6. GI Prophylaxis:   7. Code: Full  8. Disposition: Home with family and home care  9. ELOS:  28 days  10. Rehab prognosis:  Fair  11. Follow up Appointments on Discharge: The following labs/tests are recommended: cbc ,basic metabolic panel in 4-5 days, also get mag and phos in 3-4 days .need follow up with neurology as scheduled.     Sudarshan Fuentes MD   Physical Medicine &  Rehabilitation

## 2020-09-25 NOTE — PROGRESS NOTES
Called pt son Donte PH: [phone] with intention on introducing self, confirming information about pt PTA, discussing ARU and also notifying son about IMM. Pt son stated that he is at a doctor apt and asked to call SW back. SW provided son with direct number. Will wait for return call.     ADDENDUM: spoke with pt son Donte on the phone. Discussed IMM, Donte denied questions or concerns and provided SW with verbal consent. Discussed assessing pt progress week-by-week and team rounds. Notified Donte that if family not at bedside that therapy/MD would call with updates and SW will be available if/when needed. Donte stated that he has a copy of HCD/POA at work and can scan and email it to [facility]. SW sent Donte an email ([email or web address]) so that he can respond with scanned copy and also has SW information to follow up if needed. Donte appreciative of call, denied additional needs. Donte in agreement with being the primary contact for updates and he will update the rest of his family. No additional needs at this time.     Luann Mayen, PUMA, [facility]  [place] Acute [facility] Unit   Phone: [phone]  I   Pager: [phone]

## 2020-09-25 NOTE — PROGRESS NOTES
09/25/20 1700   General Information   Onset Date 09/07/20   Start of Care Date 09/25/20   Referring Physician    Patient Profile Review/OT: Additional Occupational Profile Info See Profile for full history and prior level of function   Swallowing Evaluation Bedside swallow evaluation   Mode of current nutrition Oral diet;PEG   Type of oral diet Dysphagia diet level 1;Nectar - thick liquid   Respiratory Status Room air   Comments SLP: pt has been seen during hospitalization for swallowing and speech/language goals   Clinical Swallow Evaluation   Oral Musculature anomalies present   Structural Abnormalities present   Dentition present and adequate   Secretion Management left corner drooling   Mandibular Strength and Mobility impaired   Oral Labial Strength and Mobility impaired retraction;impaired pursing;impaired seal;impaired coordination  (left side weak)   Lingual Strength and Mobility impaired protrusion;impaired coordination   Laryngeal Function Cough;Throat clear;Swallow;Voicing initiated  (weak voice and cough)   Clinical Swallow Eval: Nectar Thick Liquid Texture Trial   Mode of Presentation, Nectar cup;self-fed   Oral Phase, Nectar Residue in oral cavity   Oral Residue, Nectar left lip drooling   Pharyngeal Phase, Nectar other (see comments)  (recent VFSS revealed pharyngeal residue)   Diagnostic Statement SLP: pt did not demonstrate overt sx aspiration, noting delayed swallow at times, as well as oral leakage, on left, and oral residue (prior VFSS revealed poor bolus control, oral and pharyngeal residue. did not trial thin 2/2 VFSS results, deep penetration   Clinical Swallow Eval: Puree Solid Texture Trial   Mode of Presentation, Puree spoon;self-fed;fed by clinician   Oral Phase, Puree Poor AP movement;Residue in oral cavity   Oral Residue, Puree left lip drooling;mid posterior tongue   Pharyngeal Phase, Puree other (see comments)  (VFSS revealed pharyngeal residue, premature entry)    Diagnostic Statement SLP: pt needed assist to feed, noting delayed swallow at times, with intermittent oral stasis/oral leakage out of mouth (vfss revealed poor bolus control, premature pharyngeal entry, oral and pharyngeal stasis    Esophageal Phase of Swallow   Patient reports or presents with symptoms of esophageal dysphagia No   General Therapy Interventions   Planned Therapy Interventions Dysphagia Treatment;Language;Communication   Dysphagia treatment Oropharyngeal exercise training;Modified diet education;Instruction of safe swallow strategies;Compensatory strategies for swallowing   Language Auditory comprehension;Reading comprehension   Communication Improve speech intelligibility   Swallow Eval: Clinical Impressions   Skilled Criteria for Therapy Intervention Skilled criteria met.  Treatment indicated.   Functional Assessment Scale (FAS) 2   Treatment Diagnosis moderate severe oropharyngeal dysphagia 2/2 CVA   Diet texture [place]s Dysphagia diet level 1;Nectar thick liquids   Recommended Feeding/Eating Techniques check mouth frequently for oral residue/pocketing;maintain upright posture during/after eating for 30 mins;no straws;small sips/bites;other (see comments)  (assist to feed, items to right visual field)   Therapy Frequency Daily   Anticipated Discharge Disposition home w/ home health   Risks and Benefits of Treatment have been explained. Yes   Patient, family and/or staff in agreement with Plan of Care Yes   Clinical Impression Comments SLP: Pt demonstates moderate severe dysphagia during bedside evaluation and recent VFSS   Pt on NDD1/nectar diet, noting difficulty with self feeding (reduced initiation, use of utensil non dominant hand, visual perceptual deficits) nectar swallowed with intermittent delay, oral leakage, no overt sx aspiration.  Puree/NDD1, also with intermittent hold/ing/delayed swallow, oral leakage/residue at times without overt sx aspiration.  REcommend continue  current NDD1/nectar diet , will work towards diet advancement, with likely repeat VFSS to r/o silent aspiration especially for thin fluids.  PT to have 1:1 for po, sitting up, assist to feed, slow pace, small bites and sips, check for oral residue.  PT is below baseline and would benefit from skilled intervention to improve swallowing on least restrictive diet.   Total Evaluation Time   Total Evaluation Time (Minutes) 40

## 2020-09-25 NOTE — PROGRESS NOTES
CLINICAL NUTRITION SERVICES - ASSESSMENT NOTE     Nutrition Prescription    RECOMMENDATIONS FOR MDs/PROVIDERS TO ORDER:  Diet per SLP     Malnutrition Status:    Patient does not meet two of the established criteria necessary for diagnosing malnutrition    Recommendations already ordered by Registered Dietitian (RD):  Magic cup with lunch and dinner  Calorie count  Continue current EN order     Future/Additional Recommendations:  Monitor intakes and wt  Adjust EN pending calorie counts with goal of discontinuing once meeting 75% of minimum calorie and protein needs (1513kcal, 61g protein)     REASON FOR ASSESSMENT  Jorje Teran is a 70 year old male assessed by the dietitian for Provider Order - Registered Dietitian to Assess and Order TF per Medical Nutrition Therapy Protocol  PMH significant for HTN, HLD, DM II, SHELDON, hypothyroidism admitted for MCA infarct and R carotid atherosclerosis    NUTRITION HISTORY  Difficult to obtain full nutrition history from pt, aphasic and soft spoken . Unknown intakes prior to hospitalization. Noted 24# wt loss in 2 months. Pt started on EN via NGT 9/8, transitioned to cycled feeding 9/14. Started pureed diet with NTL 9/11. G tube placed 9/22. Calorie count completed 9/14-9/16 and 9/18-9/20:     9/14: 1262 cals, 52 gm pro    9/15: 596 carol, 24 g pro   9/16: 302 cals, 9 gm pro     3day AVERAGE = 720 cals (36% needs), 28 gm pro (29% needs)   9/18: 1068 kcal, 43 gm pro   9/19: 791 kcal, 23 gm pro   9/20: 889 cals, 23 gm pro   3 DAY AVERAGE = 916 cals (46% est needs), 30 gm pro (32% est needs)     CURRENT NUTRITION ORDERS  Diet: Dysphagia Level 1:  Pureed and Nectar Thickened Liquids   Nutrition Support: Isosource 1.5 via g-tube @ 60 mL/hr x 14 hrs (6pm-8am) providing 840 mL, 1260 kcal (15.6 kcal/kg), 57 g protein (0.7 g/kg), 148 g CHO, 13 g fiber, and 638 mL free water per DW of 80.7 kg.  Water Flushes: 100 mL q 4 hrs (TF + Flushes = 1238 mL water; meeting 61% hydration  "needs).    Intake/Tolerance: Ate 25% of dinner last night, only a few bites of breakfast per RN. Pt enjoys milk per RN. Average oral intakes and EN meeting 100% minimum energy and protein needs.     LABS  Labs reviewed:  Results for JENNIFER GANNON (MRN [id number]) as of 9/25/2020 08:10   Ref. Range 9/21/2020 06:20   Sodium Latest Ref Range: 133 - 144 mmol/L 141   Potassium Latest Ref Range: 3.4 - 5.3 mmol/L 4.1   Chloride Latest Ref Range: 94 - 109 mmol/L 111 (H)   Carbon Dioxide Latest Ref Range: 20 - 32 mmol/L 25   Urea Nitrogen Latest Ref Range: 7 - 30 mg/dL 21   Creatinine Latest Ref Range: 0.66 - 1.25 mg/dL 0.78   GFR Estimate Latest Ref Range: >60 mL/min/1.73_m2 >90   GFR Estimate If Black Latest Ref Range: >60 mL/min/1.73_m2 >90   Calcium Latest Ref Range: 8.5 - 10.1 mg/dL 8.6   Anion Gap Latest Ref Range: 3 - 14 mmol/L 5   Magnesium Latest Ref Range: 1.6 - 2.3 mg/dL 2.4 (H)   Phosphorus Latest Ref Range: 2.5 - 4.5 mg/dL 3.4     MEDICATIONS  Medications reviewed:  Lasix  novolog  lantus  thera-vit-m  PRN: Dulcolax, miralax    ANTHROPOMETRICS  Height: 175.3 cm (5' 9\")  Most Recent Weight: 104.9 kg (231 lb 4.8 oz)    IBW: 72.7 kg  BMI: 34.16 Obesity Grade I BMI 30-34.9  Weight History: 24# ( 9.4%) wt loss in 2 months.   Wt Readings from Last 10 Encounters:   09/25/20 104.9 kg (231 lb 4.8 oz)   09/24/20 106.5 kg (234 lb 11.2 oz)-admit wt   09/23/20 109.1 kg (240 lb 8 oz)   07/29/20 115.8 kg (255 lb 3.2 oz)   10/24/19 114.1 kg (251 lb 9.6 oz)     Dosing Weight: 80.7 kg    ASSESSED NUTRITION NEEDS  Estimated Energy Needs: 1359-6743 kcals/day (25 - 30 kcals/kg)  Justification: Maintenance  Estimated Protein Needs: 81-97 grams protein/day (1 - 1.2 grams of pro/kg)  Justification: Obesity guidelines  Estimated Fluid Needs: 8567-2260 mL/day (1 mL/kcal)   Justification: Maintenance or Per provider pending fluid status    PHYSICAL FINDINGS  See malnutrition section below.  No abnormal nutrition-related physical " findings observed.     MALNUTRITION  % Intake: Decreased intake does not meet criteria, EN and PO meeting needs  % Weight Loss: > 7.5% in 3 months (severe)  Subcutaneous Fat Loss: None observed  Muscle Loss: None observed  Fluid Accumulation/Edema: 1+ (BUE and BLE)  Malnutrition Diagnosis: Patient does not meet two of the established criteria necessary for diagnosing malnutrition    NUTRITION DIAGNOSIS  Inadequate oral intake related to decreased appetite, fatigue, and altered consistency diet and  as evidenced by calorie count results and need for EN to meet nutritional needs.       INTERVENTIONS  Implementation  Magic cup with lunch and dinner  Calorie count  Continue current EN order:Isosource 1.5 via g-tube @ 60 mL/hr x 14 hrs (6pm-8am) providing 840 mL, 1260 kcal (15.6 kcal/kg), 57 g protein (0.7 g/kg), 148 g CHO, 13 g fiber, and 638 mL free water per DW of 80.7 kg.  Water Flushes: 100 mL q 4 hrs (TF + Flushes = 1238 mL water; meeting 61% hydration needs).    Goals  Total avg nutritional intake to meet a minimum of 25 kcal/kg and 1 g PRO/kg daily (per dosing wt 80.7 kg).     Monitoring/Evaluation  Progress toward goals will be monitored and evaluated per protocol.    Annamaria Castano MS, RD, LDN  Unit Pager [phone]

## 2020-09-25 NOTE — PLAN OF CARE
Patient is alert and oriented x3, disoriented to place this shift. Denies pain when asked. Is aphasic and speaks very quietly. Is Ax2 using golvo lift for transfers, needs max Ax2 for cares. Is incontinent of bladder, condom cath is in place this shift draining tiffany urine. No BM this shift. Tolerating diet with poor appetite. Needs feeding assistance and encouragement to eat and drink. BG noted in flowsheets. PEG is patent and flushes given per orders. Triple lumen PICC to RUE patent and saline locked. Bed alarm is on for safety, call light is within reach of patient. Continue with POC.

## 2020-09-25 NOTE — PLAN OF CARE
FOCUS/GOAL  Bowel management, Bladder management, Pain management, Mobility, Skin integrity, and Safety management    ASSESSMENT, INTERVENTIONS AND CONTINUING PLAN FOR GOAL:  A/O x3. Disoriented to place.  Severe aphasia, pt talks softly and able to only say a few words at a time.  VSS on RA.  No c/o pain. No prn meds given.  Left side flaccid w/ left facial droop and left side neglect.  Up with lift assist two.  Incont of bowel and bladder.  Last BM today prior to arriving at our unit.  DDI diet with nectar thick liquids. Pills were crushed w/ applesauce.  PEG tube intact.  Continues tube feeds from 2116-6968 at rate of 60 mls/hr.  Bgs were 153 and .  Triple lumen PICC to right arm, blood returned noted, flushed and heparin locked.  Blanchable redness to coccyx, sacrum mepilx placed.  Cont w/ POC.

## 2020-09-25 NOTE — PLAN OF CARE
FOCUS/GOAL  Bladder management, Medication management, and Medical management    ASSESSMENT, INTERVENTIONS AND CONTINUING PLAN FOR GOAL:  Alert, dioriented to place. Makes needs known, A2 w/ lift. Alarms on.  Pt denied pain during night.  Incontinent of B/B, Smear BM 9/25. Condom cath on.  TF running until 0800AM.  Continue with POC.

## 2020-09-25 NOTE — PHARMACY-MEDICATION REGIMEN REVIEW
Pharmacy Medication Regimen Review  Jorje Teran is a 70 year old male who is currently in the Acute Rehab Unit.    Assessment: All medications have an appropriate indications and no unnecessary use was found.  Nystatin oral suspension: started on 9/23/2020    Plan:   Continue current treatment.  Please consider stopping Nystatin oral suspension 7-14 days after it was started.  Attending provider will be sent this note for review.  If there are any emergent issues noted above, pharmacist will contact provider directly by phone.      Pharmacy will periodically review the resident's medication regimen for any PRN medications not administered in > 72 hours and discontinue them. The pharmacist will discuss gradual dose reductions of psychopharmacologic medications with interdisciplinary team on a regular basis.    Please contact pharmacy if the above does not answer specific medication questions/concerns.    Background:  A pharmacist has reviewed all medications and pertinent medical history today.  Medications were reviewed for appropriate use and any irregularities found are listed with [place]s.      Current Facility-Administered Medications:      amLODIPine (NORVASC) tablet 20 mg, 20 mg, Oral, Daily, Kai Cooper DO, 20 mg at 09/25/20 0750     aspirin EC tablet 81 mg, 81 mg, Oral, Daily, Sudarshan Fuentes MD, 81 mg at 09/25/20 0750     atorvastatin (LIPITOR) tablet 40 mg, 40 mg, Oral, Daily, Sudarshan Fuentes MD, 40 mg at 09/25/20 0750     bacitracin ointment, , Topical, Q1H PRN, Sudarshan Fuentes MD     bisacodyl (DULCOLAX) Suppository 10 mg, 10 mg, Rectal, Daily PRN, Sudarshan Fuentes MD     carboxymethylcellulose PF (REFRESH PLUS) 0.5 % ophthalmic solution 2 drop, 2 drop, Ophthalmic, Q1H PRN, Sudarshan Fuentes MD     dextrose 10% infusion, , Intravenous, Continuous PRN, Sudarshan Fuentes MD     glucose gel 15-30 g, 15-30 g, Oral, Q15 Min PRN **OR** dextrose 50 %  injection 25-50 mL, 25-50 mL, Intravenous, Q15 Min PRN **OR** glucagon injection 1 mg, 1 mg, Subcutaneous, Q15 Min PRN, Sudarshan Fuentes MD     ezetimibe (ZETIA) tablet 10 mg, 10 mg, Oral, Daily, Sudarshan Fuentes MD, 10 mg at 09/25/20 0749     furosemide (LASIX) tablet 40 mg, 40 mg, Oral, BID, Kai Cooper DO, 40 mg at 09/25/20 0750     heparin ANTICOAGULANT injection 7,500 Units, 7,500 Units, Subcutaneous, Q12H, Sudarshan Fuentes MD, 7,500 Units at 09/25/20 1058     heparin lock flush 10 UNIT/ML injection 2-5 mL, 2-5 mL, Intracatheter, Q1H PRN, Sudarshan Fuentes MD, 5 mL at 09/24/20 2145     insulin aspart (NovoLOG) injection (RAPID ACTING), 1-5 Units, Subcutaneous, At Bedtime, Sudarshan Fuentes MD     insulin aspart (NovoLOG) injection (RAPID ACTING), 1-7 Units, Subcutaneous, TID w/meals, Sudarshan Fuentes MD, 2 Units at 09/25/20 0800     insulin glargine (LANTUS PEN) injection 12 Units, 12 Units, Subcutaneous, At Bedtime, Sudarshan Fuentes MD, 12 Units at 09/24/20 2130     insulin glargine (LANTUS PEN) injection 12 Units, 12 Units, Subcutaneous, QAM AC, Sudarshan Fuentes MD, 12 Units at 09/25/20 0749     levothyroxine (SYNTHROID/LEVOTHROID) tablet 50 mcg, 50 mcg, Oral, Daily, Sudarshan Fuentes MD, 50 mcg at 09/25/20 0749     metoprolol tartrate (LOPRESSOR) tablet 50 mg, 50 mg, Oral, BID, Kai Cooper DO, 50 mg at 09/25/20 0802     miconazole (MICATIN) 2 % powder, , Topical, Q1H PRN, Sudarshan Fuentes MD     multivitamin w/minerals (THERA-VIT-M) tablet 1 tablet, 1 tablet, Oral, Daily, Sudarshan Fuentes MD, 1 tablet at 09/24/20 1701     nitroGLYcerin (NITROSTAT) sublingual tablet 0.4 mg, 0.4 mg, Sublingual, Q5 Min PRN, Sudarshan Fuentes MD     nystatin (MYCOSTATIN) suspension 500,000 Units, 500,000 Units, Swish & Spit, 4x Daily, Sudarshan Fuentes MD, 500,000 Units at 09/25/20 0750     Patient is already receiving anticoagulation with  heparin, enoxaparin (LOVENOX), warfarin (COUMADIN)  or other anticoagulant medication, , Does not apply, Continuous PRN, Sudarshan Fuentes MD     polyethylene glycol (MIRALAX) Packet 17 g, 17 g, Oral, BID PRN, Sudarshan Fuentes MD     sacubitril-valsartan (ENTRESTO) 49-51 MG per tablet 1 tablet, 1 tablet, Oral, BID, Kai Cooper DO, 1 tablet at 09/25/20 0802     sodium chloride (PF) 0.9% PF flush 5-50 mL, 5-50 mL, Intracatheter, Q1H PRN, Sudarshan Fuentes MD     traZODone (DESYREL) tablet 50 mg, 50 mg, Oral, At Bedtime PRN, Sudarshan Fuentes MD     valsartan (DIOVAN) tablet 320 mg, 320 mg, Oral, Daily, Kai Cooper DO, 320 mg at 09/25/20 0749  No current outpatient prescriptions on file.   PMH: CVA with dense left hemiparesis, dysphagia, CAD s/p CABG x 3, x 1 in 2015, HTN, HLD, DM II, SHELDON, hypothyroidism, and morbid obesity

## 2020-09-25 NOTE — PROGRESS NOTES
09/25/20 1143   Quick Adds   Type of Visit Initial Occupational Therapy Evaluation   Living Environment   Lives With alone   Living Arrangements house   Home Accessibility stairs to enter home   Living Environment Comment pm per pt report lives in [facility] home and has been a  since 2000, all living on main level with bathroom remodeled withhigher toilet and walkin shower, pt drove did cooking, and hired out outdoor tasks mowing and sholving, pt is a retired    Self-Care   Usual Activity Tolerance good   Current Activity Tolerance poor   Equipment Currently Used at Home none   Functional Level   Ambulation 0-->independent   Transferring 0-->independent   Toileting 0-->independent   Bathing 0-->independent   Dressing 0-->independent   Eating 0-->independent   Communication 0-->understands/communicates without difficulty   Swallowing 0-->swallows foods/liquids without difficulty   Cognition 0 - no cognition issues reported   Fall history within last six months yes   Number of times patient has fallen within last six months 1   Which of the above functional risks had a recent onset or change? ambulation;transferring;toileting;bathing;dressing;eating;swallowing;cognition;communication/speech   General Information   Additional Occupational Profile Info/Pertinent History of Current Problem per MD report pt is a 71 y/o male with past medical history of HTN, HLD, DM II, SHELDON, hypothyroidism, and morbid obesity who was found down by his son after not hearing from his father throughout the day. The patient was unable to move his L side, and EMS was activated. Pt was found to have a R MCA infarct and R carotid atherosclerosis. The patient was not appropriate for tPA or endovascular treatment as the infarct was already established. His hospital course is notable for dysphagia, BP management needs, hypernatremia (resolved), DM II management, and extensive therapies. He had PEG placed on 9/22/2020,    Weight-Bearing Status - LUE full weight-bearing   Weight-Bearing Status - RUE full weight-bearing   Weight-Bearing Status - LLE full weight-bearing   Weight-Bearing Status - RLE full weight-bearing   Cognitive Status Examination   Orientation person   Level of Consciousness alert;confused   Follows Commands (Cognition)   (follows one step commands;0-24% accuracy, pm tx increased co)   Visual Perception   Visual Perception Comments pt demonstrates l neglect with OT eval pt pushing with l hand  bringing body to leaning to L   Sensory Examination   Sensory Comments pt with decrease cognition pt unable to state appears decreae sensation and awareness of JULIA/E   Pain Assessment   Patient Currently in Pain No   Posture   Posture forward head position   Range of Motion (ROM)   ROM Comment R wfl arom. Prom L U/E  wfl with  Shoulder  flex 120     Strength   Strength Comments r wfl l flacid   Hand Strength   Left hand  (pounds) 0 pounds   Hand Strength Comments r functional not  formally tested   Coordination   Coordination Comments none l r wfl diffcut following commands decrease cognition   ARC Assessment Only   Acute Rehab Functional Assessment See IP Rehab Daily Documentation Flowsheet for Functional Mobility/ADL Assessment   Instrumental Activities of Daily Living (IADL)   Previous Responsibilities meal prep;housekeeping;laundry;shopping;yardwork;medication management;finances;driving   Activities of Daily Living Analysis   Impairments Contributing to Impaired Activities of Daily Living balance impaired;cognition impaired;coordination impaired;flexibility decreased;motor control impaired;muscle tone abnormal;sensation decreased;strength decreased   General Therapy Interventions   Planned Therapy Interventions ADL retraining;IADL retraining;balance training;bed mobility training;cognition;fine motor coordination training;neuromuscular re-education;orthotic fitting/training;ROM;strengthening   Clinical Impression    Criteria for Skilled Therapeutic Interventions Met yes, treatment indicated   OT Diagnosis decrease adls and I adls   Influenced by the following impairments decrease cog flacid l U/E and L L/E   Assessment of Occupational Performance 5 or more Performance Deficits   Identified Performance Deficits dressing , bathing meal prep home management, toileting   Clinical Decision Making (Complexity) High complexity   Therapy Frequency Daily   Predicted Duration of Therapy Intervention (days/wks) 30 days   Anticipated Equipment Needs at Discharge bathing equipment;tub bench;wheelchair;raised toilet seat   Anticipated Discharge Disposition Home with Home Therapy  (and 24 hr care)   Risks and Benefits of Treatment have been explained. Yes   Patient, Family & other staff in agreement with plan of care Yes   Clinical Impression Comments Pt will benifit from OT to increase core balance faciliation of L u/E and L L /E to increae I with adls pt prior I with adls and I adls   Total Evaluation Time   Total Evaluation Time (Minutes) 30

## 2020-09-25 NOTE — PLAN OF CARE
SLP: Pt demonstates moderate severe dysphagia during bedside evaluation and recent VFSS   Pt on NDD1/nectar diet, noting difficulty with self feeding (reduced initiation, use of utensil non dominant hand, visual perceptual deficits) nectar swallowed with intermittent delay, oral leakage, no overt sx aspiration.  Puree/NDD1, also with intermittent hold/ing/delayed swallow, oral leakage/residue at times without overt sx aspiration.  REcommend continue current NDD1/nectar diet , will work towards diet advancement, with likely repeat VFSS to r/o silent aspiration especially for thin fluids.  PT to have 1:1 for po, sitting up, assist to feed, slow pace, small bites and sips, check for oral residue.  PT is below baseline and would benefit from skilled intervention to improve swallowing on least restrictive diet.

## 2020-09-25 NOTE — PLAN OF CARE
Discharge Planner PT   Patient plan for discharge: home w/ 24 hour assist  Current status: dependent w/c and lift  Barriers to return to prior living situation: level of physical assist  Recommendations for discharge: progress IP care as tolerated and clinically appropriate  Rationale for [place]s: pt presentation       Entered by: Becky Sharma 09/25/2020 5:06 PM

## 2020-09-26 ENCOUNTER — APPOINTMENT (OUTPATIENT)
Dept: OCCUPATIONAL THERAPY | Facility: CLINIC | Age: 70
End: 2020-09-26
Payer: COMMERCIAL

## 2020-09-26 ENCOUNTER — APPOINTMENT (OUTPATIENT)
Dept: SPEECH THERAPY | Facility: CLINIC | Age: 70
End: 2020-09-26
Payer: COMMERCIAL

## 2020-09-26 ENCOUNTER — APPOINTMENT (OUTPATIENT)
Dept: PHYSICAL THERAPY | Facility: CLINIC | Age: 70
End: 2020-09-26
Payer: COMMERCIAL

## 2020-09-26 LAB
GLUCOSE BLDC GLUCOMTR-MCNC: 270 MG/DL (ref 70–99)
GLUCOSE BLDC GLUCOMTR-MCNC: 305 MG/DL (ref 70–99)
GLUCOSE BLDC GLUCOMTR-MCNC: 309 MG/DL (ref 70–99)
GLUCOSE BLDC GLUCOMTR-MCNC: 362 MG/DL (ref 70–99)

## 2020-09-26 PROCEDURE — 92526 ORAL FUNCTION THERAPY: CPT | Mod: GN

## 2020-09-26 PROCEDURE — 97110 THERAPEUTIC EXERCISES: CPT | Mod: GP

## 2020-09-26 PROCEDURE — 97535 SELF CARE MNGMENT TRAINING: CPT | Mod: GO

## 2020-09-26 PROCEDURE — 25000132 ZZH RX MED GY IP 250 OP 250 PS 637: Performed by: PHYSICAL MEDICINE & REHABILITATION

## 2020-09-26 PROCEDURE — 00000146 ZZHCL STATISTIC GLUCOSE BY METER IP

## 2020-09-26 PROCEDURE — 97530 THERAPEUTIC ACTIVITIES: CPT | Mod: GP

## 2020-09-26 PROCEDURE — 12800006 ZZH R&B REHAB

## 2020-09-26 PROCEDURE — 92507 TX SP LANG VOICE COMM INDIV: CPT | Mod: GN

## 2020-09-26 PROCEDURE — 25000128 H RX IP 250 OP 636: Performed by: PHYSICAL MEDICINE & REHABILITATION

## 2020-09-26 PROCEDURE — 27210429 ZZH NUTRITION PRODUCT INTERMEDIATE LITER

## 2020-09-26 RX ADMIN — VALSARTAN 320 MG: 160 TABLET ORAL at 09:40

## 2020-09-26 RX ADMIN — INSULIN ASPART 3 UNITS: 100 INJECTION, SOLUTION INTRAVENOUS; SUBCUTANEOUS at 09:43

## 2020-09-26 RX ADMIN — ATORVASTATIN CALCIUM 40 MG: 40 TABLET, FILM COATED ORAL at 09:40

## 2020-09-26 RX ADMIN — MULTIPLE VITAMINS W/ MINERALS TAB 1 TABLET: TAB at 17:00

## 2020-09-26 RX ADMIN — INSULIN GLARGINE 12 UNITS: 100 INJECTION, SOLUTION SUBCUTANEOUS at 09:44

## 2020-09-26 RX ADMIN — EZETIMIBE 10 MG: 10 TABLET ORAL at 09:41

## 2020-09-26 RX ADMIN — INSULIN ASPART 4 UNITS: 100 INJECTION, SOLUTION INTRAVENOUS; SUBCUTANEOUS at 21:03

## 2020-09-26 RX ADMIN — HEPARIN SODIUM 7500 UNITS: 10000 INJECTION INTRAVENOUS; SUBCUTANEOUS at 21:07

## 2020-09-26 RX ADMIN — NYSTATIN 500000 UNITS: 500000 SUSPENSION ORAL at 09:49

## 2020-09-26 RX ADMIN — FUROSEMIDE 40 MG: 20 TABLET ORAL at 09:42

## 2020-09-26 RX ADMIN — INSULIN GLARGINE 12 UNITS: 100 INJECTION, SOLUTION SUBCUTANEOUS at 21:05

## 2020-09-26 RX ADMIN — AMLODIPINE BESYLATE 20 MG: 10 TABLET ORAL at 09:43

## 2020-09-26 RX ADMIN — SACUBITRIL AND VALSARTAN 1 TABLET: 49; 51 TABLET, FILM COATED ORAL at 09:41

## 2020-09-26 RX ADMIN — METOPROLOL TARTRATE 50 MG: 50 TABLET, FILM COATED ORAL at 09:42

## 2020-09-26 RX ADMIN — ASPIRIN 81 MG: 81 TABLET, COATED ORAL at 09:43

## 2020-09-26 RX ADMIN — SACUBITRIL AND VALSARTAN 1 TABLET: 49; 51 TABLET, FILM COATED ORAL at 20:46

## 2020-09-26 RX ADMIN — HEPARIN SODIUM 7500 UNITS: 10000 INJECTION INTRAVENOUS; SUBCUTANEOUS at 12:07

## 2020-09-26 RX ADMIN — NYSTATIN 500000 UNITS: 500000 SUSPENSION ORAL at 16:57

## 2020-09-26 RX ADMIN — LEVOTHYROXINE SODIUM 50 MCG: 25 TABLET ORAL at 09:41

## 2020-09-26 RX ADMIN — NYSTATIN 500000 UNITS: 500000 SUSPENSION ORAL at 20:46

## 2020-09-26 RX ADMIN — INSULIN ASPART 4 UNITS: 100 INJECTION, SOLUTION INTRAVENOUS; SUBCUTANEOUS at 18:02

## 2020-09-26 RX ADMIN — INSULIN ASPART 4 UNITS: 100 INJECTION, SOLUTION INTRAVENOUS; SUBCUTANEOUS at 12:07

## 2020-09-26 RX ADMIN — FUROSEMIDE 40 MG: 20 TABLET ORAL at 16:57

## 2020-09-26 RX ADMIN — NYSTATIN 500000 UNITS: 500000 SUSPENSION ORAL at 12:08

## 2020-09-26 RX ADMIN — METOPROLOL TARTRATE 50 MG: 50 TABLET, FILM COATED ORAL at 20:46

## 2020-09-26 ASSESSMENT — MIFFLIN-ST. JEOR: SCORE: 1799.99

## 2020-09-26 NOTE — PLAN OF CARE
PT: Focus of session was on achieving standing in standing frame using Golvo lift to achieve out of bed position. Pt requires Max A x 2 for all mobility at this point given poor motor control and L side neglect/pushing. In standing,  Pt's BP in standing is 100/62 mmHg, carter to tolerate ~ 1.5 minutes.  Pt tolerated the entire session with good spirits, did have coughing fit once in sitting, resolved with therapist providing sponge with nectar liquids. Pt should continue to be challenged out of his bed to encourage return of function.

## 2020-09-26 NOTE — PROGRESS NOTES
FOCUS/GOAL  Bowel management, Bladder management, Nutrition/Feeding/Swallowing precautions, Medical management, Mobility, Cognition/Memory/Judgment/Problem solving and Safety management    ASSESSMENT, INTERVENTIONS AND CONTINUING PLAN FOR GOAL:  Patient is alert and oriented to self, place and situation,`disoriented to date, slurred and slow to to respond. Denied CP, pain, and SOB. AS2 with Golvo lift for transfer and heavy AS2 for bed mobility as well. Pt is on cycle tube feeding, stopped around 8:20 am, PEG dressing changed per plan of care. Pt assisted in feeding, take pills with applesauce. Incontinent of bowel and bladder x3 during the shift, uses condom cath at night. RUE triple lumen PICC in placed. Pt is turned and repositioned frequently in bed, heels elevated off bed, needs to be anticipated. Had shower with OT. Will continue to monitor patient as POC.   1330: Pt able to connect with family via IPad video call and face time via pt phone.

## 2020-09-26 NOTE — PLAN OF CARE
FOCUS/GOAL  Bowel management, Bladder management, and Wound care management    ASSESSMENT, INTERVENTIONS AND CONTINUING PLAN FOR GOAL:  Pt was A/oriented x 3, was not using calling today but able to make needs known to staff. Denied pain. On cycle TF at 60ml/hr with 100ml flushes of water q 4hrs, tolerated well. PEG tube in place, dressing CDI. Encouraged to eat. PICC line intact and patent. Incon of BL/BM, on condom catheter, LBM-9/25/20. Faom dressing on coccyx changed. Will continue with current POC.

## 2020-09-26 NOTE — PROGRESS NOTES
"  [facility]   Acute Rehabilitation Unit  Weekend Progress Note    INTERVAL HISTORY:     No acute events overnight.    He continues to be dependent on nursing for transfers with heavy cares.  He continues to have difficulty making more than very short sentences with noted slurring of speech.  He denies headaches, shortness of breath, chest pain, or abdominal pain.    Functional status update:  OT: Able to follow one-step cues but requires max assist for bathing and mechanical lift for transfers with max assist.  _________________________________________________     OBJECTIVE:     Physical Exam:  /61 (BP Location: Left arm)   Pulse 78   Temp 97.3  F (36.3  C) (Oral)   Resp 18   Ht 1.753 m (5' 9\")   Wt 105.5 kg (232 lb 8 oz)   SpO2 96%   BMI 34.33 kg/m    GEN:               No acute distress, lying in bed comfortably  HEENT:           no conjunctival injection, moist mucous membranes  CV:                 Limbs are well perfused  PULM:            Breathing comfortably on room air  ABD:               soft, nondistended  EXT:               No overt edema  SKIN:              No rashes or bruises visualized on exposed skin  NEURO/MSK: Alert, speech dysarthria, right gaze preference, left field cut, does not move left upper or lower extremity against gravity  _________________________________________________  Pertinent Labs/Imaging:    Recent Labs   Lab Test 09/26/20  1146 09/26/20  0930 09/25/20  2109 09/25/20  1813   * 270* 247* 205*   ________________________________________________     ASSESSMENT:  Jorje Teran is a 70 year old  hand dominant male with Stroke Ischemic 01.1 (L) Body Involvement (R) Brain Stroke; L sided weakness due to MCA infarct, R MCA occlusion, and R carotid atherosclerosis.    Persistent speech dysarthria, left hemiparesis, and left neglect.     PLAN:  #Rehabilitation  - Continue current appropriate rehabilitation plan of care    Patient " was staffed with Dr. Kenneth Coello MD  Physical Medicine & Rehabilitation

## 2020-09-26 NOTE — PLAN OF CARE
Patient initially requiring full assistance for feeding.  Was then able to transition out to just being presented a loaded utensil.  With some verbal cues, patient did take a couple of independent bites that generally still benefiting from being presented with a loaded utensil.  Patient tolerating NDD-1 food textures and nectar thick liquids without any overt signs symptoms of aspiration or changes in vocal quality and oral residue staying at a consistent level.  Patient requiring high amount of verbal cues and reminders to utilize compensatory strategy of speaking louder in order to improve intelligibility.  Patient generally just intelligible at 1-2 word level.

## 2020-09-26 NOTE — PLAN OF CARE
OT: Th facilitated showering and dressing, pt able to follow 1-step cues but max assist for bathing, mechanical lift between bed and rolling/dependent shower chair and max assist rolling in bed to L/total assist to the R.  Max assist for UB dressing and total assist for LB dressing.  Pt able to use swab with set-up and cues. L sided, profound weakness persists but pt trying to assist with upright posture vs pushing the the L. Max cues to look to the L. Pt pleasant, grateful and conversational with occ cues to speak more loudly/with more effort. Con't OT per POC.

## 2020-09-26 NOTE — PLAN OF CARE
FOCUS/GOAL  Bowel management, Bladder management, Pain management and Cognition/Memory/Judgment/Problem solving    ASSESSMENT, INTERVENTIONS AND CONTINUING PLAN FOR GOAL:  Pt is alert and oriented, with slow garbled speech that requires extended time for pt to respond. Pt denied pain, sob, fever, chills, n/v, or new numbness and tingling. Grunting and coughing was audible when in pt room and out, cough was non productive. residual was 60 ml when checked at 230, pt being turned ever 2 hours, using condom cath overnight with effect, PEG dressing CDI, no further care concerns at this time continue with POC.

## 2020-09-27 ENCOUNTER — APPOINTMENT (OUTPATIENT)
Dept: PHYSICAL THERAPY | Facility: CLINIC | Age: 70
End: 2020-09-27
Payer: COMMERCIAL

## 2020-09-27 ENCOUNTER — APPOINTMENT (OUTPATIENT)
Dept: SPEECH THERAPY | Facility: CLINIC | Age: 70
End: 2020-09-27
Payer: COMMERCIAL

## 2020-09-27 ENCOUNTER — APPOINTMENT (OUTPATIENT)
Dept: OCCUPATIONAL THERAPY | Facility: CLINIC | Age: 70
End: 2020-09-27
Payer: COMMERCIAL

## 2020-09-27 PROBLEM — E66.812 CLASS 2 SEVERE OBESITY DUE TO EXCESS CALORIES WITH SERIOUS COMORBIDITY AND BODY MASS INDEX (BMI) OF 38.0 TO 38.9 IN ADULT (H): Status: ACTIVE | Noted: 2018-10-05

## 2020-09-27 PROBLEM — E78.5 HYPERLIPIDEMIA: Status: ACTIVE | Noted: 2020-09-27

## 2020-09-27 PROBLEM — G47.33 OBSTRUCTIVE SLEEP APNEA SYNDROME: Status: ACTIVE | Noted: 2019-04-22

## 2020-09-27 PROBLEM — E66.01 CLASS 2 SEVERE OBESITY DUE TO EXCESS CALORIES WITH SERIOUS COMORBIDITY AND BODY MASS INDEX (BMI) OF 38.0 TO 38.9 IN ADULT (H): Status: ACTIVE | Noted: 2018-10-05

## 2020-09-27 PROBLEM — E03.9 HYPOTHYROID: Status: ACTIVE | Noted: 2020-09-27

## 2020-09-27 LAB
GLUCOSE BLDC GLUCOMTR-MCNC: 245 MG/DL (ref 70–99)
GLUCOSE BLDC GLUCOMTR-MCNC: 280 MG/DL (ref 70–99)
GLUCOSE BLDC GLUCOMTR-MCNC: 309 MG/DL (ref 70–99)
GLUCOSE BLDC GLUCOMTR-MCNC: 339 MG/DL (ref 70–99)
GLUCOSE BLDC GLUCOMTR-MCNC: 341 MG/DL (ref 70–99)
GLUCOSE BLDC GLUCOMTR-MCNC: 365 MG/DL (ref 70–99)
GLUCOSE BLDC GLUCOMTR-MCNC: 397 MG/DL (ref 70–99)
PLATELET # BLD AUTO: 279 10E9/L (ref 150–450)

## 2020-09-27 PROCEDURE — 25000132 ZZH RX MED GY IP 250 OP 250 PS 637: Performed by: PHYSICAL MEDICINE & REHABILITATION

## 2020-09-27 PROCEDURE — 27210429 ZZH NUTRITION PRODUCT INTERMEDIATE LITER

## 2020-09-27 PROCEDURE — 25000128 H RX IP 250 OP 636: Performed by: PHYSICAL MEDICINE & REHABILITATION

## 2020-09-27 PROCEDURE — 97530 THERAPEUTIC ACTIVITIES: CPT | Mod: GP

## 2020-09-27 PROCEDURE — 85049 AUTOMATED PLATELET COUNT: CPT | Performed by: PHYSICAL MEDICINE & REHABILITATION

## 2020-09-27 PROCEDURE — 92526 ORAL FUNCTION THERAPY: CPT | Mod: GN

## 2020-09-27 PROCEDURE — 12800006 ZZH R&B REHAB

## 2020-09-27 PROCEDURE — 97112 NEUROMUSCULAR REEDUCATION: CPT | Mod: GO

## 2020-09-27 PROCEDURE — 97535 SELF CARE MNGMENT TRAINING: CPT | Mod: GO

## 2020-09-27 PROCEDURE — 97110 THERAPEUTIC EXERCISES: CPT | Mod: GP

## 2020-09-27 PROCEDURE — 97112 NEUROMUSCULAR REEDUCATION: CPT | Mod: GP

## 2020-09-27 PROCEDURE — 92507 TX SP LANG VOICE COMM INDIV: CPT | Mod: GN

## 2020-09-27 PROCEDURE — 00000146 ZZHCL STATISTIC GLUCOSE BY METER IP

## 2020-09-27 PROCEDURE — 36416 COLLJ CAPILLARY BLOOD SPEC: CPT | Performed by: PHYSICAL MEDICINE & REHABILITATION

## 2020-09-27 RX ADMIN — INSULIN ASPART 3 UNITS: 100 INJECTION, SOLUTION INTRAVENOUS; SUBCUTANEOUS at 21:39

## 2020-09-27 RX ADMIN — ASPIRIN 81 MG: 81 TABLET, COATED ORAL at 08:33

## 2020-09-27 RX ADMIN — METOPROLOL TARTRATE 50 MG: 50 TABLET, FILM COATED ORAL at 08:33

## 2020-09-27 RX ADMIN — NYSTATIN 500000 UNITS: 500000 SUSPENSION ORAL at 08:33

## 2020-09-27 RX ADMIN — INSULIN GLARGINE 12 UNITS: 100 INJECTION, SOLUTION SUBCUTANEOUS at 08:29

## 2020-09-27 RX ADMIN — INSULIN ASPART 3 UNITS: 100 INJECTION, SOLUTION INTRAVENOUS; SUBCUTANEOUS at 11:14

## 2020-09-27 RX ADMIN — INSULIN ASPART 5 UNITS: 100 INJECTION, SOLUTION INTRAVENOUS; SUBCUTANEOUS at 08:27

## 2020-09-27 RX ADMIN — FUROSEMIDE 40 MG: 20 TABLET ORAL at 17:16

## 2020-09-27 RX ADMIN — HEPARIN SODIUM 7500 UNITS: 10000 INJECTION INTRAVENOUS; SUBCUTANEOUS at 11:05

## 2020-09-27 RX ADMIN — MULTIPLE VITAMINS W/ MINERALS TAB 1 TABLET: TAB at 17:16

## 2020-09-27 RX ADMIN — METOPROLOL TARTRATE 50 MG: 50 TABLET, FILM COATED ORAL at 21:50

## 2020-09-27 RX ADMIN — AMLODIPINE BESYLATE 20 MG: 10 TABLET ORAL at 08:32

## 2020-09-27 RX ADMIN — LEVOTHYROXINE SODIUM 50 MCG: 25 TABLET ORAL at 08:33

## 2020-09-27 RX ADMIN — ATORVASTATIN CALCIUM 40 MG: 40 TABLET, FILM COATED ORAL at 08:33

## 2020-09-27 RX ADMIN — NYSTATIN 500000 UNITS: 500000 SUSPENSION ORAL at 11:05

## 2020-09-27 RX ADMIN — NYSTATIN 500000 UNITS: 500000 SUSPENSION ORAL at 17:16

## 2020-09-27 RX ADMIN — SACUBITRIL AND VALSARTAN 1 TABLET: 49; 51 TABLET, FILM COATED ORAL at 21:50

## 2020-09-27 RX ADMIN — FUROSEMIDE 40 MG: 20 TABLET ORAL at 08:33

## 2020-09-27 RX ADMIN — NYSTATIN 500000 UNITS: 500000 SUSPENSION ORAL at 21:52

## 2020-09-27 RX ADMIN — INSULIN GLARGINE 12 UNITS: 100 INJECTION, SOLUTION SUBCUTANEOUS at 21:41

## 2020-09-27 RX ADMIN — INSULIN ASPART 3 UNITS: 100 INJECTION, SOLUTION INTRAVENOUS; SUBCUTANEOUS at 17:43

## 2020-09-27 RX ADMIN — EZETIMIBE 10 MG: 10 TABLET ORAL at 08:33

## 2020-09-27 RX ADMIN — HEPARIN SODIUM 7500 UNITS: 10000 INJECTION INTRAVENOUS; SUBCUTANEOUS at 21:48

## 2020-09-27 RX ADMIN — VALSARTAN 320 MG: 160 TABLET ORAL at 08:33

## 2020-09-27 RX ADMIN — SACUBITRIL AND VALSARTAN 1 TABLET: 49; 51 TABLET, FILM COATED ORAL at 08:33

## 2020-09-27 NOTE — PLAN OF CARE
Patient tolerating trials of NDD-2 food textures and nectar thick liquids without any overt signs and symptoms of aspiration or change in vocal quality.  Also only noting minimal to mild oral residue.  Recommend diet advancement to NDD-2 food textures with nectar thick liquids.  Patient easily distracted during meal and requiring some more direct assistance to eat his meal. PM session, patient more fatigued and presenting with very limited intelligibility and not as good today being able to use louder voice despite verbal cues.

## 2020-09-27 NOTE — PLAN OF CARE
PT: HOB > 30 degrees. Pt did well with sitting balance and reaching activities in w/c. Pt is max A/total assistance for rolling, dependent for bed to w/c transfer. Pt able to transfer rooms to have a ceiling lift. Pt's /74. Pt limited by L sided weakness and L neglect.

## 2020-09-27 NOTE — PROGRESS NOTES
"  [facility]   Acute Rehabilitation Unit  Weekend Progress Note    INTERVAL HISTORY:     No acute events overnight.  His blood glucose continues to be high.  He feels like his speech and energy have slightly improved from yesterday.  Nursing reported heartburn with greasy foods and marinara, and diet was adjusted.    Functional status update:  PT: max assist / total assist for rolling, dependent for bed/wheelchair transfers.  SLP: NDD2 diet with nectar thick liquids  _________________________________________________     OBJECTIVE:     Physical Exam:  BP (!) 155/84   Pulse 82   Temp 96.8  F (36  C) (Oral)   Resp 20   Ht 1.753 m (5' 9\")   Wt 105.5 kg (232 lb 8 oz)   SpO2 94%   BMI 34.33 kg/m    GEN:               No acute distress, sitting up in chair  HEENT:           no conjunctival injection, moist mucous membranes  CV:                 Limbs are well perfused  PULM:            Breathing comfortably on room air  ABD:               soft, nondistended  EXT:               No overt edema  SKIN:              No rashes or bruises visualized on exposed skin  NEURO/MSK: Alert, speech dysarthria, right gaze preference but able to overcome with max cuing  _________________________________________________  Pertinent Labs/Imaging:  CBC:   Recent Labs   Lab Test 09/27/20  0902  09/19/20  0635   WBC  --   --  9.5   RBC  --   --  3.67*   HGB  --   --  11.2*   MCV  --   --  92   MCH  --   --  30.5   MCHC  --   --  33.0   RDW  --   --  12.5      < > 174    < > = values in this interval not displayed.     Recent Labs   Lab Test 09/27/20  1108 09/27/20  0823 09/27/20  0752 09/27/20  0616   * 341* 397* 339*     _________________________________________________     ASSESSMENT:  Jorje Teran is a 70 year old  hand dominant male with Stroke Ischemic 01.1 (L) Body Involvement (R) Brain Stroke; L sided weakness due to MCA infarct, R MCA occlusion, and R carotid " atherosclerosis.    His blood glucose control is currently poor secondary to his needs for tube feeding.  His A1C was 7.5, indicating that his non-insulin home control was not optimized.     PLAN:  #Rehabilitation  - Continue current appropriate rehabilitation plan of care    #DM2  - Endocrine consult for diabetes management and education    Patient was staffed with Dr. Kenneth Coello MD  Physical Medicine & Rehabilitation

## 2020-09-27 NOTE — PLAN OF CARE
FOCUS/GOAL  Bowel management, Bladder management, and Skin integrity    ASSESSMENT, INTERVENTIONS AND CONTINUING PLAN FOR GOAL:  Pt was A/O x3, able to use call light and made needs known to staff. Denied pain. On cycle TF at 60ml/hr with water flushes every 4 hrs, pt tolerated well. Gastric tube in place, patent, dressing CDI. Incontinent of Bl/BM, LBM-9/26/20, loose, endorse to NOC nurse for monitoring of BM. Repositioned q 2hrs, alarms on. Will continue with current POC.

## 2020-09-27 NOTE — PROGRESS NOTES
Pt is alert and oriented to self, place, situation, disoriented to date. Pt has slow and slurred speech. Changed pt room from 505 to 502. AS2 Liko lift for transfers. Pt is on cycle tube feeds stopped at 0800 this am. Pt was not hungry for breakfast and does not like the gravy, only ate a couple bites. I called dietary and they said they have to put gravy on per NDD1 and DD2. Pt changed to DD2 diet today by speech therapy. Pt is still having loose stools, has condom catheter to be used PRN. I took off for this AM. BG high on shift and doctor consulted endocrine. Heels elevated off bed. Continue to monitor POC.

## 2020-09-27 NOTE — PLAN OF CARE
FOCUS/GOAL  Medical management    ASSESSMENT, INTERVENTIONS AND CONTINUING PLAN FOR GOAL:  Pt is alert, unable to assess orientation due to severe aphasia. Unable to express needs, encouragement needed. Inappropriate words used in response to writer. Difficulty in word finding. No verbal and non-verbal signs of pain. Is incontinent in bowel and of bladder with a condom catheter on at night. A moderate assist of two with rolling from side to side in bed. PICC line on right arm with intact dressing on. With a PEG tube on continuous feeding at 60 ml/hr from 6pm to 8am, patent. Charge nurse updated PMR of BG in the evenings and is now on BG monitoring every 4 hours. To update PMR if BG is >400. Will continue to monitor.

## 2020-09-27 NOTE — SIGNIFICANT EVENT
Significant Event Note    Time of event: 12:27 AM September 27, 2020    Description of event:  Nursing noted that patient was on tube feeds with an order for limited daily glucose monitoring only, and his FSG levels have been climbing in the 300s    Plan:  Changed FSG order to Q4H (used subcutaneous insulin order set)  Instructed nursing to page me if FSG > 400  If the patient is not eating, adjust ISS for NPO tube feeds    Janak Coello MD

## 2020-09-28 ENCOUNTER — APPOINTMENT (OUTPATIENT)
Dept: SPEECH THERAPY | Facility: CLINIC | Age: 70
End: 2020-09-28
Payer: COMMERCIAL

## 2020-09-28 ENCOUNTER — APPOINTMENT (OUTPATIENT)
Dept: PHYSICAL THERAPY | Facility: CLINIC | Age: 70
End: 2020-09-28
Payer: COMMERCIAL

## 2020-09-28 ENCOUNTER — APPOINTMENT (OUTPATIENT)
Dept: OCCUPATIONAL THERAPY | Facility: CLINIC | Age: 70
End: 2020-09-28
Payer: COMMERCIAL

## 2020-09-28 LAB
GLUCOSE BLDC GLUCOMTR-MCNC: 273 MG/DL (ref 70–99)
GLUCOSE BLDC GLUCOMTR-MCNC: 303 MG/DL (ref 70–99)
GLUCOSE BLDC GLUCOMTR-MCNC: 311 MG/DL (ref 70–99)
GLUCOSE BLDC GLUCOMTR-MCNC: 316 MG/DL (ref 70–99)
GLUCOSE BLDC GLUCOMTR-MCNC: 374 MG/DL (ref 70–99)
GLUCOSE BLDC GLUCOMTR-MCNC: 374 MG/DL (ref 70–99)

## 2020-09-28 PROCEDURE — 25000132 ZZH RX MED GY IP 250 OP 250 PS 637: Performed by: PHYSICAL MEDICINE & REHABILITATION

## 2020-09-28 PROCEDURE — 92526 ORAL FUNCTION THERAPY: CPT | Mod: GN

## 2020-09-28 PROCEDURE — 12800006 ZZH R&B REHAB

## 2020-09-28 PROCEDURE — 97110 THERAPEUTIC EXERCISES: CPT | Mod: GP | Performed by: PHYSICAL THERAPIST

## 2020-09-28 PROCEDURE — 92526 ORAL FUNCTION THERAPY: CPT | Mod: GN | Performed by: SPEECH-LANGUAGE PATHOLOGIST

## 2020-09-28 PROCEDURE — 25000131 ZZH RX MED GY IP 250 OP 636 PS 637: Performed by: NURSE PRACTITIONER

## 2020-09-28 PROCEDURE — 97530 THERAPEUTIC ACTIVITIES: CPT | Mod: GP | Performed by: PHYSICAL THERAPIST

## 2020-09-28 PROCEDURE — 97112 NEUROMUSCULAR REEDUCATION: CPT | Mod: GO | Performed by: OCCUPATIONAL THERAPIST

## 2020-09-28 PROCEDURE — 97535 SELF CARE MNGMENT TRAINING: CPT | Mod: GO | Performed by: OCCUPATIONAL THERAPIST

## 2020-09-28 PROCEDURE — 27210429 ZZH NUTRITION PRODUCT INTERMEDIATE LITER

## 2020-09-28 PROCEDURE — 97530 THERAPEUTIC ACTIVITIES: CPT | Mod: GO | Performed by: OCCUPATIONAL THERAPIST

## 2020-09-28 PROCEDURE — 25000128 H RX IP 250 OP 636: Performed by: PHYSICAL MEDICINE & REHABILITATION

## 2020-09-28 PROCEDURE — 00000146 ZZHCL STATISTIC GLUCOSE BY METER IP

## 2020-09-28 PROCEDURE — 92507 TX SP LANG VOICE COMM INDIV: CPT | Mod: GN | Performed by: SPEECH-LANGUAGE PATHOLOGIST

## 2020-09-28 RX ORDER — NICOTINE POLACRILEX 4 MG
15-30 LOZENGE BUCCAL
Status: DISCONTINUED | OUTPATIENT
Start: 2020-09-28 | End: 2020-10-23 | Stop reason: HOSPADM

## 2020-09-28 RX ORDER — FAMOTIDINE 20 MG/1
20 TABLET, FILM COATED ORAL 2 TIMES DAILY
Status: DISCONTINUED | OUTPATIENT
Start: 2020-09-28 | End: 2020-10-16

## 2020-09-28 RX ORDER — DEXTROSE MONOHYDRATE 25 G/50ML
25-50 INJECTION, SOLUTION INTRAVENOUS
Status: DISCONTINUED | OUTPATIENT
Start: 2020-09-28 | End: 2020-10-23 | Stop reason: HOSPADM

## 2020-09-28 RX ADMIN — NYSTATIN 500000 UNITS: 500000 SUSPENSION ORAL at 12:49

## 2020-09-28 RX ADMIN — METOPROLOL TARTRATE 50 MG: 50 TABLET, FILM COATED ORAL at 08:42

## 2020-09-28 RX ADMIN — NYSTATIN 500000 UNITS: 500000 SUSPENSION ORAL at 08:41

## 2020-09-28 RX ADMIN — INSULIN GLARGINE 12 UNITS: 100 INJECTION, SOLUTION SUBCUTANEOUS at 09:01

## 2020-09-28 RX ADMIN — FUROSEMIDE 40 MG: 20 TABLET ORAL at 08:42

## 2020-09-28 RX ADMIN — INSULIN ASPART 5 UNITS: 100 INJECTION, SOLUTION INTRAVENOUS; SUBCUTANEOUS at 09:00

## 2020-09-28 RX ADMIN — HEPARIN SODIUM 7500 UNITS: 10000 INJECTION INTRAVENOUS; SUBCUTANEOUS at 11:30

## 2020-09-28 RX ADMIN — EZETIMIBE 10 MG: 10 TABLET ORAL at 08:42

## 2020-09-28 RX ADMIN — INSULIN HUMAN 20 UNITS: 100 INJECTION, SUSPENSION SUBCUTANEOUS at 21:53

## 2020-09-28 RX ADMIN — LEVOTHYROXINE SODIUM 50 MCG: 25 TABLET ORAL at 08:42

## 2020-09-28 RX ADMIN — NYSTATIN 500000 UNITS: 500000 SUSPENSION ORAL at 21:37

## 2020-09-28 RX ADMIN — HEPARIN SODIUM 7500 UNITS: 10000 INJECTION INTRAVENOUS; SUBCUTANEOUS at 21:37

## 2020-09-28 RX ADMIN — INSULIN ASPART 4 UNITS: 100 INJECTION, SOLUTION INTRAVENOUS; SUBCUTANEOUS at 12:00

## 2020-09-28 RX ADMIN — FUROSEMIDE 40 MG: 20 TABLET ORAL at 16:22

## 2020-09-28 RX ADMIN — METOPROLOL TARTRATE 50 MG: 50 TABLET, FILM COATED ORAL at 21:37

## 2020-09-28 RX ADMIN — ASPIRIN 81 MG: 81 TABLET, COATED ORAL at 08:42

## 2020-09-28 RX ADMIN — NYSTATIN 500000 UNITS: 500000 SUSPENSION ORAL at 16:25

## 2020-09-28 RX ADMIN — SACUBITRIL AND VALSARTAN 1 TABLET: 49; 51 TABLET, FILM COATED ORAL at 21:37

## 2020-09-28 RX ADMIN — AMLODIPINE BESYLATE 20 MG: 10 TABLET ORAL at 08:42

## 2020-09-28 RX ADMIN — SACUBITRIL AND VALSARTAN 1 TABLET: 49; 51 TABLET, FILM COATED ORAL at 08:41

## 2020-09-28 RX ADMIN — FAMOTIDINE 20 MG: 20 TABLET ORAL at 14:00

## 2020-09-28 RX ADMIN — VALSARTAN 320 MG: 160 TABLET ORAL at 08:41

## 2020-09-28 RX ADMIN — MULTIPLE VITAMINS W/ MINERALS TAB 1 TABLET: TAB at 18:04

## 2020-09-28 RX ADMIN — FAMOTIDINE 20 MG: 20 TABLET ORAL at 21:37

## 2020-09-28 RX ADMIN — ATORVASTATIN CALCIUM 40 MG: 40 TABLET, FILM COATED ORAL at 08:41

## 2020-09-28 ASSESSMENT — MIFFLIN-ST. JEOR: SCORE: 1798.18

## 2020-09-28 NOTE — PROGRESS NOTES
Calorie Counts    9/25: 1482 kcal and 56 g protein (3 meals, 3 Magic Cups)  9/26: 1448 kcal and 40 g protein (3 meals, 2 Magic Cups)  9/27: 878 kcal and 33 g protein (3 meals, 1 Magic Cup)    3 day average PO intake = 1269 kcal (63% minimum energy needs) and 43 g protein (53% minimum protein needs)      Jonna Miller RD, ELVIRA  [facility] Pager: [phone]

## 2020-09-28 NOTE — PLAN OF CARE
"SLP: worked on phrase repetition, focusing on \"big/loud\" increased volume, moving mouth more. with slight improved speech.  conversational speech often not understood.   RN gave pills crushed, swabbed tongue for thrush, had coughing episode few minutes later, also had few sips nectar earlier in session.  Unsure if pt had delayed response to small amount po, secretions. Noting some difficulty initiating/producing cough   "

## 2020-09-28 NOTE — CONSULTS
"Diabetes/Hyperglycemia Management Consult    Chief Complaint \"poor FSG control in Type II diabetic with tube feeding post stroke.   Consult requested by: Janak Coello MD    History of Present Illness:  Stroke Ischemic 01.1 (L) Body Involvement (R) Brain Stroke; L sided weakness due to MCA infarct, R MCA occlusion, and R carotid atherosclerosis     HISTORY OF PRESENT ILLNESS  Jorje Teran is a 71 y/o male with past medical history of HTN, HLD, DM II, SHELDON, hypothyroidism, and morbid obesity who was found down by his son after not hearing from his father throughout the day. The patient was unable to move his L side, and EMS was activated. Pt was found to have a R MCA infarct and R carotid atherosclerosis. The patient was not appropriate for tPA or endovascular treatment as the infarct was already established. His hospital course is notable for dysphagia, BP management needs, hypernatremia (resolved), DM II management, and extensive therapies. He had PEG placed on 9/22/2020, and is now medically ready for discharge to [facility] for intensive therapies, ongoing medical management, and rehabilitative nursing care.  Patient requires an intensive inpatient rehab program to address the following acute impairments:impaired ROM, impaired strength, impaired activity tolerance, impaired tone, impaired balance, impaired coordination, impaired cognition, impaired judgement and safety awareness, impulsiveness, impaired weight shifting, dysphagia, aphasia and dysarthria  Admitted to ARU 9/24/2020.    Diabetes:    Patient diagnosed with type 2 diabetes mellitus approximately 1.5 years ago.  Chart review reveals a first elevated A1c of 6.9% 9/12/2019.   He denies starting on medications and has been diet controlled PTA.  He was given a glucometer per PCP noted however did not routinely check BG. A1c on 7/29 was 7.1% slightly up from previous of 6.9% 9/12/2019.  Family hx:  Son with diabetes, no other family members listed.  BG not " "currently well controlled.  Blood sugars have been difficult to control with sugars in the 300s while on TF   - previously on insulin gtt, since tube feeds were started his blood sugars were in the 150-200 range, and adjust insulin as needed.      BG at time of consult:  311  Regimen:  Lantus 12 units BID  Novolog sliding scale insulin    Confounding factors:  Tube feedings, no steroids or dextrose fluids    Recent Labs   Lab 09/28/20  1116 09/28/20  0811 09/28/20  0608 09/28/20  0204 09/27/20  2128 09/27/20  1739   * 374* 374* 316* 309* 245*       Diabetes Type:   Type 2 Diabetes  Diabetes Duration: Diagnosed 9/2019  (1 year)    Usual Diabetes Regimen:   No regular BG monitoring at home  No restricted diet  No diabetic medications PTA - diet controlled    Ability to [place] Prescribed Regimen: s/p stroke with sig L-sided deficit    Diabetes Control:   Lab Results   Component Value Date    A1C 7.5 09/10/2020     Diabetes Complications: no retinopathy, no peripheral neuropathy, no nephropathy    History of DKA:   No   Able to Detect Hypoglycemia: no - has not happened    Usual Diabetes Care Provider: PCP Dr. Esparza, Susana Serrano MD of [facility]    Factors Impacting Glucose Control: Tube feedings    Review of Systems  Constitutional:  No fever, no chills  ENT/Mouth:  no hearing changes, no ear pain, no sore throat, no rhinorrhea, no difficulty swallowing - intermittent coughing  Eyes:  no eye pain, no discharge, vision limited to L side  CV:  No CP/SOB, no new edema  Resp:  No cough, no wheezing  GI:  No nausea, no vomiting, denies constipation, denies diarrhea  : denies  Musk:  No joint swelling/pain, back pain - intermittent \"from laying in bed too much\"  Skin/heme:  No new rashes/bruises/open areas.  No pruritis  Neuro:  No new weakness, denies numbness/tingling, (per note noted hx of ulnar neuropathy) no headache  Psych:  No new anxiety, denies depression  Endocrine:  No polyuria, no " "polydipsia    Past medical, family and social histories are reviewed and updated.    Past Medical History  No past medical history on file.    Family History  No family history on file.    Social History  Social History     Socioeconomic History     Marital status:      Spouse name: Not on file     Number of children: Not on file     Years of education: Not on file     Highest education level: Not on file   Occupational History     Not on file   Social Needs     Financial resource strain: Not on file     Food insecurity     Worry: Not on file     Inability: Not on file     Transportation needs     Medical: Not on file     Non-medical: Not on file   Tobacco Use     Smoking status: Not on file   Substance and Sexual Activity     Alcohol use: Yes     Drug use: Not on file     Sexual activity: Not on file   Lifestyle     Physical activity     Days per week: Not on file     Minutes per session: Not on file     Stress: Not on file   Relationships     Social connections     Talks on phone: Not on file     Gets together: Not on file     Attends Rastafarian service: Not on file     Active member of club or organization: Not on file     Attends meetings of clubs or organizations: Not on file     Relationship status: Not on file     Intimate partner violence     Fear of current or ex partner: Not on file     Emotionally abused: Not on file     Physically abused: Not on file     Forced sexual activity: Not on file   Other Topics Concern     Not on file   Social History Narrative     Not on file       Physical Exam  /64 (BP Location: Left arm)   Pulse 78   Temp 98.1  F (36.7  C) (Oral)   Resp 20   Ht 1.753 m (5' 9\")   Wt 105.3 kg (232 lb 1.6 oz)   SpO2 95%   BMI 34.28 kg/m      General: pleasant, in no acute distress.  Laying in bed.  HEENT: NC/AT. MMM, sclera not injected  Lungs: unlabored, no cough, no SOB  ABD: rounded, soft, obese - non-tender - no rigidity or rebound  Skin: warm and dry, no obvious " lesions  Feet: skin intact. Strong PP, warm.   MSK:  moving R-sided extremities, L deficit  Lymp:  trace LE edema  Mental Status: Alert and oriented x2  Psych:  Cooperative, good eye contact    Laboratory  Recent Labs   Lab Test 09/21/20  0620 09/20/20  0630    140   POTASSIUM 4.1 4.0   CHLORIDE 111* 110*   CO2 25 25   ANIONGAP 5 5   * 165*   BUN 21 22   CR 0.78 0.76   SELENA 8.6 8.5     CBC RESULTS:   Recent Labs   Lab Test 09/27/20  0902  09/19/20  0635   WBC  --   --  9.5   RBC  --   --  3.67*   HGB  --   --  11.2*   HCT  --   --  33.9*   MCV  --   --  92   MCH  --   --  30.5   MCHC  --   --  33.0   RDW  --   --  12.5      < > 174    < > = values in this interval not displayed.       Liver Function Studies -   Recent Labs   Lab Test 09/07/20  2355   PROTTOTAL 7.4   ALBUMIN 3.6   BILITOTAL 0.9   ALKPHOS 67   AST 23   ALT 28       Active Medications  Current Facility-Administered Medications   Medication     amLODIPine (NORVASC) tablet 20 mg     aspirin EC tablet 81 mg     atorvastatin (LIPITOR) tablet 40 mg     bacitracin ointment     bisacodyl (DULCOLAX) Suppository 10 mg     carboxymethylcellulose PF (REFRESH PLUS) 0.5 % ophthalmic solution 2 drop     dextrose 10% infusion     glucose gel 15-30 g    Or     dextrose 50 % injection 25-50 mL    Or     glucagon injection 1 mg     ezetimibe (ZETIA) tablet 10 mg     furosemide (LASIX) tablet 40 mg     heparin ANTICOAGULANT injection 7,500 Units     insulin aspart (NovoLOG) injection (RAPID ACTING)     insulin aspart (NovoLOG) injection (RAPID ACTING)     insulin aspart (NovoLOG) injection (RAPID ACTING)     insulin aspart (NovoLOG) injection (RAPID ACTING)     insulin isophane human (HumuLIN N PEN) injection 10 Units     [START ON 9/29/2020] insulin isophane human (HumuLIN N PEN) injection 15 Units     levothyroxine (SYNTHROID/LEVOTHROID) tablet 50 mcg     metoprolol tartrate (LOPRESSOR) tablet 50 mg     miconazole (MICATIN) 2 % powder      multivitamin w/minerals (THERA-VIT-M) tablet 1 tablet     nitroGLYcerin (NITROSTAT) sublingual tablet 0.4 mg     nystatin (MYCOSTATIN) suspension 500,000 Units     Patient is already receiving anticoagulation with heparin, enoxaparin (LOVENOX), warfarin (COUMADIN)  or other anticoagulant medication     polyethylene glycol (MIRALAX) Packet 17 g     sacubitril-valsartan (ENTRESTO) 49-51 MG per tablet 1 tablet     sodium chloride (PF) 0.9% PF flush 10 mL     sodium chloride (PF) 0.9% PF flush 10-20 mL     traZODone (DESYREL) tablet 50 mg     valsartan (DIOVAN) tablet 320 mg     No current outpatient medications on file.       Current Diet  Orders Placed This Encounter      Combination Diet Dysphagia Diet Level 2: Mechan Altered; Nectar Thickened Liquids (pre-thickened or use instant food thickener)      Assessment:    1)  Type 2 Diabetes Mellitus; poorly controlled - A1c 7.1%. Worsened by tube feedings.    2)  Tube feeding (starts 1800 - off 0800)    3)  Obesity;  BMI 34.28    Plan:      -  Start NPH BID     -  AM NPH 15 units    -  PM NPH 20 units today, will likely increase to 25 units tomorrow (has Lantus onboard today)     -  Novolog 1:12 g CHO TID AC, HS    -  Novolog 1 per 30 correction sliding scale TID AC, HS AND 0200    -  BG monitoring TID AC, HS and 0200    -  Hypoglycemia protocol    -  Will follow daily    CADE Sahu CNP   Inpatient Diabetes Management Service  Pager - [phone]  Diabetes Management Team job code: [id number]      I spent a total of 80 minutes bedside and on the inpatient unit managing glycemic care.  Over 50% of my time on the unit was spent counseling the patient and/or coordinating care regarding acute hyperglycemic management.  See note for details.

## 2020-09-28 NOTE — PLAN OF CARE
FOCUS/GOAL  Bowel management, Bladder management, and Wound care management    ASSESSMENT, INTERVENTIONS AND CONTINUING PLAN FOR GOAL:  Pt was A/O x 3, did not use call light this shift but able to make needs known to staff. Denied pain when asked. On cycle TF at 60ml/hr with 100ml water flushes q 4hrs, pt tolerated well. PEG tube dressing done. Incont ob BL/BM, LBM-9/27/20, loose, small. PICC line removed by  Chi) at 2100. BG monitored. Alarms on. Will continue with current POC.

## 2020-09-28 NOTE — PROGRESS NOTES
[facility]   Acute Rehabilitation Unit  Daily progress note  CC:   Stroke Ischemic 01.1 (L) Body Involvement (R) Brain Stroke; L sided weakness due to MCA infarct, R MCA occlusion, and R carotid atherosclerosis    The patient currently performs bed mobility with Max A x 2 and bed rail. Once sitting at EOB the pt is able to sit with anywhere from SBA/CGA, to Mod A x 2 depending on fatigue and task. The patient is able to use Gretchen Stedy with Max A x 2, however unable to achieve a full stand. The patient is currently dependent on mechanical lift for bed to and from chair. The patient's alertness and participation is improving and he will tolerate intensive therapies in the ARC setting. He is able to follow 2 step directions with 85%, and intelligibility with short 2-3 word phrases needs mod/max cues to increase breath support    S:   Speaking better. Dense left hemiplegia, left field cut +. On TF. BS higher. Endo following.    IS and Pneumoboots added.    ROS: Denies HA, nausea, pain. Abigail +      [unfilled]   Scheduled meds    amLODIPine  20 mg Oral Daily     aspirin  81 mg Oral Daily     atorvastatin  40 mg Oral Daily     ezetimibe  10 mg Oral Daily     furosemide  40 mg Oral BID     heparin ANTICOAGULANT  7,500 Units Subcutaneous Q12H     insulin aspart   Subcutaneous TID w/meals     insulin aspart  1-6 Units Subcutaneous TID AC     insulin aspart  1-6 Units Subcutaneous At Bedtime     insulin isophane human  10 Units Subcutaneous QPM     [START ON 9/29/2020] insulin isophane human  15 Units Subcutaneous QAM     levothyroxine  50 mcg Oral Daily     metoprolol tartrate  50 mg Oral BID     multivitamin w/minerals  1 tablet Oral Daily     nystatin  500,000 Units Swish & Spit 4x Daily     sacubitril-valsartan  1 tablet Oral BID     sodium chloride (PF)  10 mL Intracatheter Q7 Days     valsartan  320 mg Oral Daily       PRN meds:  bacitracin, bisacodyl, carboxymethylcellulose  "PF, dextrose, glucose **OR** dextrose **OR** glucagon, insulin aspart, miconazole, nitroGLYcerin, - MEDICATION INSTRUCTIONS -, polyethylene glycol, sodium chloride (PF), traZODone      PHYSICAL EXAM  /64 (BP Location: Left arm)   Pulse 78   Temp 98.1  F (36.7  C) (Oral)   Resp 20   Ht 1.753 m (5' 9\")   Wt 105.3 kg (232 lb 1.6 oz)   SpO2 95%   BMI 34.28 kg/m    Alert oriented x3  Respiratory: Clear to auscultation bilaterally, no crackles or wheezing  Cardiovascular: S1 and S2, RRR, and no murmur noted  GI: soft, non-distended, non-tender. G tube + BS +  Skin/Integumen: No rashes, trace lower extremity edema noted  Neuro : Left lower Facial droop + Left Field cut +  Dense left-sided hemiplegia with neglect noted  Toes mute  Vocalizes, low volume   Extremities. Moves right side well. Edema +  LABS  Last Comprehensive Metabolic Panel:  Sodium   Date Value Ref Range Status   09/21/2020 141 133 - 144 mmol/L Final     Potassium   Date Value Ref Range Status   09/21/2020 4.1 3.4 - 5.3 mmol/L Final     Chloride   Date Value Ref Range Status   09/21/2020 111 (H) 94 - 109 mmol/L Final     Carbon Dioxide   Date Value Ref Range Status   09/21/2020 25 20 - 32 mmol/L Final     Anion Gap   Date Value Ref Range Status   09/21/2020 5 3 - 14 mmol/L Final     Glucose   Date Value Ref Range Status   09/21/2020 178 (H) 70 - 99 mg/dL Final     Urea Nitrogen   Date Value Ref Range Status   09/21/2020 21 7 - 30 mg/dL Final     Creatinine   Date Value Ref Range Status   09/21/2020 0.78 0.66 - 1.25 mg/dL Final     GFR Estimate   Date Value Ref Range Status   09/21/2020 >90 >60 mL/min/[1.73_m2] Final     Comment:     Non  GFR Calc  Starting 12/18/2018, serum creatinine based estimated GFR (eGFR) will be   calculated using the Chronic Kidney Disease Epidemiology Collaboration   (CKD-EPI) equation.       Calcium   Date Value Ref Range Status   09/21/2020 8.6 8.5 - 10.1 mg/dL Final        CBC RESULTS:   Recent Labs "   Lab Test 09/22/20  0629 09/19/20  0635   WBC  --  9.5   RBC  --  3.67*   HGB  --  11.2*   HCT  --  33.9*   MCV  --  92   MCH  --  30.5   MCHC  --  33.0   RDW  --  12.5    174      Glucose Values Latest Ref Rng & Units 9/27/2020 9/27/2020 9/27/2020 9/28/2020 9/28/2020 9/28/2020 9/28/2020   Bedside Glucose (mg/dl )  - -- -- -- -- -- -- --   GLUCOSE 70 - 99 mg/dL 280(H) 245(H) 309(H) 316(H) 374(H) 374(H) 311(H)   Some recent data might be hidden     ASSESSMENT AND PLAN    Jorje Teran is a 70 year old  hand dominant male with Stroke Ischemic 01.1 (L) Body Involvement (R) Brain Stroke; L sided weakness due to MCA infarct, R MCA occlusion, and R carotid atherosclerosis  1. PT, OT and SLP 60 minutes of each on a daily basis, in addition to rehab nursing and close management of physiatrist.       2. Impairment of ADL's: OT for 60 minutes daily to work on ADL re-training such as grooming, self cares and bathing.       3. Impairment of mobility:  PT for 60 minutes daily to work on neuromuscular re-education focusing on strength, balance, coordination, and endurance.       4. Impairment of cognition/language/swallow:  SLP for 60 minutes daily to work on cognitive and linguistic deficits.     1. Rehab RN for med administration, bowel regimen, glucose monitoring and wound care.  2.   3. Medical Conditions  CVA with dense left hemiparesis  CT with filling defect within the distal R ICA extending into the R MCA and occlusion of the R MCA at the M1 segment. CTA with severe plaquing at R carotid bifurcation with filling defect in the prox R ICA suggestive of thrombus contributing to approximately 80% stenosis. 60% stenosis of the proximal L ICA. TTE with bubble -> Normal left ventricular size and function. Left ventricular ejection fraction of 55-60%. No segmental wall motion abnormalities noted.   - Lipids 7/29/20- LDL 78, HDL 56, , T chol 164, hemoglobin A1c 7.1% H, TSH is 2.05.  - remains with  significant dysarthria, facial droop and dense left sided hemiparesis  - ASA 81 mg daily PO/IA  - atorvastatin 40 mg daily, Zetia 10 mg po daily  - BP management as below; - goal for SBPs < 160       Dysphagia  - currently on DD1 with nectar thickened liquids   -PEG tube to be placed on 9/22, currently patient is on tube feeding  If questions or problems arise regarding tube function (e.g. leaking, dislodges, etc.) Contact Interventional Radiology department 24 hours a day.   For procedures that were done at [facility]:  8 AM - 4 PM Monday through Friday Contact   [phone]  For afterhours and weekends: [phone]   Ask for the Interventional Radiologist on call.   BS high. Joaquín following.  CAD s/p CABG x 3, x 1 in 2015  HTN continue Norvasc /Entresto / Metoprolol 50 mg BID and Valsartan   HLD: On Lipitor  Hypernatremia- resolved     DM II  Recent A1C at 7.1 7/2020.  BG goal is < 180.- started on lantus Endo following.  [facility] at [facility] negative for DVT     SHELDON  - states he could not sleep with CPAP as outpatient  - he would benefit for treating SHELDON  - CPAP ordered last night but he did not sleep well     Hypothyroidism  - resumed PTA Levothyroxine 50 mcg daily  TSH checked 7/29/2020 at 2.05     Morbid obesity  BMI noted  7/202 at ~38. Will need to encourage healthy lifestyle and weight loss with recovery     Diet: Snacks/Supplements Adult: Other; thickened smoothie with meals  (RD); With Meals  Calorie Counts     DVT Prophylaxis: Pneumatic Compression Devices  Code Status: Full Code      2. Adjustment to disability:  Clinical psychology to eval and treat when ready  3. FEN: On NDD 1 [place] +TF  4. Bowel: Javed meds  5. Bladder: Monitor  6. GI Prophylaxis:   7. Code: Full  8. Disposition: Home with family and home care  9. ELOS:  28 days  10. Rehab prognosis:  Fair  11. Follow up Appointments on Discharge: The following labs/tests are recommended: cbc ,basic metabolic panel in 4-5 days, also get mag and phos  in 3-4 days .need follow up with neurology as scheduled. Follows with Dr. Vincent with [facility].      Sudarshan Fuentes MD   Physical Medicine & Rehabilitation

## 2020-09-28 NOTE — PLAN OF CARE
Patient generally requiring full assistance in order to eat his meal.  Tolerating NDD-2 food textures without difficulties.  Did have a hard cough x1 but was with a puréed texture.  When taking drinks of nectar thick liquids, patient taking very very small sips of 1 to 2 mL per sip.  Patient provided with max verbal cues to take larger swallows but still only taking very small sips.  Seem to go faster when doing it via teaspoon which is 3 to 5 ml.

## 2020-09-28 NOTE — PROGRESS NOTES
Writer removed triple lumen PICC (45cm) from RUE per order.  Patient tolerated well, pressure applied, minimal bleeding and dressing was applied to cover and protect.

## 2020-09-28 NOTE — PLAN OF CARE
FOCUS/GOAL  Bowel management, Bladder management, Pain management and Cognition/Memory/Judgment/Problem solving    ASSESSMENT, INTERVENTIONS AND CONTINUING PLAN FOR GOAL:  Pt is alert and oriented, aphasic with slow speech and extended response times. Left sided facial droop present in unison with left sided hemiparesis, Pt denies SOB, fever, chills, dizziness, n/v, or new numbness and tingling but needed assistance with suction due to oral secretions, cyclic TF from 6-8am running without issue through PEG overnight. Traditional condom catheter replaced with flower pedal condom cath after falling off leading to urinary incontinence overnight. Incontinence of bowl movement occurring at the same time, dressing on sacrum changed post incontinent bowel movement, no further care concerns at this time continue with POC.

## 2020-09-28 NOTE — PLAN OF CARE
OT Mod A H/G using R UE with some [place] assist seated at sink. Tolerating sitting up in wheelchair with L lap tray in place. Needs adjustment of L UE at times when patient attempts to reposition self in the chair and arm dangles, unless supervised.

## 2020-09-28 NOTE — PLAN OF CARE
FOCUS/GOAL  Bowel management, Bladder management, Nutrition/Feeding/Swallowing precautions, and Medication management    ASSESSMENT, INTERVENTIONS AND CONTINUING PLAN FOR GOAL:  Pt is alert and oriented x4, has occasional expressive aphasia. Does not call to make needs known, but can answer questions when asked. Condom catheter removed during day, urine very cloudy so sticky note left for MD. Pt is asymptomatic. Has incont BM today. Endocrinology met with pt today and modified insulin orders including increasing insulin doses and adding NPH insulin. Pt needing assist with meals. Left field cut limiting pt still. No complaints of pain.

## 2020-09-28 NOTE — PLAN OF CARE
Discharge Planner PT   Patient plan for discharge: home w/ hired assist  Current status: lift and w/c dependent  Barriers to return to prior living situation: dense (L) hemiplegia  Recommendations for discharge: ongoing IP care  Rationale for [place]s: pt presentation       Entered by: Becky Sharma 09/28/2020 5:10 PM

## 2020-09-29 ENCOUNTER — APPOINTMENT (OUTPATIENT)
Dept: SPEECH THERAPY | Facility: CLINIC | Age: 70
End: 2020-09-29
Payer: COMMERCIAL

## 2020-09-29 ENCOUNTER — APPOINTMENT (OUTPATIENT)
Dept: PHYSICAL THERAPY | Facility: CLINIC | Age: 70
End: 2020-09-29
Payer: COMMERCIAL

## 2020-09-29 ENCOUNTER — APPOINTMENT (OUTPATIENT)
Dept: OCCUPATIONAL THERAPY | Facility: CLINIC | Age: 70
End: 2020-09-29
Payer: COMMERCIAL

## 2020-09-29 LAB
GLUCOSE BLDC GLUCOMTR-MCNC: 249 MG/DL (ref 70–99)
GLUCOSE BLDC GLUCOMTR-MCNC: 272 MG/DL (ref 70–99)
GLUCOSE BLDC GLUCOMTR-MCNC: 298 MG/DL (ref 70–99)
GLUCOSE BLDC GLUCOMTR-MCNC: 299 MG/DL (ref 70–99)
GLUCOSE BLDC GLUCOMTR-MCNC: 321 MG/DL (ref 70–99)
GLUCOSE BLDC GLUCOMTR-MCNC: 323 MG/DL (ref 70–99)
GLUCOSE BLDC GLUCOMTR-MCNC: 324 MG/DL (ref 70–99)

## 2020-09-29 PROCEDURE — 97112 NEUROMUSCULAR REEDUCATION: CPT | Mod: GO

## 2020-09-29 PROCEDURE — 97530 THERAPEUTIC ACTIVITIES: CPT | Mod: GP

## 2020-09-29 PROCEDURE — 25000132 ZZH RX MED GY IP 250 OP 250 PS 637: Performed by: PHYSICAL MEDICINE & REHABILITATION

## 2020-09-29 PROCEDURE — 25000128 H RX IP 250 OP 636: Performed by: PHYSICAL MEDICINE & REHABILITATION

## 2020-09-29 PROCEDURE — 92526 ORAL FUNCTION THERAPY: CPT | Mod: GN

## 2020-09-29 PROCEDURE — 27210429 ZZH NUTRITION PRODUCT INTERMEDIATE LITER

## 2020-09-29 PROCEDURE — 12800006 ZZH R&B REHAB

## 2020-09-29 PROCEDURE — 00000146 ZZHCL STATISTIC GLUCOSE BY METER IP

## 2020-09-29 PROCEDURE — 92507 TX SP LANG VOICE COMM INDIV: CPT | Mod: GN | Performed by: SPEECH-LANGUAGE PATHOLOGIST

## 2020-09-29 PROCEDURE — 97535 SELF CARE MNGMENT TRAINING: CPT | Mod: GO

## 2020-09-29 RX ADMIN — VALSARTAN 320 MG: 160 TABLET ORAL at 09:34

## 2020-09-29 RX ADMIN — METOPROLOL TARTRATE 50 MG: 50 TABLET, FILM COATED ORAL at 09:36

## 2020-09-29 RX ADMIN — FUROSEMIDE 40 MG: 20 TABLET ORAL at 09:39

## 2020-09-29 RX ADMIN — ATORVASTATIN CALCIUM 40 MG: 40 TABLET, FILM COATED ORAL at 09:37

## 2020-09-29 RX ADMIN — ASPIRIN 81 MG: 81 TABLET, COATED ORAL at 09:36

## 2020-09-29 RX ADMIN — NYSTATIN 500000 UNITS: 500000 SUSPENSION ORAL at 20:43

## 2020-09-29 RX ADMIN — NYSTATIN 500000 UNITS: 500000 SUSPENSION ORAL at 12:09

## 2020-09-29 RX ADMIN — EZETIMIBE 10 MG: 10 TABLET ORAL at 09:39

## 2020-09-29 RX ADMIN — LEVOTHYROXINE SODIUM 50 MCG: 25 TABLET ORAL at 09:39

## 2020-09-29 RX ADMIN — FUROSEMIDE 40 MG: 20 TABLET ORAL at 16:16

## 2020-09-29 RX ADMIN — FAMOTIDINE 20 MG: 20 TABLET ORAL at 20:43

## 2020-09-29 RX ADMIN — MULTIPLE VITAMINS W/ MINERALS TAB 1 TABLET: TAB at 18:14

## 2020-09-29 RX ADMIN — AMLODIPINE BESYLATE 20 MG: 10 TABLET ORAL at 09:34

## 2020-09-29 RX ADMIN — HEPARIN SODIUM 7500 UNITS: 10000 INJECTION INTRAVENOUS; SUBCUTANEOUS at 21:23

## 2020-09-29 RX ADMIN — FAMOTIDINE 20 MG: 20 TABLET ORAL at 09:37

## 2020-09-29 RX ADMIN — NYSTATIN 500000 UNITS: 500000 SUSPENSION ORAL at 16:15

## 2020-09-29 RX ADMIN — METOPROLOL TARTRATE 50 MG: 50 TABLET, FILM COATED ORAL at 20:43

## 2020-09-29 RX ADMIN — SACUBITRIL AND VALSARTAN 1 TABLET: 49; 51 TABLET, FILM COATED ORAL at 09:39

## 2020-09-29 RX ADMIN — HEPARIN SODIUM 7500 UNITS: 10000 INJECTION INTRAVENOUS; SUBCUTANEOUS at 10:07

## 2020-09-29 RX ADMIN — NYSTATIN 500000 UNITS: 500000 SUSPENSION ORAL at 09:40

## 2020-09-29 RX ADMIN — SACUBITRIL AND VALSARTAN 1 TABLET: 49; 51 TABLET, FILM COATED ORAL at 20:43

## 2020-09-29 ASSESSMENT — MIFFLIN-ST. JEOR: SCORE: 1790.01

## 2020-09-29 NOTE — PLAN OF CARE
Patient seen for meal of NDD2 and NTL with trial of NDD3 sandwich. Patient had apparent lack of initiation and low independence for self-feeding. SLP to assist w/loading utensil and placing in pt's hand. Patient had some residue, specifically on R. side and prolonged mastication on NDD3 food item. Liquid wash assisted in clearing all residue from the oral cavity. Patient to continue on NDD2 and NTLs. Continue to assess as appropriate to upgrade diet.

## 2020-09-29 NOTE — PLAN OF CARE
"sLP: worked on visual scanning seeing playing cards,moved in line vision right to center, needing moderate cueing to move head/eyes PRN, when saw card, could accurately name. Also incorporated speech into above task, stating card for listener to understand, understood 70% of time, cued for \"big and loud\" with single word/sentence repetition, needing to say several times,  for increased loudness/articulation. No sx aspiration noted with sips nectar, continue to set up, offer liquids to help meet hydration needs. (also requested from dietary 2 each per meal, and 2 between meals, minimal.  "

## 2020-09-29 NOTE — PROGRESS NOTES
"                          Diabetes Consult Daily  Progress Note          Assessment/Plan:   Jorje Teran is a 71 y/o male with past medical history of HTN, HLD, DM II, SHELDON, hypothyroidism, and morbid obesity who was found down, found to have a R MCA infarct and R carotid atherosclerosis, now admitted to ARU 9/24 for intensive therapies, ongoing medical management, and rehabilitative nursing care.  Hyperglycemia exacerbated by enteral feeds.      (glargine was discontinued)  - NPH 15 units this morning  - aspart increase to high resistace QAC  - aspart increase to 1 unit per 9 grams carb  - NPH increase from 20 to 40 units for TF tonight--> TF cycle will shorten to 12 h so reduce NPH to 35 units  - aspart increase to 2 units per 30 mg/dL during TF tonight, 2200, 0200  -BG QAC, HS 0200  - hypoglycemia protocol       Outpatient diabetes follow up: TBD  Plan discussed with patient, bedside RN, and dietician           Interval History:     The last 24 hours progress and nursing notes reviewed.  Glucose still persisting in 300s.    Pt eating better, 1200 kcal.  Thus, will have cycle Iso 1.5 at 60 ml/h decreased from 14 to 12 hours, new sched 4039-0430.  Pt says not too much appetite.  [place] TF cycle may help w/ bfast intake.    \"Patient requires an intensive inpatient rehab program to address the following acute impairments:impaired ROM, impaired strength, impaired activity tolerance, impaired tone, impaired balance, impaired coordination, impaired cognition, impaired judgement and safety awareness, impulsiveness, impaired weight shifting, dysphagia, aphasia and dysarthria\"      Recent Labs   Lab 09/29/20  0924 09/29/20  0732 09/29/20  0201 09/28/20  2143 09/28/20  1754 09/28/20  1116   * 321* 323* 303* 273* 311*           Nutrition:     Orders Placed This Encounter      Combination Diet Dysphagia Diet Level 2: Mechan Altered; Nectar Thickened Liquids (pre-thickened or use instant food thickener)            PTA " "Regimen:   No regular BG monitoring at home  No restricted diet  No diabetic medications PTA - diet controlled            Review of Systems:   See interval hx          Medications:   No steroid         Physical Exam:     Gen: Alert, in NAD, sitting up in bed  HEENT: NC/AT, hearing intact to conversational volume  Resp: Unlabored at rest  Neuro: alert, answering questions  Psych: calm mood, offers a smile  /71 (BP Location: Right arm)   Pulse 80   Temp 96.2  F (35.7  C) (Oral)   Resp 18   Ht 1.753 m (5' 9\")   Wt 104.5 kg (230 lb 4.8 oz)   SpO2 92%   BMI 34.01 kg/m               Data:     Lab Results   Component Value Date    A1C 7.5 09/10/2020              CBC RESULTS:   Recent Labs   Lab Test 09/27/20  0902 09/19/20  0635   WBC  --   --  9.5   RBC  --   --  3.67*   HGB  --   --  11.2*   HCT  --   --  33.9*   MCV  --   --  92   MCH  --   --  30.5   MCHC  --   --  33.0   RDW  --   --  12.5      < > 174    < > = values in this interval not displayed.     Recent Labs   Lab Test 09/21/20  0620 09/20/20  0630    140   POTASSIUM 4.1 4.0   CHLORIDE 111* 110*   CO2 25 25   ANIONGAP 5 5   * 165*   BUN 21 22   CR 0.78 0.76   SELENA 8.6 8.5     Liver Function Studies -   Recent Labs   Lab Test 09/07/20  2355   PROTTOTAL 7.4   ALBUMIN 3.6   BILITOTAL 0.9   ALKPHOS 67   AST 23   ALT 28     Lab Results   Component Value Date    INR 1.17 09/21/2020    INR 1.03 09/07/2020         Rin Izquierdo APRN [id number]  Diabetes Management job code [id number]                "

## 2020-09-29 NOTE — PLAN OF CARE
Patient is alert and oriented x4. Does not call for help. Staff anticipates needs. Aphasia present. VSS. Denied any pain, denied sob, denied N/V, denied, denied fever or chills, denied new numbness or tingling. Pt coughed way after taking crushed pills with applesauce and mouth swab with nystatin. Pt was also working with SLP and had nectar drink prior. Hard to tell what caused. Pt did not cough after for the rest of the shift.  Tolerated DD2 diet, nectar fluid at dinner. Pt requires a total feed at meal times.  TF going 60ml/hr with free water flush 100ml every 4 hrs. PEG tube site cleaned and new dressing applied. BGs were 273 and 303, covered with insulin per order. Liko lift with 2 assist for transfers. Maximum assist of 2 with bed mobility and incontinent care. incontinent of bladder x1 this shift. Condom cath on for the night. LBM 9/28/20. PCD in place, turned and repositioned. frequnt rounds provided. Alarm on for saftey. Will continue with POC.

## 2020-09-29 NOTE — PLAN OF CARE
"PT: Pt tolerated standing frame for 13:30 minutes, initial sitting BP 92/68, and BP in standing 99/58 at 1:30 and 98/64 at 10 minutes. Pt reported standing to \"feel good\". Notable L lean in sitting and standing needing assist, A of 2 throughout entire session.   "

## 2020-09-29 NOTE — PLAN OF CARE
OT: treatment focused on Lue faciliation through increased sensory input (visual, tactile, proprioceptive) and emelina ue activities including fingers interlocking w/  assisted ROM exer, , trace mm grade detected in scap retraction/elevation, sh ext and pects w/ horiz add, pt's L neglect interferes w/ eliciting movement as well as mm weakness,bedside UB ADLs including gown change, grooming, oral cares , requres cues and assist to attend to L side of body, attempt to get pt to engage in shannon dressing techniques but difficulty following instructions and attending to L ue, pt follows 1 step simple motor commands w/ 50% prompt and 50% w/ 1-2 sec delay in initiation. pt provided verbal responses to personal questions but at times needed cues to speak louder.     Laurita Magdaleno, OTR/L, CBIS

## 2020-09-29 NOTE — PROGRESS NOTES
[facility]   Acute Rehabilitation Unit  Daily progress note  CC:   Stroke Ischemic 01.1 (L) Body Involvement (R) Brain Stroke; L sided weakness due to MCA infarct, R MCA occlusion, and R carotid atherosclerosis    The patient currently performs bed mobility with Max A x 2 and bed rail. Once sitting at EOB the pt is able to sit with anywhere from SBA/CGA, to Mod A x 2 depending on fatigue and task. The patient is able to use Gretchen Stedy with Max A x 2, however unable to achieve a full stand. The patient is currently dependent on mechanical lift for bed to and from chair. The patient's alertness and participation is improving and he will tolerate intensive therapies in the ARC setting. He is able to follow 2 step directions with 85%, and intelligibility with short 2-3 word phrases needs mod/max cues to increase breath support    S:   Speaking better. Dense left hemiplegia, left field cut +. On TF. BS higher. Endo following.  NDD 2 [place]. TF at 60 ml/hour Free H2O 100 ml q4h.    Pt was incontinent of small soft BM x 1. Denied pain or discomfort. Turns and repositions with heavy assist of 2.   IS and Pneumoboots added.    ROS: Denies HA, nausea, pain. Condom Cath      [unfilled]   Scheduled meds    amLODIPine  20 mg Oral Daily     aspirin  81 mg Oral Daily     atorvastatin  40 mg Oral Daily     ezetimibe  10 mg Oral Daily     famotidine  20 mg Oral BID     furosemide  40 mg Oral BID     heparin ANTICOAGULANT  7,500 Units Subcutaneous Q12H     insulin aspart  1-10 Units Subcutaneous TID w/meals     insulin aspart  2-16 Units Subcutaneous BID     insulin aspart   Subcutaneous TID w/meals     insulin isophane human  15 Units Subcutaneous QAM     insulin isophane human  40 Units Subcutaneous QPM     levothyroxine  50 mcg Oral Daily     metoprolol tartrate  50 mg Oral BID     multivitamin w/minerals  1 tablet Oral Daily     nystatin  500,000 Units Swish & Spit 4x Daily      "sacubitril-valsartan  1 tablet Oral BID     sodium chloride (PF)  10 mL Intracatheter Q7 Days     valsartan  320 mg Oral Daily       PRN meds:  bacitracin, bisacodyl, carboxymethylcellulose PF, dextrose, glucose **OR** dextrose **OR** glucagon, insulin aspart, miconazole, nitroGLYcerin, - MEDICATION INSTRUCTIONS -, polyethylene glycol, sodium chloride (PF), traZODone      PHYSICAL EXAM  /71 (BP Location: Right arm)   Pulse 80   Temp 96.2  F (35.7  C) (Oral)   Resp 18   Ht 1.753 m (5' 9\")   Wt 104.5 kg (230 lb 4.8 oz)   SpO2 92%   BMI 34.01 kg/m    Alert oriented x3  Respiratory: Clear to auscultation bilaterally, no crackles or wheezing  Cardiovascular: S1 and S2, RRR, and no murmur noted  GI: soft, non-distended, non-tender. G tube + BS +  Skin/Integumen: No rashes, trace lower extremity edema noted  Neuro : Left lower Facial droop + Left Field cut +  Dense left-sided hemiplegia with neglect noted  Toes mute  Vocalizes, low volume   Extremities. Moves right side well. Edema +  LABS  Last Comprehensive Metabolic Panel:  Sodium   Date Value Ref Range Status   09/21/2020 141 133 - 144 mmol/L Final     Potassium   Date Value Ref Range Status   09/21/2020 4.1 3.4 - 5.3 mmol/L Final     Chloride   Date Value Ref Range Status   09/21/2020 111 (H) 94 - 109 mmol/L Final     Carbon Dioxide   Date Value Ref Range Status   09/21/2020 25 20 - 32 mmol/L Final     Anion Gap   Date Value Ref Range Status   09/21/2020 5 3 - 14 mmol/L Final     Glucose   Date Value Ref Range Status   09/21/2020 178 (H) 70 - 99 mg/dL Final     Urea Nitrogen   Date Value Ref Range Status   09/21/2020 21 7 - 30 mg/dL Final     Creatinine   Date Value Ref Range Status   09/21/2020 0.78 0.66 - 1.25 mg/dL Final     GFR Estimate   Date Value Ref Range Status   09/21/2020 >90 >60 mL/min/[1.73_m2] Final     Comment:     Non  GFR Calc  Starting 12/18/2018, serum creatinine based estimated GFR (eGFR) will be   calculated using the " Chronic Kidney Disease Epidemiology Collaboration   (CKD-EPI) equation.       Calcium   Date Value Ref Range Status   09/21/2020 8.6 8.5 - 10.1 mg/dL Final        CBC RESULTS:   Recent Labs   Lab Test 09/22/20  0629 09/19/20  0635   WBC  --  9.5   RBC  --  3.67*   HGB  --  11.2*   HCT  --  33.9*   MCV  --  92   MCH  --  30.5   MCHC  --  33.0   RDW  --  12.5    174      Glucose Values Latest Ref Rng & Units 9/28/2020 9/28/2020 9/28/2020 9/28/2020 9/29/2020 9/29/2020 9/29/2020   Bedside Glucose (mg/dl )  - -- -- -- -- -- -- --   GLUCOSE 70 - 99 mg/dL 374(H) 311(H) 273(H) 303(H) 323(H) 321(H) 324(H)   Some recent data might be hidden     ASSESSMENT AND PLAN    Jorje Teran is a 70 year old  hand dominant male with Stroke Ischemic 01.1 (L) Body Involvement (R) Brain Stroke; L sided weakness due to MCA infarct, R MCA occlusion, and R carotid atherosclerosis  1. PT, OT and SLP 60 minutes of each on a daily basis, in addition to rehab nursing and close management of physiatrist.       2. Impairment of ADL's: OT for 60 minutes daily to work on ADL re-training such as grooming, self cares and bathing.       3. Impairment of mobility:  PT for 60 minutes daily to work on neuromuscular re-education focusing on strength, balance, coordination, and endurance.       4. Impairment of cognition/language/swallow:  SLP for 60 minutes daily to work on cognitive and linguistic deficits.     1. Rehab RN for med administration, bowel regimen, glucose monitoring and wound care.  2.   3. Medical Conditions  CVA with dense left hemiparesis  CT with filling defect within the distal R ICA extending into the R MCA and occlusion of the R MCA at the M1 segment. CTA with severe plaquing at R carotid bifurcation with filling defect in the prox R ICA suggestive of thrombus contributing to approximately 80% stenosis. 60% stenosis of the proximal L ICA. TTE with bubble -> Normal left ventricular size and function. Left ventricular  ejection fraction of 55-60%. No segmental wall motion abnormalities noted.   - Lipids 7/29/20- LDL 78, HDL 56, , T chol 164, hemoglobin A1c 7.1% H, TSH is 2.05.  - remains with significant dysarthria, facial droop and dense left sided hemiparesis  - ASA 81 mg daily PO/UT  - atorvastatin 40 mg daily, Zetia 10 mg po daily  - BP management as below; - goal for SBPs < 160       Dysphagia  - currently on DD1 with nectar thickened liquids   -PEG tube to be placed on 9/22, currently patient is on tube feeding  If questions or problems arise regarding tube function (e.g. leaking, dislodges, etc.) Contact Interventional Radiology department 24 hours a day.   For procedures that were done at [facility]:  8 AM - 4 PM Monday through Friday Contact   [phone]  For afterhours and weekends: [phone]   Ask for the Interventional Radiologist on call.   BS high. Joaquín following.  CAD s/p CABG x 3, x 1 in 2015  HTN continue Norvasc /Entresto / Metoprolol 50 mg BID and Valsartan   HLD: On Lipitor  Hypernatremia- resolved     DM II  Recent A1C at 7.1 7/2020.  BG goal is < 180.- started on lantus Endo following.    US at [facility] negative for DVT     SHELDON  - states he could not sleep with CPAP as outpatient  - he would benefit for treating SHELDON   ? CPAP at night    Hypothyroidism  - resumed PTA Levothyroxine 50 mcg daily  TSH checked 7/29/2020 at 2.05     Morbid obesity  BMI noted  7/202 at ~38. Will need to encourage healthy lifestyle and weight loss with recovery     Diet: Snacks/Supplements Adult: Other; thickened smoothie with meals  (RD); With Meals  Calorie Counts     DVT Prophylaxis: Pneumatic Compression Devices  Code Status: Full Code      2. Adjustment to disability:  Clinical psychology to eval and treat when ready  3. FEN: On NDD 1 [place] +TF  4. Bowel: Javed meds  5. Bladder: Monitor  6. GI Prophylaxis:   7. Code: Full  8. Disposition: Home with family and home care  9. ELOS:  28 days  10. Rehab prognosis:   Fair  11. Follow up Appointments on Discharge: The following labs/tests are recommended: cbc ,basic metabolic panel in 4-5 days, also get mag and phos in 3-4 days .need follow up with neurology as scheduled. Follows with Dr. Vincent with [facility].      Sudarshan Fuentes MD   Physical Medicine & Rehabilitation

## 2020-09-29 NOTE — PLAN OF CARE
FOCUS/GOAL  Bowel management, Bladder management, Pain management, Mobility, Cognition/Memory/Judgment/Problem solving, and Safety management    ASSESSMENT, INTERVENTIONS AND CONTINUING PLAN FOR GOAL:  Pt is alert and oriented. TF infusing via G-tube without difficulty. Blood glucose at 0200 was 323 and covered per orders. Condom catheter was replaced this shift as the previous one had fallen off. Pt was incontinent of small soft BM x 1. Denied pain or discomfort. Turns and repositions with heavy assist of 2. Pt slept well between cares. Call light within reach, but pt has not used it all shift. Staff anticipates needs. Bed alarm on for safety. Will continue with POC.

## 2020-09-30 ENCOUNTER — APPOINTMENT (OUTPATIENT)
Dept: PHYSICAL THERAPY | Facility: CLINIC | Age: 70
End: 2020-09-30
Payer: COMMERCIAL

## 2020-09-30 ENCOUNTER — APPOINTMENT (OUTPATIENT)
Dept: OCCUPATIONAL THERAPY | Facility: CLINIC | Age: 70
End: 2020-09-30
Payer: COMMERCIAL

## 2020-09-30 ENCOUNTER — APPOINTMENT (OUTPATIENT)
Dept: SPEECH THERAPY | Facility: CLINIC | Age: 70
End: 2020-09-30
Payer: COMMERCIAL

## 2020-09-30 LAB
BASOPHILS # BLD AUTO: 0 10E9/L (ref 0–0.2)
BASOPHILS NFR BLD AUTO: 0.3 %
DIFFERENTIAL METHOD BLD: ABNORMAL
EOSINOPHIL # BLD AUTO: 0.3 10E9/L (ref 0–0.7)
EOSINOPHIL NFR BLD AUTO: 2.7 %
ERYTHROCYTE [DISTWIDTH] IN BLOOD BY AUTOMATED COUNT: 12.9 % (ref 10–15)
GLUCOSE BLDC GLUCOMTR-MCNC: 196 MG/DL (ref 70–99)
GLUCOSE BLDC GLUCOMTR-MCNC: 219 MG/DL (ref 70–99)
GLUCOSE BLDC GLUCOMTR-MCNC: 229 MG/DL (ref 70–99)
GLUCOSE BLDC GLUCOMTR-MCNC: 263 MG/DL (ref 70–99)
GLUCOSE BLDC GLUCOMTR-MCNC: 267 MG/DL (ref 70–99)
GLUCOSE BLDC GLUCOMTR-MCNC: 273 MG/DL (ref 70–99)
HCT VFR BLD AUTO: 36.3 % (ref 40–53)
HGB BLD-MCNC: 11.6 G/DL (ref 13.3–17.7)
IMM GRANULOCYTES # BLD: 0.1 10E9/L (ref 0–0.4)
IMM GRANULOCYTES NFR BLD: 0.6 %
LYMPHOCYTES # BLD AUTO: 1.2 10E9/L (ref 0.8–5.3)
LYMPHOCYTES NFR BLD AUTO: 13.3 %
MCH RBC QN AUTO: 30.6 PG (ref 26.5–33)
MCHC RBC AUTO-ENTMCNC: 32 G/DL (ref 31.5–36.5)
MCV RBC AUTO: 96 FL (ref 78–100)
MONOCYTES # BLD AUTO: 0.9 10E9/L (ref 0–1.3)
MONOCYTES NFR BLD AUTO: 9.6 %
NEUTROPHILS # BLD AUTO: 6.8 10E9/L (ref 1.6–8.3)
NEUTROPHILS NFR BLD AUTO: 73.5 %
NRBC # BLD AUTO: 0 10*3/UL
NRBC BLD AUTO-RTO: 0 /100
PLATELET # BLD AUTO: 230 10E9/L (ref 150–450)
RBC # BLD AUTO: 3.79 10E12/L (ref 4.4–5.9)
WBC # BLD AUTO: 9.3 10E9/L (ref 4–11)

## 2020-09-30 PROCEDURE — 25000132 ZZH RX MED GY IP 250 OP 250 PS 637: Performed by: PHYSICAL MEDICINE & REHABILITATION

## 2020-09-30 PROCEDURE — 27210429 ZZH NUTRITION PRODUCT INTERMEDIATE LITER

## 2020-09-30 PROCEDURE — 97129 THER IVNTJ 1ST 15 MIN: CPT | Mod: GN | Performed by: SPEECH-LANGUAGE PATHOLOGIST

## 2020-09-30 PROCEDURE — 97112 NEUROMUSCULAR REEDUCATION: CPT | Mod: GP

## 2020-09-30 PROCEDURE — 85025 COMPLETE CBC W/AUTO DIFF WBC: CPT | Performed by: PHYSICAL MEDICINE & REHABILITATION

## 2020-09-30 PROCEDURE — 36415 COLL VENOUS BLD VENIPUNCTURE: CPT | Performed by: PHYSICAL MEDICINE & REHABILITATION

## 2020-09-30 PROCEDURE — 97530 THERAPEUTIC ACTIVITIES: CPT | Mod: GP

## 2020-09-30 PROCEDURE — 97535 SELF CARE MNGMENT TRAINING: CPT | Mod: GO

## 2020-09-30 PROCEDURE — 97110 THERAPEUTIC EXERCISES: CPT | Mod: GP

## 2020-09-30 PROCEDURE — 92526 ORAL FUNCTION THERAPY: CPT | Mod: GN | Performed by: SPEECH-LANGUAGE PATHOLOGIST

## 2020-09-30 PROCEDURE — 40000187 ZZH STATISTIC PATIENT MED CONFERENCE < 30 MIN

## 2020-09-30 PROCEDURE — 25000128 H RX IP 250 OP 636: Performed by: PHYSICAL MEDICINE & REHABILITATION

## 2020-09-30 PROCEDURE — 12800006 ZZH R&B REHAB

## 2020-09-30 PROCEDURE — 00000146 ZZHCL STATISTIC GLUCOSE BY METER IP

## 2020-09-30 PROCEDURE — 40000183 ZZH STATISTIC PT MED CONFERENCE < 30 MIN: Performed by: PHYSICAL THERAPIST

## 2020-09-30 PROCEDURE — 97112 NEUROMUSCULAR REEDUCATION: CPT | Mod: GO

## 2020-09-30 PROCEDURE — 97110 THERAPEUTIC EXERCISES: CPT | Mod: GO

## 2020-09-30 RX ORDER — CEPHALEXIN 500 MG/1
500 CAPSULE ORAL EVERY 6 HOURS SCHEDULED
Status: COMPLETED | OUTPATIENT
Start: 2020-09-30 | End: 2020-10-07

## 2020-09-30 RX ORDER — ACETAMINOPHEN 325 MG/1
650 TABLET ORAL EVERY 4 HOURS PRN
Status: DISCONTINUED | OUTPATIENT
Start: 2020-09-30 | End: 2020-10-23 | Stop reason: HOSPADM

## 2020-09-30 RX ADMIN — NYSTATIN 500000 UNITS: 500000 SUSPENSION ORAL at 08:09

## 2020-09-30 RX ADMIN — SACUBITRIL AND VALSARTAN 1 TABLET: 49; 51 TABLET, FILM COATED ORAL at 22:14

## 2020-09-30 RX ADMIN — METOPROLOL TARTRATE 50 MG: 50 TABLET, FILM COATED ORAL at 22:15

## 2020-09-30 RX ADMIN — FAMOTIDINE 20 MG: 20 TABLET ORAL at 22:15

## 2020-09-30 RX ADMIN — INSULIN ASPART 5 UNITS: 100 INJECTION, SOLUTION INTRAVENOUS; SUBCUTANEOUS at 22:00

## 2020-09-30 RX ADMIN — EZETIMIBE 10 MG: 10 TABLET ORAL at 08:09

## 2020-09-30 RX ADMIN — INSULIN ASPART 3 UNITS: 100 INJECTION, SOLUTION INTRAVENOUS; SUBCUTANEOUS at 18:20

## 2020-09-30 RX ADMIN — SACUBITRIL AND VALSARTAN 1 TABLET: 49; 51 TABLET, FILM COATED ORAL at 08:09

## 2020-09-30 RX ADMIN — FAMOTIDINE 20 MG: 20 TABLET ORAL at 08:10

## 2020-09-30 RX ADMIN — CEPHALEXIN 500 MG: 500 CAPSULE ORAL at 18:16

## 2020-09-30 RX ADMIN — AMLODIPINE BESYLATE 20 MG: 10 TABLET ORAL at 08:09

## 2020-09-30 RX ADMIN — HEPARIN SODIUM 7500 UNITS: 10000 INJECTION INTRAVENOUS; SUBCUTANEOUS at 10:12

## 2020-09-30 RX ADMIN — MULTIPLE VITAMINS W/ MINERALS TAB 1 TABLET: TAB at 18:16

## 2020-09-30 RX ADMIN — VALSARTAN 320 MG: 160 TABLET ORAL at 08:09

## 2020-09-30 RX ADMIN — ATORVASTATIN CALCIUM 40 MG: 40 TABLET, FILM COATED ORAL at 08:09

## 2020-09-30 RX ADMIN — LEVOTHYROXINE SODIUM 50 MCG: 25 TABLET ORAL at 08:09

## 2020-09-30 RX ADMIN — FUROSEMIDE 40 MG: 20 TABLET ORAL at 08:09

## 2020-09-30 RX ADMIN — NYSTATIN 500000 UNITS: 500000 SUSPENSION ORAL at 16:43

## 2020-09-30 RX ADMIN — FUROSEMIDE 40 MG: 20 TABLET ORAL at 16:43

## 2020-09-30 RX ADMIN — ASPIRIN 81 MG: 81 TABLET, COATED ORAL at 08:09

## 2020-09-30 RX ADMIN — METOPROLOL TARTRATE 50 MG: 50 TABLET, FILM COATED ORAL at 08:09

## 2020-09-30 ASSESSMENT — MIFFLIN-ST. JEOR: SCORE: 1786.39

## 2020-09-30 NOTE — CARE CONFERENCE
Acute Rehab Care Conference/Team Rounds    Type: Team Rounds    Present: Dr. Sudarshan Fuentes, Becky Sharma PT, Mara Chappell OTR-L, Mandeep Winn SLP, Luann GREEN, Peterson Mann-Brandon Britton, Howard Mathews RN     Discharge Barriers/Treatment/Education    Rehab Diagnosis: Stroke Ischemic 01.1 (L) Body Involvement (R) Brain Stroke; L sided weakness due to MCA infarct, R MCA occlusion, and R carotid atherosclerosis    Active Medical Co-morbidities/Prognosis: Dysphagia  - SLP following  - had NG FT for few days- now removed  -Patient failed calorie counting requirements  - currently on DD1 with nectar thickened liquids   -PEG tube to be placed on 9/22, currently patient is on tube feeding  If questions or problems arise regarding tube function (e.g. leaking, dislodges, etc.) Contact Interventional Radiology department 24 hours a day.      For procedures that were done at [facility]:  8 AM - 4 PM Monday through Friday Contact   [phone]  For afterhours and weekends: [phone]   Ask for the Interventional Radiologist on call.      CAD s/p CABG x 3, x 1 in 2015    HTN    HLD     Hypernatremia- resolved     DM II    SHELDON     Hypothyroidism     Morbid obesity    Safety: pt is alert, disoriented to time. Does not use call light, staff anticipates needs. Does not seem to be a candidate for MAP. Bed alarm on for safety.    Pain: Denies pain or discomfort    Medications, Skin, Tubes/Lines: Skin with bruises. G-tube intact with redness and reported drainage. Nocturnal use of condom catheter.    Swallowing/Nutrition: Diet has been advanced to NDD-2 with NTL. Pocketing noted with harder food textures. Poor oral hygiene. Difficulties with ability to complete self feeding and requiring 1:1 assistance with meals. Poor fluid intake and requiring fluid flushes through PEG. May have changes with tube feeding with switch to bolus feedings pending quantity consumed at meal.     Bowel/Bladder:  Incontinent of bladder, nocturnal use of condom catheter. Urine is cloudy and yellow. Incontinent of bowel, last BM 9/29.    Psychosocial: . Lives alone in a home with 1 KIERRA. Indep PTA. No mental health or substance abuse PTA. Three adult sons who are supportive and involved in pt care. Retired and has LTC insurance policy.     ADLs/IADLs:min a for oral cares min a-mod a g/h assist with thourghness.max a hospital gown, cont OT per poc    Mobility: w/c based and dependent; using standing frame for wt bearing and trunk control. Recommend ongoing IP care.  W/c eval next week, call family to get home measurements. PASS score on admission 1/36.    Cognition/Language: Severe dysarthria limiting intelligibility and inconsistent ability to use compensatory strategies. Patient having some obvious cognitive impairment as well. Variable performance with language/cognition tasks.     Community Re-Entry: w/c based w/ assist    Transportation: w/c based    Decision maker: self    Plan of Care and goals reviewed and updated.    Discharge Plan/Recommendations    Fall Precautions: continue    Patient/Family input to goals: Home with assistance at a higher level of function    Anticipated rehab needs following discharge: Home care and family help    Anticipated care giver support after discharge: Anticipated Discharge Support: Family member  24/7 support available : Yes  Identified caregiver(s):  3 sons - Donte, Jeyson, and Kai  Anticipated Discharge Needs: Home with homecare and Tube Feeds      Estimated length of stay: 28 days    Overall plan for the patient: Keep medically stable, advance diet, improve function to return home with assistance.      Utilization Review and Continued Stay Justification    Medical Necessity Criteria:    For any criteria that is not met, please document reason and plan for discharge, transfer, or modification of plan of care to address.    Requires intensive rehabilitation program to treat  functional deficits?: Yes    Requires 3x per week or greater involvement of rehabilitation physician to oversee rehabilitation program?: Yes    Requires rehabilitation nursing interventions?: Yes    Patient is making functional progress?: Yes    There is a potential for additional functional progress? Yes    Patient is participating in therapy 3 hours per day a minimum of 5 days per week or 15 hours per week in 7 day period?:Yes    Has discharge needs that require coordinated discharge planning approach?:Yes      Final Physician Sign off    Statement of Approval: I agree with the plans and findings noted above.    Patient Goals  Social Work Goals: Confirm discharge [place]s with therapy, coordinate safe discharge plan and remain available to support and assist as needed.     OT Frequency: 4 weeks 60-90 miin daily  OT goal: hygiene/grooming: modified independent  OT goal: upper body dressing: Minimal assist  OT goal: lower body dressing: Minimal assist  OT goal: upper body bathing: Supervision/stand-by assist  OT goal: lower body bathing: Minimal assist  OT goal: bed mobility: Supervision/stand-by assist   OT goal: toilet transfer/toileting: Moderate assist  OT goal: meal preparation: Moderate assist  OT goal: home management: Minimal assist   OT goal 1: pt will be mod a of 1 transer to extended tub bench  OT goal 2: Pt will be min a with  prom aarom l u/e and arom ex with r U/E      PT Frequency: daily x 60 minutes  PT goal: bed mobility: Minimal assist  PT goal: transfers: Minimal assist, Assistive device   PT goal 1: will work w/ vendor to attain recommended equipment for home  PT Goal 2: will be able to sit EOB w/ intermittent single hand support x 10 minutes      SLP Frequency: daily  SLP Swallow Goal:  Safely tolerate diet without signs/symptoms of aspiration: Dysphagia level 3 diet, thin liquids, with use of compensatory swallow strategies, with use of swallow precautions, with  assistance/supervision  SLP Language Goal:  Improve language comprehension for interaction with caregivers/environment: minimal assist, auditory and written  SLP Communication goal:  Communicate basic wants and needs: minimal assist, verbally     RN Goals :  Wound Care Management: pt will remain free from any new skin breakdown or pressure injuries throughout stay at ARU by demonstrating proper weight shift techniques and hygiene.   Bowel Management: pt will be continent of bowel by time of discharge by participating in bowel training program.   Bladder: pt will be continent of urine by time of discharge by participating in time toilet training program

## 2020-09-30 NOTE — PLAN OF CARE
FOCUS/GOAL  Bowel management, Bladder management, and Medical management    ASSESSMENT, INTERVENTIONS AND CONTINUING PLAN FOR GOAL:  Alert and oriented x4, incont bowel x2 this shift and incont bladder x1. Pt using condom catheter, intact and draining. Urine is cloudy, note left for MD from previous shift. G-tube cares completed, pt reports pain at insertion site when coughing and reports general aching pain, note left for MD.  Rounds today, see notes. Pt to be started on bowel program. Pt is total feed, okay appetite; ate 75% of breakfast and lunch this shift.  Insulin given as ordered.  Pt is also coughing excessively and is aggravated when eating, note left for team to assess.  No other concerns, continue POC.     TF orders now changed. Cyclic TF discontinued. Pt will now receive bolus TF based on amount of food pt eats for each meal. Pt will get one bolus feed before bed each night. BG/ insulin orders will remain the same, nursing is to include carbs for bolus feed with carbs for meal tray when administering carb coverage insulin. There is a PRN carb coverage order in case bolus feed is not given at the same time as meal tray.

## 2020-09-30 NOTE — PROGRESS NOTES
[facility]   Acute Rehabilitation Unit  Daily progress note  CC:   Stroke Ischemic 01.1 (L) Body Involvement (R) Brain Stroke; L sided weakness due to MCA infarct, R MCA occlusion, and R carotid atherosclerosis    The patient currently performs bed mobility with Max A x 2 and bed rail. Once sitting at EOB the pt is able to sit with anywhere from SBA/CGA, to Mod A x 2 depending on fatigue and task. The patient is able to use Gretchen Stedy with Max A x 2, however unable to achieve a full stand. The patient is currently dependent on mechanical lift for bed to and from chair. The patient's alertness and participation is improving and he will tolerate intensive therapies in the AR[facility] setting. He is able to follow 2 step directions with 85%, and intelligibility with short 2-3 word phrases needs mod/max cues to increase breath support    S:   Speaking better. Dense left hemiplegia, left field cut +. On TF. BS higher. Endo following.  NDD 2 [place]. TF at 60 ml/hour Free H2O 100 ml q4h.    Pt was incontinent of small soft BM.. C/O G tube site discomfort. . Turns and repositions with heavy assist of 2.   IS and Pneumoboots added.    ROS: Denies HA, nausea, pain. Condom Cath      [unfilled]   Scheduled meds    amLODIPine  20 mg Oral Daily     aspirin  81 mg Oral Daily     atorvastatin  40 mg Oral Daily     ezetimibe  10 mg Oral Daily     famotidine  20 mg Oral BID     furosemide  40 mg Oral BID     heparin ANTICOAGULANT  7,500 Units Subcutaneous Q12H     insulin aspart   Subcutaneous TID w/meals     insulin aspart  1-10 Units Subcutaneous TID w/meals     insulin aspart  2-16 Units Subcutaneous BID     insulin isophane human  15 Units Subcutaneous QAM     insulin isophane human  50 Units Subcutaneous QPM     levothyroxine  50 mcg Oral Daily     metoprolol tartrate  50 mg Oral BID     multivitamin w/minerals  1 tablet Oral Daily     nystatin  500,000 Units Swish & Spit 4x Daily      "sacubitril-valsartan  1 tablet Oral BID     sodium chloride (PF)  10 mL Intracatheter Q7 Days     valsartan  320 mg Oral Daily       PRN meds:  bacitracin, bisacodyl, carboxymethylcellulose PF, dextrose, glucose **OR** dextrose **OR** glucagon, insulin aspart, miconazole, nitroGLYcerin, - MEDICATION INSTRUCTIONS -, polyethylene glycol, sodium chloride (PF), traZODone      PHYSICAL EXAM  /64 (BP Location: Right arm)   Pulse 83   Temp 98.4  F (36.9  C) (Axillary)   Resp 18   Ht 1.753 m (5' 9\")   Wt 104.1 kg (229 lb 8 oz)   SpO2 95%   BMI 33.89 kg/m    Alert oriented x3  Respiratory: Clear to auscultation bilaterally, no crackles or wheezing  Cardiovascular: S1 and S2, RRR, and no murmur noted  GI: soft, non-distended, non-tender. G tube + Discharge + BS +  Skin/Integumen: No rashes, trace lower extremity edema noted  Neuro : Left lower Facial droop + Left Field cut +  Dense left-sided hemiplegia with neglect noted  Toes mute  Vocalizes, low volume   Extremities. Moves right side well. Edema +  LABS  Last Comprehensive Metabolic Panel:  Sodium   Date Value Ref Range Status   09/21/2020 141 133 - 144 mmol/L Final     Potassium   Date Value Ref Range Status   09/21/2020 4.1 3.4 - 5.3 mmol/L Final     Chloride   Date Value Ref Range Status   09/21/2020 111 (H) 94 - 109 mmol/L Final     Carbon Dioxide   Date Value Ref Range Status   09/21/2020 25 20 - 32 mmol/L Final     Anion Gap   Date Value Ref Range Status   09/21/2020 5 3 - 14 mmol/L Final     Glucose   Date Value Ref Range Status   09/21/2020 178 (H) 70 - 99 mg/dL Final     Urea Nitrogen   Date Value Ref Range Status   09/21/2020 21 7 - 30 mg/dL Final     Creatinine   Date Value Ref Range Status   09/21/2020 0.78 0.66 - 1.25 mg/dL Final     GFR Estimate   Date Value Ref Range Status   09/21/2020 >90 >60 mL/min/[1.73_m2] Final     Comment:     Non  GFR Calc  Starting 12/18/2018, serum creatinine based estimated GFR (eGFR) will be "   calculated using the Chronic Kidney Disease Epidemiology Collaboration   (CKD-EPI) equation.       Calcium   Date Value Ref Range Status   09/21/2020 8.6 8.5 - 10.1 mg/dL Final        CBC RESULTS:   Recent Labs   Lab Test 09/22/20  0629 09/19/20  0635   WBC  --  9.5   RBC  --  3.67*   HGB  --  11.2*   HCT  --  33.9*   MCV  --  92   MCH  --  30.5   MCHC  --  33.0   RDW  --  12.5    174      Glucose Values Latest Ref Rng & Units 9/29/2020 9/29/2020 9/29/2020 9/29/2020 9/30/2020 9/30/2020 9/30/2020   Bedside Glucose (mg/dl )  - -- -- -- -- -- -- --   GLUCOSE 70 - 99 mg/dL 298(H) 272(H) 249(H) 299(H) 273(H) 229(H) 219(H)   Some recent data might be hidden     ASSESSMENT AND PLAN    Jorje Teran is a 70 year old  hand dominant male with Stroke Ischemic 01.1 (L) Body Involvement (R) Brain Stroke; L sided weakness due to MCA infarct, R MCA occlusion, and R carotid atherosclerosis  1. PT, OT and SLP 60 minutes of each on a daily basis, in addition to rehab nursing and close management of physiatrist.       2. Impairment of ADL's: OT for 60 minutes daily to work on ADL re-training such as grooming, self cares and bathing.       3. Impairment of mobility:  PT for 60 minutes daily to work on neuromuscular re-education focusing on strength, balance, coordination, and endurance.       4. Impairment of cognition/language/swallow:  SLP for 60 minutes daily to work on cognitive and linguistic deficits.     1. Rehab RN for med administration, bowel regimen, glucose monitoring and wound care.  2.   3. Medical Conditions  CVA with dense left hemiparesis  CT with filling defect within the distal R ICA extending into the R MCA and occlusion of the R MCA at the M1 segment. CTA with severe plaquing at R carotid bifurcation with filling defect in the prox R ICA suggestive of thrombus contributing to approximately 80% stenosis. 60% stenosis of the proximal L ICA. TTE with bubble -> Normal left ventricular size and  function. Left ventricular ejection fraction of 55-60%. No segmental wall motion abnormalities noted.   - Lipids 7/29/20- LDL 78, HDL 56, , T chol 164, hemoglobin A1c 7.1% H, TSH is 2.05.  - remains with significant dysarthria, facial droop and dense left sided hemiparesis  - ASA 81 mg daily PO/IL  - atorvastatin 40 mg daily, Zetia 10 mg po daily  - BP management as below; - goal for SBPs < 160       Dysphagia  - currently on DD2 with nectar thickened liquids   -PEG tube to be placed on 9/22, currently patient is on tube feeding  If questions or problems arise regarding tube function (e.g. leaking, dislodges, etc.) Contact Interventional Radiology department 24 hours a day.   For procedures that were done at [facility]:  8 AM - 4 PM Monday through Friday Contact   [phone]  For afterhours and weekends: [phone]   Ask for the Interventional Radiologist on call.     PeG site Discomfort: Treat infection. Clean site. Add Keflex 500 mg qid x 5 days.  Tylenol prn.    BS high. Endo following.  CAD s/p CABG x 3, x 1 in 2015  HTN continue Norvasc /Entresto / Metoprolol 50 mg BID and Valsartan   HLD: On Lipitor  Hypernatremia- resolved     DM II  Recent A1C at 7.1 7/2020.  BG goal is < 180.- started on lantus Endo following.    US at [facility] negative for DVT     SHELDON  - states he could not sleep with CPAP as outpatient  - he would benefit for treating SHELDON   ? CPAP at night    Hypothyroidism  - resumed PTA Levothyroxine 50 mcg daily  TSH checked 7/29/2020 at 2.05     Morbid obesity  BMI noted  7/202 at ~38. Will need to encourage healthy lifestyle and weight loss with recovery     Diet: Snacks/Supplements Adult: Other; thickened smoothie with meals  (RD); With Meals  Calorie Counts     DVT Prophylaxis: Pneumatic Compression Devices  Code Status: Full Code      2. Adjustment to disability:  Clinical psychology to eval and treat when ready  3. FEN: On NDD 2 [place] +TF, Bolus versus nocturnal  supplements.  4. Bowel: Javed meds  5. Bladder: Monitor  6. GI Prophylaxis:   7. Code: Full  8. Disposition: Home with family and home care  9. ELOS:  28 days  10. Rehab prognosis:  Fair  11. Follow up Appointments on Discharge: The following labs/tests are recommended: cbc ,basic metabolic panel in 4-5 days, also get mag and phos in 3-4 days .need follow up with neurology as scheduled. Follows with Dr. Vincent with [facility].      Sudarshan Fuentes MD   Physical Medicine & Rehabilitation

## 2020-09-30 NOTE — PROGRESS NOTES
"                          Diabetes Consult Daily  Progress Note          Assessment/Plan:   Jorje Teran is a 69 y/o male with past medical history of HTN, HLD, DM II, SHELDON, hypothyroidism, and morbid obesity who was found down, found to have a R MCA infarct and R carotid atherosclerosis, now admitted to ARU 9/24 for intensive therapies, ongoing medical management, and rehabilitative nursing care.  Hyperglycemia exacerbated by enteral feeds.      BG improved to 200s.  TF schedule will now change.      - NPH 15 units this morning  - NPH 10 units at HS, when no TF will infuse   - aspart high resistace QAC and HS, all for >/=140  - aspart increase to 1 unit per 6 grams carb AC, HS (TF sched for HS) and PRN  - patient care order to alert RN to PRN aspart in case TF at different time    -BG QAC, HS 0200  - hypoglycemia protocol       Outpatient diabetes follow up: TBD  Plan discussed with patient, bedside RN, and dietician           Interval History:     The last 24 hours progress and nursing notes reviewed.  BG darryl 219 before lunch today.    Plan to revise feeding schedule from nocturnal cycle to boluses after meals during the day.  RN believes she can promptly assess meal consumption and give bolus at that time.  This quick timing would allow for one aspart injection.  If the  Meal and bolus feed become more widely spaced, will need more injections.  Jorje agreeable to  New plan.    Note from during [facility] admission, utilized glargine 10 units BID w/ fair basal coverage while on Dysphagia diet only (no TF yet),    \"Patient requires an intensive inpatient rehab program to address the following acute impairments:impaired ROM, impaired strength, impaired activity tolerance, impaired tone, impaired balance, impaired coordination, impaired cognition, impaired judgement and safety awareness, impulsiveness, impaired weight shifting, dysphagia, aphasia and dysarthria\"      Recent Labs   Lab 09/30/20  1134 " "09/30/20  0736 09/30/20  0151 09/29/20  2119 09/29/20  1736 09/29/20  1143   * 229* 273* 299* 249* 272*           Nutrition:     Orders Placed This Encounter      Combination Diet Dysphagia Diet Level 2: Mechan Altered; Nectar Thickened Liquids (pre-thickened or use instant food thickener)     Was Isosource 1.5 60 ml/h x 12 h, now:  If pt eats <25% of the meal give full bolus: 325 mL of isosource 1.5 via gtube which provides 487 kcal, 22 g protein, 57 g CHO, 5.6 g fiber, and 247 mL free water              If pt eats 25-50% of meal provide half bolus: 162ml of isosource 1.5 via gtube which provides  244kcal, 11 g protein, 29 g CHO, 2.8 g fiber, and 124 mL free water.              If pt eats >50% of meal give no bolus              Always give full bolus (325ml) at HS              Flush with 100ml water before/after each           PTA Regimen:   No regular BG monitoring at home  No restricted diet  No diabetic medications PTA - diet controlled            Review of Systems:   See interval hx          Medications:   No steroid         Physical Exam:     Gen: Alert, in NAD, resting in bed  HEENT: NC/AT, hearing intact to conversational volume  Resp: Unlabored at rest  Neuro: alert, answering questions more briefly, more difficult to understand  Psych: calm mood  /60 (BP Location: Right arm)   Pulse 76   Temp 98.1  F (36.7  C) (Oral)   Resp 18   Ht 1.753 m (5' 9\")   Wt 104.1 kg (229 lb 8 oz)   SpO2 94%   BMI 33.89 kg/m               Data:     Lab Results   Component Value Date    A1C 7.5 09/10/2020              CBC RESULTS:   Recent Labs   Lab Test 09/27/20  0902  09/19/20  0635   WBC  --   --  9.5   RBC  --   --  3.67*   HGB  --   --  11.2*   HCT  --   --  33.9*   MCV  --   --  92   MCH  --   --  30.5   MCHC  --   --  33.0   RDW  --   --  12.5      < > 174    < > = values in this interval not displayed.     Recent Labs   Lab Test 09/21/20  0620 09/20/20  0630    140   POTASSIUM 4.1 4.0 "   CHLORIDE 111* 110*   CO2 25 25   ANIONGAP 5 5   * 165*   BUN 21 22   CR 0.78 0.76   SELENA 8.6 8.5     Liver Function Studies -   Recent Labs   Lab Test 09/07/20  2355   PROTTOTAL 7.4   ALBUMIN 3.6   BILITOTAL 0.9   ALKPHOS 67   AST 23   ALT 28     Lab Results   Component Value Date    INR 1.17 09/21/2020    INR 1.03 09/07/2020     I spent a total of 35 minutes bedside and on the inpatient unit managing the glycemic care of Jorej Teran. Over 50% of my time on the unit was spent counseling the patient  and/or coordinating care regarding acute hyperglycvemia mgmnt w/ TF exacerbating.  See note for details.      Rin Izquierdo APRN [id number]  Diabetes Management job code [id number]

## 2020-09-30 NOTE — PROGRESS NOTES
Post Rounds Family Phone Call  Length of call: 15 minutes  Family involved: son Nacho; Nacho was actually here rather than on the phone  Projected discharge date: 10/16  Projected discharge location: home w/ 24 hour physical assist  Equipment needs: will need manual w/c and possibly mechanical lift  Other questions and misc: discussed that Ron will be w/c based when he leaves here, confirmed that family is wanting discharge home and to use LTC policy to pay for assistance at home; Nacho indicated that they will go with our [place] as far as discharge placement; gave him a home measurement worksheet and asked that he return it by the weekend so we can determine that the w/c he needs will fit in his home.

## 2020-09-30 NOTE — PROGRESS NOTES
Calorie Counts    9/29: 611 kcal and 25 g protein (3 meals, 1 supplements)-75% breakfast per RN notes, lunch ticket saved, and <25% dinner per RN note. 25% magic cup with lunch    Annamaria Castano MS, RD, LDN  Unit Pager [phone]

## 2020-09-30 NOTE — PLAN OF CARE
FOCUS/GOAL  Bladder management and Safety management    ASSESSMENT, INTERVENTIONS AND CONTINUING PLAN FOR GOAL:    Pt is alert to self and place. Incontinent of bowel. Continent of bladder due to condom catheter, was replaced with petal catheter this evening. BG at bedtime was 299, 12 units administered. Pt ate less than 25% of dinner, did not eat more than 9 carbs for dinner, so was not given insulin with dinner. TF cycle still from 6-6. Tolerating well. No complaints of pain. Turned throughout the shift. Continue with plan of care.

## 2020-09-30 NOTE — PLAN OF CARE
FOCUS/GOAL  Bladder management, Pain management, Mobility, Skin integrity, Cognition/Memory/Judgment/Problem solving, and Safety management    ASSESSMENT, INTERVENTIONS AND CONTINUING PLAN FOR GOAL:  Pt is alert, disoriented to time. Condom catheter is patent and intact, draining cloudy yellow urine. Denied pain or discomfort overnight. Blood glucose at 0200 was 273 and covered per orders. Turned and repositioned with assist of 2. Tube feeding infusing via G-tube without difficulty, due to complete at 0600 per orders. Pt slept between cares. Does not use call light, anticipate pt needs. Bed alarm on for safety. Will continue with POC.

## 2020-09-30 NOTE — PROGRESS NOTES
Postural Assessment Scale for Stroke    Maintaining a posture  1. Sitting without support: 1  2. Standing with support: 0  3. Standing without support: 0  4. Standing on nonparetic le  5. Standing on paretic le    Changing posture  6. Supine to affected side lateral: 0  7. Supine to nonaffected side lateral: 0  8. Supine to sitting up on the edge of the table: 0  9. Sitting on the edge of the table to supine: 0  10. Sitting to standing up: 0  11. Standing up to sitting down: 0  12. Standing, picking up a pencil from the floor: 0    Total score:   136

## 2020-09-30 NOTE — PLAN OF CARE
SLP:  Not yet safe to swish/spit with toothbrushing, Pt was unable to follow the command as would consistently swallow (practiced with nectar thick).  Continue DD2/nectar thick liquids.  Pt unaware he was on thickened liquids so VFSS reviewed and rationale provided for current diet [place]s.       Cognition/Language:  Poor ability to produce intelligible speech in PM session, only 50% intelligible. Continued to further assess cognition-could not complete basic attention task of counting backward from 20 and had difficulty generating responses for practical problem-solving tasks at moderate level.  Could recall 3/3 words following distraction task.  Have added goals for attention and problem solving.

## 2020-09-30 NOTE — PROGRESS NOTES
Nutrition services progress note  MD requesting bolus based on PO EN order to better eat regular eating pattern and encourage intakes. Recommend proving a bolus after each meal based on how much was eaten (see below) and always providing a full bolus at HS.    If pt eats <25% of the meal give full bolus: 325 mL of isosource 1.5 via gtube which provides 487 kcal, 22 g protein, 57 g CHO, 5.6 g fiber, and 247 mL free water   If pt eats 25-50% of meal provide half bolus: 162ml of isosource 1.5 via gtube which provides  244kcal, 11 g protein, 29 g CHO, 2.8 g fiber, and 124 mL free water.   If pt eats >50% of meal give no bolus   Always give full bolus (325ml) at HS   Flush with 100ml water before/after each bolus  Plan to start bolus feedings at HS tonight, will given half bolus to ensure pt tolerates the larger amount. Discussed with RN and endo.     Annamaria Castano MS, RD, LDN  Unit Pager [phone]

## 2020-10-01 LAB
GLUCOSE BLDC GLUCOMTR-MCNC: 184 MG/DL (ref 70–99)
GLUCOSE BLDC GLUCOMTR-MCNC: 194 MG/DL (ref 70–99)
GLUCOSE BLDC GLUCOMTR-MCNC: 205 MG/DL (ref 70–99)
GLUCOSE BLDC GLUCOMTR-MCNC: 218 MG/DL (ref 70–99)
GLUCOSE BLDC GLUCOMTR-MCNC: 224 MG/DL (ref 70–99)

## 2020-10-01 PROCEDURE — 92507 TX SP LANG VOICE COMM INDIV: CPT | Mod: GN

## 2020-10-01 PROCEDURE — 92526 ORAL FUNCTION THERAPY: CPT | Mod: GN

## 2020-10-01 PROCEDURE — 999N001017 HC STATISTIC GLUCOSE BY METER IP

## 2020-10-01 PROCEDURE — 97530 THERAPEUTIC ACTIVITIES: CPT | Mod: GP | Performed by: PHYSICAL THERAPIST

## 2020-10-01 PROCEDURE — 99233 SBSQ HOSP IP/OBS HIGH 50: CPT | Performed by: PHYSICAL MEDICINE & REHABILITATION

## 2020-10-01 PROCEDURE — 97535 SELF CARE MNGMENT TRAINING: CPT | Mod: GO

## 2020-10-01 PROCEDURE — 250N000013 HC RX MED GY IP 250 OP 250 PS 637: Performed by: PHYSICAL MEDICINE & REHABILITATION

## 2020-10-01 PROCEDURE — 97112 NEUROMUSCULAR REEDUCATION: CPT | Mod: GO

## 2020-10-01 PROCEDURE — 128N000003 HC R&B REHAB

## 2020-10-01 PROCEDURE — 96372 THER/PROPH/DIAG INJ SC/IM: CPT | Performed by: CLINICAL NURSE SPECIALIST

## 2020-10-01 PROCEDURE — 97129 THER IVNTJ 1ST 15 MIN: CPT

## 2020-10-01 PROCEDURE — 97130 THER IVNTJ EA ADDL 15 MIN: CPT

## 2020-10-01 PROCEDURE — 99232 SBSQ HOSP IP/OBS MODERATE 35: CPT | Performed by: CLINICAL NURSE SPECIALIST

## 2020-10-01 PROCEDURE — 250N000012 HC RX MED GY IP 250 OP 636 PS 637: Performed by: CLINICAL NURSE SPECIALIST

## 2020-10-01 RX ADMIN — CEPHALEXIN 500 MG: 500 CAPSULE ORAL at 18:40

## 2020-10-01 RX ADMIN — ACETAMINOPHEN 650 MG: 325 TABLET, FILM COATED ORAL at 06:14

## 2020-10-01 RX ADMIN — FAMOTIDINE 20 MG: 20 TABLET ORAL at 10:38

## 2020-10-01 RX ADMIN — METOPROLOL TARTRATE 50 MG: 50 TABLET, FILM COATED ORAL at 10:40

## 2020-10-01 RX ADMIN — ASPIRIN 81 MG: 81 TABLET, COATED ORAL at 10:39

## 2020-10-01 RX ADMIN — EZETIMIBE 10 MG: 10 TABLET ORAL at 10:38

## 2020-10-01 RX ADMIN — AMLODIPINE BESYLATE 20 MG: 10 TABLET ORAL at 10:39

## 2020-10-01 RX ADMIN — FUROSEMIDE 40 MG: 20 TABLET ORAL at 18:40

## 2020-10-01 RX ADMIN — INSULIN ASPART 4 UNITS: 100 INJECTION, SOLUTION INTRAVENOUS; SUBCUTANEOUS at 21:29

## 2020-10-01 RX ADMIN — SACUBITRIL AND VALSARTAN 1 TABLET: 49; 51 TABLET, FILM COATED ORAL at 10:40

## 2020-10-01 RX ADMIN — VALSARTAN 320 MG: 160 TABLET ORAL at 10:40

## 2020-10-01 RX ADMIN — LEVOTHYROXINE SODIUM 50 MCG: 25 TABLET ORAL at 10:39

## 2020-10-01 RX ADMIN — CEPHALEXIN 500 MG: 500 CAPSULE ORAL at 00:02

## 2020-10-01 RX ADMIN — ACETAMINOPHEN 650 MG: 325 TABLET, FILM COATED ORAL at 13:19

## 2020-10-01 RX ADMIN — FAMOTIDINE 20 MG: 20 TABLET ORAL at 21:34

## 2020-10-01 RX ADMIN — NYSTATIN 500000 UNITS: 500000 SUSPENSION ORAL at 18:40

## 2020-10-01 RX ADMIN — ATORVASTATIN CALCIUM 40 MG: 40 TABLET, FILM COATED ORAL at 10:38

## 2020-10-01 RX ADMIN — FUROSEMIDE 40 MG: 20 TABLET ORAL at 10:39

## 2020-10-01 RX ADMIN — CEPHALEXIN 500 MG: 500 CAPSULE ORAL at 13:21

## 2020-10-01 RX ADMIN — NYSTATIN 500000 UNITS: 500000 SUSPENSION ORAL at 21:33

## 2020-10-01 RX ADMIN — MULTIPLE VITAMINS W/ MINERALS TAB 1 TABLET: TAB at 18:43

## 2020-10-01 RX ADMIN — INSULIN ASPART 4 UNITS: 100 INJECTION, SOLUTION INTRAVENOUS; SUBCUTANEOUS at 13:21

## 2020-10-01 RX ADMIN — INSULIN ASPART 3 UNITS: 100 INJECTION, SOLUTION INTRAVENOUS; SUBCUTANEOUS at 08:16

## 2020-10-01 RX ADMIN — SACUBITRIL AND VALSARTAN 1 TABLET: 49; 51 TABLET, FILM COATED ORAL at 21:38

## 2020-10-01 RX ADMIN — CEPHALEXIN 500 MG: 500 CAPSULE ORAL at 06:14

## 2020-10-01 RX ADMIN — INSULIN ASPART 2 UNITS: 100 INJECTION, SOLUTION INTRAVENOUS; SUBCUTANEOUS at 18:57

## 2020-10-01 RX ADMIN — METOPROLOL TARTRATE 50 MG: 50 TABLET, FILM COATED ORAL at 21:33

## 2020-10-01 ASSESSMENT — MIFFLIN-ST. JEOR: SCORE: 1853.52

## 2020-10-01 NOTE — PROGRESS NOTES
[facility]   Acute Rehabilitation Unit  Daily progress note  CC:   Stroke Ischemic 01.1 (L) Body Involvement (R) Brain Stroke; L sided weakness due to MCA infarct, R MCA occlusion, and R carotid atherosclerosis    The patient currently performs bed mobility with Max A x 2 and bed rail. Once sitting at EOB the pt is able to sit with anywhere from SBA/CGA, to Mod A x 2 depending on fatigue and task. The patient is able to use Gretchen Stedy with Max A x 2, however unable to achieve a full stand. The patient is currently dependent on mechanical lift for bed to and from chair. The patient's alertness and participation is improving and he will tolerate intensive therapies in the ARC setting. He is able to follow 2 step directions with 85%, and intelligibility with short 2-3 word phrases needs mod/max cues to increase breath support    S:   Son here. Reviewed his status. Strategies to improve function. Likes Coffe with [employer]!    Speaking better. Dense left hemiplegia, left field cut +. Compensates by turning.  On TF. BS have been high at times.. Endo following.  NDD 2 [place]. TF at 60 ml/hour Free H2O 100 ml q4h.    Pt was incontinent of small soft BM.. C/O G tube site discomfort. GT site was red and tender to touch  with purulent moderate drainage noted , Site cleaned with soap and water . Turns and repositions with heavy assist of 2.   IS and Pneumoboots added.    ROS: Denies HA, nausea, pain. Condom Cath      [unfilled]   Scheduled meds    amLODIPine  20 mg Oral Daily     aspirin  81 mg Oral Daily     atorvastatin  40 mg Oral Daily     cephALEXin  500 mg Oral Q6H TORI     ezetimibe  10 mg Oral Daily     famotidine  20 mg Oral BID     furosemide  40 mg Oral BID     insulin aspart   Subcutaneous TID w/meals     insulin aspart   Subcutaneous At Bedtime     insulin aspart  1-10 Units Subcutaneous 4x Daily w/meals     insulin isophane human  10 Units Subcutaneous At Bedtime      "insulin isophane human  15 Units Subcutaneous QAM     levothyroxine  50 mcg Oral Daily     metoprolol tartrate  50 mg Oral BID     multivitamin w/minerals  1 tablet Oral Daily     nystatin  500,000 Units Swish & Spit 4x Daily     sacubitril-valsartan  1 tablet Oral BID     sodium chloride (PF)  10 mL Intracatheter Q7 Days     valsartan  320 mg Oral Daily       PRN meds:  acetaminophen, bacitracin, bisacodyl, carboxymethylcellulose PF, dextrose, glucose **OR** dextrose **OR** glucagon, insulin aspart, miconazole, nitroGLYcerin, - MEDICATION INSTRUCTIONS -, polyethylene glycol, sodium chloride (PF), traZODone      PHYSICAL EXAM  /67 (BP Location: Right arm)   Pulse 73   Temp 98.4  F (36.9  C) (Axillary)   Resp 18   Ht 1.753 m (5' 9\")   Wt 110.8 kg (244 lb 4.8 oz)   SpO2 93%   BMI 36.08 kg/m    Alert oriented x3  Respiratory: Clear to auscultation bilaterally, no crackles or wheezing  Cardiovascular: S1 and S2, RRR, and no murmur noted  GI: soft, non-distended, non-tender. G tube + Discharge + BS +  Skin/Integumen: No rashes, trace lower extremity edema noted  Neuro : Left lower Facial droop + Left Field cut +  Dense left-sided hemiplegia with neglect noted  Toes mute  Vocalizes, low volume   Extremities. Moves right side well. Edema +  LABS  Last Comprehensive Metabolic Panel:  Sodium   Date Value Ref Range Status   09/21/2020 141 133 - 144 mmol/L Final     Potassium   Date Value Ref Range Status   09/21/2020 4.1 3.4 - 5.3 mmol/L Final     Chloride   Date Value Ref Range Status   09/21/2020 111 (H) 94 - 109 mmol/L Final     Carbon Dioxide   Date Value Ref Range Status   09/21/2020 25 20 - 32 mmol/L Final     Anion Gap   Date Value Ref Range Status   09/21/2020 5 3 - 14 mmol/L Final     Glucose   Date Value Ref Range Status   09/21/2020 178 (H) 70 - 99 mg/dL Final     Urea Nitrogen   Date Value Ref Range Status   09/21/2020 21 7 - 30 mg/dL Final     Creatinine   Date Value Ref Range Status   09/21/2020 " 0.78 0.66 - 1.25 mg/dL Final     GFR Estimate   Date Value Ref Range Status   09/21/2020 >90 >60 mL/min/[1.73_m2] Final     Comment:     Non  GFR Calc  Starting 12/18/2018, serum creatinine based estimated GFR (eGFR) will be   calculated using the Chronic Kidney Disease Epidemiology Collaboration   (CKD-EPI) equation.       Calcium   Date Value Ref Range Status   09/21/2020 8.6 8.5 - 10.1 mg/dL Final        CBC RESULTS:   Recent Labs   Lab Test 09/30/20  0607   WBC 9.3   RBC 3.79*   HGB 11.6*   HCT 36.3*   MCV 96   MCH 30.6   MCHC 32.0   RDW 12.9             Glucose Values Latest Ref Rng & Units 9/30/2020 9/30/2020 9/30/2020 9/30/2020 10/1/2020 10/1/2020 10/1/2020   Bedside Glucose (mg/dl )  - -- -- -- -- -- -- --   GLUCOSE 70 - 99 mg/dL 219(H) 196(H) 267(H) 263(H) 194(H) 205(H) 218(H)   Some recent data might be hidden     ASSESSMENT AND PLAN    Jorje Teran is a 70 year old  hand dominant male with Stroke Ischemic 01.1 (L) Body Involvement (R) Brain Stroke; L sided weakness due to MCA infarct, R MCA occlusion, and R carotid atherosclerosis  1. PT, OT and SLP 60 minutes of each on a daily basis, in addition to rehab nursing and close management of physiatrist.       2. Impairment of ADL's: OT for 60 minutes daily to work on ADL re-training such as grooming, self cares and bathing.       3. Impairment of mobility:  PT for 60 minutes daily to work on neuromuscular re-education focusing on strength, balance, coordination, and endurance.       4. Impairment of cognition/language/swallow:  SLP for 60 minutes daily to work on cognitive and linguistic deficits.     1. Rehab RN for med administration, bowel regimen, glucose monitoring and wound care.  2.   3. Medical Conditions  CVA with dense left hemiparesis  CT with filling defect within the distal R ICA extending into the R MCA and occlusion of the R MCA at the M1 segment. CTA with severe plaquing at R carotid bifurcation with filling  defect in the prox R ICA suggestive of thrombus contributing to approximately 80% stenosis. 60% stenosis of the proximal L ICA. TTE with bubble -> Normal left ventricular size and function. Left ventricular ejection fraction of 55-60%. No segmental wall motion abnormalities noted.   - Lipids 7/29/20- LDL 78, HDL 56, , T chol 164, hemoglobin A1c 7.1% H, TSH is 2.05.  - remains with significant dysarthria, facial droop and dense left sided hemiparesis  - ASA 81 mg daily PO/IA  - atorvastatin 40 mg daily, Zetia 10 mg po daily  - BP management as below; - goal for SBPs < 160       Dysphagia  - currently on DD2 with nectar thickened liquids   -PEG tube to be placed on 9/22, currently patient is on tube feeding  If questions or problems arise regarding tube function (e.g. leaking, dislodges, etc.) Contact Interventional Radiology department 24 hours a day.   For procedures that were done at [facility]:  8 AM - 4 PM Monday through Friday Contact   [phone]  For afterhours and weekends: [phone]   Ask for the Interventional Radiologist on call.     PeG site Discomfort: Treat infection. Clean site. Add Keflex 500 mg qid x 5 days.  Tylenol prn.    BS high. Endo following.  CAD s/p CABG x 3, x 1 in 2015  HTN continue Norvasc /Entresto / Metoprolol 50 mg BID and Valsartan   HLD: On Lipitor  Hypernatremia- resolved     DM II  Recent A1C at 7.1 7/2020.  BG goal is < 180.- started on lantus Endo following.    US at [facility] negative for DVT     SHELDON  - states he could not sleep with CPAP as outpatient  - he would benefit for treating SHELDON   ? CPAP at night    Hypothyroidism  - resumed PTA Levothyroxine 50 mcg daily  TSH checked 7/29/2020 at 2.05     Morbid obesity  BMI noted  7/202 at ~38. Will need to encourage healthy lifestyle and weight loss with recovery     Diet: Snacks/Supplements Adult: Other; thickened smoothie with meals  (RD); With Meals  Calorie Counts     DVT Prophylaxis: Pneumatic Compression  Devices  Code Status: Full Code      2. Adjustment to disability:  Clinical psychology to eval and treat when ready  3. FEN: On NDD 2 [place] +TF, Bolus versus nocturnal supplements.  4. Bowel: Javed meds  5. Bladder: Monitor  6. GI Prophylaxis:   7. Code: Full  8. Disposition: Home with family and home care  9. ELOS:  28 days  10. Rehab prognosis:  Fair  11. Follow up Appointments on Discharge: The following labs/tests are recommended: cbc ,basic metabolic panel in 4-5 days, also get mag and phos in 3-4 days .need follow up with neurology as scheduled. Follows with Dr. Vincent with [facility].      Sudarshan Fuentes MD   Physical Medicine & Rehabilitation

## 2020-10-01 NOTE — PLAN OF CARE
FOCUS/GOAL  Bowel management, Bladder management, Nutrition/Feeding/Swallowing precautions, Medication management, and Safety management    ASSESSMENT, INTERVENTIONS AND CONTINUING PLAN FOR GOAL:    Pt is alert. No complaints of pain. Was incontinent of bowel, continues to have condom catheter. Repositioned every two hours. Pt ate 50% of dinner, tube feeding given to supplement what was not eaten. 160ml given per order at HS. Remains an assist of two with a liko and repositioning. Continue with plan of care.

## 2020-10-01 NOTE — PLAN OF CARE
"FOCUS/GOAL  Bowel management, Bladder management, and Nutrition/Feeding/Swallowing precautions    ASSESSMENT, INTERVENTIONS AND CONTINUING PLAN FOR GOAL:  Alert and oriented x4, pt using condom cathter with good output and incont of BM, LBM this shift. Pt ate >75% of his meals so extra bolus feeds not needed this shift. See BG levels in chart, insulin given per order. Pt encouraged to use call light, when asked what button to push on the call light, he states \"the big red button\", however did not utilize the call light this shift. Staff anticipated his needs. Son at bedside. No other concerns.     "

## 2020-10-01 NOTE — PLAN OF CARE
FOCUS/GOAL  Medical management    ASSESSMENT, INTERVENTIONS AND CONTINUING PLAN FOR GOAL:  Slept well. GT site is red and tender to touch  with purulent moderate drainage noted , Site cleaned with soap and water, Sticky note sent to the provider. He complaint of L side pain, declined PRN once and accepted it toward the end of the shift.  Tylenol given at 06:13. Continue with POC.

## 2020-10-01 NOTE — PROGRESS NOTES
"                          Diabetes Consult Daily  Progress Note          Assessment/Plan:   Jorje Teran is a 71 y/o male with past medical history of HTN, HLD, DM II, SHELDON, hypothyroidism, and morbid obesity who was found down, found to have a R MCA infarct and R carotid atherosclerosis, now admitted to ARU 9/24 for intensive therapies, ongoing medical management, and rehabilitative nursing care.  Hyperglycemia exacerbated by enteral feeds.      Glucose to 190s at best.  Some intake between meals not getting aspart coverage.      - NPH 15 units this morning  - NPH increase to 15 units at HS  - aspart high resistace QAC and HS, all for >/=140  - aspart increase to 1 unit per 4 grams carb AC, HS (TF sched for HS) and PRN  - RN will work w/ family to consolidate presentation of PO to limit number of injections needed  - patient care order to alert RN to PRN aspart in case TF at different time  (- monitor for ability to start metformin-- looseer stool at present)    -BG QAC, HS 0200  - hypoglycemia protocol       Outpatient diabetes follow up: TBD  Plan discussed with patient, son, bedside RN           Interval History:     The last 24 hours progress and nursing notes reviewed.  BG darryl 194 0200.  Only got TF carb coverage at HS, while was supposed to get correction as well.  Fasting >200.    Ate two full meals today, so no TF bolus needed.  Strong coughing caused stool incontinence.  NST describes unformed stool, but not diarrhea.     During [facility] admission, utilized glargine 10 units BID w/ fair basal coverage while on Dysphagia diet only (no TF yet),    \"Patient requires an intensive inpatient rehab program to address the following acute impairments:impaired ROM, impaired strength, impaired activity tolerance, impaired tone, impaired balance, impaired coordination, impaired cognition, impaired judgement and safety awareness, impulsiveness, impaired weight shifting, dysphagia, aphasia and " "dysarthria\"      Recent Labs   Lab 10/01/20  1230 10/01/20  0725 10/01/20  0200 09/30/20  2155 09/30/20  2118 09/30/20  1713   * 205* 194* 263* 267* 196*           Nutrition:     Orders Placed This Encounter      Combination Diet Dysphagia Diet Level 2: Mechan Altered; Nectar Thickened Liquids (pre-thickened or use instant food thickener)     Was Isosource 1.5 60 ml/h x 12 h, then starting at HS on 9/30----  If pt eats <25% of the meal give full bolus: 325 mL of isosource 1.5 via gtube which provides 487 kcal, 22 g protein, 57 g CHO, 5.6 g fiber, and 247 mL free water              If pt eats 25-50% of meal provide half bolus: 162ml of isosource 1.5 via gtube which provides  244kcal, 11 g protein, 29 g CHO, 2.8 g fiber, and 124 mL free water.              If pt eats >50% of meal give no bolus              Always give full bolus (325ml) at HS              Flush with 100ml water before/after each           PTA Regimen:   No regular BG monitoring at home  No restricted diet  No diabetic medications PTA - diet controlled            Review of Systems:   See interval hx          Medications:   No steroid         Physical Exam:     Gen: Alert, in NAD, resting in bed, son visiting, helping w/ cares  HEENT: NC/AT, hearing intact to conversational volume  Resp: Unlabored at rest, then very strong cough  Neuro: alert, oriented easier to understand  Psych: calm mood  /67 (BP Location: Right arm)   Pulse 73   Temp 98.4  F (36.9  C) (Axillary)   Resp 18   Ht 1.753 m (5' 9\")   Wt 110.8 kg (244 lb 4.8 oz)   SpO2 93%   BMI 36.08 kg/m               Data:     Lab Results   Component Value Date    A1C 7.5 09/10/2020              CBC RESULTS:   Recent Labs   Lab Test 09/27/20  0902  09/19/20  0635   WBC  --   --  9.5   RBC  --   --  3.67*   HGB  --   --  11.2*   HCT  --   --  33.9*   MCV  --   --  92   MCH  --   --  30.5   MCHC  --   --  33.0   RDW  --   --  12.5      < > 174    < > = values in this interval " not displayed.     Recent Labs   Lab Test 09/21/20  0620 09/20/20  0630    140   POTASSIUM 4.1 4.0   CHLORIDE 111* 110*   CO2 25 25   ANIONGAP 5 5   * 165*   BUN 21 22   CR 0.78 0.76   SELENA 8.6 8.5     Liver Function Studies -   Recent Labs   Lab Test 09/07/20  2355   PROTTOTAL 7.4   ALBUMIN 3.6   BILITOTAL 0.9   ALKPHOS 67   AST 23   ALT 28     Lab Results   Component Value Date    INR 1.17 09/21/2020    INR 1.03 09/07/2020       Rin Izquierdo APRN [facility] [phone]  Diabetes Management job code [id number]

## 2020-10-01 NOTE — PLAN OF CARE
OT: Initiated NMES with L shoulder subluxation protocol. Pt tolerated well. Pt c/o increased pain in tailbone throughout session while sitting in w/c. Educated pt and son (present during session) on importance of repositioning and weight shifting while in w/c and bed every 15 minutes. Ordered aquacell heat pad as well for pain relief.     Pt is aware of his L neglect and responds well to cueing to scan environment to see objects on L side.

## 2020-10-01 NOTE — PLAN OF CARE
Visuospatial: Patient requiring frequent prompts to attend to L side in all tasks. Attempted to complete number task wkst, but was too small. When asked to locate large numbers on a page and various cards when spread out, patient still not attending to L side w/o cueing.    Expression: Patient required continuous cues to utilize dysarthria strategies to be intelligible. Cues to be big and loud, overarticulate, and take deep breaths were used. Patient about 50% intelligible out of context in casual conversation.    Dysphagia: Patient drinking NTL during session. Two large productive coughing episodes, however, timing does not seem to be related to NTL intake.Oral cavity appears relatively clean, despite thrush present.

## 2020-10-01 NOTE — PROGRESS NOTES
CLINICAL NUTRITION SERVICES - REASSESSMENT NOTE     Nutrition Prescription    RECOMMENDATIONS FOR MDs/PROVIDERS TO ORDER:  None today     Malnutrition Status:    Patient does not meet two of the established criteria necessary for diagnosing malnutrition but is at risk for malnutrition    Recommendations already ordered by Registered Dietitian (RD):  None today - continue TF and calorie count as ordered     Future/Additional Recommendations:  Monitor calorie count and make further adjustments to TF as appropriate   Monitor weight and lab trends   Diet per SLP      EVALUATION OF THE PROGRESS TOWARD GOALS   Diet: Dysphagia Level 2:  Mechanically Altered, Nectar Thick liquids   Supplement: Magic cup BID with lunch and dinner  Calorie Count:  9/30: 1140 kcal and 43 g protein (3 meals)  Per nursing notes: Pt received 160 ml TF at dinner as took only 50% of dinner, and HS TF given as ordered for an additional estimated 730 kcal, 32 gm protein.    Nutrition Support: Intermittent bolus G-TF with Isosource 1.5, nursing to give 325 ml after meals if pt eats <25%, 162 ml if pt eats 25-50%, no bolus if pt eats >50%, always give 325 ml at HS    Intake/TF tolerance:Total intake on 9/30 with po and TF: 1870 kcal, 75 gm protein. Pt appears to be tolerating transition to bolus TF thus far per nursing notes and family report. RD reviewed oral intakes and TF plan of care with family at bedside, pt asleep so did not disturb. Reviewed how oral intakes seem to be going well thus far, so possible goal for pt can be to work on transitioning off tube feeding, pt may only need water flushes upon discharge, tube noted to be placed on 9/22, reviewed general [place] that G-tubes need to remain in place for 4-6 weeks prior to removal, pt's family verbalized understanding and agreement with RD's suggested plan of care.      NEW FINDINGS   MAR reviewed. Labs reviewed.     10/01/20 0624 110.8 kg (244 lb 4.8 oz)   09/30/20 0646 104.1 kg (229  lb 8 oz)   09/29/20 0647 104.5 kg (230 lb 4.8 oz)   09/28/20 0649 105.3 kg (232 lb 1.6 oz)   09/26/20 0634 105.5 kg (232 lb 8 oz)   09/25/20 0700 104.9 kg (231 lb 4.8 oz)   09/24/20 1522 106.5 kg (234 lb 11.2 oz)     115.8 kg (255 lb 3.2 oz) 07/29/2020 Per CE     114.3 kg (252 lb) 03/20/2019 Per CE     Weight assessment: 4.3% weight loss in not quite 3 months. Noted weights varying during ARU admission, could be multifactorial in the setting of accuracy vs fluid status changes (noted trace edema to extremities) vs true weight loss.     MALNUTRITION  % Intake: Intake does not meet criteria as pt is being supported by EN   % Weight Loss: Weight loss does not meet criteria  Subcutaneous Fat Loss: None observed (visual only)   Muscle Loss: None observed (visual only)   Fluid Accumulation/Edema: Trace per flow sheets   Malnutrition Diagnosis: Patient does not meet two of the established criteria necessary for diagnosing malnutrition but is at risk for malnutrition    Previous Goals   Total avg nutritional intake to meet a minimum of 25 kcal/kg and 1 g PRO/kg daily (per dosing wt 80.7 kg).  Evaluation: Met    Previous Nutrition Diagnosis  Inadequate oral intake related to decreased appetite, fatigue, and altered consistency diet as evidenced by calorie count results and need for EN to meet nutritional needs  Evaluation: No change    CURRENT NUTRITION DIAGNOSIS  Inadequate oral intake related to decreased appetite, fatigue, and altered consistency diet as evidenced by calorie count results and need for EN to meet nutritional needs    INTERVENTIONS  Implementation  Calorie count   Enteral nutrition/flushes - continue bolus feedings and water flushes as ordered   Medical food supplement therapy    Goals  Total avg nutritional intake to meet a minimum of 25 kcal/kg and 1.0 g PRO/kg daily (per dosing wt 80.7 kg).    Monitoring/Evaluation  Progress toward goals will be monitored and evaluated per protocol.    Adia Gamboa RD,  LD

## 2020-10-01 NOTE — PLAN OF CARE
"Switched w/c today from TIS to standard w/ solid back, he was able to sit upright in it.  Need to get a different cushion for him, the one he is on, [employer] Matrix, is not comfortable for him but is currently the only 20\" wide cushion available.  "

## 2020-10-02 ENCOUNTER — APPOINTMENT (OUTPATIENT)
Dept: SPEECH THERAPY | Facility: CLINIC | Age: 70
End: 2020-10-02
Payer: COMMERCIAL

## 2020-10-02 ENCOUNTER — APPOINTMENT (OUTPATIENT)
Dept: OCCUPATIONAL THERAPY | Facility: CLINIC | Age: 70
End: 2020-10-02
Payer: COMMERCIAL

## 2020-10-02 ENCOUNTER — APPOINTMENT (OUTPATIENT)
Dept: PHYSICAL THERAPY | Facility: CLINIC | Age: 70
End: 2020-10-02
Payer: COMMERCIAL

## 2020-10-02 LAB
GLUCOSE BLDC GLUCOMTR-MCNC: 144 MG/DL (ref 70–99)
GLUCOSE BLDC GLUCOMTR-MCNC: 190 MG/DL (ref 70–99)
GLUCOSE BLDC GLUCOMTR-MCNC: 219 MG/DL (ref 70–99)
GLUCOSE BLDC GLUCOMTR-MCNC: 238 MG/DL (ref 70–99)
GLUCOSE BLDC GLUCOMTR-MCNC: 243 MG/DL (ref 70–99)

## 2020-10-02 PROCEDURE — 97112 NEUROMUSCULAR REEDUCATION: CPT | Mod: GP | Performed by: PHYSICAL THERAPIST

## 2020-10-02 PROCEDURE — 250N000012 HC RX MED GY IP 250 OP 636 PS 637: Performed by: NURSE PRACTITIONER

## 2020-10-02 PROCEDURE — 128N000003 HC R&B REHAB

## 2020-10-02 PROCEDURE — 99233 SBSQ HOSP IP/OBS HIGH 50: CPT | Performed by: PHYSICAL MEDICINE & REHABILITATION

## 2020-10-02 PROCEDURE — 96372 THER/PROPH/DIAG INJ SC/IM: CPT | Performed by: NURSE PRACTITIONER

## 2020-10-02 PROCEDURE — 250N000013 HC RX MED GY IP 250 OP 250 PS 637: Performed by: PHYSICAL MEDICINE & REHABILITATION

## 2020-10-02 PROCEDURE — 97129 THER IVNTJ 1ST 15 MIN: CPT | Performed by: SPEECH-LANGUAGE PATHOLOGIST

## 2020-10-02 PROCEDURE — 97535 SELF CARE MNGMENT TRAINING: CPT | Mod: GO

## 2020-10-02 PROCEDURE — 97130 THER IVNTJ EA ADDL 15 MIN: CPT | Performed by: SPEECH-LANGUAGE PATHOLOGIST

## 2020-10-02 PROCEDURE — 97530 THERAPEUTIC ACTIVITIES: CPT | Mod: GP | Performed by: PHYSICAL THERAPIST

## 2020-10-02 PROCEDURE — 92526 ORAL FUNCTION THERAPY: CPT | Mod: GN | Performed by: SPEECH-LANGUAGE PATHOLOGIST

## 2020-10-02 PROCEDURE — 999N001017 HC STATISTIC GLUCOSE BY METER IP

## 2020-10-02 RX ADMIN — CEPHALEXIN 500 MG: 500 CAPSULE ORAL at 06:15

## 2020-10-02 RX ADMIN — CEPHALEXIN 500 MG: 500 CAPSULE ORAL at 18:10

## 2020-10-02 RX ADMIN — AMLODIPINE BESYLATE 20 MG: 10 TABLET ORAL at 08:40

## 2020-10-02 RX ADMIN — FUROSEMIDE 40 MG: 20 TABLET ORAL at 08:40

## 2020-10-02 RX ADMIN — ACETAMINOPHEN 650 MG: 325 TABLET, FILM COATED ORAL at 20:41

## 2020-10-02 RX ADMIN — INSULIN HUMAN 18 UNITS: 100 INJECTION, SUSPENSION SUBCUTANEOUS at 21:44

## 2020-10-02 RX ADMIN — MULTIPLE VITAMINS W/ MINERALS TAB 1 TABLET: TAB at 18:10

## 2020-10-02 RX ADMIN — FUROSEMIDE 40 MG: 20 TABLET ORAL at 16:41

## 2020-10-02 RX ADMIN — ASPIRIN 81 MG: 81 TABLET, COATED ORAL at 08:40

## 2020-10-02 RX ADMIN — INSULIN ASPART 1 UNITS: 100 INJECTION, SOLUTION INTRAVENOUS; SUBCUTANEOUS at 21:44

## 2020-10-02 RX ADMIN — CEPHALEXIN 500 MG: 500 CAPSULE ORAL at 12:13

## 2020-10-02 RX ADMIN — NYSTATIN 500000 UNITS: 500000 SUSPENSION ORAL at 16:42

## 2020-10-02 RX ADMIN — NYSTATIN 500000 UNITS: 500000 SUSPENSION ORAL at 20:41

## 2020-10-02 RX ADMIN — CEPHALEXIN 500 MG: 500 CAPSULE ORAL at 00:33

## 2020-10-02 RX ADMIN — SACUBITRIL AND VALSARTAN 1 TABLET: 49; 51 TABLET, FILM COATED ORAL at 08:43

## 2020-10-02 RX ADMIN — INSULIN ASPART 5 UNITS: 100 INJECTION, SOLUTION INTRAVENOUS; SUBCUTANEOUS at 18:14

## 2020-10-02 RX ADMIN — NYSTATIN 500000 UNITS: 500000 SUSPENSION ORAL at 08:43

## 2020-10-02 RX ADMIN — ACETAMINOPHEN 650 MG: 325 TABLET, FILM COATED ORAL at 12:13

## 2020-10-02 RX ADMIN — ATORVASTATIN CALCIUM 40 MG: 40 TABLET, FILM COATED ORAL at 08:43

## 2020-10-02 RX ADMIN — METOPROLOL TARTRATE 50 MG: 50 TABLET, FILM COATED ORAL at 08:43

## 2020-10-02 RX ADMIN — FAMOTIDINE 20 MG: 20 TABLET ORAL at 20:41

## 2020-10-02 RX ADMIN — NYSTATIN 500000 UNITS: 500000 SUSPENSION ORAL at 12:13

## 2020-10-02 RX ADMIN — SACUBITRIL AND VALSARTAN 1 TABLET: 49; 51 TABLET, FILM COATED ORAL at 20:41

## 2020-10-02 RX ADMIN — VALSARTAN 320 MG: 160 TABLET ORAL at 08:43

## 2020-10-02 RX ADMIN — METOPROLOL TARTRATE 50 MG: 50 TABLET, FILM COATED ORAL at 20:40

## 2020-10-02 RX ADMIN — INSULIN ASPART 3 UNITS: 100 INJECTION, SOLUTION INTRAVENOUS; SUBCUTANEOUS at 13:08

## 2020-10-02 RX ADMIN — INSULIN HUMAN 18 UNITS: 100 INJECTION, SUSPENSION SUBCUTANEOUS at 08:43

## 2020-10-02 RX ADMIN — EZETIMIBE 10 MG: 10 TABLET ORAL at 08:40

## 2020-10-02 RX ADMIN — LEVOTHYROXINE SODIUM 50 MCG: 25 TABLET ORAL at 08:40

## 2020-10-02 NOTE — PROGRESS NOTES
"IP Diabetes Management  Daily Note           Assessment and Plan:   HPI: Jorje Teran is a 71 y/o male with past medical history of HTN, HLD, DM II, SHELDON, hypothyroidism, and morbid obesity who was found down, found to have a R MCA infarct and R carotid atherosclerosis, now admitted to ARU 9/24 for intensive therapies, ongoing medical management, and rehabilitative nursing care.  Hyperglycemia exacerbated by enteral feeds.       Assessment:     1)Type 2 Diabetes Mellitus    Plan:       - NPH increased to 18 from 15 units this morning  - NPH 18  units at HS, when no TF will infuse   - aspart high resistace QAC and HS, all for >/=140  - aspart increase to 1 unit per 6 grams carb AC, HS (TF sched for HS) and PRN  - patient care order to alert RN to PRN aspart in case TF at different time     -BG QAC, HS 0200  - hypoglycemia protocol         Interval History and Assessment: interval glucose trend reviewed: BG trend continues to be above target.  NPH adjusted.    Son in with patient today and informs writer they have ordered a Free Style Stephan for CGM \"so he doesn't have to have his finger poked all the time\"  Clarified the policy for finger BGM for all clinical decision making and CGM cannot take the place.  Verbalized disappointment and understanding    -  Eating has improved.  Good appetite.    Current nutritional intake and type: Orders Placed This Encounter      Combination Diet Dysphagia Diet Level 2: Mechan Altered; Nectar Thickened Liquids (pre-thickened or use instant food thickener)     Was Isosource 1.5 60 ml/h x 12 h, now:  If pt eats <25% of the meal give full bolus: 325 mL of isosource 1.5 via gtube which provides 487 kcal, 22 g protein, 57 g CHO, 5.6 g fiber, and 247 mL free water              If pt eats 25-50% of meal provide half bolus: 162ml of isosource 1.5 via gtube which provides  244kcal, 11 g protein, 29 g CHO, 2.8 g fiber, and 124 mL free water.              If pt eats >50% of meal give no " bolus              Always give full bolus (325ml) at HS              Flush with 100ml water before/after each      Steroid planning: no    PTA Diabetes Regimen:   No regular BG monitoring at home  No restricted diet  No diabetic medications PTA - diet controlled    During [facility] admission, utilized glargine 10 units BID w/ fair basal coverage while on Dysphagia diet only (no TF yet)    Discharge Planning: ELOS 28 days           Diabetes History:   Type of Diabetes: Type 2 Diabetes Mellitus  Lab Results   Component Value Date    A1C 7.5 09/10/2020              Review of Systems:   Constitutional:   No fever, no chills  ENT/Mouth:   No hearing changes, no ear pain, no sore throat, no rhinorrhea, no difficulty swallowing  Eyes:  No eye pain, no discharge, no vision changes  CV:   No CP/SOB, no new edema  Resp:  No cough, no wheezing  GI:   No nausea, no vomiting, no constipation, no further loose stools/diarrhea  :  No dysuria, no frequency, no hematuria  Musk:  No joint swelling/pain, yes -  back pain/intermittently r/t positioning  Skin/heme:   No new rashes/bruises/open areas.  No pruritis  Neuro:   No new weakness, no new  numbness/tingling, no headache  Psych:   No new anxiety, no depression  Endocrine:  No polyuria, no polydipsia          Medications:     Current Facility-Administered Medications   Medication     acetaminophen (TYLENOL) tablet 650 mg     amLODIPine (NORVASC) tablet 20 mg     aspirin EC tablet 81 mg     atorvastatin (LIPITOR) tablet 40 mg     bacitracin ointment     bisacodyl (DULCOLAX) Suppository 10 mg     carboxymethylcellulose PF (REFRESH PLUS) 0.5 % ophthalmic solution 2 drop     cephALEXin (KEFLEX) capsule 500 mg     dextrose 10% infusion     glucose gel 15-30 g    Or     dextrose 50 % injection 25-50 mL    Or     glucagon injection 1 mg     ezetimibe (ZETIA) tablet 10 mg     famotidine (PEPCID) tablet 20 mg     furosemide (LASIX) tablet 40 mg     insulin aspart (NovoLOG) injection  "(RAPID ACTING)     insulin aspart (NovoLOG) injection (RAPID ACTING)     insulin aspart (NovoLOG) injection (RAPID ACTING)     insulin aspart (NovoLOG) injection (RAPID ACTING)     insulin isophane human (HumuLIN N PEN) injection 18 Units     insulin isophane human (HumuLIN N PEN) injection 18 Units     levothyroxine (SYNTHROID/LEVOTHROID) tablet 50 mcg     metoprolol tartrate (LOPRESSOR) tablet 50 mg     miconazole (MICATIN) 2 % powder     multivitamin w/minerals (THERA-VIT-M) tablet 1 tablet     nitroGLYcerin (NITROSTAT) sublingual tablet 0.4 mg     nystatin (MYCOSTATIN) suspension 500,000 Units     Patient is already receiving anticoagulation with heparin, enoxaparin (LOVENOX), warfarin (COUMADIN)  or other anticoagulant medication     polyethylene glycol (MIRALAX) Packet 17 g     sacubitril-valsartan (ENTRESTO) 49-51 MG per tablet 1 tablet     sodium chloride (PF) 0.9% PF flush 10 mL     sodium chloride (PF) 0.9% PF flush 10-20 mL     traZODone (DESYREL) tablet 50 mg     valsartan (DIOVAN) tablet 320 mg            Physical Exam:    /67 (BP Location: Right arm)   Pulse 74   Temp 99  F (37.2  C) (Axillary)   Resp 20   Ht 1.753 m (5' 9\")   Wt 110.8 kg (244 lb 4.8 oz)   SpO2 92%   BMI 36.08 kg/m    General:  pleasant, in no acute distress. Laying in bed - was repositioned to help with back discomfort  HEENT:  NC/AT. MMM, sclera not injected  Lungs:  unremarkable, no new cough, no SOB  ABD:  rounded, soft, non-tender  Skin:  warm and dry, no obvious lesions  Feet:    CMS intact to RLE, no movement to LLE (stroke)  MSK:   moving R sided extremities, L s/p stroke  Lymp:   trace LE edema  Mental Status:  Alert and oriented x3  Psych:   Cooperative, moves head to accommodate for vision field cut.          Data:     Recent Labs   Lab 10/02/20  0215 10/01/20  2348 10/01/20  2059 10/01/20  1708 10/01/20  1230 10/01/20  0725   * 238* 224* 184* 218* 205*     Lab Results   Component Value Date    WBC 9.3 " 09/30/2020    WBC 9.5 09/19/2020    WBC 9.7 09/18/2020    HGB 11.6 (L) 09/30/2020    HGB 11.2 (L) 09/19/2020    HGB 11.6 (L) 09/18/2020    HCT 36.3 (L) 09/30/2020    HCT 33.9 (L) 09/19/2020    HCT 34.6 (L) 09/18/2020    MCV 96 09/30/2020    MCV 92 09/19/2020    MCV 92 09/18/2020     09/30/2020     09/27/2020     09/22/2020     Lab Results   Component Value Date     09/21/2020     09/20/2020     09/19/2020    POTASSIUM 4.1 09/21/2020    POTASSIUM 4.0 09/20/2020    POTASSIUM 3.9 09/19/2020    CHLORIDE 111 (H) 09/21/2020    CHLORIDE 110 (H) 09/20/2020    CHLORIDE 109 09/19/2020    CO2 25 09/21/2020    CO2 25 09/20/2020    CO2 24 09/19/2020     (H) 09/21/2020     (H) 09/20/2020     (H) 09/19/2020     Lab Results   Component Value Date    BUN 21 09/21/2020    BUN 22 09/20/2020    BUN 21 09/19/2020     No results found for: TSH  Lab Results   Component Value Date    AST 23 09/07/2020    ALT 28 09/07/2020    ALKPHOS 67 09/07/2020         I spent a total of 25 minutes bedside and on the inpatient unit managing glycemic care. Over 50% of my time on the unit was spent counseling the patient and/or coordinating care regarding acute hyperglycemia management.  See note for details.    To contact Endocrine Diabetes service:   From 8AM-4PM: page inpatient diabetes provider that is following the patient  For questions or updates from 4PM-8AM: page the diabetes job code for on call fellow: [phone]    CADE Sahu CNP   Inpatient Diabetes Management Service  Pager - [phone]  Friday - Monday 0800 - 1600 hrs  Diabetes Management Team job code: [phone]

## 2020-10-02 NOTE — PLAN OF CARE
FOCUS/GOAL  Bowel management, Bladder management, Nutrition/Feeding/Swallowing precautions, and Pain management    ASSESSMENT, INTERVENTIONS AND CONTINUING PLAN FOR GOAL:  Alert and oriented x4, pt ate adequate amounts of his meals an did not need extra tube feeding to supplement meals. Pt ate breakfast before BG level checked this AM, sliding scale not given. Lunch BG was 190, insulin given as ordered. G-tube leaked a moderate amount of breakfast eaten, pt was cleaned up and put back in bed. Pt reports pain once from sitting in the chair, tylenol given x1. No other concerns.

## 2020-10-02 NOTE — PLAN OF CARE
Discharge Planner PT   Patient plan for discharge: home w/ hired assist  Current status: lift and w/c dependent  Barriers to return to prior living situation: level of care predicted   Recommendations for discharge: progress (L) body wt acceptance, upright sitting, dynamic trunk  Rationale for [place]s: pt presentation, family goals       Entered by: Becky Sharma 10/02/2020 11:43 AM

## 2020-10-02 NOTE — PROGRESS NOTES
Calorie Counts    9/29: 611 kcal and 25 g protein (3 meals, 1 supplements)-75% breakfast per RN notes, lunch ticket saved, and <25% dinner per RN note. 25% magic cup with lunch  9/30: 1140 kcal and 43 g protein (3 meals)  10/1: 1429 kcal and 57 g protein (3 meals, 0 supplements)    3 day average PO intake = 1060 kcal (53% minimum energy needs) and 42 g protein (64% minimum protein needs)    Plan/Recommendations: See RD follow up assessment note from 10/1 for full details, RD will re-order calorie count to continue over weekend, and follow up on calorie counts on Monday(10/5). If po intakes continue to improve, RD will further wean TF early next week.     Adia Gamboa, RD, LD     Annamaria Castano MS, RD, LDN  Unit Pager [phone]

## 2020-10-02 NOTE — PLAN OF CARE
Completed oral motor excs and passive stretching- lips on the left side - limited movement on the left with retraction and protrusion. Worked on thinking 'big and loud' for increasing vocal volume as well as timing with speaking on exhalation for breath support and loudness; also practiced exaggerated articulation for multisyllabic words- needs mod to max cues to use. Compelted verbal reasoning task-- at 4/6 accuracy but needing max assist with visual scanning for this task using highlighted left border and enlarged print    Swallowing: trialed a DD3 item- ham and cheese sandwich as a snack. Pt with slower oral prep but was successful with adequate oral clearance on the left- used oral swab to check. Swallow initiation remains delayed with both solids and nectar thick liquids. No aspiration s/s with nectar thick liquids. 2x did have a mild throat clear with solids- pt indicating some mild sensation of pharyngeal retention- clears when f/u with sips of nectar thick liquids. Recommend continue with current DD2 diet with nectar thick liquids but will plan to trial DD3 items again tomorrow

## 2020-10-02 NOTE — PLAN OF CARE
FOCUS/GOAL  Bowel management, Bladder management, and Medication management    ASSESSMENT, INTERVENTIONS AND CONTINUING PLAN FOR GOAL:  Pt was A/oriented to self and situation, able to use call light and made needs known to staff. A2 with liko lift. Denied pain when asked. Ate 100% of meal. Had bolus TF of 325ml at bedtime with 30ml water flushes, pt tolerated well. BG monitored. Writer was concerned with pt's insulin doses at bedtime. Dr. Dasilva, resident endocrinologist was contacted. Dr. Dasilva told the writer that when bolus TF are given, insulin should also be given. Thus, writer gave the insulin for the bolus TF. Pt is currently using condom catheter, no BM this shift, LBM-10/1/20. Alarm on, call light within reach. Will continue with current POC.

## 2020-10-02 NOTE — PROGRESS NOTES
[facility]   Acute Rehabilitation Unit  Daily progress note  CC:   Stroke Ischemic 01.1 (L) Body Involvement (R) Brain Stroke; L sided weakness due to MCA infarct, R MCA occlusion, and R carotid atherosclerosis    The patient currently performs bed mobility with Max A x 2 and bed rail. Once sitting at EOB the pt is able to sit with anywhere from SBA/CGA, to Mod A x 2 depending on fatigue and task. The patient is able to use Gretchen Stedy with Max A x 2, however unable to achieve a full stand. The patient is currently dependent on mechanical lift for bed to and from chair.     The patient's alertness and participation is improving and he will tolerate intensive therapies in the ARC setting. He is able to follow 2 step directions with 85%, and intelligibility with short 2-3 word phrases needs mod/max cues to increase breath support    OT: pt able to sit eob 50 min with sba to cga with pt holding rail with r hand 50 % of time sba with reaching with r hand for g/h eatingand u/b dressing.OT mod a feeding self with sitting eob. G/H:sba sitting eob cues to attend to L with face, chest able to wash l arm with r ,assit with chest and r arm. U/B dressing mod a sitting eob in hospial gown    SLP: Completed oral motor excs and passive stretching- lips on the left side - limited movement on the left with retraction and protrusion. Worked on thinking 'big and loud' for increasing vocal volume as well as timing with speaking on exhalation for breath support and loudness; also practiced exaggerated articulation for multisyllabic words- needs mod to max cues to use. Compelted verbal reasoning task-- at 4/6 accuracy but needing max assist with visual scanning for this task using highlighted left border and enlarged print     S:   Son here. Reviewed his status. Strategies to improve function. Likes Coffe with Thickener!     Speaking better. Dense left hemiplegia, left field cut +. Compensates by  "turning.  On TF. BS have been high at times.. Endo following.  NDD 2 Valatie. TF at 60 ml/hour Free H2O 100 ml q4h. Trial of NND3.    Pt was incontinent of small soft BM.. C/O G tube site discomfort. GT site was red and tender to touch  with purulent moderate drainage noted , Site cleaned with soap and water. Now on Keflex .  IS and Pneumoboots added.    ROS: Denies HA, nausea, pain. Condom Cath      [unfilled]   Scheduled meds    amLODIPine  20 mg Oral Daily     aspirin  81 mg Oral Daily     atorvastatin  40 mg Oral Daily     cephALEXin  500 mg Oral Q6H TORI     ezetimibe  10 mg Oral Daily     famotidine  20 mg Oral BID     furosemide  40 mg Oral BID     insulin aspart   Subcutaneous TID w/meals     insulin aspart   Subcutaneous At Bedtime     insulin aspart  1-10 Units Subcutaneous 4x Daily w/meals     insulin isophane human  18 Units Subcutaneous QAM     insulin isophane human  18 Units Subcutaneous At Bedtime     levothyroxine  50 mcg Oral Daily     metoprolol tartrate  50 mg Oral BID     multivitamin w/minerals  1 tablet Oral Daily     nystatin  500,000 Units Swish & Spit 4x Daily     sacubitril-valsartan  1 tablet Oral BID     sodium chloride (PF)  10 mL Intracatheter Q7 Days     valsartan  320 mg Oral Daily       PRN meds:  acetaminophen, bacitracin, bisacodyl, carboxymethylcellulose PF, dextrose, glucose **OR** dextrose **OR** glucagon, insulin aspart, miconazole, nitroGLYcerin, - MEDICATION INSTRUCTIONS -, polyethylene glycol, sodium chloride (PF), traZODone      PHYSICAL EXAM  /63   Pulse 89   Temp 99  F (37.2  C) (Axillary)   Resp 20   Ht 1.753 m (5' 9\")   Wt 110.8 kg (244 lb 4.8 oz)   SpO2 92%   BMI 36.08 kg/m    Alert oriented x3  Respiratory: Clear to auscultation bilaterally, no crackles or wheezing  Cardiovascular: S1 and S2, RRR, and no murmur noted  GI: soft, non-distended, non-tender. G tube + Discharge + BS +  Skin/Integumen: No rashes, trace lower extremity edema noted  Neuro : " Left lower Facial droop + Left Field cut +  Dense left-sided hemiplegia with neglect noted  Toes mute  Vocalizes, low volume   Extremities. Moves right side well. Edema +  LABS  Last Comprehensive Metabolic Panel:  Sodium   Date Value Ref Range Status   09/21/2020 141 133 - 144 mmol/L Final     Potassium   Date Value Ref Range Status   09/21/2020 4.1 3.4 - 5.3 mmol/L Final     Chloride   Date Value Ref Range Status   09/21/2020 111 (H) 94 - 109 mmol/L Final     Carbon Dioxide   Date Value Ref Range Status   09/21/2020 25 20 - 32 mmol/L Final     Anion Gap   Date Value Ref Range Status   09/21/2020 5 3 - 14 mmol/L Final     Glucose   Date Value Ref Range Status   09/21/2020 178 (H) 70 - 99 mg/dL Final     Urea Nitrogen   Date Value Ref Range Status   09/21/2020 21 7 - 30 mg/dL Final     Creatinine   Date Value Ref Range Status   09/21/2020 0.78 0.66 - 1.25 mg/dL Final     GFR Estimate   Date Value Ref Range Status   09/21/2020 >90 >60 mL/min/[1.73_m2] Final     Comment:     Non  GFR Calc  Starting 12/18/2018, serum creatinine based estimated GFR (eGFR) will be   calculated using the Chronic Kidney Disease Epidemiology Collaboration   (CKD-EPI) equation.       Calcium   Date Value Ref Range Status   09/21/2020 8.6 8.5 - 10.1 mg/dL Final        CBC RESULTS:   Recent Labs   Lab Test 09/30/20  0607   WBC 9.3   RBC 3.79*   HGB 11.6*   HCT 36.3*   MCV 96   MCH 30.6   MCHC 32.0   RDW 12.9             Glucose Values Latest Ref Rng & Units 10/1/2020 10/1/2020 10/1/2020 10/1/2020 10/1/2020 10/2/2020 10/2/2020   Bedside Glucose (mg/dl )  - -- -- -- -- -- -- --   GLUCOSE 70 - 99 mg/dL 205(H) 218(H) 184(H) 224(H) 238(H) 219(H) 190(H)   Some recent data might be hidden     ASSESSMENT AND PLAN    Jorje Teran is a 70 year old  hand dominant male with Stroke Ischemic 01.1 (L) Body Involvement (R) Brain Stroke; L sided weakness due to MCA infarct, R MCA occlusion, and R carotid atherosclerosis  1. PT,  OT and SLP 60 minutes of each on a daily basis, in addition to rehab nursing and close management of physiatrist.       2. Impairment of ADL's: OT for 60 minutes daily to work on ADL re-training such as grooming, self cares and bathing.       3. Impairment of mobility:  PT for 60 minutes daily to work on neuromuscular re-education focusing on strength, balance, coordination, and endurance.       4. Impairment of cognition/language/swallow:  SLP for 60 minutes daily to work on cognitive and linguistic deficits.     1. Rehab RN for med administration, bowel regimen, glucose monitoring and wound care.  2.   3. Medical Conditions  CVA with dense left hemiparesis  CT with filling defect within the distal R ICA extending into the R MCA and occlusion of the R MCA at the M1 segment. CTA with severe plaquing at R carotid bifurcation with filling defect in the prox R ICA suggestive of thrombus contributing to approximately 80% stenosis. 60% stenosis of the proximal L ICA. TTE with bubble -> Normal left ventricular size and function. Left ventricular ejection fraction of 55-60%. No segmental wall motion abnormalities noted.   - Lipids 7/29/20- LDL 78, HDL 56, , T chol 164, hemoglobin A1c 7.1% H, TSH is 2.05.  - remains with significant dysarthria, facial droop and dense left sided hemiparesis  - ASA 81 mg daily PO/NJ  - atorvastatin 40 mg daily, Zetia 10 mg po daily  - BP management as below; - goal for SBPs < 160       Dysphagia  - currently on DD2 with nectar thickened liquids   -PEG tube to be placed on 9/22, currently patient is on tube feeding  If questions or problems arise regarding tube function (e.g. leaking, dislodges, etc.) Contact Interventional Radiology department 24 hours a day.   For procedures that were done at [facility]:  8 AM - 4 PM Monday through Friday Contact   [phone]  For afterhours and weekends: [phone]   Ask for the Interventional Radiologist on call.     PeG site Discomfort:  Treat infection. Clean site. Add Keflex 500 mg qid x 7 days.  Tylenol prn.    BS high. Endo following.  CAD s/p CABG x 3, x 1 in 2015  HTN continue Norvasc /Entresto / Metoprolol 50 mg BID and Valsartan   HLD: On Lipitor  Hypernatremia- resolved     DM II  Recent A1C at 7.1 7/2020.  BG goal is < 180.- started on lantus Endo following.    US at [facility] negative for DVT     SHELDON  - states he could not sleep with CPAP as outpatient  - he would benefit for treating SHELDON   ? CPAP at night    Hypothyroidism  - resumed PTA Levothyroxine 50 mcg daily  TSH checked 7/29/2020 at 2.05     Morbid obesity  BMI noted  7/202 at ~38. Will need to encourage healthy lifestyle and weight loss with recovery     Diet: Snacks/Supplements Adult: Other; thickened smoothie with meals  (RD); With Meals  Calorie Counts     DVT Prophylaxis: Pneumatic Compression Devices  Code Status: Full Code      2. Adjustment to disability:  Clinical psychology to eval and treat when ready  3. FEN: On NDD 2 Rice +TF, Bolus versus nocturnal supplements.  4. Bowel: Javed meds  5. Bladder: Monitor  6. GI Prophylaxis:   7. Code: Full  8. Disposition: Home with family and home care  9. ELOS:  28 days  10. Rehab prognosis:  Fair  11. Follow up Appointments on Discharge: The following labs/tests are recommended: cbc ,basic metabolic panel in 4-5 days, also get mag and phos in 3-4 days .need follow up with neurology as scheduled. Follows with Dr. Vincent with [facility].      Sudarshan Fuentes MD   Physical Medicine & Rehabilitation

## 2020-10-02 NOTE — PLAN OF CARE
FOCUS/GOAL  Medical management    ASSESSMENT, INTERVENTIONS AND CONTINUING PLAN FOR GOAL:  Had a good night.Toileted x1 this shift with total assist of 2, had   A medium loose. Condom catheter in place. GT site still irritated with purulent drainage. Patient turned and reposition during the shift. BG at 0200 was within patient's baseline.Continue with POC.

## 2020-10-02 NOTE — PLAN OF CARE
OT pt able to sit eob 50 min with sba to cga with pt holding rail with r hand 50 % of time sba with reaching with r hand for g/h eatingand u/b dressing.OT mod a feeding self with sitting eob. G/H:sba sitting eob cues to attend to L with face, chest able to wash l arm with r ,assit with chest and r arm. U/B dressing mod a sitting eob in hospial gown

## 2020-10-03 ENCOUNTER — APPOINTMENT (OUTPATIENT)
Dept: SPEECH THERAPY | Facility: CLINIC | Age: 70
End: 2020-10-03
Payer: COMMERCIAL

## 2020-10-03 ENCOUNTER — APPOINTMENT (OUTPATIENT)
Dept: PHYSICAL THERAPY | Facility: CLINIC | Age: 70
End: 2020-10-03
Payer: COMMERCIAL

## 2020-10-03 ENCOUNTER — APPOINTMENT (OUTPATIENT)
Dept: OCCUPATIONAL THERAPY | Facility: CLINIC | Age: 70
End: 2020-10-03
Payer: COMMERCIAL

## 2020-10-03 LAB
GLUCOSE BLDC GLUCOMTR-MCNC: 179 MG/DL (ref 70–99)
GLUCOSE BLDC GLUCOMTR-MCNC: 181 MG/DL (ref 70–99)
GLUCOSE BLDC GLUCOMTR-MCNC: 187 MG/DL (ref 70–99)
GLUCOSE BLDC GLUCOMTR-MCNC: 190 MG/DL (ref 70–99)
GLUCOSE BLDC GLUCOMTR-MCNC: 230 MG/DL (ref 70–99)
PLATELET # BLD AUTO: 153 10E9/L (ref 150–450)

## 2020-10-03 PROCEDURE — 97110 THERAPEUTIC EXERCISES: CPT | Mod: GO

## 2020-10-03 PROCEDURE — 250N000013 HC RX MED GY IP 250 OP 250 PS 637: Performed by: PHYSICAL MEDICINE & REHABILITATION

## 2020-10-03 PROCEDURE — 250N000012 HC RX MED GY IP 250 OP 636 PS 637: Performed by: NURSE PRACTITIONER

## 2020-10-03 PROCEDURE — 97112 NEUROMUSCULAR REEDUCATION: CPT | Mod: GP

## 2020-10-03 PROCEDURE — 999N001017 HC STATISTIC GLUCOSE BY METER IP

## 2020-10-03 PROCEDURE — 97530 THERAPEUTIC ACTIVITIES: CPT | Mod: GP

## 2020-10-03 PROCEDURE — 99233 SBSQ HOSP IP/OBS HIGH 50: CPT | Performed by: PHYSICAL MEDICINE & REHABILITATION

## 2020-10-03 PROCEDURE — 128N000003 HC R&B REHAB

## 2020-10-03 PROCEDURE — 97535 SELF CARE MNGMENT TRAINING: CPT | Mod: GO

## 2020-10-03 PROCEDURE — 96372 THER/PROPH/DIAG INJ SC/IM: CPT | Performed by: NURSE PRACTITIONER

## 2020-10-03 PROCEDURE — 92507 TX SP LANG VOICE COMM INDIV: CPT | Mod: GN | Performed by: SPEECH-LANGUAGE PATHOLOGIST

## 2020-10-03 PROCEDURE — 85049 AUTOMATED PLATELET COUNT: CPT | Performed by: PHYSICAL MEDICINE & REHABILITATION

## 2020-10-03 PROCEDURE — 96372 THER/PROPH/DIAG INJ SC/IM: CPT | Performed by: CLINICAL NURSE SPECIALIST

## 2020-10-03 PROCEDURE — 36415 COLL VENOUS BLD VENIPUNCTURE: CPT | Performed by: PHYSICAL MEDICINE & REHABILITATION

## 2020-10-03 PROCEDURE — 97110 THERAPEUTIC EXERCISES: CPT | Mod: GP

## 2020-10-03 PROCEDURE — 92526 ORAL FUNCTION THERAPY: CPT | Mod: GN | Performed by: SPEECH-LANGUAGE PATHOLOGIST

## 2020-10-03 PROCEDURE — 250N000012 HC RX MED GY IP 250 OP 636 PS 637: Performed by: CLINICAL NURSE SPECIALIST

## 2020-10-03 RX ADMIN — NYSTATIN 500000 UNITS: 500000 SUSPENSION ORAL at 12:26

## 2020-10-03 RX ADMIN — INSULIN ASPART 3 UNITS: 100 INJECTION, SOLUTION INTRAVENOUS; SUBCUTANEOUS at 21:46

## 2020-10-03 RX ADMIN — FUROSEMIDE 40 MG: 20 TABLET ORAL at 17:00

## 2020-10-03 RX ADMIN — CEPHALEXIN 500 MG: 500 CAPSULE ORAL at 05:46

## 2020-10-03 RX ADMIN — SACUBITRIL AND VALSARTAN 1 TABLET: 49; 51 TABLET, FILM COATED ORAL at 21:34

## 2020-10-03 RX ADMIN — CEPHALEXIN 500 MG: 500 CAPSULE ORAL at 18:22

## 2020-10-03 RX ADMIN — SACUBITRIL AND VALSARTAN 1 TABLET: 49; 51 TABLET, FILM COATED ORAL at 08:23

## 2020-10-03 RX ADMIN — AMLODIPINE BESYLATE 20 MG: 10 TABLET ORAL at 08:24

## 2020-10-03 RX ADMIN — ATORVASTATIN CALCIUM 40 MG: 40 TABLET, FILM COATED ORAL at 08:24

## 2020-10-03 RX ADMIN — METOPROLOL TARTRATE 50 MG: 50 TABLET, FILM COATED ORAL at 08:25

## 2020-10-03 RX ADMIN — CEPHALEXIN 500 MG: 500 CAPSULE ORAL at 12:25

## 2020-10-03 RX ADMIN — NYSTATIN 500000 UNITS: 500000 SUSPENSION ORAL at 17:00

## 2020-10-03 RX ADMIN — FAMOTIDINE 20 MG: 20 TABLET ORAL at 21:33

## 2020-10-03 RX ADMIN — CEPHALEXIN 500 MG: 500 CAPSULE ORAL at 00:04

## 2020-10-03 RX ADMIN — NYSTATIN 500000 UNITS: 500000 SUSPENSION ORAL at 21:33

## 2020-10-03 RX ADMIN — METOPROLOL TARTRATE 50 MG: 50 TABLET, FILM COATED ORAL at 21:34

## 2020-10-03 RX ADMIN — INSULIN ASPART 2 UNITS: 100 INJECTION, SOLUTION INTRAVENOUS; SUBCUTANEOUS at 08:27

## 2020-10-03 RX ADMIN — LEVOTHYROXINE SODIUM 50 MCG: 25 TABLET ORAL at 08:26

## 2020-10-03 RX ADMIN — FUROSEMIDE 40 MG: 20 TABLET ORAL at 08:22

## 2020-10-03 RX ADMIN — MULTIPLE VITAMINS W/ MINERALS TAB 1 TABLET: TAB at 18:22

## 2020-10-03 RX ADMIN — INSULIN HUMAN 18 UNITS: 100 INJECTION, SUSPENSION SUBCUTANEOUS at 21:46

## 2020-10-03 RX ADMIN — INSULIN ASPART 4 UNITS: 100 INJECTION, SOLUTION INTRAVENOUS; SUBCUTANEOUS at 18:30

## 2020-10-03 RX ADMIN — EZETIMIBE 10 MG: 10 TABLET ORAL at 08:24

## 2020-10-03 RX ADMIN — ASPIRIN 81 MG: 81 TABLET, COATED ORAL at 08:23

## 2020-10-03 RX ADMIN — VALSARTAN 320 MG: 160 TABLET ORAL at 08:23

## 2020-10-03 RX ADMIN — INSULIN ASPART 2 UNITS: 100 INJECTION, SOLUTION INTRAVENOUS; SUBCUTANEOUS at 12:23

## 2020-10-03 RX ADMIN — INSULIN HUMAN 18 UNITS: 100 INJECTION, SUSPENSION SUBCUTANEOUS at 08:25

## 2020-10-03 RX ADMIN — FAMOTIDINE 20 MG: 20 TABLET ORAL at 08:23

## 2020-10-03 NOTE — PROGRESS NOTES
Pt is alert and oriented other than to time. He denied pain, although he does groan when rolled side to side. Pt did not require TF bolus with breakfast but did require 325 ml after lunch. Pt tolerated bolus well. Pt had large incontinent BM. Condom cath replaced once. Liko lift to transfer between w/c and bed. Son visited and shaved pt with electric razor. Pt took medications with applesauce. C/o acid reflex in afternoon, given thickened ginger ale which was effective.

## 2020-10-03 NOTE — PLAN OF CARE
OT: Completed L UE PROM at shoulder, elbow, and digits for 10 reps across all planes in order to improve R.  Completed face washing with set up while lying in bed. Pt. did well attending to left side. Noted increased fatigue today. Not able to sit EOB or complete rolling with assist of one. -15

## 2020-10-03 NOTE — PROGRESS NOTES
Diabetes Consult Daily  Progress Note          Assessment/Plan:     HPI:  Jorje Teran is a 71 y/o male with past medical history of HTN, HLD, DM II, SHELDON, hypothyroidism, and morbid obesity who was found down, found to have a R MCA infarct and R carotid atherosclerosis, now admitted to ARU 9/24 for intensive therapies, ongoing medical management, and rehabilitative nursing care.  Hyperglycemia exacerbated by enteral feeds.        Assessment:      1)Type 2 Diabetes Mellitus     Plan:                  - NPH 18 qAM    - NPH 18  units at HS, when no TF will infuse   - aspart high resistace QAC and HS, all for >/=140  - aspart increase to 1 unit per 6 grams carb AC, HS (TF sched for HS) and PRN  - patient care order to alert RN to PRN aspart in case TF at different time     -BG QAC, HS 0200  - hypoglycemia protocol     Outpatient diabetes follow up: ELOS 28 days  Plan discussed with patient, bedside RN           Interval History:     The last 24 hours progress and nursing notes reviewed.  No acute events this interval.  BG trending at target.   Eating well.  Working with therapies.      Recent Labs   Lab 10/03/20  0202 10/02/20  2118 10/02/20  1722 10/02/20  1221 10/02/20  0215 10/01/20  2348   * 144* 243* 190* 219* 238*           Nutrition:     Orders Placed This Encounter      Combination Diet Dysphagia Diet Level 2: Mechan Altered; Nectar Thickened Liquids (pre-thickened or use instant food thickener)  Other; thickened smoothie with meals  (RD); With Meals    Calorie Counts   Was Isosource 1.5 60 ml/h x 12 h, now:  If pt eats <25% of the meal give full bolus: 325 mL of isosource 1.5 via gtube which provides 487 kcal, 22 g protein, 57 g CHO, 5.6 g fiber, and 247 mL free water              If pt eats 25-50% of meal provide half bolus: 162ml of isosource 1.5 via gtube which provides  244kcal, 11 g protein, 29 g CHO, 2.8 g fiber, and 124 mL free water.              If pt eats >50% of  "meal give no bolus              Always give full bolus (325ml) at HS              Flush with 100ml water before/after each            PTA Regimen:     No regular BG monitoring at home  No restricted diet  No diabetic medications PTA - diet controlled     During [facility] admission, utilized glargine 10 units BID w/ fair basal coverage while on Dysphagia diet only (no TF yet)          Review of Systems:   Constitutional:   No fever, no chills  ENT/Mouth:   No hearing changes, no ear pain, no sore throat, no rhinorrhea, no difficulty swallowing  Eyes:  No eye pain, no discharge, no vision changes  CV:   No CP/SOB, no new edema  Resp:  No cough, no wheezing  GI:   No nausea, no vomiting, denies constipation, or diarrhea - c/o \"heartburn\"  :  No dysuria, no frequency, no hematuria  Musk:  No joint swelling/pain, No back pain  Skin/heme:   No new rashes/bruises/open areas.  No pruritis  Neuro:   No new weakness, no numbness/tingling, no headache  Psych:   No new anxiety, denies depression  Endocrine:  No polyuria, no polydipsia          Medications:   Steroid planning:  no         Physical Exam:     General:  pleasant, in no acute distress. resting comfortably in bed.   HEENT:  NC/AT. MMM, sclera not injected - visual field cut to L - compensates by turning head  Lungs:  unremarkable, no new cough, no SOB  ABD:   soft, obese, non-tender - g-tube  Skin:  warm and dry, no obvious lesions/rash  Feet:    CMS intact, PPP  MSK:   moving all extremities  Lymp:   trace LE edema  Mental Status:  Alert and oriented x2  Psych:   Cooperative, good eye contact, full affect          Data:     Lab Results   Component Value Date    A1C 7.5 09/10/2020          CBC RESULTS:   Recent Labs   Lab Test 10/03/20  0606 09/30/20  0607   WBC  --  9.3   RBC  --  3.79*   HGB  --  11.6*   HCT  --  36.3*   MCV  --  96   MCH  --  30.6   MCHC  --  32.0   RDW  --  12.9    230     Recent Labs   Lab Test 09/21/20  0620 09/20/20  0630    140 "   POTASSIUM 4.1 4.0   CHLORIDE 111* 110*   CO2 25 25   ANIONGAP 5 5   * 165*   BUN 21 22   CR 0.78 0.76   SELENA 8.6 8.5     Liver Function Studies -   Recent Labs   Lab Test 09/07/20  2355   PROTTOTAL 7.4   ALBUMIN 3.6   BILITOTAL 0.9   ALKPHOS 67   AST 23   ALT 28     Lab Results   Component Value Date    INR 1.17 09/21/2020    INR 1.03 09/07/2020         CADE Sahu CNP   Inpatient Diabetes Management Service  Pager - [phone]  Friday - Monday 0800 - 1600 hrs  Diabetes Management Team job code: [id number]       I spent a total of 25 minutes bedside and on the inpatient unit managing glycemic care.  Over 50% of my time on the unit was spent counseling the patient and/or coordinating care regarding acute hyperglycemic management.  See note for details.

## 2020-10-03 NOTE — PLAN OF CARE
FOCUS/GOAL  Medical management    ASSESSMENT, INTERVENTIONS AND CONTINUING PLAN FOR GOAL:  Pt is alert, disoriented to time at times. Assist of 2 with liko lift for transfers. Heavy assist of 2 to turn and reposition. Denies shortness of breath/chest pain. Occasional non productive cough.  Has condom catheter on, intact. 450 out this shift. Was incontinent of small BM x1.  Ate 75% of supper, bolus not given at that time. Gave HS bolus as ordered and insulin as ordered.  & 144. Repositioned q 2 hours. Left side flaccid, pt does not have feeling on that side. DD3 diet with nectar liquids. Total feed. Requested tylenol x1 for generalized pain. Continue POC.

## 2020-10-03 NOTE — PLAN OF CARE
SLP--Pt seen for both swallowing tx and communication.    Swallow:  Pt tolerating DD2 with nectar thick liquids well.  Vision impeding independence at meal times.  Swallow function WFL given current diet. One robust cough following meal however pt observed coughing at baseline. Unable to determine if cough was a direct result of PO trials. Will continue to monitor at current diet.  Pt will need VFSS prior to liquid upgrade.     Communication: Session focused on big and loud dysarthria tx. Pt inteligibility significantly improved with increased breath support and vocal intensity. Pt also worked with visual tracking of picture cards with clinican standing on pt's L (neglect side) so that pt is moving his head to the left with intention to recognize cards.

## 2020-10-03 NOTE — PLAN OF CARE
FOCUS/GOAL  Medical management    ASSESSMENT, INTERVENTIONS AND CONTINUING PLAN FOR GOAL:  Pt is very sleepy but arousable, no complain of pain and no sob noted. Used condom catheter for urination at night, output of 700cc. Turned and repositioned w/ max A2. No other concern noted. Kept rested.

## 2020-10-03 NOTE — PROGRESS NOTES
PM&R PROGRESS NOTE     Patient Active Problem List   Diagnosis     CVA (cerebral vascular accident) (H)     Right middle cerebral artery stroke (H)     Class 2 severe obesity due to excess calories with serious comorbidity and body mass index (BMI) of 38.0 to 38.9 in adult (H)     Controlled type 2 diabetes mellitus without complication, without long-term current use of insulin (H)     Coronary atherosclerosis     Essential hypertension     Hyperlipidemia     Hypothyroid     Obstructive sleep apnea syndrome     S/P CABG x 3     Tubular adenoma of colon       HPI   Jorje Teran is a 70 year old male admitted to the ARU on 9/24/2020    Stroke Ischemic 01.1 (L) Body Involvement (R) Brain Stroke; L sided weakness due to MCA infarct, R MCA occlusion, and R carotid atherosclerosis    From the functional perspective, he is participating in all 3 therapies. His deficits are left hemiparesis, left visual field cut, neglect, dysarthria and dysphagia. He is on tube feeds.         Medically,   CVA with dense left hemiparesis  - ASA 81 mg daily for secondary prophylaxis of stroke  - atorvastatin 40 mg daily, Zetia 10 mg po daily  - BP management as below; - goal for SBPs < 160       Dysphagia  - currently on DD2 with nectar thickened liquids   - PEG tube placed on 9/22, currently patient is on tube feeding  - Keflex 500 mg qid x 7 days added. .     DM II  Recent A1C at 7.1 7/2020.    BG goal is < 180.- started on lantus Endo following.DM:   BS high: 140'S 'S.   Endo following.      CAD s/p CABG x 3, x 1 in 2015    HTN continue Norvasc /Entresto / Metoprolol 50 mg BID and Valsartan     HLD: On Lipitor      Orders Placed This Encounter      Combination Diet Dysphagia Diet Level 2: Mechan Altered; Nectar Thickened Liquids (pre-thickened or use instant food thickener)      Review Of Systems  Total of ten systems reviewed, pertinent positives and negatives as follows  Denies chest pain fever chills rigors  Denies any  "shortness of breath   Denies any nausea or vomiting   Appetite poor  Denies any lightheadedness or dizziness   Reports left sided weakness   Orders Placed This Encounter      Combination Diet Dysphagia Diet Level 2: Mechan Altered; Nectar Thickened Liquids (pre-thickened or use instant food thickener)  Denies any pain    Denies any sob or cough   Denies any new rashes   Hayes in place   Slept well last night   Remainder of the review of the systems was negative        /68 (BP Location: Left arm)   Pulse 74   Temp 96.2  F (35.7  C) (Oral)   Resp 15   Ht 1.753 m (5' 9\")   Wt 110.8 kg (244 lb 4.8 oz)   SpO2 95%   BMI 36.08 kg/m    Physical exam    Patient is sitting in wheelchair comfortable in no acute distress   HEENT NC AT PRTL EOM good   Neck supple  Heart S1S2  Lungs CTA  Abdomen  benign BS positive NT NR   LE no edema         Current Facility-Administered Medications   Medication     acetaminophen (TYLENOL) tablet 650 mg     amLODIPine (NORVASC) tablet 20 mg     aspirin EC tablet 81 mg     atorvastatin (LIPITOR) tablet 40 mg     bacitracin ointment     bisacodyl (DULCOLAX) Suppository 10 mg     carboxymethylcellulose PF (REFRESH PLUS) 0.5 % ophthalmic solution 2 drop     cephALEXin (KEFLEX) capsule 500 mg     dextrose 10% infusion     glucose gel 15-30 g    Or     dextrose 50 % injection 25-50 mL    Or     glucagon injection 1 mg     ezetimibe (ZETIA) tablet 10 mg     famotidine (PEPCID) tablet 20 mg     furosemide (LASIX) tablet 40 mg     insulin aspart (NovoLOG) injection (RAPID ACTING)     insulin aspart (NovoLOG) injection (RAPID ACTING)     insulin aspart (NovoLOG) injection (RAPID ACTING)     insulin aspart (NovoLOG) injection (RAPID ACTING)     insulin isophane human (HumuLIN N PEN) injection 18 Units     insulin isophane human (HumuLIN N PEN) injection 18 Units     levothyroxine (SYNTHROID/LEVOTHROID) tablet 50 mcg     metoprolol tartrate (LOPRESSOR) tablet 50 mg     miconazole (MICATIN) 2 % " powder     multivitamin w/minerals (THERA-VIT-M) tablet 1 tablet     nitroGLYcerin (NITROSTAT) sublingual tablet 0.4 mg     nystatin (MYCOSTATIN) suspension 500,000 Units     Patient is already receiving anticoagulation with heparin, enoxaparin (LOVENOX), warfarin (COUMADIN)  or other anticoagulant medication     polyethylene glycol (MIRALAX) Packet 17 g     sacubitril-valsartan (ENTRESTO) 49-51 MG per tablet 1 tablet     sodium chloride (PF) 0.9% PF flush 10 mL     sodium chloride (PF) 0.9% PF flush 10-20 mL     traZODone (DESYREL) tablet 50 mg     valsartan (DIOVAN) tablet 320 mg        Labs:  Lab Results   Component Value Date    WBC 9.3 09/30/2020    HGB 11.6 (L) 09/30/2020    HCT 36.3 (L) 09/30/2020     10/03/2020     09/21/2020    POTASSIUM 4.1 09/21/2020    CHLORIDE 111 (H) 09/21/2020    CO2 25 09/21/2020    BUN 21 09/21/2020    CR 0.78 09/21/2020     (H) 09/21/2020    NTBNPI 228 09/07/2020    TROPI <0.015 09/08/2020    AST 23 09/07/2020    ALT 28 09/07/2020    ALKPHOS 67 09/07/2020    BILITOTAL 0.9 09/07/2020    INR 1.17 (H) 09/21/2020       Attestation:  This patient has been seen and evaluated by me, Susan Alves MD.    Total time:  35 Minutes more than 50% in Care coordination on the floor/ counseling the patient face to face. Reviewed all medications and determined medical stability    Susan Alves MD, [facility]   Department of Rehabilitation

## 2020-10-03 NOTE — PLAN OF CARE
PT: focus of session on stretching to improve comfort with functional mobility and reduce pain. Pt unable to tolerate supine positioning due to cough. Pt participates in sitting balance on EOM with focus on controlling balance within REYES and shoulder positioning.

## 2020-10-04 ENCOUNTER — APPOINTMENT (OUTPATIENT)
Dept: SPEECH THERAPY | Facility: CLINIC | Age: 70
End: 2020-10-04
Payer: COMMERCIAL

## 2020-10-04 ENCOUNTER — APPOINTMENT (OUTPATIENT)
Dept: PHYSICAL THERAPY | Facility: CLINIC | Age: 70
End: 2020-10-04
Payer: COMMERCIAL

## 2020-10-04 ENCOUNTER — APPOINTMENT (OUTPATIENT)
Dept: OCCUPATIONAL THERAPY | Facility: CLINIC | Age: 70
End: 2020-10-04
Payer: COMMERCIAL

## 2020-10-04 LAB
GLUCOSE BLDC GLUCOMTR-MCNC: 138 MG/DL (ref 70–99)
GLUCOSE BLDC GLUCOMTR-MCNC: 142 MG/DL (ref 70–99)
GLUCOSE BLDC GLUCOMTR-MCNC: 187 MG/DL (ref 70–99)
GLUCOSE BLDC GLUCOMTR-MCNC: 248 MG/DL (ref 70–99)
GLUCOSE BLDC GLUCOMTR-MCNC: 258 MG/DL (ref 70–99)

## 2020-10-04 PROCEDURE — 97535 SELF CARE MNGMENT TRAINING: CPT | Mod: GO

## 2020-10-04 PROCEDURE — 250N000013 HC RX MED GY IP 250 OP 250 PS 637: Performed by: PHYSICAL MEDICINE & REHABILITATION

## 2020-10-04 PROCEDURE — 92526 ORAL FUNCTION THERAPY: CPT | Mod: GN | Performed by: SPEECH-LANGUAGE PATHOLOGIST

## 2020-10-04 PROCEDURE — 97110 THERAPEUTIC EXERCISES: CPT | Mod: GP

## 2020-10-04 PROCEDURE — 99232 SBSQ HOSP IP/OBS MODERATE 35: CPT | Performed by: PHYSICAL MEDICINE & REHABILITATION

## 2020-10-04 PROCEDURE — 92507 TX SP LANG VOICE COMM INDIV: CPT | Mod: GN | Performed by: SPEECH-LANGUAGE PATHOLOGIST

## 2020-10-04 PROCEDURE — 97530 THERAPEUTIC ACTIVITIES: CPT | Mod: GP

## 2020-10-04 PROCEDURE — 97112 NEUROMUSCULAR REEDUCATION: CPT | Mod: GO

## 2020-10-04 PROCEDURE — 999N001017 HC STATISTIC GLUCOSE BY METER IP

## 2020-10-04 PROCEDURE — 128N000003 HC R&B REHAB

## 2020-10-04 RX ORDER — SIMETHICONE 80 MG
40 TABLET,CHEWABLE ORAL EVERY 6 HOURS PRN
Status: DISCONTINUED | OUTPATIENT
Start: 2020-10-04 | End: 2020-10-05

## 2020-10-04 RX ADMIN — MULTIPLE VITAMINS W/ MINERALS TAB 1 TABLET: TAB at 18:14

## 2020-10-04 RX ADMIN — INSULIN ASPART 5 UNITS: 100 INJECTION, SOLUTION INTRAVENOUS; SUBCUTANEOUS at 12:17

## 2020-10-04 RX ADMIN — FUROSEMIDE 40 MG: 20 TABLET ORAL at 09:10

## 2020-10-04 RX ADMIN — NYSTATIN 500000 UNITS: 500000 SUSPENSION ORAL at 16:03

## 2020-10-04 RX ADMIN — VALSARTAN 320 MG: 160 TABLET ORAL at 09:19

## 2020-10-04 RX ADMIN — FAMOTIDINE 20 MG: 20 TABLET ORAL at 09:10

## 2020-10-04 RX ADMIN — CEPHALEXIN 500 MG: 500 CAPSULE ORAL at 18:15

## 2020-10-04 RX ADMIN — CEPHALEXIN 500 MG: 500 CAPSULE ORAL at 06:43

## 2020-10-04 RX ADMIN — INSULIN ASPART 1 UNITS: 100 INJECTION, SOLUTION INTRAVENOUS; SUBCUTANEOUS at 18:15

## 2020-10-04 RX ADMIN — ACETAMINOPHEN 650 MG: 325 TABLET, FILM COATED ORAL at 13:11

## 2020-10-04 RX ADMIN — LEVOTHYROXINE SODIUM 50 MCG: 25 TABLET ORAL at 09:11

## 2020-10-04 RX ADMIN — NYSTATIN 500000 UNITS: 500000 SUSPENSION ORAL at 09:16

## 2020-10-04 RX ADMIN — INSULIN ASPART 2 UNITS: 100 INJECTION, SOLUTION INTRAVENOUS; SUBCUTANEOUS at 09:05

## 2020-10-04 RX ADMIN — CEPHALEXIN 500 MG: 500 CAPSULE ORAL at 00:05

## 2020-10-04 RX ADMIN — NYSTATIN 500000 UNITS: 500000 SUSPENSION ORAL at 12:21

## 2020-10-04 RX ADMIN — SACUBITRIL AND VALSARTAN 1 TABLET: 49; 51 TABLET, FILM COATED ORAL at 09:10

## 2020-10-04 RX ADMIN — FAMOTIDINE 20 MG: 20 TABLET ORAL at 21:39

## 2020-10-04 RX ADMIN — EZETIMIBE 10 MG: 10 TABLET ORAL at 09:10

## 2020-10-04 RX ADMIN — INSULIN HUMAN 18 UNITS: 100 INJECTION, SUSPENSION SUBCUTANEOUS at 09:06

## 2020-10-04 RX ADMIN — ATORVASTATIN CALCIUM 40 MG: 40 TABLET, FILM COATED ORAL at 09:11

## 2020-10-04 RX ADMIN — CEPHALEXIN 500 MG: 500 CAPSULE ORAL at 23:39

## 2020-10-04 RX ADMIN — METOPROLOL TARTRATE 50 MG: 50 TABLET, FILM COATED ORAL at 09:10

## 2020-10-04 RX ADMIN — ASPIRIN 81 MG: 81 TABLET, COATED ORAL at 09:10

## 2020-10-04 RX ADMIN — FUROSEMIDE 40 MG: 20 TABLET ORAL at 16:03

## 2020-10-04 RX ADMIN — CEPHALEXIN 500 MG: 500 CAPSULE ORAL at 12:21

## 2020-10-04 RX ADMIN — AMLODIPINE BESYLATE 20 MG: 10 TABLET ORAL at 09:10

## 2020-10-04 RX ADMIN — METOPROLOL TARTRATE 50 MG: 50 TABLET, FILM COATED ORAL at 21:39

## 2020-10-04 RX ADMIN — SACUBITRIL AND VALSARTAN 1 TABLET: 49; 51 TABLET, FILM COATED ORAL at 21:39

## 2020-10-04 NOTE — PLAN OF CARE
SLP--Pt seen for mealtime follow up of DD2 meal with nectar thick liquids.  Pt orienting better to the tray given L visual neglect compared to yesterday's session.  More self-feeding completed.  However, pt continues to be confused at times, for example trying to drink ice cream rather than water. Pt continues to need monitoring to maintain small bites and sips. When self feeding, it is dificult for pt to  how much in on his fork at a time. DD2 meal of Algerian toast with syrup, ground sausage, and chopped peaches.  Pt required prompting to follow safe swallow strategies of alternating sips and bites, taking small bites, and clearing mouth before introducing more food.  Pt had a significant coughing episode after the third bite of [place]ian toast.  Pt attributed the coughing to nasal drainage.  Pt's son was present and noted that his father has a hx of coughing from reflux and nasal drip/allergies. From a speech perspective, it is difficult to determine the etiology of coughing fit at bedside given  the compounding factors.  However, this particular fit was significant enough to prompt caution moving forward to a more advanced diet.  Recommendation is to continue trials of DD2 and nectar thick meals, notify nursing regarding nasal drip/allergies.

## 2020-10-04 NOTE — PROGRESS NOTES
Pt alert oriented, alert. Had some LUQ pain to touch, and with bolus tube feedings somewhat. Abd is distended, maybe needs something for gas, put sticky note in. Liko lift, assist of 2. Had moderate BM 10/3, urinary incontinent episode x2. Condom catheter taken off evening shift since causing pt some discomfort/pain. Ate 25% of meal and gave 160 tube feed bolus, and 250 at night for HS per orders. See BG in MAR. Continue POC. VSS.

## 2020-10-04 NOTE — PLAN OF CARE
OT: pt demo 1/5 mm grade inconsistently on L ue in scapular elevation, horiz add and sh ext, due to weakness and L neglect and motor planning deficits it is difficult to elicit consistent mm contraction on command. Pt has L sh subluxations, educ pt and son on proper positioning in w/c and bed.     Pt demo bed mobility, rolling to L and R repeatedly requiring verbal and tactile cues and physical assist due to L sided weakness and L neglect, max to total assist to roll toward R (leading w/ weak L side) and mod to max A toward L (leading w/ stronger R side but impeded by L neglect ), once on L side and using Rue to hold onto rail w/ verbal cues pt was able to stay on side w/ mod to max assist and continued cues to assist , pt easily distracted and required cues to get pt's attn before directing pt to task, pt iniitiated and engaged in conversation and used humor, pt incontinent of bowel w/ total assist for pericares and clothing management w/ assist of 2, gown change in supine w/ HOB elevated to sitting position and max assist due to L hemiplegia  and L neglect and motor planning deficits, oral cares w/ set up and sba w/ directives to stay on task and use yonkaur to suction out fluid /toothpaste from mouth, mod to max A for ub sponge bath, min A for managing hair, pt has improved in past 5 days w/ attn to L , increased volume and clarity of speech making it easer to communicate pt's needs, and less delay in response to simple motor tasks when environmental distractions are limited, pts son  SHAWN present , pt and Shawn educ on shannon dressing techniques, techniques to increase attn to L , and pt status/progress in past 5 days re: OT POC    Pt's son Shawn requested to be contacted during pt rounds/POC update mtg this wk scheduled for Wed morning (previously pt's son Jeyson was  but per Shawn, Jeyson has been sick w/ Covid so now Shawn is the designated family )    Overall, pt making gains but very  dependent in cares and mobility.  (+10 min )    Laurita Magdaleno, OTR/L,CBIS

## 2020-10-04 NOTE — PLAN OF CARE
Patient stated he felt pain on his buttocks, tylenol 650 mg administered. Also assisted him in bed and discovered he had a large incontinent BM contained in brief, leah care completed and barrier cream applied, no skin alteration noted on his buttocks or coccyx area. Patient ate good at breakfast and lunch. Patient needs constant encouragement and cues to eat. Sliding scale and carb coverage insulin completed. Patients on at bedside most of the shift, will continue POC

## 2020-10-04 NOTE — PROGRESS NOTES
PM&R PROGRESS NOTE     Patient Active Problem List   Diagnosis     CVA (cerebral vascular accident) (H)     Right middle cerebral artery stroke (H)     Class 2 severe obesity due to excess calories with serious comorbidity and body mass index (BMI) of 38.0 to 38.9 in adult (H)     Controlled type 2 diabetes mellitus without complication, without long-term current use of insulin (H)     Coronary atherosclerosis     Essential hypertension     Hyperlipidemia     Hypothyroid     Obstructive sleep apnea syndrome     S/P CABG x 3     Tubular adenoma of colon       HPI   Jorje Teran is a 70 year old male admitted to the ARU on 9/24/2020    Stroke Ischemic 01.1 (L) Body Involvement (R) Brain Stroke; L sided weakness due to MCA infarct, R MCA occlusion, and R carotid atherosclerosis.     From the functional perspective, he is participating in all 3 therapies. His deficits are left hemiparesis, left visual field cut, neglect, dysarthria and dysphagia. He is on tube feeds. He is currently utilizing the "[employer]o lift for transfers, limited by hemiparesis, slow processing, fatigue and improving neglect.   Continue the current intensity       Medically,   DM II  recs per Endo  - NPH 18 qAM    - increased to NPH 20  units at HS, when no TF will infuse   - aspart high resistace QAC and HS, all for >/=140  - aspart increase to 1 unit per 6 grams carb AC, HS (TF sched for HS) and PRN  - on examination, he does not exhibit any signs of dehydration.   - reviewed BS, range is 187-260 and QHS insulin dose increased  - continue to monitor.       CVA with dense left hemiparesis  - ASA 81 mg daily for secondary prophylaxis of stroke  - atorvastatin 40 mg daily, Zetia 10 mg po daily  - BP management as below; - goal for SBPs < 160         Dysphagia  - currently on DD2 with nectar thickened liquids   - PEG tube placed on 9/22, currently patient is on tube feeding  - Keflex 500 mg qid x 7 days added.        CAD s/p CABG x 3, x 1 in  "2015    HTN   - continue Norvasc /Entresto / Metoprolol 50 mg BID and Valsartan   - reviewed blood pressures   BP Readings from Last 3 Encounters:   10/04/20 124/67   09/24/20 118/76   09/07/20 (!) 130/106     HLD: On Lipitor    Orders Placed This Encounter      Combination Diet Dysphagia Diet Level 2: Mechan Altered; Nectar Thickened Liquids (pre-thickened or use instant food thickener)      Review Of Systems  Total of ten systems reviewed, pertinent positives and negatives as follows  Denies any shortness of breath   Denies any nausea or vomiting   Appetite poor  Denies any lightheadedness or dizziness   Reports left sided weakness, flaccid UE   Orders Placed This Encounter      Combination Diet Dysphagia Diet Level 2: Mechan Altered; Nectar Thickened Liquids (pre-thickened or use instant food thickener)  Denies any pain    Denies any sob or cough   Denies any new rashes   Slept well last night   Condom catheter at night   Remainder of the review of the systems was negative      /67 (BP Location: Left arm)   Pulse 71   Temp 96.3  F (35.7  C) (Oral)   Resp 16   Ht 1.753 m (5' 9\")   Wt 110.8 kg (244 lb 4.8 oz)   SpO2 96%   BMI 36.08 kg/m    Physical exam    Patient is sitting in wheelchair comfortable in no acute distress   Paucity of speech   But able to follow directions   Aphasia   Left facial weakness   Left hemiparesis, LUE flaccid, unable to move LLE   Heart S1S2  Lungs CTA  Abdomen  benign BS positive NT NR   LE mild edema         Current Facility-Administered Medications   Medication     acetaminophen (TYLENOL) tablet 650 mg     amLODIPine (NORVASC) tablet 20 mg     aspirin EC tablet 81 mg     atorvastatin (LIPITOR) tablet 40 mg     bacitracin ointment     bisacodyl (DULCOLAX) Suppository 10 mg     carboxymethylcellulose PF (REFRESH PLUS) 0.5 % ophthalmic solution 2 drop     cephALEXin (KEFLEX) capsule 500 mg     dextrose 10% infusion     glucose gel 15-30 g    Or     dextrose 50 % injection " 25-50 mL    Or     glucagon injection 1 mg     ezetimibe (ZETIA) tablet 10 mg     famotidine (PEPCID) tablet 20 mg     furosemide (LASIX) tablet 40 mg     insulin aspart (NovoLOG) injection (RAPID ACTING)     insulin aspart (NovoLOG) injection (RAPID ACTING)     insulin aspart (NovoLOG) injection (RAPID ACTING)     insulin aspart (NovoLOG) injection (RAPID ACTING)     insulin isophane human (HumuLIN N PEN) injection 18 Units     insulin isophane human (HumuLIN N PEN) injection 20 Units     levothyroxine (SYNTHROID/LEVOTHROID) tablet 50 mcg     metoprolol tartrate (LOPRESSOR) tablet 50 mg     miconazole (MICATIN) 2 % powder     multivitamin w/minerals (THERA-VIT-M) tablet 1 tablet     nitroGLYcerin (NITROSTAT) sublingual tablet 0.4 mg     nystatin (MYCOSTATIN) suspension 500,000 Units     Patient is already receiving anticoagulation with heparin, enoxaparin (LOVENOX), warfarin (COUMADIN)  or other anticoagulant medication     polyethylene glycol (MIRALAX) Packet 17 g     sacubitril-valsartan (ENTRESTO) 49-51 MG per tablet 1 tablet     sodium chloride (PF) 0.9% PF flush 10 mL     sodium chloride (PF) 0.9% PF flush 10-20 mL     traZODone (DESYREL) tablet 50 mg     valsartan (DIOVAN) tablet 320 mg        Labs:  Lab Results   Component Value Date    WBC 9.3 09/30/2020    HGB 11.6 (L) 09/30/2020    HCT 36.3 (L) 09/30/2020     10/03/2020     09/21/2020    POTASSIUM 4.1 09/21/2020    CHLORIDE 111 (H) 09/21/2020    CO2 25 09/21/2020    BUN 21 09/21/2020    CR 0.78 09/21/2020     (H) 09/21/2020    NTBNPI 228 09/07/2020    TROPI <0.015 09/08/2020    AST 23 09/07/2020    ALT 28 09/07/2020    ALKPHOS 67 09/07/2020    BILITOTAL 0.9 09/07/2020    INR 1.17 (H) 09/21/2020       Attestation:  This patient has been seen and evaluated by me, Susan Alves MD.    Total time:  25 Minutes more than 50% in Care coordination on the floor/ counseling the patient face to face. Reviewed all medications and  determined medical stability    Susan Alves MD, A   Department of Rehabilitation

## 2020-10-04 NOTE — PLAN OF CARE
Appears to be sleeping well. A&O x4, able to make needs known. Liko lift. Denies pain. Condom cath on overnight. G-tube patent. . Continue to monitor.

## 2020-10-04 NOTE — PLAN OF CARE
PT: Time spent stretching L LE today, Pt demonstrating hypertonicity in L glut; tightness in hip ER. Pt able to demonstrate intermittent L hip activation with facilitation.

## 2020-10-04 NOTE — PROGRESS NOTES
Diabetes Consult Daily  Progress Note          Assessment/Plan:     HPI:  Jorje Teran is a 69 y/o male with past medical history of HTN, HLD, DM II, SHELDON, hypothyroidism, and morbid obesity who was found down, found to have a R MCA infarct and R carotid atherosclerosis, now admitted to ARU 9/24 for intensive therapies, ongoing medical management, and rehabilitative nursing care.  Hyperglycemia exacerbated by enteral feeds.      Assessment:      1)Type 2 Diabetes Mellitus    2)  Obesity; BMI 36     Plan:                  - NPH 18 qAM    - NPH 20 units at HS, when no TF will infuse   - aspart high resistace QAC and HS, all for >/=140  - aspart increase to 1 unit per 6 grams carb AC, HS (TF sched for HS) and PRN  - patient care order to alert RN to PRN aspart in case TF at different time     -BG QAC, HS 0200  - hypoglycemia protocol     Outpatient diabetes follow up: ELOS 28 days  Plan discussed with patient, bedside RN           Interval History:     The last 24 hours progress and nursing notes reviewed.  No acute events this interval.  BG trending well with increase in the evening/noc.  Eating improving, not sure all snacks and/or drinks are being carb covered.      Recent Labs   Lab 10/04/20  0205 10/03/20  2140 10/03/20  1734 10/03/20  1138 10/03/20  0738 10/03/20  0202   * 190* 230* 187* 181* 179*           Nutrition:     Orders Placed This Encounter      Combination Diet Dysphagia Diet Level 2: Mechan Altered; Nectar Thickened Liquids (pre-thickened or use instant food thickener)    Supplements: Other; thickened smoothie with meals  (RD); With Meals     Calorie Counts   Was Isosource 1.5 60 ml/h x 12 h, now:  If pt eats <25% of the meal give full bolus: 325 mL of isosource 1.5 via gtube which provides 487 kcal, 22 g protein, 57 g CHO, 5.6 g fiber, and 247 mL free water              If pt eats 25-50% of meal provide half bolus: 162ml of isosource 1.5 via gtube which provides  " 244kcal, 11 g protein, 29 g CHO, 2.8 g fiber, and 124 mL free water.              If pt eats >50% of meal give no bolus              Always give full bolus (325ml) at HS              Flush with 100ml water before/after each         PTA Regimen:     No regular BG monitoring at home  No restricted diet  No diabetic medications PTA - diet controlled     During [facility] admission, utilized glargine 10 units BID w/ fair basal coverage while on Dysphagia diet only (no TF yet)          Review of Systems:   Constitutional:   No fever, no chills  ENT/Mouth:   No hearing changes, no ear pain, no sore throat, no rhinorrhea, no difficulty swallowing  Eyes:  No eye pain, no discharge, no vision changes  CV:   No CP/SOB, no new edema  Resp:  No cough, no wheezing  GI:   No nausea, no vomiting, denies constipation, no diarrhea  :  No dysuria, no frequency, no hematuria  Musk:  No joint swelling/pain, No back pain - \"my butt hurts\" from laying in bed too long  - repositioned  Skin/heme:   No new rashes/bruises/open areas.  No pruritis  Neuro:   No new weakness,  numbness/tingling, no headache  Psych:   No new anxiety,  Denies depression  Endocrine:  No polyuria, no polydipsia          Medications:   Steroid planning:  no       Physical Exam:   /59 (BP Location: Right arm)   Pulse 68   Temp 97.1  F (36.2  C) (Axillary)   Resp 16   Ht 1.753 m (5' 9\")   Wt 110.8 kg (244 lb 4.8 oz)   SpO2 95%   BMI 36.08 kg/m      General:  pleasant, in no acute distress.  Laying somwhat comfortably in bed. Speaks softly  HEENT:  NC/AT. MMM, sclera not injected  Lungs:  unremarkable, no new cough, no SOB  ABD:   Central obesity, soft,  non-tender  Skin:  warm and dry, no obvious lesions/rash  Feet:    CMS intact, PPP  MSK:   moving all extremities  Lymp:   trace LE edema  Mental Status:  Alert and oriented x3  Psych:   Cooperative, good eye contact, full affect          Data:     Lab Results   Component Value Date    A1C 7.5 09/10/2020 "          CBC RESULTS:   Recent Labs   Lab Test 10/03/20  0606 09/30/20  0607   WBC  --  9.3   RBC  --  3.79*   HGB  --  11.6*   HCT  --  36.3*   MCV  --  96   MCH  --  30.6   MCHC  --  32.0   RDW  --  12.9    230     Recent Labs   Lab Test 09/21/20  0620 09/20/20  0630    140   POTASSIUM 4.1 4.0   CHLORIDE 111* 110*   CO2 25 25   ANIONGAP 5 5   * 165*   BUN 21 22   CR 0.78 0.76   SELENA 8.6 8.5     Liver Function Studies -   Recent Labs   Lab Test 09/07/20  2355   PROTTOTAL 7.4   ALBUMIN 3.6   BILITOTAL 0.9   ALKPHOS 67   AST 23   ALT 28     Lab Results   Component Value Date    INR 1.17 09/21/2020    INR 1.03 09/07/2020         CADE Sahu CNP   Inpatient Diabetes Management Service  Pager - [phone]  Friday - Monday 0800 - 1600 hrs  Diabetes Management Team job code: [id number]       I spent a total of 25 minutes bedside and on the inpatient unit managing glycemic care.  Over 50% of my time on the unit was spent counseling the patient and/or coordinating care regarding acute hyperglycemic management.  See note for details.

## 2020-10-05 ENCOUNTER — APPOINTMENT (OUTPATIENT)
Dept: OCCUPATIONAL THERAPY | Facility: CLINIC | Age: 70
End: 2020-10-05
Payer: COMMERCIAL

## 2020-10-05 ENCOUNTER — APPOINTMENT (OUTPATIENT)
Dept: SPEECH THERAPY | Facility: CLINIC | Age: 70
End: 2020-10-05
Payer: COMMERCIAL

## 2020-10-05 ENCOUNTER — APPOINTMENT (OUTPATIENT)
Dept: PHYSICAL THERAPY | Facility: CLINIC | Age: 70
End: 2020-10-05
Payer: COMMERCIAL

## 2020-10-05 LAB
GLUCOSE BLDC GLUCOMTR-MCNC: 121 MG/DL (ref 70–99)
GLUCOSE BLDC GLUCOMTR-MCNC: 168 MG/DL (ref 70–99)
GLUCOSE BLDC GLUCOMTR-MCNC: 178 MG/DL (ref 70–99)
GLUCOSE BLDC GLUCOMTR-MCNC: 201 MG/DL (ref 70–99)
GLUCOSE BLDC GLUCOMTR-MCNC: 254 MG/DL (ref 70–99)

## 2020-10-05 PROCEDURE — 97112 NEUROMUSCULAR REEDUCATION: CPT | Mod: GO

## 2020-10-05 PROCEDURE — 97535 SELF CARE MNGMENT TRAINING: CPT | Mod: GO

## 2020-10-05 PROCEDURE — 99232 SBSQ HOSP IP/OBS MODERATE 35: CPT | Performed by: NURSE PRACTITIONER

## 2020-10-05 PROCEDURE — 97110 THERAPEUTIC EXERCISES: CPT | Mod: GP

## 2020-10-05 PROCEDURE — 250N000013 HC RX MED GY IP 250 OP 250 PS 637: Performed by: PHYSICAL MEDICINE & REHABILITATION

## 2020-10-05 PROCEDURE — 999N001017 HC STATISTIC GLUCOSE BY METER IP

## 2020-10-05 PROCEDURE — 128N000003 HC R&B REHAB

## 2020-10-05 PROCEDURE — 97530 THERAPEUTIC ACTIVITIES: CPT | Mod: GP

## 2020-10-05 PROCEDURE — 92507 TX SP LANG VOICE COMM INDIV: CPT | Mod: GN

## 2020-10-05 PROCEDURE — 97112 NEUROMUSCULAR REEDUCATION: CPT | Mod: GP

## 2020-10-05 PROCEDURE — 99233 SBSQ HOSP IP/OBS HIGH 50: CPT | Performed by: PHYSICAL MEDICINE & REHABILITATION

## 2020-10-05 PROCEDURE — 92526 ORAL FUNCTION THERAPY: CPT | Mod: GN

## 2020-10-05 RX ORDER — SIMETHICONE 40MG/0.6ML
40 SUSPENSION, DROPS(FINAL DOSAGE FORM)(ML) ORAL EVERY 6 HOURS PRN
Status: DISCONTINUED | OUTPATIENT
Start: 2020-10-05 | End: 2020-10-23 | Stop reason: HOSPADM

## 2020-10-05 RX ORDER — BACLOFEN 10 MG/1
5 TABLET ORAL 2 TIMES DAILY
Status: DISCONTINUED | OUTPATIENT
Start: 2020-10-05 | End: 2020-10-07

## 2020-10-05 RX ADMIN — LEVOTHYROXINE SODIUM 50 MCG: 25 TABLET ORAL at 09:23

## 2020-10-05 RX ADMIN — Medication 5 MG: at 21:39

## 2020-10-05 RX ADMIN — ASPIRIN 81 MG: 81 TABLET, COATED ORAL at 09:22

## 2020-10-05 RX ADMIN — Medication 5 MG: at 12:36

## 2020-10-05 RX ADMIN — CEPHALEXIN 500 MG: 500 CAPSULE ORAL at 12:36

## 2020-10-05 RX ADMIN — INSULIN ASPART 2 UNITS: 100 INJECTION, SOLUTION INTRAVENOUS; SUBCUTANEOUS at 12:34

## 2020-10-05 RX ADMIN — AMLODIPINE BESYLATE 20 MG: 10 TABLET ORAL at 09:22

## 2020-10-05 RX ADMIN — METOPROLOL TARTRATE 50 MG: 50 TABLET, FILM COATED ORAL at 21:39

## 2020-10-05 RX ADMIN — VALSARTAN 320 MG: 160 TABLET ORAL at 09:21

## 2020-10-05 RX ADMIN — NYSTATIN 500000 UNITS: 500000 SUSPENSION ORAL at 12:36

## 2020-10-05 RX ADMIN — METOPROLOL TARTRATE 50 MG: 50 TABLET, FILM COATED ORAL at 09:22

## 2020-10-05 RX ADMIN — INSULIN ASPART 3 UNITS: 100 INJECTION, SOLUTION INTRAVENOUS; SUBCUTANEOUS at 21:56

## 2020-10-05 RX ADMIN — FAMOTIDINE 20 MG: 20 TABLET ORAL at 09:21

## 2020-10-05 RX ADMIN — SACUBITRIL AND VALSARTAN 1 TABLET: 49; 51 TABLET, FILM COATED ORAL at 21:39

## 2020-10-05 RX ADMIN — CEPHALEXIN 500 MG: 500 CAPSULE ORAL at 06:34

## 2020-10-05 RX ADMIN — INSULIN HUMAN 18 UNITS: 100 INJECTION, SUSPENSION SUBCUTANEOUS at 08:40

## 2020-10-05 RX ADMIN — FUROSEMIDE 40 MG: 20 TABLET ORAL at 16:14

## 2020-10-05 RX ADMIN — INSULIN ASPART 2 UNITS: 100 INJECTION, SOLUTION INTRAVENOUS; SUBCUTANEOUS at 08:39

## 2020-10-05 RX ADMIN — ACETAMINOPHEN 650 MG: 325 TABLET, FILM COATED ORAL at 14:23

## 2020-10-05 RX ADMIN — FAMOTIDINE 20 MG: 20 TABLET ORAL at 21:39

## 2020-10-05 RX ADMIN — ATORVASTATIN CALCIUM 40 MG: 40 TABLET, FILM COATED ORAL at 09:21

## 2020-10-05 RX ADMIN — EZETIMIBE 10 MG: 10 TABLET ORAL at 09:21

## 2020-10-05 RX ADMIN — FUROSEMIDE 40 MG: 20 TABLET ORAL at 09:24

## 2020-10-05 RX ADMIN — NYSTATIN 500000 UNITS: 500000 SUSPENSION ORAL at 09:23

## 2020-10-05 RX ADMIN — CEPHALEXIN 500 MG: 500 CAPSULE ORAL at 18:28

## 2020-10-05 RX ADMIN — NYSTATIN 500000 UNITS: 500000 SUSPENSION ORAL at 16:14

## 2020-10-05 RX ADMIN — SACUBITRIL AND VALSARTAN 1 TABLET: 49; 51 TABLET, FILM COATED ORAL at 09:21

## 2020-10-05 RX ADMIN — ACETAMINOPHEN 650 MG: 325 TABLET, FILM COATED ORAL at 21:39

## 2020-10-05 NOTE — PLAN OF CARE
OT pt seen sitting of back of w/c with adls, pt requiring max a to min a to come off back of w/c and maintain with cga arms supported on tray table in front pt sitting with posterial tilt. ptto be  Changed  To a tilt in space second to scooting to edge of chair and needed to be repositoned with OT tx session with liko lift. G/H face sba  hands min a chest min a sitting in w/c U/B dressing  max a hospital gown siting in w/c

## 2020-10-05 NOTE — PLAN OF CARE
PT: Focus on midline sitting/ unsupported sitting EOB. Pt able to sit SBA once attained midline. Showing improvement in recognizing L sided listing and able to self adjust to R w/o cues.

## 2020-10-05 NOTE — PLAN OF CARE
Improved ability to feed self primarily with just verbal cues. Cough x1 with a mixed consistency. Patient did take several large bites but was able to tolerate. For now, continue with NDD-2 with NTL.

## 2020-10-05 NOTE — PROGRESS NOTES
[facility]   Acute Rehabilitation Unit  Daily progress note  CC:   Stroke Ischemic 01.1 (L) Body Involvement (R) Brain Stroke; L sided weakness due to MCA infarct, R MCA occlusion, and R carotid atherosclerosis    The patient currently performs bed mobility with Max A x 2 and bed rail. Once sitting at EOB the pt is able to sit with anywhere from SBA/CGA, to Mod A x 2 depending on fatigue and task. The patient is able to use Gretchen Stedy with Max A x 2, however unable to achieve a full stand. The patient is currently dependent on mechanical lift for bed to and from chair.     The patient's alertness and participation is improving and he will tolerate intensive therapies in the ARC setting. He is able to follow 2 step directions with 85%, and intelligibility with short 2-3 word phrases needs mod/max cues to increase breath support    OT: pt able to sit eob 50 min with sba to cga with pt holding rail with r hand 50 % of time sba with reaching with r hand for g/h eatingand u/b dressing.OT mod a feeding self with sitting eob. G/H:sba sitting eob cues to attend to L with face, chest able to wash l arm with r ,assit with chest and r arm. U/B dressing mod a sitting eob in hospial gown    SLP: Completed oral motor excs and passive stretching- lips on the left side - limited movement on the left with retraction and protrusion. Worked on thinking 'big and loud' for increasing vocal volume as well as timing with speaking on exhalation for breath support and loudness; also practiced exaggerated articulation for multisyllabic words- needs mod to max cues to use. Compelted verbal reasoning task-- at 4/6 accuracy but needing max assist with visual scanning for this task using highlighted left border and enlarged print     S:   Son has beenhere. I have eviewed his status. Strategies to improve function. Likes Coffe with Thickener! Some spasms. Baclofen low dose. Ice pack prn.    Speaking better.  "Dense left hemiplegia, left field cut +. Compensates by turning.  On TF. BS have been high at times.. Endo following.  NDD 2 [place]. TF at 60 ml/hour Free H2O 100 ml q4h. Trial of NND3.    Pt was incontinent of small soft BM.. C/O G tube site discomfort. GT site was red and tender to touch  with purulent moderate drainage noted , Site cleaned with soap and water. Now on Keflex . Simethicone added for gas.  IS and Pneumoboots added.    ROS: Denies HA, nausea, pain. Condom Cath      [unfilled]   Scheduled meds    amLODIPine  20 mg Oral Daily     aspirin  81 mg Oral Daily     atorvastatin  40 mg Oral Daily     Baclofen  5 mg Oral BID     cephALEXin  500 mg Oral Q6H TORI     ezetimibe  10 mg Oral Daily     famotidine  20 mg Oral BID     furosemide  40 mg Oral BID     insulin aspart   Subcutaneous TID w/meals     insulin aspart   Subcutaneous At Bedtime     insulin aspart  1-10 Units Subcutaneous 4x Daily w/meals     insulin isophane human  18 Units Subcutaneous QAM     insulin isophane human  20 Units Subcutaneous At Bedtime     levothyroxine  50 mcg Oral Daily     metoprolol tartrate  50 mg Oral BID     multivitamin w/minerals  1 tablet Oral Daily     nystatin  500,000 Units Swish & Spit 4x Daily     sacubitril-valsartan  1 tablet Oral BID     sodium chloride (PF)  10 mL Intracatheter Q7 Days     valsartan  320 mg Oral Daily       PRN meds:  acetaminophen, bacitracin, bisacodyl, carboxymethylcellulose PF, dextrose, glucose **OR** dextrose **OR** glucagon, insulin aspart, miconazole, nitroGLYcerin, - MEDICATION INSTRUCTIONS -, polyethylene glycol, simethicone, simethicone, sodium chloride (PF), traZODone      PHYSICAL EXAM  /72 (BP Location: Left arm)   Pulse 70   Temp 97.3  F (36.3  C) (Oral)   Resp 16   Ht 1.753 m (5' 9\")   Wt 110.8 kg (244 lb 4.8 oz)   SpO2 93%   BMI 36.08 kg/m    Alert oriented x3  Respiratory: Clear to auscultation bilaterally, no crackles or wheezing  Cardiovascular: S1 and S2, " RRR, and no murmur noted  GI: soft, non-distended, non-tender. G tube + Discharge + BS +  Skin/Integumen: No rashes, trace lower extremity edema noted  Neuro : Left lower Facial droop + Left Field cut +  Dense left-sided hemiplegia with neglect noted.  Tone +. Reflexes emerging.  Toes mute  Vocalizes, low volume   Extremities. Moves right side well. Edema +  LABS  Last Comprehensive Metabolic Panel:  Sodium   Date Value Ref Range Status   09/21/2020 141 133 - 144 mmol/L Final     Potassium   Date Value Ref Range Status   09/21/2020 4.1 3.4 - 5.3 mmol/L Final     Chloride   Date Value Ref Range Status   09/21/2020 111 (H) 94 - 109 mmol/L Final     Carbon Dioxide   Date Value Ref Range Status   09/21/2020 25 20 - 32 mmol/L Final     Anion Gap   Date Value Ref Range Status   09/21/2020 5 3 - 14 mmol/L Final     Glucose   Date Value Ref Range Status   09/21/2020 178 (H) 70 - 99 mg/dL Final     Urea Nitrogen   Date Value Ref Range Status   09/21/2020 21 7 - 30 mg/dL Final     Creatinine   Date Value Ref Range Status   09/21/2020 0.78 0.66 - 1.25 mg/dL Final     GFR Estimate   Date Value Ref Range Status   09/21/2020 >90 >60 mL/min/[1.73_m2] Final     Comment:     Non  GFR Calc  Starting 12/18/2018, serum creatinine based estimated GFR (eGFR) will be   calculated using the Chronic Kidney Disease Epidemiology Collaboration   (CKD-EPI) equation.       Calcium   Date Value Ref Range Status   09/21/2020 8.6 8.5 - 10.1 mg/dL Final        CBC RESULTS:   Recent Labs   Lab Test 09/30/20  0607   WBC 9.3   RBC 3.79*   HGB 11.6*   HCT 36.3*   MCV 96   MCH 30.6   MCHC 32.0   RDW 12.9             Glucose Values Latest Ref Rng & Units 10/4/2020 10/4/2020 10/4/2020 10/4/2020 10/4/2020 10/5/2020 10/5/2020   Bedside Glucose (mg/dl )  - -- -- -- -- -- -- --   GLUCOSE 70 - 99 mg/dL 258(H) 187(H) 248(H) 142(H) 138(H) 254(H) 168(H)   Some recent data might be hidden     ASSESSMENT AND PLAN    Jorje Teran is a  70 year old  hand dominant male with Stroke Ischemic 01.1 (L) Body Involvement (R) Brain Stroke; L sided weakness due to MCA infarct, R MCA occlusion, and R carotid atherosclerosis  1. PT, OT and SLP 60 minutes of each on a daily basis, in addition to rehab nursing and close management of physiatrist.       2. Impairment of ADL's: OT for 60 minutes daily to work on ADL re-training such as grooming, self cares and bathing.       3. Impairment of mobility:  PT for 60 minutes daily to work on neuromuscular re-education focusing on strength, balance, coordination, and endurance.       4. Impairment of cognition/language/swallow:  SLP for 60 minutes daily to work on cognitive and linguistic deficits.     1. Rehab RN for med administration, bowel regimen, glucose monitoring and wound care.  2.   3. Medical Conditions  CVA with dense left hemiparesis  CT with filling defect within the distal R ICA extending into the R MCA and occlusion of the R MCA at the M1 segment. CTA with severe plaquing at R carotid bifurcation with filling defect in the prox R ICA suggestive of thrombus contributing to approximately 80% stenosis. 60% stenosis of the proximal L ICA. TTE with bubble -> Normal left ventricular size and function. Left ventricular ejection fraction of 55-60%. No segmental wall motion abnormalities noted.   - Lipids 7/29/20- LDL 78, HDL 56, , T chol 164, hemoglobin A1c 7.1% H, TSH is 2.05.  - remains with significant dysarthria, facial droop and dense left sided hemiparesis  Spasticity: Stretching, ice + Low dose Baclofen to decrease pain. 5 mg bid.  - ASA 81 mg daily PO/IA  - atorvastatin 40 mg daily, Zetia 10 mg po daily  - BP management as below; - goal for SBPs < 160       Dysphagia  - currently on DD2 with nectar thickened liquids   -PEG tube to be placed on 9/22, currently patient is on tube feeding  If questions or problems arise regarding tube function (e.g. leaking, dislodges, etc.) Contact Interventional  Radiology department 24 hours a day.   [facility]:  8 AM - 4 PM Monday through Friday Contact   [phone]  For afterhours and weekends: [phone]   Ask for the Interventional Radiologist on call.     PeG site Discomfort: Treat infection. Clean site. Add Keflex 500 mg qid x 7 days.  Tylenol prn.  Simethicone added.    BS high. Endo following.  CAD s/p CABG x 3, x 1 in 2015  HTN continue Norvasc /Entresto / Metoprolol 50 mg BID and Valsartan   HLD: On Lipitor  Hypernatremia- resolved     DM II  Recent A1C at 7.1 7/2020.  BG goal is < 180.- started on lantus Endo following.    US at [facility] negative for DVT     SHELDON  - states he could not sleep with CPAP as outpatient  - he would benefit for treating SHELDON   ? CPAP at night    Hypothyroidism  - resumed PTA Levothyroxine 50 mcg daily  TSH checked 7/29/2020 at 2.05     Morbid obesity  BMI noted  7/202 at ~38. Will need to encourage healthy lifestyle and weight loss with recovery     Diet: Snacks/Supplements Adult: Other; thickened smoothie with meals  (RD); With Meals  Calorie Counts     DVT Prophylaxis: Pneumatic Compression Devices  Code Status: Full Code      2. Adjustment to disability:  Clinical psychology to eval and treat when ready  3. FEN: On NDD 2 [place] +TF, Bolus versus nocturnal supplements.  4. Bowel: Javed meds  5. Bladder: Monitor  6. GI Prophylaxis:   7. Code: Full  8. Disposition: Home with family and home care  9. ELOS:  28 days  10. Rehab prognosis:  Fair  11. Follow up Appointments on Discharge: The following labs/tests are recommended: cbc ,basic metabolic panel in 4-5 days, also get mag and phos in 3-4 days .need follow up with neurology as scheduled. Follows with Dr. Vincent with [facility].      Sudarshan Fuentes MD   Physical Medicine & Rehabilitation

## 2020-10-05 NOTE — PLAN OF CARE
FOCUS/GOAL  Bowel management, Bladder management, and Nutrition/Feeding/Swallowing precautions    ASSESSMENT, INTERVENTIONS AND CONTINUING PLAN FOR GOAL:  Alert and oriented x4, incont both. Condom catheter replaced this shift, pt attempted to use call light for assistance with urinal, but urgency limits ability to be incontinent. Pt ate > 50% of both meals, not bolus feedings needed. Insulin given per order. Pt reports pain in his back and bottom. Skin is blanchable, tylenol, given x1. Pt repositioned q2hr, fluids encouraged. No other concerns, continue POC.

## 2020-10-05 NOTE — PROGRESS NOTES
Diabetes Consult Daily  Progress Note          Assessment/Plan:     HPI:  Jorje Teran is a 69 y/o male with past medical history of HTN, HLD, DM II, SHELDON, hypothyroidism, and morbid obesity who was found down, found to have a R MCA infarct and R carotid atherosclerosis, now admitted to ARU 9/24 for intensive therapies, ongoing medical management, and rehabilitative nursing care.  Hyperglycemia exacerbated by enteral feeds.      Assessment:      1)Type 2 Diabetes Mellitus     2)  Obesity; BMI 36     Plan:                  - NPH 18 qAM    - NPH 22 units at HS, when no TF will infuse   - aspart high resistace QAC and HS, all for >/=140  - aspart increase to 1 unit per 4 grams carb AC, HS (TF sched for HS) and PRN  - patient care order to alert RN to PRN aspart in case TF at different time     -BG QAC, HS 0200  - hypoglycemia protocol     Outpatient diabetes follow up: ELOS 28 days  Plan discussed with patient, bedside RN             Interval History:     The last 24 hours progress and nursing notes reviewed.  No acute events this interval.  Noting BG elevated above target over the night, will increase carb coverage.  Patient is uncertain if he had a late night snack which was not covered causing the 0200 BG elevation.    Son was in most of the weekend, he enjoyed that.    Recent Labs   Lab 10/05/20  0733 10/05/20  0235 10/04/20  2120 10/04/20  1720 10/04/20  1128 10/04/20  0741   * 254* 138* 142* 248* 187*           Nutrition:     Orders Placed This Encounter      Combination Diet Dysphagia Diet Level 2: Mechan Altered; Nectar Thickened Liquids (pre-thickened or use instant food thickener)    Supplements: Other; thickened smoothie with meals  (RD); With Meals     Calorie Counts   Was Isosource 1.5 60 ml/h x 12 h, now:  If pt eats <25% of the meal give full bolus: 325 mL of isosource 1.5 via gtube which provides 487 kcal, 22 g protein, 57 g CHO, 5.6 g fiber, and 247 mL free  "water              If pt eats 25-50% of meal provide half bolus: 162ml of isosource 1.5 via gtube which provides  244kcal, 11 g protein, 29 g CHO, 2.8 g fiber, and 124 mL free water.              If pt eats >50% of meal give no bolus              Always give full bolus (325ml) at HS              Flush with 100ml water before/after each         PTA Regimen:     No regular BG monitoring at home  No restricted diet  No diabetic medications PTA - diet controlled     During [facility] admission, utilized glargine 10 units BID w/ fair basal coverage while on Dysphagia diet only (no TF yet)          Review of Systems:   Constitutional:   No fever, no chills  ENT/Mouth:   No hearing changes, no ear pain, no sore throat, no rhinorrhea, no difficulty swallowing  Eyes:  No eye pain, no discharge, no vision changes  CV:   No CP/SOB, no new edema  Resp:  No cough, no wheezing  GI:   No nausea, no vomiting, denies constipation or diarrhea  :  denies  Musk:  No joint swelling/pain, - back and buttocks discomfort when in bed - improves when up with therapies  Skin/heme:   No new rashes/bruises/open areas.  No pruritis  Neuro:   No new weakness, denies numbness/tingling, no headache  Psych:   No new anxiety, ok mood  Endocrine:  No polyuria, no polydipsia          Medications:   Steroid planning:  no       Physical Exam:   /72 (BP Location: Left arm)   Pulse 70   Temp 97.3  F (36.3  C) (Oral)   Resp 16   Ht 1.753 m (5' 9\")   Wt 110.8 kg (244 lb 4.8 oz)   SpO2 93%   BMI 36.08 kg/m      General:  pleasant, in no acute distress.  Laying comfortably in bed.   HEENT:  NC/AT. MMM, sclera not injected  Lungs:  unremarkable, no new cough, no SOB  ABD:   soft,  non-tender,  no lipodystrophy noted  Skin:  warm and dry, no obvious lesions/rash  Feet:    CMS intact, PPP  MSK:   Moving r side  Lymp:    LE edema trace, no calf tendernes  Mental Status:  Alert and oriented x3  Psych:   Cooperative, good eye contact (when on right " side)         Data:     Lab Results   Component Value Date    A1C 7.5 09/10/2020          CBC RESULTS:   Recent Labs   Lab Test 10/03/20  0606 09/30/20  0607   WBC  --  9.3   RBC  --  3.79*   HGB  --  11.6*   HCT  --  36.3*   MCV  --  96   MCH  --  30.6   MCHC  --  32.0   RDW  --  12.9    230     Recent Labs   Lab Test 09/21/20  0620 09/20/20  0630    140   POTASSIUM 4.1 4.0   CHLORIDE 111* 110*   CO2 25 25   ANIONGAP 5 5   * 165*   BUN 21 22   CR 0.78 0.76   SELENA 8.6 8.5     Liver Function Studies -   Recent Labs   Lab Test 09/07/20  2355   PROTTOTAL 7.4   ALBUMIN 3.6   BILITOTAL 0.9   ALKPHOS 67   AST 23   ALT 28     Lab Results   Component Value Date    INR 1.17 09/21/2020    INR 1.03 09/07/2020         CADE Sahu CNP   Inpatient Diabetes Management Service  Pager - [phone]  Friday - Monday 0800 - 1600 hrs  Diabetes Management Team job code: [id number]     I spent a total of 25 minutes bedside and on the inpatient unit managing glycemic care.  Over 50% of my time on the unit was spent counseling the patient and/or coordinating care regarding acute hyperglycemic management.  See note for details.

## 2020-10-05 NOTE — PROGRESS NOTES
Pt alert oriented, alert x4. Liko lift, assist of 2. Had moderate BM 10/4, pt told staff he needed to urinate and used urinal, got 600ml. Continent of urine on my shift.  Condom catheter taken off evening shift and passed onto night shift to reapply. Ate 50-60% of meal and gave 250 at night for HS per orders. See BG and insulin in MAR. Had part of boost for snack at HS, gave 2 units. Pt HR increased to 125 from 80 earlier in my shift. Paged on-call resident doctor, was instructed to monitor and no additional medication needed at this stime. Pt is asymptotic, all other vitals stable. BP did rise to 144/91 later but still asymptomatic. Continue POC.

## 2020-10-05 NOTE — PLAN OF CARE
Patient overall showing some improvements in ability to express himself with improved intelligibility.  On multiple occasions, patient not fully utilizing strategies and not intelligible.  When provided with verbal cue to say to get louder, patient just recognizing the fact that he needs to use the strategies and not actually repeating what he said.  Attempts to try to redirect patient were generally unsuccessful thus highlighting some of the cognitive impairments that he is also having.   Initial trials of ice chips tolerated without any overt signs and symptoms of aspiration or changes in vocal quality.  Subsequent attempts did result in some wet vocal quality as well as coughing.  At this time, recommend continue with nectar thick liquids.  Patient also had one hard cough prior to any p.o. intake.

## 2020-10-05 NOTE — PROGRESS NOTES
Calorie Count  10/2:  No tickets saved.  Per nursing flow sheet po 75% at lunch and dinner.  Intake at breakfast not recorded, but per RN notes did not need supplemental TF bolus (so assume >50% of meal consumed).  10/3:  823 kcals and 25 g protein, 3 meals including 1/2 Magic Cup.    10/4: 1085 kcals and 67 g protein, 3 meals including part of 2 Magic Cups.    Two day average 10/3 and 10/4:  954 kcals and 46 g protein meeting 47% minimum calorie needs and 57% minimum protein needs.      PO intakes continue about the same.  Seems to be taking Nectar Thick milk well, but only some of Magic Cups.     Interventions:  Modify supplements to continue Magic Cup with lunch and try Gelatein Plus with dinner.    Also send Nectar Thick Milk with meals tid.    Follow Up/Monitoring:  Continue Calorie Counts with TF adjustments as appropriate.      Charleen Recinos RD, LD

## 2020-10-05 NOTE — PLAN OF CARE
Appears to be sleeping well. A&O x4, able to make needs known. Liko lift. Ax2 for bed mobility. Denies pain. Declined condom cath overnight. Incontinent of urine x1. G-tube patent. . Using call light appropriately. Continue to monitor.

## 2020-10-06 ENCOUNTER — APPOINTMENT (OUTPATIENT)
Dept: EDUCATION SERVICES | Facility: CLINIC | Age: 70
End: 2020-10-06
Attending: PHYSICAL MEDICINE & REHABILITATION
Payer: COMMERCIAL

## 2020-10-06 ENCOUNTER — APPOINTMENT (OUTPATIENT)
Dept: SPEECH THERAPY | Facility: CLINIC | Age: 70
End: 2020-10-06
Payer: COMMERCIAL

## 2020-10-06 ENCOUNTER — APPOINTMENT (OUTPATIENT)
Dept: PHYSICAL THERAPY | Facility: CLINIC | Age: 70
End: 2020-10-06
Payer: COMMERCIAL

## 2020-10-06 ENCOUNTER — APPOINTMENT (OUTPATIENT)
Dept: OCCUPATIONAL THERAPY | Facility: CLINIC | Age: 70
End: 2020-10-06
Payer: COMMERCIAL

## 2020-10-06 LAB
GLUCOSE BLDC GLUCOMTR-MCNC: 159 MG/DL (ref 70–99)
GLUCOSE BLDC GLUCOMTR-MCNC: 161 MG/DL (ref 70–99)
GLUCOSE BLDC GLUCOMTR-MCNC: 170 MG/DL (ref 70–99)
GLUCOSE BLDC GLUCOMTR-MCNC: 197 MG/DL (ref 70–99)
GLUCOSE BLDC GLUCOMTR-MCNC: 228 MG/DL (ref 70–99)
PLATELET # BLD AUTO: 133 10E9/L (ref 150–450)

## 2020-10-06 PROCEDURE — 97112 NEUROMUSCULAR REEDUCATION: CPT | Mod: GP

## 2020-10-06 PROCEDURE — 250N000013 HC RX MED GY IP 250 OP 250 PS 637: Performed by: PHYSICAL MEDICINE & REHABILITATION

## 2020-10-06 PROCEDURE — 99232 SBSQ HOSP IP/OBS MODERATE 35: CPT | Performed by: NURSE PRACTITIONER

## 2020-10-06 PROCEDURE — 92526 ORAL FUNCTION THERAPY: CPT | Mod: GN

## 2020-10-06 PROCEDURE — 97110 THERAPEUTIC EXERCISES: CPT | Mod: GP

## 2020-10-06 PROCEDURE — 97530 THERAPEUTIC ACTIVITIES: CPT | Mod: GP

## 2020-10-06 PROCEDURE — 99233 SBSQ HOSP IP/OBS HIGH 50: CPT | Performed by: PHYSICAL MEDICINE & REHABILITATION

## 2020-10-06 PROCEDURE — 92526 ORAL FUNCTION THERAPY: CPT | Mod: GN | Performed by: SPEECH-LANGUAGE PATHOLOGIST

## 2020-10-06 PROCEDURE — 97530 THERAPEUTIC ACTIVITIES: CPT | Mod: GP | Performed by: PHYSICAL THERAPIST

## 2020-10-06 PROCEDURE — 85049 AUTOMATED PLATELET COUNT: CPT | Performed by: PHYSICAL MEDICINE & REHABILITATION

## 2020-10-06 PROCEDURE — 92507 TX SP LANG VOICE COMM INDIV: CPT | Mod: GN

## 2020-10-06 PROCEDURE — 36415 COLL VENOUS BLD VENIPUNCTURE: CPT | Performed by: PHYSICAL MEDICINE & REHABILITATION

## 2020-10-06 PROCEDURE — 128N000003 HC R&B REHAB

## 2020-10-06 PROCEDURE — 97535 SELF CARE MNGMENT TRAINING: CPT | Mod: GO | Performed by: OCCUPATIONAL THERAPIST

## 2020-10-06 PROCEDURE — 999N001017 HC STATISTIC GLUCOSE BY METER IP

## 2020-10-06 PROCEDURE — 92507 TX SP LANG VOICE COMM INDIV: CPT | Mod: GN | Performed by: SPEECH-LANGUAGE PATHOLOGIST

## 2020-10-06 PROCEDURE — 250N000013 HC RX MED GY IP 250 OP 250 PS 637: Performed by: NURSE PRACTITIONER

## 2020-10-06 RX ADMIN — METOPROLOL TARTRATE 50 MG: 50 TABLET, FILM COATED ORAL at 21:15

## 2020-10-06 RX ADMIN — INSULIN ASPART 3 UNITS: 100 INJECTION, SOLUTION INTRAVENOUS; SUBCUTANEOUS at 13:04

## 2020-10-06 RX ADMIN — NYSTATIN 500000 UNITS: 500000 SUSPENSION ORAL at 13:04

## 2020-10-06 RX ADMIN — CEPHALEXIN 500 MG: 500 CAPSULE ORAL at 13:04

## 2020-10-06 RX ADMIN — ACETAMINOPHEN 650 MG: 325 TABLET, FILM COATED ORAL at 13:04

## 2020-10-06 RX ADMIN — ASPIRIN 81 MG: 81 TABLET, COATED ORAL at 09:28

## 2020-10-06 RX ADMIN — NYSTATIN 500000 UNITS: 500000 SUSPENSION ORAL at 21:15

## 2020-10-06 RX ADMIN — FAMOTIDINE 20 MG: 20 TABLET ORAL at 21:15

## 2020-10-06 RX ADMIN — CEPHALEXIN 500 MG: 500 CAPSULE ORAL at 17:41

## 2020-10-06 RX ADMIN — MULTIVITAMIN 15 ML: LIQUID ORAL at 18:36

## 2020-10-06 RX ADMIN — INSULIN ASPART 2 UNITS: 100 INJECTION, SOLUTION INTRAVENOUS; SUBCUTANEOUS at 17:41

## 2020-10-06 RX ADMIN — INSULIN ASPART 1 UNITS: 100 INJECTION, SOLUTION INTRAVENOUS; SUBCUTANEOUS at 09:21

## 2020-10-06 RX ADMIN — SACUBITRIL AND VALSARTAN 1 TABLET: 49; 51 TABLET, FILM COATED ORAL at 09:29

## 2020-10-06 RX ADMIN — EZETIMIBE 10 MG: 10 TABLET ORAL at 09:29

## 2020-10-06 RX ADMIN — ATORVASTATIN CALCIUM 40 MG: 40 TABLET, FILM COATED ORAL at 09:27

## 2020-10-06 RX ADMIN — NYSTATIN 500000 UNITS: 500000 SUSPENSION ORAL at 17:41

## 2020-10-06 RX ADMIN — FUROSEMIDE 40 MG: 20 TABLET ORAL at 09:28

## 2020-10-06 RX ADMIN — FAMOTIDINE 20 MG: 20 TABLET ORAL at 09:29

## 2020-10-06 RX ADMIN — SACUBITRIL AND VALSARTAN 1 TABLET: 49; 51 TABLET, FILM COATED ORAL at 21:15

## 2020-10-06 RX ADMIN — CEPHALEXIN 500 MG: 500 CAPSULE ORAL at 23:53

## 2020-10-06 RX ADMIN — METFORMIN HYDROCHLORIDE 500 MG: 500 TABLET ORAL at 17:41

## 2020-10-06 RX ADMIN — CEPHALEXIN 500 MG: 500 CAPSULE ORAL at 00:50

## 2020-10-06 RX ADMIN — Medication 5 MG: at 21:15

## 2020-10-06 RX ADMIN — AMLODIPINE BESYLATE 20 MG: 10 TABLET ORAL at 09:28

## 2020-10-06 RX ADMIN — NYSTATIN 500000 UNITS: 500000 SUSPENSION ORAL at 09:29

## 2020-10-06 RX ADMIN — FUROSEMIDE 40 MG: 20 TABLET ORAL at 17:41

## 2020-10-06 RX ADMIN — METOPROLOL TARTRATE 50 MG: 50 TABLET, FILM COATED ORAL at 09:29

## 2020-10-06 RX ADMIN — LEVOTHYROXINE SODIUM 50 MCG: 25 TABLET ORAL at 09:28

## 2020-10-06 RX ADMIN — INSULIN HUMAN 18 UNITS: 100 INJECTION, SUSPENSION SUBCUTANEOUS at 09:21

## 2020-10-06 RX ADMIN — Medication 5 MG: at 09:29

## 2020-10-06 RX ADMIN — CEPHALEXIN 500 MG: 500 CAPSULE ORAL at 06:33

## 2020-10-06 RX ADMIN — VALSARTAN 320 MG: 160 TABLET ORAL at 09:27

## 2020-10-06 NOTE — CONSULTS
10/06/20 1207 Iwona Steward, SATHYA     Stroke Education Note     The following information has been reviewed with the patient and family:     1. Warning signs of stroke     2. Calling 911 if having warning signs of stroke     3. All modifiable risk factors: hypertension, CAD, atrial fib, diabetes, hypercholesterolemia, smoking, substance abuse, diet, physical inactivity, obesity, sleep apnea.     4. Patient's risk factors for stroke which include: HTN, HLD, DM2, SHELDON, Obesity, physical inactivity     5. Follow-up plan for after discharge     6. Discharge medications which include: ASA, HLD, HTN     In addition, the PLC Stroke Class Handout has been given to the patient and family.     Learner's response to risk factors / lifestyle modification education: Reasons to change Need to change Committment to change      Iwona Steward RN, RN

## 2020-10-06 NOTE — PLAN OF CARE
"Swallowing: Introduced pharyngeal exercises to patient; effortful swallow, tongue depressor/tongue press, and yawn. Patient had inconsistencies in following cues to perform exercise, but benefitted from \"1-2-3 swallow cue\" to initiate effortful swallow. SLP wore clear mask to assist in visual cues. Recommend continued use of clear mask for future sessions.  Expression: Patient would frequently verbalize to be \"big and loud\" but had fluctuating intelligibility to utilize the strategy. About 85% intelligible in repetition of phrases and 60% intelligible in casual conversation.  "

## 2020-10-06 NOTE — PLAN OF CARE
Discharge Planner OT   Patient plan for discharge: not discussed at time of session  Current status: A X 2 liko lift transf. w/c-bed transf. SBA after setup with washing face. Pt. able to wash L side of face without cue.  Therapist keeping on L side to motivate pt. to attend to L side.  Barriers to return to prior living situation: lives alone, below baseline with ADLs/mobility, Lside deficits  Recommendations for discharge: TBD  Rationale for [place]s: to max. Safety/indep. With ADLs/mobility       Entered by: Cathie Jefferson 10/06/2020 12:09 PM

## 2020-10-06 NOTE — PLAN OF CARE
Patient A & O x2, disoriented to time and place. VSS. Pt up with x2 assist using liko lift. Lung sounds clear, infrequent nonproductive cough. Patient denies chest pain, SOB, nausea, and vomiting. Bowel sounds active, last BM 10/6/2020, incontinent, passing flatus, and tolerating mechanical soft diet. Pills crushed in applesauce. Drinking well. Patient has condom cath on until patient demonstrates ability to hold urinal in place without spilling. Right side hemiplegia. Pain is tolerable and patient taking tylenol for pain, ice pack applied to shoulder with a decrease in pain. Plan is to discharge to home with 24/7 assistance on 10/16. Patient demonstrates ability to use call light appropriately. Will continue to monitor patient.

## 2020-10-06 NOTE — PROGRESS NOTES
Diabetes Consult Daily  Progress Note          Assessment/Plan:       Jorje Teran is a 69 y/o male with past medical history of HTN, HLD, DM II, SHELDON, hypothyroidism, and morbid obesity who was found down, found to have a R MCA infarct and R carotid atherosclerosis, now admitted to ARU 9/24 for intensive therapies, ongoing medical management, and rehabilitative nursing care.  Hyperglycemia exacerbated by enteral feeds.                         Type 2 Diabetes Mellitus      Obesity; BMI 36     Plan:    - lantus  20 units at bedtime  - metformin 500 mg with dinner ( starting dose 10/6)             - NPH 18 qAM , discontinued  - NPH 22 units at HS, discontnued  - aspart high resistace QAC and HS, all for >/=140, discontinued  -novolog high correction before meals and at bedtime  - aspart  1 unit per 10 grams CHO for meals/snacks/supplements  -BG QAC, HS 0200  - hypoglycemia protocol  -will continue to follow        Outpatient diabetes follow up: TBD  Plan discussed with patient, family, and primary team via this note    Discharge: 10/16           Interval History:   The last 24 hours progress and nursing notes reviewed.  Blood sugars are moderately controlled on current regime  Appetite has improved, discussed with RD, will hold bolus tube feeds for now.  This is difficult way to manage diabetes with variable tube feeds amount based on oral intake      Per Calorie Counts: no tube feeds given on 10/5    Will start metformin with dinner and will increase if tolerates  Is working with therapies       Calorie Counts   Was Isosource 1.5 60 ml/h x 12 h, now:  If pt eats <25% of the meal give full bolus: 325 mL of isosource 1.5 via gtube which provides 487 kcal, 22 g protein, 57 g CHO, 5.6 g fiber, and 247 mL free water              If pt eats 25-50% of meal provide half bolus: 162ml of isosource 1.5 via gtube which provides  244kcal, 11 g protein, 29 g CHO, 2.8 g fiber, and 124 mL free  "water.              If pt eats >50% of meal give no bolus              Always give full bolus (325ml) at HS              Flush with 100ml water before/after each           PTA:    No regular BG monitoring at home  No restricted diet  No diabetic medications PTA - diet controlled     During [facility] admission, utilized glargine 10 units BID w/ fair basal coverage while on Dysphagia diet only (no TF yet)     Recent Labs   Lab 10/06/20  0730 10/06/20  0154 10/05/20  2127 10/05/20  1736 10/05/20  1119 10/05/20  0733   * 228* 201* 121* 178* 168*               Review of Systems:   See interval hx          Medications:     Orders Placed This Encounter      Combination Diet Dysphagia Diet Level 2: Mechan Altered; Nectar Thickened Liquids (pre-thickened or use instant food thickener)       Physical Exam:  Gen: Alert, resting in bed, in NAD   HEENT:  mucous membranes are moist  Resp: non-labored, + cough  with eating  Ext: moving right arm and hand  Neuro:oriented x3, communicating clearly, dysphagia  /66 (BP Location: Left arm)   Pulse 65   Temp 96.2  F (35.7  C) (Oral)   Resp 16   Ht 1.753 m (5' 9\")   Wt 110.8 kg (244 lb 4.8 oz)   SpO2 98%   BMI 36.08 kg/m             Data:     Lab Results   Component Value Date    A1C 7.5 09/10/2020              CBC RESULTS:   Recent Labs   Lab Test 10/03/20  0606 09/30/20  0607   WBC  --  9.3   RBC  --  3.79*   HGB  --  11.6*   HCT  --  36.3*   MCV  --  96   MCH  --  30.6   MCHC  --  32.0   RDW  --  12.9    230     Recent Labs   Lab Test 09/21/20  0620 09/20/20  0630    140   POTASSIUM 4.1 4.0   CHLORIDE 111* 110*   CO2 25 25   ANIONGAP 5 5   * 165*   BUN 21 22   CR 0.78 0.76   SELENA 8.6 8.5     Liver Function Studies -   Recent Labs   Lab Test 09/07/20  2355   PROTTOTAL 7.4   ALBUMIN 3.6   BILITOTAL 0.9   ALKPHOS 67   AST 23   ALT 28     Lab Results   Component Value Date    INR 1.17 09/21/2020    INR 1.03 09/07/2020         I spent a total of 25 " minutes bedside and on the inpatient unit managing the glycemic care of Jorje Teran. Over 50% of my time on the unit was spent counseling the patient  and/or coordinating care regarding .  See note for details.      To contact Endocrine Diabetes service:   From 8AM-4PM: page inpatient diabetes provider that is following the patient  For questions or updates from 4PM-8AM: page the diabetes job code for on call fellow: [phone]        Esperanza Mixon [phone]  Diabetes Management job code [phone]

## 2020-10-06 NOTE — PROGRESS NOTES
Calorie Counts    10/05: 1333 kcal and 59 g protein (3 meals, 1 supplements)-no breakfast ticket saved but at >50% per RN note. Lunch ticket saved, good intake and 50% of magic cup. 75% of dinner per flowsheet    -no TF given after meals d/t eating >50%, HS bolus given. Total calories/protein: 1820kcal and 81g protein    Pt ate well yesterday, only received HS bolus. Ate 100% of lunch today per endo NP. Discussed holding TF and assessing intakes with Endo. MD agreeable. Three day calorie count continues through 10/7.     Annamaria Castano MS, RD, LDN  Unit Pager [phone]

## 2020-10-06 NOTE — PLAN OF CARE
A&Ox3, disoriented to time. VSS. Denied chest pain. Denied SOB. No c/o pain overnight. Ax2 via liko lift. Incontinent B/B. Condom cath in place overnight, patent and draining. Last BM 10/6/2020. Takes pills crushed w/ applesauce.  Pt is able to make his needs known. Call light within reach. Bed alarm on for safety. Continue POC.

## 2020-10-06 NOTE — PLAN OF CARE
VSS. A/Ox's 4.  Pt has difficulty expressing himself with slow/slurred speech at times. Pain rated as manageable. Tylenol given for pain control. PT has loss of sensation on left side of body. Pt had DDL2 diet with nectar, nurse was at bedside feeding him x45 minutes and he had several coughing episodes (sticky note left for MD).  Pt received bolus TF before bed. Denied any nausea, CP, SOB, lightheadedness or dizziness. Pt is incontinent of bowel and bladder and had large BM this shift. Passing flatus. Bedrest maintained.  Resting in bed at this time with call light in reach and able to make needs known.

## 2020-10-06 NOTE — PLAN OF CARE
SLP: worked on speech tasks, repeating sentences/conversation, speech intelligibility much improved from few days ago.Oropharyngeal exercise carried out with verbal/visual cues noted pt drinking nectar, cued small sips, no outward sx aspiration. Encourage/offer nectar fluids for helping meet hydration needs.

## 2020-10-06 NOTE — PROGRESS NOTES
"  [facility]   Acute Rehabilitation Unit  Daily progress note  CC:   Stroke Ischemic 01.1 (L) Body Involvement (R) Brain Stroke; L sided weakness due to MCA infarct, R MCA occlusion, and R carotid atherosclerosis    PT: Focus on midline sitting/ unsupported sitting EOB. Pt able to sit SBA once attained midline. Showing improvement in recognizing L sided listing and able to self adjust to R w/o cues.     The patient currently performs bed mobility with Max A x 2 and bed rail. Once sitting at EOB the pt is able to sit with anywhere from SBA/CGA, to Mod A x 2 depending on fatigue and task. The patient is able to use Gretchen Stedy with Max A x 2, however unable to achieve a full stand. The patient is currently dependent on mechanical lift for bed to and from chair.     The patient's alertness and participation is improving and he will tolerate intensive therapies in the ARC setting. He is able to follow 2 step directions with 85%, and intelligibility with short 2-3 word phrases needs mod/max cues to increase breath support    OT: pt seen sitting of back of w/c with adls, pt requiring max a to min a to come off back of w/c and maintain with cga arms supported on tray table in front pt sitting with posterial tilt. ptto be  Changed  To a tilt in space second to scooting to edge of chair and needed to be repositoned with OT tx session with liko lift. G/H face sba  hands min a chest min a sitting in w/c U/B dressing  max a hospital gown siting in w/c    SLP: Swallowing: Introduced pharyngeal exercises to patient; effortful swallow, tongue depressor/tongue press, and yawn. Patient had inconsistencies in following cues to perform exercise, but benefitted from \"1-2-3 swallow cue\" to initiate effortful swallow. SLP wore clear mask to assist in visual cues. Recommend continued use of clear mask for future sessions.  Expression: Patient would frequently verbalize to be \"big and loud\" but had " fluctuating intelligibility to utilize the strategy. About 85% intelligible in repetition of phrases and 60% intelligible in casual conversation.    RN: Denied chest pain. Denied SOB. No c/o pain overnight. Ax2 via liko lift. Incontinent B/B. Condom cath in place overnight, patent and draining. Last BM 10/6/2020. Takes pills crushed w/ applesauce.  Pt is able to make his needs known. Call light within reach. Bed alarm on for safety    S:   Son has been here. I have reviewed his status. Strategies to improve function. Likes Coffee with Thickener! Some spasms. Baclofen low dose. Ice pack prn. Pain better. Improving! Does cough with feeding!     Speaking better. Dense left hemiplegia, left field cut +. Compensates by turning.  On TF. BS have been high at times.. Endo following.  NDD 2 Vamo. TF at 60 ml/hour Free H2O 100 ml q4h. Trial of NND3.        ROS: Denies HA, nausea, pain. Condom Cath      [unfilled]   Scheduled meds    amLODIPine  20 mg Oral Daily     aspirin  81 mg Oral Daily     atorvastatin  40 mg Oral Daily     Baclofen  5 mg Oral BID     cephALEXin  500 mg Oral Q6H TORI     ezetimibe  10 mg Oral Daily     famotidine  20 mg Oral BID     furosemide  40 mg Oral BID     insulin aspart   Subcutaneous TID w/meals     insulin aspart   Subcutaneous At Bedtime     insulin aspart  1-10 Units Subcutaneous 4x Daily w/meals     insulin isophane human  18 Units Subcutaneous QAM     insulin isophane human  22 Units Subcutaneous At Bedtime     levothyroxine  50 mcg Oral Daily     metoprolol tartrate  50 mg Oral BID     multivitamin w/minerals  1 tablet Oral Daily     nystatin  500,000 Units Swish & Spit 4x Daily     sacubitril-valsartan  1 tablet Oral BID     sodium chloride (PF)  10 mL Intracatheter Q7 Days     valsartan  320 mg Oral Daily       PRN meds:  acetaminophen, bacitracin, bisacodyl, carboxymethylcellulose PF, dextrose, glucose **OR** dextrose **OR** glucagon, insulin aspart, miconazole, nitroGLYcerin, -  "MEDICATION INSTRUCTIONS -, polyethylene glycol, simethicone, sodium chloride (PF), traZODone      PHYSICAL EXAM  /66 (BP Location: Left arm)   Pulse 65   Temp 96.2  F (35.7  C) (Oral)   Resp 16   Ht 1.753 m (5' 9\")   Wt 110.8 kg (244 lb 4.8 oz)   SpO2 98%   BMI 36.08 kg/m    Alert fluent. Right gaze preference. Able to track past midline.  Respiratory: Clear to auscultation bilaterally, no crackles or wheezing  Cardiovascular: S1 and S2, RRR, and no murmur noted  GI: soft, non-distended, non-tender. G tube + Discharge + BS +  Skin/Integumen: No rashes, trace lower extremity edema noted  Neuro : Left lower Facial droop + Left Field cut +  Dense left-sided hemiplegia with neglect noted.  Tone +. Reflexes emerging.  Toes mute  Vocalizes, low volume   Extremities. Moves right side well. Edema +    LABS  Last Comprehensive Metabolic Panel:  Sodium   Date Value Ref Range Status   09/21/2020 141 133 - 144 mmol/L Final     Potassium   Date Value Ref Range Status   09/21/2020 4.1 3.4 - 5.3 mmol/L Final     Chloride   Date Value Ref Range Status   09/21/2020 111 (H) 94 - 109 mmol/L Final     Carbon Dioxide   Date Value Ref Range Status   09/21/2020 25 20 - 32 mmol/L Final     Anion Gap   Date Value Ref Range Status   09/21/2020 5 3 - 14 mmol/L Final     Glucose   Date Value Ref Range Status   09/21/2020 178 (H) 70 - 99 mg/dL Final     Urea Nitrogen   Date Value Ref Range Status   09/21/2020 21 7 - 30 mg/dL Final     Creatinine   Date Value Ref Range Status   09/21/2020 0.78 0.66 - 1.25 mg/dL Final     GFR Estimate   Date Value Ref Range Status   09/21/2020 >90 >60 mL/min/[1.73_m2] Final     Comment:     Non  GFR Calc  Starting 12/18/2018, serum creatinine based estimated GFR (eGFR) will be   calculated using the Chronic Kidney Disease Epidemiology Collaboration   (CKD-EPI) equation.       Calcium   Date Value Ref Range Status   09/21/2020 8.6 8.5 - 10.1 mg/dL Final        CBC RESULTS:   Recent " Labs   Lab Test 09/30/20  0607   WBC 9.3   RBC 3.79*   HGB 11.6*   HCT 36.3*   MCV 96   MCH 30.6   MCHC 32.0   RDW 12.9             Glucose Values Latest Ref Rng & Units 10/5/2020 10/5/2020 10/5/2020 10/5/2020 10/5/2020 10/6/2020 10/6/2020   Bedside Glucose (mg/dl )  - -- -- -- -- -- -- --   GLUCOSE 70 - 99 mg/dL 254(H) 168(H) 178(H) 121(H) 201(H) 228(H) 161(H)   Some recent data might be hidden     ASSESSMENT AND PLAN    Jorje Teran is a 70 year old  hand dominant male with Stroke Ischemic 01.1 (L) Body Involvement (R) Brain Stroke; L sided weakness due to MCA infarct, R MCA occlusion, and R carotid atherosclerosis  1. PT, OT and SLP 60 minutes of each on a daily basis, in addition to rehab nursing and close management of physiatrist.       2. Impairment of ADL's: OT for 60 minutes daily to work on ADL re-training such as grooming, self cares and bathing.       3. Impairment of mobility:  PT for 60 minutes daily to work on neuromuscular re-education focusing on strength, balance, coordination, and endurance.       4. Impairment of cognition/language/swallow:  SLP for 60 minutes daily to work on cognitive and linguistic deficits.     1. Rehab RN for med administration, bowel regimen, glucose monitoring and wound care.  2.   3. Medical Conditions  CVA with dense left hemiparesis  CT with filling defect within the distal R ICA extending into the R MCA and occlusion of the R MCA at the M1 segment. CTA with severe plaquing at R carotid bifurcation with filling defect in the prox R ICA suggestive of thrombus contributing to approximately 80% stenosis. 60% stenosis of the proximal L ICA. TTE with bubble -> Normal left ventricular size and function. Left ventricular ejection fraction of 55-60%. No segmental wall motion abnormalities noted.   - Lipids 7/29/20- LDL 78, HDL 56, , T chol 164, hemoglobin A1c 7.1% H, TSH is 2.05.  - remains with significant dysarthria, facial droop and dense left sided  "hemiparesis  Spasticity: Stretching, ice + Low dose Baclofen to decrease pain. 5 mg bid.  - ASA 81 mg daily PO/NC  - atorvastatin 40 mg daily, Zetia 10 mg po daily  - BP management as below; - goal for SBPs < 160       Dysphagia  - currently on DD2 with nectar thickened liquids   -PEG tube to be placed on 9/22, currently patient is on tube feeding  If questions or problems arise regarding tube function (e.g. leaking, dislodges, etc.) Contact Interventional Radiology department 24 hours a day.   For procedures that were done at [facility]:  8 AM - 4 PM Monday through Friday Contact   [phone]  For afterhours and weekends: [phone]   Ask for the Interventional Radiologist on call.     PeG site Discomfort: Treat infection. Clean site. Add Keflex 500 mg qid x 7 days.  Tylenol prn.  Simethicone added.    BS high. Endo following.  CAD s/p CABG x 3, x 1 in 2015  HTN continue Norvasc /Entresto / Metoprolol 50 mg BID and Valsartan   HLD: On Lipitor  Hypernatremia- resolved     DM II  Recent A1C at 7.1 7/2020.  BG goal is < 180.- started on lantus Endo following. \"- NPH 18 qAM    - NPH 22 units at HS, when no TF will infuse   - aspart high resistace QAC and HS, all for >/=140  - aspart increase to 1 unit per 4 grams carb AC, HS (TF sched for HS) and PRN  - patient care order to alert RN to PRN aspart in case TF at different time  -BG QAC, HS 0200  - hypoglycemia protocol  Outpatient diabetes follow up: [facility] 28 days\"    US at [facility] negative for DVT     SHELDON  - states he could not sleep with CPAP as outpatient  - he would benefit for treating SHELDON   ? CPAP at night    Hypothyroidism  - resumed PTA Levothyroxine 50 mcg daily  TSH checked 7/29/2020 at 2.05     Morbid obesity  BMI noted  7/202 at ~38. Will need to encourage healthy lifestyle and weight loss with recovery     Diet: Snacks/Supplements Adult: Other; thickened smoothie with meals  (RD); With Meals  Calorie Counts     DVT Prophylaxis: Pneumatic " Compression Devices  Code Status: Full Code      2. Adjustment to disability:  Clinical psychology to eval and treat when ready  3. FEN: On NDD 2 [place] +TF, Bolus versus nocturnal supplements.  4. Bowel: Javed meds  5. Bladder: Monitor  6. GI Prophylaxis:   7. Code: Full  8. Disposition: Home with family and home care  9. ELOS:  28 days  10. Rehab prognosis:  Fair  11. Follow up Appointments on Discharge: The following labs/tests are recommended: cbc ,basic metabolic panel in 4-5 days, also get mag and phos in 3-4 days .need follow up with neurology as scheduled. Follows with Dr. Vincent with [facility].      Sudarshan Fuentes MD   Physical Medicine & Rehabilitation

## 2020-10-07 ENCOUNTER — APPOINTMENT (OUTPATIENT)
Dept: PHYSICAL THERAPY | Facility: CLINIC | Age: 70
End: 2020-10-07
Payer: COMMERCIAL

## 2020-10-07 ENCOUNTER — DOCUMENTATION ONLY (OUTPATIENT)
Dept: REHABILITATION | Facility: CLINIC | Age: 70
End: 2020-10-07

## 2020-10-07 ENCOUNTER — APPOINTMENT (OUTPATIENT)
Dept: SPEECH THERAPY | Facility: CLINIC | Age: 70
End: 2020-10-07
Payer: COMMERCIAL

## 2020-10-07 ENCOUNTER — APPOINTMENT (OUTPATIENT)
Dept: OCCUPATIONAL THERAPY | Facility: CLINIC | Age: 70
End: 2020-10-07
Payer: COMMERCIAL

## 2020-10-07 LAB
GLUCOSE BLDC GLUCOMTR-MCNC: 148 MG/DL (ref 70–99)
GLUCOSE BLDC GLUCOMTR-MCNC: 152 MG/DL (ref 70–99)
GLUCOSE BLDC GLUCOMTR-MCNC: 160 MG/DL (ref 70–99)
GLUCOSE BLDC GLUCOMTR-MCNC: 174 MG/DL (ref 70–99)
GLUCOSE BLDC GLUCOMTR-MCNC: 182 MG/DL (ref 70–99)

## 2020-10-07 PROCEDURE — 99233 SBSQ HOSP IP/OBS HIGH 50: CPT | Performed by: PHYSICAL MEDICINE & REHABILITATION

## 2020-10-07 PROCEDURE — 999N001017 HC STATISTIC GLUCOSE BY METER IP

## 2020-10-07 PROCEDURE — 97110 THERAPEUTIC EXERCISES: CPT | Mod: GP

## 2020-10-07 PROCEDURE — 97530 THERAPEUTIC ACTIVITIES: CPT | Mod: GP

## 2020-10-07 PROCEDURE — 250N000013 HC RX MED GY IP 250 OP 250 PS 637: Performed by: PHYSICAL MEDICINE & REHABILITATION

## 2020-10-07 PROCEDURE — 128N000003 HC R&B REHAB

## 2020-10-07 PROCEDURE — 97112 NEUROMUSCULAR REEDUCATION: CPT | Mod: GP | Performed by: PHYSICAL THERAPIST

## 2020-10-07 PROCEDURE — 97760 ORTHOTIC MGMT&TRAING 1ST ENC: CPT | Mod: GO

## 2020-10-07 PROCEDURE — 999N000150 HC STATISTIC PT MED CONFERENCE < 30 MIN: Performed by: PHYSICAL THERAPIST

## 2020-10-07 PROCEDURE — 999N000125 HC STATISTIC PATIENT MED CONFERENCE < 30 MIN

## 2020-10-07 PROCEDURE — 99232 SBSQ HOSP IP/OBS MODERATE 35: CPT | Performed by: NURSE PRACTITIONER

## 2020-10-07 PROCEDURE — 92526 ORAL FUNCTION THERAPY: CPT | Mod: GN

## 2020-10-07 PROCEDURE — 250N000013 HC RX MED GY IP 250 OP 250 PS 637: Performed by: NURSE PRACTITIONER

## 2020-10-07 PROCEDURE — 97530 THERAPEUTIC ACTIVITIES: CPT | Mod: GP | Performed by: PHYSICAL THERAPIST

## 2020-10-07 PROCEDURE — 97535 SELF CARE MNGMENT TRAINING: CPT | Mod: GO

## 2020-10-07 RX ORDER — BACLOFEN 10 MG/1
10 TABLET ORAL 3 TIMES DAILY
Status: DISCONTINUED | OUTPATIENT
Start: 2020-10-07 | End: 2020-10-09

## 2020-10-07 RX ADMIN — VALSARTAN 320 MG: 160 TABLET ORAL at 08:41

## 2020-10-07 RX ADMIN — FAMOTIDINE 20 MG: 20 TABLET ORAL at 08:41

## 2020-10-07 RX ADMIN — BACLOFEN 10 MG: 10 TABLET ORAL at 15:04

## 2020-10-07 RX ADMIN — INSULIN ASPART 2 UNITS: 100 INJECTION, SOLUTION INTRAVENOUS; SUBCUTANEOUS at 12:16

## 2020-10-07 RX ADMIN — CEPHALEXIN 500 MG: 500 CAPSULE ORAL at 06:26

## 2020-10-07 RX ADMIN — ATORVASTATIN CALCIUM 40 MG: 40 TABLET, FILM COATED ORAL at 08:41

## 2020-10-07 RX ADMIN — NYSTATIN 500000 UNITS: 500000 SUSPENSION ORAL at 20:33

## 2020-10-07 RX ADMIN — MULTIVITAMIN 15 ML: LIQUID ORAL at 20:30

## 2020-10-07 RX ADMIN — LEVOTHYROXINE SODIUM 50 MCG: 25 TABLET ORAL at 08:41

## 2020-10-07 RX ADMIN — FUROSEMIDE 40 MG: 20 TABLET ORAL at 08:41

## 2020-10-07 RX ADMIN — AMLODIPINE BESYLATE 20 MG: 10 TABLET ORAL at 08:41

## 2020-10-07 RX ADMIN — CEPHALEXIN 500 MG: 500 CAPSULE ORAL at 13:06

## 2020-10-07 RX ADMIN — FAMOTIDINE 20 MG: 20 TABLET ORAL at 20:19

## 2020-10-07 RX ADMIN — ASPIRIN 81 MG: 81 TABLET, COATED ORAL at 08:41

## 2020-10-07 RX ADMIN — INSULIN ASPART 1 UNITS: 100 INJECTION, SOLUTION INTRAVENOUS; SUBCUTANEOUS at 13:29

## 2020-10-07 RX ADMIN — METOPROLOL TARTRATE 50 MG: 50 TABLET, FILM COATED ORAL at 20:19

## 2020-10-07 RX ADMIN — METFORMIN HYDROCHLORIDE 500 MG: 500 TABLET ORAL at 18:09

## 2020-10-07 RX ADMIN — BACLOFEN 10 MG: 10 TABLET ORAL at 20:19

## 2020-10-07 RX ADMIN — NYSTATIN 500000 UNITS: 500000 SUSPENSION ORAL at 13:06

## 2020-10-07 RX ADMIN — EZETIMIBE 10 MG: 10 TABLET ORAL at 08:41

## 2020-10-07 RX ADMIN — INSULIN ASPART 4 UNITS: 100 INJECTION, SOLUTION INTRAVENOUS; SUBCUTANEOUS at 12:17

## 2020-10-07 RX ADMIN — FUROSEMIDE 40 MG: 20 TABLET ORAL at 18:09

## 2020-10-07 RX ADMIN — METOPROLOL TARTRATE 50 MG: 50 TABLET, FILM COATED ORAL at 08:41

## 2020-10-07 RX ADMIN — INSULIN ASPART 1 UNITS: 100 INJECTION, SOLUTION INTRAVENOUS; SUBCUTANEOUS at 18:12

## 2020-10-07 RX ADMIN — SACUBITRIL AND VALSARTAN 1 TABLET: 49; 51 TABLET, FILM COATED ORAL at 08:41

## 2020-10-07 RX ADMIN — NYSTATIN 500000 UNITS: 500000 SUSPENSION ORAL at 18:10

## 2020-10-07 RX ADMIN — Medication 5 MG: at 08:41

## 2020-10-07 RX ADMIN — SACUBITRIL AND VALSARTAN 1 TABLET: 49; 51 TABLET, FILM COATED ORAL at 20:19

## 2020-10-07 RX ADMIN — NYSTATIN 500000 UNITS: 500000 SUSPENSION ORAL at 08:40

## 2020-10-07 NOTE — PROGRESS NOTES
Calorie Counts    10/5: 1333 kcal and 59 g protein (3 meals, 1 supplements)-  10/6: 1480 kcal and 89 g protein (3 meals, 2 supplements)    Annamaria Castano MS, RD, LDN

## 2020-10-07 NOTE — PLAN OF CARE
Acute Rehab Care Conference/Team Rounds      Type: Team Rounds    Present: Dr. Sudarshan Fuentes, Becky Sharma PT, Mara Chappell OTR-L, Mandeep Winn SLP, Luann Mayen SW, Annamaria Castano RD, Donte Parikh RN CC, Bang-O Jatin Britton, Renee Iverson RN       Discharge Barriers/Treatment/Education    Rehab Diagnosis:  Stroke Ischemic 01.1 (L) Body Involvement (R) Brain Stroke; L sided weakness due to MCA infarct, R MCA occlusion, and R carotid atherosclerosis    Active Medical Co-morbidities/Prognosis:   Patient Active Problem List   Diagnosis     CVA (cerebral vascular accident) (H)     Right middle cerebral artery stroke (H)     Class 2 severe obesity due to excess calories with serious comorbidity and body mass index (BMI) of 38.0 to 38.9 in adult (H)     Controlled type 2 diabetes mellitus without complication, without long-term current use of insulin (H)     Coronary atherosclerosis     Essential hypertension     Hyperlipidemia     Hypothyroid     Obstructive sleep apnea syndrome     S/P CABG x 3     Tubular adenoma of colon        Safety: Per protocol.    Pain: Under control. Has Baclofen for spasticity.    Medications, Skin, Tubes/Lines:    Swallowing/Nutrition: Patient's current diet is NDD-2 with nectar thick liquids. Patient has been showing improvement in ability to feed self but still requiring assistance with all meals. Has shown several episodes of aspiration but generally not directly associated with PO intake but instead with managing secretions. Some pocketing noted with trials of DD-3 textures. Limited trials of thin liquids showing s/sx of aspiration.     Bowel/Bladder: has condom catheter.    Psychosocial: . Lives alone in a home with 1 KIERRA. Indep PTA. No mental health or substance abuse PTA. Three adult sons who are supportive and involved in pt care. Retired and has LTC insurance policy.     ADLs/IADLs:pt sitting balance for adls on eob varies from assit of liko lift to  "cga ,Oral cares min a for thourghness, OT face sba  hands min a chest min a sitting in w/c.U /B dressing max a hospital gown siting in w/c.L/B dressing dep     Mobility: wheelchair based and dependent, heavy assist for rolling, able to sit EOB or EOM w/ staff w/in arms reach and careful environmental controls.  Unable to bear wt for lift off or standing.  Has not been able to coordinate/sequence w/c management.  PASS score on admit 1/36 and is unchanged.     Cognition/Language: Significant dysarthria limiting intelligibility at times though improvements noted over course of stay with use of \"loud and proud\" strategy. Requires frequent reminders to use strategies. Cognitive impairments also noted and would require assistance with all IADL tasks. Ongoing SLP intervention upon facility discharge.    Community Re-Entry:    Transportation:    Decision maker: self    Plan of Care and goals reviewed and updated.    Discharge Plan/Recommendations    Fall Precautions: continue    Patient/Family input to goals: Yes. Son is actively participating.    Anticipated rehab needs following discharge: Continued PT/OT/SLP/RN home care    Anticipated care giver support after discharge: Family and home care    Estimated length of stay: 28 days    Overall plan for the patient: Continue IP Rehabilitation.      Utilization Review and Continued Stay Justification    Medical Necessity Criteria:    For any criteria that is not met, please document reason and plan for discharge, transfer, or modification of plan of care to address.    Requires intensive rehabilitation program to treat functional deficits?: Yes    Requires 3x per week or greater involvement of rehabilitation physician to oversee rehabilitation program?: Yes    Requires rehabilitation nursing interventions?: Yes    Patient is making functional progress?: Yes    There is a potential for additional functional progress? Yes    Patient is participating in therapy 3 hours per day a " minimum of 5 days per week or 15 hours per week in 7 day period?:Yes    Has discharge needs that require coordinated discharge planning approach?:Yes        Final Physician Sign off    Statement of Approval: I concur with the plan of care    Patient Goals  Social Work Goals:Confirm discharge [place]s with therapy, coordinate safe discharge plan and remain available to support and assist as needed.         OT Frequency: 4 weeks 60-90 miin daily  OT goal: hygiene/grooming: modified independent  OT goal: upper body dressing: Minimal assist  OT goal: lower body dressing: Minimal assist  OT goal: upper body bathing: Supervision/stand-by assist  OT goal: lower body bathing: Minimal assist  OT goal: bed mobility: Supervision/stand-by assist     OT goal: toilet transfer/toileting: Moderate assist  OT goal: meal preparation: Moderate assist  OT goal: home management: Minimal assist           OT goal 1: pt will be mod a of 1 transer to extended tub bench  OT goal 2: Pt will be min a with  prom aarom l u/e and arom ex with r U/E                PT Frequency: daily x 60 minutes  PT goal: bed mobility: Minimal assist  PT goal: transfers: Minimal assist, Assistive device         PT goal 1: will work w/ vendor to attain recommended equipment for home  PT Goal 2: will be able to sit EOB w/ intermittent single hand support x 10 minutes                SLP Frequency: daily  SLP Swallow Goal:  Safely tolerate diet without signs/symptoms of aspiration: Dysphagia level 3 diet, thin liquids, with use of compensatory swallow strategies, with use of swallow precautions, with assistance/supervision  SLP Language Goal:  Improve language comprehension for interaction with caregivers/environment: minimal assist, auditory and written  SLP Communication goal:  Communicate basic wants and needs: minimal assist, verbally  SLP goal 1: Pt to complete basic-mod level attention and reasoning/problem solving tasks with 90% acc given min A to  maximize independence with tasks of daily living and therapy tasks.

## 2020-10-07 NOTE — PROGRESS NOTES
Prior Authorization **APPROVED**    Authorization Effective Date: 9/7/2020  Authorization Expiration Date: 10/7/2023  Medication: Trulicity  Approved Dose/Quantity: 2ml every 28 days  Reference #: CoverMyMeds Key: [id number] - [place] Case ID: [id number], [employer] Case ID: [id number]   Insurance Company: [employer] - Phone [phone] Fax [phone]  Expected CoPay: $196.24 (price due to Medicare Coverage Gap/[place] - $784.84 remains in Coverage Gap)     CoPay Card Available: No    Foundation Assistance Needed: n/a  Which Pharmacy is filling the prescription (Not needed for infusion/clinic administered): n/a - Patient has not yet been discharged, and there are no outpatient orders for this medication yet  Comments:  Proactive Prior Authorization              Sandra Salazar CPhT  [facility] Liaison  Pronouns: She/Her/Hers    [facility]  [address] [place] [address]  [address] [place] [address] [address] [place] [id number]   [email or web address]  [email or web address]   Phone: [phone]  Pager: [phone]  Fax: [phone]

## 2020-10-07 NOTE — PLAN OF CARE
Patient A&O with some confusion noted. Bowel sounds active no BM as of yet. Denied CP, lightheadedness, dizziness, numbness, tingling. Denied SOB/CORREA or pain.  Pt drinking well and voiding spontaneously without difficulties but incontinent.  FT was flushed with 100ml of free water. BG WNL and no hyper/hypoglycemia noted. Pt's son is by the bed side and helping with feeding. Will continue with POC.

## 2020-10-07 NOTE — PROGRESS NOTES
Diabetes Consult Daily  Progress Note          Assessment/Plan:       Jorje Teran is a 71 y/o male with past medical history of HTN, HLD, DM II, SHELDON, hypothyroidism, and morbid obesity who was found down, found to have a R MCA infarct and R carotid atherosclerosis, now admitted to ARU 9/24 for intensive therapies, ongoing medical management, and rehabilitative nursing care.  Hyperglycemia exacerbated by enteral feeds.                         Type 2 Diabetes Mellitus      Obesity; BMI 36     Plan:    - lantus  20 units at bedtime  - metformin 500 mg with dinner ( starting dose 10/6)             - NPH 18 qAM , discontinued  - NPH 22 units at HS, discontnued  - aspart high resistace QAC and HS, all for >/=140, discontinued  -novolog high correction before meals and at bedtime  - aspart  1 unit per 10 grams CHO for meals/snacks/supplements  -BG QAC, HS 0200  - hypoglycemia protocol  -will continue to follow        Outpatient diabetes follow up: TBD  Plan discussed with patient, family, and primary team via this note    Discharge: 10/16           Interval History:   The last 24 hours progress and nursing notes reviewed.  Blood sugars are improving with change to lantus versus NPH and bolus tube feeds.   Has not used additional bolus tube feeds or about 2 days.  Started on metformin, no reports of diarrhea, will increase to bid in the next 1-2 days if no side effects  Appetite is good, denies nausea and or vomiting  Is working with therapies          Calorie Counts   Was Isosource 1.5 60 ml/h x 12 h, now:  If pt eats <25% of the meal give full bolus: 325 mL of isosource 1.5 via gtube which provides 487 kcal, 22 g protein, 57 g CHO, 5.6 g fiber, and 247 mL free water              If pt eats 25-50% of meal provide half bolus: 162ml of isosource 1.5 via gtube which provides  244kcal, 11 g protein, 29 g CHO, 2.8 g fiber, and 124 mL free water.              If pt eats >50% of meal give no  "bolus              Always give full bolus (325ml) at HS              Flush with 100ml water before/after each           PTA:    No regular BG monitoring at home  No restricted diet  No diabetic medications PTA - diet controlled     During [facility] admission, utilized glargine 10 units BID w/ fair basal coverage while on Dysphagia diet only (no TF yet)     Recent Labs   Lab 10/07/20  0159 10/06/20  2137 10/06/20  1729 10/06/20  1222 10/06/20  0730 10/06/20  0154   * 159* 170* 197* 161* 228*               Review of Systems:   See interval hx          Medications:     Orders Placed This Encounter      Combination Diet Dysphagia Diet Level 2: Mechan Altered; Nectar Thickened Liquids (pre-thickened or use instant food thickener)       Physical Exam:  Gen: Alert, resting in bed, in NAD   HEENT:  mucous membranes are moist  Resp: non-labored, + cough  with eating  Ext: moving right arm and hand  Neuro:oriented x3, communicating clearly, dysphagia  /61 (BP Location: Left arm)   Pulse 79   Temp 98.1  F (36.7  C) (Oral)   Resp 18   Ht 1.753 m (5' 9\")   Wt 110.8 kg (244 lb 4.8 oz)   SpO2 92%   BMI 36.08 kg/m             Data:     Lab Results   Component Value Date    A1C 7.5 09/10/2020              CBC RESULTS:   Recent Labs   Lab Test 10/03/20  0606 09/30/20  0607   WBC  --  9.3   RBC  --  3.79*   HGB  --  11.6*   HCT  --  36.3*   MCV  --  96   MCH  --  30.6   MCHC  --  32.0   RDW  --  12.9    230     Recent Labs   Lab Test 09/21/20  0620 09/20/20  0630    140   POTASSIUM 4.1 4.0   CHLORIDE 111* 110*   CO2 25 25   ANIONGAP 5 5   * 165*   BUN 21 22   CR 0.78 0.76   SELENA 8.6 8.5     Liver Function Studies -   Recent Labs   Lab Test 09/07/20  2355   PROTTOTAL 7.4   ALBUMIN 3.6   BILITOTAL 0.9   ALKPHOS 67   AST 23   ALT 28     Lab Results   Component Value Date    INR 1.17 09/21/2020    INR 1.03 09/07/2020         I spent a total of 25 minutes bedside and on the inpatient unit managing the " glycemic care of Jorje Teran. Over 50% of my time on the unit was spent counseling the patient  and/or coordinating care regarding .  See note for details.      To contact Endocrine Diabetes service:   From 8AM-4PM: page inpatient diabetes provider that is following the patient  For questions or updates from 4PM-8AM: page the diabetes job code for on call fellow: [phone]        Esperanza Mixon -[phone]  Diabetes Management job code [phone]

## 2020-10-07 NOTE — PLAN OF CARE
FOCUS/GOAL  Bladder management and Medication management    ASSESSMENT, INTERVENTIONS AND CONTINUING PLAN FOR GOAL:  Patient slept well without complaint. Repositioned every 2 h  during the shift. BG was within in limit. Condom catheter fell off, was incontinent of urine, Continue with POC.

## 2020-10-07 NOTE — PROGRESS NOTES
"Team rounds this morning. Son Nacho at bedside. Discussed LOS (goal discharge Friday 10/23, 4 week LOS) and goals for discharge. When discussing plan to hire in help, pt son stated he wanted to speak with MD about that. Jasen stayed after team rounds and made a plan to meet with pt son this afternoon to discuss further.     Met with Nacho later in the morning. Nacho appeared to have a change in mood. Nacho stated that it has been a rough morning for the pt, he (pt) is frustrated, his butt hurts and he (pt) feels like a burden to his son and family. Discussed changes or events that changed since team rounds this morning. Provided support and validation. Discussed health psych or spiritual care support. Nacho declining supportive services at this time. Nacho stated that these services had been offered to the pt and he declined. Appreciates knowing that the services are available if/when needed.     Discussed discharge options. Discussed TCU vs home with hired in help with HHC services. Nacho stated \"money is endless and we will do that it takes to take care of him and do what is best\". Discussed what is offered in TCU and visitor restrictions, given the pandemic. Discussed HHC services and private pay caregivers (in regards to frequency, care and insurance coverage). Provided Nacho with caregiver resources to begin looking into. Also discussed [place] [employer] [employer] and per Nacho request, plan to discuss with pt other son and closer to discharge.     Nacho had questions about who and how to get equipment for discharge. Discussed therapy assistance and timeframe for ordering DME. Nacho stated that they have a contractor who is planning to widen many areas within the home so it is more accessible for the pt. Nacho stated that he is able to build a ramp for the 3 KIERRA as well. Walk-in shower which Nacho is not sure that they can widen or if it's accessible. Nacho aware that therapy will continue to discuss this with him and " help determine what is needed.     Nacho appreciative of assistance and knowing more about options. Nacho requested that SW email the [employer] caregiver agency information to him so that he can forward to his brothers as well. Email sent ([email or web address]). Provided Nacho with a 1-month parking pass which he will give to his brother as well.     Last week, pt son Donte emailed PETER a copy of pt POA document. Jasen forwarded the document to [employer] and printed a copy for pt paper chart.     Will continue to follow and assist with coordinating discharge needs. RN CC consulted as well.     Luann Mayen, PUMA, [facility]   Phone: [phone]  I   Pager: [phone]

## 2020-10-07 NOTE — PHARMACY-RX INSURANCE COVERAGE
Consulted by Rita Mixon with Diabetes Management Team to run a test claim for a covered GLP-1 agonist drug.    All GLP-1 agonists require prior authorization per insurance plan. The following are eligible for a PA and are formulary per plan:    Ozempic pens    Rybelsus tablets    Trulicity pens    Victoza pens    Bydureon pens/BCISE and Byetta pens are non-formulary--likely to not get covered unless patient has preciously trialed preferred GLP-1s above.    Please feel free to contact me with any other test claims, prior authorizations, or insurance questions regarding outpatient medications.     Thanks!      Sandra Salazar CPhT  [facility]charge Pharm[facility] Liaison  Pronouns: She/Her/Hers    [facility]  [address] [place] [address]  [address] [place] [address] [address], [place] [address]   [email or web address]  [email or web address]   Phone: [phone]  Pager: [phone]  Fax: [phone]

## 2020-10-07 NOTE — PLAN OF CARE
Visuospatial: Patient able to easily attend to SLP and hand cue on left side when prompted. Improvements noted in scanning.    Swallowing: Patient required 1:1 assistance for sips of NTL and to attend to opening on cup. 2x small coughs noted during session, but appear to be independent of oral intake. Patient had improved success w completing oropharyngeal exercises, benefiting from visual cues and repetition of tasks. Exercises to be relayed to son to assist in carryover when not in tx.    Communication: Intelligibility improving, some repetitions required to understand and reminders to use dysarthria strategies.

## 2020-10-07 NOTE — PROGRESS NOTES
"  [facility]   Acute Rehabilitation Unit  Daily progress note  CC:   Stroke Ischemic 01.1 (L) Body Involvement (R) Brain Stroke; L sided weakness due to MCA infarct, R MCA occlusion, and R carotid atherosclerosis    PT: Focus on midline sitting/ unsupported sitting EOB. Pt able to sit SBA once attained midline. Showing improvement in recognizing L sided listing and able to self adjust to R w/o cues.     The patient currently performs bed mobility with Max A x 2 and bed rail. Once sitting at EOB the pt is able to sit with anywhere from SBA/CGA, to Mod A x 2 depending on fatigue and task. The patient is able to use Gretchen Stedy with Max A x 2, however unable to achieve a full stand. The patient is currently dependent on mechanical lift for bed to and from chair.     The patient's alertness and participation is improving and he will tolerate intensive therapies in the ARC setting. He is able to follow 2 step directions with 85%, and intelligibility with short 2-3 word phrases needs mod/max cues to increase breath support    OT: pt seen sitting of back of w/c with adls, pt requiring max a to min a to come off back of w/c and maintain with cga arms supported on tray table in front pt sitting with posterial tilt. ptto be  Changed  To a tilt in space second to scooting to edge of chair and needed to be repositoned with OT tx session with liko lift. G/H face sba  hands min a chest min a sitting in w/c U/B dressing  max a hospital gown siting in w/c    SLP: Swallowing: Introduced pharyngeal exercises to patient; effortful swallow, tongue depressor/tongue press, and yawn. Patient had inconsistencies in following cues to perform exercise, but benefitted from \"1-2-3 swallow cue\" to initiate effortful swallow. SLP wore clear mask to assist in visual cues. Recommend continued use of clear mask for future sessions.  Expression: Patient would frequently verbalize to be \"big and loud\" but had " fluctuating intelligibility to utilize the strategy. About 85% intelligible in repetition of phrases and 60% intelligible in casual conversation.    RN: Denied chest pain. Denied SOB. No c/o pain overnight. Ax2 via liko lift. Incontinent B/B. Condom cath in place overnight, patent and draining. Last BM 10/6/2020. Takes pills crushed w/ applesauce.  Pt is able to make his needs known. Call light within reach. Bed alarm on for safety    S:   TR held 10/7/2020 Reviewed with Son has been here. I have reviewed his status. Strategies to improve function. Likes Coffee with Thickener! Some spasms. Baclofen 10 mg tid to reduce pain and spasm. Ice pack prn. Pain better. Improving! Does cough with secretions. SLP following.     Speaking better. Dense left hemiplegia, left field cut +. Compensates by turning.  OffTF. BS monitored. Endo following.  NDD 2 [place].        ROS: Denies HA, nausea, pain. Condom Cath      [unfilled]   Scheduled meds    amLODIPine  20 mg Oral Daily     aspirin  81 mg Oral Daily     atorvastatin  40 mg Oral Daily     Baclofen  10 mg Oral TID     ezetimibe  10 mg Oral Daily     famotidine  20 mg Oral BID     furosemide  40 mg Oral BID     insulin aspart   Subcutaneous TID w/meals     insulin aspart  1-10 Units Subcutaneous TID AC     insulin aspart  1-7 Units Subcutaneous At Bedtime     insulin glargine  20 Units Subcutaneous At Bedtime     levothyroxine  50 mcg Oral Daily     metFORMIN  500 mg Oral Daily with supper     metoprolol tartrate  50 mg Oral BID     multivitamins w/minerals  15 mL Oral Daily     nystatin  500,000 Units Swish & Spit 4x Daily     sacubitril-valsartan  1 tablet Oral BID     sodium chloride (PF)  10 mL Intracatheter Q7 Days     valsartan  320 mg Oral Daily       PRN meds:  acetaminophen, bacitracin, bisacodyl, carboxymethylcellulose PF, dextrose, glucose **OR** dextrose **OR** glucagon, insulin aspart, miconazole, nitroGLYcerin, - MEDICATION INSTRUCTIONS -, polyethylene  "glycol, simethicone, sodium chloride (PF), traZODone      PHYSICAL EXAM  /67 (BP Location: Left arm)   Pulse 77   Temp 98.5  F (36.9  C) (Oral)   Resp 16   Ht 1.753 m (5' 9\")   Wt 110.8 kg (244 lb 4.8 oz)   SpO2 95%   BMI 36.08 kg/m    Alert fluent. Right gaze preference. Able to track past midline.  Respiratory: Clear to auscultation bilaterally, no crackles or wheezing  Cardiovascular: S1 and S2, RRR, and no murmur noted  GI: soft, non-distended, non-tender. G tube + Discharge + BS +  Skin/Integumen: No rashes, trace lower extremity edema noted  Neuro : Left lower Facial droop + Left Field cut +  Dense left-sided hemiplegia with neglect noted.  Tone +. Reflexes emerging.    Vocalizes, low volume   Extremities. Moves right side well. Edema +    LABS  Last Comprehensive Metabolic Panel:  Sodium   Date Value Ref Range Status   09/21/2020 141 133 - 144 mmol/L Final     Potassium   Date Value Ref Range Status   09/21/2020 4.1 3.4 - 5.3 mmol/L Final     Chloride   Date Value Ref Range Status   09/21/2020 111 (H) 94 - 109 mmol/L Final     Carbon Dioxide   Date Value Ref Range Status   09/21/2020 25 20 - 32 mmol/L Final     Anion Gap   Date Value Ref Range Status   09/21/2020 5 3 - 14 mmol/L Final     Glucose   Date Value Ref Range Status   09/21/2020 178 (H) 70 - 99 mg/dL Final     Urea Nitrogen   Date Value Ref Range Status   09/21/2020 21 7 - 30 mg/dL Final     Creatinine   Date Value Ref Range Status   09/21/2020 0.78 0.66 - 1.25 mg/dL Final     GFR Estimate   Date Value Ref Range Status   09/21/2020 >90 >60 mL/min/[1.73_m2] Final     Comment:     Non  GFR Calc  Starting 12/18/2018, serum creatinine based estimated GFR (eGFR) will be   calculated using the Chronic Kidney Disease Epidemiology Collaboration   (CKD-EPI) equation.       Calcium   Date Value Ref Range Status   09/21/2020 8.6 8.5 - 10.1 mg/dL Final        CBC RESULTS:   Recent Labs   Lab Test 09/30/20  0607   WBC 9.3   RBC 3.79* "   HGB 11.6*   HCT 36.3*   MCV 96   MCH 30.6   MCHC 32.0   RDW 12.9             Glucose Values Latest Ref Rng & Units 10/6/2020 10/6/2020 10/6/2020 10/6/2020 10/7/2020 10/7/2020 10/7/2020   Bedside Glucose (mg/dl )  - -- -- -- -- -- -- --   GLUCOSE 70 - 99 mg/dL 161(H) 197(H) 170(H) 159(H) 152(H) 174(H) 160(H)   Some recent data might be hidden     ASSESSMENT AND PLAN    Jorje Teran is a 70 year old  hand dominant male with Stroke Ischemic 01.1 (L) Body Involvement (R) Brain Stroke; L sided weakness due to MCA infarct, R MCA occlusion, and R carotid atherosclerosis  1. PT, OT and SLP 60 minutes of each on a daily basis, in addition to rehab nursing and close management of physiatrist.       2. Impairment of ADL's: OT for 60 minutes daily to work on ADL re-training such as grooming, self cares and bathing.       3. Impairment of mobility:  PT for 60 minutes daily to work on neuromuscular re-education focusing on strength, balance, coordination, and endurance.       4. Impairment of cognition/language/swallow:  SLP for 60 minutes daily to work on cognitive and linguistic deficits.     1. Rehab RN for med administration, bowel regimen, glucose monitoring and wound care.  2.   3. Medical Conditions  CVA with dense left hemiparesis  CT with filling defect within the distal R ICA extending into the R MCA and occlusion of the R MCA at the M1 segment. CTA with severe plaquing at R carotid bifurcation with filling defect in the prox R ICA suggestive of thrombus contributing to approximately 80% stenosis. 60% stenosis of the proximal L ICA. TTE with bubble -> Normal left ventricular size and function. Left ventricular ejection fraction of 55-60%. No segmental wall motion abnormalities noted.   - Lipids 7/29/20- LDL 78, HDL 56, , T chol 164, hemoglobin A1c 7.1% H, TSH is 2.05.  - remains with significant dysarthria, facial droop and dense left sided hemiparesis  Spasticity: Stretching, ice + Low dose  "Baclofen to decrease pain. 10 mg bid.  - ASA 81 mg daily PO/WV  - atorvastatin 40 mg daily, Zetia 10 mg po daily  - BP management as below; - goal for SBPs < 160       Dysphagia  - currently on DD2 with nectar thickened liquids   -PEG tube to be placed on 9/22, currently patient is on tube feeding  If questions or problems arise regarding tube function (e.g. leaking, dislodges, etc.) Contact Interventional Radiology department 24 hours a day.   For procedures that were done at [facility]:  8 AM - 4 PM Monday through Friday Contact   [phone]  For afterhours and weekends: [phone]   Ask for the Interventional Radiologist on call.     PeG site Discomfort: Treat infection. Clean site. Add Keflex 500 mg qid x 7 days. Better.  Tylenol prn.  Simethicone added.    BS high. Endo following.  CAD s/p CABG x 3, x 1 in 2015  HTN continue Norvasc /Entresto / Metoprolol 50 mg BID and Valsartan   HLD: On Lipitor  Hypernatremia- resolved     DM II  Recent A1C at 7.1 7/2020.  BG goal is < 180.- started on lantus Endo following. \"- NPH 18 qAM    - NPH 22 units at HS, when no TF will infuse   - aspart high resistace QAC and HS, all for >/=140  - aspart increase to 1 unit per 4 grams carb AC, HS (TF sched for HS) and PRN  - patient care order to alert RN to PRN aspart in case TF at different time  -BG QAC, HS 0200  - hypoglycemia protocol  Outpatient diabetes follow up: CRISTHIAN 28 days\"    US at [facility] negative for DVT     SHELDON  - states he could not sleep with CPAP as outpatient  - he would benefit for treating SHELDON   ? CPAP at night    Hypothyroidism  - resumed PTA Levothyroxine 50 mcg daily  TSH checked 7/29/2020 at 2.05     Morbid obesity  BMI noted  7/202 at ~38. Will need to encourage healthy lifestyle and weight loss with recovery     Diet: Snacks/Supplements Adult: Other; thickened smoothie with meals  (RD); With Meals  Calorie Counts     DVT Prophylaxis: Pneumatic Compression Devices  Code Status: Full " Code      2. Adjustment to disability:  Clinical psychology to eval and treat when ready  3. FEN: On NDD 2 [place] +TF, Bolus versus nocturnal supplements.  4. Bowel: Javed meds  5. Bladder: Monitor  6. GI Prophylaxis:   7. Code: Full  8. Disposition: Home with family and home care  9. ELOS:  28 days 10/23/20  10. Rehab prognosis:  Fair  11. Follow up Appointments on Discharge: The following labs/tests are recommended: cbc ,basic metabolic panel in 4-5 days, also get mag and phos in 3-4 days .need follow up with neurology as scheduled. Follows with Dr. Vincent with [facility].      Sudarshan Fuentes MD   Physical Medicine & Rehabilitation

## 2020-10-08 ENCOUNTER — APPOINTMENT (OUTPATIENT)
Dept: OCCUPATIONAL THERAPY | Facility: CLINIC | Age: 70
End: 2020-10-08
Payer: COMMERCIAL

## 2020-10-08 ENCOUNTER — APPOINTMENT (OUTPATIENT)
Dept: SPEECH THERAPY | Facility: CLINIC | Age: 70
End: 2020-10-08
Payer: COMMERCIAL

## 2020-10-08 ENCOUNTER — APPOINTMENT (OUTPATIENT)
Dept: PHYSICAL THERAPY | Facility: CLINIC | Age: 70
End: 2020-10-08
Payer: COMMERCIAL

## 2020-10-08 LAB
GLUCOSE BLDC GLUCOMTR-MCNC: 134 MG/DL (ref 70–99)
GLUCOSE BLDC GLUCOMTR-MCNC: 134 MG/DL (ref 70–99)
GLUCOSE BLDC GLUCOMTR-MCNC: 158 MG/DL (ref 70–99)
GLUCOSE BLDC GLUCOMTR-MCNC: 159 MG/DL (ref 70–99)
GLUCOSE BLDC GLUCOMTR-MCNC: 202 MG/DL (ref 70–99)

## 2020-10-08 PROCEDURE — 250N000013 HC RX MED GY IP 250 OP 250 PS 637: Performed by: PHYSICAL MEDICINE & REHABILITATION

## 2020-10-08 PROCEDURE — 99233 SBSQ HOSP IP/OBS HIGH 50: CPT | Performed by: PHYSICAL MEDICINE & REHABILITATION

## 2020-10-08 PROCEDURE — 99232 SBSQ HOSP IP/OBS MODERATE 35: CPT | Performed by: NURSE PRACTITIONER

## 2020-10-08 PROCEDURE — 250N000013 HC RX MED GY IP 250 OP 250 PS 637: Performed by: NURSE PRACTITIONER

## 2020-10-08 PROCEDURE — 92526 ORAL FUNCTION THERAPY: CPT | Mod: GN | Performed by: SPEECH-LANGUAGE PATHOLOGIST

## 2020-10-08 PROCEDURE — 999N001017 HC STATISTIC GLUCOSE BY METER IP

## 2020-10-08 PROCEDURE — 97112 NEUROMUSCULAR REEDUCATION: CPT | Mod: GO

## 2020-10-08 PROCEDURE — 92526 ORAL FUNCTION THERAPY: CPT | Mod: GN

## 2020-10-08 PROCEDURE — 97530 THERAPEUTIC ACTIVITIES: CPT | Mod: GP | Performed by: REHABILITATION PRACTITIONER

## 2020-10-08 PROCEDURE — 128N000003 HC R&B REHAB

## 2020-10-08 PROCEDURE — 97535 SELF CARE MNGMENT TRAINING: CPT | Mod: GO

## 2020-10-08 RX ADMIN — INSULIN ASPART 1 UNITS: 100 INJECTION, SOLUTION INTRAVENOUS; SUBCUTANEOUS at 13:23

## 2020-10-08 RX ADMIN — METOPROLOL TARTRATE 50 MG: 50 TABLET, FILM COATED ORAL at 09:10

## 2020-10-08 RX ADMIN — ASPIRIN 81 MG: 81 TABLET, COATED ORAL at 09:10

## 2020-10-08 RX ADMIN — BACLOFEN 10 MG: 10 TABLET ORAL at 09:17

## 2020-10-08 RX ADMIN — ATORVASTATIN CALCIUM 40 MG: 40 TABLET, FILM COATED ORAL at 09:17

## 2020-10-08 RX ADMIN — SACUBITRIL AND VALSARTAN 1 TABLET: 49; 51 TABLET, FILM COATED ORAL at 21:57

## 2020-10-08 RX ADMIN — NYSTATIN 500000 UNITS: 500000 SUSPENSION ORAL at 17:56

## 2020-10-08 RX ADMIN — INSULIN ASPART 3 UNITS: 100 INJECTION, SOLUTION INTRAVENOUS; SUBCUTANEOUS at 18:01

## 2020-10-08 RX ADMIN — NYSTATIN 500000 UNITS: 500000 SUSPENSION ORAL at 13:18

## 2020-10-08 RX ADMIN — MULTIVITAMIN 15 ML: LIQUID ORAL at 18:55

## 2020-10-08 RX ADMIN — BACLOFEN 10 MG: 10 TABLET ORAL at 21:57

## 2020-10-08 RX ADMIN — LEVOTHYROXINE SODIUM 50 MCG: 25 TABLET ORAL at 09:10

## 2020-10-08 RX ADMIN — EZETIMIBE 10 MG: 10 TABLET ORAL at 09:10

## 2020-10-08 RX ADMIN — BACLOFEN 10 MG: 10 TABLET ORAL at 13:18

## 2020-10-08 RX ADMIN — VALSARTAN 320 MG: 160 TABLET ORAL at 09:10

## 2020-10-08 RX ADMIN — AMLODIPINE BESYLATE 20 MG: 10 TABLET ORAL at 09:10

## 2020-10-08 RX ADMIN — INSULIN ASPART 1 UNITS: 100 INJECTION, SOLUTION INTRAVENOUS; SUBCUTANEOUS at 09:19

## 2020-10-08 RX ADMIN — ACETAMINOPHEN 650 MG: 325 TABLET, FILM COATED ORAL at 13:18

## 2020-10-08 RX ADMIN — METFORMIN HYDROCHLORIDE 500 MG: 500 TABLET ORAL at 17:57

## 2020-10-08 RX ADMIN — METOPROLOL TARTRATE 50 MG: 50 TABLET, FILM COATED ORAL at 21:57

## 2020-10-08 RX ADMIN — NYSTATIN 500000 UNITS: 500000 SUSPENSION ORAL at 09:10

## 2020-10-08 RX ADMIN — NYSTATIN 500000 UNITS: 500000 SUSPENSION ORAL at 21:57

## 2020-10-08 RX ADMIN — FUROSEMIDE 40 MG: 20 TABLET ORAL at 17:56

## 2020-10-08 RX ADMIN — SACUBITRIL AND VALSARTAN 1 TABLET: 49; 51 TABLET, FILM COATED ORAL at 09:10

## 2020-10-08 RX ADMIN — FUROSEMIDE 40 MG: 20 TABLET ORAL at 09:10

## 2020-10-08 RX ADMIN — FAMOTIDINE 20 MG: 20 TABLET ORAL at 09:10

## 2020-10-08 ASSESSMENT — MIFFLIN-ST. JEOR: SCORE: 1843.08

## 2020-10-08 NOTE — PLAN OF CARE
Social: Patient more awake and alert compared to previous tx sessions. Highly motivated for NDD3 food items brought for trial at meal.    Expression: Patient almost fully intelligible throughout meal session - approximately 90-95%. No reminders needed to use dysarthria strategies.    Visuospatial: Patient highly motivated to attend to L side w dessert item on tray. Continued to move item from side to side and have patient attend to different directions/heights. Patient still requiring frequent reminders to turn head to L side.    Swallowing: Patient seen for meal. Trial 1 of DD3 sandwich and brownie. Patient motivated to focus on both food items and remembering to avoid distractions/talking while chewing/swallowing with minimal cues. Patient had 1x delayed throat clear on sandwich but no other concerns for airway invasion. Patient reminded to take small bites and alternate with sips of liquid. Patient still requiring 1:1 support to cut items and place on fork. Will continue to assess readiness to advance diet - looking for consistency in tolerance and safe chewing/swallowing bxs prior to advancing to NDD3.

## 2020-10-08 NOTE — PROGRESS NOTES
Diabetes Consult Daily  Progress Note          Assessment/Plan:       Jorje Teran is a 71 y/o male with past medical history of HTN, HLD, DM II, SHELDON, hypothyroidism, and morbid obesity who was found down, found to have a R MCA infarct and R carotid atherosclerosis, now admitted to ARU 9/24 for intensive therapies, ongoing medical management, and rehabilitative nursing care.  Hyperglycemia exacerbated by enteral feeds.                         Type 2 Diabetes Mellitus      Obesity; BMI 36     Plan:    - lantus  20 units at bedtime  - metformin 500 mg with dinner ( starting dose 10/6), plan to increase in 1-2 days if no side effects   - aspart  1 unit per 10 grams CHO for meals/snacks/supplements           -novolog high correction before meals and at bedtime  -BG QAC, HS 0200  - hypoglycemia protocol  -will continue to follow        Outpatient diabetes follow up: TBD  Plan discussed with patient, family, and primary team via this note    Discharge: 10/16           Interval History:   The last 24 hours progress and nursing notes reviewed.    Blood sugars are in pretty good control on current regime  Blood sugars are < 190 and no hypoglycemia  Appetite is good, per family is eating 100% of his meals.  Discussed the use and cost of a GLP-1 with patient and son  No history pf pancreatitis or medullary thyroid cancer. They are both willing to try the medication while in ARU. Will wait a few more days to rebeca sure appetite is at 100% before starting Victoza  Is working with therapies      10/7 Retail/Discharge Pharmacy Test Claims (covered): Entresto, Trulicity (PA thru 10/7/23 - $196.24 d/t Medicare Coverage Gap--$784.94 remaining in coverage gap)   *Not Covered*: Maria R Ortiz Victoza, Bydureon, Byetta -[facility]  Discharge Pharmacy Liaison  Pager: [phone]     Nutrition:  Orders Placed This Encounter      Combination Diet Dysphagia Diet Level 2: Mechan Altered; Nectar Thickened Liquids  "(pre-thickened or use instant food thickener)  Snacks/supplements: magic cup with lunch, Gelatein Plus cherry with dinner        Nutrition: tube feeds discontinued   Was Isosource 1.5 60 ml/h x 12 h, now:  If pt eats <25% of the meal give full bolus: 325 mL of isosource 1.5 via gtube which provides 487 kcal, 22 g protein, 57 g CHO, 5.6 g fiber, and 247 mL free water              If pt eats 25-50% of meal provide half bolus: 162ml of isosource 1.5 via gtube which provides  244kcal, 11 g protein, 29 g CHO, 2.8 g fiber, and 124 mL free water.              If pt eats >50% of meal give no bolus              Always give full bolus (325ml) at HS              Flush with 100ml water before/after each           PTA:    No regular BG monitoring at home  No restricted diet  No diabetic medications PTA - diet controlled     During [facility] admission, utilized glargine 10 units BID w/ fair basal coverage while on Dysphagia diet only (no TF yet)     Recent Labs   Lab 10/08/20  0211 10/07/20  2113 10/07/20  1751 10/07/20  1240 10/07/20  0847 10/07/20  0159   * 182* 148* 160* 174* 152*               Review of Systems:   See interval hx          Medications:     Orders Placed This Encounter      Combination Diet Dysphagia Diet Level 2: Mechan Altered; Nectar Thickened Liquids (pre-thickened or use instant food thickener)       Physical Exam:  Gen: Alert, resting in bed, in NAD   HEENT:  mucous membranes are moist  Resp: non-labored,  Ext: moving right arm and hand  Neuro:oriented x3, communicating clearly, dysphagia  /65   Pulse 75   Temp 98.5  F (36.9  C) (Oral)   Resp 17   Ht 1.753 m (5' 9\")   Wt 109.8 kg (242 lb)   SpO2 93%   BMI 35.74 kg/m             Data:     Lab Results   Component Value Date    A1C 7.5 09/10/2020              CBC RESULTS:   Recent Labs   Lab Test 10/03/20  0606 09/30/20  0607   WBC  --  9.3   RBC  --  3.79*   HGB  --  11.6*   HCT  --  36.3*   MCV  --  96   MCH  --  30.6   MCHC  --  32.0 "   RDW  --  12.9    230     Recent Labs   Lab Test 09/21/20  0620 09/20/20  0630    140   POTASSIUM 4.1 4.0   CHLORIDE 111* 110*   CO2 25 25   ANIONGAP 5 5   * 165*   BUN 21 22   CR 0.78 0.76   SELENA 8.6 8.5     Liver Function Studies -   Recent Labs   Lab Test 09/07/20  2355   PROTTOTAL 7.4   ALBUMIN 3.6   BILITOTAL 0.9   ALKPHOS 67   AST 23   ALT 28     Lab Results   Component Value Date    INR 1.17 09/21/2020    INR 1.03 09/07/2020         I spent a total of 25 minutes bedside and on the inpatient unit managing the glycemic care of Jorje BARRY Teran. Over 50% of my time on the unit was spent counseling the patient  and/or coordinating care regarding .  See note for details.      To contact Endocrine Diabetes service:   From 8AM-4PM: page inpatient diabetes provider that is following the patient  For questions or updates from 4PM-8AM: page the diabetes job code for on call fellow: [phone]        Esperanza Mixon [phone]  Diabetes Management job code [phone]

## 2020-10-08 NOTE — PLAN OF CARE
-15 mins - could not maintain alertness despite max verbal and tactile cues- would open eyes fleetingly and attempt to participate in oral pharyngeal excs but then go right back to sleep

## 2020-10-08 NOTE — PROGRESS NOTES
"  [facility]   Acute Rehabilitation Unit  Daily progress note  CC:   Stroke Ischemic 01.1 (L) Body Involvement (R) Brain Stroke; L sided weakness due to MCA infarct, R MCA occlusion, and R carotid atherosclerosis    PT: Focus on midline sitting/ unsupported sitting EOB. Pt able to sit SBA once attained midline. Showing improvement in recognizing L sided listing and able to self adjust to R w/o cues.     The patient currently performs bed mobility with Max A x 2 and bed rail. Once sitting at EOB the pt is able to sit with anywhere from SBA/CGA, to Mod A x 2 depending on fatigue and task. The patient is able to use Gretchen Stedy with Max A x 2, however unable to achieve a full stand. The patient is currently dependent on mechanical lift for bed to and from chair.     The patient's alertness and participation is improving and he will tolerate intensive therapies in the [facility] setting. He is able to follow 2 step directions with 85%, and intelligibility with short 2-3 word phrases needs mod/max cues to increase breath support    OT: pt seen sitting of back of w/c with adls, pt requiring max a to min a to come off back of w/c and maintain with cga arms supported on tray table in front pt sitting with posterial tilt. ptto be  Changed  To a tilt in space second to scooting to edge of chair and needed to be repositoned with OT tx session with liko lift. G/H face sba  hands min a chest min a sitting in w/c U/B dressing  max a hospital gown siting in w/c    SLP: Swallowing: Introduced pharyngeal exercises to patient; effortful swallow, tongue depressor/tongue press, and yawn. Patient had inconsistencies in following cues to perform exercise, but benefitted from \"1-2-3 swallow cue\" to initiate effortful swallow. SLP wore clear mask to assist in visual cues. Recommend continued use of clear mask for future sessions.  Expression: Patient would frequently verbalize to be \"big and loud\" but had " fluctuating intelligibility to utilize the strategy. About 85% intelligible in repetition of phrases and 60% intelligible in casual conversation.    RN: Denied chest pain. Denied SOB. No c/o pain overnight. Ax2 via liko lift. Incontinent B/B. Condom cath in place overnight, patent and draining. Last BM 10/6/2020. Takes pills crushed w/ applesauce.  Pt is able to make his needs known. Call light within reach. Bed alarm on for safety    S:   Significant imp[airment due to hemisensory deficit, fiedcut, and hemiplegia.    TR held 10/7/2020 Reviewed with Son has been here. I have reviewed his status. Strategies to improve function. Likes Coffee with Thickener! Some spasms. Baclofen 10 mg tid to reduce pain and spasm. Ice pack prn. Pain better. Improving! Does cough with secretions. SLP following.     Speaking better. Dense left hemiplegia, left field cut +. Compensates by turning.  OffTF. BS monitored. Endo following.  NDD 2 [place].        ROS: Denies HA, nausea, pain. Condom Cath      [unfilled]   Scheduled meds    amLODIPine  20 mg Oral Daily     aspirin  81 mg Oral Daily     atorvastatin  40 mg Oral Daily     Baclofen  10 mg Oral TID     ezetimibe  10 mg Oral Daily     famotidine  20 mg Oral BID     furosemide  40 mg Oral BID     insulin aspart   Subcutaneous TID w/meals     insulin aspart  1-10 Units Subcutaneous TID AC     insulin aspart  1-7 Units Subcutaneous At Bedtime     insulin glargine  20 Units Subcutaneous At Bedtime     levothyroxine  50 mcg Oral Daily     metFORMIN  500 mg Oral Daily with supper     metoprolol tartrate  50 mg Oral BID     multivitamins w/minerals  15 mL Oral Daily     nystatin  500,000 Units Swish & Spit 4x Daily     sacubitril-valsartan  1 tablet Oral BID     sodium chloride (PF)  10 mL Intracatheter Q7 Days     valsartan  320 mg Oral Daily       PRN meds:  acetaminophen, bacitracin, bisacodyl, carboxymethylcellulose PF, dextrose, glucose **OR** dextrose **OR** glucagon, insulin  "aspart, miconazole, nitroGLYcerin, - MEDICATION INSTRUCTIONS -, polyethylene glycol, simethicone, sodium chloride (PF), traZODone      PHYSICAL EXAM  /65 (BP Location: Left arm)   Pulse 68   Temp 96.8  F (36  C) (Oral)   Resp 17   Ht 1.753 m (5' 9\")   Wt 109.8 kg (242 lb)   SpO2 96%   BMI 35.74 kg/m    Alert fluent. Right gaze preference. Able to track past midline.  Respiratory: Clear to auscultation bilaterally, no crackles or wheezing  Cardiovascular: S1 and S2, RRR, and no murmur noted  GI: soft, non-distended, non-tender. G tube + Discharge + BS +  Skin/Integumen: No rashes, trace lower extremity edema noted  Neuro : Left lower Facial droop + Left Field cut +  Dense left-sided hemiplegia with neglect noted.  Tone +. Reflexes emerging.    Vocalizes, low volume   Extremities. Moves right side well. Edema +    LABS  Last Comprehensive Metabolic Panel:  Sodium   Date Value Ref Range Status   09/21/2020 141 133 - 144 mmol/L Final     Potassium   Date Value Ref Range Status   09/21/2020 4.1 3.4 - 5.3 mmol/L Final     Chloride   Date Value Ref Range Status   09/21/2020 111 (H) 94 - 109 mmol/L Final     Carbon Dioxide   Date Value Ref Range Status   09/21/2020 25 20 - 32 mmol/L Final     Anion Gap   Date Value Ref Range Status   09/21/2020 5 3 - 14 mmol/L Final     Glucose   Date Value Ref Range Status   09/21/2020 178 (H) 70 - 99 mg/dL Final     Urea Nitrogen   Date Value Ref Range Status   09/21/2020 21 7 - 30 mg/dL Final     Creatinine   Date Value Ref Range Status   09/21/2020 0.78 0.66 - 1.25 mg/dL Final     GFR Estimate   Date Value Ref Range Status   09/21/2020 >90 >60 mL/min/[1.73_m2] Final     Comment:     Non  GFR Calc  Starting 12/18/2018, serum creatinine based estimated GFR (eGFR) will be   calculated using the Chronic Kidney Disease Epidemiology Collaboration   (CKD-EPI) equation.       Calcium   Date Value Ref Range Status   09/21/2020 8.6 8.5 - 10.1 mg/dL Final        CBC " RESULTS:   Recent Labs   Lab Test 09/30/20  0607   WBC 9.3   RBC 3.79*   HGB 11.6*   HCT 36.3*   MCV 96   MCH 30.6   MCHC 32.0   RDW 12.9             Glucose Values Latest Ref Rng & Units 10/7/2020 10/7/2020 10/7/2020 10/7/2020 10/8/2020 10/8/2020 10/8/2020   Bedside Glucose (mg/dl )  - -- -- -- -- -- -- --   GLUCOSE 70 - 99 mg/dL 174(H) 160(H) 148(H) 182(H) 134(H) 159(H) 158(H)   Some recent data might be hidden     ASSESSMENT AND PLAN    Jorje Teran is a 70 year old  hand dominant male with Stroke Ischemic 01.1 (L) Body Involvement (R) Brain Stroke; L sided weakness due to MCA infarct, R MCA occlusion, and R carotid atherosclerosis  1. PT, OT and SLP 60 minutes of each on a daily basis, in addition to rehab nursing and close management of physiatrist.       2. Impairment of ADL's: OT for 60 minutes daily to work on ADL re-training such as grooming, self cares and bathing.       3. Impairment of mobility:  PT for 60 minutes daily to work on neuromuscular re-education focusing on strength, balance, coordination, and endurance.       4. Impairment of cognition/language/swallow:  SLP for 60 minutes daily to work on cognitive and linguistic deficits.     1. Rehab RN for med administration, bowel regimen, glucose monitoring and wound care.  2.   3. Medical Conditions  CVA with dense left hemiparesis  CT with filling defect within the distal R ICA extending into the R MCA and occlusion of the R MCA at the M1 segment. CTA with severe plaquing at R carotid bifurcation with filling defect in the prox R ICA suggestive of thrombus contributing to approximately 80% stenosis. 60% stenosis of the proximal L ICA. TTE with bubble -> Normal left ventricular size and function. Left ventricular ejection fraction of 55-60%. No segmental wall motion abnormalities noted.   - Lipids 7/29/20- LDL 78, HDL 56, , T chol 164, hemoglobin A1c 7.1% H, TSH is 2.05.  - remains with significant dysarthria, facial droop and  dense left sided hemiparesis  Spasticity: Stretching, ice + Low dose Baclofen to decrease pain. 10 mg bid.  - ASA 81 mg daily PO/NH  - atorvastatin 40 mg daily, Zetia 10 mg po daily  - BP management as below; - goal for SBPs < 160       Dysphagia  - currently on DD2 with nectar thickened liquids   -PEG tube to be placed on 9/22, currently patient is on tube feeding  If questions or problems arise regarding tube function (e.g. leaking, dislodges, etc.) Contact Interventional Radiology department 24 hours a day.   For procedures that were done at [facility]:  8 AM - 4 PM Monday through Friday Contact   [phone]  For afterhours and weekends: [phone]   Ask for the Interventional Radiologist on call.     PeG site Discomfort: Treat infection. Clean site. Add Keflex 500 mg qid x 7 days. Better.  Tylenol prn.  Simethicone added.    BS high. Endo following.  CAD s/p CABG x 3, x 1 in 2015  HTN continue Norvasc /Entresto / Metoprolol 50 mg BID and Valsartan   HLD: On Lipitor  Hypernatremia- resolved     DM II  Recent A1C at 7.1 7/2020.  BG goal is < 180.- started on lantus Endo following.     Outpatient diabetes follow up: ELOS 28 days   - lantus  20 units at bedtime  - metformin 500 mg with dinner ( starting dose 10/6), plan to increase in 1-2 days if no side effects   - aspart  1 unit per 10 grams CHO for meals/snacks/supplements           -novolog high correction before meals and at bedtime  -BG QAC, HS 0200  - hypoglycemia protocol      US at [facility] negative for DVT     SHELDON  - states he could not sleep with CPAP as outpatient  - he would benefit for treating SHELDON   ? CPAP at night    Hypothyroidism  - resumed PTA Levothyroxine 50 mcg daily  TSH checked 7/29/2020 at 2.05     Morbid obesity  BMI noted  7/202 at ~38. Will need to encourage healthy lifestyle and weight loss with recovery     Diet: Snacks/Supplements Adult: Other; thickened smoothie with meals  (RD); With Meals  Calorie Counts     DVT  Prophylaxis: Pneumatic Compression Devices  Code Status: Full Code      2. Adjustment to disability:  Clinical psychology to eval and treat when ready  3. FEN: On NDD 2 [place] +TF, Bolus versus nocturnal supplements.  4. Bowel: Javed meds  5. Bladder: Monitor  6. GI Prophylaxis:   7. Code: Full  8. Disposition: Home with family and home care  9. ELOS:  28 days 10/23/20  10. Rehab prognosis:  Fair  11. Follow up Appointments on Discharge: The following labs/tests are recommended: cbc ,basic metabolic panel in 4-5 days, also get mag and phos in 3-4 days .need follow up with neurology as scheduled. Follows with Dr. Vincent with [facility].      BMP in the am.     Sudarshan Fuentes MD   Physical Medicine & Rehabilitation

## 2020-10-08 NOTE — PROGRESS NOTES
Pt alert/oriented x3, confusion with situation at times. Bowel sounds active, pt had smear on our shift. Incontinent of bowel and bladder. Denied pain. TF flushed 100 ml free water, no bolus feeding necessary since pt ate adequately. Nursing assistant helped with feeding, pt needed prompting. VSS. Continue POC.

## 2020-10-08 NOTE — PROGRESS NOTES
CLINICAL NUTRITION SERVICES - REASSESSMENT NOTE     Nutrition Prescription    RECOMMENDATIONS FOR MDs/PROVIDERS TO ORDER:  May want to consider drawing chem panel to continue to monitor hydration status/water flush needs (last one noted was on 9/21)    Malnutrition Status:    Patient does not meet two of the established criteria necessary for diagnosing malnutrition    Recommendations already ordered by Registered Dietitian (RD):  RD will adjust/increase water flush order to 240 ml QID at [id number] (960 ml/day)     Calorie count order will be discontinued as pt continues to meet >50% of estimated needs orally     Future/Additional Recommendations:  Monitor meal and supplement intake  Monitor diet per SLP and make further adjustments to water flushes as needed  Monitor wt and lab trends      EVALUATION OF THE PROGRESS TOWARD GOALS   Diet: Dysphagia Level 2:  Mechanically Altered and Nectar Thickened Liquids   Supplement: Chocolate Magic Cup at lunch, cherry Gelatein at dinner     3-Day Calorie Count:  10/5: 1333 kcal and 59 g protein (3 meals, 1 supplements)  10/6: 1480 kcal and 89 g protein (3 meals, 2 supplements)  10/7: 285 kcal and 13 g protein (No slips saved, only 50% of lunch recorded)  3 day average PO intake = 1032 kcal (52% minimum energy needs) and 54 g protein (67% minimum protein needs)    Nutrition Support: Pt is only receiving water flushes via G-tube of 100 ml before and after each feeding (estimated at ~800 ml daily)      NEW FINDINGS   MAR reviewed. Labs reviewed, past Na trends notes. Pt with trace edema to upper and lower extremities, pt is on Lasix - 40 mg BID.     Intake assessment: Pt continues to consume at least 50% or more of estimated needs orally with tube feeding orders being discontinued on 10/6. Water flushes continue, and as pt remains on thickened liquid diet, pt will likely continue to need additional water flushes to maintain adequate hydration status.     10/08/20 1028  109.8 kg (242 lb)   10/01/20 0624 110.8 kg (244 lb 4.8 oz)   09/30/20 0646 104.1 kg (229 lb 8 oz)   09/29/20 0647 104.5 kg (230 lb 4.8 oz)   09/28/20 0649 105.3 kg (232 lb 1.6 oz)   09/26/20 0634 105.5 kg (232 lb 8 oz)   09/25/20 0700 104.9 kg (231 lb 4.8 oz)   09/24/20 1522 106.5 kg (234 lb 11.2 oz)     115.8 kg (255 lb 3.2 oz) 07/29/2020 Per CE      114.3 kg (252 lb) 03/20/2019 Per CE      Weight assessment: 8 lb wt gain since admit, could be multifactorial (fluid vs true wt gain). Non-significant 5% weight loss in 3 months.    MALNUTRITION  % Intake: Decreased intake does not meet criteria  % Weight Loss: Weight loss does not meet criteria  Subcutaneous Fat Loss: None observed (visual only)  Muscle Loss: None observed  Fluid Accumulation/Edema: Trace per flow sheets   Malnutrition Diagnosis: Patient does not meet two of the established criteria necessary for diagnosing malnutrition    Previous Goals   Total avg nutritional intake to meet a minimum of 25 kcal/kg and 1.0 g PRO/kg daily (per dosing wt 80.7 kg).  Evaluation: Likely met    Previous Nutrition Diagnosis  Inadequate oral intake related to decreased appetite, fatigue, and altered consistency diet as evidenced by calorie count results and need for EN to meet nutritional needs  Evaluation: Improving, diagnosis adjusted to below     CURRENT NUTRITION DIAGNOSIS  Predicted inadequate nutrient intake related to chewing/swallowing difficult as evidenced by continued need for modified diet with continued need of water flushes via feeding tube to assure hydration status       INTERVENTIONS  Implementation  Diet per SLP   Feeding tube flush - adjusted to above to assure hydration with thickened liquids   Continue medical food supplement therapy    Goals  Patient to consume % of nutritionally adequate meal trays TID, or the equivalent with supplements/snacks.    Patient to tolerate water flushes as needed, wean flushes as appropriate based on continued need  for modified diet.     Monitoring/Evaluation  Progress toward goals will be monitored and evaluated per protocol.    Adia Gamboa RD, LD

## 2020-10-08 NOTE — PLAN OF CARE
FOCUS/GOAL  Bowel management, Bladder management, and Medical management    ASSESSMENT, INTERVENTIONS AND CONTINUING PLAN FOR GOAL:  Slept well, weight shifted every 2 hours. Condom catheter on during NOC for bladder management  output 500 ML  , LBM 10/07. Denied Pain. Continue with POC.

## 2020-10-08 NOTE — PLAN OF CARE
PT: mech lift with sling to and from PT mat. Pt demo seated reaching task with close SBA to CGA. Pt able to demo IND sitting near midline up to 2-5 min. V.c and tactile cues for midline posture.

## 2020-10-08 NOTE — PLAN OF CARE
Skilled set up on :  Pt dependent for proper placement of electrodes on l U/E for optimum muscle contraction, safe positioning on w/c within frame and cushion for prevention of skin breakdown, UE secured to arm support.  Passive motion assessed to ensure proper positioning.      Pt [id number] of active FES ergometry with 0-100% stimulation applied to above muscles at .29rpm with .05nm resistance.  This OT adjusted e-stim and cycling parameters in real-time to ensure palpable muscle contractions throughout session.  Please see [email or web address] for further details on patient's stimulation parameters and ergometry outcomes.      Changes in parameters that were part of this treatment:     -amplitude shoulder         Functional outcomes from this intervention include:   -reduced spasticity  -improved sensory awareness and proprioception  -improved muscle strength  -improved motor coordination

## 2020-10-09 ENCOUNTER — APPOINTMENT (OUTPATIENT)
Dept: PHYSICAL THERAPY | Facility: CLINIC | Age: 70
End: 2020-10-09
Payer: COMMERCIAL

## 2020-10-09 ENCOUNTER — APPOINTMENT (OUTPATIENT)
Dept: OCCUPATIONAL THERAPY | Facility: CLINIC | Age: 70
End: 2020-10-09
Payer: COMMERCIAL

## 2020-10-09 LAB
ALBUMIN UR-MCNC: NEGATIVE MG/DL
ANION GAP SERPL CALCULATED.3IONS-SCNC: 8 MMOL/L (ref 3–14)
ANION GAP SERPL CALCULATED.3IONS-SCNC: 8 MMOL/L (ref 3–14)
APPEARANCE UR: CLEAR
BILIRUB UR QL STRIP: NEGATIVE
BUN SERPL-MCNC: 46 MG/DL (ref 7–30)
BUN SERPL-MCNC: 48 MG/DL (ref 7–30)
CALCIUM SERPL-MCNC: 8.5 MG/DL (ref 8.5–10.1)
CALCIUM SERPL-MCNC: 8.9 MG/DL (ref 8.5–10.1)
CHLORIDE SERPL-SCNC: 106 MMOL/L (ref 94–109)
CHLORIDE SERPL-SCNC: 106 MMOL/L (ref 94–109)
CO2 SERPL-SCNC: 24 MMOL/L (ref 20–32)
CO2 SERPL-SCNC: 25 MMOL/L (ref 20–32)
COLOR UR AUTO: YELLOW
CREAT SERPL-MCNC: 1.32 MG/DL (ref 0.66–1.25)
CREAT SERPL-MCNC: 1.65 MG/DL (ref 0.66–1.25)
GFR SERPL CREATININE-BSD FRML MDRD: 41 ML/MIN/{1.73_M2}
GFR SERPL CREATININE-BSD FRML MDRD: 54 ML/MIN/{1.73_M2}
GLUCOSE BLDC GLUCOMTR-MCNC: 139 MG/DL (ref 70–99)
GLUCOSE BLDC GLUCOMTR-MCNC: 155 MG/DL (ref 70–99)
GLUCOSE BLDC GLUCOMTR-MCNC: 176 MG/DL (ref 70–99)
GLUCOSE BLDC GLUCOMTR-MCNC: 177 MG/DL (ref 70–99)
GLUCOSE SERPL-MCNC: 148 MG/DL (ref 70–99)
GLUCOSE SERPL-MCNC: 155 MG/DL (ref 70–99)
GLUCOSE UR STRIP-MCNC: NEGATIVE MG/DL
HGB UR QL STRIP: NEGATIVE
KETONES UR STRIP-MCNC: NEGATIVE MG/DL
LEUKOCYTE ESTERASE UR QL STRIP: NEGATIVE
NITRATE UR QL: NEGATIVE
PH UR STRIP: 5.5 PH (ref 5–7)
PLATELET # BLD AUTO: 162 10E9/L (ref 150–450)
POTASSIUM SERPL-SCNC: 4.2 MMOL/L (ref 3.4–5.3)
POTASSIUM SERPL-SCNC: 4.4 MMOL/L (ref 3.4–5.3)
RBC #/AREA URNS AUTO: <1 /HPF (ref 0–2)
SODIUM SERPL-SCNC: 138 MMOL/L (ref 133–144)
SODIUM SERPL-SCNC: 139 MMOL/L (ref 133–144)
SOURCE: NORMAL
SP GR UR STRIP: 1.01 (ref 1–1.03)
SQUAMOUS #/AREA URNS AUTO: <1 /HPF (ref 0–1)
UROBILINOGEN UR STRIP-MCNC: NORMAL MG/DL (ref 0–2)
WBC #/AREA URNS AUTO: <1 /HPF (ref 0–5)

## 2020-10-09 PROCEDURE — 97535 SELF CARE MNGMENT TRAINING: CPT | Mod: GO

## 2020-10-09 PROCEDURE — 99233 SBSQ HOSP IP/OBS HIGH 50: CPT | Performed by: PHYSICAL MEDICINE & REHABILITATION

## 2020-10-09 PROCEDURE — 97530 THERAPEUTIC ACTIVITIES: CPT | Mod: GP | Performed by: PHYSICAL THERAPIST

## 2020-10-09 PROCEDURE — 36415 COLL VENOUS BLD VENIPUNCTURE: CPT | Performed by: PHYSICAL MEDICINE & REHABILITATION

## 2020-10-09 PROCEDURE — 250N000013 HC RX MED GY IP 250 OP 250 PS 637: Performed by: PHYSICAL MEDICINE & REHABILITATION

## 2020-10-09 PROCEDURE — 99232 SBSQ HOSP IP/OBS MODERATE 35: CPT | Performed by: NURSE PRACTITIONER

## 2020-10-09 PROCEDURE — 36415 COLL VENOUS BLD VENIPUNCTURE: CPT | Performed by: INTERNAL MEDICINE

## 2020-10-09 PROCEDURE — 258N000003 HC RX IP 258 OP 636: Performed by: INTERNAL MEDICINE

## 2020-10-09 PROCEDURE — 80048 BASIC METABOLIC PNL TOTAL CA: CPT | Performed by: INTERNAL MEDICINE

## 2020-10-09 PROCEDURE — 99207 PR CONSULT E&M CHANGED TO SUBSEQUENT LEVEL: CPT | Performed by: INTERNAL MEDICINE

## 2020-10-09 PROCEDURE — 85049 AUTOMATED PLATELET COUNT: CPT | Performed by: PHYSICAL MEDICINE & REHABILITATION

## 2020-10-09 PROCEDURE — 999N001017 HC STATISTIC GLUCOSE BY METER IP

## 2020-10-09 PROCEDURE — 250N000013 HC RX MED GY IP 250 OP 250 PS 637: Performed by: INTERNAL MEDICINE

## 2020-10-09 PROCEDURE — 97110 THERAPEUTIC EXERCISES: CPT | Mod: GP | Performed by: PHYSICAL THERAPIST

## 2020-10-09 PROCEDURE — 80048 BASIC METABOLIC PNL TOTAL CA: CPT | Performed by: PHYSICAL MEDICINE & REHABILITATION

## 2020-10-09 PROCEDURE — 81001 URINALYSIS AUTO W/SCOPE: CPT | Performed by: NURSE PRACTITIONER

## 2020-10-09 PROCEDURE — 99232 SBSQ HOSP IP/OBS MODERATE 35: CPT | Performed by: INTERNAL MEDICINE

## 2020-10-09 PROCEDURE — 128N000003 HC R&B REHAB

## 2020-10-09 RX ORDER — LIRAGLUTIDE 6 MG/ML
0.6 INJECTION SUBCUTANEOUS DAILY
Status: DISCONTINUED | OUTPATIENT
Start: 2020-10-10 | End: 2020-10-13

## 2020-10-09 RX ORDER — SODIUM CHLORIDE 9 MG/ML
INJECTION, SOLUTION INTRAVENOUS
Status: DISCONTINUED
Start: 2020-10-09 | End: 2020-10-10 | Stop reason: HOSPADM

## 2020-10-09 RX ORDER — AMLODIPINE BESYLATE 5 MG/1
5 TABLET ORAL DAILY
Status: DISCONTINUED | OUTPATIENT
Start: 2020-10-10 | End: 2020-10-11

## 2020-10-09 RX ORDER — METOPROLOL TARTRATE 25 MG/1
25 TABLET, FILM COATED ORAL 2 TIMES DAILY
Status: DISCONTINUED | OUTPATIENT
Start: 2020-10-09 | End: 2020-10-11

## 2020-10-09 RX ORDER — LIRAGLUTIDE 6 MG/ML
0.6 INJECTION SUBCUTANEOUS DAILY
Status: DISCONTINUED | OUTPATIENT
Start: 2020-10-10 | End: 2020-10-09

## 2020-10-09 RX ORDER — AMLODIPINE BESYLATE 10 MG/1
10 TABLET ORAL DAILY
Status: DISCONTINUED | OUTPATIENT
Start: 2020-10-10 | End: 2020-10-09

## 2020-10-09 RX ADMIN — ACETAMINOPHEN 650 MG: 325 TABLET, FILM COATED ORAL at 02:21

## 2020-10-09 RX ADMIN — ATORVASTATIN CALCIUM 40 MG: 40 TABLET, FILM COATED ORAL at 09:47

## 2020-10-09 RX ADMIN — VALSARTAN 320 MG: 160 TABLET ORAL at 09:45

## 2020-10-09 RX ADMIN — METOPROLOL TARTRATE 25 MG: 25 TABLET, FILM COATED ORAL at 21:20

## 2020-10-09 RX ADMIN — AMLODIPINE BESYLATE 20 MG: 10 TABLET ORAL at 09:46

## 2020-10-09 RX ADMIN — NYSTATIN 500000 UNITS: 500000 SUSPENSION ORAL at 21:20

## 2020-10-09 RX ADMIN — INSULIN ASPART 1 UNITS: 100 INJECTION, SOLUTION INTRAVENOUS; SUBCUTANEOUS at 10:08

## 2020-10-09 RX ADMIN — NYSTATIN 500000 UNITS: 500000 SUSPENSION ORAL at 16:20

## 2020-10-09 RX ADMIN — LEVOTHYROXINE SODIUM 50 MCG: 25 TABLET ORAL at 09:46

## 2020-10-09 RX ADMIN — ACETAMINOPHEN 650 MG: 325 TABLET, FILM COATED ORAL at 11:07

## 2020-10-09 RX ADMIN — MULTIVITAMIN 15 ML: LIQUID ORAL at 21:19

## 2020-10-09 RX ADMIN — ASPIRIN 81 MG: 81 TABLET, COATED ORAL at 09:47

## 2020-10-09 RX ADMIN — FAMOTIDINE 20 MG: 20 TABLET ORAL at 21:20

## 2020-10-09 RX ADMIN — NYSTATIN 500000 UNITS: 500000 SUSPENSION ORAL at 13:17

## 2020-10-09 RX ADMIN — BACLOFEN 10 MG: 10 TABLET ORAL at 09:47

## 2020-10-09 RX ADMIN — SODIUM CHLORIDE 1000 ML: 9 INJECTION, SOLUTION INTRAVENOUS at 14:46

## 2020-10-09 RX ADMIN — SACUBITRIL AND VALSARTAN 1 TABLET: 49; 51 TABLET, FILM COATED ORAL at 09:47

## 2020-10-09 RX ADMIN — FUROSEMIDE 40 MG: 20 TABLET ORAL at 09:47

## 2020-10-09 RX ADMIN — EZETIMIBE 10 MG: 10 TABLET ORAL at 09:47

## 2020-10-09 RX ADMIN — METOPROLOL TARTRATE 50 MG: 50 TABLET, FILM COATED ORAL at 09:48

## 2020-10-09 RX ADMIN — NYSTATIN 500000 UNITS: 500000 SUSPENSION ORAL at 09:48

## 2020-10-09 RX ADMIN — Medication 7.5 MG: at 14:46

## 2020-10-09 RX ADMIN — INSULIN ASPART 2 UNITS: 100 INJECTION, SOLUTION INTRAVENOUS; SUBCUTANEOUS at 13:08

## 2020-10-09 ASSESSMENT — MIFFLIN-ST. JEOR: SCORE: 1847.62

## 2020-10-09 NOTE — PLAN OF CARE
Discharge Planner Post-Acute Rehab OT:     Discharge Plan: home with 24 hr supervision vs tcu    Precautions: l neglect, falls, pushing to l    Current Status:  ADLs: OT mod a therapist able to place food on spoon pt  able to take spoon and bring to mouth and return to table u/b dressing OT dep sitting in w/c dep with L into sleeve,assit to start arm R  and dep  over head L/B dressing dep supine in bed  IADLs: dep transfers liko lift bed mobility max a l and r  Vision/Cognition: l neglect    Assessment: pt dep with adls pt trunk control and adls varies with cog level     Other Barriers to Discharge (DME, Family Training, etc): tbd

## 2020-10-09 NOTE — PROGRESS NOTES
"  [facility]   Acute Rehabilitation Unit  Daily progress note  CC:   Stroke Ischemic 01.1 (L) Body Involvement (R) Brain Stroke; L sided weakness due to MCA infarct, R MCA occlusion, and R carotid atherosclerosis    PT: Focus on midline sitting/ unsupported sitting EOB. Pt able to sit SBA once attained midline. Showing improvement in recognizing L sided listing and able to self adjust to R w/o cues.     The patient currently performs bed mobility with Max A x 2 and bed rail. Once sitting at EOB the pt is able to sit with anywhere from SBA/CGA, to Mod A x 2 depending on fatigue and task. The patient is able to use Gretchen Stedy with Max A x 2, however unable to achieve a full stand. The patient is currently dependent on mechanical lift for bed to and from chair.     The patient's alertness and participation is improving and he will tolerate intensive therapies in the ARC setting. He is able to follow 2 step directions with 85%, and intelligibility with short 2-3 word phrases needs mod/max cues to increase breath support    OT: pt seen sitting of back of w/c with adls, pt requiring max a to min a to come off back of w/c and maintain with cga arms supported on tray table in front pt sitting with posterial tilt. ptto be  Changed  To a tilt in space second to scooting to edge of chair and needed to be repositoned with OT tx session with liko lift. G/H face sba  hands min a chest min a sitting in w/c U/B dressing  max a hospital gown siting in w/c    SLP: Swallowing: Introduced pharyngeal exercises to patient; effortful swallow, tongue depressor/tongue press, and yawn. Patient had inconsistencies in following cues to perform exercise, but benefitted from \"1-2-3 swallow cue\" to initiate effortful swallow. SLP wore clear mask to assist in visual cues. Recommend continued use of clear mask for future sessions.  Expression: Patient would frequently verbalize to be \"big and loud\" but had " fluctuating intelligibility to utilize the strategy. About 85% intelligible in repetition of phrases and 60% intelligible in casual conversation.    RN: Denied chest pain. Denied SOB. No c/o pain overnight. Ax2 via liko lift. Incontinent B/B. Condom cath in place overnight, patent and draining. Last BM 10/6/2020. Takes pills crushed w/ applesauce.  Pt is able to make his needs known. Call light within reach. Bed alarm on for safety    S:   Significant impairment due to hemisensory deficit, fiedcut, and hemiplegia. Some worsening today.   Less effort to speak.  On Baclofen. Today's lab show some fluid deficit. BP trending lower. BS ok. Has condom cath.    TR held 10/7/2020 Reviewed with Son has been here. I have reviewed his status. Strategies to improve function. Likes Coffee with Thickener! Some spasms. Baclofen 10 mg tid to reduce pain and spasm. Ice pack prn. Pain better. Improving! Does cough with secretions. SLP following.     Dense left hemiplegia, left field cut +. Compensates by turning.  OffTF. BS monitored. Endo following.  NDD 2 [place].        ROS: Denies HA, nausea, pain. Condom Cath      [unfilled]   Scheduled meds    sodium chloride 0.9%  1,000 mL Intravenous Once     [START ON 10/10/2020] amLODIPine  10 mg Oral Daily     aspirin  81 mg Oral Daily     atorvastatin  40 mg Oral Daily     Baclofen  7.5 mg Oral TID     ezetimibe  10 mg Oral Daily     famotidine  20 mg Oral BID     insulin aspart   Subcutaneous TID w/meals     insulin aspart  1-10 Units Subcutaneous TID AC     insulin aspart  1-7 Units Subcutaneous At Bedtime     insulin glargine  20 Units Subcutaneous At Bedtime     levothyroxine  50 mcg Oral Daily     [START ON 10/10/2020] liraglutide  0.6 mg Subcutaneous Daily     metoprolol tartrate  25 mg Oral BID     multivitamins w/minerals  15 mL Oral Daily     nystatin  500,000 Units Swish & Spit 4x Daily     sodium chloride (PF)  10 mL Intracatheter Q7 Days       PRN meds:  acetaminophen,  "bacitracin, bisacodyl, carboxymethylcellulose PF, dextrose, glucose **OR** dextrose **OR** glucagon, insulin aspart, miconazole, nitroGLYcerin, - MEDICATION INSTRUCTIONS -, polyethylene glycol, simethicone, sodium chloride (PF), traZODone      PHYSICAL EXAM  /60 (BP Location: Left arm)   Pulse 74   Temp 97  F (36.1  C) (Axillary)   Resp 18   Ht 1.753 m (5' 9\")   Wt 110.2 kg (243 lb)   SpO2 97%   BMI 35.88 kg/m    Alert less verbal, though able to. Right gaze preference. Able to track past midline. Sitting better and moving better.  Respiratory: Clear to auscultation bilaterally, no crackles or wheezing  Cardiovascular: S1 and S2, RRR, and no murmur noted  GI: soft, non-distended, non-tender. G tube intact + BS +  Skin/Integumen: No rashes, trace lower extremity edema noted  Neuro : Left lower Facial droop + Left Field cut +  Dense left-sided hemiplegia with neglect noted.  Tone less now. Reflexes emerging.    Vocalizes, low volume   Extremities. Moves right side well. Edema +    LABS  Last Comprehensive Metabolic Panel:  Sodium   Date Value Ref Range Status   10/09/2020 139 133 - 144 mmol/L Final     Potassium   Date Value Ref Range Status   10/09/2020 4.4 3.4 - 5.3 mmol/L Final     Chloride   Date Value Ref Range Status   10/09/2020 106 94 - 109 mmol/L Final     Carbon Dioxide   Date Value Ref Range Status   10/09/2020 25 20 - 32 mmol/L Final     Anion Gap   Date Value Ref Range Status   10/09/2020 8 3 - 14 mmol/L Final     Glucose   Date Value Ref Range Status   10/09/2020 148 (H) 70 - 99 mg/dL Final     Urea Nitrogen   Date Value Ref Range Status   10/09/2020 48 (H) 7 - 30 mg/dL Final     Creatinine   Date Value Ref Range Status   10/09/2020 1.65 (H) 0.66 - 1.25 mg/dL Final     GFR Estimate   Date Value Ref Range Status   10/09/2020 41 (L) >60 mL/min/[1.73_m2] Final     Comment:     Non  GFR Calc  Starting 12/18/2018, serum creatinine based estimated GFR (eGFR) will be   calculated " using the Chronic Kidney Disease Epidemiology Collaboration   (CKD-EPI) equation.       Calcium   Date Value Ref Range Status   10/09/2020 8.9 8.5 - 10.1 mg/dL Final      CBC RESULTS:   Recent Labs   Lab Test 10/09/20  0726 09/30/20  0607 09/30/20  0607   WBC  --   --  9.3   RBC  --   --  3.79*   HGB  --   --  11.6*   HCT  --   --  36.3*   MCV  --   --  96   MCH  --   --  30.6   MCHC  --   --  32.0   RDW  --   --  12.9      < > 230    < > = values in this interval not displayed.     CBC RESULTS:   Recent Labs   Lab Test 09/30/20  0607   WBC 9.3   RBC 3.79*   HGB 11.6*   HCT 36.3*   MCV 96   MCH 30.6   MCHC 32.0   RDW 12.9             Glucose Values Latest Ref Rng & Units 10/8/2020 10/8/2020 10/8/2020 10/8/2020 10/9/2020 10/9/2020 10/9/2020   Bedside Glucose (mg/dl )  - -- -- -- -- -- -- --   GLUCOSE 70 - 99 mg/dL 159(H) 158(H) 202(H) 134(H) 155(H) 148(H) 176(H)   Some recent data might be hidden     ASSESSMENT AND PLAN    Jorje Teran is a 70 year old  hand dominant male with Stroke Ischemic 01.1 (L) Body Involvement (R) Brain Stroke; L sided weakness due to MCA infarct, R MCA occlusion, and R carotid atherosclerosis  1. PT, OT and SLP 60 minutes of each on a daily basis, in addition to rehab nursing and close management of physiatrist.       2. Impairment of ADL's: OT for 60 minutes daily to work on ADL re-training such as grooming, self cares and bathing.       3. Impairment of mobility:  PT for 60 minutes daily to work on neuromuscular re-education focusing on strength, balance, coordination, and endurance.       4. Impairment of cognition/language/swallow:  SLP for 60 minutes daily to work on cognitive and linguistic deficits.     Rehab RN for med administration, bowel regimen, glucose monitoring and wound care.    1. Medical Conditions  CVA with dense left hemiparesis  CT with filling defect within the distal R ICA extending into the R MCA and occlusion of the R MCA at the M1 segment.  CTA with severe plaquing at R carotid bifurcation with filling defect in the prox R ICA suggestive of thrombus contributing to approximately 80% stenosis. 60% stenosis of the proximal L ICA. TTE with bubble -> Normal left ventricular size and function. Left ventricular ejection fraction of 55-60%. No segmental wall motion abnormalities noted.   - Lipids 7/29/20- LDL 78, HDL 56, , T chol 164, hemoglobin A1c 7.1% H, TSH is 2.05.  - remains with significant dysarthria, facial droop and dense left sided hemiparesis  Spasticity: Stretching, ice + Low dose Baclofen to decrease pain. Decrease to 7.5 mg tid.  - ASA 81 mg daily PO/UT  - atorvastatin 40 mg daily, Zetia 10 mg po daily    - BP management:  - goal for SBPs < 160  Trending much lower. BUN, Cr increased. UA/UC planned. Room for cutting down diuretics and BP meds. Dr. Beckwith consulting.     Dysphagia  - currently on DD2 with nectar thickened liquids   -PEG tube to be placed on 9/22, currently patient is on tube feeding  If questions or problems arise regarding tube function (e.g. leaking, dislodges, etc.) Contact Interventional Radiology department 24 hours a day.   For procedures that were done at [facility]:  8 AM - 4 PM Monday through Friday Contact   [phone]  For afterhours and weekends: [phone]   Ask for the Interventional Radiologist on call.     PeG site Discomfort: Treat infection. Clean site. Added Keflex 500 mg qid x 7 days. Better.  Tylenol prn.  Simethicone added prn.    BS high. Endo following.  CAD s/p CABG x 3, x 1 in 2015  HTN continue Norvasc /Entresto / Metoprolol 50 mg BID and Valsartan   HLD: On Lipitor  Hypernatremia- resolved     DM II  Recent A1C at 7.1 7/2020.  BG goal is < 180.- started on lantus Endo following.     Outpatient diabetes follow up: ELOS 28 days   - lantus  20 units at bedtime  - metformin 500 mg with dinner ( starting dose 10/6), plan to increase in 1-2 days if no side effects   - aspart  1 unit  per 10 grams CHO for meals/snacks/supplements           -novolog high correction before meals and at bedtime  -BG QAC, HS 0200  - hypoglycemia protocol      US at [facility] negative for DVT     SHELDON  - states he could not sleep with CPAP as outpatient  - he would benefit for treating SHELDON   ? CPAP at night    Hypothyroidism  - resumed PTA Levothyroxine 50 mcg daily  TSH checked 7/29/2020 at 2.05     Morbid obesity  BMI noted  7/202 at ~38. Will need to encourage healthy lifestyle and weight loss with recovery     Diet: Snacks/Supplements Adult: Other; thickened smoothie with meals  (RD); With Meals  Calorie Counts     DVT Prophylaxis: Pneumatic Compression Devices  Code Status: Full Code      2. Adjustment to disability:  Clinical psychology to eval and treat when ready  3. FEN: On NDD 2 [place] +TF, Bolus versus nocturnal supplements.  4. Bowel: Javed meds  5. Bladder: Monitor  6. GI Prophylaxis:   7. Code: Full  8. Disposition: Home with family and home care  9. ELOS:  28 days 10/23/20  10. Rehab prognosis:  Fair  11. Follow up Appointments on Discharge: The following labs/tests are recommended: cbc ,basic metabolic panel in 4-5 days, also get mag and phos in 3-4 days .need follow up with neurology as scheduled. Follows with Dr. Vincent with [facility].       D/W son, and Dr. Beckwith.    Sudarshan Fuentes MD   Physical Medicine & Rehabilitation

## 2020-10-09 NOTE — CONSULTS
Consult Date:  10/09/2020      INTERNAL MEDICINE CONSULTATION      ASSESSMENT:   1.  Acute kidney injury, likely secondary to volume depletion in the setting of dysphagia following Cerebrovascular accident (CVA), as well as multiple antihypertensive medications including furosemide.  The patient's blood pressure has been relatively low compared to baseline,  likely secondary to decreased intake, particularly following discontinuation of tube feedings.   2.  History of cerebrovascular accident with a dense left hemiparesis and dysphagia.   3.  History of coronary artery disease, status post coronary artery bypass grafting (CABG) in 2015, as well as congestive heart failure with preserved systolic function.  The patient has required a complex antihypertensive medication regimen as an outpatient.  His blood pressure, however, has been relatively low on this regimen, likely secondary to decreased intake and concurrent medications used in the post-CVA period.  The patient's son believes he was compliant at home.   4.  Lethargy and increased confusion today compared to post-CVA baseline.  This may be secondary to medications, i.e., recent increase in Baclofen, as well as to relative hypotension and acute kidney injury.  Nursing staff notes foul-smelling urine, which may be an indication of a urinary tract infection (UTI) as well.   5.  Diabetes mellitus type 2, currently on Lantus and prandial insulin, as well as metformin.  Metformin will  need to be held for the short-term in view of acute kidney injury.   6.  Morbid obesity.   7.  History of obstructive sleep apnea with intolerance to CPAP as an outpatient.        RECOMMENDATIONS:     1.  The patient was given 1 liter of IV fluids.  Renal function will be repeated in the morning.  Blood pressure will be monitored.  The patient's blood pressure medication will be adjusted in view of his worsening renal function.  Goal systolic blood pressure is 131/40 given his history  of congestive heart failure but also recent CVA.  Therefore, metoprolol will be decreased to 25 mg twice daily with hold parameters.  Amlodipine will be decreased to 5 mg a day with hold parameters.  Furosemide, Entresto and Diovan will be held for at least the short-term.  The patient ultimately may require increased free water down his feeding tube to supplement his oral intake briefly given the necessity for nectar-thickened liquids. Would hold Baclofen in view of lethargy, recent dose increase and worsening renal function.   2.  Diabetes management as per the diabetes team.   3.  DVT prophylaxis with pneumatic compression devices.      Thank you for having me see this patient.      DISCUSSION:  Mr. Teran is a 70-year-old male who currently is hospitalized on the acute rehab unit under the care of Dr. Fuentes.  He was admitted to the acute rehab unit on 09/24/2020 from [facility] where he was hospitalized from 09/07 through 09/24 after suffering a right MCA CVA.  He has had residual dysarthria, dysphagia, facial droop and left-sided weakness and neglect.  The patient has required tube feeding initially for nutrition and fluids.  He currently, however, is tolerating a dysphagia diet but is receiving water flushes.  Feedings were stopped approximately 3 days ago.  The patient's blood pressures are reviewed.  They generally been in the 110s to 120s, though he has had occasional readings in the low 100s.  It is noted by the staff that the patient in general has been alert, dysarthric with expressive aphasia.  This morning, the staff and the patient's son note he is more lethargic.  Of note, his Baclofen dose was recently increased per PM and R.      PAST MEDICAL HISTORY:   1.  History of right hemispheric CVA as noted above.   2.  History of coronary artery disease, status post CABG in 2015.   3.  History of congestive heart failure with preserved LV function with a normal ejection fraction by  echo during his recent hospitalization.   4.  Hypertension, for which the patient has been managed with amlodipine, Entresto, metoprolol, losartan and furosemide as an outpatient by his cardiologist.   5.  Hyperlipidemia.   6.  Diabetes mellitus type 2 with hemoglobin A1c of 7.1 in 2020.   7.  Obstructive sleep apnea.  He has not tolerated CPAP.   8.  Hypothyroidism.   9.  Morbid obesity.      MEDICATION ALLERGIES:  NONE.      FAMILY HISTORY:  Reviewed by me and is noncontributory.      REVIEW OF SYSTEMS:  Unobtainable as the patient is quite sleepy.  His main complaint currently is that of buttocks pain since he was taken out of bed and put in a chair earlier this morning.      MEDICATIONS PRIOR TO ADMISSION TO [facility]:     1.  Amlodipine 20 mg daily.   2.  Lipitor 40 mg daily.   3.  Zetia 10 mg daily.   4.  Furosemide 40 mg twice daily.   5.  Levothyroxine 50 mcg daily.   6.  Metoprolol XL 25 mg bid.   7.  Entresto 49/51 mg 1 tablet daily.   8.  Trazodone 50 mg daily.   9.  Valsartan 320 mg daily.      SOCIAL HISTORY:  Reviewed.  The patient is an alcohol user or cigarette smoker.  He does occasionally smoke a cigar.      MEDICATIONS AS OF THIS MORNIN.  Amlodipine 20 mg daily.   2.  Aspirin 81 mg daily.   3.  Lipitor 40 mg daily.   4.  Baclofen 10 mg 3 times a day, which has been now reduced to 7.5 mg t.i.d.   5.  Zetia 10 mg daily.   6.  Famotidine 20 mg twice daily.   7.  Lasix 40 mg twice daily.   8.  Insulin per sliding scale and carb counting, as well as Lantus   9.  Metoprolol 50 mg twice daily.   10.  Multivitamins once a day.   11.  Mycostatin suspension 4 times a day.     12.  Entresto 49/51 one tablet twice daily.   13.  Valsartan 320 mg daily.      PHYSICAL EXAMINATION:   GENERAL:  He is an obese male sitting in a chair.  He is uncomfortable, indicating he is having pain over the coccyx area.   VITAL SIGNS:  Blood pressures over the last 24 hours have been in the 90s to 110s  systolically.  Heart rates have been in the 70s.  O2 saturations in the mid-90s on room air.  He is afebrile.   HEENT:  Left facial droop is present.  Oral mucosa is dry appearing.   LUNGS:  Clear.   CARDIAC:  Regular rate and rhythm without murmurs.   ABDOMEN:  Soft, protuberant, nontender.  G-tube site was not assessed by me.   EXTREMITIES:  Reveals 1+ lower extremity edema bilaterally.   NEUROLOGIC:  He is alert.  He has expressive aphasia.  Speech is minimal this morning.  He has a left facial droop and marked left-sided weakness.      LABORATORY DATA:  Obtained this morning included BMP remarkable for creatinine 1.65.  Potassium is normal.  BUN is 48.  The last creatinine prior to today was 0.78 on .         DORA OSUNA MD             D: 10/09/2020   T: 10/09/2020   MT:       Name:     JENNIFER GANNON   MRN:      [id number]        Account:       [id number]   :      1950           Consult Date:  10/09/2020      Document: [id number]

## 2020-10-09 NOTE — PLAN OF CARE
Discharge Planner Post-Acute Rehab PT:     Discharge Plan:  home w/ hired physical assist vs TCU    Precautions: * falls, (L) UE flaccid, shannon neglect, field cut    Current Status:  Transfer: liko  Gait: w/c dependent  Stairs: unable  Balance: assist w/ static sitting    Assessment: *slow progress w/ good participation, apraxia limiting    Other Barriers to Discharge (DME, Family Training, etc): level of physical assist

## 2020-10-09 NOTE — PROGRESS NOTES
"                          Diabetes Consult Daily  Progress Note          Assessment/Plan:     HPI:  Jorje Teran is a 69 y/o male with past medical history of HTN, HLD, DM II, SHELDON, hypothyroidism, and morbid obesity who was found down, found to have a R MCA infarct and R carotid atherosclerosis, now admitted to ARU 9/24 for intensive therapies, ongoing medical management, and rehabilitative nursing care.  Hyperglycemia exacerbated by enteral feeds.                         Assessment:  1)  Type 2 Diabetes Mellitus    2)  Obesity; BMI 36    3)  Confusion - change in mental status     Plan:    - lantus  20 units at bedtime  -  metformin - discontinued (creat 1.65)  - aspart  1 unit per 10 grams CHO for meals/snacks/supplements           - novolog high correction before meals and at bedtime  - BG QAC, HS 0200  - hypoglycemia protocol  - will continue to follow      Test claim completed for GLP-1 (Trulicity) covered for outpatient.  Will start Victoza while inpatient and Trulicity outpatient.    10/7 Retail/Discharge Pharmacy Test Claims (covered): Rosalind Mcqueen ([place] thru 10/7/23 - $196.24 d/t Medicare Coverage     Gap--$784.94 remaining in coverage gap)   *Not Covered*: Ozempic, Rybelsus, Victoza, Bydureon, Jean Pierre   -Sandra Salazar  Discharge Pharmacy Liaison     Outpatient diabetes follow up: TBD  Plan discussed with patient, family, and primary team via this note     Discharge: 10/16              Interval History:     The last 24 hours progress and nursing notes reviewed.  Creat elevated, metformin stopped.  More confused today, son noting a significant change in cognitive function from yesterday to today.  Today he is repeating \"she is nasty\" over and over.   Urine strong smelling.  Will discuss with primary team re: UA    Recent Labs   Lab 10/09/20  0726 10/09/20  0217 10/08/20  2059 10/08/20  1711 10/08/20  1200 10/08/20  0859 10/08/20  0211   *  --   --   --   --   --   --    BGM  --  155* 134* 202* " "158* 159* 134*           Nutrition:     Orders Placed This Encounter      Combination Diet Dysphagia Diet Level 2: Mechan Altered; Nectar Thickened Liquids (pre-thickened or use instant food thickener)    Supplements: Snacks/supplements: magic cup with lunch, Gelatein Plus cherry with dinner        PTA Regimen:     No regular BG monitoring at home  No restricted diet  No diabetic medications PTA - diet controlled     During [facility] admission, utilized glargine 10 units BID w/ fair basal coverage while on Dysphagia diet only (no TF yet)           Review of Systems:    patient denies however is notably confused       Medications:   Steroid planning:  no       Physical Exam:   /60 (BP Location: Left arm)   Pulse 74   Temp 97  F (36.1  C) (Axillary)   Resp 18   Ht 1.753 m (5' 9\")   Wt 110.2 kg (243 lb)   SpO2 97%   BMI 35.88 kg/m      General:  pleasant, in no acute distress.  sitting in chair.  Strong smell of urine  HEENT:  NC/AT. MMM, sclera not injected - L sided facial droop presists  Lungs:  unremarkable, no new cough, no SOB  ABD:   soft,  non-tender,  no lipodystrophy noted  Skin:  warm and dry, no obvious lesions/rash  Feet:    CMS intact, PPP  MSK:   moving all extremities  Lymp:    LE edema  Mental Status:  Alert and oriented x1   Psych:   Cooperative, good eye contact         Data:     Lab Results   Component Value Date    A1C 7.5 09/10/2020            CBC RESULTS:   Recent Labs   Lab Test 10/06/20  0713 09/30/20  0607 09/30/20  0607   WBC  --   --  9.3   RBC  --   --  3.79*   HGB  --   --  11.6*   HCT  --   --  36.3*   MCV  --   --  96   MCH  --   --  30.6   MCHC  --   --  32.0   RDW  --   --  12.9   *   < > 230    < > = values in this interval not displayed.     Recent Labs   Lab Test 10/09/20  0726 09/21/20  0620    141   POTASSIUM 4.4 4.1   CHLORIDE 106 111*   CO2 25 25   ANIONGAP 8 5   * 178*   BUN 48* 21   CR 1.65* 0.78   SELENA 8.9 8.6     Liver Function Studies - "   Recent Labs   Lab Test 09/07/20  2355   PROTTOTAL 7.4   ALBUMIN 3.6   BILITOTAL 0.9   ALKPHOS 67   AST 23   ALT 28     Lab Results   Component Value Date    INR 1.17 09/21/2020    INR 1.03 09/07/2020       CADE Sahu CNP   Inpatient Diabetes Management Service  Pager - [phone]  Friday - Monday 0800 - 1600 hrs  Diabetes Management Team job code: [phone]     I spent a total of 25 minutes bedside and on the inpatient unit managing glycemic care.  Over 50% of my time on the unit was spent counseling the patient and/or coordinating care regarding acute hyperglycemic management.  See note for details.

## 2020-10-09 NOTE — PLAN OF CARE
"Blood pressure 106/60, pulse 74, temperature 97  F (36.1  C), temperature source Axillary, resp. rate 18, height 1.753 m (5' 9\"), weight 110.2 kg (243 lb), SpO2 97 %.  Patient was lethargic and sleepy this am as writer was trying to wake him up for the day and his med. Patient would wake up  and fall asleep again. At 0930, writer checks in again on patient, was trying to get himself out of bed with LE out of bed, writer assisted patient back to bed with assist of OT personal. Bed alarm on. Patient was assisted to bed with a liko with two into w/c, assisted with feeding, ate 50% of breakfast.Incontinnent of urine twice this shift. LBM 10/6.  Son was here to visit, stated his dad  Was not looking the same as he was in the last six days. Son was reassured and MD notified on son's concern. MD came and saw patient , decreased dose of baclofen and a BOLUS of N/S 1000 ml started at 250 ml for 4 hrs. A condom cath applied to collect a urine specimen is due. Bed alarms on . Will continue to monitor.   "

## 2020-10-09 NOTE — PLAN OF CARE
FOCUS/GOAL  Bladder management, Nutrition/Feeding/Swallowing precautions, Medical management, and Cognition/Memory/Judgment/Problem solving    ASSESSMENT, INTERVENTIONS AND CONTINUING PLAN FOR GOAL:  Slept well without issues.  at 0200, Weight is stable   Patient was mourning with repositioning. PRN Tylenol given at 0221 with good result Bed alarm not activated, bed need to be zeroed today when patient OOB. Continue with POC.

## 2020-10-10 ENCOUNTER — APPOINTMENT (OUTPATIENT)
Dept: OCCUPATIONAL THERAPY | Facility: CLINIC | Age: 70
End: 2020-10-10
Payer: COMMERCIAL

## 2020-10-10 ENCOUNTER — APPOINTMENT (OUTPATIENT)
Dept: PHYSICAL THERAPY | Facility: CLINIC | Age: 70
End: 2020-10-10
Payer: COMMERCIAL

## 2020-10-10 ENCOUNTER — APPOINTMENT (OUTPATIENT)
Dept: SPEECH THERAPY | Facility: CLINIC | Age: 70
End: 2020-10-10
Payer: COMMERCIAL

## 2020-10-10 LAB
ANION GAP SERPL CALCULATED.3IONS-SCNC: 7 MMOL/L (ref 3–14)
BUN SERPL-MCNC: 34 MG/DL (ref 7–30)
CALCIUM SERPL-MCNC: 8.7 MG/DL (ref 8.5–10.1)
CHLORIDE SERPL-SCNC: 106 MMOL/L (ref 94–109)
CO2 SERPL-SCNC: 25 MMOL/L (ref 20–32)
CREAT SERPL-MCNC: 1.13 MG/DL (ref 0.66–1.25)
GFR SERPL CREATININE-BSD FRML MDRD: 65 ML/MIN/{1.73_M2}
GLUCOSE BLDC GLUCOMTR-MCNC: 113 MG/DL (ref 70–99)
GLUCOSE BLDC GLUCOMTR-MCNC: 136 MG/DL (ref 70–99)
GLUCOSE BLDC GLUCOMTR-MCNC: 146 MG/DL (ref 70–99)
GLUCOSE BLDC GLUCOMTR-MCNC: 229 MG/DL (ref 70–99)
GLUCOSE BLDC GLUCOMTR-MCNC: 74 MG/DL (ref 70–99)
GLUCOSE SERPL-MCNC: 141 MG/DL (ref 70–99)
POTASSIUM SERPL-SCNC: 4.5 MMOL/L (ref 3.4–5.3)
SODIUM SERPL-SCNC: 138 MMOL/L (ref 133–144)

## 2020-10-10 PROCEDURE — 80048 BASIC METABOLIC PNL TOTAL CA: CPT | Performed by: INTERNAL MEDICINE

## 2020-10-10 PROCEDURE — 36415 COLL VENOUS BLD VENIPUNCTURE: CPT | Performed by: INTERNAL MEDICINE

## 2020-10-10 PROCEDURE — 99232 SBSQ HOSP IP/OBS MODERATE 35: CPT | Performed by: NURSE PRACTITIONER

## 2020-10-10 PROCEDURE — 250N000013 HC RX MED GY IP 250 OP 250 PS 637: Performed by: PHYSICAL MEDICINE & REHABILITATION

## 2020-10-10 PROCEDURE — 128N000003 HC R&B REHAB

## 2020-10-10 PROCEDURE — 99207 PR CDG-MDM COMPONENT: MEETS HIGH - UP CODED: CPT | Performed by: INTERNAL MEDICINE

## 2020-10-10 PROCEDURE — 999N001017 HC STATISTIC GLUCOSE BY METER IP

## 2020-10-10 PROCEDURE — 99233 SBSQ HOSP IP/OBS HIGH 50: CPT | Performed by: PHYSICAL MEDICINE & REHABILITATION

## 2020-10-10 PROCEDURE — 99233 SBSQ HOSP IP/OBS HIGH 50: CPT | Performed by: INTERNAL MEDICINE

## 2020-10-10 PROCEDURE — 97110 THERAPEUTIC EXERCISES: CPT | Mod: GO

## 2020-10-10 PROCEDURE — 97535 SELF CARE MNGMENT TRAINING: CPT | Mod: GO

## 2020-10-10 PROCEDURE — 258N000001 HC RX 258: Performed by: PHYSICAL MEDICINE & REHABILITATION

## 2020-10-10 PROCEDURE — 97110 THERAPEUTIC EXERCISES: CPT | Mod: GP

## 2020-10-10 PROCEDURE — 97112 NEUROMUSCULAR REEDUCATION: CPT | Mod: GO

## 2020-10-10 PROCEDURE — 250N000013 HC RX MED GY IP 250 OP 250 PS 637: Performed by: INTERNAL MEDICINE

## 2020-10-10 PROCEDURE — 97530 THERAPEUTIC ACTIVITIES: CPT | Mod: GP

## 2020-10-10 PROCEDURE — 92526 ORAL FUNCTION THERAPY: CPT | Mod: GN | Performed by: REHABILITATION PRACTITIONER

## 2020-10-10 PROCEDURE — 272N000078 HC NUTRITION PRODUCT INTERMEDIATE LITER

## 2020-10-10 RX ORDER — DEXTROSE MONOHYDRATE 100 MG/ML
INJECTION, SOLUTION INTRAVENOUS CONTINUOUS PRN
Status: DISCONTINUED | OUTPATIENT
Start: 2020-10-10 | End: 2020-10-23 | Stop reason: HOSPADM

## 2020-10-10 RX ADMIN — ACETAMINOPHEN 650 MG: 325 TABLET, FILM COATED ORAL at 14:17

## 2020-10-10 RX ADMIN — FAMOTIDINE 20 MG: 20 TABLET ORAL at 09:11

## 2020-10-10 RX ADMIN — AMLODIPINE BESYLATE 5 MG: 5 TABLET ORAL at 07:52

## 2020-10-10 RX ADMIN — ASPIRIN 81 MG: 81 TABLET, COATED ORAL at 07:51

## 2020-10-10 RX ADMIN — NYSTATIN 500000 UNITS: 500000 SUSPENSION ORAL at 17:15

## 2020-10-10 RX ADMIN — EZETIMIBE 10 MG: 10 TABLET ORAL at 07:52

## 2020-10-10 RX ADMIN — LEVOTHYROXINE SODIUM 50 MCG: 25 TABLET ORAL at 07:52

## 2020-10-10 RX ADMIN — NYSTATIN 500000 UNITS: 500000 SUSPENSION ORAL at 07:52

## 2020-10-10 RX ADMIN — LIRAGLUTIDE 0.6 MG: 6 INJECTION SUBCUTANEOUS at 09:12

## 2020-10-10 RX ADMIN — NYSTATIN 500000 UNITS: 500000 SUSPENSION ORAL at 21:26

## 2020-10-10 RX ADMIN — METOPROLOL TARTRATE 25 MG: 25 TABLET, FILM COATED ORAL at 21:30

## 2020-10-10 RX ADMIN — ATORVASTATIN CALCIUM 40 MG: 40 TABLET, FILM COATED ORAL at 07:52

## 2020-10-10 RX ADMIN — ACETAMINOPHEN 650 MG: 325 TABLET, FILM COATED ORAL at 09:11

## 2020-10-10 RX ADMIN — MULTIVITAMIN 15 ML: LIQUID ORAL at 18:21

## 2020-10-10 RX ADMIN — FAMOTIDINE 20 MG: 20 TABLET ORAL at 21:26

## 2020-10-10 RX ADMIN — METOPROLOL TARTRATE 25 MG: 25 TABLET, FILM COATED ORAL at 09:11

## 2020-10-10 RX ADMIN — DEXTROSE MONOHYDRATE 1000 ML: 100 INJECTION, SOLUTION INTRAVENOUS at 17:14

## 2020-10-10 NOTE — PROGRESS NOTES
Diabetes Consult Daily  Progress Note          Assessment/Plan:     HPI:  Jorje Teran is a 69 y/o male with past medical history of HTN, HLD, DM II, SHELDON, hypothyroidism, and morbid obesity who was found down, found to have a R MCA infarct and R carotid atherosclerosis, now admitted to ARU 9/24 for intensive therapies, ongoing medical management, and rehabilitative nursing care.  Hyperglycemia exacerbated by enteral feeds.                         Assessment:  1)  Type 2 Diabetes Mellitus     2)  Obesity; BMI 36     3)  Confusion - persists but improved     Plan:    - lantus  20 units at bedtime   -  Metformin discontinued for now - will trend creat - remains elevated today (1.32)  -  aspart  1 unit per 10 grams CHO for meals/snacks/supplements           - novolog high correction before meals and at bedtime  - BG QAC, HS 0200  - hypoglycemia protocol  - will continue to follow      Test claim completed for GLP-1 (Trulicity) covered for outpatient.  Will start Victoza while inpatient and Trulicity outpatient.     10/7 Retail/Discharge Pharmacy Test Claims (covered): Rosalind Mcqueen ([place] thru 10/7/23 - $196.24 d/t Medicare Coverage    Gap--$784.94 remaining in coverage gap)   *Not Covered*: Ozempic, Rybelsus, Victoza, Jean Pierre Navarro   -Sandra Salazar  Discharge Pharmacy Liaison      Outpatient diabetes follow up: TBD  Plan discussed with patient, family, and primary team via this note     Discharge: 10/16              Interval History:     The last 24 hours progress and nursing notes reviewed.   BG trending nicely at target.   persisting confusion although does seem less confused than yesterday.  UA was grossly negative.  Denies complaints  Metformin stopped yesterday - creat elevated 1.65 and 1.32 today (10/10) from previous 0.78 (9/21).  Will start GLP-1 (victoza) with discharge GLP-1 to be Trulicity which is covered by insurance    Recent Labs   Lab 10/10/20  0749 10/10/20  0201  10/09/20  2101 10/09/20  1942 10/09/20  1717 10/09/20  1208 10/09/20  0726 10/09/20  0217   GLC  --   --   --  155*  --   --  148*  --    * 146* 139*  --  177* 176*  --  155*           Nutrition:     Orders Placed This Encounter      Combination Diet Dysphagia Diet Level 2: Mechan Altered; Nectar Thickened Liquids (pre-thickened or use instant food thickener)    Supplements: Snacks/supplements: magic cup with lunch, Gelatein Plus cherry with dinner        PTA Regimen:      No regular BG monitoring at home  No restricted diet  No diabetic medications PTA - diet controlled     During [facility] admission, utilized glargine 10 units BID w/ fair basal coverage while on Dysphagia diet only (no TF yet)           Review of Systems:   Constitutional:   No fever, no chills  ENT/Mouth:   No hearing changes, no ear pain, no sore throat, no rhinorrhea, no difficulty swallowing  Eyes:  No eye pain, no discharge, no vision changes  CV:   No CP/SOB, no new edema  Resp:  No cough, no wheezing  GI:   No nausea, no vomiting, denies constipation, no diarrhea  :  No dysuria, no frequency, no hematuria  Musk:  No joint swelling/pain, No back pain  Skin/heme:   No new rashes/bruises/open areas.  No pruritis  Neuro:   No new weakness, denies numbness/tingling, no headache  Psych:   No new anxiety, no depression  Endocrine:  No polyuria, no polydipsia          Medications:   Steroid planning:  No         Physical Exam:     General:  pleasant, in no acute distress.  sitting comfortably in bed.   HEENT:  NC/AT. MMM, sclera not injected  Lungs:  unremarkable, no new cough, no SOB  ABD:   soft,  non-tender,  no lipodystrophy noted  Skin:  warm and dry, no obvious lesions/rash  Feet:    CMS intact, PPP  MSK:   moving all extremities - L sided deficits persist (UE/LE)  Lymp:    No LE edema  Mental Status:  Alert and oriented x1 - oriented to self, recognizes self in photos but not others  Psych:   Cooperative, good eye contact          Data:     Lab Results   Component Value Date    A1C 7.5 09/10/2020            CBC RESULTS:   Recent Labs   Lab Test 10/09/20  0726 09/30/20  0607 09/30/20  0607   WBC  --   --  9.3   RBC  --   --  3.79*   HGB  --   --  11.6*   HCT  --   --  36.3*   MCV  --   --  96   MCH  --   --  30.6   MCHC  --   --  32.0   RDW  --   --  12.9      < > 230    < > = values in this interval not displayed.     Recent Labs   Lab Test 10/09/20  1942 10/09/20  0726    139   POTASSIUM 4.2 4.4   CHLORIDE 106 106   CO2 24 25   ANIONGAP 8 8   * 148*   BUN 46* 48*   CR 1.32* 1.65*   SELENA 8.5 8.9     Liver Function Studies -   Recent Labs   Lab Test 09/07/20  2355   PROTTOTAL 7.4   ALBUMIN 3.6   BILITOTAL 0.9   ALKPHOS 67   AST 23   ALT 28     Lab Results   Component Value Date    INR 1.17 09/21/2020    INR 1.03 09/07/2020       CADE Sahu CNP   Inpatient Diabetes Management Service  Pager - [phone]  Friday - Monday 0800 - 1600 hrs  Diabetes Management Team job code: [id number]      I spent a total of 25 minutes bedside and on the inpatient unit managing glycemic care.  Over 50% of my time on the unit was spent counseling the patient and/or coordinating care regarding acute hyperglycemic management.  See note for details.

## 2020-10-10 NOTE — PLAN OF CARE
FOCUS/GOAL  Medical management, Cognition/Memory/Judgment/Problem solving, and Safety management    ASSESSMENT, INTERVENTIONS AND CONTINUING PLAN FOR GOAL:    Pt is alert, confused however follows simple commands. Uses inappropriate words at times but nods to a yes or no questions. Unable to make needs known. Writer did oral cares and pt opened mouth when asked to. No verbal and non-verbal signs of pain. Vitals stable with BP at 122/63, pt is more alert and responds to verbal and tactile stimuli. Incontinent in bladder and has a condom catheter on with adequate output. May be incontinent in bowel with LBM 10/6. A maximum assist of 2 with rolling from side to side in bed at night. Will continue to monitor any decline in LOCand sudden drop of BP.

## 2020-10-10 NOTE — PLAN OF CARE
FOCUS/GOAL  Bowel management, Bladder management, Nutrition/Feeding/Swallowing precautions, Medication management, Pain management, Medical management, Mobility, and Skin integrity    ASSESSMENT, INTERVENTIONS AND CONTINUING PLAN FOR GOAL:  Patient a little off today, not cooperative , mourning. He did not eat his breakfast or  lunch. He admitted being in pain when asked but cold not describe or identify the location. PRN Tylenol given at 09:12 around 14:00 with same relieve. Request put to the medical team to restore the feeding order if possible. Labs done this morning and to be repeat tomorrow.  Insulin not given this shift  ( order parameter not met), BS of 74 lunch time Jaky, provider updated. New orders in,  240 ml H20 given  At 1330 for hydration due meals refusal. Continue with POC.

## 2020-10-10 NOTE — PLAN OF CARE
Discharge Planner Post-Acute Rehab PT:      Discharge Plan:  home w/ hired physical assist vs TCU     Precautions: * falls, (L) UE flaccid, shannon neglect, field cut     Current Status:  Transfer: liko  Gait: w/c dependent  Stairs: unable  Balance: assist w/ static sitting, unable today do to lethargy and status change, monitoring     Assessment: *slow progress w/ good participation, apraxia and lethargy limiting     Other Barriers to Discharge (DME, Family Training, etc): level of physical assist    Renaldo Junior, PT  10/10/2020

## 2020-10-10 NOTE — PROGRESS NOTES
Discharge Planner Post-Acute Rehab OT:     Discharge Plan: home with 24 hr supervision vs tcu    Precautions:  Left neglect, fall risk,     Current Status:  ADLs: mod A x 2 for UB dressing with verbal cueing; SBA with full set up for oral cares and g/h tasks while seated upright. Dependent A x 2 liko lift for all functional transfers.   IADLs: -  Vision/Cognition: Left neglect, lethargy     Assessment: Pt dependent with ADLs and IADLs.     Other Barriers to Discharge (DME, Family Training, etc): TBD

## 2020-10-10 NOTE — PLAN OF CARE
Discharge Planner Post-Acute Rehab SLP:     Discharge Plan: Home with 24 hour supervision vs TCU.    Precautions:fall, aspiration    Current Status:  Patient seen bedside to re-asses swallow given recent change in function/lethargy. Pt tolerated small sips of nectar thick liquids by cup with no overt s/s aspiration. He declined to trial any semi solids due to not feeling well. Recommend: cautiously continue DD2 solids with nectar thick liquids. Ensure adequate level of alertness and upright for meals. -15 minutes today due to nurse placing IV line.    Communication:Dysarthria.    Cognition: Impaired    Swallow: Currently on DD2 solids with nectar thick liquids. Monitor tolerance.    Assessment: Pt appears lethargic and po trials were limited. Recommend  cautiously continue DD2 solids with nectar thick liquids. Ensure adequate level of alertness and upright for meals.    Other Barriers to Discharge (Family Training, etc):  swallow, cognition, communication.

## 2020-10-10 NOTE — PLAN OF CARE
FOCUS/GOAL  Bowel management, Bladder management, Pain management, and Mobility    ASSESSMENT, INTERVENTIONS AND CONTINUING PLAN FOR GOAL:    Alert to self. Patient has been lethargic through out the shift. VSS. Patient does not call appropriately, needs to be anticipated. Condom cath on for bladder management. Patient given IV bolus maintenance fluid. BP WNL. Patient removed PIV. PEG tube site dressing changed, site appear CDI without any sign of infection. PEG flushed q 4 and patent. Patient did not eat dinner, sliding scale and carb coverage not given. UA collected, sent to lab, result WNL. Liko lift for transfer. Alarms on for safety. Continue to monitor.

## 2020-10-10 NOTE — PROGRESS NOTES
[facility], [place]   Physical Medicine and Rehabilitation Daily Note           Assessment and Plan of Care:   Jorje Teran is a 70-year-old male who sustained an ischemic right MCA stroke resulting in dense left hemiparesis.  Admitted to acute rehab rehab for ongoing medical management and multidisciplinary rehab.     --Hospitalist following for medical complexity, appreciate [place]s and expertise.  --Creatinine recovering nicely with initiation of IVFs and holding baclofen. Cr 1.13 currently. Will continue to monitor.  --Restarting tube feeds given that he is not taking in any nutrition via mouth. Nutrition consult placed for [place]s.   --Will need to hold long acting insulin if TFs not initiated. Will continue sliding scale insulin.   --Continue ongoing medical management.  --Continue therapies and plan of care.           Interval history:   Patient seen and examined at bedside.  Per nursing report, patient has been more lethargic over the last 1 to 2 days, and also is not taking in any nutrition by mouth.  His creatinine was found to be significantly elevated above baseline at 1.6 (baseline 0.8), and therefore hospitalist evaluation was prompted.  The decision was made to discontinue baclofen, which was a new medication during this admission.  Today, he remains somnolent.  He is getting IV fluids, and creatinine is recovering nicely.              Physical Exam:   VS:   Vitals:    10/09/20 2345 10/10/20 0650 10/10/20 0700 10/10/20 0902   BP: 122/63 128/69 (!) 140/75 138/72   BP Location: Right arm Right arm Right arm Right arm   Pulse: 60 76 75 73   Resp: 24 16 18 16   Temp: 98  F (36.7  C) 97.6  F (36.4  C) 97.5  F (36.4  C) 98.7  F (37.1  C)   TempSrc: Oral Axillary Oral Oral   SpO2: 95% 92%  95%   Weight:       Height:         Gen: NAD, lying in bed, responsive to voice  Heart: RRR  Lungs: breathing unlabored on room air  Abd: soft and mildly tender throughout.    Ext: 1+ pitting edema in BLE, no calf tenderness  MSK/neuro: Lethargic, does not reliably follow commands.           Data:   Scheduled meds    amLODIPine  5 mg Oral Daily     aspirin  81 mg Oral Daily     atorvastatin  40 mg Oral Daily     [Held by provider] Baclofen  7.5 mg Oral TID     ezetimibe  10 mg Oral Daily     famotidine  20 mg Oral BID     insulin aspart   Subcutaneous TID w/meals     insulin aspart  1-10 Units Subcutaneous TID AC     insulin aspart  1-7 Units Subcutaneous At Bedtime     insulin glargine  20 Units Subcutaneous At Bedtime     levothyroxine  50 mcg Oral Daily     liraglutide  0.6 mg Subcutaneous Daily     metoprolol tartrate  25 mg Oral BID     multivitamins w/minerals  15 mL Oral Daily     nystatin  500,000 Units Swish & Spit 4x Daily     sodium chloride (PF)  10 mL Intracatheter Q7 Days       PRN meds:  acetaminophen, bacitracin, bisacodyl, carboxymethylcellulose PF, dextrose, glucose **OR** dextrose **OR** glucagon, insulin aspart, miconazole, nitroGLYcerin, - MEDICATION INSTRUCTIONS -, polyethylene glycol, simethicone, sodium chloride (PF), traZODone      Jaky Joya MD  Physical Medicine and Rehabilitation     I spent a total of 35 minutes face-to-face and managing the care of Jorje Teran. Over 50% of my time on the unit was spent counseling the patient and coordinating care. Please see note for details.

## 2020-10-10 NOTE — PROGRESS NOTES
CLINICAL NUTRITION SERVICES  -  BRIEF NOTE    MD consult received regarding: Please assist in evaluating to re-start on tube feeding. Patient is more lethargic and is not taking in anything PO.    NUTRITION HISTORY  - Information obtained from chart. Pt previously on TF via G tube, however intakes improved and discontinued. Pt now with increased lethargy and not taking po. RD to place [place]s for tube feeding below    Diet: Dysphagia Level 2:  Mechanically Altered and Nectar Thickened Liquids   Supplement: Chocolate Magic Cup at lunch, cherry Gelatein at dinner   Intakes: No po intake yesterday or today (10/9-10/10) previously consuming 50-75% of meals.     NUTRITION INTERVENTIONS  Recommendations / Nutrition Prescription    If Nocturnal TF warranted resume:  Isosource 1.5 via g-tube @ 60 mL/hr x 14 hrs (6pm-8am) providing 840 mL, 1260 kcal (15.6 kcal/kg), 57 g protein (0.7 g/kg), 148 g CHO, 13 g fiber, and 638 mL free water per DW of 80.7 kg.  Water Flushes: 100 mL q 4 hrs (TF + Flushes = 1238 mL water; meeting 61% hydration needs).  *this meet ~60% of estimated nutritional needs     If continuous TF warranted: Begin Isosource 1.5 via G tube at 30 mL/hr x 4 hours and then increase every 4 hours by 15 mL to goal of 60 mL/hr to provide:  2160 kcal (27 kcal/kg), 98 g protein (1.2 g/kg), 253 g CHO, 22 g fiber, 1094 mL H2O   Water flush: 100 mL q 4 hrs 1694 mL with TF      Torrie Wright RD, LD   Weekend Pager: [phone]

## 2020-10-10 NOTE — PLAN OF CARE
PTA: Attempted to see pt from PT overflow list however pt already has PT scheduled at this time. Unable to see.

## 2020-10-10 NOTE — PROGRESS NOTES
Pt was seen, course reviewed with team, nursing staff    Staff notes Pt continues to be more lethargic than baseline    He is able to answer yes/no questions, seems to indicate he is not having pain    Last BM reported to be 10/7/20  Incontinent of urine, condom cath in place, with reported adequate UO      Afebrile  BP 120s-130s/  HR 70s  02 sats mid 90s RA  BG 100s    Alert, in NAD, lying in bed  Oral mucosa moist  Lungs clear  CV rrr  Abd soft, protuberant  No LE edema  No calf tenderness  L facial droop, L side neglect L hemiparesis      Results for JENNIFER GANNON (MRN [id number]) as of 10/10/2020 09:25   Ref. Range 10/10/2020 08:33   Sodium Latest Ref Range: 133 - 144 mmol/L 138   Potassium Latest Ref Range: 3.4 - 5.3 mmol/L 4.5   Chloride Latest Ref Range: 94 - 109 mmol/L 106   Carbon Dioxide Latest Ref Range: 20 - 32 mmol/L 25   Urea Nitrogen Latest Ref Range: 7 - 30 mg/dL 34 (H)   Creatinine Latest Ref Range: 0.66 - 1.25 mg/dL 1.13   GFR Estimate Latest Ref Range: >60 mL/min/1.73_m2 65   GFR Estimate If Black Latest Ref Range: >60 mL/min/1.73_m2 75   Calcium Latest Ref Range: 8.5 - 10.1 mg/dL 8.7   Anion Gap Latest Ref Range: 3 - 14 mmol/L 7   Glucose Latest Ref Range: 70 - 99 mg/dL 141 (H)       UA neg      Assessment    VEENA secondary to hypotension (secondary to decreased intake with discontinuation tube feedings, con current use of furosemide, Entresto, valsartan, amlodipine), improved with holding of blood pressure medications, furosemide and with administration of IV fluids yesterday.    Decreased level of consciousness, likely secondary to acute kidney injury, relatively low blood pressure and recent initiation of baclofen.  Baclofen is metabolized by the kidneys.  Baclofen currently on hold.    Recent right hemispheric CVA with left-sided weakness, on aspirin and statin    History of hypertension, controlled in outpatient setting on multiple medications, including metoprolol, amlodipine,  furosemide, Entresto, Diovan.  Blood pressure currently stable on reduced dose of metoprolol and amlodipine alone.  Furosemide currently on hold    History of diastolic heart failure, CAD,  Stable    Diabetes mellitus type 2, stable on insulin, with diabetes team following    Plan  Continue to closely monitor mental status.  Will defer need for further imaging to primary team  Goal blood pressure 130 -140  systolic  Continue metoprolol 25 mg twice daily, amlodipine 5 mg daily with hold parameters.  Baclofen has been discontinued.  Repeat BMP in a.m.

## 2020-10-11 ENCOUNTER — APPOINTMENT (OUTPATIENT)
Dept: SPEECH THERAPY | Facility: CLINIC | Age: 70
End: 2020-10-11
Payer: COMMERCIAL

## 2020-10-11 ENCOUNTER — APPOINTMENT (OUTPATIENT)
Dept: PHYSICAL THERAPY | Facility: CLINIC | Age: 70
End: 2020-10-11
Payer: COMMERCIAL

## 2020-10-11 ENCOUNTER — APPOINTMENT (OUTPATIENT)
Dept: OCCUPATIONAL THERAPY | Facility: CLINIC | Age: 70
End: 2020-10-11
Payer: COMMERCIAL

## 2020-10-11 LAB
ANION GAP SERPL CALCULATED.3IONS-SCNC: 7 MMOL/L (ref 3–14)
BUN SERPL-MCNC: 29 MG/DL (ref 7–30)
CALCIUM SERPL-MCNC: 8.8 MG/DL (ref 8.5–10.1)
CHLORIDE SERPL-SCNC: 106 MMOL/L (ref 94–109)
CO2 SERPL-SCNC: 20 MMOL/L (ref 20–32)
CREAT SERPL-MCNC: 0.97 MG/DL (ref 0.66–1.25)
GFR SERPL CREATININE-BSD FRML MDRD: 78 ML/MIN/{1.73_M2}
GLUCOSE BLDC GLUCOMTR-MCNC: 161 MG/DL (ref 70–99)
GLUCOSE BLDC GLUCOMTR-MCNC: 167 MG/DL (ref 70–99)
GLUCOSE BLDC GLUCOMTR-MCNC: 209 MG/DL (ref 70–99)
GLUCOSE BLDC GLUCOMTR-MCNC: 210 MG/DL (ref 70–99)
GLUCOSE BLDC GLUCOMTR-MCNC: 225 MG/DL (ref 70–99)
GLUCOSE BLDC GLUCOMTR-MCNC: 225 MG/DL (ref 70–99)
GLUCOSE SERPL-MCNC: 220 MG/DL (ref 70–99)
POTASSIUM SERPL-SCNC: 4.7 MMOL/L (ref 3.4–5.3)
SODIUM SERPL-SCNC: 133 MMOL/L (ref 133–144)

## 2020-10-11 PROCEDURE — 96372 THER/PROPH/DIAG INJ SC/IM: CPT | Performed by: NURSE PRACTITIONER

## 2020-10-11 PROCEDURE — 250N000013 HC RX MED GY IP 250 OP 250 PS 637: Performed by: INTERNAL MEDICINE

## 2020-10-11 PROCEDURE — 97129 THER IVNTJ 1ST 15 MIN: CPT | Performed by: SPEECH-LANGUAGE PATHOLOGIST

## 2020-10-11 PROCEDURE — 99207 PR CDG-MDM COMPONENT: MEETS HIGH - UP CODED: CPT | Performed by: INTERNAL MEDICINE

## 2020-10-11 PROCEDURE — 272N000078 HC NUTRITION PRODUCT INTERMEDIATE LITER

## 2020-10-11 PROCEDURE — 250N000012 HC RX MED GY IP 250 OP 636 PS 637: Performed by: NURSE PRACTITIONER

## 2020-10-11 PROCEDURE — 99233 SBSQ HOSP IP/OBS HIGH 50: CPT | Performed by: INTERNAL MEDICINE

## 2020-10-11 PROCEDURE — 80048 BASIC METABOLIC PNL TOTAL CA: CPT | Performed by: INTERNAL MEDICINE

## 2020-10-11 PROCEDURE — 36416 COLLJ CAPILLARY BLOOD SPEC: CPT | Performed by: INTERNAL MEDICINE

## 2020-10-11 PROCEDURE — 97110 THERAPEUTIC EXERCISES: CPT | Mod: GP

## 2020-10-11 PROCEDURE — 250N000013 HC RX MED GY IP 250 OP 250 PS 637: Performed by: PHYSICAL MEDICINE & REHABILITATION

## 2020-10-11 PROCEDURE — 99232 SBSQ HOSP IP/OBS MODERATE 35: CPT | Performed by: NURSE PRACTITIONER

## 2020-10-11 PROCEDURE — 128N000003 HC R&B REHAB

## 2020-10-11 PROCEDURE — 97112 NEUROMUSCULAR REEDUCATION: CPT | Mod: GP

## 2020-10-11 PROCEDURE — 97535 SELF CARE MNGMENT TRAINING: CPT | Mod: GO

## 2020-10-11 PROCEDURE — 97112 NEUROMUSCULAR REEDUCATION: CPT | Mod: GO

## 2020-10-11 PROCEDURE — 97530 THERAPEUTIC ACTIVITIES: CPT | Mod: GP

## 2020-10-11 PROCEDURE — 92526 ORAL FUNCTION THERAPY: CPT | Mod: GN | Performed by: REHABILITATION PRACTITIONER

## 2020-10-11 PROCEDURE — 99231 SBSQ HOSP IP/OBS SF/LOW 25: CPT | Performed by: PHYSICAL MEDICINE & REHABILITATION

## 2020-10-11 PROCEDURE — 999N001017 HC STATISTIC GLUCOSE BY METER IP

## 2020-10-11 RX ORDER — METOPROLOL TARTRATE 50 MG
50 TABLET ORAL 2 TIMES DAILY
Status: DISCONTINUED | OUTPATIENT
Start: 2020-10-11 | End: 2020-10-23 | Stop reason: HOSPADM

## 2020-10-11 RX ORDER — AMLODIPINE BESYLATE 5 MG/1
5 TABLET ORAL 2 TIMES DAILY
Status: DISCONTINUED | OUTPATIENT
Start: 2020-10-11 | End: 2020-10-23 | Stop reason: HOSPADM

## 2020-10-11 RX ADMIN — INSULIN ASPART 1 UNITS: 100 INJECTION, SOLUTION INTRAVENOUS; SUBCUTANEOUS at 19:15

## 2020-10-11 RX ADMIN — FAMOTIDINE 20 MG: 20 TABLET ORAL at 10:23

## 2020-10-11 RX ADMIN — LEVOTHYROXINE SODIUM 50 MCG: 25 TABLET ORAL at 10:24

## 2020-10-11 RX ADMIN — NYSTATIN 500000 UNITS: 500000 SUSPENSION ORAL at 16:42

## 2020-10-11 RX ADMIN — INSULIN ASPART 2 UNITS: 100 INJECTION, SOLUTION INTRAVENOUS; SUBCUTANEOUS at 13:22

## 2020-10-11 RX ADMIN — AMLODIPINE BESYLATE 5 MG: 5 TABLET ORAL at 10:24

## 2020-10-11 RX ADMIN — METOPROLOL TARTRATE 50 MG: 50 TABLET, FILM COATED ORAL at 10:24

## 2020-10-11 RX ADMIN — LIRAGLUTIDE 0.6 MG: 6 INJECTION SUBCUTANEOUS at 10:28

## 2020-10-11 RX ADMIN — METOPROLOL TARTRATE 50 MG: 50 TABLET, FILM COATED ORAL at 21:01

## 2020-10-11 RX ADMIN — NYSTATIN 500000 UNITS: 500000 SUSPENSION ORAL at 13:22

## 2020-10-11 RX ADMIN — ATORVASTATIN CALCIUM 40 MG: 40 TABLET, FILM COATED ORAL at 10:24

## 2020-10-11 RX ADMIN — CARBOXYMETHYLCELLULOSE SODIUM 2 DROP: 5 SOLUTION/ DROPS OPHTHALMIC at 09:23

## 2020-10-11 RX ADMIN — EZETIMIBE 10 MG: 10 TABLET ORAL at 10:24

## 2020-10-11 RX ADMIN — FAMOTIDINE 20 MG: 20 TABLET ORAL at 21:02

## 2020-10-11 RX ADMIN — NYSTATIN 500000 UNITS: 500000 SUSPENSION ORAL at 10:25

## 2020-10-11 RX ADMIN — ASPIRIN 81 MG: 81 TABLET, COATED ORAL at 10:24

## 2020-10-11 RX ADMIN — NYSTATIN 500000 UNITS: 500000 SUSPENSION ORAL at 21:02

## 2020-10-11 RX ADMIN — INSULIN ASPART 4 UNITS: 100 INJECTION, SOLUTION INTRAVENOUS; SUBCUTANEOUS at 10:25

## 2020-10-11 RX ADMIN — INSULIN HUMAN 22 UNITS: 100 INJECTION, SUSPENSION SUBCUTANEOUS at 19:17

## 2020-10-11 RX ADMIN — AMLODIPINE BESYLATE 5 MG: 5 TABLET ORAL at 21:02

## 2020-10-11 RX ADMIN — MULTIVITAMIN 15 ML: LIQUID ORAL at 18:28

## 2020-10-11 ASSESSMENT — MIFFLIN-ST. JEOR: SCORE: 1854.42

## 2020-10-11 NOTE — PROGRESS NOTES
Pt alert, oriented to place, knows its 2020 but has intermittent confusion with time of day. Intermittent confusion, some slurred speech from previous stroke. I stopped PRN IV dextrose at 0030 10/11 due to high blood glucose 225, pt was hot and sweaty with a temp of 99, paged FYI to on call resident. BG at 0230 was 209, no longer hot/sweaty and asymptomatic. Pt did not void on shift and straight cath was 250ml at 0530. Tried to give/offer suppository since LBM was 10/6 but pt firmly declined. VSS. Continue POC.

## 2020-10-11 NOTE — PLAN OF CARE
Discharge Planner Post-Acute Rehab PT:      Discharge Plan:  home w/ hired physical assist vs TCU     Precautions: * falls, (L) UE flaccid, shannon neglect, field cut     Current Status:  Transfer: liko  Gait: w/c dependent  Stairs: unable  Balance: assist w/ static sitting, poor midline orientation, L neglect     Assessment: Still lethargic and making slow progress.  Tolerated standing frame well this session without complaints of pain.  Poor midline orientation. Difficult transfers due to apraxia and L neglect.       Other Barriers to Discharge (DME, Family Training, etc): level of physical assist

## 2020-10-11 NOTE — PLAN OF CARE
"Discharge Planner Post-Acute Rehab SLP:     Discharge Plan:to be determined, at this time pt would need 24 hour support    Precautions: fall, swallowing, assist/supervision at meals    Current Status:  Communication: moderate dysarthria, variable intelligibility.  Cognition: moderate/ impairment for basic orientation, memory, reasoning  Swallow: NDD2 diet, nectar fluids, needing assist/supervision, cues for small bites and sips.    Assessment: Worked on speech, repeating phrases with cues for \"big and loud\" minimal ability to modify this session. Conversation intelligibility poor.  Also worked on basic cognitive tasks (ie counting backwards, simple verbal reasoning) noted difficulty.    Other Barriers to Discharge (Family Training, etc):    "

## 2020-10-11 NOTE — PROGRESS NOTES
Diabetes Consult Daily  Progress Note          Assessment/Plan:     HPI:  Jorje Teran is a 71 y/o male with past medical history of HTN, HLD, DM II, SHELDON, hypothyroidism, and morbid obesity who was found down, found to have a R MCA infarct and R carotid atherosclerosis, now admitted to ARU 9/24 for intensive therapies, ongoing medical management, and rehabilitative nursing care.  Hyperglycemia exacerbated by enteral feeds.                         Assessment:  1)  Type 2 Diabetes Mellitus     2)  Obesity; BMI 36     3)  Confusion - persists but improved     Plan:    -  Lantus discontinued as TF have been re-started   -  NPH 22 units ordered for PM - 1 hour before start of TF (1900)   -  NPH 18 units AM (0800)   -  Metformin discontinued for now - will trend creat today 0.97 and yesterday (1.32)  -  aspart  1 unit per 10 grams CHO for meals/snacks/supplements           -  novolog high correction before meals and at bedtime  -  BG QAC, HS 0200  -  hypoglycemia protocol  -  will continue to follow      Test claim completed for GLP-1 (Trulicity) covered for outpatient.  Will start Victoza while inpatient and Trulicity outpatient.     10/7 Retail/Discharge Pharmacy Test Claims (covered): Rosalind Mcqueen ([place] thru 10/7/23 - $196.24 d/t Medicare Coverage    Gap--$784.94 remaining in coverage gap)   *Not Covered*: Ozempic, Rybelsus, Victoza, Jean Pierre Navarro  Discharge Pharmacy Liaison      Outpatient diabetes follow up: discharge 10/16  Plan discussed with patient, family, and primary team via this note              Interval History:     The last 24 hours progress and nursing notes reviewed.  This interval patient had a low BG that appears to have ran for several hours after the initial BG reading of 74 which was at 1157 with nursing note revealing IV dextrose ran until 0030.   TF re-started yesterday - Endo team not informed last evening.  Orders changed today more appropriate  "for TF.  No voiced complaints.  Son in visiting and he is happy - up and eating lunch  Mild confusion persists    Recent Labs   Lab 10/11/20  0644 10/11/20  0211 10/11/20  0019 10/10/20  2132 10/10/20  1736 10/10/20  1157 10/10/20  0833 10/10/20  0749 10/09/20  1942 10/09/20  1942 10/09/20  0726 10/09/20  0726   *  --   --   --   --   --  141*  --   --  155*  --  148*   BGM  --  209* 225* 229* 113* 74  --  136*   < >  --    < >  --     < > = values in this interval not displayed.           Nutrition:     Orders Placed This Encounter      Combination Diet Dysphagia Diet Level 2: Mechan Altered; Nectar Thickened Liquids (pre-thickened or use instant food thickener)    Supplements: Other; thickened smoothie with meals  (RD); With Meals        PTA Regimen:    No regular BG monitoring at home  No restricted diet  No diabetic medications PTA - diet controlled     During [facility] admission, utilized glargine 10 units BID w/ fair basal coverage while on Dysphagia diet only (no TF yet)          Review of Systems:   Constitutional:   No fever, no chills  ENT/Mouth:   No hearing changes, no ear pain, no sore throat, no rhinorrhea, no difficulty swallowing  Eyes:  No eye pain, no discharge, no vision changes  CV:   No CP/SOB, no new edema  Resp:  No cough, no wheezing  GI:   No nausea, no vomiting, no constipation, no diarrhea  :  Condom cath  Musk:  No joint swelling/pain, No back pain - \"my butt is sore\"  Skin/heme:   No new rashes/bruises/open areas.  No pruritis  Neuro:   No new weakness, denies numbness/tingling, no headache  Psych:   No new anxiety, no depression  Endocrine:  No polyuria, no polydipsia          Medications:   Steroid planning:  no         Physical Exam:   BP (!) 165/80 (BP Location: Right arm)   Pulse 90   Temp 97.9  F (36.6  C) (Oral)   Resp 16   Ht 1.753 m (5' 9\")   Wt 110.9 kg (244 lb 8 oz)   SpO2 98%   BMI 36.11 kg/m      General:  pleasant, in no acute distress.   sitting comfortably " in chair - L sided deficts HEENT:  NC/AT. MMM, sclera not injected  Lungs:  unremarkable, no new cough, no SOB  ABD:   soft,  non-tender,  no lipodystrophy noted  Skin:  warm and dry, no obvious lesions/rash  Feet:    CMS intact, PPP  MSK:   moving R extremities - without difficulties  Lymp:   minimal LE edema  Mental Status:  Alert and oriented x2  Psych:   Cooperative, good eye contact, more appropriate affect today          Data:     Lab Results   Component Value Date    A1C 7.5 09/10/2020          CBC RESULTS:   Recent Labs   Lab Test 10/09/20  0726 09/30/20  0607 09/30/20  0607   WBC  --   --  9.3   RBC  --   --  3.79*   HGB  --   --  11.6*   HCT  --   --  36.3*   MCV  --   --  96   MCH  --   --  30.6   MCHC  --   --  32.0   RDW  --   --  12.9      < > 230    < > = values in this interval not displayed.     Recent Labs   Lab Test 10/11/20  0644 10/10/20  0833    138   POTASSIUM 4.7 4.5   CHLORIDE 106 106   CO2 20 25   ANIONGAP 7 7   * 141*   BUN 29 34*   CR 0.97 1.13   SELENA 8.8 8.7     Liver Function Studies -   Recent Labs   Lab Test 09/07/20  2355   PROTTOTAL 7.4   ALBUMIN 3.6   BILITOTAL 0.9   ALKPHOS 67   AST 23   ALT 28     Lab Results   Component Value Date    INR 1.17 09/21/2020    INR 1.03 09/07/2020         CADE Sahu CNP   Inpatient Diabetes Management Service  Pager - [phone]  Friday - Monday 0800 - 1600 hrs  Diabetes Management Team job code: [id number]       I spent a total of 35 minutes bedside and on the inpatient unit managing glycemic care.  Over 50% of my time on the unit was spent counseling the patient and/or coordinating care regarding acute hyperglycemic management.  See note for details.

## 2020-10-11 NOTE — PLAN OF CARE
Discharge Planner Post-Acute Rehab SLP:      Discharge Plan: Home with 24 hour supervision vs TCU.     Precautions:fall, aspiration     Current Status:    Communication:Dysarthria.     Cognition: Impaired     Swallow: Patient more alert than yesterday with improved swallow noted. Currently on DD2 solids with nectar thick liquids. Needs intermittent cues/assist for self feeding. Monitor tolerance.     Assessment: Improvement in level of alertness noted. Pt tolerating DD2 solids with nectar thick liquids. Ensure adequate level of alertness and upright for meals.     Other Barriers to Discharge (Family Training, etc):  swallow, cognition, communication.

## 2020-10-11 NOTE — PLAN OF CARE
Pt alert and oriented x4. VSS. (L) side flaccid. Slow, whispered speech. No complaints of shortness of breath or chest pain. Denied pain. Up with 2 assist, using lyko lift. Dd2 diet, nectar thick fluids. Cyclic tube feeds from 6pm-8am. Takes pills crushed with applesauce. Ate 50% of meals today. BS stable. Incontinent of bowel and bladder. LBM: 10/6, Will administer suppository once pt gets back into bed. Inconsistent with call light use. Having paranoia, doctor believes could be related to meds that he was taking. Will continue with plan of care.     (R) eye a little red. Looks irritated.

## 2020-10-11 NOTE — PLAN OF CARE
FOCUS/GOAL  Bowel management, Bladder management, Pain management, and Mobility    ASSESSMENT, INTERVENTIONS AND CONTINUING PLAN FOR GOAL:    Alert and oriented X 3, disoriented to time. VSS. Denies pain. Patient not calling appropriately, needs to be anticipated. Patient did not void through out the shift. Bladder scanned for 530 and ISC for 520 at 2300.  Patient having very poor appetite, writer and son encouraged patient to eat dinner, he had a few bit. No insulin coverage given. Cyclic TF initiated at 1800 and running at 60 mL/hr. Patient tolerating TF, denies N/V, abdominal discomfort. Patient given PRN dextrose for low BG in AM shift and IV access was obtained and was started infusing at 1700. Liko lift for transfer. Call light within reach. Alarms on for safety. Continue to monitor.

## 2020-10-11 NOTE — PROGRESS NOTES
"[facility], [place]   Physical Medicine and Rehabilitation Daily Note           Assessment and Plan of Care:   Jorje Teran is a 70-year-old male who sustained an ischemic right MCA stroke resulting in dense left hemiparesis.  Admitted to acute rehab rehab for ongoing medical management and multidisciplinary rehab.     --Hospitalist following for medical complexity, appreciate [place]s and expertise.  --Creatinine recovering nicely with initiation of IVFs and holding baclofen and other nephrotoxic medications. Cr 0.97 currently. Will continue to monitor.  --Restarted tube feeds overnight given that he was not taking in any nutrition via mouth yesterday. Nutrition consult placed for [place]s. Will likely discontinue in near future when po intake increases.   --Continue ongoing medical management.  --Continue therapies and plan of care.           Interval history:   Patient seen and examined at bedside with son present. Patient is much more alert today, has been taking in some po. Continues to be intermittently confused. Patient told his son \"they are trying to kill me\" referring to nursing staff. Otherwise, patient is more responsive and is participating in therapy today.           Physical Exam:   VS:   Vitals:    10/10/20 2100 10/10/20 2300 10/11/20 0600 10/11/20 0803   BP: 135/72 135/67  (!) 165/80   BP Location: Right arm Right arm  Right arm   Pulse:  83  90   Resp:  16  16   Temp:  99  F (37.2  C)  97.9  F (36.6  C)   TempSrc:  Axillary  Oral   SpO2:  94%  98%   Weight:   110.9 kg (244 lb 8 oz)    Height:         Gen: NAD, sitting in wc, alert  Heart: RRR  Lungs: breathing unlabored on room air  Abd: soft and mildly tender throughout.   Ext: 1+ pitting edema in BLE, no calf tenderness  MSK/neuro: Sitting upright in wheelchair, converses with dysarthria, mildly confused, perseverates on catheter          Data:   Scheduled meds    amLODIPine  5 mg Oral BID     " aspirin  81 mg Oral Daily     atorvastatin  40 mg Oral Daily     ezetimibe  10 mg Oral Daily     famotidine  20 mg Oral BID     insulin aspart   Subcutaneous TID w/meals     insulin aspart  1-10 Units Subcutaneous TID AC     insulin aspart  1-7 Units Subcutaneous At Bedtime     insulin glargine  18 Units Subcutaneous At Bedtime     levothyroxine  50 mcg Oral Daily     liraglutide  0.6 mg Subcutaneous Daily     metoprolol tartrate  50 mg Oral BID     multivitamins w/minerals  15 mL Oral Daily     nystatin  500,000 Units Swish & Spit 4x Daily     sodium chloride (PF)  10 mL Intracatheter Q7 Days       PRN meds:  acetaminophen, bacitracin, bisacodyl, carboxymethylcellulose PF, dextrose, dextrose, glucose **OR** dextrose **OR** glucagon, insulin aspart, miconazole, nitroGLYcerin, - MEDICATION INSTRUCTIONS -, polyethylene glycol, simethicone, sodium chloride (PF), traZODone      Jaky Joya MD  Physical Medicine and Rehabilitation     I spent a total of 15 minutes face-to-face and managing the care of Jorje Teran. Over 50% of my time on the unit was spent counseling the patient and coordinating care. Please see note for details.

## 2020-10-11 NOTE — PROGRESS NOTES
Pt was seen, course reviewed with team.    Staff notes improvement in level of alertness over the last 24 hours  Noc feedings resumed last night in view of poor intake and decreased alertness, BG low 200s during the night. He remains on Lantus, Victoza and prandial insulin. Metformin has been on hold secondary to VEENA. He is receiving water flushes (240 ml qid) + dysphagia diet with thickened liquids.    Last BM reported to be 10/6  Has required frequent st caths secondary to inability to void      Afebrile  BP 130s-160s/60-80s  HR 80s  02 sats upper 90s RA this am    Alert, sitting up in bed, is being fed breakfast  Speech dysarthric, generally unintelligible  RR 12, unlabored  Lungs clear  CV rrr  Abd soft, obese  LE 1 + edema L LE, trace edema R LE      Results for JENNIFER GANNON (MRN [id number]) as of 10/11/2020 09:31   Ref. Range 10/11/2020 06:44   Sodium Latest Ref Range: 133 - 144 mmol/L 133   Potassium Latest Ref Range: 3.4 - 5.3 mmol/L 4.7   Chloride Latest Ref Range: 94 - 109 mmol/L 106   Carbon Dioxide Latest Ref Range: 20 - 32 mmol/L 20   Urea Nitrogen Latest Ref Range: 7 - 30 mg/dL 29   Creatinine Latest Ref Range: 0.66 - 1.25 mg/dL 0.97   GFR Estimate Latest Ref Range: >60 mL/min/1.73_m2 78   GFR Estimate If Black Latest Ref Range: >60 mL/min/1.73_m2 >90   Calcium Latest Ref Range: 8.5 - 10.1 mg/dL 8.8   Anion Gap Latest Ref Range: 3 - 14 mmol/L 7         Assessment    VEENA secondary to hypovolemia and  hypotension (secondary to decreased intake with discontinuation tube feedings, concurrent use of furosemide, Entresto, valsartan, amlodipine), now resolved with IV fluids, now resumption of scheduled TF, holding of furosemide, entresto, diovan and reduction in Metoprolol and Amlodipine    Decreased level of consciousness, likely secondary to acute kidney injury, relatively low blood pressure and recent initiation of baclofen.  Baclofen is metabolized by the kidneys.  Baclofen currently on hold. Mental  status improved.      Recent right hemispheric CVA with left-sided weakness, on aspirin and statin     History of hypertension and diastolic CHF,  controlled in outpatient setting on the following multiple medications:    Furosemide 40 mg bid  Amlodipine 20 mg daily  Metoprolol XL 25 mg bid (changed to short acting Metoprolol 50 mg bid during recent hospitalization)  Entresto 49/51, 1 tab daily  Valsartan 320 mg daily.  Pt has LE edema on exam today, no signs of CHF  Goal -140/ in post CVA setting, history of diastolic CHF. Entresto, valsartan, furosemide on hold. Pt is receiving reduced doses of Metoprolol and amlodipine    History of CAD, on ASA and statin, B Blocker    Hyponatremia, mild, likely secondary to resumed TF and continued water flushes (initiated when TF recently discontinued and Pt was started on nectar thick liquids), hyperglycemia     Diabetes mellitus type 2, labile, in setting of decreased intake, now resumption of TF    Plan  Continue to monitor mental status, vitals  Would defer need for further CNS imaging to primary team  Increase Metoprolol to 50 mg bid  Increase Amlodipine to 5 mg bid  Decrease free water flushes (RN to review with dietician)  Repeat BMP in am  Anticipate resumption of reduced dose furosemide tomorrow, in Na stable and renal function stable  Continue to hold Entresto and Valsartan for now  BMP in am  Would not resume Baclofen  Diabetes team is managing Insulin.

## 2020-10-12 ENCOUNTER — APPOINTMENT (OUTPATIENT)
Dept: PHYSICAL THERAPY | Facility: CLINIC | Age: 70
End: 2020-10-12
Payer: COMMERCIAL

## 2020-10-12 ENCOUNTER — APPOINTMENT (OUTPATIENT)
Dept: SPEECH THERAPY | Facility: CLINIC | Age: 70
End: 2020-10-12
Payer: COMMERCIAL

## 2020-10-12 ENCOUNTER — APPOINTMENT (OUTPATIENT)
Dept: OCCUPATIONAL THERAPY | Facility: CLINIC | Age: 70
End: 2020-10-12
Payer: COMMERCIAL

## 2020-10-12 LAB
ANION GAP SERPL CALCULATED.3IONS-SCNC: 7 MMOL/L (ref 3–14)
BUN SERPL-MCNC: 29 MG/DL (ref 7–30)
CALCIUM SERPL-MCNC: 8.8 MG/DL (ref 8.5–10.1)
CHLORIDE SERPL-SCNC: 105 MMOL/L (ref 94–109)
CO2 SERPL-SCNC: 26 MMOL/L (ref 20–32)
CREAT SERPL-MCNC: 1.04 MG/DL (ref 0.66–1.25)
GFR SERPL CREATININE-BSD FRML MDRD: 72 ML/MIN/{1.73_M2}
GLUCOSE BLDC GLUCOMTR-MCNC: 135 MG/DL (ref 70–99)
GLUCOSE BLDC GLUCOMTR-MCNC: 145 MG/DL (ref 70–99)
GLUCOSE BLDC GLUCOMTR-MCNC: 162 MG/DL (ref 70–99)
GLUCOSE BLDC GLUCOMTR-MCNC: 164 MG/DL (ref 70–99)
GLUCOSE BLDC GLUCOMTR-MCNC: 194 MG/DL (ref 70–99)
GLUCOSE SERPL-MCNC: 206 MG/DL (ref 70–99)
PLATELET # BLD AUTO: 202 10E9/L (ref 150–450)
POTASSIUM SERPL-SCNC: 4.1 MMOL/L (ref 3.4–5.3)
SODIUM SERPL-SCNC: 138 MMOL/L (ref 133–144)

## 2020-10-12 PROCEDURE — 80048 BASIC METABOLIC PNL TOTAL CA: CPT | Performed by: PHYSICAL MEDICINE & REHABILITATION

## 2020-10-12 PROCEDURE — 85049 AUTOMATED PLATELET COUNT: CPT | Performed by: PHYSICAL MEDICINE & REHABILITATION

## 2020-10-12 PROCEDURE — 97535 SELF CARE MNGMENT TRAINING: CPT | Mod: GO

## 2020-10-12 PROCEDURE — 97530 THERAPEUTIC ACTIVITIES: CPT | Mod: GP

## 2020-10-12 PROCEDURE — 36415 COLL VENOUS BLD VENIPUNCTURE: CPT | Performed by: PHYSICAL MEDICINE & REHABILITATION

## 2020-10-12 PROCEDURE — 99232 SBSQ HOSP IP/OBS MODERATE 35: CPT | Performed by: NURSE PRACTITIONER

## 2020-10-12 PROCEDURE — 99232 SBSQ HOSP IP/OBS MODERATE 35: CPT | Performed by: INTERNAL MEDICINE

## 2020-10-12 PROCEDURE — 128N000003 HC R&B REHAB

## 2020-10-12 PROCEDURE — 92526 ORAL FUNCTION THERAPY: CPT | Mod: GN

## 2020-10-12 PROCEDURE — 272N000078 HC NUTRITION PRODUCT INTERMEDIATE LITER

## 2020-10-12 PROCEDURE — 999N001017 HC STATISTIC GLUCOSE BY METER IP

## 2020-10-12 PROCEDURE — 250N000013 HC RX MED GY IP 250 OP 250 PS 637: Performed by: INTERNAL MEDICINE

## 2020-10-12 PROCEDURE — 99233 SBSQ HOSP IP/OBS HIGH 50: CPT | Performed by: PHYSICAL MEDICINE & REHABILITATION

## 2020-10-12 PROCEDURE — 250N000013 HC RX MED GY IP 250 OP 250 PS 637: Performed by: PHYSICAL MEDICINE & REHABILITATION

## 2020-10-12 PROCEDURE — 97110 THERAPEUTIC EXERCISES: CPT | Mod: GP

## 2020-10-12 PROCEDURE — 250N000013 HC RX MED GY IP 250 OP 250 PS 637: Performed by: NURSE PRACTITIONER

## 2020-10-12 PROCEDURE — 97112 NEUROMUSCULAR REEDUCATION: CPT | Mod: GP

## 2020-10-12 RX ORDER — FUROSEMIDE 20 MG
20 TABLET ORAL
Status: DISCONTINUED | OUTPATIENT
Start: 2020-10-12 | End: 2020-10-23 | Stop reason: HOSPADM

## 2020-10-12 RX ADMIN — MULTIVITAMIN 15 ML: LIQUID ORAL at 18:55

## 2020-10-12 RX ADMIN — INSULIN HUMAN 22 UNITS: 100 INJECTION, SUSPENSION SUBCUTANEOUS at 21:27

## 2020-10-12 RX ADMIN — FAMOTIDINE 20 MG: 20 TABLET ORAL at 09:16

## 2020-10-12 RX ADMIN — ATORVASTATIN CALCIUM 40 MG: 40 TABLET, FILM COATED ORAL at 09:16

## 2020-10-12 RX ADMIN — FUROSEMIDE 20 MG: 20 TABLET ORAL at 16:45

## 2020-10-12 RX ADMIN — NYSTATIN 500000 UNITS: 500000 SUSPENSION ORAL at 09:16

## 2020-10-12 RX ADMIN — FAMOTIDINE 20 MG: 20 TABLET ORAL at 21:27

## 2020-10-12 RX ADMIN — AMLODIPINE BESYLATE 5 MG: 5 TABLET ORAL at 21:27

## 2020-10-12 RX ADMIN — NYSTATIN 500000 UNITS: 500000 SUSPENSION ORAL at 14:09

## 2020-10-12 RX ADMIN — INSULIN ASPART 1 UNITS: 100 INJECTION, SOLUTION INTRAVENOUS; SUBCUTANEOUS at 09:15

## 2020-10-12 RX ADMIN — AMLODIPINE BESYLATE 5 MG: 5 TABLET ORAL at 09:21

## 2020-10-12 RX ADMIN — METOPROLOL TARTRATE 50 MG: 50 TABLET, FILM COATED ORAL at 09:22

## 2020-10-12 RX ADMIN — LEVOTHYROXINE SODIUM 50 MCG: 25 TABLET ORAL at 09:16

## 2020-10-12 RX ADMIN — SALINE NASAL SPRAY 1 SPRAY: 1.5 SOLUTION NASAL at 18:31

## 2020-10-12 RX ADMIN — SALINE NASAL SPRAY 1 SPRAY: 1.5 SOLUTION NASAL at 14:06

## 2020-10-12 RX ADMIN — SALINE NASAL SPRAY 1 SPRAY: 1.5 SOLUTION NASAL at 21:33

## 2020-10-12 RX ADMIN — FUROSEMIDE 20 MG: 20 TABLET ORAL at 09:22

## 2020-10-12 RX ADMIN — ASPIRIN 81 MG: 81 TABLET, COATED ORAL at 09:16

## 2020-10-12 RX ADMIN — METOPROLOL TARTRATE 50 MG: 50 TABLET, FILM COATED ORAL at 21:27

## 2020-10-12 RX ADMIN — NYSTATIN 500000 UNITS: 500000 SUSPENSION ORAL at 21:27

## 2020-10-12 RX ADMIN — METFORMIN HYDROCHLORIDE 500 MG: 500 TABLET ORAL at 18:54

## 2020-10-12 RX ADMIN — LIRAGLUTIDE 0.6 MG: 6 INJECTION SUBCUTANEOUS at 09:18

## 2020-10-12 RX ADMIN — NYSTATIN 500000 UNITS: 500000 SUSPENSION ORAL at 16:45

## 2020-10-12 RX ADMIN — INSULIN ASPART 1 UNITS: 100 INJECTION, SOLUTION INTRAVENOUS; SUBCUTANEOUS at 14:07

## 2020-10-12 RX ADMIN — EZETIMIBE 10 MG: 10 TABLET ORAL at 09:16

## 2020-10-12 NOTE — PLAN OF CARE
FOCUS/GOAL  Bowel management, Bladder management, Pain management, and Mobility    ASSESSMENT, INTERVENTIONS AND CONTINUING PLAN FOR GOAL:    Alert and oriented X 3, disoriented to time. VSS. Denies pain. Patient not calling appropriately, needs to be anticipated. Patient had good appetite today ate 85% of his dinner, insulin coverage given per order. Cyclic TF initiated at 1800 and running at 60 mL/hr. Patient tolerating TF, denies N/V, abdominal discomfort. Patient was incontinent of B/B X 1 during shift. LBM 10/11. Liko lift for transfer. Call light within reach. Alarms on for safety. Continue to monitor.

## 2020-10-12 NOTE — PLAN OF CARE
Discharge Planner Post-Acute Rehab OT:      Discharge Plan: home with 24 hr supervision vs tcu     Precautions:  Left neglect,  L) UE flaccid fall risk,      Current Status:  ADLs:G/H min a sitting eob with chest level able to get under l arm for washing and applying deoderant, sba face level. U/B dressing max a of 1 for button down shirt sitting eob verbal cueing for sequencing; dep bL/B dressing bed Mobilit dep with liko lift to eob pt able to sit 45 min eob with leg portion of lift removed with g/h and unit(s)/b dressing and cga-sba of therapist for balance IADLs: -  Vision/Cognition: Left neglect, increase attention with cues for sequencing increasing participation with g/h dressing and eating     Assessment:increasing participation with g/h eating and u)/b dressing with ADLs and balance sitting eob with adls.pt continues to be dep with IADLs.      Other Barriers to Discharge (DME, Family Training, etc): TBD

## 2020-10-12 NOTE — PROGRESS NOTES
Patient is alert, disoriented to day and situation. Denied CP, pain, numbness, tingling and SOB. AS2 with liko lift for transfer. BG monitored and insulin given as ordered. Pt is on DD2 with nectar thickened liquid, fluid encouraged, appetite is good, pt denied N/V. New order placed for urinary retention, pt random bladder scan around 1000 am was 400 ml and straight cath for 350 ml. Continent of bowel on BSC, and was incontinent of bladder around 1445. MD notified if pt can have PRN cough medication as pt cough at times. Repositioned and turned in bed, bed alarm ON for safety, will continue to monitor patient as POC.

## 2020-10-12 NOTE — PROGRESS NOTES
Pt was seen, course reviewed with team.    Pt states he feels well overall  Notes frequent non-productive cough during the night, resolved with sitting up this am.  Denies SOB, chest pain.  States his appetite is improved      T 98-99.3  BP 140s-150s/  HR 80s -90  02 sats 98 % RA    10/11/20 0600 110.9 kg (244 lb 8 oz) JT   10/09/20 0557 110.2 kg (243 lb)    10/08/20 0647 109.8 kg (242 lb) GH   10/01/20 0624 110.8 kg (244 lb 4.8 oz) YD   09/30/20 0646 104.1 kg (229 lb 8 oz) YD   09/29/20 0647 104.5 kg (230 lb 4.8 oz) YD   09/28/20 0649 105.3 kg (232 lb 1.6 oz) JT   09/26/20 0634 105.5 kg (232 lb 8 oz) JT   09/25/20 0700 104.9 kg (231 lb 4.8 oz) MO   09/24/20 1522 106.5 kg (234 lb 11.2 oz)      Alert, sitting up in chair, speech much more clear today  RR 12, unlabored  BG mid 100s-low 200s  No coughing noted during interview and exam  Lungs clear except faint crackles L base  CV rr  Abd soft, obese  Trace LE edema B  L sided weakness, neglect, as before      Results for JENNIFER GANNON (MRN [id number]) as of 10/12/2020 09:49   Ref. Range 10/12/2020 05:53   Sodium Latest Ref Range: 133 - 144 mmol/L 138   Potassium Latest Ref Range: 3.4 - 5.3 mmol/L 4.1   Chloride Latest Ref Range: 94 - 109 mmol/L 105   Carbon Dioxide Latest Ref Range: 20 - 32 mmol/L 26   Urea Nitrogen Latest Ref Range: 7 - 30 mg/dL 29   Creatinine Latest Ref Range: 0.66 - 1.25 mg/dL 1.04   GFR Estimate Latest Ref Range: >60 mL/min/1.73_m2 72   GFR Estimate If Black Latest Ref Range: >60 mL/min/1.73_m2 83   Calcium Latest Ref Range: 8.5 - 10.1 mg/dL 8.8   Anion Gap Latest Ref Range: 3 - 14 mmol/L 7   Glucose Latest Ref Range: 70 - 99 mg/dL 206 (H)   Platelet Count Latest Ref Range: 150 - 450 10e9/L 202       Assessment    VEENA secondary to hypovolemia and  hypotension (secondary to decreased intake with discontinuation tube feedings, concurrent use of furosemide, Entresto, valsartan, amlodipine), now resolved with IV fluids,  resumption of scheduled  TF, holding of furosemide, entresto, diovan and reduction in Metoprolol and Amlodipine.  Renal function now back to baseline     Decreased level of consciousness, likely secondary to acute kidney injury, relatively low blood pressure and recent initiation of baclofen.  Baclofen is metabolized by the kidneys.  Baclofen currently on hold. Mental status improved, appears at or close to baseline    Cough last night, very low grade T this am. Excellent 02 sats on RA this am.  At risk for atelectasis, aspiration pneumonia. Cough may also be related to volume overload, secondary to IV fluids, temporarily holding of lasix     Recent right hemispheric CVA with left-sided weakness, on aspirin and statin     History of hypertension and diastolic CHF,  controlled in outpatient setting on the following multiple medications:     Furosemide 40 mg bid  Amlodipine 20 mg daily  Metoprolol XL 25 mg bid (changed to short acting Metoprolol 50 mg bid during recent hospitalization)  Entresto 49/51, 1 tab daily  Valsartan 320 mg daily.  Pt has LE edema on exam today, no signs of CHF  Goal -140/ in post CVA setting, history of diastolic CHF. Entresto, valsartan, furosemide on hold.     Has been restarted on Metoprolol 50 mg bid, amlodipine 5 mg bid     History of CAD, on ASA and statin, B Blocker     Hyponatremia, mild, likely secondary to resumed TF and continued water flushes (initiated when TF recently discontinued and Pt was started on nectar thick liquids), hyperglycemia, resolved with decrease in free water administration     Diabetes mellitus type 2, labile, in setting of decreased intake, now resumption of TF    Plan  Monitor vitals, 02 sats exam  Low threshold for obtaining CXR  Resume lasix 20 mg bid, repeat BMP in 2 days  Goal systolic pressures 130-140; if BP trends up, would resume diovan.  Diabetes team is following

## 2020-10-12 NOTE — PROGRESS NOTES
Diabetes Consult Daily  Progress Note          Assessment/Plan:    HPI:  Jorje Teran is a 71 y/o male with past medical history of HTN, HLD, DM II, SHELDON, hypothyroidism, and morbid obesity who was found down, found to have a R MCA infarct and R carotid atherosclerosis, now admitted to ARU 9/24 for intensive therapies, ongoing medical management, and rehabilitative nursing care.  Hyperglycemia exacerbated by enteral feeds.                         Assessment:    1)  Type 2 Diabetes Mellitus     2)  Obesity; BMI 36     3)  Confusion - much improved     Plan:      -  Lantus discontinued as TF have been re-started   -  NPH 22 units ordered for PM - 1 hour before start of TF (1900)   -  NPH 18 units AM (0800)   -  Victoza 0.6 daily started 10/10 - tolerating (could consider increase if no side effects continue 10/14 or 10/15 to 1.2 mg daily)   -  Metformin can re-start - creat today 1.04 yesterday -  0.97 and (1.32) on 10/10 Given improvement, can re-start BID at lower dosing and monitor (ordered 500 mg to start this evening)  -  aspart  1 unit per 10 grams CHO for meals/snacks/supplements           -  novolog high correction before meals and at bedtime  -  BG QAC, HS 0200  -  hypoglycemia protocol  -  will continue to follow      Test claim completed for GLP-1 (Trulicity) covered for outpatient.  Will start Victoza while inpatient and Trulicity outpatient.     10/7 Retail/Discharge Pharmacy Test Claims (covered): Entresto, Trulicity (PA thru 10/7/23 - $196.24 d/t Medicare Coverage    Gap--$784.94 remaining in coverage gap)   *Not Covered*: Maria R Ortiz Victoza, Bydureon, Byetta -Dawn Rausch  Discharge Pharmacy Liaison      Outpatient diabetes follow up: discharge 10/16  Plan discussed with patient, and primary team              Interval History:     The last 24 hours progress and nursing notes reviewed.  No acute events this interval.  BG trending above target - was re-started on TF  "evening of 10/10.   Creat has normalized over weekend, will re-start Metformin at low dosing and monitor renal function.      Recent Labs   Lab 10/12/20  0751 10/12/20  0553 10/12/20  0221 10/11/20  2117 10/11/20  1711 10/11/20  1220 10/11/20  0759 10/11/20  0644 10/10/20  0833 10/10/20  0833 10/09/20  1942 10/09/20  1942 10/09/20  0726 10/09/20  0726   GLC  --  206*  --   --   --   --   --  220*  --  141*  --  155*  --  148*   *  --  194* 210* 161* 167* 225*  --    < >  --    < >  --    < >  --     < > = values in this interval not displayed.           Nutrition:     Orders Placed This Encounter      Combination Diet Dysphagia Diet Level 2: Mechan Altered; Nectar Thickened Liquids (pre-thickened or use instant food thickener)    Supplements: Other; thickened smoothie with meals  (RD); With Meals           PTA Regimen:     No regular BG monitoring at home  No restricted diet  No diabetic medications PTA - diet controlled     During [facility] admission, utilized glargine 10 units BID w/ fair basal coverage while on Dysphagia diet only (no TF yet)           Review of Systems:   CC:  Denies    Constitutional:   No fever, no chills  ENT/Mouth:   No hearing changes, no ear pain, no sore throat, no rhinorrhea, no difficulty swallowing  Eyes:  No eye pain, no discharge, no vision changes  CV:   No CP/SOB, no new edema  Resp:  No cough, no wheezing  GI:   No nausea or vomiting, no constipation, no diarrhea  :  No dysuria, no frequency, no hematuria  Musk:  No joint swelling/pain, No back pain  Skin/heme:   No new rashes/bruises/open areas.  No pruritis  Neuro:   No new weakness, denies numbness/tingling, no headache  Psych:   No new anxiety, no depression  Endocrine:  No polyuria, no polydipsia         Medications:   Steroid planning:  no       Physical Exam:   BP (!) 143/77 (BP Location: Right arm)   Pulse 91   Temp 99.3  F (37.4  C) (Oral)   Resp 16   Ht 1.753 m (5' 9\")   Wt 110.9 kg (244 lb 8 oz)   SpO2 " 98%   BMI 36.11 kg/m      General:  pleasant, in no acute distress.  sitting comfortably in chair - eating breakfast. L sided deficit s/p   HEENT:  NC/AT. MMM, sclera not injected  Lungs:  unremarkable, no new cough, no SOB  ABD:   soft,  non-tender  Skin:  warm and dry, no obvious lesions/rash  Feet:    CMS intact, PPP  MSK:   moving all extremities  Lymp:   trace LE edema  Mental Status:  Alert and oriented x2 - continues to clear from confusion  Psych:   Cooperative, good eye contact, full affect          Data:     Lab Results   Component Value Date    A1C 7.5 09/10/2020            CBC RESULTS:   Recent Labs   Lab Test 10/12/20  0553 09/30/20  0607 09/30/20  0607   WBC  --   --  9.3   RBC  --   --  3.79*   HGB  --   --  11.6*   HCT  --   --  36.3*   MCV  --   --  96   MCH  --   --  30.6   MCHC  --   --  32.0   RDW  --   --  12.9      < > 230    < > = values in this interval not displayed.     Recent Labs   Lab Test 10/12/20  0553 10/11/20  0644    133   POTASSIUM 4.1 4.7   CHLORIDE 105 106   CO2 26 20   ANIONGAP 7 7   * 220*   BUN 29 29   CR 1.04 0.97   SELENA 8.8 8.8     Liver Function Studies -   Recent Labs   Lab Test 09/07/20  2355   PROTTOTAL 7.4   ALBUMIN 3.6   BILITOTAL 0.9   ALKPHOS 67   AST 23   ALT 28     Lab Results   Component Value Date    INR 1.17 09/21/2020    INR 1.03 09/07/2020         CADE Sahu CNP   Inpatient Diabetes Management Service  Pager - [phone]  Friday - Monday 0800 - 1600 hrs  Diabetes Management Team job code: [phone]       I spent a total of 25 minutes bedside and on the inpatient unit managing glycemic care.  Over 50% of my time on the unit was spent counseling the patient and/or coordinating care regarding acute hyperglycemic management.  See note for details.

## 2020-10-12 NOTE — PROGRESS NOTES
Addendum          Pt alert, oriented x3. Disoriented to time. Intermittent confusion.  at 0200, asympomatic. Liko lift assist of 2. Pt did not void on shift and bladder scan showed 400ml, so I did straight cath and got out 425ml at 0020. Incontinent of bowel and bladder. LBM 10/11. Bladder scan at 0400 was only about 100ml. Tube feeding running from 6pm-8am cycled with water flushes. Site clean dry intact. No other skin concerns. Denied pain, SOB, dizziness, vision changes. VSS. Continue POC.

## 2020-10-12 NOTE — PLAN OF CARE
Discharge Planner Post-Acute Rehab SLP:      Discharge Plan: to be determined, at this time pt would need 24 hour support     Precautions: fall, swallowing, 1:1 assist/supervision at meals     Current Status: Patient seen for meal on NDD2/nectar w NDD3 food item. Patient tolerating meal w 1x delayed cough from NDD3 brownie. No other concerns for swallowing safety. Patient still requiring max cues to attend to food/items on L side despite preferred food item placed in L visual field. Patient also unable to follow cues during meal despite repetition.    Communication: moderate dysarthria, variable intelligibility.    Cognition: moderate/ impairment for basic orientation, memory, reasoning    Swallowing: NDD2 diet, nectar fluids, needing 1:1 assist/supervision, cues for small bites and sips.        Other Barriers to Discharge (Family Training, etc):

## 2020-10-12 NOTE — PROGRESS NOTES
[facility]   Acute Rehabilitation Unit  Daily progress note  CC:   Stroke Ischemic 01.1 (L) Body Involvement (R) Brain Stroke; L sided weakness due to MCA infarct, R MCA occlusion, and R carotid atherosclerosis    S:   Significant impairment due to hemisensory deficit, fiedcut, and hemiplegia.      Off Baclofen. Needed adjustment of BP medications. Improved since. Needs catheterizing. ICP instituted.    TR held 10/7/2020 Reviewed with Son has been here. I have reviewed his status. Strategies to improve function. Likes Coffee with Thickener! Some spasms. Baclofen 10 mg tid to reduce pain and spasm. Ice pack prn. Pain better. Improving! Does cough with secretions. SLP following.     Dense left hemiplegia, left field cut +. Compensates by turning.  BS monitored. Endo following.  NDD 2 [place].        ROS: Denies HA, nausea, pain.  Cath. Alertness improved. Coughs.        [unfilled]   Scheduled meds    amLODIPine  5 mg Oral BID     aspirin  81 mg Oral Daily     atorvastatin  40 mg Oral Daily     ezetimibe  10 mg Oral Daily     famotidine  20 mg Oral BID     furosemide  20 mg Oral or Feeding Tube BID     insulin aspart   Subcutaneous TID w/meals     insulin aspart  1-10 Units Subcutaneous TID AC     insulin aspart  1-7 Units Subcutaneous At Bedtime     insulin isophane human  18 Units Subcutaneous QAM     insulin isophane human  22 Units Subcutaneous QPM     levothyroxine  50 mcg Oral Daily     liraglutide  0.6 mg Subcutaneous Daily     metoprolol tartrate  50 mg Oral BID     multivitamins w/minerals  15 mL Oral Daily     nystatin  500,000 Units Swish & Spit 4x Daily     sodium chloride  1 spray Both Nostrils 4x daily     sodium chloride (PF)  10 mL Intracatheter Q7 Days       PRN meds:  acetaminophen, bacitracin, bisacodyl, carboxymethylcellulose PF, dextrose, dextrose, glucose **OR** dextrose **OR** glucagon, insulin aspart, miconazole, nitroGLYcerin, - MEDICATION INSTRUCTIONS  "-, polyethylene glycol, simethicone, sodium chloride (PF), traZODone      PHYSICAL EXAM  BP (!) 143/77 (BP Location: Right arm)   Pulse 91   Temp 99.3  F (37.4  C) (Oral)   Resp 16   Ht 1.753 m (5' 9\")   Wt 110.9 kg (244 lb 8 oz)   SpO2 98%   BMI 36.11 kg/m    Alert verbal. Sitting better.  Respiratory: Clear to auscultation bilaterally, no crackles or wheezing  Cardiovascular: S1 and S2, RRR, and no murmur noted  GI: soft, non-distended, non-tender. G tube intact + BS +  Skin/Integumen: No rashes, trace lower extremity edema noted  Neuro : Left lower Facial droop + Left Field cut +  Dense left-sided hemiplegia with neglect noted.  Tone less now. Reflexes emerging.    Vocalizes, low volume   Extremities. Moves right side well. Edema +    LABS  Last Comprehensive Metabolic Panel:  Sodium   Date Value Ref Range Status   10/12/2020 138 133 - 144 mmol/L Final     Potassium   Date Value Ref Range Status   10/12/2020 4.1 3.4 - 5.3 mmol/L Final     Chloride   Date Value Ref Range Status   10/12/2020 105 94 - 109 mmol/L Final     Carbon Dioxide   Date Value Ref Range Status   10/12/2020 26 20 - 32 mmol/L Final     Anion Gap   Date Value Ref Range Status   10/12/2020 7 3 - 14 mmol/L Final     Glucose   Date Value Ref Range Status   10/12/2020 206 (H) 70 - 99 mg/dL Final     Urea Nitrogen   Date Value Ref Range Status   10/12/2020 29 7 - 30 mg/dL Final     Creatinine   Date Value Ref Range Status   10/12/2020 1.04 0.66 - 1.25 mg/dL Final     GFR Estimate   Date Value Ref Range Status   10/12/2020 72 >60 mL/min/[1.73_m2] Final     Comment:     Non  GFR Calc  Starting 12/18/2018, serum creatinine based estimated GFR (eGFR) will be   calculated using the Chronic Kidney Disease Epidemiology Collaboration   (CKD-EPI) equation.       Calcium   Date Value Ref Range Status   10/12/2020 8.8 8.5 - 10.1 mg/dL Final      CBC RESULTS:   Recent Labs   Lab Test 10/09/20  0726 09/30/20  0607 09/30/20  0607   WBC  --   " --  9.3   RBC  --   --  3.79*   HGB  --   --  11.6*   HCT  --   --  36.3*   MCV  --   --  96   MCH  --   --  30.6   MCHC  --   --  32.0   RDW  --   --  12.9      < > 230    < > = values in this interval not displayed.     CBC RESULTS:   Recent Labs   Lab Test 09/30/20  0607   WBC 9.3   RBC 3.79*   HGB 11.6*   HCT 36.3*   MCV 96   MCH 30.6   MCHC 32.0   RDW 12.9             Glucose Values Latest Ref Rng & Units 10/11/2020 10/11/2020 10/11/2020 10/11/2020 10/12/2020 10/12/2020 10/12/2020   Bedside Glucose (mg/dl )  - -- -- -- -- -- -- --   GLUCOSE 70 - 99 mg/dL 225(H) 167(H) 161(H) 210(H) 194(H) 206(H) 164(H)   Some recent data might be hidden     ASSESSMENT AND PLAN    Jorje Teran is a 70 year old  hand dominant male with Stroke Ischemic 01.1 (L) Body Involvement (R) Brain Stroke; L sided weakness due to MCA infarct, R MCA occlusion, and R carotid atherosclerosis  1. PT, OT and SLP 60 minutes of each on a daily basis, in addition to rehab nursing and close management of physiatrist.       2. Impairment of ADL's: OT for 60 minutes daily to work on ADL re-training such as grooming, self cares and bathing.       3. Impairment of mobility:  PT for 60 minutes daily to work on neuromuscular re-education focusing on strength, balance, coordination, and endurance.       4. Impairment of cognition/language/swallow:  SLP for 60 minutes daily to work on cognitive and linguistic deficits.     Rehab RN for med administration, bowel regimen, glucose monitoring and wound care.    1. Medical Conditions  CVA with dense left hemiparesis  CT with filling defect within the distal R ICA extending into the R MCA and occlusion of the R MCA at the M1 segment. CTA with severe plaquing at R carotid bifurcation with filling defect in the prox R ICA suggestive of thrombus contributing to approximately 80% stenosis. 60% stenosis of the proximal L ICA. TTE with bubble -> Normal left ventricular size and function. Left  ventricular ejection fraction of 55-60%. No segmental wall motion abnormalities noted.   - Lipids 7/29/20- LDL 78, HDL 56, , T chol 164, hemoglobin A1c 7.1% H, TSH is 2.05.  - remains with significant dysarthria, facial droop and dense left sided hemiparesis  Spasticity: Stretching, ice + Off Baclofen as was noted to become drowsy.  - ASA 81 mg daily PO/WY  - atorvastatin 40 mg daily, Zetia 10 mg po daily    - BP management:  - goal for SBPs < 160    Cr improved. BUN better.  Dysphagia  - currently on DD2 with nectar thickened liquids   -PEG tube to be placed on 9/22, currently patient is on tube feeding  If questions or problems arise regarding tube function (e.g. leaking, dislodges, etc.) Contact Interventional Radiology department 24 hours a day.   For procedures that were done at [facility]:  8 AM - 4 PM Monday through Friday Contact   [phone]  For afterhours and weekends: [phone]   Ask for the Interventional Radiologist on call.     PeG site Discomfort: Treat infection. Clean site. Added Keflex 500 mg qid x 7 days. Better.  Tylenol prn.  Simethicone added prn.    BS high. Endo following.  CAD s/p CABG x 3, x 1 in 2015  HTN continue medications noted above.  HLD: On Lipitor       DM II  Recent A1C at 7.1 7/2020.  BG goal is < 180.- started on lantus Endo following.      -novolog before meals and at bedtime  -BG QAC, HS 0200  - hypoglycemia protocol      [facility] at [facility] negative for DVT     SHELDON  - states he could not sleep with CPAP as outpatient  - he would benefit for treating SHELDON   ? CPAP at night    Hypothyroidism  - resumed PTA Levothyroxine 50 mcg daily  TSH checked 7/29/2020 at 2.05     Morbid obesity  BMI noted  7/202 at ~38. Will need to encourage healthy lifestyle and weight loss with recovery     Diet: Snacks/Supplements Adult: Other; thickened smoothie with meals  (RD); With Meals  Calorie Counts     DVT Prophylaxis: Pneumatic Compression Devices  Code Status: Full  Code      2. Adjustment to disability:  Clinical psychology to eval and treat when ready  3. FEN: On NDD 2 [place] +TF, Bolus versus nocturnal supplements.  4. Bowel: Javed meds  5. Bladder: Monitor  6. GI Prophylaxis:   7. Code: Full  8. Disposition: Home with family and home care vs TCU  9. ELOS:  28 days 10/23/20  10. Rehab prognosis:  Fair  11. Follow up Appointments on Discharge: The following labs/tests are recommended: cbc ,basic metabolic panel in 4-5 days, also get mag and phos in 3-4 days .need follow up with neurology as scheduled. Follows with Dr. Vincent with [facility].       D/W  Dr. Beckwith.    Sudarshan Fuentes MD   Physical Medicine & Rehabilitation

## 2020-10-12 NOTE — PLAN OF CARE
Discharge Planner Post-Acute Rehab PT:      Discharge Plan:  home w/ hired physical assist vs TCU     Precautions: * falls, (L) UE flaccid, shannon neglect, field cut     Current Status:  Transfer: liko  Gait: w/c dependent  Stairs: unable  Balance: Mod->SBA w/ static sitting, poor midline orientation, L neglect     Assessment: Improved alertness and participation in sessions today.  Still some slight paranoia but able to follow commands for transfers and exercises much better.  Patient sat at EOB and performed static and dynamic activities with Mod-> SBA at times.  Continues to demonstrate poor midline orientation but able to correct briefly with verbal cues. Noted some initiation of L hip extension and adduction.     Other Barriers to Discharge (DME, Family Training, etc): level of physical assist, pt will need a wheelchair eval if discharging to home

## 2020-10-13 ENCOUNTER — APPOINTMENT (OUTPATIENT)
Dept: SPEECH THERAPY | Facility: CLINIC | Age: 70
End: 2020-10-13
Payer: COMMERCIAL

## 2020-10-13 ENCOUNTER — APPOINTMENT (OUTPATIENT)
Dept: PHYSICAL THERAPY | Facility: CLINIC | Age: 70
End: 2020-10-13
Payer: COMMERCIAL

## 2020-10-13 ENCOUNTER — APPOINTMENT (OUTPATIENT)
Dept: OCCUPATIONAL THERAPY | Facility: CLINIC | Age: 70
End: 2020-10-13
Payer: COMMERCIAL

## 2020-10-13 LAB
ANION GAP SERPL CALCULATED.3IONS-SCNC: 7 MMOL/L (ref 3–14)
BUN SERPL-MCNC: 27 MG/DL (ref 7–30)
CALCIUM SERPL-MCNC: 8.9 MG/DL (ref 8.5–10.1)
CHLORIDE SERPL-SCNC: 105 MMOL/L (ref 94–109)
CO2 SERPL-SCNC: 25 MMOL/L (ref 20–32)
CREAT SERPL-MCNC: 1.01 MG/DL (ref 0.66–1.25)
GFR SERPL CREATININE-BSD FRML MDRD: 74 ML/MIN/{1.73_M2}
GLUCOSE BLDC GLUCOMTR-MCNC: 111 MG/DL (ref 70–99)
GLUCOSE BLDC GLUCOMTR-MCNC: 114 MG/DL (ref 70–99)
GLUCOSE BLDC GLUCOMTR-MCNC: 123 MG/DL (ref 70–99)
GLUCOSE BLDC GLUCOMTR-MCNC: 127 MG/DL (ref 70–99)
GLUCOSE BLDC GLUCOMTR-MCNC: 190 MG/DL (ref 70–99)
GLUCOSE SERPL-MCNC: 176 MG/DL (ref 70–99)
POTASSIUM SERPL-SCNC: 3.9 MMOL/L (ref 3.4–5.3)
SODIUM SERPL-SCNC: 137 MMOL/L (ref 133–144)

## 2020-10-13 PROCEDURE — 250N000013 HC RX MED GY IP 250 OP 250 PS 637: Performed by: INTERNAL MEDICINE

## 2020-10-13 PROCEDURE — 99233 SBSQ HOSP IP/OBS HIGH 50: CPT | Performed by: PHYSICAL MEDICINE & REHABILITATION

## 2020-10-13 PROCEDURE — 250N000013 HC RX MED GY IP 250 OP 250 PS 637: Performed by: NURSE PRACTITIONER

## 2020-10-13 PROCEDURE — 92507 TX SP LANG VOICE COMM INDIV: CPT | Mod: GN

## 2020-10-13 PROCEDURE — 250N000013 HC RX MED GY IP 250 OP 250 PS 637: Performed by: PHYSICAL MEDICINE & REHABILITATION

## 2020-10-13 PROCEDURE — 97112 NEUROMUSCULAR REEDUCATION: CPT | Mod: GP | Performed by: PHYSICAL THERAPIST

## 2020-10-13 PROCEDURE — 97535 SELF CARE MNGMENT TRAINING: CPT | Mod: GO

## 2020-10-13 PROCEDURE — 250N000013 HC RX MED GY IP 250 OP 250 PS 637: Performed by: STUDENT IN AN ORGANIZED HEALTH CARE EDUCATION/TRAINING PROGRAM

## 2020-10-13 PROCEDURE — 99232 SBSQ HOSP IP/OBS MODERATE 35: CPT | Performed by: INTERNAL MEDICINE

## 2020-10-13 PROCEDURE — 97112 NEUROMUSCULAR REEDUCATION: CPT | Mod: GP

## 2020-10-13 PROCEDURE — 36415 COLL VENOUS BLD VENIPUNCTURE: CPT | Performed by: INTERNAL MEDICINE

## 2020-10-13 PROCEDURE — 80048 BASIC METABOLIC PNL TOTAL CA: CPT | Performed by: INTERNAL MEDICINE

## 2020-10-13 PROCEDURE — 128N000003 HC R&B REHAB

## 2020-10-13 PROCEDURE — 272N000078 HC NUTRITION PRODUCT INTERMEDIATE LITER

## 2020-10-13 PROCEDURE — 999N001017 HC STATISTIC GLUCOSE BY METER IP

## 2020-10-13 PROCEDURE — 97530 THERAPEUTIC ACTIVITIES: CPT | Mod: GP

## 2020-10-13 PROCEDURE — 92526 ORAL FUNCTION THERAPY: CPT | Mod: GN

## 2020-10-13 RX ORDER — LIRAGLUTIDE 6 MG/ML
1.2 INJECTION SUBCUTANEOUS DAILY
Status: DISCONTINUED | OUTPATIENT
Start: 2020-10-14 | End: 2020-10-18 | Stop reason: DRUGHIGH

## 2020-10-13 RX ORDER — CALCIUM CARBONATE 500 MG/1
500 TABLET, CHEWABLE ORAL DAILY PRN
Status: DISCONTINUED | OUTPATIENT
Start: 2020-10-13 | End: 2020-10-15

## 2020-10-13 RX ADMIN — METFORMIN HYDROCHLORIDE 500 MG: 500 TABLET ORAL at 08:00

## 2020-10-13 RX ADMIN — ASPIRIN 81 MG: 81 TABLET, COATED ORAL at 07:57

## 2020-10-13 RX ADMIN — METOPROLOL TARTRATE 50 MG: 50 TABLET, FILM COATED ORAL at 21:48

## 2020-10-13 RX ADMIN — SALINE NASAL SPRAY 1 SPRAY: 1.5 SOLUTION NASAL at 21:48

## 2020-10-13 RX ADMIN — SALINE NASAL SPRAY 1 SPRAY: 1.5 SOLUTION NASAL at 14:03

## 2020-10-13 RX ADMIN — LEVOTHYROXINE SODIUM 50 MCG: 25 TABLET ORAL at 07:59

## 2020-10-13 RX ADMIN — FAMOTIDINE 20 MG: 20 TABLET ORAL at 21:48

## 2020-10-13 RX ADMIN — NYSTATIN 500000 UNITS: 500000 SUSPENSION ORAL at 21:48

## 2020-10-13 RX ADMIN — AMLODIPINE BESYLATE 5 MG: 5 TABLET ORAL at 08:00

## 2020-10-13 RX ADMIN — FAMOTIDINE 20 MG: 20 TABLET ORAL at 08:01

## 2020-10-13 RX ADMIN — NYSTATIN 500000 UNITS: 500000 SUSPENSION ORAL at 08:00

## 2020-10-13 RX ADMIN — INSULIN ASPART 3 UNITS: 100 INJECTION, SOLUTION INTRAVENOUS; SUBCUTANEOUS at 07:53

## 2020-10-13 RX ADMIN — SALINE NASAL SPRAY 1 SPRAY: 1.5 SOLUTION NASAL at 08:17

## 2020-10-13 RX ADMIN — LIRAGLUTIDE 0.6 MG: 6 INJECTION SUBCUTANEOUS at 08:13

## 2020-10-13 RX ADMIN — FUROSEMIDE 20 MG: 20 TABLET ORAL at 07:59

## 2020-10-13 RX ADMIN — METFORMIN HYDROCHLORIDE 500 MG: 500 TABLET ORAL at 17:43

## 2020-10-13 RX ADMIN — METOPROLOL TARTRATE 50 MG: 50 TABLET, FILM COATED ORAL at 08:00

## 2020-10-13 RX ADMIN — SALINE NASAL SPRAY 1 SPRAY: 1.5 SOLUTION NASAL at 17:25

## 2020-10-13 RX ADMIN — FUROSEMIDE 20 MG: 20 TABLET ORAL at 17:22

## 2020-10-13 RX ADMIN — EZETIMIBE 10 MG: 10 TABLET ORAL at 07:58

## 2020-10-13 RX ADMIN — NYSTATIN 500000 UNITS: 500000 SUSPENSION ORAL at 17:23

## 2020-10-13 RX ADMIN — NYSTATIN 500000 UNITS: 500000 SUSPENSION ORAL at 14:02

## 2020-10-13 RX ADMIN — MULTIVITAMIN 15 ML: LIQUID ORAL at 17:23

## 2020-10-13 RX ADMIN — ATORVASTATIN CALCIUM 40 MG: 40 TABLET, FILM COATED ORAL at 07:58

## 2020-10-13 RX ADMIN — CALCIUM CARBONATE (ANTACID) CHEW TAB 500 MG 500 MG: 500 CHEW TAB at 17:23

## 2020-10-13 ASSESSMENT — MIFFLIN-ST. JEOR: SCORE: 1860.78

## 2020-10-13 NOTE — PHARMACY-RX INSURANCE COVERAGE
Consulted by Ghazala Mejía with Diabetes Management Team to run a test claim for covered Glucometer & Supplies.    Per outpatient pharmacy benefits, insurance prefers OneTouch brand blood glucose monitor and supplies.    Please feel free to contact me with any other test claims, prior authorizations, or insurance questions regarding outpatient medications.     Thanks!        Sandra Salazar CPhT  [facility] Discharge Pharmacy Liaison  Pronouns: She/Her/Hers    [facility]  [facility]  [address] [place] [address] [address] [address]   [email or web address]  [email or web address]   Phone: [phone]  Pager: [phone]  Fax: [phone]

## 2020-10-13 NOTE — PROGRESS NOTES
[facility]   Acute Rehabilitation Unit  Daily progress note  CC:   Stroke Ischemic 01.1 (L) Body Involvement (R) Brain Stroke; L sided weakness due to MCA infarct, R MCA occlusion, and R carotid atherosclerosis    S:   Feels much improved. Significant impairment due to hemisensory deficit, fiedcut, and hemiplegia.   Needs caths. Retaining.   Off Baclofen. Needed adjustment of BP medications. Improved since. Needs catheterizing. ICP instituted.    TR held 10/7/2020 Reviewed with Son has been here. I have reviewed his status. Strategies to improve function. Likes Coffee with Thickener! Some spasms. Baclofen 10 mg tid to reduce pain and spasm. Ice pack prn. Pain better. Improving! Does cough with secretions. SLP following.     Dense left hemiplegia, left field cut +. Compensates by turning.  BS monitored. Endo following.  NDD 2 [place].  TF continued.      ROS: Denies HA, nausea, pain.  Cath. Alertness improved. Coughs.        [unfilled]   Scheduled meds    amLODIPine  5 mg Oral BID     aspirin  81 mg Oral Daily     atorvastatin  40 mg Oral Daily     ezetimibe  10 mg Oral Daily     famotidine  20 mg Oral BID     furosemide  20 mg Oral or Feeding Tube BID     insulin aspart  1-7 Units Subcutaneous BID     insulin aspart   Subcutaneous TID w/meals     insulin aspart  1-10 Units Subcutaneous TID AC     insulin isophane human  18 Units Subcutaneous QAM     insulin isophane human  22 Units Subcutaneous QPM     levothyroxine  50 mcg Oral Daily     liraglutide  0.6 mg Subcutaneous Daily     metFORMIN  500 mg Oral BID w/meals     metoprolol tartrate  50 mg Oral BID     multivitamins w/minerals  15 mL Oral Daily     nystatin  500,000 Units Swish & Spit 4x Daily     sodium chloride  1 spray Both Nostrils 4x daily     sodium chloride (PF)  10 mL Intracatheter Q7 Days       PRN meds:  acetaminophen, bacitracin, bisacodyl, carboxymethylcellulose PF, dextrose, glucose **OR** dextrose **OR**  "glucagon, insulin aspart, miconazole, nitroGLYcerin, - MEDICATION INSTRUCTIONS -, polyethylene glycol, simethicone, sodium chloride (PF), traZODone      PHYSICAL EXAM  /70 (BP Location: Right arm)   Pulse 90   Temp 98.7  F (37.1  C) (Oral)   Resp 20   Ht 1.753 m (5' 9\")   Wt 111.5 kg (245 lb 14.4 oz)   SpO2 95%   BMI 36.31 kg/m    Alert verbal. Sitting better. Verbalizing at baseline now.   Respiratory: Clear to auscultation bilaterally, no crackles or wheezing  Cardiovascular: S1 and S2, RRR, and no murmur noted  GI: soft, non-distended, non-tender. G tube intact + BS +  Skin/Integumen: No rashes, trace lower extremity edema noted  Neuro : Left lower Facial droop + Left Field cut +  Dense left-sided hemiplegia with neglect noted.  Tone less now. Reflexes emerging.    Vocalizes, low volume   Extremities. Moves right side well. Edema +    LABS  Last Comprehensive Metabolic Panel:  Sodium   Date Value Ref Range Status   10/13/2020 137 133 - 144 mmol/L Final     Potassium   Date Value Ref Range Status   10/13/2020 3.9 3.4 - 5.3 mmol/L Final     Chloride   Date Value Ref Range Status   10/13/2020 105 94 - 109 mmol/L Final     Carbon Dioxide   Date Value Ref Range Status   10/13/2020 25 20 - 32 mmol/L Final     Anion Gap   Date Value Ref Range Status   10/13/2020 7 3 - 14 mmol/L Final     Glucose   Date Value Ref Range Status   10/13/2020 176 (H) 70 - 99 mg/dL Final     Urea Nitrogen   Date Value Ref Range Status   10/13/2020 27 7 - 30 mg/dL Final     Creatinine   Date Value Ref Range Status   10/13/2020 1.01 0.66 - 1.25 mg/dL Final     GFR Estimate   Date Value Ref Range Status   10/13/2020 74 >60 mL/min/[1.73_m2] Final     Comment:     Non  GFR Calc  Starting 12/18/2018, serum creatinine based estimated GFR (eGFR) will be   calculated using the Chronic Kidney Disease Epidemiology Collaboration   (CKD-EPI) equation.       Calcium   Date Value Ref Range Status   10/13/2020 8.9 8.5 - 10.1 " mg/dL Final      CBC RESULTS:   Recent Labs     CBC RESULTS:   Recent Labs   Lab Test 09/30/20  0607   WBC 9.3   RBC 3.79*   HGB 11.6*   HCT 36.3*   MCV 96   MCH 30.6   MCHC 32.0   RDW 12.9        CBC RESULTS:   Lab Test 10/12/20  0553   WBC  --    RBC  --    HGB  --    HCT  --    MCV  --    MCH  --    MCHC  --    RDW  --            Glucose Values Latest Ref Rng & Units 10/12/2020 10/12/2020 10/12/2020 10/12/2020 10/13/2020 10/13/2020 10/13/2020   Bedside Glucose (mg/dl )  - -- -- -- -- -- -- --   GLUCOSE 70 - 99 mg/dL 164(H) 162(H) 135(H) 145(H) 127(H) 176(H) 190(H)   Some recent data might be hidden     ASSESSMENT AND PLAN    Jorje Teran is a 70 year old  hand dominant male with Stroke Ischemic 01.1 (L) Body Involvement (R) Brain Stroke; L sided weakness due to MCA infarct, R MCA occlusion, and R carotid atherosclerosis  1. PT, OT and SLP 60 minutes of each on a daily basis, in addition to rehab nursing and close management of physiatrist.       2. Impairment of ADL's: OT for 60 minutes daily to work on ADL re-training such as grooming, self cares and bathing.       3. Impairment of mobility:  PT for 60 minutes daily to work on neuromuscular re-education focusing on strength, balance, coordination, and endurance.       4. Impairment of cognition/language/swallow:  SLP for 60 minutes daily to work on cognitive and linguistic deficits.     Rehab RN for med administration, bowel regimen, glucose monitoring and wound care.    1. Medical Conditions  CVA with dense left hemiparesis  CT with filling defect within the distal R ICA extending into the R MCA and occlusion of the R MCA at the M1 segment. CTA with severe plaquing at R carotid bifurcation with filling defect in the prox R ICA suggestive of thrombus contributing to approximately 80% stenosis. 60% stenosis of the proximal L ICA. TTE with bubble -> Normal left ventricular size and function. Left ventricular ejection fraction of 55-60%. No  segmental wall motion abnormalities noted.   - Lipids 7/29/20- LDL 78, HDL 56, , T chol 164, hemoglobin A1c 7.1% H, TSH is 2.05.  - remains with significant dysarthria, facial droop and dense left sided hemiparesis  Spasticity: Stretching, ice + Off Baclofen as was noted to become drowsy. Better now.  - ASA 81 mg daily PO/IN  - atorvastatin 40 mg daily, Zetia 10 mg po daily    - BP management: Better. Avoiding lows.  - goal for SBPs < 160    Cr improved. BUN better.  Dysphagia  - currently on DD2 with nectar thickened liquids   -PEG tube to be placed on 9/22, currently patient is on tube feeding  If questions or problems arise regarding tube function (e.g. leaking, dislodges, etc.) Contact Interventional Radiology department 24 hours a day.   For procedures that were done at [facility]:  8 AM - 4 PM Monday through Friday Contact   [phone]  For afterhours and weekends: [phone]   Ask for the Interventional Radiologist on call.     PeG site Discomfort: Treat infection. Clean site. Added Keflex 500 mg qid x 7 days. Better.  Tylenol prn.  Simethicone added prn.    BS high. Endo following.  CAD s/p CABG x 3, x 1 in 2015  HTN continue medications noted above.  HLD: On Lipitor       DM II  Recent A1C at 7.1 7/2020.  BG goal is < 180.- started on lantus Endo following.      -novolog before meals and at bedtime  -BG QAC, HS 0200  - hypoglycemia protocol      US at [facility] [place] negative for DVT     SHELDON  - states he could not sleep with CPAP as outpatient  - he would benefit for treating SHELDON   ? CPAP at night    Hypothyroidism  - resumed PTA Levothyroxine 50 mcg daily  TSH checked 7/29/2020 at 2.05     Morbid obesity  BMI noted  7/202 at ~38. Will need to encourage healthy lifestyle and weight loss with recovery     Diet: Snacks/Supplements Adult: Other; thickened smoothie with meals  (RD); With Meals  Calorie Counts     DVT Prophylaxis: Pneumatic Compression Devices  Code Status: Full Code      2. Adjustment  to disability:  Clinical psychology to eval and treat when ready  3. FEN: On NDD 2 [place] +TF, Bolus versus nocturnal supplements.  4. Bowel: Javed meds  5. Bladder: Monitor  6. GI Prophylaxis:   7. Code: Full  8. Disposition: Home with family and home care vs TCU  9. ELOS:  28 days 10/23/20  10. Rehab prognosis:  Fair  11. Follow up Appointments on Discharge: The following labs/tests are recommended: cbc ,basic metabolic panel in 4-5 days, also get mag and phos in 3-4 days .need follow up with neurology as scheduled. Follows with Dr. Vincent with [facility].        Sudarshan Fuentes MD   Physical Medicine & Rehabilitation

## 2020-10-13 NOTE — PLAN OF CARE
FOCUS/GOAL  Medical management    ASSESSMENT, INTERVENTIONS AND CONTINUING PLAN FOR GOAL:    Pt is alert and disoriented to time and person. Responds to touch and verbal stimulus. Does not use the call light for needs.  No signs of verbal and non-verbal pain, sob or chest pain. Is incontinent in bowel. Has not voided and has a bladder scan of 139 earlier at 0200. A maximum assist of two using the liko with transfers. Is on cycled feeding, running this shift through the G-tube at 60 ml/hr with 100ml of water flushes every 4 hours, patent and pt tolerating well. No concerns at this time. Will continue POC.    No void this shift. Bladder scan shows 442mL. ISC done and got 400mL out.

## 2020-10-13 NOTE — PROGRESS NOTES
"[facility], [place]   Internal Medicine Daily Note           Interval History/Events     Hand off taken from Dr. Beckwith  Reports doing well  Had issues with urination this morning.  No nausea, vomiting  No chest pain, shortness of breath           Review of Systems        4 point ROS including Respiratory, CV, GI and , other than that noted above is negative      Medications   I have reviewed current medications  in the \"current medication\" section of Epic.  Relevant changes include:     Physical Exam   General:       Vital signs:    Blood pressure 125/70, pulse 90, temperature 98.7  F (37.1  C), temperature source Oral, resp. rate 20, height 1.753 m (5' 9\"), weight 111.5 kg (245 lb 14.4 oz), SpO2 95 %.  Estimated body mass index is 36.31 kg/m  as calculated from the following:    Height as of this encounter: 1.753 m (5' 9\").    Weight as of this encounter: 111.5 kg (245 lb 14.4 oz).      Intake/Output Summary (Last 24 hours) at 10/13/2020 1053  Last data filed at 10/13/2020 0600  Gross per 24 hour   Intake 440 ml   Output 800 ml   Net -360 ml        Constitutional: Laying in bed in no acute distress  Eye: No icterus, no pallor  Mouth/ENT: Normal oral mucosa  Cardiovascular: S1, S2 normal. B/L pre tibial edema  Respiratory: B/L CTA  GI: Soft, NT, BS+  : No Hayes's catheter  Neurology: Alert, awake, and oriented. Left hemiparesis, neglect  Psych: Mood stable  MSK:   Integumentary:   Heme/Lymph/Imm:      Laboratory and Imaging Studies     I have reviewed  laboratory and imaging studies in the Epic. Pertinent findings are as below:    BMP  Recent Labs   Lab 10/13/20  0641 10/12/20  0553 10/11/20  0644 10/10/20  0833    138 133 138   POTASSIUM 3.9 4.1 4.7 4.5   CHLORIDE 105 105 106 106   SELENA 8.9 8.8 8.8 8.7   CO2 25 26 20 25   BUN 27 29 29 34*   CR 1.01 1.04 0.97 1.13   * 206* 220* 141*     CBC  Recent Labs   Lab 10/12/20  0553 10/09/20  0726    162     INRNo lab " results found in last 7 days.  LFTsNo lab results found in last 7 days.   PANCNo lab results found in last 7 days.        Impression/Plan          VEENA secondary to hypovolemia and  hypotension (secondary to decreased intake with discontinuation tube feedings, concurrent use of furosemide, Entresto, valsartan, amlodipine), now resolved with IV fluids,  resumption of scheduled TF, holding of furosemide, entresto, diovan and reduction in Metoprolol and Amlodipine.  Renal function now back to baseline     # Decreased level of consciousness, likely secondary to acute kidney injury, relatively low blood pressure and recent initiation of baclofen.  Baclofen is metabolized by the kidneys.  Baclofen currently on hold. Mental status improved, appears at or close to baseline  - Monitor     # Cough with Temp 99.3 on 10/12: Oxygen sat is normal. 02 sats on RA this am.  At risk for atelectasis, aspiration pneumonia. Cough may also be related to volume overload, secondary to IV fluids, temporarily holding of lasix  Temp normal, denies any cough this morning  - Continue to monitor  - Low threshold for X ray chest     # Recent right hemispheric CVA with left-sided weakness, on aspirin and statin  - Continue Aspirin, Statin  - PT/OT     # History of hypertension and diastolic CHF,  controlled in outpatient setting on the following multiple medications:     Furosemide 40 mg bid, Amlodipine 20 mg daily, Metoprolol XL 25 mg bid (changed to short acting Metoprolol 50 mg bid during recent hospitalization), Entresto 49/51, 1 tab daily, Valsartan 320 mg daily.  Goal -140/ in post CVA setting, history of diastolic CHF.   Entresto, valsartan, on Hold     Has been restarted on Metoprolol 50 mg bid, amlodipine 5 mg bid  - Continue Amlodipine 5 mg BID, Metoprolol 50 mg BID  - Continue Furosemide 20 mg BID (home dose 40 mg BID)  - Continue to monitor on titrate  - Restart Valsartan if BP starts to trend up     # History of CAD:  On ASA and  statin, B Blocker  - Continue Aspirin, Metoprolol, Statin     # Hyponatremia, mild, likely secondary to resumed TF and continued water flushes (initiated when TF recently discontinued and Pt was started on nectar thick liquids), hyperglycemia, resolved with decrease in free water administration     # Diabetes mellitus type 2, labile, in setting of decreased intake, now resumption of TF  On Insulin Isophane 18 units in AM, 22 units in evening, Insulin Aspart pre meal 1:10 CHO, Insulin Aspart correction 1:25, Liraglutide 0.6 daily, and Metformin 500 mg BID  Glucose fairly controlled.   - Diabetes team following. Appreciate management              Pt's care was discussed with bedside RN, patient and  during Care Team Rounds.               Brendan Antony MD  Hospitalist ( Internal medicine)  Pager: [phone]

## 2020-10-13 NOTE — PLAN OF CARE
Discharge Planner Post-Acute Rehab PT:      Discharge Plan:  home w/ hired physical assist vs TCU     Precautions: * falls, (L) UE flaccid, shannon neglect, field cut     Current Status:  Transfer: liko  Gait: w/c dependent  Stairs: unable  Balance: Mod->SBA w/ static sitting, poor midline orientation, L neglect     Assessment: Improved alertness and participation in sessions today.     Other Barriers to Discharge (DME, Family Training, etc): level of physical assist, pt will need a wheelchair eval if discharging to home

## 2020-10-13 NOTE — PROGRESS NOTES
IP Diabetes Management  Daily Note           Assessment and Plan:   HPI: Jorje Teran is a 69 y/o male with past medical history of HTN, HLD, DM II, SHELDON, hypothyroidism, and morbid obesity who was admitted 9/7/20 after being found down, found to have a R MCA infarct and R carotid atherosclerosis, now admitted to ARU 9/24 for intensive therapies, ongoing medical management, and rehabilitative nursing care.        Assessment:   1)Type II Diabetes Mellitus, II with suboptimal diet control PTA (A1c 7.5%), with tube feed induced hyperglycemia.      Plan:    -reduce AM NPH from 18>14 units daily    -reduce PM NPH from 22> 18 units daily PM (and adjusted timing to coincide with start of tube feeds at 1800)   -continue Metformin 500mg BID   -Victoza 0.6mg daily AM>> increase to 1.2mg daily tomorrow    -reduce aspart from 1:10> 1:12g CHO coverage with meals and snacks/supplements>> reduce to 1:15g tomorrow AM with increase in Victoza, and possibly discontinue subsequently.   -aspart high intensity sliding scale TID AC, changed HS to threshold of >140 on TF, and added 0200 correction while on cycled TF.   -BG monitoring TID AC, HS, 0200   -hypoglycemia protocol   -carb counting protocol   -diabetes education will be needed prior to discharge home, for glucometer, and possibly insulin, teaching     Discharge Planning:    -insulin requirements will be dependent on advancement of diet/ continuing enteral feeds   -anticipate Metformin and Victoza to full strength for discharge, pending improvement in PO intake and stability of renal function    -would need diabetes education for meter teaching, and insulin teaching, if this will be required for tube feeds on discharge.     Outpatient follow up: TBD  Plan discussed with patient, bedside RN, and paged primary team.     Interval History and Assessment: interval glucose trend reviewed:  BG have been remaining stable on current regimen for the most part.   Trending lower with  addition of Metformin.    TF were started late yesterday (1900), NPH given at 2130 (ordered for one hour before TF start). BG slightly improved compared to previous days, with NPH given after TF started, due to the 14 hour cycle time (NPH typically lasts ~12 hours), along with Metformin now on board.     Notified by RN that patient ate breakfast, and tray was removed before ticket could be saved, patient did not recall how much he ate, only to state he did not finish his meal. Was >30 minutes after eating; this provider instructed to not give CHO coverage. Despite this, pre-lunch BG was not elevated (123).     Jorje is tolerating PO intake with assist. He has a notable frequent cough during our evaluation, and asked this provider for liquids laying flat today. He denies abd fullness, nausea, diarrhea, but endorses heartburn, which he attributed to the tube feeds.     Cr stable, 1.01 and GFR 74 today. Continuing to monitor renal function, with plans to titrate Metformin if remaining stable.     Current nutritional intake and type: Orders Placed This Encounter      Combination Diet Dysphagia Diet Level 2: Mechan Altered; Nectar Thickened Liquids (pre-thickened or use instant food thickener)  cycled TF: Isosource 1.5 at 65cc/hour, from 6PM-8A (14 hours)     PTA Diabetes Regimen:   TIIDM was reportedly diet controlled prior to admission.     Discharge Planning: per notes, target discharge date of 10/23.           Diabetes History:   Type of Diabetes: Type 2 Diabetes Mellitus, TPN/Enteral Feeding Induced Hyperglycemia  Lab Results   Component Value Date    A1C 7.5 09/10/2020              Review of Systems:     The Review of Systems is negative other than noted in the Interval History.           Medications:     Current Facility-Administered Medications   Medication     acetaminophen (TYLENOL) tablet 650 mg     amLODIPine (NORVASC) tablet 5 mg     aspirin EC tablet 81 mg     atorvastatin (LIPITOR) tablet 40 mg      "bacitracin ointment     bisacodyl (DULCOLAX) Suppository 10 mg     carboxymethylcellulose PF (REFRESH PLUS) 0.5 % ophthalmic solution 2 drop     dextrose 10% infusion     glucose gel 15-30 g    Or     dextrose 50 % injection 25-50 mL    Or     glucagon injection 1 mg     ezetimibe (ZETIA) tablet 10 mg     famotidine (PEPCID) tablet 20 mg     furosemide (LASIX) tablet 20 mg     insulin aspart (NovoLOG) injection (RAPID ACTING)     insulin aspart (NovoLOG) injection (RAPID ACTING)     insulin aspart (NovoLOG) injection (RAPID ACTING)     insulin aspart (NovoLOG) injection (RAPID ACTING)     [START ON 10/14/2020] insulin isophane human (HumuLIN N PEN) injection 14 Units     insulin isophane human (HumuLIN N PEN) injection 18 Units     levothyroxine (SYNTHROID/LEVOTHROID) tablet 50 mcg     [START ON 10/14/2020] liraglutide (VICTOZA) injection 1.2 mg     metFORMIN (GLUCOPHAGE) tablet 500 mg     metoprolol tartrate (LOPRESSOR) tablet 50 mg     miconazole (MICATIN) 2 % powder     multivitamins w/minerals (CERTAVITE) liquid 15 mL     nitroGLYcerin (NITROSTAT) sublingual tablet 0.4 mg     nystatin (MYCOSTATIN) suspension 500,000 Units     Patient is already receiving anticoagulation with heparin, enoxaparin (LOVENOX), warfarin (COUMADIN)  or other anticoagulant medication     polyethylene glycol (MIRALAX) Packet 17 g     simethicone (MYLICON) suspension 40 mg     sodium chloride (OCEAN) 0.65 % nasal spray 1 spray     sodium chloride (PF) 0.9% PF flush 10 mL     sodium chloride (PF) 0.9% PF flush 10-20 mL     traZODone (DESYREL) tablet 50 mg            Physical Exam:    /70 (BP Location: Right arm)   Pulse 90   Temp 98.7  F (37.1  C) (Oral)   Resp 20   Ht 1.753 m (5' 9\")   Wt 111.5 kg (245 lb 14.4 oz)   SpO2 95%   BMI 36.31 kg/m    General: pleasant, in no distress. Laying flat in bed   HEENT: normocephalic, atraumatic. Oral mucous membranes moist.   Lungs: unlabored respiration, frequent cough noted, pt unable " to expectorate.   ABD: rounded, soft  Skin: warm and dry, no obvious lesions  MSK:  moves all extremities  Lymp:  no LE edema   Mental status:  alert, oriented to self, place, certain aspects of situation  Psych:  affect, calm and appropriate interaction             Data:     Recent Labs   Lab 10/13/20  1207 10/13/20  0738 10/13/20  0641 10/13/20  0219 10/12/20  2132 10/12/20  1743 10/12/20  1304 10/12/20  0553 10/12/20  0553 10/11/20  0644 10/11/20  0644 10/10/20  0833 10/10/20  0833 10/09/20  1942 10/09/20  1942 10/09/20  0726 10/09/20  0726   GLC  --   --  176*  --   --   --   --   --  206*  --  220*  --  141*  --  155*  --  148*   * 190*  --  127* 145* 135* 162*   < >  --    < >  --    < >  --    < >  --    < >  --     < > = values in this interval not displayed.     Lab Results   Component Value Date    WBC 9.3 09/30/2020    WBC 9.5 09/19/2020    WBC 9.7 09/18/2020    HGB 11.6 (L) 09/30/2020    HGB 11.2 (L) 09/19/2020    HGB 11.6 (L) 09/18/2020    HCT 36.3 (L) 09/30/2020    HCT 33.9 (L) 09/19/2020    HCT 34.6 (L) 09/18/2020    MCV 96 09/30/2020    MCV 92 09/19/2020    MCV 92 09/18/2020     10/12/2020     10/09/2020     (L) 10/06/2020     Lab Results   Component Value Date     10/13/2020     10/12/2020     10/11/2020    POTASSIUM 3.9 10/13/2020    POTASSIUM 4.1 10/12/2020    POTASSIUM 4.7 10/11/2020    CHLORIDE 105 10/13/2020    CHLORIDE 105 10/12/2020    CHLORIDE 106 10/11/2020    CO2 25 10/13/2020    CO2 26 10/12/2020    CO2 20 10/11/2020     (H) 10/13/2020     (H) 10/12/2020     (H) 10/11/2020     Lab Results   Component Value Date    BUN 27 10/13/2020    BUN 29 10/12/2020    BUN 29 10/11/2020     No results found for: TSH  Lab Results   Component Value Date    AST 23 09/07/2020    ALT 28 09/07/2020    ALKPHOS 67 09/07/2020     I spent a total of 35 minutes bedside and on the inpatient unit managing glycemic care. Over 50% of my time on the  unit was spent counseling the patient and/or coordinating care regarding acute hyperglycemia management.  See note for details.    To contact Endocrine Diabetes service:   From 8AM-4PM: page inpatient diabetes provider that is following the patient  For questions or updates from 4PM-8AM: page the diabetes job code for on call fellow: [phone]    Vale Funes PA-C  Inpatient Diabetes Management Service  Pager [phone]

## 2020-10-13 NOTE — PLAN OF CARE
Denies pain. Continent of bladder in the morning, contained in his brief, pVR for 66, attempted to void after lunch with no success, scan for 150. BG today 190 and 123. Carb coverage fro breakfast not given due to not being able to locate the tray, fabian is aware, son at bedside, will continue POC

## 2020-10-13 NOTE — PLAN OF CARE
Discharge Planner Post-Acute Rehab SLP:      Discharge Plan: to be determined, at this time pt would need 24 hour support     Precautions: fall, swallowing, 1:1 assist/supervision at meals     Current Status: Patient seen for meal on NDD2/nectar - pt demonstrated adequate oral manipulation and no s/sx of aspiration. Thin liquids trialed with delayed throat clear - possible indication of deep penetration per previous video swallow. Current diet remains appropriate.     Communication: moderate dysarthria, variable intelligibility.    Cognition: moderate/ impairment for basic orientation, memory, reasoning    Swallowing: NDD2 diet, nectar fluids, needing 1:1 assist/supervision, cues for small bites and sips.        Other Barriers to Discharge (Family Training, etc):  Training, discharge plan/support, tube feed training and/or adequate PO intake

## 2020-10-13 NOTE — PLAN OF CARE
FOCUS/GOAL  Bladder management, Nutrition/Feeding/Swallowing precautions, and Safety management    ASSESSMENT, INTERVENTIONS AND CONTINUING PLAN FOR GOAL:    Pt is alert but can be disoriented at times, also forgetful. Complained of pain after sitting up in wheel chair but was relieved after being transferred to bed. Pt was bladder scanned around 1900 for a total of 370, was straight cathed around 1930 for 400 ml. Pt ate 75% of meal with writers assist. Tube feeding connected around 1900, one hour late from prescribed starting point. Was incontinent of bowel this evening. BG was checked, no insulin administered due to not within parameters.     Able to make needs known. Remains an assist of two with a liko lift. Continue with plan of care.

## 2020-10-14 ENCOUNTER — APPOINTMENT (OUTPATIENT)
Dept: OCCUPATIONAL THERAPY | Facility: CLINIC | Age: 70
End: 2020-10-14
Payer: COMMERCIAL

## 2020-10-14 ENCOUNTER — APPOINTMENT (OUTPATIENT)
Dept: SPEECH THERAPY | Facility: CLINIC | Age: 70
End: 2020-10-14
Payer: COMMERCIAL

## 2020-10-14 ENCOUNTER — APPOINTMENT (OUTPATIENT)
Dept: PHYSICAL THERAPY | Facility: CLINIC | Age: 70
End: 2020-10-14
Payer: COMMERCIAL

## 2020-10-14 LAB
ANION GAP SERPL CALCULATED.3IONS-SCNC: 7 MMOL/L (ref 3–14)
BUN SERPL-MCNC: 25 MG/DL (ref 7–30)
CALCIUM SERPL-MCNC: 8.5 MG/DL (ref 8.5–10.1)
CHLORIDE SERPL-SCNC: 106 MMOL/L (ref 94–109)
CO2 SERPL-SCNC: 25 MMOL/L (ref 20–32)
CREAT SERPL-MCNC: 0.94 MG/DL (ref 0.66–1.25)
GFR SERPL CREATININE-BSD FRML MDRD: 81 ML/MIN/{1.73_M2}
GLUCOSE BLDC GLUCOMTR-MCNC: 120 MG/DL (ref 70–99)
GLUCOSE BLDC GLUCOMTR-MCNC: 129 MG/DL (ref 70–99)
GLUCOSE BLDC GLUCOMTR-MCNC: 141 MG/DL (ref 70–99)
GLUCOSE BLDC GLUCOMTR-MCNC: 156 MG/DL (ref 70–99)
GLUCOSE BLDC GLUCOMTR-MCNC: 82 MG/DL (ref 70–99)
GLUCOSE SERPL-MCNC: 155 MG/DL (ref 70–99)
POTASSIUM SERPL-SCNC: 4 MMOL/L (ref 3.4–5.3)
SODIUM SERPL-SCNC: 138 MMOL/L (ref 133–144)

## 2020-10-14 PROCEDURE — 999N000125 HC STATISTIC PATIENT MED CONFERENCE < 30 MIN

## 2020-10-14 PROCEDURE — 97112 NEUROMUSCULAR REEDUCATION: CPT | Mod: GP | Performed by: PHYSICAL THERAPIST

## 2020-10-14 PROCEDURE — 999N000150 HC STATISTIC PT MED CONFERENCE < 30 MIN: Performed by: PHYSICAL THERAPIST

## 2020-10-14 PROCEDURE — 128N000003 HC R&B REHAB

## 2020-10-14 PROCEDURE — 250N000013 HC RX MED GY IP 250 OP 250 PS 637: Performed by: PHYSICAL MEDICINE & REHABILITATION

## 2020-10-14 PROCEDURE — 92526 ORAL FUNCTION THERAPY: CPT | Mod: GN | Performed by: SPEECH-LANGUAGE PATHOLOGIST

## 2020-10-14 PROCEDURE — 250N000013 HC RX MED GY IP 250 OP 250 PS 637: Performed by: PHYSICIAN ASSISTANT

## 2020-10-14 PROCEDURE — 97112 NEUROMUSCULAR REEDUCATION: CPT | Mod: GO

## 2020-10-14 PROCEDURE — 80048 BASIC METABOLIC PNL TOTAL CA: CPT | Performed by: INTERNAL MEDICINE

## 2020-10-14 PROCEDURE — 99207 PR CDG-CUT & PASTE-POTENTIAL IMPACT ON LEVEL: CPT | Performed by: INTERNAL MEDICINE

## 2020-10-14 PROCEDURE — 250N000013 HC RX MED GY IP 250 OP 250 PS 637: Performed by: STUDENT IN AN ORGANIZED HEALTH CARE EDUCATION/TRAINING PROGRAM

## 2020-10-14 PROCEDURE — 97129 THER IVNTJ 1ST 15 MIN: CPT

## 2020-10-14 PROCEDURE — 99232 SBSQ HOSP IP/OBS MODERATE 35: CPT | Performed by: INTERNAL MEDICINE

## 2020-10-14 PROCEDURE — 92526 ORAL FUNCTION THERAPY: CPT | Mod: GN

## 2020-10-14 PROCEDURE — 250N000013 HC RX MED GY IP 250 OP 250 PS 637: Performed by: NURSE PRACTITIONER

## 2020-10-14 PROCEDURE — 999N001017 HC STATISTIC GLUCOSE BY METER IP

## 2020-10-14 PROCEDURE — 36415 COLL VENOUS BLD VENIPUNCTURE: CPT | Performed by: INTERNAL MEDICINE

## 2020-10-14 PROCEDURE — 250N000013 HC RX MED GY IP 250 OP 250 PS 637: Performed by: INTERNAL MEDICINE

## 2020-10-14 PROCEDURE — 99233 SBSQ HOSP IP/OBS HIGH 50: CPT | Performed by: PHYSICAL MEDICINE & REHABILITATION

## 2020-10-14 PROCEDURE — 97130 THER IVNTJ EA ADDL 15 MIN: CPT

## 2020-10-14 RX ADMIN — METFORMIN HYDROCHLORIDE 500 MG: 500 TABLET ORAL at 07:59

## 2020-10-14 RX ADMIN — FUROSEMIDE 20 MG: 20 TABLET ORAL at 08:00

## 2020-10-14 RX ADMIN — NYSTATIN 500000 UNITS: 500000 SUSPENSION ORAL at 12:52

## 2020-10-14 RX ADMIN — METOPROLOL TARTRATE 50 MG: 50 TABLET, FILM COATED ORAL at 20:01

## 2020-10-14 RX ADMIN — CALCIUM CARBONATE (ANTACID) CHEW TAB 500 MG 500 MG: 500 CHEW TAB at 18:09

## 2020-10-14 RX ADMIN — SALINE NASAL SPRAY 1 SPRAY: 1.5 SOLUTION NASAL at 12:52

## 2020-10-14 RX ADMIN — LIRAGLUTIDE 1.2 MG: 6 INJECTION SUBCUTANEOUS at 08:04

## 2020-10-14 RX ADMIN — MULTIVITAMIN 15 ML: LIQUID ORAL at 17:59

## 2020-10-14 RX ADMIN — METFORMIN HYDROCHLORIDE 1000 MG: 500 TABLET ORAL at 17:59

## 2020-10-14 RX ADMIN — ASPIRIN 81 MG: 81 TABLET, COATED ORAL at 07:59

## 2020-10-14 RX ADMIN — SALINE NASAL SPRAY 1 SPRAY: 1.5 SOLUTION NASAL at 08:09

## 2020-10-14 RX ADMIN — NYSTATIN 500000 UNITS: 500000 SUSPENSION ORAL at 20:11

## 2020-10-14 RX ADMIN — SALINE NASAL SPRAY 1 SPRAY: 1.5 SOLUTION NASAL at 22:24

## 2020-10-14 RX ADMIN — INSULIN ASPART 1 UNITS: 100 INJECTION, SOLUTION INTRAVENOUS; SUBCUTANEOUS at 07:54

## 2020-10-14 RX ADMIN — FUROSEMIDE 20 MG: 20 TABLET ORAL at 16:24

## 2020-10-14 RX ADMIN — METOPROLOL TARTRATE 50 MG: 50 TABLET, FILM COATED ORAL at 08:00

## 2020-10-14 RX ADMIN — NYSTATIN 500000 UNITS: 500000 SUSPENSION ORAL at 08:00

## 2020-10-14 RX ADMIN — EZETIMIBE 10 MG: 10 TABLET ORAL at 07:59

## 2020-10-14 RX ADMIN — LEVOTHYROXINE SODIUM 50 MCG: 25 TABLET ORAL at 07:58

## 2020-10-14 RX ADMIN — FAMOTIDINE 20 MG: 20 TABLET ORAL at 20:01

## 2020-10-14 RX ADMIN — FAMOTIDINE 20 MG: 20 TABLET ORAL at 08:00

## 2020-10-14 RX ADMIN — ATORVASTATIN CALCIUM 40 MG: 40 TABLET, FILM COATED ORAL at 07:59

## 2020-10-14 RX ADMIN — AMLODIPINE BESYLATE 5 MG: 5 TABLET ORAL at 08:00

## 2020-10-14 RX ADMIN — SALINE NASAL SPRAY 1 SPRAY: 1.5 SOLUTION NASAL at 17:59

## 2020-10-14 RX ADMIN — NYSTATIN 500000 UNITS: 500000 SUSPENSION ORAL at 16:24

## 2020-10-14 ASSESSMENT — MIFFLIN-ST. JEOR: SCORE: 1793.38

## 2020-10-14 NOTE — PLAN OF CARE
Discharge Planner Post-Acute Rehab OT:      Discharge Plan: home with 24 hr supervision vs tcu     Precautions:  Left neglect,  L) UE flaccid fall risk,      Current Status:  ADLs/IADLs:ADLs:G/H min a sitting eob with chest level able to get under l arm for washing and applying deoderant, sba face level. U/B dressing max a of 1 for button down shirt and pull over shirt, sitting eob verbal cueing for sequencing; dep bL/B dressing bed Mobilit dep with liko lift to eob pt able to sit 45 min eob with leg portion of lift removed with g/h and unit(s)/b dressing and cga-sba of therapist for balance IADLs: -  Vision/Cognition: Left neglect, increase attention with cues for sequencing increasing participation with g/h dressing and eating     Assessment: pt able to increase tolerance to fes l u)/e Skilled set up on :  Pt dependent for proper placement of electrodes on l U/E for optimum muscle contraction, safe positioning on w/c within frame and cushion for prevention of skin breakdown, UE secured to arm support.  Passive motion assessed to ensure proper positioning.      Pt [id number] of active FES ergometry with 0-100% stimulation applied to above muscles at 27rpm with .05nm resistance.  This OT adjusted e-stim and cycling parameters in real-time to ensure palpable muscle contractions throughout session.  Please see [email or web address] for further details on patient's stimulation parameters and ergometry outcomes.            Functional outcomes from this intervention include:   -reduced spasticity  -improved sensory awareness and proprioception  -improved muscle strength  -improved motor coordination     Other Barriers to Discharge (DME, Family Training, etc): TBD

## 2020-10-14 NOTE — PROGRESS NOTES
[facility]   Acute Rehabilitation Unit  Daily progress note  CC:   Stroke Ischemic 01.1 (L) Body Involvement (R) Brain Stroke; L sided weakness due to MCA infarct, R MCA occlusion, and R carotid atherosclerosis    S:   Feels much improved. Significant impairment due to hemisensory deficit, fiedcut, and hemiplegia.   Needs caths. Retaining.   Off Baclofen. Needed adjustment of BP medications. Improved since. Incontinent but not retaining.    TR held 10/14/2020  Dense left hemiplegia, left field cut +. Compensates by turning.  BS monitored. Endo following.  NDD 2 [place].  TF continued.      ROS: Denies HA, nausea, pain.  Cath. Alertness improved. Coughs.        [unfilled]   Scheduled meds    amLODIPine  5 mg Oral BID     aspirin  81 mg Oral Daily     atorvastatin  40 mg Oral Daily     ezetimibe  10 mg Oral Daily     famotidine  20 mg Oral BID     furosemide  20 mg Oral or Feeding Tube BID     insulin aspart  5 Units Subcutaneous Daily     insulin aspart  1-7 Units Subcutaneous BID     insulin aspart   Subcutaneous TID w/meals     insulin aspart  1-10 Units Subcutaneous TID AC     insulin isophane human  14 Units Subcutaneous QAM     levothyroxine  50 mcg Oral Daily     liraglutide  1.2 mg Subcutaneous Daily     metFORMIN  1,000 mg Oral BID w/meals     metoprolol tartrate  50 mg Oral BID     multivitamins w/minerals  15 mL Oral Daily     nystatin  500,000 Units Swish & Spit 4x Daily     sodium chloride  1 spray Both Nostrils 4x daily     sodium chloride (PF)  10 mL Intracatheter Q7 Days       PRN meds:  acetaminophen, bacitracin, bisacodyl, calcium carbonate, carboxymethylcellulose PF, dextrose, glucose **OR** dextrose **OR** glucagon, insulin aspart, miconazole, nitroGLYcerin, - MEDICATION INSTRUCTIONS -, polyethylene glycol, simethicone, sodium chloride (PF), traZODone      PHYSICAL EXAM  /67 (BP Location: Right arm)   Pulse 85   Temp 97.6  F (36.4  C) (Axillary)    "Resp 20   Ht 1.753 m (5' 9\")   Wt 104.8 kg (231 lb 0.7 oz)   SpO2 94%   BMI 34.12 kg/m    Alert verbal. Sitting better. Verbalizing at baseline now.   Respiratory: Clear to auscultation bilaterally, no crackles or wheezing  Cardiovascular: S1 and S2, RRR, and no murmur noted  GI: soft, non-distended, non-tender. G tube intact + BS +  Skin/Integumen: No rashes, trace lower extremity edema noted  Neuro : Left lower Facial droop + Left Field cut +  Dense left-sided hemiplegia with neglect noted.  Tone +. Reflexes +    Vocalizes, low volume   Extremities. Moves right side well. Edema +    LABS  Last Comprehensive Metabolic Panel:  Sodium   Date Value Ref Range Status   10/14/2020 138 133 - 144 mmol/L Final     Potassium   Date Value Ref Range Status   10/14/2020 4.0 3.4 - 5.3 mmol/L Final     Chloride   Date Value Ref Range Status   10/14/2020 106 94 - 109 mmol/L Final     Carbon Dioxide   Date Value Ref Range Status   10/14/2020 25 20 - 32 mmol/L Final     Anion Gap   Date Value Ref Range Status   10/14/2020 7 3 - 14 mmol/L Final     Glucose   Date Value Ref Range Status   10/14/2020 155 (H) 70 - 99 mg/dL Final     Urea Nitrogen   Date Value Ref Range Status   10/14/2020 25 7 - 30 mg/dL Final     Creatinine   Date Value Ref Range Status   10/14/2020 0.94 0.66 - 1.25 mg/dL Final     GFR Estimate   Date Value Ref Range Status   10/14/2020 81 >60 mL/min/[1.73_m2] Final     Comment:     Non  GFR Calc  Starting 12/18/2018, serum creatinine based estimated GFR (eGFR) will be   calculated using the Chronic Kidney Disease Epidemiology Collaboration   (CKD-EPI) equation.       Calcium   Date Value Ref Range Status   10/14/2020 8.5 8.5 - 10.1 mg/dL Final      CBC RESULTS:   Recent Labs     CBC RESULTS:   Recent Labs   Lab Test 09/30/20  0607   WBC 9.3   RBC 3.79*   HGB 11.6*   HCT 36.3*   MCV 96   MCH 30.6   MCHC 32.0   RDW 12.9        CBC RESULTS:   Lab Test 10/12/20  0553   WBC  --    RBC  --    HGB  " --    HCT  --    MCV  --    MCH  --    MCHC  --    RDW  --            Glucose Values Latest Ref Rng & Units 10/13/2020 10/13/2020 10/13/2020 10/14/2020 10/14/2020 10/14/2020 10/14/2020   Bedside Glucose (mg/dl )  - -- -- -- -- -- -- --   GLUCOSE 70 - 99 mg/dL 123(H) 114(H) 111(H) 129(H) 141(H) 155(H) 156(H)   Some recent data might be hidden     ASSESSMENT AND PLAN    Jorje Teran is a 70 year old  hand dominant male with Stroke Ischemic 01.1 (L) Body Involvement (R) Brain Stroke; L sided weakness due to MCA infarct, R MCA occlusion, and R carotid atherosclerosis  1. PT, OT and SLP 60 minutes of each on a daily basis, in addition to rehab nursing and close management of physiatrist.       2. Impairment of ADL's: OT for 60 minutes daily to work on ADL re-training such as grooming, self cares and bathing.       3. Impairment of mobility:  PT for 60 minutes daily to work on neuromuscular re-education focusing on strength, balance, coordination, and endurance.       4. Impairment of cognition/language/swallow:  SLP for 60 minutes daily to work on cognitive and linguistic deficits.     Rehab RN for med administration, bowel regimen, glucose monitoring and wound care.    1. Medical Conditions  CVA with dense left hemiparesis  CT with filling defect within the distal R ICA extending into the R MCA and occlusion of the R MCA at the M1 segment. CTA with severe plaquing at R carotid bifurcation with filling defect in the prox R ICA suggestive of thrombus contributing to approximately 80% stenosis. 60% stenosis of the proximal L ICA. TTE with bubble -> Normal left ventricular size and function. Left ventricular ejection fraction of 55-60%. No segmental wall motion abnormalities noted.   - Lipids 7/29/20- LDL 78, HDL 56, , T chol 164, hemoglobin A1c 7.1% H, TSH is 2.05.  - remains with significant dysarthria, facial droop and dense left sided hemiparesis  Spasticity: Stretching, ice + Off Baclofen as was  noted to become drowsy. Better now.  - ASA 81 mg daily PO/KS  - atorvastatin 40 mg daily, Zetia 10 mg po daily    - BP management: Better. Avoiding lows.  - goal for SBPs < 160    Cr improved. BUN better.  Dysphagia  - currently on DD2 with nectar thickened liquids   -PEG tube to be placed on 9/22, currently patient is on tube feeding  If questions or problems arise regarding tube function (e.g. leaking, dislodges, etc.) Contact Interventional Radiology department 24 hours a day.   For procedures that were done at [facility]:  8 AM - 4 PM Monday through Friday Contact   [phone]  For afterhours and weekends: [phone]   Ask for the Interventional Radiologist on call.     PeG site Discomfort: Treat infection. Clean site. Added Keflex 500 mg qid x 7 days. Better.  Tylenol prn.  Simethicone added prn.    BS high. Endo following.  CAD s/p CABG x 3, x 1 in 2015  HTN continue medications noted above.  HLD: On Lipitor       DM II  Recent A1C at 7.1 7/2020.  BG goal is < 180.- started on lantus Endo following.   Dose adjusted for TF.   -novolog before meals and at bedtime  -BG QAC, HS 0200  - hypoglycemia protocol      US at [facility] negative for DVT     SHELDON  - states he could not sleep with CPAP as outpatient  - he would benefit for treating SHELDON   ? CPAP at night    Hypothyroidism  - resumed PTA Levothyroxine 50 mcg daily  TSH checked 7/29/2020 at 2.05     Morbid obesity  BMI noted  7/202 at ~38. Will need to encourage healthy lifestyle and weight loss with recovery     Diet: Snacks/Supplements Adult: Other; thickened smoothie with meals  (RD); With Meals  Calorie Counts     DVT Prophylaxis: Pneumatic Compression Devices  Code Status: Full Code      2. Adjustment to disability:  Clinical psychology to eval and treat when ready  3. FEN: On NDD 2 [place] +TF, Bolus versus nocturnal supplements.  4. Bowel: Javed meds  5. Bladder: Monitor  6. GI Prophylaxis:   7. Code: Full  8. Disposition: Home with family and home  care vs more likely TCU  9. ELOS:  28 days 10/23/20  10. Rehab prognosis:  Fair  11. Follow up Appointments on Discharge:   12.   13. He needs follow up with neurology as scheduled. Follows with Dr. Vincent with [facility].        Sudarshan Fuentes MD   Physical Medicine & Rehabilitation

## 2020-10-14 NOTE — PROGRESS NOTES
IP Diabetes Management  Daily Note           Assessment and Plan:   HPI: Jorje Teran is a 69 y/o male with past medical history of HTN, HLD, DM II, SHELDON, hypothyroidism, and morbid obesity who was admitted 9/7/20 after being found down, found to have a R MCA infarct and R carotid atherosclerosis, now admitted to ARU 9/24 for intensive therapies, ongoing medical management, and rehabilitative nursing care.        Assessment:   1)Type II Diabetes Mellitus, II with suboptimal diet control PTA (A1c 7.5%), with tube feed induced hyperglycemia.      Plan:    -increase Metformin IR to 1000mg BID tonight    -increased Victoza to 1.2 mg daily this AM.   -reduce aspart carb coverage to 1:15g CHO with meals, snacks- anticipate discontinuation tomorrow with higher dose of Victoza on board.    -AM NPH 14 units daily (will assess tomorrow), discontinue PM NPH with change in TF.    -initiate Novolog fixed dose of 5 units with start of tube feeds. Additional coverage per sliding scale overnight (likely 3-5 units will be needed to cover feeds, possibly less with higher dose of Metformin on board)   -aspart high intensity sliding scale >140 AC/HS/0200, while on overnight cycled TF   -BG monitoring TID AC, HS, 0200   -hypoglycemia protocol   -carb counting protocol   -diabetes education will be needed prior to discharge home, for glucometer, and possibly insulin, teaching     Discharge Planning:    -insulin requirements will be dependent on advancement of diet/ continuing enteral feeds   -anticipate Metformin and GLP1 (either daily Victoza or weekly Trulicity) to full strength for discharge, pending improvement in PO intake and stability of renal function    -would need diabetes education for meter teaching, and insulin teaching, if this will be required for tube feeds on discharge.     Outpatient follow up: TBD  Plan discussed with patient, bedside RN, and paged primary team.     Interval History and Assessment: interval glucose  trend reviewed:  BG well controlled yesterday; titrating Victoza today, in effort to minimize/eliminate mealtime aspart.  Plan to titrate Metformin tonight in effort to minimize/eliminate need for basal insulin    Primary team requested reduction in TF tonight. Will reduce from 14 to 8 hour cycle. Carb content will reduce from 148 to 85g CHO. This length of cycle is not amenable to the use of NPH insulin. Will schedule Novolog at start of feeds, and utilize sliding scale aspart overnight for additional need. With Metformin and Victoza increasing, anticipating less insulin will be required compared to previous.     If PO intake continues, anticipate discontinuation of TF altogether in 1-2 days. He is happy to hear TF may go away soon.     Cr stable, 1.01>0.9 and GFR 74>81 today. Continuing to monitor renal function, with plans to titrate Metformin.     Son at bedside today. Ron ate a meal with SLP, and this was not discovered by RN until too late to administer CHO coverage. For this reason, no midday BG check was done.  He and his son would prefer no injections on discharge. Reviewed likely need to continue GLP1. Recommendation is for TCU prior to home; patient would prefer discharge to home, and son concerned about visiting restrictions at TCU's.    Current nutritional intake and type: Orders Placed This Encounter      Combination Diet Dysphagia Diet Level 2: Mechan Altered; Nectar Thickened Liquids (pre-thickened or use instant food thickener)  cycled TF: Isosource 1.5 at 65cc/hour, from 9PM-5A (8 hours) as of 10/14.    PTA Diabetes Regimen:   TIIDM was reportedly diet controlled prior to admission.     Discharge Planning: per notes, target discharge date of 10/23.        Diabetes History:   Type of Diabetes: Type 2 Diabetes Mellitus, TPN/Enteral Feeding Induced Hyperglycemia  Lab Results   Component Value Date    A1C 7.5 09/10/2020              Review of Systems:     The Review of Systems is negative other than  noted in the Interval History.           Medications:     Current Facility-Administered Medications   Medication     acetaminophen (TYLENOL) tablet 650 mg     amLODIPine (NORVASC) tablet 5 mg     aspirin EC tablet 81 mg     atorvastatin (LIPITOR) tablet 40 mg     bacitracin ointment     bisacodyl (DULCOLAX) Suppository 10 mg     calcium carbonate (TUMS) chewable tablet 500 mg     carboxymethylcellulose PF (REFRESH PLUS) 0.5 % ophthalmic solution 2 drop     dextrose 10% infusion     glucose gel 15-30 g    Or     dextrose 50 % injection 25-50 mL    Or     glucagon injection 1 mg     ezetimibe (ZETIA) tablet 10 mg     famotidine (PEPCID) tablet 20 mg     furosemide (LASIX) tablet 20 mg     insulin aspart (NovoLOG) injection (RAPID ACTING)     insulin aspart (NovoLOG) injection (RAPID ACTING)     insulin aspart (NovoLOG) injection (RAPID ACTING)     insulin aspart (NovoLOG) injection (RAPID ACTING)     insulin aspart (NovoLOG) injection (RAPID ACTING)     insulin isophane human (HumuLIN N PEN) injection 14 Units     levothyroxine (SYNTHROID/LEVOTHROID) tablet 50 mcg     liraglutide (VICTOZA) injection 1.2 mg     metFORMIN (GLUCOPHAGE) tablet 1,000 mg     metoprolol tartrate (LOPRESSOR) tablet 50 mg     miconazole (MICATIN) 2 % powder     multivitamins w/minerals (CERTAVITE) liquid 15 mL     nitroGLYcerin (NITROSTAT) sublingual tablet 0.4 mg     nystatin (MYCOSTATIN) suspension 500,000 Units     Patient is already receiving anticoagulation with heparin, enoxaparin (LOVENOX), warfarin (COUMADIN)  or other anticoagulant medication     polyethylene glycol (MIRALAX) Packet 17 g     simethicone (MYLICON) suspension 40 mg     sodium chloride (OCEAN) 0.65 % nasal spray 1 spray     sodium chloride (PF) 0.9% PF flush 10 mL     sodium chloride (PF) 0.9% PF flush 10-20 mL     traZODone (DESYREL) tablet 50 mg            Physical Exam:    /64 (BP Location: Right arm)   Pulse 87   Temp 97.7  F (36.5  C) (Oral)   Resp 20   Ht  "1.753 m (5' 9\")   Wt 104.8 kg (231 lb 0.7 oz)   SpO2 94%   BMI 34.12 kg/m    General: pleasant, in no distress. Sitting up in chair. Son present and attentive.  HEENT: normocephalic, atraumatic. Oral mucous membranes moist.   Lungs: unlabored respiration, frequent cough noted, pt unable to expectorate.   ABD: rounded, soft  Skin: warm and dry, no obvious lesions  MSK:  moves all extremities  Lymp:  no LE edema   Mental status:  alert, oriented to self, place, certain aspects of situation  Psych:  affect, calm and appropriate interaction             Data:     Recent Labs   Lab 10/14/20  0720 10/14/20  0534 10/14/20  0218 10/14/20  0131 10/13/20  2148 10/13/20  1743 10/13/20  1207 10/13/20  0641 10/13/20  0641 10/12/20  0553 10/12/20  0553 10/11/20  0644 10/11/20  0644 10/10/20  0833 10/10/20  0833 10/09/20  1942 10/09/20  1942   GLC  --  155*  --   --   --   --   --   --  176*  --  206*  --  220*  --  141*  --  155*   *  --  141* 129* 111* 114* 123*   < >  --    < >  --    < >  --    < >  --    < >  --     < > = values in this interval not displayed.     Lab Results   Component Value Date    WBC 9.3 09/30/2020    WBC 9.5 09/19/2020    WBC 9.7 09/18/2020    HGB 11.6 (L) 09/30/2020    HGB 11.2 (L) 09/19/2020    HGB 11.6 (L) 09/18/2020    HCT 36.3 (L) 09/30/2020    HCT 33.9 (L) 09/19/2020    HCT 34.6 (L) 09/18/2020    MCV 96 09/30/2020    MCV 92 09/19/2020    MCV 92 09/18/2020     10/12/2020     10/09/2020     (L) 10/06/2020     Lab Results   Component Value Date     10/14/2020     10/13/2020     10/12/2020    POTASSIUM 4.0 10/14/2020    POTASSIUM 3.9 10/13/2020    POTASSIUM 4.1 10/12/2020    CHLORIDE 106 10/14/2020    CHLORIDE 105 10/13/2020    CHLORIDE 105 10/12/2020    CO2 25 10/14/2020    CO2 25 10/13/2020    CO2 26 10/12/2020     (H) 10/14/2020     (H) 10/13/2020     (H) 10/12/2020     Lab Results   Component Value Date    BUN 25 10/14/2020    " BUN 27 10/13/2020    BUN 29 10/12/2020     No results found for: TSH  Lab Results   Component Value Date    AST 23 09/07/2020    ALT 28 09/07/2020    ALKPHOS 67 09/07/2020     I spent a total of 35 minutes bedside and on the inpatient unit managing glycemic care. Over 50% of my time on the unit was spent counseling the patient and/or coordinating care regarding acute hyperglycemia management.  See note for details.    To contact Endocrine Diabetes service:   From 8AM-4PM: page inpatient diabetes provider that is following the patient  For questions or updates from 4PM-8AM: page the diabetes job code for on call fellow: [phone]    Vale Funes PA-C  Inpatient Diabetes Management Service  Pager [phone]

## 2020-10-14 NOTE — PLAN OF CARE
FOCUS/GOAL  Medical management    ASSESSMENT, INTERVENTIONS AND CONTINUING PLAN FOR GOAL:  Pt is incontinent of bladder, PVR of 88cc. Needs A2 for leah cares, boosting up and turning and repositioning. Calm and cooperative to staff. No complain of pain and no sob noted. Tube feeding is infusing well, tolerated. HOB remain elevated. Pt cough at times w/ loose phlegm. Slept in between cares.

## 2020-10-14 NOTE — PLAN OF CARE
Discharge Planner Post-Acute Rehab SLP:      Discharge Plan: to be determined, at this time pt would need 24 hour support     Precautions: fall, swallowing, 1:1 assist/supervision at meals     Current Status: Patient seen for meal on NDD2/nectar - pt demonstrated adequate oral manipulation and no s/sx of aspiration. Thin liquids trialed with delayed throat clear - possible indication of deep penetration per previous video swallow. Current diet remains appropriate.      Communication: moderate dysarthria, variable intelligibility.     Cognition: moderate/ impairment for basic orientation, memory, reasoning     Swallowing: NDD2 diet, nectar fluids, needing 1:1 assist/supervision, cues for small bites and sips.Still throat clearing with trials of DD3- will continue to assess; may need repeat VFSS before liquid advancement- ? Readiness for next week    Assessment: Pt seen for meal- trial of DD3 items-- continues to need cues to look to left to aid in self- feeding. With trials of DD3 items- pt did have some throat clearing intermittently -- suspect pharyngeal retention- pt reporting some sensation of. Also needing cues to take 1 bite at a time. . Alternating with some sips of nectar thick liquids- helpful in acting as liquid wash but better tolerance of DD2 items. No aspiration s/s with small single sips of nectar thick liquids. Plan to continue with DD2 with nectar thick liquids. Will continue to trial DD3 items. Pt needs to be upright for all po, small single sips, bites, check for left sided oral residue, double swallow as needed, slow rate        Other Barriers to Discharge (Family Training, etc):  Training, discharge plan/support, tube feed training and/or adequate PO intake

## 2020-10-14 NOTE — PROGRESS NOTES
"Team rounds this morning. Discussed [place] for TCU. Pt and pt son appeared onboard with the [place]s. SWer informed pt and pt son that SW would f/u this afternoon to discuss further.     Went back to follow up with pt and pt son. Discussed team rounds and TCU [place]s. Pt stated that he prefer to discharge home. Discussed benefits of TCU and the level of rehab at home with HHC vs TCU. In the meantime, pt fell asleep. SW spoke directly with pt son. Showed pt son the Medicare website to review TCU options, discussed ARU LOS and insurance coverage, discuss insurance coverage for TCU and shared visitation restrictions in TCUs. After discussing visitation, pt son expressed \"now this is tough\". Pt son expressed wanting the best opportunity for the pt but doesn't want the pt to feel like 'they left him there'. Pt son stated that he needs to discuss options with his other brothers and would review the list of options.     Asked pt son to email SW with TCU options if that is what the choose or to call/email SW if they choose to discharge home. Shared that if pt was to discharge home, pt would possibly need 2 caregivers, a lot of equipment and possible modifications. Pt son expressed understanding and acknowledged getting caregiver resources from pt other son which was given to him by this writer.     SW will await response from pt son's and will follow up on discharge plans. If TCU, will need referrals, acceptance, bed avail, indep day, transport and IMM.     Luann Mayen, PUMA, [facility] [place]note 2[facility] Acute Rehab Unit   Phone: [phone]  I   Pager: [phone]    "

## 2020-10-14 NOTE — PROGRESS NOTES
"[facility], [place]   Internal Medicine Daily Note           Interval History/Events     Overnight events reviewed  Reports doing well  Denies any nausea, vomiting, chest pain, shortness of breath         Review of Systems        4 point ROS including Respiratory, CV, GI and , other than that noted above is negative      Medications   I have reviewed current medications  in the \"current medication\" section of Epic.  Relevant changes include:     Physical Exam   General:       Vital signs:    Blood pressure 125/64, pulse 87, temperature 97.7  F (36.5  C), temperature source Oral, resp. rate 20, height 1.753 m (5' 9\"), weight 104.8 kg (231 lb 0.7 oz), SpO2 94 %.  Estimated body mass index is 34.12 kg/m  as calculated from the following:    Height as of this encounter: 1.753 m (5' 9\").    Weight as of this encounter: 104.8 kg (231 lb 0.7 oz).      Intake/Output Summary (Last 24 hours) at 10/13/2020 1053  Last data filed at 10/13/2020 0600  Gross per 24 hour   Intake 440 ml   Output 800 ml   Net -360 ml        Constitutional: Laying in bed in no acute distress  Eye: No icterus, no pallor  Mouth/ENT: Normal oral mucosa  Cardiovascular: S1, S2 normal. B/L pre tibial edema  Respiratory: B/L CTA  GI: Soft, NT, BS+  : No Hayes's catheter  Neurology: Alert, awake, and oriented. Left hemiparesis, neglect  Psych: Mood stable  MSK:   Integumentary:   Heme/Lymph/Imm:      Laboratory and Imaging Studies     I have reviewed  laboratory and imaging studies in the Epic. Pertinent findings are as below:    BMP  Recent Labs   Lab 10/14/20  0534 10/13/20  0641 10/12/20  0553 10/11/20  0644    137 138 133   POTASSIUM 4.0 3.9 4.1 4.7   CHLORIDE 106 105 105 106   SELENA 8.5 8.9 8.8 8.8   CO2 25 25 26 20   BUN 25 27 29 29   CR 0.94 1.01 1.04 0.97   * 176* 206* 220*     CBC  Recent Labs   Lab 10/12/20  0553 10/09/20  0726    162     INRNo lab results found in last 7 days.  LFTsNo lab results " found in last 7 days.   PANCNo lab results found in last 7 days.        Impression/Plan          VEENA secondary to hypovolemia and  hypotension (secondary to decreased intake with discontinuation tube feedings, concurrent use of furosemide, Entresto, valsartan, amlodipine), now resolved with IV fluids,  resumption of scheduled TF, holding of furosemide, entresto, diovan and reduction in Metoprolol and Amlodipine.  Renal function now back to baseline     # Decreased level of consciousness, likely secondary to acute kidney injury, relatively low blood pressure and recent initiation of baclofen.  Baclofen is metabolized by the kidneys.  Baclofen currently on hold. Mental status improved, appears at or close to baseline  - Monitor     # Cough with Temp 99.3 on 10/12: Oxygen sat is normal. 02 sats on RA this am.  At risk for atelectasis, aspiration pneumonia. Cough may also be related to volume overload, secondary to IV fluids, temporarily holding of lasix  - Continue to monitor  - Low threshold for X ray chest     # Recent right hemispheric CVA with left-sided weakness, on aspirin and statin  - Continue Aspirin, Statin  - PT/OT     # History of hypertension and diastolic CHF,  controlled in outpatient setting on the following multiple medications:     Furosemide 40 mg bid, Amlodipine 20 mg daily, Metoprolol XL 25 mg bid (changed to short acting Metoprolol 50 mg bid during recent hospitalization), Entresto 49/51, 1 tab daily, Valsartan 320 mg daily.  Goal -140/ in post CVA setting, history of diastolic CHF.   Entresto, valsartan, on Hold     Has been restarted on Metoprolol 50 mg bid, amlodipine 5 mg bid  - Continue Amlodipine 5 mg BID, Metoprolol 50 mg BID  - Continue Furosemide 20 mg BID (home dose 40 mg BID)  - Continue to monitor on titrate  - Restart Valsartan if BP starts to trend up     # History of CAD:  On ASA and statin, B Blocker  - Continue Aspirin, Metoprolol, Statin     # Hyponatremia, mild, likely  secondary to resumed TF and continued water flushes (initiated when TF recently discontinued and Pt was started on nectar thick liquids), hyperglycemia, resolved with decrease in free water administration     # Diabetes mellitus type 2, labile, in setting of decreased intake, now resumption of TF  On Insulin Isophane 18 units in AM, 22 units in evening, Insulin Aspart pre meal 1:10 CHO, Insulin Aspart correction 1:25, Liraglutide 0.6 daily, and Metformin 500 mg BID  Glucose fairly controlled.   - Diabetes team following. Appreciate management              Pt's care was discussed with bedside RN, patient and  during Care Team Rounds.               Brendan Antony MD  Hospitalist ( Internal medicine)  Pager: [phone]

## 2020-10-14 NOTE — PROGRESS NOTES
CLINICAL NUTRITION SERVICES - REASSESSMENT NOTE     Nutrition Prescription    RECOMMENDATIONS FOR MDs/PROVIDERS TO ORDER:  None - pt was reviewed during care team rounds and RD made adjustments to TF per discussion as below     Malnutrition Status:    Unable to determine due to no NFPA    Recommendations already ordered by Registered Dietitian (RD):  Adjust cycled G-TF of Isosource 1.5 to 60 ml/hr x8 hrs from 9 PM to 5 AM to provide 720 kcal, 32 gm protein, 85 gm CHO, 365 ml water    Adjust water flushes to 160 ml QID at [phone], total water w/TF = 1005 ml    Continue to monitor calorie count, continue supplements as ordered, diet per SLP - if pt can improve oral intakes over the next 24-48 hrs, will consider stopping TF    Future/Additional Recommendations:  Monitor intakes as above  Monitor weight trends  Monitor labs trends        EVALUATION OF THE PROGRESS TOWARD GOALS   Diet: Dysphagia Level 2:  Mechanically Altered and Nectar Thickened Liquids   Supplement: Chocolate Magic Cup with lunch, cherry Gelatein Plus with dinner    Calorie Count:  10/13: 234 kcal and 17 g protein (1 meal, 0 supplements)-dinner ticket saved, unknown intakes of breakfast/lunch/supplements    Nutrition Support: Cycled G-TF with Isosource 1.5 at 60 ml/hr x14 hrs from 6 PM to 8 AM with 100 ml water flush QID to provide 1260 kcal, 57 gm protein, 148 gm CHO, 13 gm fiber, 1038 ml water.     TF tolerance: Pt appears to be tolerating resumption of TF. TF was resumed on 10/10 due to pt having altered LOC, per Provider notes thought to be medication related, with pt now having improvements of LOC back to baseline. Lab trends reviewed, BGL's reviewed.      NEW FINDINGS   Pt was reviewed during care team rounds, nursing staff and rehab MD requesting TF be further adjusted as pt's oral intakes are improving/LOC improving, RD will review [place]s with Endocrinology and make adjustment as appropriate. RD spoke with Endocrinology  over the phone, reviewed [place]s and discussed if pt can increase oral intakes over the coming days, RD would plan to stop TF.     10/14/20 0652 104.8 kg (231 lb 0.7 oz)   10/08/20 0647 109.8 kg (242 lb)   09/30/20 0646 104.1 kg (229 lb 8 oz)   09/24/20 1522 106.5 kg (234 lb 11.2 oz)     115.8 kg (255 lb 3.2 oz) 07/29/2020 Per CE      114.3 kg (252 lb) 03/20/2019 Per CE      Weight assessment: Weight appears fairly stable from admit to ARU. Significant 9.4% weight loss in almost 3 months.      MALNUTRITION  % Intake: Intake does not meet criteria as pt is being supported by EN   % Weight Loss: Possibly up to or > 7.5% in 3 months (non-severe)  Subcutaneous Fat Loss: Unable to assess  Muscle Loss: Unable to assess  Fluid Accumulation/Edema: Trace per flow sheets   Malnutrition Diagnosis: Unable to determine due to no NFPA    Previous Goals   Patient to consume % of nutritionally adequate meal trays TID, or the equivalent with supplements/snacks.  Evaluation: Not met    Previous Nutrition Diagnosis  Predicted inadequate nutrient intake related to chewing/swallowing difficult as evidenced by continued need for modified diet with continued need of water flushes via feeding tube to assure hydration status   Evaluation: No longer applicable, nutrition diagnosis changed below    CURRENT NUTRITION DIAGNOSIS  Inadequate oral intake related to altered LOC and chewing/swallowing difficulty as evidenced by nutrition support resumed, pt continues on modified diet     INTERVENTIONS  Implementation  Calorie count resumed   Enteral nutrition - continue cycled regimen, will be further decreased today to encourage oral intakes   Feeding tube flush - adjusted based on decrease of TF   Medical food supplement therapy - continue as ordered     Goals  Patient to consume % of nutritionally adequate meal trays TID, or the equivalent with supplements/snacks to be again weaned from TF.     Monitoring/Evaluation  Progress  toward goals will be monitored and evaluated per protocol.    Adia Gamboa RD, LD

## 2020-10-14 NOTE — PLAN OF CARE
Denies pain, inc of bladder and smear of BM x 1. Patient need total assist with leah care and clothing management. Patient ate good breakfast, consumed 70  and 41 grams of carb at breakfast and lunch.Crag coverage administered. Tea rounds held today, see note for more details, also see orders for new TF orders and cycle times. Patients son at bedside most of the shift, will continue POC

## 2020-10-14 NOTE — PLAN OF CARE
Acute Rehab Care Conference/Team Rounds    Type: Team Rounds    Present: Dr. Sudarshan Fuentes, Becky Sharma PT, Mara Chappell OTR-L, Mandeep Winn SLP, Luann Mayen SW, Adia Gamboa RD, Donte Parikh RN CC, Bang-O Jatin Britton, Freddy Willis RN     Discharge Barriers/Treatment/Education    Rehab Diagnosis:    Rehab Diagnosis:  Stroke Ischemic 01.1 (L) Body Involvement (R) Brain Stroke; L sided weakness due to MCA infarct, R MCA occlusion, and R carotid atherosclerosis    Active Medical Co-morbidities/Prognosis:   Patient Active Problem List   Diagnosis     CVA (cerebral vascular accident) (H)     Right middle cerebral artery stroke (H)     Class 2 severe obesity due to excess calories with serious comorbidity and body mass index (BMI) of 38.0 to 38.9 in adult (H)     Controlled type 2 diabetes mellitus without complication, without long-term current use of insulin (H)     Coronary atherosclerosis     Essential hypertension     Hyperlipidemia     Hypothyroid     Obstructive sleep apnea syndrome     S/P CABG x 3     Tubular adenoma of colon        Safety: PT is alert but disoriented to time. Needs A2 w/ leah cares, turn and reposition. Liko lift to transfer.    Pain:No complain of pain    Medications, Skin, Tubes/Lines: Crushed meds w/ apple sauce and NTL. Has Peg tube for feeding overnight. Has PIV line to left wrist.    Swallowing/Nutrition: Continues NDD-2 but showing improvement with tolerance of NDD-3 textures with improving potential to advance. Some decrease in quantity of PO requiring restart of PEG feedings.     Bowel/Bladder: Incontinent of bladder/ bowel. LBM 10/13/20    Psychosocial: . Lives alone in a home with 1 KIERRA. Indep PTA. No mental health or substance abuse PTA. Three adult sons who are supportive and involved in pt care. Retired and has LTC insurance policy.     ADLs/IADLs:ADLs:G/H min a sitting eob with chest level able to get under l arm for washing and applying deoderant,  sba face level. U/B dressing max a of 1 for button down shirt and pull over shirt, sitting eob verbal cueing for sequencing; dep bL/B dressing bed Mobilit dep with liko lift to eob pt able to sit 45 min eob with leg portion of lift removed with g/h and unit(s)/b dressing and cga-sba of therapist for balance IADLs: -  Vision/Cognition: Left neglect, increase attention with cues for sequencing increasing participation with g/h dressing and eating    Mobility: w/c based w/ assist; liko for transfers; bed mobility assist of two; apraxia limiting; continues w/ decreased sensation in all modalities on (L) body; participates as requested in sessions, progressing    Cognition/Language: Ongoing dysarthria but overall showing improvement and requiring less repetitions. Ongoing left side neglect but also showing improvement. Difficulties with basic-moderate level reasoning/problem solving. 24/7 supervision needs anticipated. Ongoing SLP intervention at discharge.     Community Re-Entry: w/c based w/ assist    Transportation: w/c based    Decision maker: self    Plan of Care and goals reviewed and updated.    Discharge Plan/Recommendations    Fall Precautions: continue    Patient/Family input to goals: Yes    Anticipated rehab needs following discharge: Likely TCU    Anticipated care giver support after discharge: TCU    Estimated length of stay: 28 days    Overall plan for the patient: Continue IP Rehabilitation.      Utilization Review and Continued Stay Justification    Medical Necessity Criteria:    For any criteria that is not met, please document reason and plan for discharge, transfer, or modification of plan of care to address.    Requires intensive rehabilitation program to treat functional deficits?: Yes    Requires 3x per week or greater involvement of rehabilitation physician to oversee rehabilitation program?: Yes    Requires rehabilitation nursing interventions?: Yes    Patient is making functional progress?:  Yes    There is a potential for additional functional progress? Yes    Patient is participating in therapy 3 hours per day a minimum of 5 days per week or 15 hours per week in 7 day period?:Yes    Has discharge needs that require coordinated discharge planning approach?:Yes        Final Physician Sign off    Statement of Approval: I concur with the plan of care    Patient Goals  Social Work Goals: Confirm discharge [place]s with therapy, coordinate safe discharge plan and remain available to support and assist as needed.       OT Frequency: 4 weeks 60-90 miin daily  OT goal: hygiene/grooming: modified independent  OT goal: upper body dressing: Minimal assist  OT goal: lower body dressing: Minimal assist  OT goal: upper body bathing: Supervision/stand-by assist  OT goal: lower body bathing: Minimal assist  OT goal: bed mobility: Supervision/stand-by assist     OT goal: toilet transfer/toileting: Moderate assist  OT goal: meal preparation: Moderate assist  OT goal: home management: Minimal assist           OT goal 1: pt will be mod a of 1 transer to extended tub bench  OT goal 2: Pt will be min a with  prom aarom l u/e and arom ex with r U/E                PT Frequency: daily x 60 minutes  PT goal: bed mobility: Minimal assist  PT goal: transfers: Minimal assist, Assistive device         PT goal 1: will work w/ vendor to attain recommended equipment for home  PT Goal 2: will be able to sit EOB w/ intermittent single hand support x 10 minutes       SLP Frequency: daily  SLP Swallow Goal:  Safely tolerate diet without signs/symptoms of aspiration: Dysphagia level 3 diet, thin liquids, with use of compensatory swallow strategies, with use of swallow precautions, with assistance/supervision  SLP Language Goal:  Improve language comprehension for interaction with caregivers/environment: minimal assist, auditory and written  SLP Communication goal:  Communicate basic wants and needs: minimal assist, verbally   SLP  goal 1: Pt to complete basic-mod level attention and reasoning/problem solving tasks with 90% acc given min A to maximize independence with tasks of daily living and therapy tasks.

## 2020-10-15 ENCOUNTER — APPOINTMENT (OUTPATIENT)
Dept: PHYSICAL THERAPY | Facility: CLINIC | Age: 70
End: 2020-10-15
Payer: COMMERCIAL

## 2020-10-15 ENCOUNTER — APPOINTMENT (OUTPATIENT)
Dept: SPEECH THERAPY | Facility: CLINIC | Age: 70
End: 2020-10-15
Payer: COMMERCIAL

## 2020-10-15 LAB
ANION GAP SERPL CALCULATED.3IONS-SCNC: 6 MMOL/L (ref 3–14)
BUN SERPL-MCNC: 22 MG/DL (ref 7–30)
CALCIUM SERPL-MCNC: 8.6 MG/DL (ref 8.5–10.1)
CHLORIDE SERPL-SCNC: 103 MMOL/L (ref 94–109)
CO2 SERPL-SCNC: 27 MMOL/L (ref 20–32)
CREAT SERPL-MCNC: 0.82 MG/DL (ref 0.66–1.25)
GFR SERPL CREATININE-BSD FRML MDRD: 89 ML/MIN/{1.73_M2}
GLUCOSE BLDC GLUCOMTR-MCNC: 103 MG/DL (ref 70–99)
GLUCOSE BLDC GLUCOMTR-MCNC: 120 MG/DL (ref 70–99)
GLUCOSE BLDC GLUCOMTR-MCNC: 130 MG/DL (ref 70–99)
GLUCOSE BLDC GLUCOMTR-MCNC: 136 MG/DL (ref 70–99)
GLUCOSE SERPL-MCNC: 139 MG/DL (ref 70–99)
PLATELET # BLD AUTO: 207 10E9/L (ref 150–450)
POTASSIUM SERPL-SCNC: 4 MMOL/L (ref 3.4–5.3)
SODIUM SERPL-SCNC: 136 MMOL/L (ref 133–144)

## 2020-10-15 PROCEDURE — 97110 THERAPEUTIC EXERCISES: CPT | Mod: GP

## 2020-10-15 PROCEDURE — 250N000013 HC RX MED GY IP 250 OP 250 PS 637: Performed by: PHYSICIAN ASSISTANT

## 2020-10-15 PROCEDURE — 99232 SBSQ HOSP IP/OBS MODERATE 35: CPT | Performed by: INTERNAL MEDICINE

## 2020-10-15 PROCEDURE — 999N001017 HC STATISTIC GLUCOSE BY METER IP

## 2020-10-15 PROCEDURE — 99207 PR CDG-CUT & PASTE-POTENTIAL IMPACT ON LEVEL: CPT | Performed by: INTERNAL MEDICINE

## 2020-10-15 PROCEDURE — 99233 SBSQ HOSP IP/OBS HIGH 50: CPT | Performed by: PHYSICIAN ASSISTANT

## 2020-10-15 PROCEDURE — 250N000013 HC RX MED GY IP 250 OP 250 PS 637: Performed by: INTERNAL MEDICINE

## 2020-10-15 PROCEDURE — 97140 MANUAL THERAPY 1/> REGIONS: CPT | Mod: GP | Performed by: PHYSICAL THERAPIST

## 2020-10-15 PROCEDURE — 97530 THERAPEUTIC ACTIVITIES: CPT | Mod: GP

## 2020-10-15 PROCEDURE — 36415 COLL VENOUS BLD VENIPUNCTURE: CPT | Performed by: PHYSICAL MEDICINE & REHABILITATION

## 2020-10-15 PROCEDURE — 128N000003 HC R&B REHAB

## 2020-10-15 PROCEDURE — 97530 THERAPEUTIC ACTIVITIES: CPT | Mod: GP | Performed by: PHYSICAL THERAPIST

## 2020-10-15 PROCEDURE — 85049 AUTOMATED PLATELET COUNT: CPT | Performed by: PHYSICAL MEDICINE & REHABILITATION

## 2020-10-15 PROCEDURE — 80048 BASIC METABOLIC PNL TOTAL CA: CPT | Performed by: PHYSICAL MEDICINE & REHABILITATION

## 2020-10-15 PROCEDURE — 92507 TX SP LANG VOICE COMM INDIV: CPT | Mod: GN | Performed by: SPEECH-LANGUAGE PATHOLOGIST

## 2020-10-15 PROCEDURE — 92526 ORAL FUNCTION THERAPY: CPT | Mod: GN | Performed by: SPEECH-LANGUAGE PATHOLOGIST

## 2020-10-15 PROCEDURE — 250N000013 HC RX MED GY IP 250 OP 250 PS 637: Performed by: PHYSICAL MEDICINE & REHABILITATION

## 2020-10-15 PROCEDURE — 97014 ELECTRIC STIMULATION THERAPY: CPT | Mod: GP | Performed by: PHYSICAL THERAPIST

## 2020-10-15 PROCEDURE — 99233 SBSQ HOSP IP/OBS HIGH 50: CPT | Performed by: PHYSICAL MEDICINE & REHABILITATION

## 2020-10-15 RX ORDER — CALCIUM CARBONATE 500 MG/1
500 TABLET, CHEWABLE ORAL 4 TIMES DAILY PRN
Status: DISCONTINUED | OUTPATIENT
Start: 2020-10-15 | End: 2020-10-23 | Stop reason: HOSPADM

## 2020-10-15 RX ADMIN — SALINE NASAL SPRAY 1 SPRAY: 1.5 SOLUTION NASAL at 14:57

## 2020-10-15 RX ADMIN — FUROSEMIDE 20 MG: 20 TABLET ORAL at 16:40

## 2020-10-15 RX ADMIN — FAMOTIDINE 20 MG: 20 TABLET ORAL at 08:12

## 2020-10-15 RX ADMIN — METOPROLOL TARTRATE 50 MG: 50 TABLET, FILM COATED ORAL at 08:12

## 2020-10-15 RX ADMIN — CALCIUM CARBONATE (ANTACID) CHEW TAB 500 MG 500 MG: 500 CHEW TAB at 12:40

## 2020-10-15 RX ADMIN — EZETIMIBE 10 MG: 10 TABLET ORAL at 08:12

## 2020-10-15 RX ADMIN — ASPIRIN 81 MG: 81 TABLET, COATED ORAL at 08:11

## 2020-10-15 RX ADMIN — METFORMIN HYDROCHLORIDE 1000 MG: 500 TABLET ORAL at 08:11

## 2020-10-15 RX ADMIN — SALINE NASAL SPRAY 1 SPRAY: 1.5 SOLUTION NASAL at 21:40

## 2020-10-15 RX ADMIN — MULTIVITAMIN 15 ML: LIQUID ORAL at 17:23

## 2020-10-15 RX ADMIN — NYSTATIN 500000 UNITS: 500000 SUSPENSION ORAL at 16:42

## 2020-10-15 RX ADMIN — FAMOTIDINE 20 MG: 20 TABLET ORAL at 21:30

## 2020-10-15 RX ADMIN — FUROSEMIDE 20 MG: 20 TABLET ORAL at 08:12

## 2020-10-15 RX ADMIN — AMLODIPINE BESYLATE 5 MG: 5 TABLET ORAL at 21:30

## 2020-10-15 RX ADMIN — NYSTATIN 500000 UNITS: 500000 SUSPENSION ORAL at 21:30

## 2020-10-15 RX ADMIN — METOPROLOL TARTRATE 50 MG: 50 TABLET, FILM COATED ORAL at 21:30

## 2020-10-15 RX ADMIN — AMLODIPINE BESYLATE 5 MG: 5 TABLET ORAL at 08:11

## 2020-10-15 RX ADMIN — ATORVASTATIN CALCIUM 40 MG: 40 TABLET, FILM COATED ORAL at 08:12

## 2020-10-15 RX ADMIN — LIRAGLUTIDE 1.2 MG: 6 INJECTION SUBCUTANEOUS at 08:22

## 2020-10-15 RX ADMIN — LEVOTHYROXINE SODIUM 50 MCG: 25 TABLET ORAL at 08:11

## 2020-10-15 RX ADMIN — METFORMIN HYDROCHLORIDE 1000 MG: 500 TABLET ORAL at 17:23

## 2020-10-15 RX ADMIN — SALINE NASAL SPRAY 1 SPRAY: 1.5 SOLUTION NASAL at 16:39

## 2020-10-15 RX ADMIN — NYSTATIN 500000 UNITS: 500000 SUSPENSION ORAL at 08:12

## 2020-10-15 RX ADMIN — NYSTATIN 500000 UNITS: 500000 SUSPENSION ORAL at 12:40

## 2020-10-15 ASSESSMENT — MIFFLIN-ST. JEOR: SCORE: 1851.38

## 2020-10-15 NOTE — PROGRESS NOTES
"[facility], [place]   Internal Medicine Daily Note           Interval History/Events     Overnight events reviewed  Reports doing well  No nausea, vomiting  No chest pain, shortness of breath  No fever, chills.        Review of Systems        4 point ROS including Respiratory, CV, GI and , other than that noted above is negative      Medications   I have reviewed current medications  in the \"current medication\" section of Epic.  Relevant changes include:     Physical Exam   General:       Vital signs:    Blood pressure (!) 148/78, pulse 90, temperature 97.6  F (36.4  C), temperature source Axillary, resp. rate 18, height 1.753 m (5' 9\"), weight 110.6 kg (243 lb 13.3 oz), SpO2 94 %.  Estimated body mass index is 36.01 kg/m  as calculated from the following:    Height as of this encounter: 1.753 m (5' 9\").    Weight as of this encounter: 110.6 kg (243 lb 13.3 oz).      Intake/Output Summary (Last 24 hours) at 10/13/2020 1053  Last data filed at 10/13/2020 0600  Gross per 24 hour   Intake 440 ml   Output 800 ml   Net -360 ml        Constitutional: Laying in bed in no acute distress  Eye: No icterus, no pallor  Mouth/ENT: Normal oral mucosa  Cardiovascular: S1, S2 normal. B/L pre tibial edema  Respiratory: B/L CTA  GI: Soft, NT, BS+  : No Hayes's catheter  Neurology: Alert, awake, and oriented. Left hemiparesis, neglect  Psych: Mood stable  MSK:   Integumentary:   Heme/Lymph/Imm:      Laboratory and Imaging Studies     I have reviewed  laboratory and imaging studies in the Epic. Pertinent findings are as below:    BMP  Recent Labs   Lab 10/15/20  0738 10/14/20  0534 10/13/20  0641 10/12/20  0553    138 137 138   POTASSIUM 4.0 4.0 3.9 4.1   CHLORIDE 103 106 105 105   SELENA 8.6 8.5 8.9 8.8   CO2 27 25 25 26   BUN 22 25 27 29   CR 0.82 0.94 1.01 1.04   * 155* 176* 206*     CBC  Recent Labs   Lab 10/15/20  0738 10/12/20  0553 10/09/20  0726    202 162     INRNo lab results " found in last 7 days.  LFTsNo lab results found in last 7 days.   PANCNo lab results found in last 7 days.        Impression/Plan          VEENA secondary to hypovolemia and  hypotension (secondary to decreased intake with discontinuation tube feedings, concurrent use of furosemide, Entresto, valsartan, amlodipine), now resolved with IV fluids,  resumption of scheduled TF, holding of furosemide, entresto, diovan and reduction in Metoprolol and Amlodipine.  Renal function now back to baseline     # Decreased level of consciousness, likely secondary to acute kidney injury, relatively low blood pressure and recent initiation of baclofen.  Baclofen is metabolized by the kidneys.  Baclofen currently on hold. Mental status improved, appears at or close to baseline  - Monitor     # Cough with Temp 99.3 on 10/12: Oxygen sat is normal. 02 sats on RA this am.  At risk for atelectasis, aspiration pneumonia. Cough may also be related to volume overload, secondary to IV fluids, temporarily holding of lasix  - Continue to monitor  - Low threshold for X ray chest     # Recent right hemispheric CVA with left-sided weakness, on aspirin and statin  - Continue Aspirin, Statin  - PT/OT     # History of hypertension and diastolic CHF,  controlled in outpatient setting on  Furosemide 40 mg bid, Amlodipine 20 mg daily, Metoprolol XL 25 mg bid (changed to short acting Metoprolol 50 mg bid during recent hospitalization), Entresto 49/51, 1 tab daily, Valsartan 320 mg daily.  Goal -140/ in post CVA setting, history of diastolic CHF.        Has been restarted on Metoprolol 50 mg bid, amlodipine 5 mg bid  - Continue Amlodipine 5 mg BID, Metoprolol 50 mg BID  - Continue Furosemide 20 mg BID (home dose 40 mg BID)  - Continue to monitor on titrate  - Entresto, valsartan, on Hold  - Restart Valsartan if BP starts to trend up     # History of CAD:  On ASA and statin, B Blocker  - Continue Aspirin, Metoprolol, Statin     # Hyponatremia, mild,  likely secondary to resumed TF and continued water flushes (initiated when TF recently discontinued and Pt was started on nectar thick liquids), hyperglycemia, resolved with decrease in free water administration     # Diabetes mellitus type 2, labile, in setting of decreased intake, now resumption of TF  On Insulin Isophane 18 units in AM, 22 units in evening, Insulin Aspart pre meal 1:10 CHO, Insulin Aspart correction 1:25, Liraglutide 0.6 daily, and Metformin 500 mg BID  Glucose fairly controlled.   - Diabetes team following. Appreciate management              Pt's care was discussed with bedside RN, patient and  during Care Team Rounds.               Brendan Antony MD  Hospitalist ( Internal medicine)  Pager: [phone]

## 2020-10-15 NOTE — PROGRESS NOTES
Pt has been alert and oriented on my shift to time, situation, person, and partially place (could say he was in [place], but not the facility name or that he was in the hospital). Had a sense of humor tonight, minimal slurred speech. TF adjusted today 9pm-5am, at 60ml/hour, gave 5 units of insulin with tube feed per orders. BG 82 at dinner, gave insulin per carb count, and 120 at HS with no sliding scale coverage. Pt ate well, 75% of meal. Pt is incontinent of bowel/bladder. Liko lift. No pain. Continue POC.

## 2020-10-15 NOTE — PROGRESS NOTES
Notified by ARROXIE LIMA that patient wanted to talk with PETER. Met with patient and son in his room. Patient's son shared that they are leaning towards patient going to TCU, but are hesitant about visitor restrictions. SW offered to provide a list of Medicare certified facilities, but son indicated ARU PETER already provided copy. SW encouraged son to call facilities on the list and ask what their visitor policy is. Son agreed that he and his brothers will call TCUs today and tomorrow, then update PETER via phone or email on ones they want referrals sent to. PETER was given impression that if patient is not accepted at a TCU that allows visitors, then family will take patient home. Will update ARROXIE GREEN.     ALEYDA Alfaro,SW  TCU    Phone: [phone]  Pager: [phone]

## 2020-10-15 NOTE — PLAN OF CARE
Discharge Planner Post-Acute Rehab PT:      Discharge Plan:  home w/ hired physical assist vs TCU     Precautions: * falls, (L) UE flaccid, shannon neglect, field cut     Current Status:  Transfer: liko  Gait: w/c dependent  Stairs: unable  Balance: Mod->SBA w/ static sitting, poor midline orientation, L neglect     Assessment: Improved alertness and participation in sessions today.  Use TENS and NMES every session.   Other Barriers to Discharge (DME, Family Training, etc): level of physical assist, pt will need a wheelchair eval if discharging to home

## 2020-10-15 NOTE — PLAN OF CARE
FOCUS/GOAL  Medication management and Medical management    ASSESSMENT, INTERVENTIONS AND CONTINUING PLAN FOR GOAL:  Alert, can be disoriented to place. A2 w/ lift. Makes needs known, alarms on.  Pt denied pain during night.  Incontinent of B/B, LBM 10/14.  Tube feeding ran from [id number].  Continue with POC.

## 2020-10-15 NOTE — PROGRESS NOTES
IP Diabetes Management  Daily Note           Assessment and Plan:   HPI: Jorje Teran is a 69 y/o male with past medical history of HTN, HLD, DM II, SHELDON, hypothyroidism, and morbid obesity who was admitted 9/7/20 after being found down, found to have a R MCA infarct and R carotid atherosclerosis, now admitted to ARU 9/24 for intensive therapies, ongoing medical management, and rehabilitative nursing care.      Assessment:   1)Type II Diabetes Mellitus, II with suboptimal diet control PTA (A1c 7.5%), with tube feed induced hyperglycemia.      Plan:    -continue Metformin IR  1000mg BID    -continue Victoza 1.2 mg daily this AM.   -trial discontinuation of AM NPH today   -discontinue aspart carb coverage 1:15g CHO with meals, snacks today   -reduce Novolog fixed dose with start of tube feeds from 5>3. This will discontinue once tube feeds are stopped.   -aspart high intensity sliding scale >140 AC/HS/0200, while on overnight cycled TF- when off cycled TF can discontinue scheduled 0200 correction dose.    -BG monitoring TID AC, HS, 0200   -hypoglycemia protocol   -carb counting protocol   -diabetes education will be needed prior to discharge home, for glucometer, and injection teaching (GLP1, possibly insulin). Placed consult on 10/15.     Discharge Planning:    -BG monitoring before meals and at bedtime initially, then can reduce pending outpatient follow up.   -Metformin IR 1000mg BID   -GLP1 (either Victoza 1.2-1.8mg daily or weekly Trulicity 1.5 mg weekly)   -if discharging off tube feeds, anticipate all insulin can be discontinued upon discharge.     Outpatient follow up: TBD  Plan discussed with patient, son, and primary team paged.     Interval History and Assessment: interval glucose trend reviewed:  BG remaining well controlled, actually to low end of target now with full strength Metformin on board.     Trial discontinuation of all scheduled insulin today, with the exception for small Novolog dose to cover  8 hour cycled TF. Kcal counts 900 cals yesterday. No mention of wether TF will discontinue, but Ron is hopeful.     Cr stable, 0.9>0.82 and GFR 81>89 today. Continuing to monitor renal function.      Recommendation is for TCU prior to home; patient would prefer discharge to home, and son concerned about visiting restrictions at TCU's. Discussed with family no anticipated need for insulin on discharge, but did have to remind them one of his proposed medications (GLP1) is injectable. They would prefer no injections. Discussed that orals alone will likely not be effective, and reviewed need for aggressive diabetes management to prevent future strokes.     Current nutritional intake and type: Orders Placed This Encounter      Combination Diet Dysphagia Diet Level 2: Mechan Altered; Nectar Thickened Liquids (pre-thickened or use instant food thickener)  cycled TF: Isosource 1.5 at 65cc/hour, from 9PM-5A (8 hours) as of 10/14.    PTA Diabetes Regimen:   TIIDM was reportedly diet controlled prior to admission.     Discharge Planning: per notes, target discharge date of 10/23.        Diabetes History:   Type of Diabetes: Type 2 Diabetes Mellitus, TPN/Enteral Feeding Induced Hyperglycemia  Lab Results   Component Value Date    A1C 7.5 09/10/2020              Review of Systems:     The Review of Systems is negative other than noted in the Interval History.           Medications:     Current Facility-Administered Medications   Medication     acetaminophen (TYLENOL) tablet 650 mg     amLODIPine (NORVASC) tablet 5 mg     aspirin EC tablet 81 mg     atorvastatin (LIPITOR) tablet 40 mg     bacitracin ointment     bisacodyl (DULCOLAX) Suppository 10 mg     calcium carbonate (TUMS) chewable tablet 500 mg     carboxymethylcellulose PF (REFRESH PLUS) 0.5 % ophthalmic solution 2 drop     dextrose 10% infusion     glucose gel 15-30 g    Or     dextrose 50 % injection 25-50 mL    Or     glucagon injection 1 mg     ezetimibe (ZETIA)  "tablet 10 mg     famotidine (PEPCID) tablet 20 mg     furosemide (LASIX) tablet 20 mg     insulin aspart (NovoLOG) injection (RAPID ACTING)     insulin aspart (NovoLOG) injection (RAPID ACTING)     insulin aspart (NovoLOG) injection (RAPID ACTING)     levothyroxine (SYNTHROID/LEVOTHROID) tablet 50 mcg     liraglutide (VICTOZA) injection 1.2 mg     metFORMIN (GLUCOPHAGE) tablet 1,000 mg     metoprolol tartrate (LOPRESSOR) tablet 50 mg     miconazole (MICATIN) 2 % powder     multivitamins w/minerals (CERTAVITE) liquid 15 mL     nitroGLYcerin (NITROSTAT) sublingual tablet 0.4 mg     nystatin (MYCOSTATIN) suspension 500,000 Units     Patient is already receiving anticoagulation with heparin, enoxaparin (LOVENOX), warfarin (COUMADIN)  or other anticoagulant medication     polyethylene glycol (MIRALAX) Packet 17 g     simethicone (MYLICON) suspension 40 mg     sodium chloride (OCEAN) 0.65 % nasal spray 1 spray     sodium chloride (PF) 0.9% PF flush 10 mL     sodium chloride (PF) 0.9% PF flush 10-20 mL     traZODone (DESYREL) tablet 50 mg            Physical Exam:    BP (!) 148/78 (BP Location: Right arm)   Pulse 90   Temp 97.6  F (36.4  C) (Axillary)   Resp 18   Ht 1.753 m (5' 9\")   Wt 110.6 kg (243 lb 13.3 oz)   SpO2 94%   BMI 36.01 kg/m    General: pleasant, in no distress. Sitting up in chair. Son present and attentive.  HEENT: normocephalic, atraumatic. Oral mucous membranes moist.   Lungs: unlabored respiration, no cough.  ABD: rounded, soft  Skin: warm and dry, no obvious lesions  MSK:  moves all extremities  Lymp:  no LE edema   Mental status:  alert, oriented to self, place, certain aspects of situation  Psych:  affect, calm and appropriate interaction             Data:     Recent Labs   Lab 10/15/20  0756 10/15/20  0738 10/15/20  0139 10/14/20  2208 10/14/20  1754 10/14/20  0720 10/14/20  0534 10/14/20  0218 10/13/20  0641 10/13/20  0641 10/12/20  0553 10/12/20  0553 10/11/20  0644 10/11/20  0644 " 10/10/20  0833 10/10/20  0833   GLC  --  139*  --   --   --   --  155*  --   --  176*  --  206*  --  220*  --  141*   *  --  103* 120* 82 156*  --  141*   < >  --    < >  --    < >  --    < >  --     < > = values in this interval not displayed.     Lab Results   Component Value Date    WBC 9.3 09/30/2020    WBC 9.5 09/19/2020    WBC 9.7 09/18/2020    HGB 11.6 (L) 09/30/2020    HGB 11.2 (L) 09/19/2020    HGB 11.6 (L) 09/18/2020    HCT 36.3 (L) 09/30/2020    HCT 33.9 (L) 09/19/2020    HCT 34.6 (L) 09/18/2020    MCV 96 09/30/2020    MCV 92 09/19/2020    MCV 92 09/18/2020     10/15/2020     10/12/2020     10/09/2020     Lab Results   Component Value Date     10/15/2020     10/14/2020     10/13/2020    POTASSIUM 4.0 10/15/2020    POTASSIUM 4.0 10/14/2020    POTASSIUM 3.9 10/13/2020    CHLORIDE 103 10/15/2020    CHLORIDE 106 10/14/2020    CHLORIDE 105 10/13/2020    CO2 27 10/15/2020    CO2 25 10/14/2020    CO2 25 10/13/2020     (H) 10/15/2020     (H) 10/14/2020     (H) 10/13/2020     Lab Results   Component Value Date    BUN 22 10/15/2020    BUN 25 10/14/2020    BUN 27 10/13/2020     No results found for: TSH  Lab Results   Component Value Date    AST 23 09/07/2020    ALT 28 09/07/2020    ALKPHOS 67 09/07/2020     I spent a total of 35 minutes bedside and on the inpatient unit managing glycemic care. Over 50% of my time on the unit was spent counseling the patient and/or coordinating care regarding acute hyperglycemia management.  See note for details.    To contact Endocrine Diabetes service:   From 8AM-4PM: page inpatient diabetes provider that is following the patient  For questions or updates from 4PM-8AM: page the diabetes job code for on call fellow: [phone]    Vale Funes PA-C  Inpatient Diabetes Management Service  Pager [phone]

## 2020-10-15 NOTE — PLAN OF CARE
FOCUS/GOAL  Bowel management, Bladder management, and Mobility    ASSESSMENT, INTERVENTIONS AND CONTINUING PLAN FOR GOAL:  Pt is alert, disoriented to place. Denies pain or SOB. VSS. Incont of urine. Random bladder scan 233mL. Pt both cont and incont of stool, had large soft BM. Diet upgraded to dd3 with nectar. Transfers with liko lift. Meds given crushed in applesauce.

## 2020-10-15 NOTE — PLAN OF CARE
Discharge Planner Post-Acute Rehab SLP:      Discharge Plan: to be determined, at this time pt would need 24 hour support; ? TCU vs HH with 24 hr supervision      Precautions: fall, swallowing, 1:1 assist/supervision at meals     Current Status:   Swallowing: Advanced diet to DD3 solids and still  with nectar-improved overall with tolerance of DD3 solids today. Also initiated Free water program- with supervision: ok for 5-6 ice chips or 5-6 small teaspoons of water per hour following oral cares and only in between meals     Communication: moderate dysarthria, variable intelligibility.     Cognition: moderate/ impairment for basic orientation, memory, reasoning        Assessment: Needing max cues to use dysarthria strategies of slow rate, exaggerated articulation, increased breath support with talking on exhalation for improved loudness-- practiced with short phrases and sentences.    Swallowing: completed oral cares and then trials of thin in isolation: ice chips x3 - had coughing on 1st trial but then no aspiration s/s on the other 2. With thin water by teaspoon- coughed 1x on 1 out of 4 trials as well as wet vocal quality on this one but then no aspiration s/s on the other 3. Initiated Free water program - must have supervision: ok for 5-6 ice chips per hour or 5-6 small sips of water by teaspoon per hour following good oral cares and only in between meals. Pt also seen at meal for trial of DD3 solids with nectar thick liquids- improved oral prep of DD3 solids; no oral residue on the left; no sensation of pharyngeal retention. Pt generally tolerating without difficulty. Pt did have a cough 1x but appeared unrelated to DD3 solids- note: pt also has a baseline cough without any po intake. There were no s/s of aspiration with nectar thick liquids with small sips by cup rim. Recommending upgrade diet to DD3 with nectar thick liquids; upright for all po, set up tray, small bites/sips, alternate solids and liquids, pace  self- eat slowly. Will also continue with free water program- see above and trial thin in isolation- plan to repeat VFSS early part of next week ( Monday or Tuesday)          Other Barriers to Discharge (Family Training, etc):  Training, discharge plan/support, tube feed training and/or adequate PO intake

## 2020-10-15 NOTE — PROGRESS NOTES
"  [facility]   Acute Rehabilitation Unit  Daily progress note  CC:   Stroke Ischemic 01.1 (L) Body Involvement (R) Brain Stroke; L sided weakness due to MCA infarct, R MCA occlusion, and R carotid atherosclerosis    S:   Feels much improved. Significant impairment due to hemisensory deficit, fiedcut, and hemiplegia.   Off Baclofen. Needed adjustment of BP medications. Improved since. Incontinent but not retaining.  Now beginning to move his left side!  TR held 10/14/2020  Dense left hemiplegia, left field cut +. Compensates by turning.  BS monitored. Endo following.  NDD 2 Pheba.  TF continued.      ROS: Denies HA, nausea, pain.  Cath. Alertness improved. Coughs.        [unfilled]   Scheduled meds    amLODIPine  5 mg Oral BID     aspirin  81 mg Oral Daily     atorvastatin  40 mg Oral Daily     ezetimibe  10 mg Oral Daily     famotidine  20 mg Oral BID     furosemide  20 mg Oral or Feeding Tube BID     insulin aspart  3 Units Subcutaneous Daily     insulin aspart  1-7 Units Subcutaneous BID     insulin aspart  1-10 Units Subcutaneous TID AC     levothyroxine  50 mcg Oral Daily     liraglutide  1.2 mg Subcutaneous Daily     metFORMIN  1,000 mg Oral BID w/meals     metoprolol tartrate  50 mg Oral BID     multivitamins w/minerals  15 mL Oral Daily     nystatin  500,000 Units Swish & Spit 4x Daily     sodium chloride  1 spray Both Nostrils 4x daily     sodium chloride (PF)  10 mL Intracatheter Q7 Days       PRN meds:  acetaminophen, bacitracin, bisacodyl, calcium carbonate, carboxymethylcellulose PF, dextrose, glucose **OR** dextrose **OR** glucagon, miconazole, nitroGLYcerin, - MEDICATION INSTRUCTIONS -, polyethylene glycol, simethicone, sodium chloride (PF), traZODone      PHYSICAL EXAM  BP (!) 148/78 (BP Location: Right arm)   Pulse 90   Temp 97.6  F (36.4  C) (Axillary)   Resp 18   Ht 1.753 m (5' 9\")   Wt 110.6 kg (243 lb 13.3 oz)   SpO2 94%   BMI 36.01 kg/m    Alert " verbal. Sitting better. Verbalizing at baseline now.   Respiratory: Clear to auscultation bilaterally, no crackles or wheezing  Cardiovascular: S1 and S2, RRR, and no murmur noted  GI: soft, non-distended, non-tender. G tube intact + BS +  Skin/Integumen: No rashes, trace lower extremity edema noted  Neuro : Left lower Facial droop + Left Field cut +  Dense left-sided hemiparesis with neglect noted. He is able to adduct the UE partly 2-/5, able to bring knees together, 2-  Tone +. Reflexes +    Vocalizes, low volume   Extremities. Moves right side well. Edema +    LABS  Last Comprehensive Metabolic Panel:  Sodium   Date Value Ref Range Status   10/15/2020 136 133 - 144 mmol/L Final     Potassium   Date Value Ref Range Status   10/15/2020 4.0 3.4 - 5.3 mmol/L Final     Chloride   Date Value Ref Range Status   10/15/2020 103 94 - 109 mmol/L Final     Carbon Dioxide   Date Value Ref Range Status   10/15/2020 27 20 - 32 mmol/L Final     Anion Gap   Date Value Ref Range Status   10/15/2020 6 3 - 14 mmol/L Final     Glucose   Date Value Ref Range Status   10/15/2020 139 (H) 70 - 99 mg/dL Final     Urea Nitrogen   Date Value Ref Range Status   10/15/2020 22 7 - 30 mg/dL Final     Creatinine   Date Value Ref Range Status   10/15/2020 0.82 0.66 - 1.25 mg/dL Final     GFR Estimate   Date Value Ref Range Status   10/15/2020 89 >60 mL/min/[1.73_m2] Final     Comment:     Non  GFR Calc  Starting 12/18/2018, serum creatinine based estimated GFR (eGFR) will be   calculated using the Chronic Kidney Disease Epidemiology Collaboration   (CKD-EPI) equation.       Calcium   Date Value Ref Range Status   10/15/2020 8.6 8.5 - 10.1 mg/dL Final      CBC RESULTS:   Recent Labs     CBC RESULTS:   Recent Labs   Lab Test 09/30/20  0607   WBC 9.3   RBC 3.79*   HGB 11.6*   HCT 36.3*   MCV 96   MCH 30.6   MCHC 32.0   RDW 12.9        CBC RESULTS:   Lab Test 10/12/20  0553   WBC  --    RBC  --    HGB  --    HCT  --    MCV  --     MCH  --    MCHC  --    RDW  --            Glucose Values Latest Ref Rng & Units 10/14/2020 10/14/2020 10/14/2020 10/14/2020 10/15/2020 10/15/2020 10/15/2020   Bedside Glucose (mg/dl )  - -- -- -- -- -- -- --   GLUCOSE 70 - 99 mg/dL 155(H) 156(H) 82 120(H) 103(H) 139(H) 136(H)   Some recent data might be hidden     ASSESSMENT AND PLAN    Jorje Teran is a 70 year old  hand dominant male with Stroke Ischemic 01.1 (L) Body Involvement (R) Brain Stroke; L sided weakness due to MCA infarct, R MCA occlusion, and R carotid atherosclerosis  1. PT, OT and SLP 60 minutes of each on a daily basis, in addition to rehab nursing and close management of physiatrist.       2. Impairment of ADL's: OT for 60 minutes daily to work on ADL re-training such as grooming, self cares and bathing.       3. Impairment of mobility:  PT for 60 minutes daily to work on neuromuscular re-education focusing on strength, balance, coordination, and endurance.       4. Impairment of cognition/language/swallow:  SLP for 60 minutes daily to work on cognitive and linguistic deficits.     Rehab RN for med administration, bowel regimen, glucose monitoring and wound care.    1. Medical Conditions  CVA with dense left hemiparesis  CT with filling defect within the distal R ICA extending into the R MCA and occlusion of the R MCA at the M1 segment. CTA with severe plaquing at R carotid bifurcation with filling defect in the prox R ICA suggestive of thrombus contributing to approximately 80% stenosis. 60% stenosis of the proximal L ICA. TTE with bubble -> Normal left ventricular size and function. Left ventricular ejection fraction of 55-60%. No segmental wall motion abnormalities noted.   - Lipids 7/29/20- LDL 78, HDL 56, , T chol 164, hemoglobin A1c 7.1% H, TSH is 2.05.  - remains with significant dysarthria, facial droop and dense left sided hemiparesis  Spasticity: Stretching, ice + Off Baclofen as was noted to become drowsy.  Better now. Some strength returning!    - ASA 81 mg daily PO/OR  - atorvastatin 40 mg daily, Zetia 10 mg po daily    - BP management: Better. Avoiding lows.  - goal for SBPs < 160    Cr improved. BUN better.  Dysphagia  - currently on DD2 with nectar thickened liquids   -PEG tube to be placed on 9/22, currently patient is on tube feeding  If questions or problems arise regarding tube function (e.g. leaking, dislodges, etc.) Contact Interventional Radiology department 24 hours a day.   For procedures that were done at [facility]:  8 AM - 4 PM Monday through Friday Contact   [phone]  For afterhours and weekends: [phone]   Ask for the Interventional Radiologist on call.     10/14: 926 kcal and 49 g protein (3 meals)     Adia Gamboa, RD, LD    PeG site Discomfort: Treat infection. Clean site. Added Keflex 500 mg qid x 7 days. Better.  Tylenol prn.  Simethicone added prn.    BS high. Endo following.  CAD s/p CABG x 3, x 1 in 2015  HTN continue medications noted above.  HLD: On Lipitor       DM II  Recent A1C at 7.1 7/2020.  BG goal is < 180.- started on lantus Endo following.   Dose adjusted for TF.   -novolog before meals and at bedtime  -BG QAC, HS 0200  - hypoglycemia protocol      [facility] negative for DVT     SHELDON  - states he could not sleep with CPAP as outpatient  - he would benefit for treating SHELDON   ? CPAP at night    Hypothyroidism  - resumed PTA Levothyroxine 50 mcg daily  TSH checked 7/29/2020 at 2.05     Morbid obesity  BMI noted  7/202 at ~38. Will need to encourage healthy lifestyle and weight loss with recovery     Diet: Snacks/Supplements Adult: Other; thickened smoothie with meals  (RD); With Meals  Calorie Counts     DVT Prophylaxis: Pneumatic Compression Devices  Code Status: Full Code      2. Adjustment to disability:  Clinical psychology to eval and treat if needed  3. FEN: On NDD 2 Rampart +TF, Bolus versus nocturnal supplements.  4. Bowel: Javed meds  5. Bladder: Monitor  6. GI  Prophylaxis:   7. Code: Full  8. Disposition: Home with family and home care vs more likely TCU  9. ELOS:  28 days 10/23/20  10. Rehab prognosis:  Fair  11. Follow up Appointments on Discharge:   12. He needs follow up with neurology as scheduled. Follows with Dr. Vincent with [facility].        Sudarshan Fuentes MD   Physical Medicine & Rehabilitation

## 2020-10-16 ENCOUNTER — APPOINTMENT (OUTPATIENT)
Dept: PHYSICAL THERAPY | Facility: CLINIC | Age: 70
End: 2020-10-16
Payer: COMMERCIAL

## 2020-10-16 ENCOUNTER — APPOINTMENT (OUTPATIENT)
Dept: SPEECH THERAPY | Facility: CLINIC | Age: 70
End: 2020-10-16
Payer: COMMERCIAL

## 2020-10-16 ENCOUNTER — APPOINTMENT (OUTPATIENT)
Dept: OCCUPATIONAL THERAPY | Facility: CLINIC | Age: 70
End: 2020-10-16
Payer: COMMERCIAL

## 2020-10-16 LAB
ANION GAP SERPL CALCULATED.3IONS-SCNC: 8 MMOL/L (ref 3–14)
BUN SERPL-MCNC: 22 MG/DL (ref 7–30)
CALCIUM SERPL-MCNC: 8.7 MG/DL (ref 8.5–10.1)
CHLORIDE SERPL-SCNC: 101 MMOL/L (ref 94–109)
CO2 SERPL-SCNC: 26 MMOL/L (ref 20–32)
CREAT SERPL-MCNC: 0.86 MG/DL (ref 0.66–1.25)
GFR SERPL CREATININE-BSD FRML MDRD: 87 ML/MIN/{1.73_M2}
GLUCOSE BLDC GLUCOMTR-MCNC: 104 MG/DL (ref 70–99)
GLUCOSE BLDC GLUCOMTR-MCNC: 105 MG/DL (ref 70–99)
GLUCOSE BLDC GLUCOMTR-MCNC: 111 MG/DL (ref 70–99)
GLUCOSE BLDC GLUCOMTR-MCNC: 130 MG/DL (ref 70–99)
GLUCOSE BLDC GLUCOMTR-MCNC: 137 MG/DL (ref 70–99)
GLUCOSE BLDC GLUCOMTR-MCNC: 164 MG/DL (ref 70–99)
GLUCOSE SERPL-MCNC: 151 MG/DL (ref 70–99)
POTASSIUM SERPL-SCNC: 3.8 MMOL/L (ref 3.4–5.3)
SODIUM SERPL-SCNC: 135 MMOL/L (ref 133–144)

## 2020-10-16 PROCEDURE — 92526 ORAL FUNCTION THERAPY: CPT | Mod: GN | Performed by: SPEECH-LANGUAGE PATHOLOGIST

## 2020-10-16 PROCEDURE — 99232 SBSQ HOSP IP/OBS MODERATE 35: CPT | Performed by: INTERNAL MEDICINE

## 2020-10-16 PROCEDURE — 250N000013 HC RX MED GY IP 250 OP 250 PS 637: Performed by: INTERNAL MEDICINE

## 2020-10-16 PROCEDURE — 99207 PR CDG-CUT & PASTE-POTENTIAL IMPACT ON LEVEL: CPT | Performed by: INTERNAL MEDICINE

## 2020-10-16 PROCEDURE — 99233 SBSQ HOSP IP/OBS HIGH 50: CPT | Performed by: PHYSICAL MEDICINE & REHABILITATION

## 2020-10-16 PROCEDURE — 99232 SBSQ HOSP IP/OBS MODERATE 35: CPT | Performed by: NURSE PRACTITIONER

## 2020-10-16 PROCEDURE — 36415 COLL VENOUS BLD VENIPUNCTURE: CPT | Performed by: INTERNAL MEDICINE

## 2020-10-16 PROCEDURE — 97112 NEUROMUSCULAR REEDUCATION: CPT | Mod: GO

## 2020-10-16 PROCEDURE — 97110 THERAPEUTIC EXERCISES: CPT | Mod: GP | Performed by: PHYSICAL THERAPIST

## 2020-10-16 PROCEDURE — 250N000013 HC RX MED GY IP 250 OP 250 PS 637: Performed by: PHYSICIAN ASSISTANT

## 2020-10-16 PROCEDURE — 999N001017 HC STATISTIC GLUCOSE BY METER IP

## 2020-10-16 PROCEDURE — 97130 THER IVNTJ EA ADDL 15 MIN: CPT | Performed by: SPEECH-LANGUAGE PATHOLOGIST

## 2020-10-16 PROCEDURE — 97535 SELF CARE MNGMENT TRAINING: CPT | Mod: GO

## 2020-10-16 PROCEDURE — 80048 BASIC METABOLIC PNL TOTAL CA: CPT | Performed by: INTERNAL MEDICINE

## 2020-10-16 PROCEDURE — 97014 ELECTRIC STIMULATION THERAPY: CPT | Mod: GP | Performed by: PHYSICAL THERAPIST

## 2020-10-16 PROCEDURE — 250N000013 HC RX MED GY IP 250 OP 250 PS 637: Performed by: PHYSICAL MEDICINE & REHABILITATION

## 2020-10-16 PROCEDURE — 97129 THER IVNTJ 1ST 15 MIN: CPT | Performed by: SPEECH-LANGUAGE PATHOLOGIST

## 2020-10-16 PROCEDURE — 128N000003 HC R&B REHAB

## 2020-10-16 RX ORDER — PANTOPRAZOLE SODIUM 40 MG/1
40 TABLET, DELAYED RELEASE ORAL
Status: DISCONTINUED | OUTPATIENT
Start: 2020-10-17 | End: 2020-10-16

## 2020-10-16 RX ADMIN — ATORVASTATIN CALCIUM 40 MG: 40 TABLET, FILM COATED ORAL at 09:01

## 2020-10-16 RX ADMIN — MULTIVITAMIN 15 ML: LIQUID ORAL at 17:43

## 2020-10-16 RX ADMIN — LIRAGLUTIDE 1.2 MG: 6 INJECTION SUBCUTANEOUS at 09:03

## 2020-10-16 RX ADMIN — NYSTATIN 500000 UNITS: 500000 SUSPENSION ORAL at 09:01

## 2020-10-16 RX ADMIN — EZETIMIBE 10 MG: 10 TABLET ORAL at 09:02

## 2020-10-16 RX ADMIN — AMLODIPINE BESYLATE 5 MG: 5 TABLET ORAL at 09:01

## 2020-10-16 RX ADMIN — CALCIUM CARBONATE (ANTACID) CHEW TAB 500 MG 500 MG: 500 CHEW TAB at 09:01

## 2020-10-16 RX ADMIN — METFORMIN HYDROCHLORIDE 1000 MG: 500 TABLET ORAL at 17:42

## 2020-10-16 RX ADMIN — PANTOPRAZOLE SODIUM 40 MG: 40 TABLET, DELAYED RELEASE ORAL at 13:53

## 2020-10-16 RX ADMIN — FUROSEMIDE 20 MG: 20 TABLET ORAL at 09:03

## 2020-10-16 RX ADMIN — FAMOTIDINE 20 MG: 20 TABLET ORAL at 09:01

## 2020-10-16 RX ADMIN — INSULIN ASPART 1 UNITS: 100 INJECTION, SOLUTION INTRAVENOUS; SUBCUTANEOUS at 09:03

## 2020-10-16 RX ADMIN — NYSTATIN 500000 UNITS: 500000 SUSPENSION ORAL at 16:37

## 2020-10-16 RX ADMIN — SALINE NASAL SPRAY 1 SPRAY: 1.5 SOLUTION NASAL at 16:37

## 2020-10-16 RX ADMIN — METOPROLOL TARTRATE 50 MG: 50 TABLET, FILM COATED ORAL at 20:25

## 2020-10-16 RX ADMIN — FUROSEMIDE 20 MG: 20 TABLET ORAL at 16:37

## 2020-10-16 RX ADMIN — ASPIRIN 81 MG: 81 TABLET, COATED ORAL at 09:01

## 2020-10-16 RX ADMIN — SALINE NASAL SPRAY 1 SPRAY: 1.5 SOLUTION NASAL at 20:24

## 2020-10-16 RX ADMIN — SALINE NASAL SPRAY 1 SPRAY: 1.5 SOLUTION NASAL at 13:54

## 2020-10-16 RX ADMIN — NYSTATIN 500000 UNITS: 500000 SUSPENSION ORAL at 20:23

## 2020-10-16 RX ADMIN — AMLODIPINE BESYLATE 5 MG: 5 TABLET ORAL at 20:24

## 2020-10-16 RX ADMIN — METFORMIN HYDROCHLORIDE 1000 MG: 500 TABLET ORAL at 09:02

## 2020-10-16 RX ADMIN — LEVOTHYROXINE SODIUM 50 MCG: 25 TABLET ORAL at 09:03

## 2020-10-16 RX ADMIN — SALINE NASAL SPRAY 1 SPRAY: 1.5 SOLUTION NASAL at 09:03

## 2020-10-16 RX ADMIN — METOPROLOL TARTRATE 50 MG: 50 TABLET, FILM COATED ORAL at 09:01

## 2020-10-16 ASSESSMENT — MIFFLIN-ST. JEOR: SCORE: 1833.56

## 2020-10-16 NOTE — PLAN OF CARE
Discharge Plan: home with 24 hr supervision vs tcu     Precautions:  Left neglect,  L) UE flaccid fall risk,      Current Status:  ADLs/IADLs:ADLs:G/H min a sitting eob with chest level able to get under l arm for washing and applying deoderant, sba face level. U/B dressing max a of 1 for pull over sweatshirt shirt, sitting w/c verbal cueing for sequencing; dep bL/B dressing bed Mobility dep with liko lift to eob pt able to sit 45 min eob with leg portion of lift removed with g/h and )/b dressing and cga-sba of therapist for balance IADLs: -  Vision/Cognition: Left neglect, increase attention with cues for sequencing increasing participation with g/h dressing and eating     Assessment: pt able to decreasedtolerance to fes l u)/e Skilled set up on  with tolerating 6 min appears to be decrease postion in w/c will trail again:  Pt dependent for proper placement of electrodes on l U/E for optimum muscle contraction, safe positioning on w/c within frame and cushion for prevention of skin breakdown, UE secured to arm support.  Passive motion assessed to ensure proper positioning.       Pt performed 6minutes of active FES ergometry with 0-100% stimulation applied to above muscles at 27rpm with .05nm resistance.  This OT adjusted e-stim and cycling parameters in real-time to ensure palpable muscle contractions throughout session.  Please see [email or web address] for further details on patient's stimulation parameters and ergometry outcomes.

## 2020-10-16 NOTE — PLAN OF CARE
Discharge Planner Post-Acute Rehab SLP:      Discharge Plan: to be determined, at this time pt would need 24 hour support; ? TCU vs HH with 24 hr supervision      Precautions: fall, swallowing, 1:1 assist/supervision at meals     Current Status:   Swallowing: Advanced diet to DD3 solids and still  with nectar- thick liquids. VFSS ordered for Monday 10/19 to see if liquids can be further advanced . Continuing with Free water program- with supervision: ok for 5-6 ice chips or 5-6 small teaspoons of water per hour following oral cares and only in between meals     Communication: moderate dysarthria, variable intelligibility.     Cognition: moderate/ impairment for basic orientation, memory, reasoning        Assessment:    Swallowing: Pt seen for meal follow-up current DD3 with nectar liquids- tolerating nectar liquids without difficulty. Pt did have 2 episodes of delayed coughing- not right away after DD3 intake and this has been ongoing- coughs even without po intake- states it is due to hearburn. Does not report pharyngeal retention with DD3 solids and demosntrating adequate oral clearance but does have slower oral prep- continue with DD3 with nectar thick liquids. Pt has VFSS scheduled for Monday 10/19 to determine if liquids can be further advanced.  also continue with :  Free water program - must have supervision: ok for 5-6 ice chips per hour or 5-6 small sips of water by teaspoon per hour following good oral cares and only in between meals.     Completed memory recall task that also targeted use of dysarthria strategies ( saying words out loud using exaggerated articulation, loud volume and speaking on exhalation)-- pt needing cues to project volume and needing to repeat words. Using verbal rehearsal to aid in recall- at 12/17 accuracy with recall of 4 words and then answering a question that relates to 2 or more of the words      Other Barriers to Discharge (Family Training, etc):  Training, discharge  plan/support, tube feed training and/or adequate PO intake

## 2020-10-16 NOTE — PLAN OF CARE
Discharge Planner Post-Acute Rehab PT:      Discharge Plan:  home w/ hired physical assist vs TCU     Precautions: * falls, (L) UE flaccid, shannon neglect, field cut     Current Status:  Transfer: liko  Gait: w/c dependent  Stairs: unable  Balance: Mod->SBA w/ static sitting, poor midline orientation, L neglect     Assessment: Improved alertness and participation in sessions today.  Use TENS and NMES every session. Has new sensation to TENs on both upper and lower leg.  Other Barriers to Discharge (DME, Family Training, etc): level of physical assist, pt will need a wheelchair eval if discharging to home

## 2020-10-16 NOTE — PROGRESS NOTES
"[facility], [place]   Internal Medicine Daily Note           Interval History/Events     Overnight events reviewed  NO nausea, vomiting, chest pain, shortness of breath, lightheadedness or dizziness  No fever, chills  Tolerating diet well.        Review of Systems        4 point ROS including Respiratory, CV, GI and , other than that noted above is negative      Medications   I have reviewed current medications  in the \"current medication\" section of Epic.  Relevant changes include:     Physical Exam   General:       Vital signs:    Blood pressure (!) 143/68, pulse 92, temperature 98.1  F (36.7  C), temperature source Oral, resp. rate 18, height 1.753 m (5' 9\"), weight 108.8 kg (239 lb 14.4 oz), SpO2 95 %.  Estimated body mass index is 35.43 kg/m  as calculated from the following:    Height as of this encounter: 1.753 m (5' 9\").    Weight as of this encounter: 108.8 kg (239 lb 14.4 oz).      Intake/Output Summary (Last 24 hours) at 10/13/2020 1053  Last data filed at 10/13/2020 0600  Gross per 24 hour   Intake 440 ml   Output 800 ml   Net -360 ml        Constitutional: Laying in bed in no acute distress  Eye: No icterus, no pallor  Mouth/ENT: Normal oral mucosa  Cardiovascular: S1, S2 normal. B/L pre tibial edema  Respiratory: B/L CTA  GI: Soft, NT, BS+  : No Hayes's catheter  Neurology: Alert, awake, and oriented. Left hemiparesis, neglect  Psych: Mood stable  MSK:   Integumentary:   Heme/Lymph/Imm:      Laboratory and Imaging Studies     I have reviewed  laboratory and imaging studies in the Epic. Pertinent findings are as below:    BMP  Recent Labs   Lab 10/16/20  0621 10/15/20  0738 10/14/20  0534 10/13/20  0641    136 138 137   POTASSIUM 3.8 4.0 4.0 3.9   CHLORIDE 101 103 106 105   SELENA 8.7 8.6 8.5 8.9   CO2 26 27 25 25   BUN 22 22 25 27   CR 0.86 0.82 0.94 1.01   * 139* 155* 176*     CBC  Recent Labs   Lab 10/15/20  0738 10/12/20  0553    202     INRNo lab " results found in last 7 days.  LFTsNo lab results found in last 7 days.   PANCNo lab results found in last 7 days.        Impression/Plan          VEENA secondary to hypovolemia and  hypotension (secondary to decreased intake with discontinuation tube feedings, concurrent use of furosemide, Entresto, valsartan, amlodipine), now resolved with IV fluids,  resumption of scheduled TF, holding of furosemide, entresto, diovan and reduction in Metoprolol and Amlodipine.  Renal function now back to baseline     # Decreased level of consciousness, likely secondary to acute kidney injury, relatively low blood pressure and recent initiation of baclofen.  Baclofen is metabolized by the kidneys.  Baclofen currently on hold. Mental status improved, appears at or close to baseline  - Monitor     # Cough with Temp 99.3 on 10/12: Oxygen sat is normal. 02 sats on RA this am.  At risk for atelectasis, aspiration pneumonia. Cough may also be related to volume overload, secondary to IV fluids, temporarily holding of lasix  - Continue to monitor  - Low threshold for X ray chest     # Recent right hemispheric CVA with left-sided weakness, on aspirin and statin  - Continue Aspirin, Statin  - PT/OT     # History of hypertension and diastolic CHF,  controlled in outpatient setting on  Furosemide 40 mg bid, Amlodipine 20 mg daily, Metoprolol XL 25 mg bid (changed to short acting Metoprolol 50 mg bid during recent hospitalization), Entresto 49/51, 1 tab daily, Valsartan 320 mg daily.  Goal -140/ in post CVA setting, history of diastolic CHF.        Has been restarted on Metoprolol 50 mg bid, amlodipine 5 mg bid  BP fairly controlled.   - Continue Amlodipine 5 mg BID, Metoprolol 50 mg BID  - Continue Furosemide 20 mg BID (home dose 40 mg BID)  - Continue to monitor on titrate  - Entresto, valsartan, on Hold  - Restart Valsartan if BP starts to trend up     # History of CAD:  On ASA and statin, B Blocker  - Continue Aspirin, Metoprolol,  Statin     # Hyponatremia, mild, likely secondary to resumed TF and continued water flushes (initiated when TF recently discontinued and Pt was started on nectar thick liquids), hyperglycemia, resolved with decrease in free water administration     # Diabetes mellitus type 2, labile, in setting of decreased intake, now resumption of TF  On Insulin Isophane 18 units in AM, 22 units in evening, Insulin Aspart pre meal 1:10 CHO, Insulin Aspart correction 1:25, Liraglutide 0.6 daily, and Metformin 500 mg BID  Glucose fairly controlled.   - Diabetes team following. Appreciate management              Pt's care was discussed with bedside RN, patient and  during Care Team Rounds.               Brendan Antony MD  Hospitalist ( Internal medicine)  Pager: [phone]

## 2020-10-16 NOTE — PLAN OF CARE
Patient alert with aphasia, VS with elevated BP; LS clear and BS+; denies pain and need for pain med; up in chair for dinner; tube feeding started and water flushes administered; son visited with patient at start of shift; incontinent of urine; flacid left side and edema in extremities.

## 2020-10-16 NOTE — PROGRESS NOTES
[facility]   Acute Rehabilitation Unit  Daily progress note  CC:   Stroke Ischemic 01.1 (L) Body Involvement (R) Brain Stroke; L sided weakness due to MCA infarct, R MCA occlusion, and R carotid atherosclerosis    S:   C/O Heart burn. On pepcid. Changed to Protonix 10/16/2020.    RVS on 10/19/20    Feels much improved. Significant impairment due to hemisensory deficit, fiedcut, and hemiplegia.   Off Baclofen. Needed adjustment of BP medications. Improved since. Incontinent but not retaining.  Now beginning to move his left side!  TR held 10/14/2020  Dense left hemiplegia, left field cut +. Compensates by turning.  BS monitored. Endo following.  NDD 3 [place].  TF continued.      ROS: Denies HA, nausea, pain.  Cath. Alertness improved. Coughs.        [unfilled]   Scheduled meds    amLODIPine  5 mg Oral BID     aspirin  81 mg Oral Daily     atorvastatin  40 mg Oral Daily     ezetimibe  10 mg Oral Daily     furosemide  20 mg Oral or Feeding Tube BID     insulin aspart  3 Units Subcutaneous Daily     insulin aspart  1-7 Units Subcutaneous BID     insulin aspart  1-10 Units Subcutaneous TID AC     levothyroxine  50 mcg Oral Daily     liraglutide  1.2 mg Subcutaneous Daily     metFORMIN  1,000 mg Oral BID w/meals     metoprolol tartrate  50 mg Oral BID     multivitamins w/minerals  15 mL Oral Daily     nystatin  500,000 Units Swish & Spit 4x Daily     pantoprazole  40 mg Oral QAM AC     sodium chloride  1 spray Both Nostrils 4x daily     sodium chloride (PF)  10 mL Intracatheter Q7 Days       PRN meds:  acetaminophen, bacitracin, bisacodyl, calcium carbonate, carboxymethylcellulose PF, dextrose, glucose **OR** dextrose **OR** glucagon, miconazole, nitroGLYcerin, - MEDICATION INSTRUCTIONS -, polyethylene glycol, simethicone, sodium chloride (PF), traZODone      PHYSICAL EXAM  BP (!) 143/68 (BP Location: Right arm)   Pulse 92   Temp 98.1  F (36.7  C) (Oral)   Resp 18   Ht 1.753  "m (5' 9\")   Wt 108.8 kg (239 lb 14.4 oz)   SpO2 95%   BMI 35.43 kg/m    Alert. Sitting better. Verbalizing at baseline now.   Respiratory: Clear to auscultation bilaterally, no crackles or wheezing  Cardiovascular: S1 and S2, RRR, and no murmur noted  GI: soft, non-distended, non-tender. G tube intact + BS +  Skin/Integumen: No rashes, trace lower extremity edema noted  Neuro : Left lower Facial droop + Left Field cut +  Dense left-sided hemiparesis with neglect noted. He is able to adduct the UE partly 2-/5, able to bring knees together, 2-  Tone +. Reflexes +    Vocalizes, low volume   Extremities. Moves right side well. Edema +    LABS  Last Comprehensive Metabolic Panel:  Sodium   Date Value Ref Range Status   10/16/2020 135 133 - 144 mmol/L Final     Potassium   Date Value Ref Range Status   10/16/2020 3.8 3.4 - 5.3 mmol/L Final     Chloride   Date Value Ref Range Status   10/16/2020 101 94 - 109 mmol/L Final     Carbon Dioxide   Date Value Ref Range Status   10/16/2020 26 20 - 32 mmol/L Final     Anion Gap   Date Value Ref Range Status   10/16/2020 8 3 - 14 mmol/L Final     Glucose   Date Value Ref Range Status   10/16/2020 151 (H) 70 - 99 mg/dL Final     Urea Nitrogen   Date Value Ref Range Status   10/16/2020 22 7 - 30 mg/dL Final     Creatinine   Date Value Ref Range Status   10/16/2020 0.86 0.66 - 1.25 mg/dL Final     GFR Estimate   Date Value Ref Range Status   10/16/2020 87 >60 mL/min/[1.73_m2] Final     Comment:     Non  GFR Calc  Starting 12/18/2018, serum creatinine based estimated GFR (eGFR) will be   calculated using the Chronic Kidney Disease Epidemiology Collaboration   (CKD-EPI) equation.       Calcium   Date Value Ref Range Status   10/16/2020 8.7 8.5 - 10.1 mg/dL Final      CBC RESULTS:   Recent Labs     CBC RESULTS:   Recent Labs   Lab Test 09/30/20  0607   WBC 9.3   RBC 3.79*   HGB 11.6*   HCT 36.3*   MCV 96   MCH 30.6   MCHC 32.0   RDW 12.9        CBC RESULTS:   Lab " Test 10/12/20  0553   WBC  --    RBC  --    HGB  --    HCT  --    MCV  --    MCH  --    MCHC  --    RDW  --            Glucose Values Latest Ref Rng & Units 10/15/2020 10/15/2020 10/15/2020 10/16/2020 10/16/2020 10/16/2020 10/16/2020   Bedside Glucose (mg/dl )  - -- -- -- -- -- -- --   GLUCOSE 70 - 99 mg/dL 136(H) 120(H) 130(H) 137(H) 151(H) 164(H) 111(H)   Some recent data might be hidden     ASSESSMENT AND PLAN    Jorje Teran is a 70 year old  hand dominant male with Stroke Ischemic 01.1 (L) Body Involvement (R) Brain Stroke; L sided weakness due to MCA infarct, R MCA occlusion, and R carotid atherosclerosis  1. PT, OT and SLP 60 minutes of each on a daily basis, in addition to rehab nursing and close management of physiatrist.       2. Impairment of ADL's: OT for 60 minutes daily to work on ADL re-training such as grooming, self cares and bathing.       3. Impairment of mobility:  PT for 60 minutes daily to work on neuromuscular re-education focusing on strength, balance, coordination, and endurance.       4. Impairment of cognition/language/swallow:  SLP for 60 minutes daily to work on cognitive and linguistic deficits.     Rehab RN for med administration, bowel regimen, glucose monitoring and wound care.    1. Medical Conditions  CVA with dense left hemiparesis  CT with filling defect within the distal R ICA extending into the R MCA and occlusion of the R MCA at the M1 segment. CTA with severe plaquing at R carotid bifurcation with filling defect in the prox R ICA suggestive of thrombus contributing to approximately 80% stenosis. 60% stenosis of the proximal L ICA. TTE with bubble -> Normal left ventricular size and function. Left ventricular ejection fraction of 55-60%. No segmental wall motion abnormalities noted.   - Lipids 7/29/20- LDL 78, HDL 56, , T chol 164, hemoglobin A1c 7.1% H, TSH is 2.05.  - remains with significant dysarthria, facial droop and dense left sided  hemiparesis  Spasticity: Stretching, ice + Off Baclofen as was noted to become drowsy. Better now. Some strength returning!    - ASA 81 mg daily PO/IL  - atorvastatin 40 mg daily, Zetia 10 mg po daily    - BP management: Better. Avoiding lows.  - goal for SBPs < 160    Cr improved. BUN better.  Dysphagia  - currently on DD2 with nectar thickened liquids   -PEG tube to be placed on 9/22, currently patient is on tube feeding  If questions or problems arise regarding tube function (e.g. leaking, dislodges, etc.) Contact Interventional Radiology department 24 hours a day.   For procedures that were done at [facility]:  8 AM - 4 PM Monday through Friday Contact   [phone]  For afterhours and weekends: [phone]   Ask for the Interventional Radiologist on call.   Protonix 40 mg daily added 10/16/2020 as having heart burn on Pepcid, discontinued.    10/14: 926 kcal and 49 g protein (3 meals)     Adia Gamboa RD, LD    PeG site Discomfort: Treat infection. Clean site. Added Keflex 500 mg qid x 7 days. Better.  Tylenol prn.  Simethicone added prn.    BS high. Endo following.  CAD s/p CABG x 3, x 1 in 2015  HTN continue medications noted above.  HLD: On Lipitor       DM II  Recent A1C at 7.1 7/2020.  BG goal is < 180.- started on lantus Endo following.   Dose adjusted for TF.   -novolog before meals and at bedtime  -BG QAC, HS 0200  - hypoglycemia protocol      [facility] at [facility] negative for DVT     SHELDON  - states he could not sleep with CPAP as outpatient  - he would benefit for treating SHELDON   ? CPAP at night    Hypothyroidism  - resumed PTA Levothyroxine 50 mcg daily  TSH checked 7/29/2020 at 2.05     Morbid obesity  BMI noted  7/202 at ~38. Will need to encourage healthy lifestyle and weight loss with recovery     Diet: Snacks/Supplements Adult: Other; thickened smoothie with meals  (RD); With Meals  Calorie Counts     DVT Prophylaxis: Pneumatic Compression Devices  Code Status: Full Code      2. Adjustment to  disability:  Clinical psychology to eval and treat if needed  3. FEN: On NDD 2 [place] +TF, Bolus versus nocturnal supplements.  4. Bowel: Javed meds  5. Bladder: Monitor  6. GI Prophylaxis:   7. Code: Full  8. Disposition: Home with family and home care vs more likely TCU  9. ELOS:  28 days 10/23/20  10. Rehab prognosis:  Fair  11. Follow up Appointments on Discharge:   12. He needs follow up with neurology as scheduled. Follows with Dr. Vincent with [facility].        Sudarshan Fuentes MD   Physical Medicine & Rehabilitation

## 2020-10-16 NOTE — PROGRESS NOTES
Diabetes Consult Daily  Progress Note          Assessment/Plan:     HPI:  Jorje Teran is a 69 y/o male with past medical history of HTN, HLD, DM II, SHELDON, hypothyroidism, and morbid obesity who was admitted 9/7/20 after being found down, found to have a R MCA infarct and R carotid atherosclerosis, now admitted to ARU 9/24 for intensive therapies, ongoing medical management, and rehabilitative nursing care.       Assessment:   1)Type II Diabetes Mellitus, II with suboptimal diet control PTA (A1c 7.5%), with tube feed induced hyperglycemia.       Plan:                  -continue Metformin IR  1000mg BID                  -continue Victoza 1.2 mg daily this AM.                 -trial discontinuation of AM NPH started today (10/16)  (trending nicely)                 -discontinue aspart carb coverage 1:15g CHO with meals, snacks today                 -reduce Novolog fixed dose with start of tube feeds from 5>3. This will discontinue once tube feeds are stopped.                 -aspart high intensity sliding scale >140 AC/HS/0200, while on overnight cycled TF- when off cycled TF can discontinue scheduled 0200 correction dose.                  -BG monitoring TID AC, HS, 0200                 -hypoglycemia protocol                 -carb counting protocol                 -diabetes education will be needed prior to discharge home, for glucometer, and injection teaching (GLP1, possibly insulin). Placed consult on 10/15.      Discharge Planning:                  -BG monitoring before meals and at bedtime initially, then can reduce pending outpatient follow up.                 -Metformin IR 1000mg BID                 -GLP1 (either Victoza 1.2-1.8mg daily or weekly Trulicity 1.5 mg weekly)                 -if discharging off tube feeds, anticipate all insulin can be discontinued upon discharge.      Outpatient follow up: TBD  Plan discussed with patient, and primary team via this note.             "Interval History:     The last 24 hours progress and nursing notes reviewed.  No acute events this interval.  BG stable trending.  Appears to be doing well without the AM NPH - 2:40 PM  BG at 111.    Tolerating the Victoza and Metformin - denies any abd concerns or nausea.  No complaints today, denies pain    Recent Labs   Lab 10/16/20  0733 10/16/20  0621 10/16/20  0228 10/15/20  2116 10/15/20  1700 10/15/20  0756 10/15/20  0738 10/15/20  0139 10/14/20  0534 10/14/20  0534 10/13/20  0641 10/13/20  0641 10/12/20  0553 10/12/20  0553 10/11/20  0644 10/11/20  0644   GLC  --  151*  --   --   --   --  139*  --   --  155*  --  176*  --  206*  --  220*   *  --  137* 130* 120* 136*  --  103*   < >  --    < >  --    < >  --    < >  --     < > = values in this interval not displayed.           Nutrition:     Orders Placed This Encounter      Combination Diet Dysphagia Diet Level 3: Advanced; Nectar Thickened Liquids (pre-thickened or use instant food thickener)    cycled TF: Isosource 1.5 at 65cc/hour, from 9PM-5A (8 hours) as of 10/16.        PTA Regimen:     TIIDM was reportedly diet controlled prior to admission.          Review of Systems:   CC:  \"so far so good\"    Constitutional:   No fever, no chills  ENT/Mouth:   No hearing changes, no ear pain, no sore throat, no rhinorrhea, no difficulty swallowing  Eyes:  No eye pain, no discharge, no vision changes  CV:   No CP/SOB, no new edema  Resp:  No cough, no wheezing  GI:   No nausea or vomiting, denies constipation, no diarrhea  : denies  Musk:  No joint swelling/pain, No back pain  Skin/heme:   No new rashes/bruises/open areas.  No pruritis  Neuro:   No new weakness, no numbness/tingling, no headache  Psych:   No new anxiety, no change in mood  Endocrine:  No polyuria, no polydipsia         Medications:   Steroid planning:  no       Physical Exam:   BP (!) 161/78 (BP Location: Right arm)   Pulse 94   Temp 98.1  F (36.7  C) (Axillary)   Resp 18   Ht 1.753 m " "(5' 9\")   Wt 108.8 kg (239 lb 14.4 oz)   SpO2 96%   BMI 35.43 kg/m      General:  pleasant, in no acute distress.  Sitting comfortably in bed.   HEENT:  NC/AT. MMM, sclera not injected  Lungs:  unremarkable, no new cough, no SOB  ABD:   soft,  non-tender,  no lipodystrophy noted  Skin:  warm and dry, no obvious lesions/rash  Feet:    CMS intact, PPP  MSK:   moving all extremities  Lymp:   mild LE edema  Mental Status:  Alert and oriented x3  Psych:   Cooperative, good eye contact, full affect          Data:     Lab Results   Component Value Date    A1C 7.5 09/10/2020        CBC RESULTS:   Recent Labs   Lab Test 10/15/20  0738 09/30/20  0607 09/30/20  0607   WBC  --   --  9.3   RBC  --   --  3.79*   HGB  --   --  11.6*   HCT  --   --  36.3*   MCV  --   --  96   MCH  --   --  30.6   MCHC  --   --  32.0   RDW  --   --  12.9      < > 230    < > = values in this interval not displayed.     Recent Labs   Lab Test 10/16/20  0621 10/15/20  0738    136   POTASSIUM 3.8 4.0   CHLORIDE 101 103   CO2 26 27   ANIONGAP 8 6   * 139*   BUN 22 22   CR 0.86 0.82   SELENA 8.7 8.6     Liver Function Studies -   Recent Labs   Lab Test 09/07/20  2355   PROTTOTAL 7.4   ALBUMIN 3.6   BILITOTAL 0.9   ALKPHOS 67   AST 23   ALT 28     Lab Results   Component Value Date    INR 1.17 09/21/2020    INR 1.03 09/07/2020       CADE Sahu CNP   Inpatient Diabetes Management Service  Pager - [phone]  Friday - Monday 0800 - 1600 hrs  Diabetes Management Team job code: [id number]       I spent a total of 25 minutes bedside and on the inpatient unit managing glycemic care.  Over 50% of my time on the unit was spent counseling the patient and/or coordinating care regarding acute hyperglycemic management.  See note for details.      "

## 2020-10-16 NOTE — PLAN OF CARE
FOCUS/GOAL  Bowel management, Bladder management, Nutrition/Feeding/Swallowing precautions, and Medication management    ASSESSMENT, INTERVENTIONS AND CONTINUING PLAN FOR GOAL:  Pt is alert and oriented x4 this shift, but is forgetful at times and has difficulty following directions at times. Smear incont BM this shift. Incont of urine. Pt on calorie counts. Free water protocol at well, drinking regular water. Pt complaining of heartburn, especially at night. MD updated and started pt on daily liquid protonix, first dose given today.

## 2020-10-16 NOTE — PLAN OF CARE
FOCUS/GOAL  Medical management    ASSESSMENT, INTERVENTIONS AND CONTINUING PLAN FOR GOAL:  Pt is alert, disoriented to place and time. Incontinent of bowel and bladder. Pt did not utilize call light overnight. Assist of 2 liko. TF ran as ordered. No complaints of pain, but does groan when repositioning. Declines intervention. BG overnight 137. Appeared to sleep well between cares.

## 2020-10-16 NOTE — PROGRESS NOTES
Calorie Counts    10/13: 234 kcal and 17 g protein (1 meal, 0 supplements)-dinner ticket saved, unknown intakes of breakfast/lunch/supplements)  10/14: 926 kcal and 49 g protein (3 meals)  10/15: 929 kcal and 30 g protein (2 meals, 0 supplements)-ate 75% of breakfast per flowsheet. Lunch ticket saved but not completed (unknown intakees). Dinner ticket saved. Unknown int kaes of lunch and supplements.     3 day average PO intake = 696kcal (34% minimum energy needs) and 32 g protein (40% minimum protein needs). However there is missing data for meals on 10/13 and 10/15    EN and PO intakes provide 1416kcal (70% minimum energy needs) and 64g protein (79% minimum protein needs). Likely meeting closer to 100% of needs given missing data,    Cognition improving per documentation, anticipate intakes to continue to improve with cognition. Overall 5# wt gain from admit wt with 4# (1.6%) loss in 1 week. Recommend continuing current EN order and continuing calorie count X3 days. If intakes/EN continue to provide <100% of estimated needs recommend increasing run time of EN.        Annamaria Castano MS, RD, LDN  Unit Pager [phone]

## 2020-10-17 ENCOUNTER — APPOINTMENT (OUTPATIENT)
Dept: SPEECH THERAPY | Facility: CLINIC | Age: 70
End: 2020-10-17
Payer: COMMERCIAL

## 2020-10-17 ENCOUNTER — APPOINTMENT (OUTPATIENT)
Dept: OCCUPATIONAL THERAPY | Facility: CLINIC | Age: 70
End: 2020-10-17
Payer: COMMERCIAL

## 2020-10-17 ENCOUNTER — APPOINTMENT (OUTPATIENT)
Dept: PHYSICAL THERAPY | Facility: CLINIC | Age: 70
End: 2020-10-17
Payer: COMMERCIAL

## 2020-10-17 LAB
GLUCOSE BLDC GLUCOMTR-MCNC: 113 MG/DL (ref 70–99)
GLUCOSE BLDC GLUCOMTR-MCNC: 121 MG/DL (ref 70–99)
GLUCOSE BLDC GLUCOMTR-MCNC: 129 MG/DL (ref 70–99)
GLUCOSE BLDC GLUCOMTR-MCNC: 131 MG/DL (ref 70–99)
GLUCOSE BLDC GLUCOMTR-MCNC: 133 MG/DL (ref 70–99)

## 2020-10-17 PROCEDURE — 97530 THERAPEUTIC ACTIVITIES: CPT | Mod: GO

## 2020-10-17 PROCEDURE — 250N000013 HC RX MED GY IP 250 OP 250 PS 637: Performed by: PHYSICIAN ASSISTANT

## 2020-10-17 PROCEDURE — 99207 PR CDG-CUT & PASTE-POTENTIAL IMPACT ON LEVEL: CPT | Performed by: INTERNAL MEDICINE

## 2020-10-17 PROCEDURE — 128N000003 HC R&B REHAB

## 2020-10-17 PROCEDURE — 250N000013 HC RX MED GY IP 250 OP 250 PS 637: Performed by: PHYSICAL MEDICINE & REHABILITATION

## 2020-10-17 PROCEDURE — 250N000013 HC RX MED GY IP 250 OP 250 PS 637: Performed by: INTERNAL MEDICINE

## 2020-10-17 PROCEDURE — 97112 NEUROMUSCULAR REEDUCATION: CPT | Mod: GO

## 2020-10-17 PROCEDURE — 999N001017 HC STATISTIC GLUCOSE BY METER IP

## 2020-10-17 PROCEDURE — 99232 SBSQ HOSP IP/OBS MODERATE 35: CPT | Performed by: INTERNAL MEDICINE

## 2020-10-17 PROCEDURE — 92526 ORAL FUNCTION THERAPY: CPT | Mod: GN | Performed by: SPEECH-LANGUAGE PATHOLOGIST

## 2020-10-17 PROCEDURE — 97014 ELECTRIC STIMULATION THERAPY: CPT | Mod: GP | Performed by: PHYSICAL THERAPIST

## 2020-10-17 PROCEDURE — 97112 NEUROMUSCULAR REEDUCATION: CPT | Mod: GP | Performed by: PHYSICAL THERAPIST

## 2020-10-17 PROCEDURE — 97129 THER IVNTJ 1ST 15 MIN: CPT | Performed by: SPEECH-LANGUAGE PATHOLOGIST

## 2020-10-17 PROCEDURE — 97530 THERAPEUTIC ACTIVITIES: CPT | Mod: GP | Performed by: PHYSICAL THERAPIST

## 2020-10-17 PROCEDURE — 97130 THER IVNTJ EA ADDL 15 MIN: CPT | Performed by: SPEECH-LANGUAGE PATHOLOGIST

## 2020-10-17 PROCEDURE — 99233 SBSQ HOSP IP/OBS HIGH 50: CPT | Performed by: PHYSICAL MEDICINE & REHABILITATION

## 2020-10-17 PROCEDURE — 99232 SBSQ HOSP IP/OBS MODERATE 35: CPT | Performed by: NURSE PRACTITIONER

## 2020-10-17 PROCEDURE — 92507 TX SP LANG VOICE COMM INDIV: CPT | Mod: GN | Performed by: SPEECH-LANGUAGE PATHOLOGIST

## 2020-10-17 RX ADMIN — NYSTATIN 500000 UNITS: 500000 SUSPENSION ORAL at 21:23

## 2020-10-17 RX ADMIN — EZETIMIBE 10 MG: 10 TABLET ORAL at 08:03

## 2020-10-17 RX ADMIN — LEVOTHYROXINE SODIUM 50 MCG: 25 TABLET ORAL at 08:03

## 2020-10-17 RX ADMIN — SALINE NASAL SPRAY 1 SPRAY: 1.5 SOLUTION NASAL at 08:04

## 2020-10-17 RX ADMIN — FUROSEMIDE 20 MG: 20 TABLET ORAL at 16:40

## 2020-10-17 RX ADMIN — METFORMIN HYDROCHLORIDE 1000 MG: 500 TABLET ORAL at 08:03

## 2020-10-17 RX ADMIN — ATORVASTATIN CALCIUM 40 MG: 40 TABLET, FILM COATED ORAL at 08:03

## 2020-10-17 RX ADMIN — AMLODIPINE BESYLATE 5 MG: 5 TABLET ORAL at 08:03

## 2020-10-17 RX ADMIN — SALINE NASAL SPRAY 1 SPRAY: 1.5 SOLUTION NASAL at 21:24

## 2020-10-17 RX ADMIN — SALINE NASAL SPRAY 1 SPRAY: 1.5 SOLUTION NASAL at 16:39

## 2020-10-17 RX ADMIN — PANTOPRAZOLE SODIUM 40 MG: 40 TABLET, DELAYED RELEASE ORAL at 05:24

## 2020-10-17 RX ADMIN — METOPROLOL TARTRATE 50 MG: 50 TABLET, FILM COATED ORAL at 08:02

## 2020-10-17 RX ADMIN — ASPIRIN 81 MG: 81 TABLET, COATED ORAL at 08:02

## 2020-10-17 RX ADMIN — MULTIVITAMIN 15 ML: LIQUID ORAL at 18:03

## 2020-10-17 RX ADMIN — LIRAGLUTIDE 1.2 MG: 6 INJECTION SUBCUTANEOUS at 08:04

## 2020-10-17 RX ADMIN — METFORMIN HYDROCHLORIDE 1000 MG: 500 TABLET ORAL at 18:03

## 2020-10-17 RX ADMIN — FUROSEMIDE 20 MG: 20 TABLET ORAL at 08:02

## 2020-10-17 RX ADMIN — SALINE NASAL SPRAY 1 SPRAY: 1.5 SOLUTION NASAL at 13:16

## 2020-10-17 RX ADMIN — METOPROLOL TARTRATE 50 MG: 50 TABLET, FILM COATED ORAL at 21:23

## 2020-10-17 RX ADMIN — NYSTATIN 500000 UNITS: 500000 SUSPENSION ORAL at 16:42

## 2020-10-17 RX ADMIN — AMLODIPINE BESYLATE 5 MG: 5 TABLET ORAL at 21:23

## 2020-10-17 RX ADMIN — NYSTATIN 500000 UNITS: 500000 SUSPENSION ORAL at 13:15

## 2020-10-17 ASSESSMENT — MIFFLIN-ST. JEOR: SCORE: 1798.63

## 2020-10-17 NOTE — PLAN OF CARE
Discharge Planner Post-Acute Rehab PT:      Discharge Plan:  home w/ hired physical assist vs TCU     Precautions: * falls, (L) UE flaccid, shannon neglect, field cut     Current Status:  Transfer: liko  Gait: w/c dependent  Stairs: unable  Balance: Mod->SBA w/ static sitting, poor midline orientation, L neglect     Assessment: Asking many questions today about recovery time and tasks, was focused during session on tasks.   Use TENS and NMES every session.    Other Barriers to Discharge (DME, Family Training, etc): level of physical assist, pt will need a wheelchair eval if discharging to home

## 2020-10-17 NOTE — PLAN OF CARE
FOCUS/GOAL  Medical management    ASSESSMENT, INTERVENTIONS AND CONTINUING PLAN FOR GOAL:  Pt is alert, disoriented to place and time. No complaints of pain. Assist of 2 liko, but did not get out of bed this shift. Incontinent and continent of bladder overnight. Pt did not use call light. Staff anticipating needs. TF completed as ordered.

## 2020-10-17 NOTE — PLAN OF CARE
Discharge Planner Post-Acute Rehab SLP:      Discharge Plan: to be determined, at this time pt would need 24 hour support; ? TCU vs HH with 24 hr supervision      Precautions: fall, swallowing, 1:1 assist/supervision at meals     Current Status:   Swallowing: Continues to tolerate DD3 solids and still  with nectar- thick liquids. VFSS ordered for Monday 10/19 to see if liquids can be further advanced . Continuing with Free water program- with supervision: ok for 5-6 ice chips or 5-6 small teaspoons of water per hour following oral cares and only in between meals     Communication: moderate dysarthria, variable intelligibility.     Cognition: moderate/ impairment for basic orientation, memory, reasoning        Assessment:    Swallowing: Pt seen for meal follow-up current DD3 with nectar liquids- tolerating nectar liquids without difficulty and also generally tolerating DD3 textures- no oral residue and improved oral prep. Pt does have some intermittent coughing but has this even prior to po so does not appear to be related to change to DD3 textures.  Further pt does not report pharyngeal retention with DD3 solids. Recommend continue with current DD3 with nectar thick liquids; pt needs to continue to be upright for all meals, slow rate, small bites, alternate solids and liquids Pt has VFSS scheduled for Monday 10/19 at 10:00 a.m to determine if liquids can be further advanced.  Also continue with :  Free water program - must have supervision: ok for 5-6 ice chips per hour or 5-6 small sips of water by teaspoon per hour following good oral cares and only in between meals.   Completed oral pharyngeal excs in pm but needing max cues to sequence and complete them correctly- 5-10 reps. Trials of thin with ice chips x3 - no aspiration s/s; trials of thin water by teaspoon- no aspiration s/s. Trials of thin by cup rim x3- 1x had delayed cough- difficult to determine if related to aspiration as pt also has baseline cough  without po intake.    Reviewed dysarthria strategies again- pt able to recall that he is to exaggerate his articulation- verbalized this - but needs max cues to consistently use this as well as to slow his rate.         Other Barriers to Discharge (Family Training, etc):  Training, discharge plan/support, tube feed training and/or adequate PO intake

## 2020-10-17 NOTE — PLAN OF CARE
Discharge Planner Post-Acute Rehab OT:      Discharge Plan: home with 24 hr assist vs tcu     Precautions:  Left neglect,  L) UE flaccid fall risk,      Current Status:  ADLs:G/H min a sitting eob with chest level able to get under l arm for washing and applying deoderant, sba face level. U/B dressing max a of 1 for button down shirt sitting eob verbal cueing for sequencing; dep bL/B dressing bed Mobilit dep with liko lift to eob pt able to sit 45 min eob with leg portion of lift removed with g/h and unit(s)/b dressing and cga-sba of therapist for balance   IADLs:dependent  Vision/Cognition: Left neglect, increase attention with cues for sequencing increasing participation with g/h dressing and eating.  Difficulty following multi-step cues this date.      Assessment:increasing participation with g/h eating and u)/b dressing with ADLs and balance sitting eob with adls. pt continues to be dep with IADLs.      Other Barriers to Discharge (DME, Family Training, etc): level of physical assist, cognition, DME for home

## 2020-10-17 NOTE — PLAN OF CARE
RN: Pt is alert and disoriented to the time and forgetful, VSS. Pt was up in chair for dinner, appetite good. TF started at 2050 via G-tube without any issues. Pt is incontinent of bowel and bladder, perineal skin care done per unit routine, , no st.cath needed this shift.  Pt denies pain or SOB. LUE edema, tubi  removed at HS, LUE elevated. PCD applied at HS. Continue with POC. Bed alarm applied for safety.

## 2020-10-17 NOTE — PROGRESS NOTES
Diabetes Consult Daily  Progress Note          Assessment/Plan:     HPI:   Jorje Teran is a 69 y/o male with past medical history of HTN, HLD, DM II, SHELDON, hypothyroidism, and morbid obesity who was admitted 9/7/20 after being found down, found to have a R MCA infarct and R carotid atherosclerosis, now admitted to ARU 9/24 for intensive therapies, ongoing medical management, and rehabilitative nursing care.       Assessment:   1)Type II Diabetes Mellitus, II with suboptimal diet control PTA (A1c 7.5%), with tube feed induced hyperglycemia.       Plan:                  -continue Metformin IR  1000mg BID                  -continue Victoza 1.2 mg daily this AM.                 -trial discontinuation of AM NPH started (10/16 - continues to trend nicely)                 -discontinue aspart carb coverage 1:15g CHO with meals, snacks today                 -reduce Novolog fixed dose with start of tube feeds from 5>3. This will discontinue once tube feeds are stopped.                 -aspart high intensity sliding scale >140 AC/HS/0200, while on overnight cycled TF- when off cycled TF can discontinue scheduled 0200 correction dose.                  -BG monitoring TID AC, HS, 0200                 -hypoglycemia protocol                 -carb counting protocol                 -diabetes education will be needed prior to discharge home, for glucometer, and injection teaching (GLP1, possibly insulin). Placed consult on 10/15.      Discharge Planning:                  -BG monitoring before meals and at bedtime initially, then can reduce pending outpatient follow up.                 -Metformin IR 1000mg BID                 -GLP1 (either Victoza 1.2-1.8mg daily or weekly Trulicity 1.5 mg weekly)                 -if discharging off tube feeds, anticipate all insulin can be discontinued upon discharge.      Outpatient follow up: TBD  Plan discussed with patient, and primary team via this note.                "Interval History:     The last 24 hours progress and nursing notes reviewed.  No acute events this interval.  BG trending slightly below target - will not make changes and continue to watch.  Continues on TF during the night interval.  No sig BG spikes with the initiation of TF.  No changes to PO intake  Quite alert and interactive today.  He is hopeful to be off the TF soon and get [place]     Recent Labs   Lab 10/17/20  1147 10/17/20  0754 10/17/20  0209 10/16/20  2211 10/16/20  2046 10/16/20  1727 10/16/20  0621 10/16/20  0621 10/15/20  0738 10/15/20  0738 10/14/20  0534 10/14/20  0534 10/13/20  0641 10/13/20  0641 10/12/20  0553 10/12/20  0553 10/11/20  0644 10/11/20  0644   GLC  --   --   --   --   --   --   --  151*  --  139*  --  155*  --  176*  --  206*  --  220*   * 133* 131* 104* 105* 130*   < >  --    < >  --    < >  --    < >  --    < >  --    < >  --     < > = values in this interval not displayed.           Nutrition:     Orders Placed This Encounter      Combination Diet Dysphagia Diet Level 3: Advanced; Nectar Thickened Liquids (pre-thickened or use instant food thickener)          PTA Regimen:     Cycled TF: Isosource 1.5 at 65cc/hour, from 9PM-5A (8 hours) as of 10/17.           Review of Systems:   CC:  No complaints today- up and ready to head to therapy    Constitutional:   No fever, no chills  ENT/Mouth:   No hearing changes, no ear pain, no sore throat, no rhinorrhea, no difficulty swallowing  Eyes:  No eye pain, no discharge, no vision changes  CV:   No CP/SOB, no new edema  Resp:  No cough, no wheezing  GI:   No nausea or vomiting, no constipation, no diarrhea  :  Denies   Musk:  No joint swelling/pain, No back pain           Medications:   Steroid planning:  no         Physical Exam:   BP (!) 143/68 (BP Location: Right arm)   Pulse 84   Temp 98.7  F (37.1  C) (Oral)   Resp 18   Ht 1.753 m (5' 9\")   Wt 105.3 kg (232 lb 3.2 oz)   SpO2 94%   BMI 34.29 kg/m  "     General:  pleasant, in no acute distress.  Sitting comfortably in W/C  HEENT:  NC/AT. MMM, sclera not injected  Lungs:  unremarkable, no new cough, no SOB  ABD:   Soft, obese  Skin:  warm and dry, no obvious lesions/rash  Feet:    CMS intact, PPP  MSK:   moving all extremities per his baseline - L sided deficit  Lymp:   No LE edema, L UE edema persists - compression sleeve on  Mental Status:  Alert and oriented x3  Psych:   Cooperative, good eye contact, full affect          Data:     Lab Results   Component Value Date    A1C 7.5 09/10/2020            CBC RESULTS:   Recent Labs   Lab Test 10/15/20  0738 09/30/20  0607 09/30/20  0607   WBC  --   --  9.3   RBC  --   --  3.79*   HGB  --   --  11.6*   HCT  --   --  36.3*   MCV  --   --  96   MCH  --   --  30.6   MCHC  --   --  32.0   RDW  --   --  12.9      < > 230    < > = values in this interval not displayed.     Recent Labs   Lab Test 10/16/20  0621 10/15/20  0738    136   POTASSIUM 3.8 4.0   CHLORIDE 101 103   CO2 26 27   ANIONGAP 8 6   * 139*   BUN 22 22   CR 0.86 0.82   SELENA 8.7 8.6     Liver Function Studies -   Recent Labs   Lab Test 09/07/20  2355   PROTTOTAL 7.4   ALBUMIN 3.6   BILITOTAL 0.9   ALKPHOS 67   AST 23   ALT 28     Lab Results   Component Value Date    INR 1.17 09/21/2020    INR 1.03 09/07/2020         CADE Sahu CNP   Inpatient Diabetes Management Service  Pager - [phone]  Friday - Monday 0800 - 1600 hrs  Diabetes Management Team job code: [id number]       I spent a total of 25 minutes bedside and on the inpatient unit managing glycemic care.  Over 50% of my time on the unit was spent counseling the patient and/or coordinating care regarding acute hyperglycemic management.  See note for details.

## 2020-10-17 NOTE — PROGRESS NOTES
"  [facility]   Acute Rehabilitation Unit  Daily progress note  CC:   Stroke Ischemic 01.1 (L) Body Involvement (R) Brain Stroke; L sided weakness due to MCA infarct, R MCA occlusion, and R carotid atherosclerosis    S:   C/O Heart burn better on Protonix 10/16/2020. Shoulder and UE pain returning on the left side.    RVS on 10/19/20    Feels much improved. Significant impairment due to hemisensory deficit, fiedcut, and hemiplegia.   Off Baclofen. Needed adjustment of BP medications. Improved since. Incontinent but not retaining.  Now beginning to move his left side!  TR held 10/14/2020  Dense left hemiplegia, left field cut +. Compensates by turning.  BS monitored. Endo following.  NDD 3 [place].  TF continued.      ROS: Denies HA, nausea, pain.  Cath. Alertness improved. Coughs.        [unfilled]   Scheduled meds    amLODIPine  5 mg Oral BID     aspirin  81 mg Oral Daily     atorvastatin  40 mg Oral Daily     ezetimibe  10 mg Oral Daily     furosemide  20 mg Oral or Feeding Tube BID     insulin aspart  3 Units Subcutaneous Daily     insulin aspart  1-7 Units Subcutaneous BID     insulin aspart  1-10 Units Subcutaneous TID AC     levothyroxine  50 mcg Oral Daily     liraglutide  1.2 mg Subcutaneous Daily     metFORMIN  1,000 mg Oral BID w/meals     metoprolol tartrate  50 mg Oral BID     multivitamins w/minerals  15 mL Oral Daily     nystatin  500,000 Units Swish & Spit 4x Daily     pantoprazole  40 mg Oral QAM AC     sodium chloride  1 spray Both Nostrils 4x daily       PRN meds:  acetaminophen, bacitracin, bisacodyl, calcium carbonate, carboxymethylcellulose PF, dextrose, glucose **OR** dextrose **OR** glucagon, miconazole, nitroGLYcerin, - MEDICATION INSTRUCTIONS -, polyethylene glycol, simethicone, sodium chloride (PF), traZODone      PHYSICAL EXAM  BP (!) 143/68 (BP Location: Right arm)   Pulse 84   Temp 98.7  F (37.1  C) (Oral)   Resp 18   Ht 1.753 m (5' 9\")   Wt " 105.3 kg (232 lb 3.2 oz)   SpO2 94%   BMI 34.29 kg/m    Alert. Sitting better. Verbalizing at baseline now.   Respiratory: Clear to auscultation bilaterally, no crackles or wheezing  Cardiovascular: S1 and S2, RRR, and no murmur noted  GI: soft, non-distended, non-tender. G tube intact + BS +  Skin/Integumen: No rashes, trace lower extremity edema noted  Neuro : Left lower Facial droop + Left Field cut +  Dense left-sided hemiparesis with neglect noted. He is able to adduct the UE partly 2-/5, able to bring knees together, 2-  MSK: Left shoulder subluxation +,  Tone +. Reflexes +    Vocalizes, low volume   Extremities. Moves right side well. Edema +    LABS  Last Comprehensive Metabolic Panel:  Sodium   Date Value Ref Range Status   10/16/2020 135 133 - 144 mmol/L Final     Potassium   Date Value Ref Range Status   10/16/2020 3.8 3.4 - 5.3 mmol/L Final     Chloride   Date Value Ref Range Status   10/16/2020 101 94 - 109 mmol/L Final     Carbon Dioxide   Date Value Ref Range Status   10/16/2020 26 20 - 32 mmol/L Final     Anion Gap   Date Value Ref Range Status   10/16/2020 8 3 - 14 mmol/L Final     Glucose   Date Value Ref Range Status   10/16/2020 151 (H) 70 - 99 mg/dL Final     Urea Nitrogen   Date Value Ref Range Status   10/16/2020 22 7 - 30 mg/dL Final     Creatinine   Date Value Ref Range Status   10/16/2020 0.86 0.66 - 1.25 mg/dL Final     GFR Estimate   Date Value Ref Range Status   10/16/2020 87 >60 mL/min/[1.73_m2] Final     Comment:     Non  GFR Calc  Starting 12/18/2018, serum creatinine based estimated GFR (eGFR) will be   calculated using the Chronic Kidney Disease Epidemiology Collaboration   (CKD-EPI) equation.       Calcium   Date Value Ref Range Status   10/16/2020 8.7 8.5 - 10.1 mg/dL Final      CBC RESULTS:   Recent Labs     CBC RESULTS:   Recent Labs   Lab Test 09/30/20  0607   WBC 9.3   RBC 3.79*   HGB 11.6*   HCT 36.3*   MCV 96   MCH 30.6   MCHC 32.0   RDW 12.9         CBC RESULTS:   Lab Test 10/12/20  0553   WBC  --    RBC  --    HGB  --    HCT  --    MCV  --    MCH  --    MCHC  --    RDW  --            Glucose Values Latest Ref Rng & Units 10/16/2020 10/16/2020 10/16/2020 10/16/2020 10/16/2020 10/17/2020 10/17/2020   Bedside Glucose (mg/dl )  - -- -- -- -- -- -- --   GLUCOSE 70 - 99 mg/dL 164(H) 111(H) 130(H) 105(H) 104(H) 131(H) 133(H)   Some recent data might be hidden     ASSESSMENT AND PLAN    Jorje Teran is a 70 year old  hand dominant male with Stroke Ischemic 01.1 (L) Body Involvement (R) Brain Stroke; L sided weakness due to MCA infarct, R MCA occlusion, and R carotid atherosclerosis  1. PT, OT and SLP 60 minutes of each on a daily basis, in addition to rehab nursing and close management of physiatrist.       2. Impairment of ADL's: OT for 60 minutes daily to work on ADL re-training such as grooming, self cares and bathing.       3. Impairment of mobility:  PT for 60 minutes daily to work on neuromuscular re-education focusing on strength, balance, coordination, and endurance.       4. Impairment of cognition/language/swallow:  SLP for 60 minutes daily to work on cognitive and linguistic deficits.     Rehab RN for med administration, bowel regimen, glucose monitoring and wound care.    1. Medical Conditions  CVA with dense left hemiparesis  CT with filling defect within the distal R ICA extending into the R MCA and occlusion of the R MCA at the M1 segment. CTA with severe plaquing at R carotid bifurcation with filling defect in the prox R ICA suggestive of thrombus contributing to approximately 80% stenosis. 60% stenosis of the proximal L ICA. TTE with bubble -> Normal left ventricular size and function. Left ventricular ejection fraction of 55-60%. No segmental wall motion abnormalities noted.   - Lipids 7/29/20- LDL 78, HDL 56, , T chol 164, hemoglobin A1c 7.1% H, TSH is 2.05.  - remains with significant dysarthria, facial droop and dense  left sided hemiparesis  Spasticity: Stretching, ice + Off Baclofen as was noted to become drowsy. Better now. Some strength returning!  Add shoulder support to reduce subluxation. NMES etc.  - ASA 81 mg daily PO/IA  - atorvastatin 40 mg daily, Zetia 10 mg po daily    - BP management: Better. Avoiding lows.  - goal for SBPs < 160    Cr improved. BUN better.  Dysphagia  - currently on DD2 with nectar thickened liquids   -PEG tube to be placed on 9/22, currently patient is on tube feeding  If questions or problems arise regarding tube function (e.g. leaking, dislodges, etc.) Contact Interventional Radiology department 24 hours a day.   For procedures that were done at [facility]:  8 AM - 4 PM Monday through Friday Contact   [phone]  For afterhours and weekends: [phone]   Ask for the Interventional Radiologist on call.   Protonix 40 mg daily added 10/16/2020 as having heart burn on Pepcid, discontinued.    10/14: 926 kcal and 49 g protein (3 meals)     Adia Gamboa RD, LD    PeG site Discomfort: Treat infection. Clean site. Added Keflex 500 mg qid x 7 days. Better.  Tylenol prn.  Simethicone added prn.    BS high. Endo following.  CAD s/p CABG x 3, x 1 in 2015  HTN continue medications noted above.  HLD: On Lipitor       DM II  Recent A1C at 7.1 7/2020.  BG goal is < 180.- started on lantus Endo following.   Dose adjusted for TF.   -novolog before meals and at bedtime  -BG QAC, HS 0200  - hypoglycemia protocol      US at [facility] negative for DVT     SHELDON  - states he could not sleep with CPAP as outpatient  - he would benefit for treating SHELDON   ? CPAP at night    Hypothyroidism  - resumed PTA Levothyroxine 50 mcg daily  TSH checked 7/29/2020 at 2.05     Morbid obesity  BMI noted  7/202 at ~38. Will need to encourage healthy lifestyle and weight loss with recovery     Diet: Snacks/Supplements Adult: Other; thickened smoothie with meals  (RD); With Meals  Calorie Counts     DVT Prophylaxis: Pneumatic  Compression Devices  Code Status: Full Code      2. Adjustment to disability:  Clinical psychology to eval and treat if needed  3. FEN: On NDD 2 [place] +TF, Bolus versus nocturnal supplements.  4. Bowel: Javed meds  5. Bladder: Monitor  6. GI Prophylaxis:   7. Code: Full  8. Disposition: Home with family and home care vs more likely TCU  9. ELOS:  28 days 10/23/20  10. Rehab prognosis:  Fair  11. Follow up Appointments on Discharge:   12. He needs follow up with neurology as scheduled. Follows with Dr. Vincent with [facility].        Sudarshan Fuentes MD   Physical Medicine & Rehabilitation

## 2020-10-17 NOTE — PLAN OF CARE
FOCUS/GOAL  Bladder management, Nutrition/Feeding/Swallowing precautions, and Mobility    ASSESSMENT, INTERVENTIONS AND CONTINUING PLAN FOR GOAL:  Pt is alert and oriented x4, but is forgetful. Denies pain, SOB or nausea. States heartburn is improved some. Compression stocking on LUE. Pt did not have a BM today. Unable to urinate on own, Straight cathed at 1445 for 525 mL of cloudy urine. Pt tolerated well. Up in chair for all meals, on calorie counts.

## 2020-10-17 NOTE — PROGRESS NOTES
"[facility], [place]   Internal Medicine Daily Note           Interval History/Events     Overnight events reviewed  Reports doing well this morning  Denies any nausea, vomiting, chest pain, shortness of breath  No lightheadedness or dizizness       Review of Systems        4 point ROS including Respiratory, CV, GI and , other than that noted above is negative      Medications   I have reviewed current medications  in the \"current medication\" section of Epic.  Relevant changes include:     Physical Exam   General:       Vital signs:    Blood pressure (!) 143/68, pulse 84, temperature 98.7  F (37.1  C), temperature source Oral, resp. rate 18, height 1.753 m (5' 9\"), weight 105.3 kg (232 lb 3.2 oz), SpO2 94 %.  Estimated body mass index is 34.29 kg/m  as calculated from the following:    Height as of this encounter: 1.753 m (5' 9\").    Weight as of this encounter: 105.3 kg (232 lb 3.2 oz).      Intake/Output Summary (Last 24 hours) at 10/13/2020 1053  Last data filed at 10/13/2020 0600  Gross per 24 hour   Intake 440 ml   Output 800 ml   Net -360 ml        Constitutional: Laying in bed in no acute distress  Eye: No icterus, no pallor  Mouth/ENT: Normal oral mucosa  Cardiovascular: S1, S2 normal. B/L pre tibial edema  Respiratory: B/L CTA  GI: Soft, NT, BS+  : No Hayes's catheter  Neurology: Alert, awake, and oriented. Left hemiparesis, neglect  Psych: Mood stable  MSK:   Integumentary:   Heme/Lymph/Imm:      Laboratory and Imaging Studies     I have reviewed  laboratory and imaging studies in the Epic. Pertinent findings are as below:    BMP  Recent Labs   Lab 10/16/20  0621 10/15/20  0738 10/14/20  0534 10/13/20  0641    136 138 137   POTASSIUM 3.8 4.0 4.0 3.9   CHLORIDE 101 103 106 105   SELENA 8.7 8.6 8.5 8.9   CO2 26 27 25 25   BUN 22 22 25 27   CR 0.86 0.82 0.94 1.01   * 139* 155* 176*     CBC  Recent Labs   Lab 10/15/20  0738 10/12/20  0553    202     INRNo lab " results found in last 7 days.  LFTsNo lab results found in last 7 days.   PANCNo lab results found in last 7 days.        Impression/Plan          VEENA secondary to hypovolemia and  hypotension (secondary to decreased intake with discontinuation tube feedings, concurrent use of furosemide, Entresto, valsartan, amlodipine), now resolved with IV fluids,  resumption of scheduled TF, holding of furosemide, entresto, diovan and reduction in Metoprolol and Amlodipine.  Renal function now back to baseline    10/17: No change from medicine team perspective     # Decreased level of consciousness, likely secondary to acute kidney injury, relatively low blood pressure and recent initiation of baclofen.  Baclofen is metabolized by the kidneys.  Baclofen currently on hold. Mental status improved, appears at or close to baseline  - Monitor     # Cough with Temp 99.3 on 10/12: Oxygen sat is normal. 02 sats on RA this am.  At risk for atelectasis, aspiration pneumonia. Cough may also be related to volume overload, secondary to IV fluids, temporarily holding of lasix  - Continue to monitor  - Low threshold for X ray chest     # Recent right hemispheric CVA with left-sided weakness, on aspirin and statin  - Continue Aspirin, Statin  - PT/OT     # History of hypertension and diastolic CHF,  controlled in outpatient setting on  Furosemide 40 mg bid, Amlodipine 20 mg daily, Metoprolol XL 25 mg bid (changed to short acting Metoprolol 50 mg bid during recent hospitalization), Entresto 49/51, 1 tab daily, Valsartan 320 mg daily.  Goal -140/ in post CVA setting, history of diastolic CHF.        Has been restarted on Metoprolol 50 mg bid, amlodipine 5 mg bid  BP fairly controlled.   - Continue Amlodipine 5 mg BID, Metoprolol 50 mg BID  - Continue Furosemide 20 mg BID (home dose 40 mg BID)  - Continue to monitor on titrate  - Entresto, valsartan, on Hold  - Restart Valsartan if BP starts to trend up     # History of CAD:  On ASA and  statin, B Blocker  - Continue Aspirin, Metoprolol, Statin     # Hyponatremia, mild, likely secondary to resumed TF and continued water flushes (initiated when TF recently discontinued and Pt was started on nectar thick liquids), hyperglycemia, resolved with decrease in free water administration     # Diabetes mellitus type 2, labile, in setting of decreased intake, now resumption of TF  On Insulin Isophane 18 units in AM, 22 units in evening, Insulin Aspart pre meal 1:10 CHO, Insulin Aspart correction 1:25, Liraglutide 0.6 daily, and Metformin 500 mg BID  Glucose fairly controlled.   - Diabetes team following. Appreciate management              Pt's care was discussed with bedside RN, patient and  during Care Team Rounds.               Brendan Antony MD  Hospitalist ( Internal medicine)  Pager: [phone]

## 2020-10-18 ENCOUNTER — APPOINTMENT (OUTPATIENT)
Dept: SPEECH THERAPY | Facility: CLINIC | Age: 70
End: 2020-10-18
Payer: COMMERCIAL

## 2020-10-18 ENCOUNTER — APPOINTMENT (OUTPATIENT)
Dept: PHYSICAL THERAPY | Facility: CLINIC | Age: 70
End: 2020-10-18
Payer: COMMERCIAL

## 2020-10-18 ENCOUNTER — APPOINTMENT (OUTPATIENT)
Dept: OCCUPATIONAL THERAPY | Facility: CLINIC | Age: 70
End: 2020-10-18
Payer: COMMERCIAL

## 2020-10-18 LAB
GLUCOSE BLDC GLUCOMTR-MCNC: 126 MG/DL (ref 70–99)
GLUCOSE BLDC GLUCOMTR-MCNC: 131 MG/DL (ref 70–99)
GLUCOSE BLDC GLUCOMTR-MCNC: 145 MG/DL (ref 70–99)
GLUCOSE BLDC GLUCOMTR-MCNC: 150 MG/DL (ref 70–99)
GLUCOSE BLDC GLUCOMTR-MCNC: 157 MG/DL (ref 70–99)
PLATELET # BLD AUTO: 232 10E9/L (ref 150–450)

## 2020-10-18 PROCEDURE — 97535 SELF CARE MNGMENT TRAINING: CPT | Mod: GO | Performed by: OCCUPATIONAL THERAPIST

## 2020-10-18 PROCEDURE — 97112 NEUROMUSCULAR REEDUCATION: CPT | Mod: GP | Performed by: PHYSICAL THERAPIST

## 2020-10-18 PROCEDURE — 99233 SBSQ HOSP IP/OBS HIGH 50: CPT | Performed by: PHYSICAL MEDICINE & REHABILITATION

## 2020-10-18 PROCEDURE — 97130 THER IVNTJ EA ADDL 15 MIN: CPT | Performed by: SPEECH-LANGUAGE PATHOLOGIST

## 2020-10-18 PROCEDURE — 92526 ORAL FUNCTION THERAPY: CPT | Mod: GN | Performed by: SPEECH-LANGUAGE PATHOLOGIST

## 2020-10-18 PROCEDURE — 128N000003 HC R&B REHAB

## 2020-10-18 PROCEDURE — 97530 THERAPEUTIC ACTIVITIES: CPT | Mod: GP | Performed by: PHYSICAL THERAPIST

## 2020-10-18 PROCEDURE — 97129 THER IVNTJ 1ST 15 MIN: CPT | Performed by: SPEECH-LANGUAGE PATHOLOGIST

## 2020-10-18 PROCEDURE — 250N000013 HC RX MED GY IP 250 OP 250 PS 637: Performed by: INTERNAL MEDICINE

## 2020-10-18 PROCEDURE — 36415 COLL VENOUS BLD VENIPUNCTURE: CPT | Performed by: PHYSICAL MEDICINE & REHABILITATION

## 2020-10-18 PROCEDURE — 85049 AUTOMATED PLATELET COUNT: CPT | Performed by: PHYSICAL MEDICINE & REHABILITATION

## 2020-10-18 PROCEDURE — 99232 SBSQ HOSP IP/OBS MODERATE 35: CPT | Performed by: INTERNAL MEDICINE

## 2020-10-18 PROCEDURE — 250N000013 HC RX MED GY IP 250 OP 250 PS 637: Performed by: PHYSICAL MEDICINE & REHABILITATION

## 2020-10-18 PROCEDURE — 250N000013 HC RX MED GY IP 250 OP 250 PS 637: Performed by: PHYSICIAN ASSISTANT

## 2020-10-18 PROCEDURE — 97530 THERAPEUTIC ACTIVITIES: CPT | Mod: GO | Performed by: OCCUPATIONAL THERAPIST

## 2020-10-18 PROCEDURE — 999N001017 HC STATISTIC GLUCOSE BY METER IP

## 2020-10-18 RX ORDER — LIRAGLUTIDE 6 MG/ML
1.8 INJECTION SUBCUTANEOUS DAILY
Status: DISCONTINUED | OUTPATIENT
Start: 2020-10-19 | End: 2020-10-23 | Stop reason: HOSPADM

## 2020-10-18 RX ORDER — VALSARTAN 40 MG/1
40 TABLET ORAL DAILY
Status: DISCONTINUED | OUTPATIENT
Start: 2020-10-18 | End: 2020-10-23 | Stop reason: HOSPADM

## 2020-10-18 RX ADMIN — LEVOTHYROXINE SODIUM 50 MCG: 25 TABLET ORAL at 07:53

## 2020-10-18 RX ADMIN — INSULIN ASPART 1 UNITS: 100 INJECTION, SOLUTION INTRAVENOUS; SUBCUTANEOUS at 08:12

## 2020-10-18 RX ADMIN — MULTIVITAMIN 15 ML: LIQUID ORAL at 18:58

## 2020-10-18 RX ADMIN — CALCIUM CARBONATE (ANTACID) CHEW TAB 500 MG 500 MG: 500 CHEW TAB at 13:07

## 2020-10-18 RX ADMIN — NYSTATIN 500000 UNITS: 500000 SUSPENSION ORAL at 16:25

## 2020-10-18 RX ADMIN — FUROSEMIDE 20 MG: 20 TABLET ORAL at 16:25

## 2020-10-18 RX ADMIN — NYSTATIN 500000 UNITS: 500000 SUSPENSION ORAL at 07:53

## 2020-10-18 RX ADMIN — EZETIMIBE 10 MG: 10 TABLET ORAL at 07:53

## 2020-10-18 RX ADMIN — PANTOPRAZOLE SODIUM 40 MG: 40 TABLET, DELAYED RELEASE ORAL at 05:13

## 2020-10-18 RX ADMIN — NYSTATIN 500000 UNITS: 500000 SUSPENSION ORAL at 21:40

## 2020-10-18 RX ADMIN — METOPROLOL TARTRATE 50 MG: 50 TABLET, FILM COATED ORAL at 07:54

## 2020-10-18 RX ADMIN — NYSTATIN 500000 UNITS: 500000 SUSPENSION ORAL at 13:07

## 2020-10-18 RX ADMIN — FUROSEMIDE 20 MG: 20 TABLET ORAL at 07:53

## 2020-10-18 RX ADMIN — AMLODIPINE BESYLATE 5 MG: 5 TABLET ORAL at 21:40

## 2020-10-18 RX ADMIN — SALINE NASAL SPRAY 1 SPRAY: 1.5 SOLUTION NASAL at 13:10

## 2020-10-18 RX ADMIN — INSULIN ASPART 1 UNITS: 100 INJECTION, SOLUTION INTRAVENOUS; SUBCUTANEOUS at 11:51

## 2020-10-18 RX ADMIN — LIRAGLUTIDE 1.2 MG: 6 INJECTION SUBCUTANEOUS at 08:06

## 2020-10-18 RX ADMIN — ASPIRIN 81 MG: 81 TABLET, COATED ORAL at 07:53

## 2020-10-18 RX ADMIN — ATORVASTATIN CALCIUM 40 MG: 40 TABLET, FILM COATED ORAL at 07:53

## 2020-10-18 RX ADMIN — VALSARTAN 40 MG: 40 TABLET, FILM COATED ORAL at 11:06

## 2020-10-18 RX ADMIN — METOPROLOL TARTRATE 50 MG: 50 TABLET, FILM COATED ORAL at 21:40

## 2020-10-18 RX ADMIN — METFORMIN HYDROCHLORIDE 1000 MG: 500 TABLET ORAL at 07:53

## 2020-10-18 RX ADMIN — METFORMIN HYDROCHLORIDE 1000 MG: 500 TABLET ORAL at 18:58

## 2020-10-18 RX ADMIN — AMLODIPINE BESYLATE 5 MG: 5 TABLET ORAL at 07:55

## 2020-10-18 ASSESSMENT — MIFFLIN-ST. JEOR: SCORE: 1860.32

## 2020-10-18 NOTE — PLAN OF CARE
FOCUS/GOAL  Bowel management, Bladder management, Pain management, Wound care management, Mobility, and Cognition/Memory/Judgment/Problem solving    ASSESSMENT, INTERVENTIONS AND CONTINUING PLAN FOR GOAL:    A&O, has difficulty at times following instructions from staff. Incontinent of bowels this shift. Patient was bladder scanned for 220 ml @ 1900. Writer asked the NA to recheck later. BS was 51 ml and doubled checked for 47 ml. May have voided with incontinent bowel episode or 220 ml scan was erroneous. Will pass on to NOC RN to monitor.    @ bedtime, TF running.

## 2020-10-18 NOTE — PLAN OF CARE
Discharge Planner Post-Acute Rehab PT:      Discharge Plan:  home w/ hired physical assist vs TCU     Precautions: * falls, (L) UE flaccid, shannon neglect, field cut     Current Status:  Transfer: liko  Gait: w/c dependent  Stairs: unable  Balance: bearing wt evenly for sitting tasks, unable to wt shift for sitting wt acceptance on feet;  L neglect     Assessment:   Did well w/ tilt table for wt bearing and for initiating mm activity (L) LE;  Use TENS and NMES every session.     Other Barriers to Discharge (DME, Family Training, etc): level of physical assist, incontinent, pt will need a wheelchair eval if discharging to home

## 2020-10-18 NOTE — PLAN OF CARE
FOCUS/GOAL  Bladder management, Nutrition/Feeding/Swallowing precautions, and Medication management    ASSESSMENT, INTERVENTIONS AND CONTINUING PLAN FOR GOAL:  Pt is alert and oriented x4. BP elevated this morning 165 systolic, MD added valsartan, recheck  systolic. BGs also elevated today, 153 and 145. Scheduled victoza insulin increased. Pt unable to void today after multiple attempts, bladder scan 462, then later straight cathed for 490 mL of cloudy tiffany urine. Sticky note left for MD as pt has repeated requests concerning heartburn.

## 2020-10-18 NOTE — PROGRESS NOTES
Diabetes Consult Daily  Progress Note          Assessment/Plan:     HPI:  Jorje Teran is a 71 y/o male with past medical history of HTN, HLD, DM II, SHELDON, hypothyroidism, and morbid obesity who was admitted 9/7/20 after being found down, found to have a R MCA infarct and R carotid atherosclerosis, now admitted to ARU 9/24 for intensive therapies, ongoing medical management, and rehabilitative nursing care.       Assessment:   1)Type II Diabetes Mellitus, II with suboptimal diet control PTA (A1c 7.5%), with tube feed induced hyperglycemia.       Plan:                  -continue Metformin IR  1000mg BID                  -continue Victoza 1.2 mg daily this AM. Will increase to 1.8 mg starting tomorrow am 10/19                 -Novolog fixed dose with start of tube feeds from 5>3. This will discontinue once                                     tube feeds are stopped.                 -aspart high intensity sliding scale >140 AC/HS/0200, while on overnight cycled TF- when                       off cycled TF can discontinue scheduled 0200 correction dose.                  -BG monitoring TID AC, HS, 0200                 -hypoglycemia protocol                 -carb counting protocol                 -diabetes education will be needed prior to discharge home, for glucometer, and injection teaching (GLP1, possibly insulin). Placed consult on 10/15.      Discharge Planning:                  -BG monitoring before meals and at bedtime initially, then can reduce pending outpatient                        follow up.                 -Metformin IR 1000mg BID                 -GLP1 (either Victoza 1.2-1.8mg daily or weekly Trulicity 1.5 mg weekly)                 -if discharging off tube feeds, anticipate all insulin can be discontinued upon discharge.      Outpatient follow up: TBD  Plan discussed with patient, and primary team via this note.               Interval History:     The last 24 hours progress and  "nursing notes reviewed.  No acute events this interval.  BG trending at target, will advance Victoza as he is tolerating.  Continues to do well without the NPH and novolog coverage  10/14 - Per note from RD - will consider stopping TF in next 1-2 days - has not yet been stopped - will watch for changes.    Recent Labs   Lab 10/18/20  0739 10/18/20  0233 10/17/20  2109 10/17/20  1804 10/17/20  1147 10/17/20  0754 10/16/20  0621 10/16/20  0621 10/15/20  0738 10/15/20  0738 10/14/20  0534 10/14/20  0534 10/13/20  0641 10/13/20  0641 10/12/20  0553 10/12/20  0553   GLC  --   --   --   --   --   --   --  151*  --  139*  --  155*  --  176*  --  206*   * 150* 121* 129* 113* 133*   < >  --    < >  --    < >  --    < >  --    < >  --     < > = values in this interval not displayed.           Nutrition:     Orders Placed This Encounter      Combination Diet Dysphagia Diet Level 3: Advanced; Nectar Thickened Liquids (pre-thickened or use instant food thickener)    Cycled TF: Isosource 1.5 at 65cc/hour, from 9PM-5A (8 hours) as of 10/18.            PTA Regimen:     TIIDM was reportedly diet controlled prior to admission.             Review of Systems:   CC: \"this heartburn\"    Constitutional:   No fever, no chills  ENT/Mouth:   No hearing changes, no ear pain, no sore throat, no rhinorrhea, no difficulty swallowing  Eyes:  No eye pain, no discharge, no vision changes  CV:   No CP/SOB, no new edema  Resp:  No cough, no wheezing  GI:   No nausea or vomiting, denies constipation, one looser stool yesterday  :  No dysuria, no frequency  Musk:  No joint swelling/pain, intermittent back pain/buttocks discomfort if sitting too long is same spot  Skin/heme:   No new rashes/bruises/open areas.  No pruritis  Neuro:   No new weakness, no numbness/tingling, no headache  Psych:   No new anxiety, no depression  Endocrine:  No polyuria, no polydipsia         Medications:   Steroid planning:  no       Physical Exam:   BP (!) 165/80 " "(BP Location: Left arm)   Pulse 98   Temp 97.4  F (36.3  C) (Oral)   Resp 16   Ht 1.753 m (5' 9\")   Wt 111.5 kg (245 lb 12.8 oz)   SpO2 96%   BMI 36.30 kg/m      General:  pleasant, in no acute distress. sitting comfortably in bed. Loud rock music playing on radio which he is thoroughly enjoying  HEENT:  NC/AT. MMM, sclera not injected  Lungs:  unremarkable, no new cough, no SOB  ABD:   soft,  non-tender - no rebound/rigidity  Skin:  warm and dry, no obvious lesions/rash  Feet:    CMS intact, PPP  MSK:   moving all extremities - per baseline - L sided deficits  Lymp:   mild LE edema  Mental Status:  Alert and oriented x3  Psych:   Cooperative, good eye contact, full affect          Data:     Lab Results   Component Value Date    A1C 7.5 09/10/2020          CBC RESULTS:   Recent Labs   Lab Test 10/18/20  0639 09/30/20  0607 09/30/20  0607   WBC  --   --  9.3   RBC  --   --  3.79*   HGB  --   --  11.6*   HCT  --   --  36.3*   MCV  --   --  96   MCH  --   --  30.6   MCHC  --   --  32.0   RDW  --   --  12.9      < > 230    < > = values in this interval not displayed.     Recent Labs   Lab Test 10/16/20  0621 10/15/20  0738    136   POTASSIUM 3.8 4.0   CHLORIDE 101 103   CO2 26 27   ANIONGAP 8 6   * 139*   BUN 22 22   CR 0.86 0.82   SELENA 8.7 8.6     Liver Function Studies -   Recent Labs   Lab Test 09/07/20  2355   PROTTOTAL 7.4   ALBUMIN 3.6   BILITOTAL 0.9   ALKPHOS 67   AST 23   ALT 28     Lab Results   Component Value Date    INR 1.17 09/21/2020    INR 1.03 09/07/2020         CADE Sahu CNP   Inpatient Diabetes Management Service  Pager - [phone]  Friday - Monday 0800 - 1600 hrs  Diabetes Management Team job code: [phone]       I spent a total of 25 minutes bedside and on the inpatient unit managing glycemic care.  Over 50% of my time on the unit was spent counseling the patient and/or coordinating care regarding acute hyperglycemic management.  See note for details.      "

## 2020-10-18 NOTE — PROGRESS NOTES
[facility]   Acute Rehabilitation Unit  Daily progress note  CC:   Stroke Ischemic 01.1 (L) Body Involvement (R) Brain Stroke; L sided weakness due to MCA infarct, R MCA occlusion, and R carotid atherosclerosis    S:   C/O Heart burn still intermittently, is on Protonix 10/16/2020. Shoulder and UE pain returning on the left side.  Elevate LUE on foam while in WC.  RVS on 10/19/20    Feels much improved. Significant impairment due to hemisensory deficit, fiedcut, and hemiplegia.   Off Baclofen. Needed adjustment of BP medications. Improved since. Incontinent but not retaining.  Now beginning to move his left side!  TR held 10/14/2020  Dense left hemiplegia, left field cut +. Compensates by turning.  BS monitored. Endo following.  NDD 3 [place].  TF continued.      ROS: Denies HA, nausea, pain.  Cath. Alertness improved. Coughs.        [unfilled]   Scheduled meds    amLODIPine  5 mg Oral BID     aspirin  81 mg Oral Daily     atorvastatin  40 mg Oral Daily     ezetimibe  10 mg Oral Daily     furosemide  20 mg Oral or Feeding Tube BID     insulin aspart  3 Units Subcutaneous Daily     insulin aspart  1-7 Units Subcutaneous BID     insulin aspart  1-10 Units Subcutaneous TID AC     levothyroxine  50 mcg Oral Daily     [START ON 10/19/2020] liraglutide  1.8 mg Subcutaneous Daily     metFORMIN  1,000 mg Oral BID w/meals     metoprolol tartrate  50 mg Oral BID     multivitamins w/minerals  15 mL Oral Daily     nystatin  500,000 Units Swish & Spit 4x Daily     pantoprazole  40 mg Oral QAM AC     sodium chloride  1 spray Both Nostrils 4x daily     valsartan  40 mg Oral Daily       PRN meds:  acetaminophen, bacitracin, bisacodyl, calcium carbonate, carboxymethylcellulose PF, dextrose, glucose **OR** dextrose **OR** glucagon, miconazole, nitroGLYcerin, - MEDICATION INSTRUCTIONS -, polyethylene glycol, simethicone, sodium chloride (PF), traZODone      PHYSICAL EXAM  BP (!) 165/80 (BP  "Location: Left arm)   Pulse 98   Temp 97.4  F (36.3  C) (Oral)   Resp 16   Ht 1.753 m (5' 9\")   Wt 111.5 kg (245 lb 12.8 oz)   SpO2 96%   BMI 36.30 kg/m    Alert. Sitting better. Verbalizing at baseline now.   Respiratory: Clear to auscultation bilaterally, no crackles or wheezing  Cardiovascular: S1 and S2, RRR, and no murmur noted  GI: soft, non-distended, non-tender. G tube intact + BS +  Skin/Integumen: No rashes, trace lower extremity edema noted  Neuro : Left lower Facial droop + Left Field cut +  Dense left-sided hemiparesis with neglect noted. He is able to adduct the UE partly 2-/5, able to bring knees together, 2-  MSK: Left shoulder subluxation +,  Tone +. Reflexes +    Vocalizes, low volume   Extremities. Moves right side well. Edema +    LABS  Last Comprehensive Metabolic Panel:  Sodium   Date Value Ref Range Status   10/16/2020 135 133 - 144 mmol/L Final     Potassium   Date Value Ref Range Status   10/16/2020 3.8 3.4 - 5.3 mmol/L Final     Chloride   Date Value Ref Range Status   10/16/2020 101 94 - 109 mmol/L Final     Carbon Dioxide   Date Value Ref Range Status   10/16/2020 26 20 - 32 mmol/L Final     Anion Gap   Date Value Ref Range Status   10/16/2020 8 3 - 14 mmol/L Final     Glucose   Date Value Ref Range Status   10/16/2020 151 (H) 70 - 99 mg/dL Final     Urea Nitrogen   Date Value Ref Range Status   10/16/2020 22 7 - 30 mg/dL Final     Creatinine   Date Value Ref Range Status   10/16/2020 0.86 0.66 - 1.25 mg/dL Final     GFR Estimate   Date Value Ref Range Status   10/16/2020 87 >60 mL/min/[1.73_m2] Final     Comment:     Non  GFR Calc  Starting 12/18/2018, serum creatinine based estimated GFR (eGFR) will be   calculated using the Chronic Kidney Disease Epidemiology Collaboration   (CKD-EPI) equation.       Calcium   Date Value Ref Range Status   10/16/2020 8.7 8.5 - 10.1 mg/dL Final      CBC RESULTS:   Recent Labs     CBC RESULTS:   Recent Labs   Lab Test " 09/30/20  0607   WBC 9.3   RBC 3.79*   HGB 11.6*   HCT 36.3*   MCV 96   MCH 30.6   MCHC 32.0   RDW 12.9        CBC RESULTS:   Lab Test 10/12/20  0553   WBC  --    RBC  --    HGB  --    HCT  --    MCV  --    MCH  --    MCHC  --    RDW  --            Glucose Values Latest Ref Rng & Units 10/17/2020 10/17/2020 10/17/2020 10/17/2020 10/18/2020 10/18/2020 10/18/2020   Bedside Glucose (mg/dl )  - -- -- -- -- -- -- --   GLUCOSE 70 - 99 mg/dL 133(H) 113(H) 129(H) 121(H) 150(H) 157(H) 145(H)   Some recent data might be hidden     ASSESSMENT AND PLAN    Jorje Teran is a 70 year old  hand dominant male with Stroke Ischemic 01.1 (L) Body Involvement (R) Brain Stroke; L sided weakness due to MCA infarct, R MCA occlusion, and R carotid atherosclerosis  1. PT, OT and SLP 60 minutes of each on a daily basis, in addition to rehab nursing and close management of physiatrist.       2. Impairment of ADL's: OT for 60 minutes daily to work on ADL re-training such as grooming, self cares and bathing.       3. Impairment of mobility:  PT for 60 minutes daily to work on neuromuscular re-education focusing on strength, balance, coordination, and endurance.       4. Impairment of cognition/language/swallow:  SLP for 60 minutes daily to work on cognitive and linguistic deficits.     Rehab RN for med administration, bowel regimen, glucose monitoring and wound care.    1. Medical Conditions  CVA with dense left hemiparesis  CT with filling defect within the distal R ICA extending into the R MCA and occlusion of the R MCA at the M1 segment. CTA with severe plaquing at R carotid bifurcation with filling defect in the prox R ICA suggestive of thrombus contributing to approximately 80% stenosis. 60% stenosis of the proximal L ICA. TTE with bubble -> Normal left ventricular size and function. Left ventricular ejection fraction of 55-60%. No segmental wall motion abnormalities noted.   - Lipids 7/29/20- LDL 78, HDL 56, ,  T chol 164, hemoglobin A1c 7.1% H, TSH is 2.05.  - remains with significant dysarthria, facial droop and dense left sided hemiparesis  Spasticity: Stretching, ice + Off Baclofen as was noted to become drowsy. Better now. Some strength returning!  Add shoulder support to reduce subluxation. NMES etc.  - ASA 81 mg daily PO/OK  - atorvastatin 40 mg daily, Zetia 10 mg po daily    - BP management: Better. Avoiding lows.  - goal for SBPs < 160    Cr improved. BUN better.  Dysphagia  - currently on DD2 with nectar thickened liquids   -PEG tube to be placed on 9/22, currently patient is on tube feeding  If questions or problems arise regarding tube function (e.g. leaking, dislodges, etc.) Contact Interventional Radiology department 24 hours a day.   For procedures that were done at [facility]:  8 AM - 4 PM Monday through Friday Contact   [phone]  For afterhours and weekends: [phone]   Ask for the Interventional Radiologist on call.   Protonix 40 mg daily added 10/16/2020 as having heart burn on Pepcid, discontinued.    10/14: 926 kcal and 49 g protein (3 meals)     Adia Gamboa RD, LD    PeG site Discomfort: Treat infection. Clean site. Added Keflex 500 mg qid x 7 days. Better.  Tylenol prn.  Simethicone added prn.    BS high. Endo following.  CAD s/p CABG x 3, x 1 in 2015  HTN continue medications noted above.  HLD: On Lipitor       DM II  Recent A1C at 7.1 7/2020.  BG goal is < 180.- started on lantus Endo following.   Dose adjusted for TF.   -novolog before meals and at bedtime  -BG QAC, HS 0200  - hypoglycemia protocol      US at [facility] negative for DVT     SHELDON  - states he could not sleep with CPAP as outpatient  - he would benefit for treating SHELDON   ? CPAP at night    Hypothyroidism  - resumed PTA Levothyroxine 50 mcg daily  TSH checked 7/29/2020 at 2.05     Morbid obesity  BMI noted  7/202 at ~38. Will need to encourage healthy lifestyle and weight loss with recovery     Diet: Snacks/Supplements  Adult: Other; thickened smoothie with meals  (RD); With Meals  Calorie Counts     DVT Prophylaxis: Pneumatic Compression Devices  Code Status: Full Code      2. Adjustment to disability:  Clinical psychology to eval and treat if needed  3. FEN: On NDD 2 [place] +TF, Bolus versus nocturnal supplements.  4. Bowel: Javed meds  5. Bladder: Monitor  6. GI Prophylaxis: Protonix.  7. Code: Full  8. Disposition: Home with family and home care vs more likely TCU  9. ELOS:  28 days 10/23/20  10. Rehab prognosis:  Fair  11. Follow up Appointments on Discharge:   12. He needs follow up with neurology as scheduled. Follows with Dr. Vincent with [facility].        Sudarshan Fuentes MD   Physical Medicine & Rehabilitation

## 2020-10-18 NOTE — PLAN OF CARE
"FOCUS/GOAL  Medical management    ASSESSMENT, INTERVENTIONS AND CONTINUING PLAN FOR GOAL:  Pt is alert, disoriented to time. No complaints of pain. Assist of 2 liko. Did not get out of bed this shift. Unable to void this shift. Random scan 486. . Pt frustrated with ISC and wondering why \"the tube can't stay in\". Educated pt on infection possibility. Pt verbalized understanding. Pt using call light by accident this shift.    "

## 2020-10-18 NOTE — PROGRESS NOTES
"[facility], [place]   Internal Medicine Daily Note           Interval History/Events     Overnight events reviewed  Reports doing well  No fever, chills, lightheadedness or dizziness       Review of Systems        4 point ROS including Respiratory, CV, GI and , other than that noted above is negative      Medications   I have reviewed current medications  in the \"current medication\" section of Epic.  Relevant changes include:     Physical Exam   General:       Vital signs:    Blood pressure (!) 165/80, pulse 98, temperature 97.4  F (36.3  C), temperature source Oral, resp. rate 16, height 1.753 m (5' 9\"), weight 111.5 kg (245 lb 12.8 oz), SpO2 96 %.  Estimated body mass index is 36.3 kg/m  as calculated from the following:    Height as of this encounter: 1.753 m (5' 9\").    Weight as of this encounter: 111.5 kg (245 lb 12.8 oz).      Intake/Output Summary (Last 24 hours) at 10/13/2020 1053  Last data filed at 10/13/2020 0600  Gross per 24 hour   Intake 440 ml   Output 800 ml   Net -360 ml        Constitutional: Laying in bed in no acute distress  Eye: No icterus, no pallor  Mouth/ENT: Normal oral mucosa  Cardiovascular: S1, S2 normal. B/L pre tibial edema  Respiratory: B/L CTA  GI: Soft, NT, BS+  : No Hayes's catheter  Neurology: Alert, awake, and oriented. Left hemiparesis, neglect  Psych: Mood stable  MSK:   Integumentary:   Heme/Lymph/Imm:      Laboratory and Imaging Studies     I have reviewed  laboratory and imaging studies in the Epic. Pertinent findings are as below:    BMP  Recent Labs   Lab 10/16/20  0621 10/15/20  0738 10/14/20  0534 10/13/20  0641    136 138 137   POTASSIUM 3.8 4.0 4.0 3.9   CHLORIDE 101 103 106 105   SELENA 8.7 8.6 8.5 8.9   CO2 26 27 25 25   BUN 22 22 25 27   CR 0.86 0.82 0.94 1.01   * 139* 155* 176*     CBC  Recent Labs   Lab 10/18/20  0639 10/15/20  0738 10/12/20  0553    207 202     INRNo lab results found in last 7 days.  LFTsNo lab " results found in last 7 days.   PANCNo lab results found in last 7 days.        Impression/Plan          VEENA secondary to hypovolemia and  hypotension (secondary to decreased intake with discontinuation tube feedings, concurrent use of furosemide, Entresto, valsartan, amlodipine), now resolved with IV fluids,  resumption of scheduled TF, holding of furosemide, entresto, diovan and reduction in Metoprolol and Amlodipine.  Renal function now back to baseline    10/18: No change from medicine team perspective     # Decreased level of consciousness, likely secondary to acute kidney injury, relatively low blood pressure and recent initiation of baclofen.  Baclofen is metabolized by the kidneys.  Baclofen currently on hold. Mental status improved, appears at or close to baseline  - Monitor     # Cough with Temp 99.3 on 10/12: Oxygen sat is normal. 02 sats on RA this am.  At risk for atelectasis, aspiration pneumonia. Cough may also be related to volume overload, secondary to IV fluids, temporarily holding of lasix  - Continue to monitor  - Low threshold for X ray chest     # Recent right hemispheric CVA with left-sided weakness, on aspirin and statin  - Continue Aspirin, Statin  - PT/OT     # History of hypertension and diastolic CHF,  controlled in outpatient setting on  Furosemide 40 mg bid, Amlodipine 20 mg daily, Metoprolol XL 25 mg bid (changed to short acting Metoprolol 50 mg bid during recent hospitalization), Entresto 49/51, 1 tab daily, Valsartan 320 mg daily.  Goal -140/ in post CVA setting, history of diastolic CHF.        Has been restarted on Metoprolol 50 mg bid, amlodipine 5 mg bid  BP slightly trending up.   - Continue Amlodipine 5 mg BID, Metoprolol 50 mg BID  - Continue Furosemide 20 mg BID (home dose 40 mg BID)  - Continue to monitor on titrate  - Entresto, valsartan, on Hold  - Restart Valsartan at 40 mg daily on 10/18     # History of CAD:  On ASA and statin, B Blocker  - Continue Aspirin,  Metoprolol, Statin     # Hyponatremia, mild, likely secondary to resumed TF and continued water flushes (initiated when TF recently discontinued and Pt was started on nectar thick liquids), hyperglycemia, resolved with decrease in free water administration     # Diabetes mellitus type 2, labile, in setting of decreased intake, now resumption of TF  On Insulin Isophane 18 units in AM, 22 units in evening, Insulin Aspart pre meal 1:10 CHO, Insulin Aspart correction 1:25, Liraglutide 0.6 daily, and Metformin 500 mg BID  Glucose fairly controlled.   - Diabetes team following. Appreciate management              Pt's care was discussed with bedside RN, patient and  during Care Team Rounds.               Brendan Antony MD  Hospitalist ( Internal medicine)  Pager: [phone]

## 2020-10-18 NOTE — PLAN OF CARE
Discharge Planner Post-Acute Rehab SLP:      Discharge Plan: to be determined, at this time pt would need 24 hour support; ? TCU vs HH with 24 hr supervision      Precautions: fall, swallowing, 1:1 assist/supervision at meals     Current Status:   Swallowing: Continues to tolerate DD3 solids and still  with nectar- thick liquids. VFSS ordered for Monday 10/19 to see if liquids can be further advanced . Continuing with Free water program- with supervision: ok for 5-6 ice chips or 5-6 small teaspoons of water per hour following oral cares and only in between meals     Communication: moderate dysarthria, variable intelligibility.     Cognition: moderate/ impairment for basic orientation, memory, reasoning        Assessment:    Swallowing: Pt seen for meal follow-up current DD3 with nectar liquids- tolerating nectar liquids without difficulty and also generally tolerating DD3 textures- no oral residue and improved oral prep. Also had pt trial thin water during meal-- pt generally tolerating thin liquid trials during meal - only 1x had mild throat clear- all other trials of thin water at meal- no aspiration s/s  Further pt does not report pharyngeal retention with DD3 solids. Recommend continue with current DD3 with nectar thick liquids; pt needs to continue to be upright for all meals, slow rate, small bites, alternate solids and liquids Pt has VFSS scheduled for Monday 10/19 at 10:00 a.m to determine if liquids can be further advanced.  Also continue with :  Free water program - must have supervision: ok for 5-6 ice chips per hour or 5-6 small sips of water by teaspoon per hour following good oral cares and only in between meals.                Other Barriers to Discharge (Family Training, etc):  Training, discharge plan/support, tube feed training and/or adequate PO intake

## 2020-10-18 NOTE — PLAN OF CARE
[]Hide copied text    []Blanca for details  Discharge Planner Post-Acute Rehab OT:      Discharge Plan: home with 24 hr assist vs tcu     Precautions:  Left neglect,  L) UE flaccid fall risk,      Current Status:  ADLs:G/H min a sitting eob with chest level able to get under l arm for washing and applying deoderant, sba face level. U/B dressing max a of 1 for button down shirt sitting eob verbal cueing for sequencing; dep bL/B dressing bed Mobilit dep with liko lift to eob pt able to sit 45 min eob with leg portion of lift removed with g/h and unit(s)/b dressing and cga-sba of therapist for balance   IADLs:dependent  Vision/Cognition: Left neglect, increase attention with cues for sequencing increasing participation with g/h dressing and eating.  Difficulty following multi-step cues this date.Worksheets completed with significant neglect on L side / mod cues to scan to L side with all written material     Assessment:increasing participation with g/h eating and u)/b dressing with ADLs and balance sitting eob with adls. pt continues to be dep with IADLs.      Other Barriers to Discharge (DME, Family Training, etc): level of physical assist, cognition, DME for home

## 2020-10-19 ENCOUNTER — APPOINTMENT (OUTPATIENT)
Dept: GENERAL RADIOLOGY | Facility: CLINIC | Age: 70
DRG: 057 | End: 2020-10-19
Attending: PHYSICAL MEDICINE & REHABILITATION
Payer: COMMERCIAL

## 2020-10-19 LAB
ALBUMIN SERPL-MCNC: 2.7 G/DL (ref 3.4–5)
ALP SERPL-CCNC: 76 U/L (ref 40–150)
ALT SERPL W P-5'-P-CCNC: 29 U/L (ref 0–70)
ANION GAP SERPL CALCULATED.3IONS-SCNC: 10 MMOL/L (ref 3–14)
AST SERPL W P-5'-P-CCNC: 19 U/L (ref 0–45)
BASOPHILS # BLD AUTO: 0.1 10E9/L (ref 0–0.2)
BASOPHILS NFR BLD AUTO: 0.6 %
BILIRUB DIRECT SERPL-MCNC: 0.2 MG/DL (ref 0–0.2)
BILIRUB SERPL-MCNC: 0.9 MG/DL (ref 0.2–1.3)
BUN SERPL-MCNC: 22 MG/DL (ref 7–30)
CALCIUM SERPL-MCNC: 8.4 MG/DL (ref 8.5–10.1)
CHLORIDE SERPL-SCNC: 102 MMOL/L (ref 94–109)
CO2 SERPL-SCNC: 25 MMOL/L (ref 20–32)
CREAT SERPL-MCNC: 0.76 MG/DL (ref 0.66–1.25)
DIFFERENTIAL METHOD BLD: ABNORMAL
EOSINOPHIL # BLD AUTO: 0.2 10E9/L (ref 0–0.7)
EOSINOPHIL NFR BLD AUTO: 2.1 %
ERYTHROCYTE [DISTWIDTH] IN BLOOD BY AUTOMATED COUNT: 12.5 % (ref 10–15)
GFR SERPL CREATININE-BSD FRML MDRD: >90 ML/MIN/{1.73_M2}
GLUCOSE BLDC GLUCOMTR-MCNC: 141 MG/DL (ref 70–99)
GLUCOSE BLDC GLUCOMTR-MCNC: 142 MG/DL (ref 70–99)
GLUCOSE BLDC GLUCOMTR-MCNC: 168 MG/DL (ref 70–99)
GLUCOSE SERPL-MCNC: 151 MG/DL (ref 70–99)
HCT VFR BLD AUTO: 31.7 % (ref 40–53)
HGB BLD-MCNC: 10.3 G/DL (ref 13.3–17.7)
IMM GRANULOCYTES # BLD: 0.1 10E9/L (ref 0–0.4)
IMM GRANULOCYTES NFR BLD: 1.2 %
LYMPHOCYTES # BLD AUTO: 1.4 10E9/L (ref 0.8–5.3)
LYMPHOCYTES NFR BLD AUTO: 16.7 %
MCH RBC QN AUTO: 30.5 PG (ref 26.5–33)
MCHC RBC AUTO-ENTMCNC: 32.5 G/DL (ref 31.5–36.5)
MCV RBC AUTO: 94 FL (ref 78–100)
MONOCYTES # BLD AUTO: 0.9 10E9/L (ref 0–1.3)
MONOCYTES NFR BLD AUTO: 11.1 %
NEUTROPHILS # BLD AUTO: 5.5 10E9/L (ref 1.6–8.3)
NEUTROPHILS NFR BLD AUTO: 68.3 %
NRBC # BLD AUTO: 0 10*3/UL
NRBC BLD AUTO-RTO: 0 /100
PLATELET # BLD AUTO: 225 10E9/L (ref 150–450)
POTASSIUM SERPL-SCNC: 3.8 MMOL/L (ref 3.4–5.3)
PROT SERPL-MCNC: 6.9 G/DL (ref 6.8–8.8)
RBC # BLD AUTO: 3.38 10E12/L (ref 4.4–5.9)
SODIUM SERPL-SCNC: 137 MMOL/L (ref 133–144)
WBC # BLD AUTO: 8.1 10E9/L (ref 4–11)

## 2020-10-19 PROCEDURE — 36415 COLL VENOUS BLD VENIPUNCTURE: CPT | Performed by: INTERNAL MEDICINE

## 2020-10-19 PROCEDURE — 99232 SBSQ HOSP IP/OBS MODERATE 35: CPT | Performed by: INTERNAL MEDICINE

## 2020-10-19 PROCEDURE — 97110 THERAPEUTIC EXERCISES: CPT | Mod: GP

## 2020-10-19 PROCEDURE — 96372 THER/PROPH/DIAG INJ SC/IM: CPT | Performed by: NURSE PRACTITIONER

## 2020-10-19 PROCEDURE — 250N000013 HC RX MED GY IP 250 OP 250 PS 637: Performed by: INTERNAL MEDICINE

## 2020-10-19 PROCEDURE — 128N000003 HC R&B REHAB

## 2020-10-19 PROCEDURE — 99233 SBSQ HOSP IP/OBS HIGH 50: CPT | Performed by: PHYSICAL MEDICINE & REHABILITATION

## 2020-10-19 PROCEDURE — 74230 X-RAY XM SWLNG FUNCJ C+: CPT | Mod: 26 | Performed by: RADIOLOGY

## 2020-10-19 PROCEDURE — 85025 COMPLETE CBC W/AUTO DIFF WBC: CPT | Performed by: INTERNAL MEDICINE

## 2020-10-19 PROCEDURE — 250N000013 HC RX MED GY IP 250 OP 250 PS 637: Performed by: PHYSICIAN ASSISTANT

## 2020-10-19 PROCEDURE — 80048 BASIC METABOLIC PNL TOTAL CA: CPT | Performed by: INTERNAL MEDICINE

## 2020-10-19 PROCEDURE — 97112 NEUROMUSCULAR REEDUCATION: CPT | Mod: GP

## 2020-10-19 PROCEDURE — 74230 X-RAY XM SWLNG FUNCJ C+: CPT

## 2020-10-19 PROCEDURE — 99207 PR CDG-CUT & PASTE-POTENTIAL IMPACT ON LEVEL: CPT | Performed by: INTERNAL MEDICINE

## 2020-10-19 PROCEDURE — 92611 MOTION FLUOROSCOPY/SWALLOW: CPT | Mod: GN

## 2020-10-19 PROCEDURE — 250N000013 HC RX MED GY IP 250 OP 250 PS 637: Performed by: PHYSICAL MEDICINE & REHABILITATION

## 2020-10-19 PROCEDURE — 97530 THERAPEUTIC ACTIVITIES: CPT | Mod: GP

## 2020-10-19 PROCEDURE — 999N001017 HC STATISTIC GLUCOSE BY METER IP

## 2020-10-19 PROCEDURE — 250N000009 HC RX 250: Performed by: NURSE PRACTITIONER

## 2020-10-19 PROCEDURE — 80076 HEPATIC FUNCTION PANEL: CPT | Performed by: INTERNAL MEDICINE

## 2020-10-19 PROCEDURE — 250N000012 HC RX MED GY IP 250 OP 636 PS 637: Performed by: NURSE PRACTITIONER

## 2020-10-19 RX ORDER — BARIUM SULFATE 400 MG/ML
SUSPENSION ORAL ONCE
Status: COMPLETED | OUTPATIENT
Start: 2020-10-19 | End: 2020-10-19

## 2020-10-19 RX ADMIN — LIRAGLUTIDE 1.8 MG: 6 INJECTION SUBCUTANEOUS at 09:02

## 2020-10-19 RX ADMIN — NYSTATIN 500000 UNITS: 500000 SUSPENSION ORAL at 17:20

## 2020-10-19 RX ADMIN — METOPROLOL TARTRATE 50 MG: 50 TABLET, FILM COATED ORAL at 21:25

## 2020-10-19 RX ADMIN — PANTOPRAZOLE SODIUM 40 MG: 40 TABLET, DELAYED RELEASE ORAL at 05:23

## 2020-10-19 RX ADMIN — AMLODIPINE BESYLATE 5 MG: 5 TABLET ORAL at 09:02

## 2020-10-19 RX ADMIN — METFORMIN HYDROCHLORIDE 1000 MG: 500 TABLET ORAL at 09:02

## 2020-10-19 RX ADMIN — METFORMIN HYDROCHLORIDE 1000 MG: 500 TABLET ORAL at 19:57

## 2020-10-19 RX ADMIN — VALSARTAN 40 MG: 40 TABLET, FILM COATED ORAL at 09:02

## 2020-10-19 RX ADMIN — INSULIN ASPART 1 UNITS: 100 INJECTION, SOLUTION INTRAVENOUS; SUBCUTANEOUS at 17:22

## 2020-10-19 RX ADMIN — PANTOPRAZOLE SODIUM 40 MG: 40 TABLET, DELAYED RELEASE ORAL at 17:19

## 2020-10-19 RX ADMIN — NYSTATIN 500000 UNITS: 500000 SUSPENSION ORAL at 09:02

## 2020-10-19 RX ADMIN — MULTIVITAMIN 15 ML: LIQUID ORAL at 20:01

## 2020-10-19 RX ADMIN — LEVOTHYROXINE SODIUM 50 MCG: 25 TABLET ORAL at 09:02

## 2020-10-19 RX ADMIN — BARIUM SULFATE 5 ML: 400 SUSPENSION ORAL at 10:38

## 2020-10-19 RX ADMIN — SALINE NASAL SPRAY 1 SPRAY: 1.5 SOLUTION NASAL at 09:01

## 2020-10-19 RX ADMIN — FUROSEMIDE 20 MG: 20 TABLET ORAL at 17:15

## 2020-10-19 RX ADMIN — INSULIN ASPART 2 UNITS: 100 INJECTION, SOLUTION INTRAVENOUS; SUBCUTANEOUS at 09:32

## 2020-10-19 RX ADMIN — AMLODIPINE BESYLATE 5 MG: 5 TABLET ORAL at 21:25

## 2020-10-19 RX ADMIN — METOPROLOL TARTRATE 50 MG: 50 TABLET, FILM COATED ORAL at 09:02

## 2020-10-19 RX ADMIN — FUROSEMIDE 20 MG: 20 TABLET ORAL at 09:02

## 2020-10-19 RX ADMIN — ASPIRIN 81 MG: 81 TABLET, COATED ORAL at 09:02

## 2020-10-19 RX ADMIN — ATORVASTATIN CALCIUM 40 MG: 40 TABLET, FILM COATED ORAL at 09:02

## 2020-10-19 RX ADMIN — NYSTATIN 500000 UNITS: 500000 SUSPENSION ORAL at 20:01

## 2020-10-19 RX ADMIN — EZETIMIBE 10 MG: 10 TABLET ORAL at 09:02

## 2020-10-19 NOTE — PLAN OF CARE
VFSS completed.  Patient showing significant improvement in swallowing function as compared to initial video swallow completed approximately 1 month ago.  Patient able to tolerate small single swallows of thin liquid without evidence of penetration or aspiration.  Did have some penetration with a spontaneous throat clear when taking larger/consecutive swallows and following the solid texture trial.  When patient limiting self to just 1 small single swallow, no difficulties noted.  At this time recommend continue with NDD-3 food textures and nectar thick liquids but will continue to work with patient on consistent use of the strategies and patient does have very good potential to advance diet back to thin liquids in the next few days pending ability to implement strategies appropriately.

## 2020-10-19 NOTE — PLAN OF CARE
"Discharge Planner Post-Acute Rehab PT:      Discharge Plan:  home w/ hired physical assist vs TCU     Precautions: * falls, (L) UE flaccid, shannon neglect, field cut     Current Status:  Transfer: liko  Gait: w/c dependent  Stairs: unable  Balance: bearing wt evenly for sitting tasks, unable to wt shift for sitting wt acceptance on feet;  L neglect, \"pusher\" to the L and posteriorly     Assessment:   Pt did well with limited time in sessions today due to meals and bathroom needs.  Will need to work with nsg to improve schedule so patient has time to eat before therapy.  He tolerated L LE ROM well with minimal complaints of pain.  Noted trace ankle and toe motion on L to command.  Total A due to pt resisting posteriorly and \"pusher syndrome\" to the L when performing supine to sit transfers.  Initially pushes posterior and to the L when sitting EOB but with time and vc's he is able to progress to SBA sitting EOB for up to 12 minutes.  Use TENS and NMES every session.     Other Barriers to Discharge (DME, Family Training, etc): level of physical assist, incontinent, pt will need a wheelchair eval if discharging to home  "

## 2020-10-19 NOTE — PROGRESS NOTES
[facility]   Acute Rehabilitation Unit  Daily progress note  CC:   Stroke Ischemic 01.1 (L) Body Involvement (R) Brain Stroke; L sided weakness due to MCA infarct, R MCA occlusion, and R carotid atherosclerosis    S:   C/O Heart burn still intermittently roderick nursing, is on Protonix 10/16/2020. BID for a jovan period? Shoulder and UE pain returning on the left side.  Elevate LUE on foam while in WC.  RVS on 10/19/20    Feels much improved. Significant impairment due to hemisensory deficit, fiedcut, and hemiplegia.   Off Baclofen. Needed adjustment of BP medications. Improved since. Incontinent but not retaining.  Now beginning to move his left side!  TR held 10/14/2020  Dense left hemiplegia, left field cut +. Compensates by turning.  BS monitored. Endo following.  NDD 3 Terminous.  TF continued.      ROS: Denies HA, nausea, pain.  Cath. Alertness improved. Coughs.        [unfilled]   Scheduled meds    amLODIPine  5 mg Oral BID     aspirin  81 mg Oral Daily     atorvastatin  40 mg Oral Daily     barium sulfate  20 mL Oral Once     barium sulfate  5 mL Oral Once     barium sulfate 40%   Oral Once     ezetimibe  10 mg Oral Daily     furosemide  20 mg Oral or Feeding Tube BID     insulin aspart  3 Units Subcutaneous Daily     insulin aspart  1-7 Units Subcutaneous BID     insulin aspart  1-10 Units Subcutaneous TID AC     levothyroxine  50 mcg Oral Daily     liraglutide  1.8 mg Subcutaneous Daily     metFORMIN  1,000 mg Oral BID w/meals     metoprolol tartrate  50 mg Oral BID     multivitamins w/minerals  15 mL Oral Daily     nystatin  500,000 Units Swish & Spit 4x Daily     pantoprazole  40 mg Oral BID AC     sodium chloride  1 spray Both Nostrils 4x daily     valsartan  40 mg Oral Daily       PRN meds:  acetaminophen, bacitracin, bisacodyl, calcium carbonate, carboxymethylcellulose PF, dextrose, glucose **OR** dextrose **OR** glucagon, lidocaine viscous 2% 15 mL and  "maalox/mylanta w/ simethicone 15 mL (GI COCKTAIL), miconazole, nitroGLYcerin, - MEDICATION INSTRUCTIONS -, polyethylene glycol, simethicone, sodium chloride (PF), traZODone      PHYSICAL EXAM  BP (!) 146/69 (BP Location: Right arm)   Pulse 90   Temp 98.4  F (36.9  C) (Axillary)   Resp 20   Ht 1.753 m (5' 9\")   Wt 111.5 kg (245 lb 12.8 oz)   SpO2 95%   BMI 36.30 kg/m    Alert. Sitting better. Verbalizing at baseline now.   Respiratory: Clear to auscultation bilaterally, no crackles or wheezing  Cardiovascular: S1 and S2, RRR, and no murmur noted  GI: soft, non-distended, non-tender. G tube intact + BS +  Skin/Integumen: No rashes, trace lower extremity edema noted  Neuro : Left lower Facial droop + Left Field cut +  Dense left-sided hemiparesis with neglect noted. He is able to adduct the UE partly 2-/5, able to bring knees together, 2-  MSK: Left shoulder subluxation +,  Tone +. Reflexes +    Vocalizes, low volume   Extremities. Moves right side well. Edema +    LABS  Last Comprehensive Metabolic Panel:  Sodium   Date Value Ref Range Status   10/19/2020 137 133 - 144 mmol/L Final     Potassium   Date Value Ref Range Status   10/19/2020 3.8 3.4 - 5.3 mmol/L Final     Chloride   Date Value Ref Range Status   10/19/2020 102 94 - 109 mmol/L Final     Carbon Dioxide   Date Value Ref Range Status   10/19/2020 25 20 - 32 mmol/L Final     Anion Gap   Date Value Ref Range Status   10/19/2020 10 3 - 14 mmol/L Final     Glucose   Date Value Ref Range Status   10/19/2020 151 (H) 70 - 99 mg/dL Final     Urea Nitrogen   Date Value Ref Range Status   10/19/2020 22 7 - 30 mg/dL Final     Creatinine   Date Value Ref Range Status   10/19/2020 0.76 0.66 - 1.25 mg/dL Final     GFR Estimate   Date Value Ref Range Status   10/19/2020 >90 >60 mL/min/[1.73_m2] Final     Comment:     Non  GFR Calc  Starting 12/18/2018, serum creatinine based estimated GFR (eGFR) will be   calculated using the Chronic Kidney Disease " Epidemiology Collaboration   (CKD-EPI) equation.       Calcium   Date Value Ref Range Status   10/19/2020 8.4 (L) 8.5 - 10.1 mg/dL Final      CBC RESULTS:   Recent Labs     CBC RESULTS:   Recent Labs   Lab Test 09/30/20  0607   WBC 9.3   RBC 3.79*   HGB 11.6*   HCT 36.3*   MCV 96   MCH 30.6   MCHC 32.0   RDW 12.9        CBC RESULTS:   Lab Test 10/12/20  0553   WBC  --    RBC  --    HGB  --    HCT  --    MCV  --    MCH  --    MCHC  --    RDW  --            Glucose Values Latest Ref Rng & Units 10/18/2020 10/18/2020 10/18/2020 10/18/2020 10/18/2020 10/19/2020 10/19/2020   Bedside Glucose (mg/dl )  - -- -- -- -- -- -- --   GLUCOSE 70 - 99 mg/dL 150(H) 157(H) 145(H) 131(H) 126(H) 142(H) 151(H)   Some recent data might be hidden     ASSESSMENT AND PLAN    Jorje Teran is a 70 year old  hand dominant male with Stroke Ischemic 01.1 (L) Body Involvement (R) Brain Stroke; L sided weakness due to MCA infarct, R MCA occlusion, and R carotid atherosclerosis  1. PT, OT and SLP 60 minutes of each on a daily basis, in addition to rehab nursing and close management of physiatrist.       2. Impairment of ADL's: OT for 60 minutes daily to work on ADL re-training such as grooming, self cares and bathing.       3. Impairment of mobility:  PT for 60 minutes daily to work on neuromuscular re-education focusing on strength, balance, coordination, and endurance.       4. Impairment of cognition/language/swallow:  SLP for 60 minutes daily to work on cognitive and linguistic deficits.     Rehab RN for med administration, bowel regimen, glucose monitoring and wound care.    1. Medical Conditions  CVA with dense left hemiparesis  CT with filling defect within the distal R ICA extending into the R MCA and occlusion of the R MCA at the M1 segment. CTA with severe plaquing at R carotid bifurcation with filling defect in the prox R ICA suggestive of thrombus contributing to approximately 80% stenosis. 60% stenosis of the  proximal L ICA. TTE with bubble -> Normal left ventricular size and function. Left ventricular ejection fraction of 55-60%. No segmental wall motion abnormalities noted.   - Lipids 7/29/20- LDL 78, HDL 56, , T chol 164, hemoglobin A1c 7.1% H, TSH is 2.05.  - remains with significant dysarthria, facial droop and dense left sided hemiparesis  Spasticity: Stretching, ice + Off Baclofen as was noted to become drowsy. Better now. Some strength returning!  Add shoulder support to reduce subluxation. NMES etc.  - ASA 81 mg daily PO/CO  - atorvastatin 40 mg daily, Zetia 10 mg po daily    - BP management: Better. Avoiding lows.  - goal for SBPs < 160    Cr improved. BUN better.  Dysphagia  - currently on DD2 with nectar thickened liquids   -PEG tube to be placed on 9/22, currently patient is on tube feeding  If questions or problems arise regarding tube function (e.g. leaking, dislodges, etc.) Contact Interventional Radiology department 24 hours a day.   For procedures that were done at [facility]:  8 AM - 4 PM Monday through Friday Contact   [phone]  For afterhours and weekends: [phone]   Ask for the Interventional Radiologist on call.   Protonix 40 mg BID started 10/16/2020 as having heart burn on Pepcid, discontinued.    10/14: 926 kcal and 49 g protein (3 meals)     Adia Gamboa, RD, LD    PeG site Discomfort: Treat infection. Clean site. Added Keflex 500 mg qid x 7 days. Better.  Tylenol prn.  Simethicone added prn.    BS high. Endo following.  CAD s/p CABG x 3, x 1 in 2015  HTN continue medications noted above.  HLD: On Lipitor       DM II  Recent A1C at 7.1 7/2020.  BG goal is < 180.- started on lantus Endo following.   Dose adjusted for TF.   -novolog before meals and at bedtime  -BG QAC, HS 0200  - hypoglycemia protocol      US at [facility] negative for DVT     SHELDON  - states he could not sleep with CPAP as outpatient  - he would benefit for treating SHELDON   ? CPAP at night    Hypothyroidism  -  resumed PTA Levothyroxine 50 mcg daily  TSH checked 7/29/2020 at 2.05     Morbid obesity  BMI noted  7/202 at ~38. Will need to encourage healthy lifestyle and weight loss with recovery     Diet: Snacks/Supplements Adult: Other; thickened smoothie with meals  (RD); With Meals  Calorie Counts     DVT Prophylaxis: Pneumatic Compression Devices  Code Status: Full Code      2. Adjustment to disability:  Clinical psychology to eval and treat if needed  3. FEN: On NDD 2 [place] +TF, Bolus versus nocturnal supplements.  4. Bowel: Javed meds  5. Bladder: Monitor  6. GI Prophylaxis: Protonix.  7. Code: Full  8. Disposition: Home with family and home care vs more likely TCU  9. ELOS:  28 days 10/23/20  10. Rehab prognosis:  Fair  11. Follow up Appointments on Discharge:   12. He needs follow up with neurology as scheduled. Follows with Dr. Vincent with [facility].        Sudarshan Fuentes MD   Physical Medicine & Rehabilitation

## 2020-10-19 NOTE — PROGRESS NOTES
Calorie Counts    10/16: 823 kcal and 45 g protein (3 meals, 1 Magic Cup - addtl supplements not recorded)  10/17: 1741 kcal and 86 g protein (3 meals, 2 Magic Cups)  10/18: 2396 kcal and 134 g protein (3 meals, 1 Gelatein, 2 Magic Cups)    Intakes have been improving each day over the weekend.    3 day average PO intake = 1653 kcal (82% minimum energy needs) and 88 g protein (100% minimum protein needs)      Jonna Miller, GABI, LD

## 2020-10-19 NOTE — PROGRESS NOTES
Was notified by covering SW that pt family leaning towards TCU placement and that pt son would email SW with options for referrals. SWer stopped by pt room, pt family not present. SW called pt's son Donte and left VM to touch base and offer assistance with questions/concerns.     SW will await response and continue to follow.     ADDENDUM: Met with pt son in office. Discussed TCU. Pt family requesting referral to [facility] TCU. Family plans to look at other options as well. Referral faxed. Awaiting response. Encouraged pt family to begin coordinating hired in help so that it is in place when pt is ready to discharge from TCU. Pt son looking at w/c accessible vans and has already had a contractor come out to the pt home and widen doorways.     Luann Mayen, PUMA, CAD-[facility] Acute Rehab Unit   Phone: [phone]  I   Pager: [phone]

## 2020-10-19 NOTE — PROGRESS NOTES
I spoke with patient and PT regarding the brace order placed in Epic. It was decided that I will follow up with Becky PT 10/20/2020.  Renaldo MELLO,LO.

## 2020-10-19 NOTE — PLAN OF CARE
FOCUS/GOAL  Bowel management, Bladder management, and Mobility    ASSESSMENT, INTERVENTIONS AND CONTINUING PLAN FOR GOAL:  Alert and oriented to person/place/situation, VSS. Unable to void this shift, ISC done per sterile technique x2 (see flow sheet). Pt requested to use a bedpan this afternoon to have a BM, but did not have BM at that time. Pt started to eat breakfast this morning around 0915 and BG was 168, was not finished eating before having to work with therapy and leave for VFSS. Pt requested to keep the tray and finish breakfast upon return from VFSS; BG not rechecked upon return. Abbie Saenz APRN/endo notified with no new orders received. Pt started lunch tray this afternoon after therapy (around 3pm per NA report), BG was 125. Gtube intact and patent, flushed as ordered. Turned and repositioned Q2H when in bed. Pt's son here visiting this afternoon, will continue with POC.

## 2020-10-19 NOTE — PROGRESS NOTES
"[facility], [place]   Internal Medicine Daily Note           Interval History/Events     Overnight events reviewed  Reports doing well  No nausea, vomiting, chest pain, shortness of breath  No lightheadedness or dizziness  No fever, chills.        Review of Systems        4 point ROS including Respiratory, CV, GI and , other than that noted above is negative      Medications   I have reviewed current medications  in the \"current medication\" section of Epic.  Relevant changes include:     Physical Exam   General:       Vital signs:    Blood pressure (!) 146/69, pulse 90, temperature 98.4  F (36.9  C), temperature source Axillary, resp. rate 20, height 1.753 m (5' 9\"), weight 111.5 kg (245 lb 12.8 oz), SpO2 95 %.  Estimated body mass index is 36.3 kg/m  as calculated from the following:    Height as of this encounter: 1.753 m (5' 9\").    Weight as of this encounter: 111.5 kg (245 lb 12.8 oz).      Intake/Output Summary (Last 24 hours) at 10/13/2020 1053  Last data filed at 10/13/2020 0600  Gross per 24 hour   Intake 440 ml   Output 800 ml   Net -360 ml        Constitutional: Laying in bed in no acute distress  Eye: No icterus, no pallor  Mouth/ENT: Normal oral mucosa  Cardiovascular: S1, S2 normal. B/L pre tibial edema  Respiratory: B/L CTA  GI: Soft, NT, BS+  : No Hayes's catheter  Neurology: Alert, awake, and oriented. Left hemiparesis, neglect  Psych: Mood stable  MSK:   Integumentary:   Heme/Lymph/Imm:      Laboratory and Imaging Studies     I have reviewed  laboratory and imaging studies in the Epic. Pertinent findings are as below:    BMP  Recent Labs   Lab 10/19/20  0606 10/16/20  0621 10/15/20  0738 10/14/20  0534    135 136 138   POTASSIUM 3.8 3.8 4.0 4.0   CHLORIDE 102 101 103 106   SELENA 8.4* 8.7 8.6 8.5   CO2 25 26 27 25   BUN 22 22 22 25   CR 0.76 0.86 0.82 0.94   * 151* 139* 155*     CBC  Recent Labs   Lab 10/19/20  0606 10/18/20  0639 10/15/20  0738   WBC 8.1  " --   --    RBC 3.38*  --   --    HGB 10.3*  --   --    HCT 31.7*  --   --    MCV 94  --   --    MCH 30.5  --   --    MCHC 32.5  --   --    RDW 12.5  --   --     232 207     INRNo lab results found in last 7 days.  LFTs  Recent Labs   Lab 10/19/20  0606   ALKPHOS 76   AST 19   ALT 29   BILITOTAL 0.9   PROTTOTAL 6.9   ALBUMIN 2.7*      PANCNo lab results found in last 7 days.        Impression/Plan          VEENA secondary to hypovolemia and  hypotension (secondary to decreased intake with discontinuation tube feedings, concurrent use of furosemide, Entresto, valsartan, amlodipine), now resolved with IV fluids,  resumption of scheduled TF, holding of furosemide, entresto, diovan and reduction in Metoprolol and Amlodipine.  Renal function now back to baseline       # Decreased level of consciousness, likely secondary to acute kidney injury, relatively low blood pressure and recent initiation of baclofen.  Baclofen is metabolized by the kidneys.  Baclofen currently on hold. Mental status improved, appears at or close to baseline  - Monitor     # Cough with Temp 99.3 on 10/12: Oxygen sat is normal. 02 sats on RA this am.  At risk for atelectasis, aspiration pneumonia. Cough may also be related to volume overload, secondary to IV fluids, temporarily holding of lasix  - Continue to monitor  - Low threshold for X ray chest     # Recent right hemispheric CVA with left-sided weakness, on aspirin and statin  - Continue Aspirin, Statin  - PT/OT     # History of hypertension and diastolic CHF,  controlled in outpatient setting on  Furosemide 40 mg bid, Amlodipine 20 mg daily, Metoprolol XL 25 mg bid (changed to short acting Metoprolol 50 mg bid during recent hospitalization), Entresto 49/51, 1 tab daily, Valsartan 320 mg daily.  Goal -140/ in post CVA setting, history of diastolic CHF.   Has been restarted on Metoprolol 50 mg bid, amlodipine 5 mg bid  BP slightly trending up.   - Continue Amlodipine 5 mg BID,  Metoprolol 50 mg BID  - Continue Furosemide 20 mg BID (home dose 40 mg BID)  - Continue to monitor on titrate  - Entresto, valsartan, on Hold  - Restart Valsartan at 40 mg daily on 10/18     # History of CAD:  On ASA and statin, B Blocker  - Continue Aspirin, Metoprolol, Statin     # Hyponatremia, mild, likely secondary to resumed TF and continued water flushes (initiated when TF recently discontinued and Pt was started on nectar thick liquids), hyperglycemia, resolved with decrease in free water administration  Na level 137 on 10/19.   - Continue to monitor     # Diabetes mellitus type 2, labile, in setting of decreased intake, now resumption of TF  On Insulin Isophane 18 units in AM, 22 units in evening, Insulin Aspart pre meal 1:10 CHO, Insulin Aspart correction 1:25, Liraglutide 0.6 daily, and Metformin 500 mg BID  Glucose fairly controlled.   - Diabetes team following. Appreciate management    # Anemia: Hg 10.3 gm/dl. Likely has anemia of chronic disease  - Follow up with primary care for further workup         Pt's care was discussed with bedside RN, patient and  during Care Team Rounds.               Brendan Antony MD  Hospitalist ( Internal medicine)  Pager: [phone]

## 2020-10-19 NOTE — PROGRESS NOTES
Diabetes Consult Daily  Progress Note          Assessment/Plan:     HPI:  Jorje Teran is a 71 y/o male with past medical history of HTN, HLD, DM II, SHELDON, hypothyroidism, and morbid obesity who was admitted 9/7/20 after being found down, found to have a R MCA infarct and R carotid atherosclerosis, now admitted to ARU 9/24 for intensive therapies, ongoing medical management, and rehabilitative nursing care.       Assessment:   1)Type II Diabetes Mellitus, II with suboptimal diet control PTA (A1c 7.5%), with tube feed induced hyperglycemia.       Plan:                  -continue Metformin IR  1000mg BID                  -Victoza 1.8 mg daily starting this AM.                  -Novolog fixed dose with start of tube feeds from 5>3. This will discontinue once                     tube feeds are stopped.                 -aspart high intensity sliding scale >140 AC/HS/0200, while on overnight cycled                         TF- when off cycled TF can discontinue scheduled 0200 correction dose.                  -BG monitoring TID AC, HS, 0200                 -hypoglycemia protocol                 -carb counting protocol                 -diabetes education will be needed prior to discharge home, for glucometer, and injection teaching (GLP1, possibly insulin). Placed consult on 10/15.      Discharge Planning:                  -BG monitoring before meals and at bedtime initially, then can reduce pending                          outpatient follow up.                 -Metformin IR 1000mg BID                 -GLP1 (either Victoza 1.2-1.8mg daily or weekly Trulicity 1.5 mg weekly)                 -if discharging off tube feeds, anticipate all insulin can be discontinued upon                             discharge.      Outpatient follow up: TBD  Plan discussed with patient, and primary team via this note.              Interval History:     The last 24 hours progress and nursing notes reviewed.  No acute  "events this interval.  BG trending at target.  Increased Victoza to goal at 1.8 mg  Eating continues to improve.  Continues to do well without the NPH and novolog coverage  10/14 - Per note from RD - will consider stopping TF in next 1-2 days - has not yet been stopped - will watch for changes. Remains on TF as of this note.     Recent Labs   Lab 10/19/20  0606 10/19/20  0159 10/18/20  2054 10/18/20  1755 10/18/20  1131 10/18/20  0739 10/18/20  0233 10/16/20  0621 10/16/20  0621 10/15/20  0738 10/15/20  0738 10/14/20  0534 10/14/20  0534 10/13/20  0641 10/13/20  0641   *  --   --   --   --   --   --   --  151*  --  139*  --  155*  --  176*   BGM  --  142* 126* 131* 145* 157* 150*   < >  --    < >  --    < >  --    < >  --     < > = values in this interval not displayed.           Nutrition:     Orders Placed This Encounter      Combination Diet Dysphagia Diet Level 3: Advanced; Nectar Thickened Liquids (pre-thickened or use instant food thickener)    Cycled TF: Isosource 1.5 at 65cc/hour, from 9PM-5A (8 hours) as of 10/18.          PTA Regimen:        TIIDM was reportedly diet controlled prior to admission.             Review of Systems:   CC: denies concerns today - \"no pain\"    Constitutional:   No fever, no chills  ENT/Mouth:   No hearing changes, no ear pain, no sore throat, no rhinorrhea, no difficulty swallowing  Eyes:  No eye pain, no discharge, no vision changes  CV:   No CP/SOB, no new edema  Resp:  No cough, no wheezing  GI:   No nausea or vomiting, denies constipation, no diarrhea  :  No dysuria, no frequency, no hematuria  Musk:  No joint swelling/pain, No back pain  Skin/heme:   No new rashes/bruises/open areas.  No pruritis  Neuro:   No new weakness, no numbness/tingling, no headache  Psych:   No new anxiety, no depression  Endocrine:  No polyuria, no polydipsia         Medications:   Steroid planning:  no       Physical Exam:   BP (!) 146/69 (BP Location: Right arm)   Pulse 90   Temp 98.4 " " F (36.9  C) (Axillary)   Resp 20   Ht 1.753 m (5' 9\")   Wt 111.5 kg (245 lb 12.8 oz)   SpO2 95%   BMI 36.30 kg/m      General:  pleasant, in no acute distress.  Laying comfortably in bed.   HEENT:  NC/AT. MMM, sclera not injected  Lungs:  unremarkable, no new cough, no SOB  ABD:   soft,  non-tender,  no lipodystrophy noted  Skin:  warm and dry, no obvious lesions/rash  Feet:   No change to CMS intact, PPP  MSK:   moving all extremities  Lymp:   trace LE edema  Mental Status:  Alert and oriented x3  Psych:   Cooperative, good eye contact, full affect          Data:     Lab Results   Component Value Date    A1C 7.5 09/10/2020            CBC RESULTS:   Recent Labs   Lab Test 10/19/20  0606   WBC 8.1   RBC 3.38*   HGB 10.3*   HCT 31.7*   MCV 94   MCH 30.5   MCHC 32.5   RDW 12.5        Recent Labs   Lab Test 10/19/20  0606 10/16/20  0621    135   POTASSIUM 3.8 3.8   CHLORIDE 102 101   CO2 25 26   ANIONGAP 10 8   * 151*   BUN 22 22   CR 0.76 0.86   SELENA 8.4* 8.7     Liver Function Studies -   Recent Labs   Lab Test 10/19/20  0606   PROTTOTAL 6.9   ALBUMIN 2.7*   BILITOTAL 0.9   ALKPHOS 76   AST 19   ALT 29     Lab Results   Component Value Date    INR 1.17 09/21/2020    INR 1.03 09/07/2020         CADE Sahu CNP   Inpatient Diabetes Management Service  Pager - [phone]  Friday - Monday 0800 - 1600 hrs  Diabetes Management Team job code: [id number]       I spent a total of 25 minutes bedside and on the inpatient unit managing glycemic care.  Over 50% of my time on the unit was spent counseling the patient and/or coordinating care regarding acute hyperglycemic management.  See note for details.      "

## 2020-10-19 NOTE — PLAN OF CARE
Patient is A&O to self, place, situation. Disoriented to time today. Liko lift for transfers. Unable to void today, SIC x1 for 525. TF started around 2130 running per orders. Up for dinner in chair, ate about 75-80 percent of meal. Pills given crushed in applesauce. Bg 131, and 126. Continue with POC. Skin intact. Continue with POC.

## 2020-10-20 ENCOUNTER — APPOINTMENT (OUTPATIENT)
Dept: PHYSICAL THERAPY | Facility: CLINIC | Age: 70
End: 2020-10-20
Payer: COMMERCIAL

## 2020-10-20 ENCOUNTER — APPOINTMENT (OUTPATIENT)
Dept: OCCUPATIONAL THERAPY | Facility: CLINIC | Age: 70
End: 2020-10-20
Payer: COMMERCIAL

## 2020-10-20 ENCOUNTER — APPOINTMENT (OUTPATIENT)
Dept: SPEECH THERAPY | Facility: CLINIC | Age: 70
End: 2020-10-20
Payer: COMMERCIAL

## 2020-10-20 LAB
ALBUMIN UR-MCNC: NEGATIVE MG/DL
APPEARANCE UR: ABNORMAL
BACTERIA #/AREA URNS HPF: ABNORMAL /HPF
BILIRUB UR QL STRIP: NEGATIVE
COLOR UR AUTO: YELLOW
GLUCOSE BLDC GLUCOMTR-MCNC: 113 MG/DL (ref 70–99)
GLUCOSE BLDC GLUCOMTR-MCNC: 125 MG/DL (ref 70–99)
GLUCOSE BLDC GLUCOMTR-MCNC: 147 MG/DL (ref 70–99)
GLUCOSE UR STRIP-MCNC: NEGATIVE MG/DL
HGB UR QL STRIP: NEGATIVE
KETONES UR STRIP-MCNC: NEGATIVE MG/DL
LEUKOCYTE ESTERASE UR QL STRIP: ABNORMAL
MUCOUS THREADS #/AREA URNS LPF: PRESENT /LPF
NITRATE UR QL: NEGATIVE
PH UR STRIP: 5 PH (ref 5–7)
RBC #/AREA URNS AUTO: 1 /HPF (ref 0–2)
SOURCE: ABNORMAL
SP GR UR STRIP: 1.01 (ref 1–1.03)
UROBILINOGEN UR STRIP-MCNC: NORMAL MG/DL (ref 0–2)
WBC #/AREA URNS AUTO: 177 /HPF (ref 0–5)
WBC CLUMPS #/AREA URNS HPF: PRESENT /HPF

## 2020-10-20 PROCEDURE — 92526 ORAL FUNCTION THERAPY: CPT | Mod: GN

## 2020-10-20 PROCEDURE — 87186 SC STD MICRODIL/AGAR DIL: CPT | Performed by: PHYSICAL MEDICINE & REHABILITATION

## 2020-10-20 PROCEDURE — 128N000003 HC R&B REHAB

## 2020-10-20 PROCEDURE — 250N000013 HC RX MED GY IP 250 OP 250 PS 637: Performed by: INTERNAL MEDICINE

## 2020-10-20 PROCEDURE — 87088 URINE BACTERIA CULTURE: CPT | Performed by: PHYSICAL MEDICINE & REHABILITATION

## 2020-10-20 PROCEDURE — 99207 PR CDG-CUT & PASTE-POTENTIAL IMPACT ON LEVEL: CPT | Performed by: INTERNAL MEDICINE

## 2020-10-20 PROCEDURE — 999N001017 HC STATISTIC GLUCOSE BY METER IP

## 2020-10-20 PROCEDURE — 81001 URINALYSIS AUTO W/SCOPE: CPT | Performed by: INTERNAL MEDICINE

## 2020-10-20 PROCEDURE — 250N000013 HC RX MED GY IP 250 OP 250 PS 637: Performed by: STUDENT IN AN ORGANIZED HEALTH CARE EDUCATION/TRAINING PROGRAM

## 2020-10-20 PROCEDURE — 97535 SELF CARE MNGMENT TRAINING: CPT | Mod: GO | Performed by: OCCUPATIONAL THERAPIST

## 2020-10-20 PROCEDURE — 97530 THERAPEUTIC ACTIVITIES: CPT | Mod: GP

## 2020-10-20 PROCEDURE — 87086 URINE CULTURE/COLONY COUNT: CPT | Performed by: PHYSICAL MEDICINE & REHABILITATION

## 2020-10-20 PROCEDURE — 97112 NEUROMUSCULAR REEDUCATION: CPT | Mod: GP | Performed by: PHYSICAL THERAPIST

## 2020-10-20 PROCEDURE — 250N000013 HC RX MED GY IP 250 OP 250 PS 637: Performed by: PHYSICIAN ASSISTANT

## 2020-10-20 PROCEDURE — 99233 SBSQ HOSP IP/OBS HIGH 50: CPT | Performed by: NURSE PRACTITIONER

## 2020-10-20 PROCEDURE — 99233 SBSQ HOSP IP/OBS HIGH 50: CPT | Performed by: PHYSICAL MEDICINE & REHABILITATION

## 2020-10-20 PROCEDURE — 97530 THERAPEUTIC ACTIVITIES: CPT | Mod: GP | Performed by: PHYSICAL THERAPIST

## 2020-10-20 PROCEDURE — U0003 INFECTIOUS AGENT DETECTION BY NUCLEIC ACID (DNA OR RNA); SEVERE ACUTE RESPIRATORY SYNDROME CORONAVIRUS 2 (SARS-COV-2) (CORONAVIRUS DISEASE [COVID-19]), AMPLIFIED PROBE TECHNIQUE, MAKING USE OF HIGH THROUGHPUT TECHNOLOGIES AS DESCRIBED BY CMS-2020-01-R: HCPCS | Performed by: PHYSICAL MEDICINE & REHABILITATION

## 2020-10-20 PROCEDURE — 250N000013 HC RX MED GY IP 250 OP 250 PS 637: Performed by: PHYSICAL MEDICINE & REHABILITATION

## 2020-10-20 PROCEDURE — 99233 SBSQ HOSP IP/OBS HIGH 50: CPT | Performed by: INTERNAL MEDICINE

## 2020-10-20 RX ORDER — NITROFURANTOIN 25; 75 MG/1; MG/1
100 CAPSULE ORAL EVERY 12 HOURS SCHEDULED
Status: DISCONTINUED | OUTPATIENT
Start: 2020-10-20 | End: 2020-10-22

## 2020-10-20 RX ORDER — LIDOCAINE HYDROCHLORIDE 20 MG/ML
JELLY TOPICAL EVERY 4 HOURS PRN
Status: DISCONTINUED | OUTPATIENT
Start: 2020-10-20 | End: 2020-10-23 | Stop reason: HOSPADM

## 2020-10-20 RX ORDER — CETIRIZINE HYDROCHLORIDE 10 MG/1
10 TABLET ORAL DAILY PRN
Status: DISCONTINUED | OUTPATIENT
Start: 2020-10-20 | End: 2020-10-22

## 2020-10-20 RX ADMIN — VALSARTAN 40 MG: 40 TABLET, FILM COATED ORAL at 08:12

## 2020-10-20 RX ADMIN — AMLODIPINE BESYLATE 5 MG: 5 TABLET ORAL at 21:38

## 2020-10-20 RX ADMIN — METFORMIN HYDROCHLORIDE 1000 MG: 500 TABLET ORAL at 17:31

## 2020-10-20 RX ADMIN — EZETIMIBE 10 MG: 10 TABLET ORAL at 08:13

## 2020-10-20 RX ADMIN — NYSTATIN 500000 UNITS: 500000 SUSPENSION ORAL at 14:05

## 2020-10-20 RX ADMIN — AMLODIPINE BESYLATE 5 MG: 5 TABLET ORAL at 08:13

## 2020-10-20 RX ADMIN — FUROSEMIDE 20 MG: 20 TABLET ORAL at 08:12

## 2020-10-20 RX ADMIN — PANTOPRAZOLE SODIUM 40 MG: 40 TABLET, DELAYED RELEASE ORAL at 17:30

## 2020-10-20 RX ADMIN — METOPROLOL TARTRATE 50 MG: 50 TABLET, FILM COATED ORAL at 08:12

## 2020-10-20 RX ADMIN — PANTOPRAZOLE SODIUM 40 MG: 40 TABLET, DELAYED RELEASE ORAL at 08:14

## 2020-10-20 RX ADMIN — NITROFURANTOIN (MONOHYDRATE/MACROCRYSTALS) 100 MG: 75; 25 CAPSULE ORAL at 23:15

## 2020-10-20 RX ADMIN — LEVOTHYROXINE SODIUM 50 MCG: 25 TABLET ORAL at 08:12

## 2020-10-20 RX ADMIN — LIRAGLUTIDE 1.8 MG: 6 INJECTION SUBCUTANEOUS at 08:23

## 2020-10-20 RX ADMIN — METOPROLOL TARTRATE 50 MG: 50 TABLET, FILM COATED ORAL at 21:38

## 2020-10-20 RX ADMIN — NYSTATIN 500000 UNITS: 500000 SUSPENSION ORAL at 21:38

## 2020-10-20 RX ADMIN — NYSTATIN 500000 UNITS: 500000 SUSPENSION ORAL at 08:14

## 2020-10-20 RX ADMIN — FUROSEMIDE 20 MG: 20 TABLET ORAL at 17:31

## 2020-10-20 RX ADMIN — SALINE NASAL SPRAY 1 SPRAY: 1.5 SOLUTION NASAL at 14:04

## 2020-10-20 RX ADMIN — ATORVASTATIN CALCIUM 40 MG: 40 TABLET, FILM COATED ORAL at 08:13

## 2020-10-20 RX ADMIN — NYSTATIN 500000 UNITS: 500000 SUSPENSION ORAL at 17:31

## 2020-10-20 RX ADMIN — ASPIRIN 81 MG: 81 TABLET, COATED ORAL at 08:12

## 2020-10-20 RX ADMIN — SALINE NASAL SPRAY 1 SPRAY: 1.5 SOLUTION NASAL at 08:13

## 2020-10-20 RX ADMIN — METFORMIN HYDROCHLORIDE 1000 MG: 500 TABLET ORAL at 08:12

## 2020-10-20 RX ADMIN — MULTIVITAMIN 15 ML: LIQUID ORAL at 17:31

## 2020-10-20 ASSESSMENT — MIFFLIN-ST. JEOR: SCORE: 1848.53

## 2020-10-20 NOTE — PROGRESS NOTES
[facility]     Medicine Progress Note - Hospitalist Service       Date of Admission:  9/24/2020  Assessment & Plan           Jorje Teran is a 70 year old  hand dominant male with Stroke Ischemic with (L) Body Involvement (R) Brain Stroke; L sided weakness due to MCA infarct, R MCA occlusion, and R carotid atherosclerosis.      # VEENA: VEENA secondary to hypovolemia and  hypotension (secondary to decreased intake with discontinuation tube feedings, concurrent use of furosemide, Entresto, valsartan, amlodipine), now resolved with IV fluids,  resumption of scheduled TF, holding of furosemide, entresto, diovan and reduction in Metoprolol and Amlodipine.  Renal function now back to baseline.     # Decreased level of consciousness, likely secondary to acute kidney injury, relatively low blood pressure and recent initiation of baclofen.  Baclofen is metabolized by the kidneys.  Baclofen currently on hold.   Mental status improved, appears at baseline  - Monitor     # Cough with Temp 99.3 on 10/12: Oxygen sat is normal. 02 sats on RA this am.  At risk for atelectasis, aspiration pneumonia. Cough may also be related to volume overload, secondary to IV fluids, temporarily holding of lasix  Remains afebrile.   - Continue to monitor  - Low threshold for X ray chest     # Recent right hemispheric CVA with left-sided weakness, on aspirin and statin  - Continue Aspirin, Statin  -  PT/OT     # History of hypertension and diastolic CHF,  controlled in outpatient setting on  Furosemide 40 mg bid, Amlodipine 20 mg daily, Metoprolol XL 25 mg bid (changed to short acting Metoprolol 50 mg bid during recent hospitalization), Entresto 49/51, 1 tab daily, Valsartan 320 mg daily.  Goal -140/ in post CVA setting, history of diastolic CHF.   Has been restarted on Metoprolol 50 mg bid, amlodipine 5 mg bid  BP better.     - Continue Amlodipine 5 mg BID, Metoprolol 50 mg BID  - Continue  Furosemide 20 mg BID (home dose 40 mg BID)  - Continue to monitor on titrate  - Entresto, valsartan, on Hold  - Restarted Valsartan at 40 mg daily on 10/18   * Titrate bp medications as needed.     Wt Readings from Last 5 Encounters:   10/20/20 110.3 kg (243 lb 3.2 oz)   09/23/20 109.1 kg (240 lb 8 oz)     # History of CAD:  On ASA and statin, B Blocker  - Continue Aspirin, Metoprolol, Statin     # Hyponatremia, mild, likely secondary to resumed TF and continued water flushes (initiated when TF recently discontinued and Pt was started on nectar thick liquids), hyperglycemia, resolved with decrease in free water administration  Na level 137 on 10/19.   - Continue to monitor     # Diabetes mellitus type 2, labile, in setting of decreased intake, now resumption of TF  On Insulin Isophane 18 units in AM, 22 units in evening, Insulin Aspart pre meal 1:10 CHO, Insulin Aspart correction 1:25, Liraglutide 0.6 daily, and Metformin 500 mg BID  Glucose fairly controlled.   - Diabetes team following. Appreciate management    Recent Labs   Lab 10/20/20  1206 10/19/20  1705 10/19/20  0846 10/19/20  0606 10/19/20  0159 10/18/20  2054 10/18/20  1755 10/16/20  0621 10/16/20  0621 10/15/20  0738 10/15/20  0738 10/14/20  0534 10/14/20  0534   GLC  --   --   --  151*  --   --   --   --  151*  --  139*  --  155*   * 141* 168*  --  142* 126* 131*   < >  --    < >  --    < >  --     < > = values in this interval not displayed.        # Anemia: Hg 10.3 gm/dl. Likely has anemia of chronic disease  - Follow up with primary care for further workup      # Urinary retention: Intermittent straight catheterization prn. Check UA. UC.   - Urology consult prn.      Rest per primary.     The patient's care was discussed with the Patient and RN. .    Hernandez Gonzalez MD  Hospitalist Service  [facility]   Contact information available via [facility]  [place]/Directory    ______________________________________________________________________    Interval History   Urinary retention+  No other new concern.     Denies fever or chills. No cough or cp or sob. No LH or dizziness. No NV. No dysuria . No new sensory or motor complaint. No new rash.     Data reviewed today: I reviewed all medications, new labs and imaging results over the last 24 hours. I personally reviewed no images or EKG's today.    Physical Exam   Vital Signs: Temp: 96.1  F (35.6  C) Temp src: Oral BP: 139/72 Pulse: 88   Resp: 20 SpO2: 94 % O2 Device: None (Room air)    Weight: 243 lbs 3.2 oz  General Appearance: Alert. nad  Respiratory: Non labored.   Cardiovascular: RRR  GI: non distended.   Skin: no acute rash  Other: Distally wwp.     Data   Recent Labs   Lab 10/19/20  0606 10/18/20  0639 10/16/20  0621 10/15/20  0738   WBC 8.1  --   --   --    HGB 10.3*  --   --   --    MCV 94  --   --   --     232  --  207     --  135 136   POTASSIUM 3.8  --  3.8 4.0   CHLORIDE 102  --  101 103   CO2 25  --  26 27   BUN 22  --  22 22   CR 0.76  --  0.86 0.82   ANIONGAP 10  --  8 6   SELENA 8.4*  --  8.7 8.6   *  --  151* 139*   ALBUMIN 2.7*  --   --   --    PROTTOTAL 6.9  --   --   --    BILITOTAL 0.9  --   --   --    ALKPHOS 76  --   --   --    ALT 29  --   --   --    AST 19  --   --   --      No results found for this or any previous visit (from the past 24 hour(s)).  Medications     dextrose       - MEDICATION INSTRUCTIONS -         amLODIPine  5 mg Oral BID     aspirin  81 mg Oral Daily     atorvastatin  40 mg Oral Daily     ezetimibe  10 mg Oral Daily     furosemide  20 mg Oral or Feeding Tube BID     insulin aspart  3 Units Subcutaneous Daily     insulin aspart  1-7 Units Subcutaneous BID     insulin aspart  1-10 Units Subcutaneous TID AC     levothyroxine  50 mcg Oral Daily     liraglutide  1.8 mg Subcutaneous Daily     metFORMIN  1,000 mg Oral BID w/meals     metoprolol tartrate  50 mg  Oral BID     multivitamins w/minerals  15 mL Oral Daily     nystatin  500,000 Units Swish & Spit 4x Daily     pantoprazole  40 mg Oral BID AC     sodium chloride  1 spray Both Nostrils 4x daily     valsartan  40 mg Oral Daily

## 2020-10-20 NOTE — PLAN OF CARE
Discharge Planner Post-Acute Rehab OT:      Discharge Plan: home with 24 hr assist vs tcu     Precautions:  Left neglect,  L) UE flaccid fall risk,      Current Status:  ADLs:g/h from w/c with SBA, set up and cue to initiate each task. A X 2 liko lift bed-w/c transf. LUE supported and elevated on w/c lap tray.      Assessment:increasing participation with g/h eating and u)/b dressing with ADLs and balance sitting eob with adls. pt continues to be dep with IADLs.      Other Barriers to Discharge (DME, Family Training, etc): level of physical assist, cognition, DME for home

## 2020-10-20 NOTE — PLAN OF CARE
"Discharge Planner Post-Acute Rehab PT:      Discharge Plan:   TCU Friday 10/23 ([facility])     Precautions: * falls, (L) UE flaccid, shannon neglect, field cut     Current Status:  Transfer: liko  Gait: w/c dependent  Stairs: unable  Balance: bearing wt evenly for sitting tasks, L neglect, \"pusher\" to the L and posteriorly     Assessment:   Stood today x 3 w/ assist of three and sheet sling, he was very pleased to be able to do this.  Use TENS and NMES every session.     Other Barriers to Discharge (DME, Family Training, etc): level of physical assist, incontinent  "

## 2020-10-20 NOTE — PROGRESS NOTES
I spoke with loy ALCARAZ and she would like to hold off on a brace at this time. I will complete the order in epic at this time. If bracing needs change just place a new consult in [facility].   Renaldo MELLO,MALIK

## 2020-10-20 NOTE — PLAN OF CARE
FOCUS/GOAL  Bladder management, Nutrition/Feeding/Swallowing precautions, Medical management, and Safety management    ASSESSMENT, INTERVENTIONS AND CONTINUING PLAN FOR GOAL:    Pt is alert and oriented. Able to make needs known. Denies any pain, sob, chest pain or any new weakness. Is incontinent in bowel with LBM tonight 10/20. Did not void for 6 hours, bladder scan showed 401 mL. ISC done and able to take out 350 mL. A moderate assist of two in rolling from side to side in bed. On continuous feeding at 60 ml/hr through the PEG tube, pt tolerating well. No complaints tonight. Will continue POC.

## 2020-10-20 NOTE — PROGRESS NOTES
[facility]   Acute Rehabilitation Unit  Daily progress note  CC:   Stroke Ischemic 01.1 (L) Body Involvement (R) Brain Stroke; L sided weakness due to MCA infarct, R MCA occlusion, and R carotid atherosclerosis    S:   TCU Friday. COVID test due. Ordered 10/20/2020     Heart burn better on Protonix 10/16/2020. BID for a jovan period? Shoulder and UE pain returning on the left side.    Still significant cough spells at times.   Elevate LUE on foam while in WC.  RVS on 10/19/20    Feels much improved. Significant impairment due to hemisensory deficit, fiedcut, and hemiplegia.   Off Baclofen. Needed adjustment of BP medications. Improved since. Incontinent but not retaining.  Now beginning to move his left side!  TR held 10/14/2020  Dense left hemiplegia, left field cut +. Compensates by turning.  BS monitored. Endo following.  NDD 3 [place].  TF continued.    VFSS completed.  Patient showing significant improvement in swallowing function as compared to initial video swallow completed approximately 1 month ago.  Patient able to tolerate small single swallows of thin liquid without evidence of penetration or aspiration.  Did have some penetration with a spontaneous throat clear when taking larger/consecutive swallows and following the solid texture trial.  When patient limiting self to just 1 small single swallow, no difficulties noted.  At this time recommend continue with NDD-3 food textures and nectar thick liquids but will continue to work with patient on consistent use of the strategies and patient does have very good potential to advance diet back to thin liquids in the next few days pending ability to implement strategies appropriately.            ROS: Denies HA, nausea, pain.  Cath. Alertness improved. Coughs.        [unfilled]   Scheduled meds    amLODIPine  5 mg Oral BID     aspirin  81 mg Oral Daily     atorvastatin  40 mg Oral Daily     ezetimibe  10 mg Oral Daily  "    furosemide  20 mg Oral or Feeding Tube BID     insulin aspart  3 Units Subcutaneous Daily     insulin aspart  1-7 Units Subcutaneous BID     insulin aspart  1-10 Units Subcutaneous TID AC     levothyroxine  50 mcg Oral Daily     liraglutide  1.8 mg Subcutaneous Daily     metFORMIN  1,000 mg Oral BID w/meals     metoprolol tartrate  50 mg Oral BID     multivitamins w/minerals  15 mL Oral Daily     nystatin  500,000 Units Swish & Spit 4x Daily     pantoprazole  40 mg Oral BID AC     sodium chloride  1 spray Both Nostrils 4x daily     valsartan  40 mg Oral Daily       PRN meds:  acetaminophen, bacitracin, bisacodyl, calcium carbonate, carboxymethylcellulose PF, cetirizine, dextrose, glucose **OR** dextrose **OR** glucagon, lidocaine viscous 2% 15 mL and maalox/mylanta w/ simethicone 15 mL (GI COCKTAIL), lidocaine, miconazole, nitroGLYcerin, - MEDICATION INSTRUCTIONS -, polyethylene glycol, simethicone, sodium chloride (PF), traZODone      PHYSICAL EXAM  /72 (BP Location: Right arm)   Pulse 88   Temp 96.1  F (35.6  C) (Oral)   Resp 20   Ht 1.753 m (5' 9\")   Wt 110.3 kg (243 lb 3.2 oz)   SpO2 94%   BMI 35.91 kg/m    Alert. Sitting better. Verbalizing at baseline now.   Respiratory: Clear to auscultation bilaterally, no crackles or wheezing  Cardiovascular: S1 and S2, RRR, and no murmur noted  GI: soft, non-distended, non-tender. G tube intact + BS +  Skin/Integumen: No rashes, trace lower extremity edema noted  Neuro : Left lower Facial droop + Left Field cut +  Dense left-sided hemiparesis with neglect noted. He is able to adduct the UE partly 2-/5, able to bring knees together, 2-  MSK: Left shoulder subluxation +,  Tone +. Reflexes +    Vocalizes, low volume   Extremities. Moves right side well. Edema +    LABS  Last Comprehensive Metabolic Panel:  Sodium   Date Value Ref Range Status   10/19/2020 137 133 - 144 mmol/L Final     Potassium   Date Value Ref Range Status   10/19/2020 3.8 3.4 - 5.3 mmol/L " Final     Chloride   Date Value Ref Range Status   10/19/2020 102 94 - 109 mmol/L Final     Carbon Dioxide   Date Value Ref Range Status   10/19/2020 25 20 - 32 mmol/L Final     Anion Gap   Date Value Ref Range Status   10/19/2020 10 3 - 14 mmol/L Final     Glucose   Date Value Ref Range Status   10/19/2020 151 (H) 70 - 99 mg/dL Final     Urea Nitrogen   Date Value Ref Range Status   10/19/2020 22 7 - 30 mg/dL Final     Creatinine   Date Value Ref Range Status   10/19/2020 0.76 0.66 - 1.25 mg/dL Final     GFR Estimate   Date Value Ref Range Status   10/19/2020 >90 >60 mL/min/[1.73_m2] Final     Comment:     Non  GFR Calc  Starting 12/18/2018, serum creatinine based estimated GFR (eGFR) will be   calculated using the Chronic Kidney Disease Epidemiology Collaboration   (CKD-EPI) equation.       Calcium   Date Value Ref Range Status   10/19/2020 8.4 (L) 8.5 - 10.1 mg/dL Final      CBC RESULTS:   Recent Labs     CBC RESULTS:   Recent Labs   Lab Test 09/30/20  0607   WBC 9.3   RBC 3.79*   HGB 11.6*   HCT 36.3*   MCV 96   MCH 30.6   MCHC 32.0   RDW 12.9        CBC RESULTS:   Lab Test 10/12/20  0553   WBC  --    RBC  --    HGB  --    HCT  --    MCV  --    MCH  --    MCHC  --    RDW  --            Glucose Values Latest Ref Rng & Units 10/18/2020 10/18/2020 10/18/2020 10/19/2020 10/19/2020 10/19/2020 10/19/2020   Bedside Glucose (mg/dl )  - -- -- -- -- -- -- --   GLUCOSE 70 - 99 mg/dL 145(H) 131(H) 126(H) 142(H) 151(H) 168(H) 141(H)   Some recent data might be hidden     ASSESSMENT AND PLAN    Jorje Teran is a 70 year old  hand dominant male with Stroke Ischemic 01.1 (L) Body Involvement (R) Brain Stroke; L sided weakness due to MCA infarct, R MCA occlusion, and R carotid atherosclerosis  1. PT, OT and SLP 60 minutes of each on a daily basis, in addition to rehab nursing and close management of physiatrist.       2. Impairment of ADL's: OT for 60 minutes daily to work on ADL  re-training such as grooming, self cares and bathing.       3. Impairment of mobility:  PT for 60 minutes daily to work on neuromuscular re-education focusing on strength, balance, coordination, and endurance.       4. Impairment of cognition/language/swallow:  SLP for 60 minutes daily to work on cognitive and linguistic deficits.     Rehab RN for med administration, bowel regimen, glucose monitoring and wound care.    1. Medical Conditions  CVA with dense left hemiparesis  CT with filling defect within the distal R ICA extending into the R MCA and occlusion of the R MCA at the M1 segment. CTA with severe plaquing at R carotid bifurcation with filling defect in the prox R ICA suggestive of thrombus contributing to approximately 80% stenosis. 60% stenosis of the proximal L ICA. TTE with bubble -> Normal left ventricular size and function. Left ventricular ejection fraction of 55-60%. No segmental wall motion abnormalities noted.   - Lipids 7/29/20- LDL 78, HDL 56, , T chol 164, hemoglobin A1c 7.1% H, TSH is 2.05.  - remains with significant dysarthria, facial droop and dense left sided hemiparesis  Spasticity: Stretching, ice + Off Baclofen as was noted to become drowsy. Better now. Some strength returning!  Add shoulder support to reduce subluxation. NMES etc.  - ASA 81 mg daily PO/CO  - atorvastatin 40 mg daily, Zetia 10 mg po daily    - BP management: Better. Avoiding lows.  - goal for SBPs < 160    Cr improved. BUN better.  Dysphagia  - currently on DD2 with nectar thickened liquids   -PEG tube to be placed on 9/22, currently patient is on tube feeding  If questions or problems arise regarding tube function (e.g. leaking, dislodges, etc.) Contact Interventional Radiology department 24 hours a day.   For procedures that were done at [facility]:  8 AM - 4 PM Monday through Friday Contact   [phone]  For afterhours and weekends: [phone]   Ask for the Interventional Radiologist on call.    Protonix 40 mg BID started 10/16/2020 as having heart burn on Pepcid, discontinued.    10/14: 926 kcal and 49 g protein (3 meals)     Adia Gamboa, RD, LD    PeG site Discomfort: Treat infection. Clean site. Added Keflex 500 mg qid x 7 days. Better.  Tylenol prn.  Simethicone added prn.    BS high. Endo following.  CAD s/p CABG x 3, x 1 in 2015  HTN continue medications noted above.  HLD: On Lipitor       DM II  Recent A1C at 7.1 7/2020.  BG goal is < 180.- started on lantus Endo following.   Dose adjusted for TF.   -novolog before meals and at bedtime  -BG QAC, HS 0200  - hypoglycemia protocol      US at [facility] negative for DVT     SHELDON  - states he could not sleep with CPAP as outpatient  - he would benefit for treating SHELDON   ? CPAP at night    Hypothyroidism  - resumed PTA Levothyroxine 50 mcg daily  TSH checked 7/29/2020 at 2.05     Morbid obesity  BMI noted  7/202 at ~38. Will need to encourage healthy lifestyle and weight loss with recovery     Diet: Snacks/Supplements Adult: Other; thickened smoothie with meals  (RD); With Meals  Calorie Counts     DVT Prophylaxis: Pneumatic Compression Devices  Code Status: Full Code      2. Adjustment to disability:  Clinical psychology to eval and treat if needed  3. FEN: On NDD [address] [place] +TF, Bolus versus nocturnal supplements.  4. Bowel: Javed meds  5. Bladder: Monitor  6. GI Prophylaxis: Protonix.  7. Code: Full  8. Disposition: Home with family and home care vs more likely TCU  9. ELOS:  28 days 10/23/20,  Friday to TCU.  10. Rehab prognosis:  Fair  11. Follow up Appointments on Discharge:   12. He needs follow up with neurology as scheduled. Follows with Dr. Vincent with [facility].        Sudarshan Fuentes MD   Physical Medicine & Rehabilitation

## 2020-10-20 NOTE — PROGRESS NOTES
Diabetes Consult Daily  Progress Note          Assessment/Plan:   Jorje Teran is a 69 y/o male with past medical history of HTN, HLD, DM II, SHELDON, hypothyroidism, and morbid obesity who was admitted 9/7/20 after being found down, found to have a R MCA infarct and R carotid atherosclerosis, now admitted to ARU 9/24 for intensive therapies, ongoing medical management, and rehabilitative nursing care.       Assessment:   1)Type II Diabetes Mellitus, II with suboptimal diet control PTA (A1c 7.5%), with tube feed induced hyperglycemia.       Plan:   -Metformin IR  1000mg BID   - Victoza 1.8 mg daily starting this AM.   -Novolog  3 units fixed dose with start of tube feeds from  This will discontinue once tube feeds are stopped.   -aspart high intensity sliding scale >140 AC/HS/0200, while on overnight cycled TF- when off cycled TF can discontinue scheduled 0200 correction dose.   -BG monitoring TID AC, HS, 0200  -hypoglycemia protocol              -diabetes education will be needed prior to discharge home, for glucometer, and injection teaching (GLP1, possibly insulin). Consult placed                             Discharge Planning  -Metformin IR 1000mg BID   -GLP1 (either Victoza 1.2-1.8mg daily or weekly Trulicity 1.5 mg weekly)  Novolog sliding scale, since he is going to TCU     Outpatient diabetes follow up: with PCP and or endocrinology  2 weeks following discharge   Plan discussed with patient, bedside RN, and primary team.  Discharge Date: 10/23 to [facility] TCU      Inpatient Diabetes Service will sign-off, sent test page to Dr. Fuentes, questions please contact via job code pager [phone].            Interval History:   The last 24 hours progress and nursing notes reviewed.      Blood sugars are in pretty good control on current regime.  Only requiring 1-2 additional units of novolog during the day  Calorie counts have been good and Jorje would like to be off the tube  "feeds  Discussed with RD, she was going to talk with primary team  Is working with therapies  denies nausea and or vomiting      Orders Placed This Encounter      Combination Diet Dysphagia Diet Level 3: Advanced; Nectar Thickened Liquids (pre-thickened or use instant food thickener)     Cycled TF: Isosource 1.5 at 65cc/hour, from 9PM-5A (8 hours) as of 10/18.      PTA:  TIIDM was reportedly diet controlled prior to admission    Recent Labs   Lab 10/19/20  1705 10/19/20  0846 10/19/20  0606 10/19/20  0159 10/18/20  2054 10/18/20  1755 10/18/20  1131 10/16/20  0621 10/16/20  0621 10/15/20  0738 10/15/20  0738 10/14/20  0534 10/14/20  0534   GLC  --   --  151*  --   --   --   --   --  151*  --  139*  --  155*   * 168*  --  142* 126* 131* 145*   < >  --    < >  --    < >  --     < > = values in this interval not displayed.               Review of Systems:   See interval hx          Medications:     Orders Placed This Encounter      Combination Diet Dysphagia Diet Level 3: Advanced; Nectar Thickened Liquids (pre-thickened or use instant food thickener)       Physical Exam:  Gen: Alert,  NAD   HEENT:  mucous membranes are moist  Resp: non-labored  Ext: moving extremities with help  Neuro:oriented x3, communicating clearly  BP (!) 146/70   Pulse 89   Temp 97.3  F (36.3  C) (Oral)   Resp 18   Ht 1.753 m (5' 9\")   Wt 110.3 kg (243 lb 3.2 oz)   SpO2 96%   BMI 35.91 kg/m             Data:     Lab Results   Component Value Date    A1C 7.5 09/10/2020              CBC RESULTS:   Recent Labs   Lab Test 10/19/20  0606   WBC 8.1   RBC 3.38*   HGB 10.3*   HCT 31.7*   MCV 94   MCH 30.5   MCHC 32.5   RDW 12.5        Recent Labs   Lab Test 10/19/20  0606 10/16/20  0621    135   POTASSIUM 3.8 3.8   CHLORIDE 102 101   CO2 25 26   ANIONGAP 10 8   * 151*   BUN 22 22   CR 0.76 0.86   SELENA 8.4* 8.7     Liver Function Studies -   Recent Labs   Lab Test 10/19/20  0606   PROTTOTAL 6.9   ALBUMIN 2.7*   BILITOTAL " 0.9   ALKPHOS 76   AST 19   ALT 29     Lab Results   Component Value Date    INR 1.17 09/21/2020    INR 1.03 09/07/2020         I spent a total of 35 minutes bedside and on the inpatient unit managing the glycemic care of Jorje ASHA Teran. Over 50% of my time on the unit was spent counseling the patient  and/or coordinating care regarding .  See note for details.      To contact Endocrine Diabetes service:   From 8AM-4PM: page inpatient diabetes provider that is following the patient  For questions or updates from 4PM-8AM: page the diabetes job code for on call fellow: [phone]        Esperanza Mixon [phone]  Diabetes Management job code [phone]

## 2020-10-20 NOTE — PLAN OF CARE
Discharge Planner Post-Acute Rehab SLP:      Discharge Plan: to be determined, at this time pt would need 24 hour support; ? TCU vs HH with 24 hr supervision      Precautions: fall, swallowing, 1:1 assist/supervision at meals     Current Status:   Swallowing: Continues to tolerate DD3 solids and still  with nectar- thick liquids. VFSS completed 10/19. Good potential to advance to thin. Continuing with Free water program- with supervision: ok for 5-6 ice chips or 5-6 small teaspoons of water per hour following oral cares and only in between meals     Communication: moderate dysarthria, variable intelligibility.     Cognition: moderate/ impairment for basic orientation, memory, reasoning        Assessment:  Patient seen to trial thin liquids w meal on DD3. Patient demonstrated 1x immediate cough on thin apple juice and some intermittent throat clearing throughout session. No other concerns for pen/asp. Patient also taking appropriate sip sizes. Continued prolonged mastication on diet and requiring 1:1 assistance to initiate bites at times. Son was present at the end of session to review VFSS from the day prior.         Other Barriers to Discharge (Family Training, etc):  Training, discharge plan/support, tube feed training and/or adequate PO intake

## 2020-10-20 NOTE — PROGRESS NOTES
"Calorie Count    10/16: 823 kcal and 45 g protein (3 meals, 1 Magic Cup - addtl supplements not recorded)  10/17: 1741 kcal and 86 g protein (3 meals, 2 Magic Cups)  10/18: 2396 kcal and 134 g protein (3 meals, 1 Gelatein, 2 Magic Cups)  10/19:  749 kcals and 40 g protein, 2 meals, 1 Gelatein Plus.  PO 50% of lunch per nursing flow sheet-not included in total since exact items consumed not known.    Per RN note:  \"Poor appetite this evening, ate very little. Pt said he ate a lot during the day.\"    Four day average:  1427 kcals and 76 g protein meeting 70% minimum energy needs and 94% minimum protein needs.    Per nursing note and flow sheets ate well for breakfast (75%) and lunch today (amount not specified).    Could consider holding TF--with insulin adjustments as appropriate per MD--and continue Calorie Counts.  Note Endocrine has now signed off.  Tried to page MD this afternoon to discuss, but did not receive a call back.    Charleen Recinos, RD, LD      "

## 2020-10-20 NOTE — PLAN OF CARE
Denies pain, incontinent of large incontinent of bladder x 1, scanned for 422, cath for 400, urine appear cloudy, no foul smelling, urine specimen collected and sent to lab for a UA.Bg's today 132 and 113, ate good at breakfast and lunch, assisted by staff and family, will continue POC

## 2020-10-20 NOTE — PROGRESS NOTES
Pt clinically and financially accepted to [facility] TCU. Bed available on Friday 10/23. Called therapy office, indep day scheduled for Thursday 10/22. Paged MD and requested COVID test, per TCU requirements. Called pt son Tushar and provided update. Tushar agreeable with plan, will update his siblings and expressed appreciation. Plan to meet with Tushar to discuss transportation and to provide [facility] [place] [facility] information this afternoon (arriving around noon-1pm).     [id number]. Will arrange transportation closer to discharge and will provide pt and pt family with IMM prior to discharge. SW available and will continue to follow.     [facility] TCU- Friday 10/23/2020, transport time TBD  PH: [phone]  F: [phone]    ADDENDUM: Met with pt son Jeyson. Discussed ambulance for transport and PCS form to support medicare coverage. Pt son aware there is no guarantee cost for transport but agreeable to the plan. Provided pt son with copy of IMM to review as SW suspects family will need to give verbal consent closer to discharge. Gave pt son [place] [facility] flyer. Pt son gave SW permission to complete online referral on his behalf. Pt son denied additional needs, expressed appreciation.     Luann Mayen, PUMA, [facility] Acute Rehab Unit   Phone: [phone]  I   Pager: [phone]

## 2020-10-21 ENCOUNTER — APPOINTMENT (OUTPATIENT)
Dept: OCCUPATIONAL THERAPY | Facility: CLINIC | Age: 70
End: 2020-10-21
Payer: COMMERCIAL

## 2020-10-21 ENCOUNTER — APPOINTMENT (OUTPATIENT)
Dept: PHYSICAL THERAPY | Facility: CLINIC | Age: 70
End: 2020-10-21
Payer: COMMERCIAL

## 2020-10-21 ENCOUNTER — APPOINTMENT (OUTPATIENT)
Dept: SPEECH THERAPY | Facility: CLINIC | Age: 70
End: 2020-10-21
Payer: COMMERCIAL

## 2020-10-21 LAB
BACTERIA SPEC CULT: ABNORMAL
GLUCOSE BLDC GLUCOMTR-MCNC: 125 MG/DL (ref 70–99)
GLUCOSE BLDC GLUCOMTR-MCNC: 125 MG/DL (ref 70–99)
GLUCOSE BLDC GLUCOMTR-MCNC: 126 MG/DL (ref 70–99)
GLUCOSE BLDC GLUCOMTR-MCNC: 132 MG/DL (ref 70–99)
GLUCOSE BLDC GLUCOMTR-MCNC: 133 MG/DL (ref 70–99)
GLUCOSE BLDC GLUCOMTR-MCNC: 141 MG/DL (ref 70–99)
GLUCOSE BLDC GLUCOMTR-MCNC: 156 MG/DL (ref 70–99)
GLUCOSE BLDC GLUCOMTR-MCNC: 184 MG/DL (ref 70–99)
GLUCOSE BLDC GLUCOMTR-MCNC: 91 MG/DL (ref 70–99)
PLATELET # BLD AUTO: 233 10E9/L (ref 150–450)
SARS-COV-2 RNA SPEC QL NAA+PROBE: NOT DETECTED
SPECIMEN SOURCE: ABNORMAL
SPECIMEN SOURCE: NORMAL

## 2020-10-21 PROCEDURE — 250N000013 HC RX MED GY IP 250 OP 250 PS 637: Performed by: STUDENT IN AN ORGANIZED HEALTH CARE EDUCATION/TRAINING PROGRAM

## 2020-10-21 PROCEDURE — 97530 THERAPEUTIC ACTIVITIES: CPT | Mod: GP | Performed by: PHYSICAL THERAPIST

## 2020-10-21 PROCEDURE — 250N000013 HC RX MED GY IP 250 OP 250 PS 637: Performed by: INTERNAL MEDICINE

## 2020-10-21 PROCEDURE — 85049 AUTOMATED PLATELET COUNT: CPT | Performed by: PHYSICAL MEDICINE & REHABILITATION

## 2020-10-21 PROCEDURE — 128N000003 HC R&B REHAB

## 2020-10-21 PROCEDURE — 97112 NEUROMUSCULAR REEDUCATION: CPT | Mod: GP | Performed by: PHYSICAL THERAPIST

## 2020-10-21 PROCEDURE — 999N000125 HC STATISTIC PATIENT MED CONFERENCE < 30 MIN

## 2020-10-21 PROCEDURE — 250N000013 HC RX MED GY IP 250 OP 250 PS 637: Performed by: PHYSICIAN ASSISTANT

## 2020-10-21 PROCEDURE — 92526 ORAL FUNCTION THERAPY: CPT | Mod: GN

## 2020-10-21 PROCEDURE — 99233 SBSQ HOSP IP/OBS HIGH 50: CPT | Performed by: INTERNAL MEDICINE

## 2020-10-21 PROCEDURE — 999N001017 HC STATISTIC GLUCOSE BY METER IP

## 2020-10-21 PROCEDURE — 250N000013 HC RX MED GY IP 250 OP 250 PS 637: Performed by: PHYSICAL MEDICINE & REHABILITATION

## 2020-10-21 PROCEDURE — 97535 SELF CARE MNGMENT TRAINING: CPT | Mod: GO

## 2020-10-21 PROCEDURE — 99207 PR CDG-CUT & PASTE-POTENTIAL IMPACT ON LEVEL: CPT | Performed by: INTERNAL MEDICINE

## 2020-10-21 PROCEDURE — 36415 COLL VENOUS BLD VENIPUNCTURE: CPT | Performed by: PHYSICAL MEDICINE & REHABILITATION

## 2020-10-21 PROCEDURE — 97110 THERAPEUTIC EXERCISES: CPT | Mod: GO

## 2020-10-21 PROCEDURE — 99233 SBSQ HOSP IP/OBS HIGH 50: CPT | Performed by: PHYSICAL MEDICINE & REHABILITATION

## 2020-10-21 PROCEDURE — 999N000150 HC STATISTIC PT MED CONFERENCE < 30 MIN: Performed by: PHYSICAL THERAPIST

## 2020-10-21 RX ADMIN — METOPROLOL TARTRATE 50 MG: 50 TABLET, FILM COATED ORAL at 08:47

## 2020-10-21 RX ADMIN — ASPIRIN 81 MG: 81 TABLET, COATED ORAL at 08:47

## 2020-10-21 RX ADMIN — PANTOPRAZOLE SODIUM 40 MG: 40 TABLET, DELAYED RELEASE ORAL at 08:48

## 2020-10-21 RX ADMIN — PANTOPRAZOLE SODIUM 40 MG: 40 TABLET, DELAYED RELEASE ORAL at 16:30

## 2020-10-21 RX ADMIN — FUROSEMIDE 20 MG: 20 TABLET ORAL at 08:47

## 2020-10-21 RX ADMIN — VALSARTAN 40 MG: 40 TABLET, FILM COATED ORAL at 08:47

## 2020-10-21 RX ADMIN — METOPROLOL TARTRATE 50 MG: 50 TABLET, FILM COATED ORAL at 20:08

## 2020-10-21 RX ADMIN — AMLODIPINE BESYLATE 5 MG: 5 TABLET ORAL at 20:09

## 2020-10-21 RX ADMIN — MULTIVITAMIN 15 ML: LIQUID ORAL at 20:08

## 2020-10-21 RX ADMIN — FUROSEMIDE 20 MG: 20 TABLET ORAL at 16:30

## 2020-10-21 RX ADMIN — NYSTATIN 500000 UNITS: 500000 SUSPENSION ORAL at 11:36

## 2020-10-21 RX ADMIN — NYSTATIN 500000 UNITS: 500000 SUSPENSION ORAL at 20:09

## 2020-10-21 RX ADMIN — LEVOTHYROXINE SODIUM 50 MCG: 25 TABLET ORAL at 08:47

## 2020-10-21 RX ADMIN — NYSTATIN 500000 UNITS: 500000 SUSPENSION ORAL at 16:30

## 2020-10-21 RX ADMIN — METFORMIN HYDROCHLORIDE 1000 MG: 500 TABLET ORAL at 20:09

## 2020-10-21 RX ADMIN — ACETAMINOPHEN 650 MG: 325 TABLET, FILM COATED ORAL at 16:48

## 2020-10-21 RX ADMIN — ATORVASTATIN CALCIUM 40 MG: 40 TABLET, FILM COATED ORAL at 08:47

## 2020-10-21 RX ADMIN — INSULIN ASPART 1 UNITS: 100 INJECTION, SOLUTION INTRAVENOUS; SUBCUTANEOUS at 11:36

## 2020-10-21 RX ADMIN — NITROFURANTOIN (MONOHYDRATE/MACROCRYSTALS) 100 MG: 75; 25 CAPSULE ORAL at 20:09

## 2020-10-21 RX ADMIN — ACETAMINOPHEN 650 MG: 325 TABLET, FILM COATED ORAL at 06:21

## 2020-10-21 RX ADMIN — NITROFURANTOIN (MONOHYDRATE/MACROCRYSTALS) 100 MG: 75; 25 CAPSULE ORAL at 08:47

## 2020-10-21 RX ADMIN — INSULIN ASPART 2 UNITS: 100 INJECTION, SOLUTION INTRAVENOUS; SUBCUTANEOUS at 08:41

## 2020-10-21 RX ADMIN — METFORMIN HYDROCHLORIDE 1000 MG: 500 TABLET ORAL at 08:47

## 2020-10-21 RX ADMIN — NYSTATIN 500000 UNITS: 500000 SUSPENSION ORAL at 08:48

## 2020-10-21 RX ADMIN — LIRAGLUTIDE 1.8 MG: 6 INJECTION SUBCUTANEOUS at 08:44

## 2020-10-21 RX ADMIN — EZETIMIBE 10 MG: 10 TABLET ORAL at 08:46

## 2020-10-21 RX ADMIN — SALINE NASAL SPRAY 1 SPRAY: 1.5 SOLUTION NASAL at 09:13

## 2020-10-21 RX ADMIN — AMLODIPINE BESYLATE 5 MG: 5 TABLET ORAL at 08:47

## 2020-10-21 ASSESSMENT — MIFFLIN-ST. JEOR: SCORE: 1854.42

## 2020-10-21 NOTE — PLAN OF CARE
FOCUS/GOAL  Bowel management, Bladder management, and Safety management    ASSESSMENT, INTERVENTIONS AND CONTINUING PLAN FOR GOAL:  Pt was A and oriented x3, did not use call light today. Staff anticipated needs. On cycle TF at 60ml/hr, started at 2200, pt tolerated well. Gastric tube in place, dressing CDI. Incontinent of urine, bladder scan done, PVR-394ml, st cathed and drained 300ml of urine. No BM this shift, LBM-10/19/20. Alarm on, call light within reach. Will continue with current POC.

## 2020-10-21 NOTE — PROGRESS NOTES
"CLINICAL NUTRITION SERVICES - REASSESSMENT NOTE     Nutrition Prescription    RECOMMENDATIONS FOR MDs/PROVIDERS TO ORDER:  -TF now discontinued and insulin orders adjusted per discussion with MD during team rounds today.  -Pending ability to advance to thin liquids and po intakes, consider decrease in free water flushes.    Malnutrition Status:    Unable to determine due to no NFPA    Recommendations already ordered by Registered Dietitian (RD):  -Continue water flushes to 160 ml QID at [id number], total water 640 mLs.  -Continue to monitor calorie count x 1 more day to ensure adequate intakes off TF.    Future/Additional Recommendations:  Monitor intakes as above  Monitor weight trends  Monitor labs trends      EVALUATION OF THE PROGRESS TOWARD GOALS   Diet: Dysphagia Level  3:  Mechanically Altered and Nectar Thickened Liquids   Supplement: Chocolate Magic Cup with lunch, cherry Gelatein Plus with dinner    Calorie Count: Five day  Average (10/16-10/20):  1364 kcals and 81 g protein meeting 68% minimum calorie needs and 100% minimum protein needs.  PO intakes over past 2 days have been lower, but overall have been improving.  Per RN pt does better with meals when up in chair, able to feed self.  Per SLP may be able to advance to thin liquids later today.    Nutrition Support: Cycled G-TF of Isosource 1.5 to 60 ml/hr x8 hrs from 9 PM to 5 AM to provide 720 kcal, 32 gm protein, 85 gm CHO, 365 ml water  Water flushes to 160 ml QID at [id number], total water w/TF = 1005 ml    Tolerating TF well per chart review.     NEW FINDINGS   Per chart review, possible UTI-UA pending.    Pt was reviewed during care team rounds today, plan to discontinue TF due to adequate po intakes.    Endocrinology has now signed off-see note 10/20 for further details.     Per SLP note 10/20:    \"1:1 assist/supervision at meals\"    \"Continues to tolerate DD3 solids and still  with nectar- thick liquids. VFSS completed 10/19. " "Good potential to advance to thin. Continuing with Free water program- with supervision: ok for 5-6 ice chips or 5-6 small teaspoons of water per hour following oral cares and only in between meals.  Please see SLP note for further details.\"     Weight Hx:    10/21/20 0655   110.9 kg (244 lb 8 oz)  10/14/20 0652 104.8 kg (231 lb 0.7 oz)   10/08/20 0647 109.8 kg (242 lb)   09/30/20 0646 104.1 kg (229 lb 8 oz)   09/24/20 1522 106.5 kg (234 lb 11.2 oz)-ARU admission     115.8 kg (255 lb 3.2 oz) 07/29/2020 Per CE      114.3 kg (252 lb) 03/20/2019 Per CE      Weight assessment: Weight has been variable since ARU admission, recently appear to be trending up-? Accuracy due to done on a bed scale.  Previously noted per RD significant 9.4% weight loss in almost 3 months.      MALNUTRITION  % Intake: Intake does not meet criteria as pt is being supported by EN   % Weight Loss: Possibly up to or > 7.5% in 3 months (non-severe)  Subcutaneous Fat Loss: Unable to assess  Muscle Loss: Unable to assess  Fluid Accumulation/Edema: trace  Malnutrition Diagnosis: Unable to determine due to no NFPA    Previous Goals   Patient to consume % of nutritionally adequate meal trays TID, or the equivalent with supplements/snacks.  Evaluation: Not met, but improving.    Previous Nutrition Diagnosis  Inadequate oral intake related to altered LOC and chewing/swallowing difficulty as evidenced by nutrition support resumed, pt continues on modified diet   Evaluation: Improving/updated below    CURRENT NUTRITION DIAGNOSIS  Inadequate oral intake related to variable appetite at times with chewing/swallowing difficulty on modified diet as evidenced per calorie counts meeting 68% minimum calorie needs and 100% minimum protein needs.     INTERVENTIONS  Implementation  Calorie count    Enteral nutrition - now discontinued due to improving po intakes per MD agreement.   Feeding tube flush - continue same for now, consider adjustment pending ability " to advance to thin liquids.  Medical food supplement therapy - continue as ordered     Goals  Patient to consume % of nutritionally adequate meal trays TID, or the equivalent with supplements/snacks.    Monitoring/Evaluation  Progress toward goals will be monitored and evaluated per protocol.    Charleen Recinos RD, LD

## 2020-10-21 NOTE — PLAN OF CARE
Denies pain, incontinent of bowel and bladder x 2,  and 268, Patient need total assist with leah care and clothing management, ater good at breakfast and lunch, on track to discharge Friday to [facility], son at bedside, will continue POC

## 2020-10-21 NOTE — PROGRESS NOTES
Paged regarding UA results. Consistent with UTI, culture pending. Macrobid started, will defer ongoing antibiotics to primary team.

## 2020-10-21 NOTE — PLAN OF CARE
FOCUS/GOAL  Bladder management, Medical management, and Safety management    ASSESSMENT, INTERVENTIONS AND CONTINUING PLAN FOR GOAL:    Pt is alert and oriented. Able to make needs known. Denies any pain, sob, chest pain or any new weakness. Is incontinent of bowel and bladder with PVR of 356 mL. ISC done with 325mL out.  A maximum assist of two in rolling from side to side in bed. BG at 2Am is 141 and given 1 unit of Insulin. BP checked and is at 131/58. Ongoing continuous feeding through the G-tube at 60ml/hr with pt tolerating it well. No complaints tonight. Will conitnue current POC.

## 2020-10-21 NOTE — PROGRESS NOTES
Spoke with IDT, due to pt ability to tolerate sitting, safety and cognition, ambulance stretcher is recommended for transport. SWer met with pt son Donte outside pt room. Notified Donte of the [place] for ambulance and that the time for transport is still pending, will update him more tomorrow. Notified Donte of no guarantee of coverage for the transport and that SW will complete PCS and send to [facility] billing to be sent to Medicare. Pt son Donte expressed understanding and agreement.     SW notified Donte of completion of [facility] [place] referral. Donte expressed appreciation. Donte asked about potential for pt to stay longer in ARU. Discussed insurance, LOS, long-term goals, etc. Donte expressed understanding.     Gave pt son IMM, pt son signed on behalf of pt due to cognition. Donte denied additional needs.     SW will call [facility] tomorrow, ensure completion of COVID test results and will arrange for ambulance transport. Will continue to follow.     [facility]-Friday 10/23/2020, timeTBD   PH: [phone]  F: [phone]    PU[place], [facility] Acute Rehab Unit   Phone: [phone]  I   Pager: [phone]

## 2020-10-21 NOTE — PLAN OF CARE
Discharge Planner Post-Acute Rehab SLP:      Discharge Plan: TCU Friday 10/23 ([facility])     Precautions: fall, swallowing, 1:1 assist/supervision at meals     Current Status:   Swallowing: Continues to tolerate DD3 solids and upgraded to thin liquids on 10/21. VFSS completed 10/19 indicating no pen/asp on thin.      Communication: moderate dysarthria, variable intelligibility.     Cognition: moderate/ impairment for basic orientation, memory, reasoning        Assessment:  Patient seen to follow-up on thin liquids w meal on NDD-3. Patient demonstrated 1x immediate cough on applesauce and some intermittent throat clearing on thin throughout session. No other concerns for pen/asp. Patient also taking appropriate sip sizes. Patient advanced to thin liquids and will continue on NDD-3 diet. Improvement on L visual scanning and intelligibility overall.     Other Barriers to Discharge (Family Training, etc):  Training, tube feed training and/or adequate PO intake

## 2020-10-21 NOTE — PROGRESS NOTES
[facility]     Medicine Progress Note - Hospitalist Service       Date of Admission:  9/24/2020  Assessment & Plan           Jorje Teran is a 70 year old  hand dominant male with Stroke Ischemic with (L) Body Involvement (R) Brain Stroke; L sided weakness due to MCA infarct, R MCA occlusion, and R carotid atherosclerosis.    # VEENA: VEENA secondary to hypovolemia and  hypotension (secondary to decreased intake with discontinuation tube feedings, concurrent use of furosemide, Entresto, valsartan, amlodipine), now resolved with IV fluids,  resumption of scheduled TF, holding of furosemide, entresto, diovan and reduction in Metoprolol and Amlodipine.  Renal function now back to baseline.     # Decreased level of consciousness, likely secondary to acute kidney injury, relatively low blood pressure and recent initiation of baclofen.  Baclofen is metabolized by the kidneys.  Baclofen currently on hold.   Mental status improved, appears at baseline  - Monitor     # Cough with Temp 99.3 on 10/12: Oxygen sat is normal. 02 sats on RA this am.  At risk for atelectasis, aspiration pneumonia. Cough may also be related to volume overload, secondary to IV fluids, temporarily holding of lasix  Remains afebrile.   - Continue to monitor  - Low threshold for X ray chest     # Recent right hemispheric CVA with left-sided weakness, on aspirin and statin  - Continue Aspirin, Statin  -  PT/OT     # History of hypertension and diastolic CHF,  controlled in outpatient setting on  Furosemide 40 mg bid, Amlodipine 20 mg daily, Metoprolol XL 25 mg bid (changed to short acting Metoprolol 50 mg bid during recent hospitalization), Entresto 49/51, 1 tab daily, Valsartan 320 mg daily.  Goal -140/ in post CVA setting, history of diastolic CHF.   Has been restarted on Metoprolol 50 mg bid, amlodipine 5 mg bid  BP better.     - Continue Amlodipine 5 mg BID, Metoprolol 50 mg BID  - Continue  Furosemide 20 mg BID (home dose 40 mg BID)  - Continue to monitor on titrate  - Entresto/ valsartan, was on Hold  - Restarted Valsartan at 40 mg daily on 10/18     * Titrate bp medications as needed.     Wt Readings from Last 5 Encounters:   10/21/20 110.9 kg (244 lb 8 oz)   09/23/20 109.1 kg (240 lb 8 oz)     # History of CAD:  On ASA and statin, B Blocker  - Continue Aspirin, Metoprolol, Statin     # Hyponatremia, mild, likely secondary to resumed TF and continued water flushes (initiated when TF recently discontinued and Pt was started on nectar thick liquids), hyperglycemia, resolved with decrease in free water administration  Na level 137 on 10/19.   - Continue to monitor.     # Diabetes mellitus type 2, labile, in setting of decreased intake, now resumption of TF  On Insulin Isophane 18 units in AM, 22 units in evening, Insulin Aspart pre meal 1:10 CHO, Insulin Aspart correction 1:25, Liraglutide 0.6 daily, and Metformin 500 mg BID  Glucose fairly controlled.   - Diabetes team following. Appreciate management    Recent Labs   Lab 10/21/20  1131 10/21/20  0811 10/21/20  0230 10/20/20  2120 10/20/20  1724 10/20/20  1206 10/19/20  0606 10/19/20  0606 10/16/20  0621 10/16/20  0621 10/15/20  0738 10/15/20  0738   GLC  --   --   --   --   --   --   --  151*  --  151*  --  139*   * 184* 141* 147* 125* 113*   < >  --    < >  --    < >  --     < > = values in this interval not displayed.        # Anemia: Hg 10.3 gm/dl. Likely has anemia of chronic disease  - Follow up with primary care for further workup      # Urinary retention: Intermittent straight catheterization prn. Check UA. UC.   - Urology consult prn.   - UA c.w UTI. Started on Nitrofurantoin. UC pending. Adjust based on UC.   - Check PVR. Encourage double void.      Rest per primary.     The patient's care was discussed with the Patient and RN. .    Hernandez Gonzalez MD  Hospitalist [facility]   Contact information available via [facility] Paging/Directory    ______________________________________________________________________    Interval History   Urinary retention+  No other new concern.     Denies fever or chills. No cough or cp or sob. No LH or dizziness. No NV. No dysuria . No new sensory or motor complaint. No new rash.     Data reviewed today: I reviewed all medications, new labs and imaging results over the last 24 hours. I personally reviewed no images or EKG's today.    Physical Exam   Vital Signs: Temp: (P) 98.1  F (36.7  C) Temp src: (P) Oral BP: (!) (P) 151/69 Pulse: (P) 86   Resp: (P) 16 SpO2: (P) 94 % O2 Device: (P) None (Room air)    Weight: 244 lbs 8 oz  General Appearance: Alert. nad  Respiratory: Non labored.   Cardiovascular: RRR  GI: non distended.   Skin: no acute rash  Other: Distally wwp. bl pedal edema 1+    Data   Recent Labs   Lab 10/21/20  0648 10/19/20  0606 10/18/20  0639 10/16/20  0621 10/15/20  0738   WBC  --  8.1  --   --   --    HGB  --  10.3*  --   --   --    MCV  --  94  --   --   --     225 232  --  207   NA  --  137  --  135 136   POTASSIUM  --  3.8  --  3.8 4.0   CHLORIDE  --  102  --  101 103   CO2  --  25  --  26 27   BUN  --  22  --  22 22   CR  --  0.76  --  0.86 0.82   ANIONGAP  --  10  --  8 6   SELENA  --  8.4*  --  8.7 8.6   GLC  --  151*  --  151* 139*   ALBUMIN  --  2.7*  --   --   --    PROTTOTAL  --  6.9  --   --   --    BILITOTAL  --  0.9  --   --   --    ALKPHOS  --  76  --   --   --    ALT  --  29  --   --   --    AST  --  19  --   --   --      No results found for this or any previous visit (from the past 24 hour(s)).  Medications     dextrose       - MEDICATION INSTRUCTIONS -         amLODIPine  5 mg Oral BID     aspirin  81 mg Oral Daily     atorvastatin  40 mg Oral Daily     ezetimibe  10 mg Oral Daily     furosemide  20 mg Oral or Feeding Tube BID     insulin aspart  1-7 Units Subcutaneous BID     insulin aspart  1-10 Units Subcutaneous  TID AC     levothyroxine  50 mcg Oral Daily     liraglutide  1.8 mg Subcutaneous Daily     metFORMIN  1,000 mg Oral BID w/meals     metoprolol tartrate  50 mg Oral BID     multivitamins w/minerals  15 mL Oral Daily     nitroFURantoin macrocrystal-monohydrate  100 mg Oral Q12H TORI     nystatin  500,000 Units Swish & Spit 4x Daily     pantoprazole  40 mg Oral BID AC     sodium chloride  1 spray Both Nostrils 4x daily     valsartan  40 mg Oral Daily

## 2020-10-21 NOTE — PROGRESS NOTES
Calorie Counts    10/20: 1109 kcal and 101 g protein (3 meals, 1 supplements)- ate 75% of breakfast and 50% of lunch per flowsheet. 100% of dinner per saved ticket. Unknown intakes of magic cup with lunch.     Annamaria Castano MS, RD, LDN  Unit Pager [phone]

## 2020-10-21 NOTE — PROGRESS NOTES
[facility]   Acute Rehabilitation Unit  Daily progress note  CC:   Stroke Ischemic 01.1 (L) Body Involvement (R) Brain Stroke; L sided weakness due to MCA infarct, R MCA occlusion, and R carotid atherosclerosis    S:   TCU Friday. COVID test due. Ordered 10/20/2020     Heart burn better on Protonix 10/16/2020. BID for a jovan period? Shoulder and UE pain returning on the left side. Improved. Not doing much standing and may not need the shoulder brace yet.    NDD3 with Thin possibly today. discontinue TF and related insulin.    Less cough spells. Allergy with post nasal drip. Restart prior med?    Elevate LUE on foam while in WC.  RVS on 10/19/20    Feels much improved. Significant impairment due to hemisensory deficit, fiedcut, and hemiplegia.   Off Baclofen. Needed adjustment of BP medications. Improved since. Incontinent but not retaining.  Now beginning to move his left side!  TR held 10/14/2020  Dense left hemiplegia, left field cut +. Compensates by turning.  BS monitored. Endo following.  NDD 3 Harold.  TF continued.    VFSS completed.  Patient showing significant improvement in swallowing function as compared to initial video swallow completed approximately 1 month ago.  Patient able to tolerate small single swallows of thin liquid without evidence of penetration or aspiration.  Did have some penetration with a spontaneous throat clear when taking larger/consecutive swallows and following the solid texture trial.  When patient limiting self to just 1 small single swallow, no difficulties noted.  At this time recommend continue with NDD-3 food textures and nectar thick liquids but will continue to work with patient on consistent use of the strategies and patient does have very good potential to advance diet back to thin liquids in the next few days pending ability to implement strategies appropriately.            ROS: Denies HA, nausea, pain.  Cath. Alertness improved.  "Coughs.        [unfilled]   Scheduled meds    amLODIPine  5 mg Oral BID     aspirin  81 mg Oral Daily     atorvastatin  40 mg Oral Daily     ezetimibe  10 mg Oral Daily     furosemide  20 mg Oral or Feeding Tube BID     insulin aspart  1-7 Units Subcutaneous BID     insulin aspart  1-10 Units Subcutaneous TID AC     levothyroxine  50 mcg Oral Daily     liraglutide  1.8 mg Subcutaneous Daily     metFORMIN  1,000 mg Oral BID w/meals     metoprolol tartrate  50 mg Oral BID     multivitamins w/minerals  15 mL Oral Daily     nitroFURantoin macrocrystal-monohydrate  100 mg Oral Q12H TORI     nystatin  500,000 Units Swish & Spit 4x Daily     pantoprazole  40 mg Oral BID AC     sodium chloride  1 spray Both Nostrils 4x daily     valsartan  40 mg Oral Daily       PRN meds:  acetaminophen, bacitracin, bisacodyl, calcium carbonate, carboxymethylcellulose PF, cetirizine, dextrose, glucose **OR** dextrose **OR** glucagon, lidocaine viscous 2% 15 mL and maalox/mylanta w/ simethicone 15 mL (GI COCKTAIL), lidocaine, miconazole, nitroGLYcerin, - MEDICATION INSTRUCTIONS -, polyethylene glycol, simethicone, sodium chloride (PF), traZODone      PHYSICAL EXAM  BP (!) 151/69 (BP Location: Right arm)   Pulse 86   Temp 98.1  F (36.7  C) (Oral)   Resp 16   Ht 1.753 m (5' 9\")   Wt 110.9 kg (244 lb 8 oz)   SpO2 94%   BMI 36.11 kg/m    Alert. Sitting better. Verbalizing at baseline now.   Respiratory: Clear to auscultation bilaterally, no crackles or wheezing  Cardiovascular: S1 and S2, RRR, and no murmur noted  GI: soft, non-distended, non-tender. G tube intact + BS +  Skin/Integumen: No rashes, trace lower extremity edema noted  Neuro : Left lower Facial droop + Left Field cut +  Dense left-sided hemiparesis with neglect noted. He is able to adduct the UE partly 2-/5, able to bring knees together, 2-  MSK: Left shoulder subluxation +,  Tone +. Reflexes +    Vocalizes, low volume   Extremities. Moves right side well. Edema " +    LABS  Last Comprehensive Metabolic Panel:  Sodium   Date Value Ref Range Status   10/19/2020 137 133 - 144 mmol/L Final     Potassium   Date Value Ref Range Status   10/19/2020 3.8 3.4 - 5.3 mmol/L Final     Chloride   Date Value Ref Range Status   10/19/2020 102 94 - 109 mmol/L Final     Carbon Dioxide   Date Value Ref Range Status   10/19/2020 25 20 - 32 mmol/L Final     Anion Gap   Date Value Ref Range Status   10/19/2020 10 3 - 14 mmol/L Final     Glucose   Date Value Ref Range Status   10/19/2020 151 (H) 70 - 99 mg/dL Final     Urea Nitrogen   Date Value Ref Range Status   10/19/2020 22 7 - 30 mg/dL Final     Creatinine   Date Value Ref Range Status   10/19/2020 0.76 0.66 - 1.25 mg/dL Final     GFR Estimate   Date Value Ref Range Status   10/19/2020 >90 >60 mL/min/[1.73_m2] Final     Comment:     Non  GFR Calc  Starting 12/18/2018, serum creatinine based estimated GFR (eGFR) will be   calculated using the Chronic Kidney Disease Epidemiology Collaboration   (CKD-EPI) equation.       Calcium   Date Value Ref Range Status   10/19/2020 8.4 (L) 8.5 - 10.1 mg/dL Final      CBC RESULTS:   Recent Labs     CBC RESULTS:   Recent Labs   Lab Test 09/30/20  0607   WBC 9.3   RBC 3.79*   HGB 11.6*   HCT 36.3*   MCV 96   MCH 30.6   MCHC 32.0   RDW 12.9        CBC RESULTS:   Lab Test 10/12/20  0553   WBC  --    RBC  --    HGB  --    HCT  --    MCV  --    MCH  --    MCHC  --    RDW  --            Glucose Values Latest Ref Rng & Units 10/20/2020 10/20/2020 10/20/2020 10/20/2020 10/21/2020 10/21/2020 10/21/2020   Bedside Glucose (mg/dl )  - -- -- -- -- -- -- --   GLUCOSE 70 - 99 mg/dL 132(H) 113(H) 125(H) 147(H) 141(H) 184(H) 156(H)   Some recent data might be hidden     ASSESSMENT AND PLAN    Jorje Teran is a 70 year old  hand dominant male with Stroke Ischemic 01.1 (L) Body Involvement (R) Brain Stroke; L sided weakness due to MCA infarct, R MCA occlusion, and R carotid  atherosclerosis  1. PT, OT and SLP 60 minutes of each on a daily basis, in addition to rehab nursing and close management of physiatrist.       2. Impairment of ADL's: OT for 60 minutes daily to work on ADL re-training such as grooming, self cares and bathing.       3. Impairment of mobility:  PT for 60 minutes daily to work on neuromuscular re-education focusing on strength, balance, coordination, and endurance.       4. Impairment of cognition/language/swallow:  SLP for 60 minutes daily to work on cognitive and linguistic deficits.     Rehab RN for med administration, bowel regimen, glucose monitoring and wound care.    1. Medical Conditions  CVA with dense left hemiparesis  CT with filling defect within the distal R ICA extending into the R MCA and occlusion of the R MCA at the M1 segment. CTA with severe plaquing at R carotid bifurcation with filling defect in the prox R ICA suggestive of thrombus contributing to approximately 80% stenosis. 60% stenosis of the proximal L ICA. TTE with bubble -> Normal left ventricular size and function. Left ventricular ejection fraction of 55-60%. No segmental wall motion abnormalities noted.   - Lipids 7/29/20- LDL 78, HDL 56, , T chol 164, hemoglobin A1c 7.1% H, TSH is 2.05.  - remains with significant dysarthria, facial droop and dense left sided hemiparesis  Spasticity: Stretching, ice + Off Baclofen as was noted to become drowsy. Better now. Some strength returning!  Add shoulder support to reduce subluxation. NMES etc.  - ASA 81 mg daily PO/IN  - atorvastatin 40 mg daily, Zetia 10 mg po daily    - BP management: Better. Avoiding lows.  - goal for SBPs < 160    Cr improved. BUN better.  Dysphagia  - currently on DD2 with nectar thickened liquids   -PEG tube to be placed on 9/22, currently patient is on tube feeding  If questions or problems arise regarding tube function (e.g. leaking, dislodges, etc.) Contact Interventional Radiology department 24 hours a day.   For  procedures that were done at [facility]:  8 AM - 4 PM Monday through Friday Contact   [phone]  For afterhours and weekends: [phone]   Ask for the Interventional Radiologist on call.   Protonix 40 mg BID started 10/16/2020 as having heart burn on Pepcid, discontinued.    10/14: 926 kcal and 49 g protein (3 meals)     Adia Gamboa RD, LD    PeG site Discomfort: Treat infection. Clean site. Added Keflex 500 mg qid x 7 days. Better.  Tylenol prn.  Simethicone added prn.    BS high. Endo following.  CAD s/p CABG x 3, x 1 in 2015  HTN continue medications noted above.  HLD: On Lipitor       DM II  Recent A1C at 7.1 7/2020.  BG goal is < 180.- started on lantus Endo following.     -BG QAC, HS 0200  - hypoglycemia protocol      US at [facility] negative for DVT     SHELDON  - states he could not sleep with CPAP as outpatient  - he would benefit for treating SHELDON   ? CPAP at night    Hypothyroidism  - resumed PTA Levothyroxine 50 mcg daily  TSH checked 7/29/2020 at 2.05     Morbid obesity  BMI noted  7/202 at ~38. Will need to encourage healthy lifestyle and weight loss with recovery     Diet: Snacks/Supplements Adult: Other; thickened smoothie with meals  (RD); With Meals  Calorie Counts. Changed to Thin likely 10/21/2020      DVT Prophylaxis: Pneumatic Compression Devices  Code Status: Full Code      2. Adjustment to disability:  Clinical psychology to eval and treat if needed  3. FEN: On NDD 2 [place] +TF, Bolus versus nocturnal supplements.  4. Bowel: Javed meds  5. Bladder: Monitor  6. GI Prophylaxis: Protonix.  7. Code: Full  8. Disposition: Home with family and home care vs more likely TCU  9. ELOS:  28 days 10/23/20,  Friday to TCU.  10. Rehab prognosis:  Fair  11. Follow up Appointments on Discharge:  On track for discontinue to TCU Friday.  12. He needs follow up with neurology as scheduled. Follows with Dr. Vincent with [facility].        Sudarshan Fuentes MD   Physical Medicine & Rehabilitation

## 2020-10-21 NOTE — PROGRESS NOTES
Postural Assessment Scale for Stroke    Maintaining a posture  1. Sitting without support: 3  2. Standing with support: 1  3. Standing without support: 0  4. Standing on nonparetic le   5. Standing on paretic le    Changing posture  6. Supine to affected side lateral: 1  7. Supine to nonaffected side lateral: 1  8. Supine to sitting up on the edge of the table: 0  9. Sitting on the edge of the table to supine: 0  10. Sitting to standing up: 1  11. Standing up to sitting down: 1  12. Standing, picking up a pencil from the floor: 0    Total score:    8/36

## 2020-10-21 NOTE — PLAN OF CARE
Acute Rehab Care Conference/Team Rounds    Type: Team Rounds    Present: Dr. Sudarshan Fuentes, Becky Sharma PT, Mara Chappell OTR-L, Mandeep Winn SLP, Luann Mayen SW, Adia Gamboa RD, Donte Parikh RN CC, Bang-O Jatin Britton, Freddy Willis RN     Discharge Barriers/Treatment/Education    Rehab Diagnosis: Stroke Ischemic 01.1 (L) Body Involvement (R) Brain Stroke; L sided weakness due to MCA infarct, R MCA occlusion, and R carotid atherosclerosis    Active Medical Co-morbidities/Prognosis:   Patient Active Problem List   Diagnosis     CVA (cerebral vascular accident) (H)     Right middle cerebral artery stroke (H)     Class 2 severe obesity due to excess calories with serious comorbidity and body mass index (BMI) of 38.0 to 38.9 in adult (H)     Controlled type 2 diabetes mellitus without complication, without long-term current use of insulin (H)     Coronary atherosclerosis     Essential hypertension     Hyperlipidemia     Hypothyroid     Obstructive sleep apnea syndrome     S/P CABG x 3     Tubular adenoma of colon       Safety: Is alert and oriented with slow speech. Staff to anticipate needs. A maximum assist of 2 liko lift in transfers. Left sided neglect evident.     Pain: Denies.     Medications, Skin, Tubes/Lines: Pt takes pills crushed with apple sauce or if not tolerating may give through the G-tube. Gastric tube in place, site reddened with cleansing and dressing changes done as scheduled and PRN.     Swallowing/Nutrition: Patient completed VFSS which was tolerated with minimal difficulties. Patient seems on the verge of advancing to thin liquids. Patient currently on NDD-3. Anticipate patient to likely discharge on NDD-3 with thin liquids. Ongoing SLP intervention at discharge.     Bowel/Bladder: Incontinent of both bowel and bladder. LBM 10/20. At times does not void for more than 4-6 hours with Bladder scans of >250 mL. ISC done as needed. UA/UC taken with significant UA results.  Started on Macrobid last night Pm shift.      Psychosocial: . Lives alone in a home with 1 KIERRA. Indep PTA. No mental health or substance abuse PTA. Three adult sons who are supportive and involved in pt care. Retired and has LTC insurance policy.     ADLs/IADLs: ADLs:g/h from w/c with SBA, set up and cue to initiate each task. Max a u/b dressing in pull over shirt, max/dep with l/b dressing dep with socks A X 2 liko lift bed-w/c transf. LUE supported and elevated on w/c lap tray.     Mobility: Remains w/c based and lift dependent; however, PASS score improved from 1/36 to 8/36 over past 4 weeks. This is double the NEYMAR.  Will plan to educate family regarding NMES for ongoing (L) shoulder intervention. Will require long, slow rehab course, agree with TCU placement for ongoing skilled care.     Cognition/Language: Improvement in ability to attend to his left side. Speech intelligibility steadily improving as well. Difficulties ongoing with memory, attention and problem solving. Anticipate need for oversight with all higher level IADL tasks. Ongoing therapy at facility discharge.     Community Re-Entry: w/c based w/ assist    Transportation: w/c based at this time    Decision maker: self    Plan of Care and goals reviewed and updated.    Discharge Plan/Recommendations    Fall Precautions: continue    Patient/Family input to goals: Yes    Anticipated rehab needs following discharge: To TCU on Friday    Anticipated care giver support after discharge: TCU    Estimated length of stay: 31-90 days    Overall plan for the patient: Continue IP rehabilitation and TCU later on with all three services.      Utilization Review and Continued Stay Justification    Medical Necessity Criteria:    For any criteria that is not met, please document reason and plan for discharge, transfer, or modification of plan of care to address.    Requires intensive rehabilitation program to treat functional deficits?: Yes    Requires 3x per  week or greater involvement of rehabilitation physician to oversee rehabilitation program?: Yes    Requires rehabilitation nursing interventions?: Yes    Patient is making functional progress?: Yes    There is a potential for additional functional progress? Yes    Patient is participating in therapy 3 hours per day a minimum of 5 days per week or 15 hours per week in 7 day period?:Yes    Has discharge needs that require coordinated discharge planning approach?:Yes      Final Physician Sign off    Statement of Approval: I approve the plan of care.    Patient Goals  Social Work Goals: TCU placement on Friday 10/23. Will arrange transport and give pt/pt family IMM before discharge.        OT Frequency: 4 weeks 60-90 miin daily  OT goal: hygiene/grooming: modified independent  OT goal: upper body dressing: Minimal assist  OT goal: lower body dressing: Minimal assist  OT goal: upper body bathing: Supervision/stand-by assist  OT goal: lower body bathing: Minimal assist  OT goal: bed mobility: Supervision/stand-by assist     OT goal: toilet transfer/toileting: Moderate assist  OT goal: meal preparation: Moderate assist  OT goal: home management: Minimal assist           OT goal 1: pt will be mod a of 1 transer to extended tub bench  OT goal 2: Pt will be min a with  prom aarom l u/e and arom ex with r U/E        PT Frequency: daily x 60 minutes  PT goal: bed mobility: Minimal assist  PT goal: transfers: Minimal assist, Assistive device        PT goal 1: will work w/ vendor to attain recommended equipment for home discontinue due to TCU discharge.   PT Goal 2: will be able to sit EOB w/ intermittent single hand support x 10 minutes  MET                SLP Frequency: daily  SLP Swallow Goal:  Safely tolerate diet without signs/symptoms of aspiration: Dysphagia level 3 diet, thin liquids, with use of compensatory swallow strategies, with use of swallow precautions, with assistance/supervision  SLP Language Goal:  Improve  language comprehension for interaction with caregivers/environment: minimal assist, auditory and written  SLP Communication goal:  Communicate basic wants and needs: minimal assist, verbally  SLP goal 1: Pt to complete mod level attention and reasoning/problem solving tasks with 90% acc given min A to maximize independence with tasks of daily living and therapy tasks.

## 2020-10-22 ENCOUNTER — APPOINTMENT (OUTPATIENT)
Dept: OCCUPATIONAL THERAPY | Facility: CLINIC | Age: 70
End: 2020-10-22
Payer: COMMERCIAL

## 2020-10-22 ENCOUNTER — APPOINTMENT (OUTPATIENT)
Dept: SPEECH THERAPY | Facility: CLINIC | Age: 70
End: 2020-10-22
Payer: COMMERCIAL

## 2020-10-22 ENCOUNTER — APPOINTMENT (OUTPATIENT)
Dept: PHYSICAL THERAPY | Facility: CLINIC | Age: 70
End: 2020-10-22
Payer: COMMERCIAL

## 2020-10-22 LAB
GLUCOSE BLDC GLUCOMTR-MCNC: 110 MG/DL (ref 70–99)
GLUCOSE BLDC GLUCOMTR-MCNC: 113 MG/DL (ref 70–99)
GLUCOSE BLDC GLUCOMTR-MCNC: 124 MG/DL (ref 70–99)
GLUCOSE BLDC GLUCOMTR-MCNC: 135 MG/DL (ref 70–99)
GLUCOSE BLDC GLUCOMTR-MCNC: 137 MG/DL (ref 70–99)

## 2020-10-22 PROCEDURE — 97112 NEUROMUSCULAR REEDUCATION: CPT | Mod: GP | Performed by: PHYSICAL THERAPIST

## 2020-10-22 PROCEDURE — 92507 TX SP LANG VOICE COMM INDIV: CPT | Mod: GN

## 2020-10-22 PROCEDURE — 97535 SELF CARE MNGMENT TRAINING: CPT | Mod: GO

## 2020-10-22 PROCEDURE — 99233 SBSQ HOSP IP/OBS HIGH 50: CPT | Performed by: PHYSICAL MEDICINE & REHABILITATION

## 2020-10-22 PROCEDURE — 250N000013 HC RX MED GY IP 250 OP 250 PS 637: Performed by: PHYSICIAN ASSISTANT

## 2020-10-22 PROCEDURE — 250N000013 HC RX MED GY IP 250 OP 250 PS 637: Performed by: PHYSICAL MEDICINE & REHABILITATION

## 2020-10-22 PROCEDURE — 250N000013 HC RX MED GY IP 250 OP 250 PS 637: Performed by: STUDENT IN AN ORGANIZED HEALTH CARE EDUCATION/TRAINING PROGRAM

## 2020-10-22 PROCEDURE — 999N001017 HC STATISTIC GLUCOSE BY METER IP

## 2020-10-22 PROCEDURE — 97112 NEUROMUSCULAR REEDUCATION: CPT | Mod: GO

## 2020-10-22 PROCEDURE — 97110 THERAPEUTIC EXERCISES: CPT | Mod: GO

## 2020-10-22 PROCEDURE — 250N000013 HC RX MED GY IP 250 OP 250 PS 637: Performed by: INTERNAL MEDICINE

## 2020-10-22 PROCEDURE — 128N000003 HC R&B REHAB

## 2020-10-22 PROCEDURE — 97530 THERAPEUTIC ACTIVITIES: CPT | Mod: GP | Performed by: PHYSICAL THERAPIST

## 2020-10-22 PROCEDURE — 92526 ORAL FUNCTION THERAPY: CPT | Mod: GN

## 2020-10-22 PROCEDURE — 99232 SBSQ HOSP IP/OBS MODERATE 35: CPT | Performed by: INTERNAL MEDICINE

## 2020-10-22 RX ORDER — AMLODIPINE BESYLATE 5 MG/1
5 TABLET ORAL 2 TIMES DAILY
DISCHARGE
Start: 2020-10-22 | End: 2022-08-30

## 2020-10-22 RX ORDER — FEXOFENADINE HCL 60 MG/1
60 TABLET, FILM COATED ORAL DAILY
Status: DISCONTINUED | OUTPATIENT
Start: 2020-10-22 | End: 2020-10-23 | Stop reason: HOSPADM

## 2020-10-22 RX ORDER — FUROSEMIDE 20 MG
20 TABLET ORAL
DISCHARGE
Start: 2020-10-22 | End: 2020-10-23

## 2020-10-22 RX ORDER — FEXOFENADINE HCL 60 MG/1
60 TABLET, FILM COATED ORAL DAILY
DISCHARGE
Start: 2020-10-23 | End: 2022-08-30

## 2020-10-22 RX ORDER — ACETAMINOPHEN 325 MG/1
650 TABLET ORAL EVERY 4 HOURS PRN
Status: ON HOLD | DISCHARGE
Start: 2020-10-22 | End: 2022-08-31

## 2020-10-22 RX ORDER — LIRAGLUTIDE 6 MG/ML
1.8 INJECTION SUBCUTANEOUS DAILY
Status: ON HOLD | DISCHARGE
Start: 2020-10-23 | End: 2022-08-31

## 2020-10-22 RX ORDER — CIPROFLOXACIN 500 MG/1
500 TABLET, FILM COATED ORAL EVERY 12 HOURS SCHEDULED
Status: DISCONTINUED | OUTPATIENT
Start: 2020-10-22 | End: 2020-10-23 | Stop reason: HOSPADM

## 2020-10-22 RX ORDER — CIPROFLOXACIN 500 MG/1
500 TABLET, FILM COATED ORAL EVERY 12 HOURS
DISCHARGE
Start: 2020-10-22 | End: 2020-10-26

## 2020-10-22 RX ORDER — VALSARTAN 40 MG/1
40 TABLET ORAL DAILY
DISCHARGE
Start: 2020-10-23 | End: 2020-10-23

## 2020-10-22 RX ADMIN — LIRAGLUTIDE 1.8 MG: 6 INJECTION SUBCUTANEOUS at 08:17

## 2020-10-22 RX ADMIN — NYSTATIN 500000 UNITS: 500000 SUSPENSION ORAL at 11:53

## 2020-10-22 RX ADMIN — PANTOPRAZOLE SODIUM 40 MG: 40 TABLET, DELAYED RELEASE ORAL at 17:03

## 2020-10-22 RX ADMIN — FUROSEMIDE 20 MG: 20 TABLET ORAL at 08:20

## 2020-10-22 RX ADMIN — FEXOFENADINE HCL 60 MG: 60 TABLET, FILM COATED ORAL at 12:15

## 2020-10-22 RX ADMIN — NITROFURANTOIN (MONOHYDRATE/MACROCRYSTALS) 100 MG: 75; 25 CAPSULE ORAL at 08:19

## 2020-10-22 RX ADMIN — AMLODIPINE BESYLATE 5 MG: 5 TABLET ORAL at 20:32

## 2020-10-22 RX ADMIN — VALSARTAN 40 MG: 40 TABLET, FILM COATED ORAL at 08:19

## 2020-10-22 RX ADMIN — METFORMIN HYDROCHLORIDE 1000 MG: 500 TABLET ORAL at 18:40

## 2020-10-22 RX ADMIN — METOPROLOL TARTRATE 50 MG: 50 TABLET, FILM COATED ORAL at 08:20

## 2020-10-22 RX ADMIN — AMLODIPINE BESYLATE 5 MG: 5 TABLET ORAL at 08:21

## 2020-10-22 RX ADMIN — EZETIMIBE 10 MG: 10 TABLET ORAL at 08:20

## 2020-10-22 RX ADMIN — METFORMIN HYDROCHLORIDE 1000 MG: 500 TABLET ORAL at 08:20

## 2020-10-22 RX ADMIN — MULTIVITAMIN 15 ML: LIQUID ORAL at 18:40

## 2020-10-22 RX ADMIN — NYSTATIN 500000 UNITS: 500000 SUSPENSION ORAL at 08:18

## 2020-10-22 RX ADMIN — ASPIRIN 81 MG: 81 TABLET, COATED ORAL at 08:21

## 2020-10-22 RX ADMIN — CIPROFLOXACIN HYDROCHLORIDE 500 MG: 500 TABLET, FILM COATED ORAL at 20:32

## 2020-10-22 RX ADMIN — ACETAMINOPHEN 650 MG: 325 TABLET, FILM COATED ORAL at 15:15

## 2020-10-22 RX ADMIN — SALINE NASAL SPRAY 1 SPRAY: 1.5 SOLUTION NASAL at 18:39

## 2020-10-22 RX ADMIN — LEVOTHYROXINE SODIUM 50 MCG: 25 TABLET ORAL at 08:20

## 2020-10-22 RX ADMIN — ACETAMINOPHEN 650 MG: 325 TABLET, FILM COATED ORAL at 06:31

## 2020-10-22 RX ADMIN — FUROSEMIDE 20 MG: 20 TABLET ORAL at 17:04

## 2020-10-22 RX ADMIN — ATORVASTATIN CALCIUM 40 MG: 40 TABLET, FILM COATED ORAL at 08:21

## 2020-10-22 RX ADMIN — METOPROLOL TARTRATE 50 MG: 50 TABLET, FILM COATED ORAL at 20:32

## 2020-10-22 RX ADMIN — PANTOPRAZOLE SODIUM 40 MG: 40 TABLET, DELAYED RELEASE ORAL at 06:16

## 2020-10-22 RX ADMIN — CIPROFLOXACIN HYDROCHLORIDE 500 MG: 500 TABLET, FILM COATED ORAL at 11:51

## 2020-10-22 ASSESSMENT — MIFFLIN-ST. JEOR: SCORE: 1834.01

## 2020-10-22 NOTE — PLAN OF CARE
Physical Therapy Discharge Summary    Reason for therapy discharge:    Discharged to transitional care facility.    Progress towards therapy goal(s). See goals on Care Plan in [facility] electronic health record for goal details.      Therapy [place](s):    Continued therapy is recommended.  Rationale/Recommendations:  Patient making slow, steady progress w/ recovery from (R) CVA. Has dense hemiplegia w/ shoulder subluxation, field cut, and contralateral pushing. Admit PASS 1/36, current 8/36. Shoulder management w/ lap tray, pillow for elevation, tubigrip for edema, and daily NMES. Sitting posture w/ pelvis skewed to (L) and (R) trunk shortening. Has been using [employer] [place] cushion and positioning back. Unable to engage trunk to assist w/ repositioning. Stands next to a wall w/ skilled assist of 3, video on his son's phone. Family has script and protocol for NMES and will purchase for ongoing use.  Anticipate progress to include transfer assist w/ one and w/c based mobility.

## 2020-10-22 NOTE — PLAN OF CARE
FOCUS/GOAL  Bowel/Bladder management, Medication/Pain management, Wound Care management      ASSESSMENT, INTERVENTIONS AND CONTINUING PLAN FOR GOAL:  A/O and able to make needs known. VSS with baseline htn. PEG with scheduled flushes around the clock. Incontinent of bladder/bowel with urine retention, last BM 10/21. Orders for straight cath for PVR >200. Cathed at 0130 with 325 straw, odorless output. 0630 PVR ~200 ml. Pt wants to attempt double void, educated on physician orders and risks of urine retention, pt refused straight cath. Ax2 with liko lift. No c/o n/v/d, cp. Neck ache at 0630 given prn. Combination diet, DD-3, thin liquids. Takes pills whole. Continue w/ POC.

## 2020-10-22 NOTE — PLAN OF CARE
Occupational Therapy Discharge Summary    Reason for therapy discharge:    Discharged to transitional care facility.    Progress towards therapy goal(s). See goals on Care Plan in [facility] electronic health record for goal details.  Goals partially met.  Barriers to achieving goals:   limited tolerance for therapy.  Pt needing longer stay to increase I with adls  Therapy [place](s):    Continued therapy is recommended.  Rationale/Recommendations:  to increase g/h ub and l/b dressing, bed mobility transfers and increase awareness to l side and increase strength decrease pain  l u/e as able.

## 2020-10-22 NOTE — PROGRESS NOTES
[facility]     Medicine Progress Note - Hospitalist Service       Date of Admission:  9/24/2020  Assessment & Plan         Jorje Teran is a 70 year old  hand dominant male with Stroke Ischemic with (L) Body Involvement (R) Brain Stroke; L sided weakness due to MCA infarct, R MCA occlusion, and R carotid atherosclerosis.    # VEENA: VEENA secondary to hypovolemia and  hypotension (secondary to decreased intake with discontinuation tube feedings, concurrent use of furosemide, Entresto, valsartan, amlodipine), now resolved with IV fluids,  resumption of scheduled TF, holding of furosemide, entresto, diovan and reduction in Metoprolol and Amlodipine.  Renal function now back to baseline.     # Decreased level of consciousness, likely secondary to acute kidney injury, relatively low blood pressure and recent initiation of baclofen.  Baclofen is metabolized by the kidneys.  Baclofen currently on hold.   Mental status improved, appears at baseline  - Monitor     # Cough with Temp 99.3 on 10/12: Oxygen sat is normal. 02 sats on RA this am.  At risk for atelectasis, aspiration pneumonia. Cough may also be related to volume overload, secondary to IV fluids, temporarily holding of lasix  Remains afebrile.   - Continue to monitor  - Low threshold for X ray chest     # Recent right hemispheric CVA with left-sided weakness, on aspirin and statin  - Continue Aspirin, Statin  -  PT/OT     # History of hypertension and diastolic CHF,  controlled in outpatient setting on  Furosemide 40 mg bid, Amlodipine 20 mg daily, Metoprolol XL 25 mg bid (changed to short acting Metoprolol 50 mg bid during recent hospitalization), Entresto 49/51, 1 tab daily, Valsartan 320 mg daily.  Goal -140/ in post CVA setting, history of diastolic CHF.   Has been restarted on Metoprolol 50 mg bid, amlodipine 5 mg bid  BP better.     - Continue Amlodipine 5 mg BID, Metoprolol 50 mg BID  - Continue Furosemide  20 mg BID (home dose 40 mg BID)  - Continue to monitor on titrate  - Entresto/ valsartan, was on Hold  - Restarted Valsartan at 40 mg daily on 10/18     * Titrate bp medications as needed.     Wt Readings from Last 5 Encounters:   10/22/20 108.9 kg (240 lb)   09/23/20 109.1 kg (240 lb 8 oz)     # History of CAD:  On ASA and statin, B Blocker  - Continue Aspirin, Metoprolol, Statin     # Hyponatremia, mild, likely secondary to resumed TF and continued water flushes (initiated when TF recently discontinued and Pt was started on nectar thick liquids), hyperglycemia, resolved with decrease in free water administration  Na level 137 on 10/19.   - Continue to monitor.     # Diabetes mellitus type 2, labile, in setting of decreased intake, now resumption of TF  On Insulin Isophane 18 units in AM, 22 units in evening, Insulin Aspart pre meal 1:10 CHO, Insulin Aspart correction 1:25, Liraglutide 0.6 daily, and Metformin 500 mg BID  Glucose fairly controlled.   - Diabetes team following. Appreciate management    Recent Labs   Lab 10/22/20  0814 10/22/20  0134 10/21/20  2307 10/21/20  1827 10/21/20  1131 10/21/20  0811 10/19/20  0606 10/19/20  0606 10/16/20  0621 10/16/20  0621   GLC  --   --   --   --   --   --   --  151*  --  151*   * 124* 126* 91 156* 184*   < >  --    < >  --     < > = values in this interval not displayed.        # Anemia: Hg 10.3 gm/dl. Likely has anemia of chronic disease  - Follow up with primary care for further workup      # Urinary retention: Intermittent straight catheterization prn. Check UA. UC.   - Urology consult prn.   - UA c.w UTI. Started on Nitrofurantoin.   - 10/22/2020: UC Klebsiella Pneumonia, hypermucoviscous. Antibiotics changed to Cipro 500 bid x 5 more days.   - PVR improving. Ct to monitor. Encourage double void. If PVR >300 after double void. Consider ISC, if persistent, consider albarado catheterization.      Rest per primary.     The patient's care was discussed with the  Patient and RN. .    Hernandez Gonzalez MD  Hospitalist Service  [facility]   Contact information available via [facility] Paging/Directory    ______________________________________________________________________    Interval History   Urinary retention+ improving.   No other new concern.     Denies fever or chills. No cough or cp or sob. No LH or dizziness. No NV. No dysuria . No new sensory or motor complaint. No new rash.     Data reviewed today: I reviewed all medications, new labs and imaging results over the last 24 hours. I personally reviewed no images or EKG's today.    Physical Exam   Vital Signs: Temp: 98.3  F (36.8  C) Temp src: Oral BP: 135/71 Pulse: 87   Resp: 20 SpO2: 94 % O2 Device: None (Room air)    Weight: 240 lbs 0 oz  General Appearance: Alert. nad  Respiratory: Non labored.   Cardiovascular: RRR  GI: non distended.   Skin: no acute rash  Other: Distally wwp. bl pedal edema 1+    Data   Recent Labs   Lab 10/21/20  0648 10/19/20  0606 10/18/20  0639 10/16/20  0621   WBC  --  8.1  --   --    HGB  --  10.3*  --   --    MCV  --  94  --   --     225 232  --    NA  --  137  --  135   POTASSIUM  --  3.8  --  3.8   CHLORIDE  --  102  --  101   CO2  --  25  --  26   BUN  --  22  --  22   CR  --  0.76  --  0.86   ANIONGAP  --  10  --  8   SELENA  --  8.4*  --  8.7   GLC  --  151*  --  151*   ALBUMIN  --  2.7*  --   --    PROTTOTAL  --  6.9  --   --    BILITOTAL  --  0.9  --   --    ALKPHOS  --  76  --   --    ALT  --  29  --   --    AST  --  19  --   --      No results found for this or any previous visit (from the past 24 hour(s)).  Medications     dextrose       - MEDICATION INSTRUCTIONS -         amLODIPine  5 mg Oral BID     aspirin  81 mg Oral Daily     atorvastatin  40 mg Oral Daily     ciprofloxacin  500 mg Oral Q12H TORI     ezetimibe  10 mg Oral Daily     furosemide  20 mg Oral or Feeding Tube BID     insulin aspart  1-7 Units Subcutaneous BID     insulin  aspart  1-10 Units Subcutaneous TID AC     levothyroxine  50 mcg Oral Daily     liraglutide  1.8 mg Subcutaneous Daily     metFORMIN  1,000 mg Oral BID w/meals     metoprolol tartrate  50 mg Oral BID     multivitamins w/minerals  15 mL Oral Daily     nystatin  500,000 Units Swish & Spit 4x Daily     pantoprazole  40 mg Oral BID AC     sodium chloride  1 spray Both Nostrils 4x daily     valsartan  40 mg Oral Daily

## 2020-10-22 NOTE — PROGRESS NOTES
Calorie Count:  10/21: 1045 kcal and 51 g protein (3 meals, Magic Cup was not documented, pt did not eat Gelatein)    Plan:   Pt is set to discharge to TCU tomorrow, so RD will discontinue calorie count. Continue diet per SLP, continue supplements as ordered, continue water flushes as ordered. Suggest next facility continue to monitor oral intakes for adequacy and work on weaning pt from use of feeding tube/weaning water flushes.     Jonna Miller, RD, LD  Adia Gamboa RD, LD

## 2020-10-22 NOTE — PROGRESS NOTES
10/19/20 1500   General Information   Onset of Illness/Injury or Date of Surgery 09/07/20   Referring Physician Dr. Cooper   Patient/Family Therapy Goal Statement (SLP) Advance to thin liquids   General Observations Patient was seen by speech therapy during acute care hospitalization.  Patient has consistently been seen by speech therapy as well during acute rehab stay.  Prior VFSS shows severe swallow impairment.       Language  impairment   General Swallowing Observations   Swallowing Evaluation Videofluoroscopic swallow study (VFSS)   VFSS Evaluation   VFSS Textures Trialed Thin Liquids;Nectar-Thick Liquids;Solid   VFSS Eval: Thin Liquid Texture Trial   Mode of Presentation, Thin Liquid cup;spoon;self-fed   Order of Presentation 2, 3, 4, 6, 7, 8, 9   Preparatory Phase WFL   Oral Phase, Thin Liquid WFL   Pharyngeal Phase, Thin Liquid Residue in valleculae   Rosenbek's Penetration Aspiration Scale: Thin Liquid Trial Results 2 - contrast enters airway, remains above the vocal cords, no residue remains (penetration)   Response to Aspiration other (see comments)  (Reflexive throat clear)   Diagnostic Statement Patient had flash penetration x1.  Also noted deeper penetration when taking a larger bolus size that was taking in quick care succession.  When limiting self to just single small sips, no difficulties noted.   VFSS Evaluation: Nectar Thick Liquid Texture Trial   Mode of Presentation, Nectar cup;self-fed   Order of Presentation 1   Preparatory Phase WFL   Pharyngeal Phase, Nectar Residue in valleculae;Residue in pyriform sinus   Rosenbek's Penetration Aspiration Scale: Nectar-Thick Liquid Trial Results 1 - no aspiration, contrast does not enter airway   Diagnostic Statement Mild to moderate vallecular and piriform residue   VFSS Evaluation: Solid Food Texture Trial   Mode of Presentation, Solid spoon;self-fed   Order of Presentation 5   Preparatory Phase WFL   Pharyngeal Phase, Solid Residue in  valleculae   Rosenbek's Penetration Aspiration Scale: Solid Food Trial Results 1 - no aspiration, contrast does not enter airway   Diagnostic Statement Increased vallecular residue.  Subsequent attempts at liquid wash resulted in some deeper penetration.   Esophageal Phase of Swallow   Patient reports or presents with symptoms of esophageal dysphagia No   SLP Therapy Assessment/Plan   Criteria for Skilled Therapeutic Interventions Met (SLP Eval) yes   SLP Diagnosis Mild oral pharyngeal dysphagia   Rehab Potential (SLP Eval) good, to achieve stated therapy goals   Therapy Frequency (SLP Eval) daily   Predicted Duration of Therapy Intervention (SLP Eval) 1 week for dysphagia related goals   Activity Limitations Related to Problem List (SLP) Ongoing modify diet appropriate   Comment, Therapy Assessment/Plan (SLP) VFSS completed.  Patient showing significant improvement in swallowing function as compared to initial video swallow completed approximately 1 month ago.  Patient able to tolerate small single swallows of thin liquid without evidence of penetration or aspiration.  Did have some penetration with a spontaneous throat clear when taking larger/consecutive swallows and following the solid texture trial.  When patient limiting self to just 1 small single swallow, no difficulties noted.  At this time recommend continue with NDD-3 food textures and nectar thick liquids but will continue to work with patient on consistent use of the strategies and patient does have very good potential to advance diet back to thin liquids in the next few days pending ability to implement strategies appropriately.    Total Evaluation Time   Total Evaluation Time (Minutes) 30

## 2020-10-22 NOTE — PLAN OF CARE
"Speech Language Therapy Discharge Summary    Reason for therapy discharge:    Discharged to transitional care facility.    Progress towards therapy goal(s). See goals on Care Plan in [facility] electronic health record for goal details.  Goals partially met.  Barriers to achieving goals:   discharge from facility.    Therapy [place](s):    Continued therapy is recommended.  Rationale/Recommendations:  address dysphagia, cognition and dysarthria related goals. Patient has been able to show steady progress over course of rehab stay. Tolerating NDD-3 food textures and thin liquids. Good potential to advance to regular as patient improving in level of independence in his meals. Initially full assisted feeding required but now just general reminders being provided. Left side neglect evident but significantly improving. Dysarthria also evident but benefits from cue to use \"loud and proud\" speech. Significantly improved intelligibility resulting. Some difficulties noted with initiation and reasoning/problem solving though addressed minimally due to initial severity of swallowing and dysarthria impairments. Did complete most recent VFSS on 10/19 which showed no aspiration. Mild increased risk when taking larger boluses or in quicker succession. Does have difficulties with managing secretions at times which results in aspiration. Per family report, this particular component is not new to patient and previously treated with allergy medications.       "

## 2020-10-22 NOTE — PROGRESS NOTES
Confirmed plans with Pao in admissions at [facility]. PETER set up stretcher ambulance with [facility] PH: [phone] for TOMORROW, Friday 10/23 at 10:30am. Stretcher ambulance due to trunk control, inability to tolerate w/c for duration of transport and overall safety. Discussed this with pt son yesterday and IDT. Pt son in agreement with plan and aware, no guarantee of coverage. SW completed PCS form, faxed to [facility] billing and placed copy in pt paper chart. SW called pt son to notify him of transport time. Pt son denied additional needs. Charge RN notified. Will update MD. No further SW needs at this time.     [facility]-TOMORROW, Friday 10/23 at 10:30am via ambulance/stretcher  PH: [phone]  F: [phone]    [facility] [place], [facility] Acute Rehab Unit   Phone: [phone]  I   Pager: [phone]

## 2020-10-22 NOTE — PROGRESS NOTES
[facility]   Acute Rehabilitation Unit  Daily progress note  CC:   Stroke Ischemic 01.1 (L) Body Involvement (R) Brain Stroke; L sided weakness due to MCA infarct, R MCA occlusion, and R carotid atherosclerosis    S:   TCU Friday. Ambulance transport due to sitting balance issues.     COVID test due. Ordered 10/20/2020     Heart burn better on Protonix 10/16/2020. BID for a jovan period? Shoulder and UE pain returning on the left side. Improved. Not doing much standing and may not need the shoulder brace yet.    NDD3 with Thin. discontinue TF and related insulin.    Less cough spells. Allergy with post nasal drip. Restart Allegra daily.    Elevate LUE on foam while in WC.  RVS on 10/19/20    Feels much improved. Significant impairment due to hemisensory deficit, fiedcut, and hemiplegia.   Off Baclofen. Needed adjustment of BP medications. Improved since. Incontinent but not retaining.  Now beginning to move his left side!  TR held 10/14/2020  Dense left hemiplegia, left field cut +. Compensates by turning.  BS monitored. Endo following.  NDD 3 Thin.      ROS: Denies HA, nausea, pain.  Cath. Alertness improved. Coughs.        [unfilled]   Scheduled meds    amLODIPine  5 mg Oral BID     aspirin  81 mg Oral Daily     atorvastatin  40 mg Oral Daily     ciprofloxacin  500 mg Oral Q12H TORI     ezetimibe  10 mg Oral Daily     furosemide  20 mg Oral or Feeding Tube BID     insulin aspart  1-7 Units Subcutaneous BID     insulin aspart  1-10 Units Subcutaneous TID AC     levothyroxine  50 mcg Oral Daily     liraglutide  1.8 mg Subcutaneous Daily     metFORMIN  1,000 mg Oral BID w/meals     metoprolol tartrate  50 mg Oral BID     multivitamins w/minerals  15 mL Oral Daily     nystatin  500,000 Units Swish & Spit 4x Daily     pantoprazole  40 mg Oral BID AC     sodium chloride  1 spray Both Nostrils 4x daily     valsartan  40 mg Oral Daily       PRN meds:  acetaminophen, bacitracin,  "bisacodyl, calcium carbonate, carboxymethylcellulose PF, cetirizine, dextrose, glucose **OR** dextrose **OR** glucagon, lidocaine viscous 2% 15 mL and maalox/mylanta w/ simethicone 15 mL (GI COCKTAIL), lidocaine, miconazole, nitroGLYcerin, - MEDICATION INSTRUCTIONS -, polyethylene glycol, simethicone, sodium chloride (PF), traZODone      PHYSICAL EXAM  /71 (BP Location: Right arm)   Pulse 87   Temp 98.3  F (36.8  C) (Oral)   Resp 20   Ht 1.753 m (5' 9\")   Wt 108.9 kg (240 lb)   SpO2 94%   BMI 35.44 kg/m    Alert. Sitting better. Verbalizing at baseline now.   Respiratory: Clear to auscultation bilaterally, no crackles or wheezing  Cardiovascular: S1 and S2, RRR, and no murmur noted  GI: soft, non-distended, non-tender. G tube intact + BS +  Skin/Integumen: No rashes, trace lower extremity edema noted  Neuro : Left lower Facial droop + Left Field cut +  Dense left-sided hemiparesis with neglect noted. He is able to adduct the UE partly 2-/5, able to bring knees together, 2-  MSK: Left shoulder subluxation +,  Tone +. Reflexes +    Vocalizes, low volume   Extremities. Moves right side well. Edema +    LABS  Last Comprehensive Metabolic Panel:  Sodium   Date Value Ref Range Status   10/19/2020 137 133 - 144 mmol/L Final     Potassium   Date Value Ref Range Status   10/19/2020 3.8 3.4 - 5.3 mmol/L Final     Chloride   Date Value Ref Range Status   10/19/2020 102 94 - 109 mmol/L Final     Carbon Dioxide   Date Value Ref Range Status   10/19/2020 25 20 - 32 mmol/L Final     Anion Gap   Date Value Ref Range Status   10/19/2020 10 3 - 14 mmol/L Final     Glucose   Date Value Ref Range Status   10/19/2020 151 (H) 70 - 99 mg/dL Final     Urea Nitrogen   Date Value Ref Range Status   10/19/2020 22 7 - 30 mg/dL Final     Creatinine   Date Value Ref Range Status   10/19/2020 0.76 0.66 - 1.25 mg/dL Final     GFR Estimate   Date Value Ref Range Status   10/19/2020 >90 >60 mL/min/[1.73_m2] Final     Comment:     Non "  GFR Calc  Starting 12/18/2018, serum creatinine based estimated GFR (eGFR) will be   calculated using the Chronic Kidney Disease Epidemiology Collaboration   (CKD-EPI) equation.       Calcium   Date Value Ref Range Status   10/19/2020 8.4 (L) 8.5 - 10.1 mg/dL Final      CBC RESULTS:   Recent Labs     CBC RESULTS:   Recent Labs   Lab Test 09/30/20  0607   WBC 9.3   RBC 3.79*   HGB 11.6*   HCT 36.3*   MCV 96   MCH 30.6   MCHC 32.0   RDW 12.9        CBC RESULTS:   Lab Test 10/12/20  0553   WBC  --    RBC  --    HGB  --    HCT  --    MCV  --    MCH  --    MCHC  --    RDW  --            Glucose Values Latest Ref Rng & Units 10/21/2020 10/21/2020 10/21/2020 10/21/2020 10/21/2020 10/22/2020 10/22/2020   Bedside Glucose (mg/dl )  - -- -- -- -- -- -- --   GLUCOSE 70 - 99 mg/dL 141(H) 184(H) 156(H) 91 126(H) 124(H) 135(H)   Some recent data might be hidden     ASSESSMENT AND PLAN    Jorje Teran is a 70 year old  hand dominant male with Stroke Ischemic 01.1 (L) Body Involvement (R) Brain Stroke; L sided weakness due to MCA infarct, R MCA occlusion, and R carotid atherosclerosis  1. PT, OT and SLP 60 minutes of each on a daily basis, in addition to rehab nursing and close management of physiatrist.       2. Impairment of ADL's: OT for 60 minutes daily to work on ADL re-training such as grooming, self cares and bathing.       3. Impairment of mobility:  PT for 60 minutes daily to work on neuromuscular re-education focusing on strength, balance, coordination, and endurance.       4. Impairment of cognition/language/swallow:  SLP for 60 minutes daily to work on cognitive and linguistic deficits.     Rehab RN for med administration, bowel regimen, glucose monitoring and wound care.    1. Medical Conditions  CVA with dense left hemiparesis  CT with filling defect within the distal R ICA extending into the R MCA and occlusion of the R MCA at the M1 segment. CTA with severe plaquing at R  carotid bifurcation with filling defect in the prox R ICA suggestive of thrombus contributing to approximately 80% stenosis. 60% stenosis of the proximal L ICA. TTE with bubble -> Normal left ventricular size and function. Left ventricular ejection fraction of 55-60%. No segmental wall motion abnormalities noted.   - Lipids 7/29/20- LDL 78, HDL 56, , T chol 164, hemoglobin A1c 7.1% H, TSH is 2.05.  - remains with significant dysarthria, facial droop and dense left sided hemiparesis  Spasticity: Stretching, ice + Off Baclofen as was noted to become drowsy. Better now. Some strength returning!  Add shoulder support to reduce subluxation. NMES etc.  - ASA 81 mg daily PO/MA  - atorvastatin 40 mg daily, Zetia 10 mg po daily    - BP management: Better. Avoiding lows.  - goal for SBPs < 160    Cr improved. BUN better.  Dysphagia  - currently on DD2 with nectar thickened liquids   -PEG tube to be placed on 9/22, currently patient is on tube feeding  If questions or problems arise regarding tube function (e.g. leaking, dislodges, etc.) Contact Interventional Radiology department 24 hours a day.   For procedures that were done at [facility]:  8 AM - 4 PM Monday through Friday Contact   [phone]  For afterhours and weekends: [phone]   Ask for the Interventional Radiologist on call.   Protonix 40 mg BID started 10/16/2020 as having heart burn on Pepcid, discontinued.    10/14: 926 kcal and 49 g protein (3 meals)     Adia Gamboa, RD, LD    PeG site Discomfort: Treat infection. Clean site. Added Keflex 500 mg qid x 7 days. Better.  Tylenol prn.  Simethicone added prn.    BS high. Endo following.  CAD s/p CABG x 3, x 1 in 2015  HTN continue medications noted above.  HLD: On Lipitor       DM II  Recent A1C at 7.1 7/2020.  BG goal is < 180.- started on lantus Endo following.     -BG QAC, HS 0200  - hypoglycemia protocol      US at [facility] negative for DVT     SHELDON  - states he could not sleep with CPAP as  outpatient  - he would benefit for treating SHELDON    Hypothyroidism  - resumed PTA Levothyroxine 50 mcg daily  TSH checked 7/29/2020 at 2.05     Morbid obesity  BMI noted  7/202 at ~38. Will need to encourage healthy lifestyle and weight loss with recovery     Diet:  With Meals  Changed to Thin     DVT Prophylaxis: Pneumatic Compression Devices  Code Status: Full Code      2. Adjustment to disability:  Clinical psychology to eval and treat if needed  3. FEN: On NDD 3 Thin   4. Bowel: Javed meds  5. Bladder: Monitor  6. GI Prophylaxis: Protonix.  7. Code: Full  8. Disposition: TCU  9. ELOS:  28 days 10/23/20,  Friday to TCU.  10. Rehab prognosis:  Fair  11. Follow up Appointments on Discharge:  On track for D/C to TCU Friday.  12. He needs follow up with neurology as scheduled. Follows with Dr. Vincent with [facility].        Sudarshan Fuentes MD   Physical Medicine & Rehabilitation

## 2020-10-22 NOTE — PLAN OF CARE
"Patient has participated with therapies today.  When in shower with OT, was able to pass stool, and he voided as well.  He could not pass urine prior to going to shower.  Bladder scan was done upon return, with result of 403.  Straight cath done. MD is aware of continued retention.  He has been started on an oral antibiotic.        Poor appetite at lunch meal.  He did not care for potatoes, meat and gravy. He requested to have fish, instead, and Cod was ordered for him.         He requires Liko lift for transfers from bed to wheelchair. Brace is on LUE, and he is wearing  Tubigrip. Currently sitting up in chair.  Has sneezed a few times, and has runny nose.  Allegra was started.     1530:  Patient did eat 3/4 of cod portion sent.  Upon return to bed, requested Tylenol for \"pain in my butt\".  He declined to be repositioned once the angle of the HOB was changed; he said that relieved the discomfort.  He was given Tylenol for this, at his request.  Note that he has difficulty swallowing meds with applesauce.  (per report this morning, that is how he takes them).  Noted this morning that he holds them in his mouth, (even when they are given individually with a spoon of applesauce).  He does  Not mention that he has not been able to swallow them until he is asked to open his mouth for a good look.   This morning, writer was able to get all of the meds in with applesauce, just slowly, with multiple tries per medication.  This afternoon, he was able to swallow one Tylenol tablet, but could not get the second one down.  As soon as it was in his mouth with the applesauce, the applesauce would be swallowed, but the pill would be to the front of his mouth, despite being near the tip of the spoon. Ended up needing to crush the second Tylenol tab, and administer in applesauce.  He did not like the taste.       Son is here, and packing up some of the patient belongings, in anticipation of his move tomorrow to [facility].   "

## 2020-10-23 VITALS
HEART RATE: 95 BPM | SYSTOLIC BLOOD PRESSURE: 158 MMHG | TEMPERATURE: 97.2 F | HEIGHT: 69 IN | OXYGEN SATURATION: 96 % | WEIGHT: 235.4 LBS | RESPIRATION RATE: 18 BRPM | DIASTOLIC BLOOD PRESSURE: 68 MMHG | BODY MASS INDEX: 34.87 KG/M2

## 2020-10-23 LAB
GLUCOSE BLDC GLUCOMTR-MCNC: 112 MG/DL (ref 70–99)
GLUCOSE BLDC GLUCOMTR-MCNC: 125 MG/DL (ref 70–99)

## 2020-10-23 PROCEDURE — 99239 HOSP IP/OBS DSCHRG MGMT >30: CPT | Performed by: PHYSICAL MEDICINE & REHABILITATION

## 2020-10-23 PROCEDURE — 250N000013 HC RX MED GY IP 250 OP 250 PS 637: Performed by: PHYSICAL MEDICINE & REHABILITATION

## 2020-10-23 PROCEDURE — 250N000013 HC RX MED GY IP 250 OP 250 PS 637: Performed by: INTERNAL MEDICINE

## 2020-10-23 PROCEDURE — 999N001017 HC STATISTIC GLUCOSE BY METER IP

## 2020-10-23 PROCEDURE — 99232 SBSQ HOSP IP/OBS MODERATE 35: CPT | Performed by: INTERNAL MEDICINE

## 2020-10-23 PROCEDURE — 250N000013 HC RX MED GY IP 250 OP 250 PS 637: Performed by: PHYSICIAN ASSISTANT

## 2020-10-23 RX ORDER — FUROSEMIDE 20 MG
40 TABLET ORAL DAILY
DISCHARGE
Start: 2020-10-23 | End: 2022-08-30

## 2020-10-23 RX ORDER — VALSARTAN 40 MG/1
80 TABLET ORAL DAILY
DISCHARGE
Start: 2020-10-23 | End: 2022-08-30

## 2020-10-23 RX ADMIN — PANTOPRAZOLE SODIUM 40 MG: 40 TABLET, DELAYED RELEASE ORAL at 07:40

## 2020-10-23 RX ADMIN — CIPROFLOXACIN HYDROCHLORIDE 500 MG: 500 TABLET, FILM COATED ORAL at 09:40

## 2020-10-23 RX ADMIN — AMLODIPINE BESYLATE 5 MG: 5 TABLET ORAL at 09:40

## 2020-10-23 RX ADMIN — VALSARTAN 40 MG: 40 TABLET, FILM COATED ORAL at 09:41

## 2020-10-23 RX ADMIN — METOPROLOL TARTRATE 50 MG: 50 TABLET, FILM COATED ORAL at 09:40

## 2020-10-23 RX ADMIN — FEXOFENADINE HCL 60 MG: 60 TABLET, FILM COATED ORAL at 09:39

## 2020-10-23 RX ADMIN — FUROSEMIDE 20 MG: 20 TABLET ORAL at 09:41

## 2020-10-23 RX ADMIN — SALINE NASAL SPRAY 1 SPRAY: 1.5 SOLUTION NASAL at 09:38

## 2020-10-23 RX ADMIN — LEVOTHYROXINE SODIUM 50 MCG: 25 TABLET ORAL at 09:40

## 2020-10-23 RX ADMIN — ATORVASTATIN CALCIUM 40 MG: 40 TABLET, FILM COATED ORAL at 09:39

## 2020-10-23 RX ADMIN — METFORMIN HYDROCHLORIDE 1000 MG: 500 TABLET ORAL at 09:40

## 2020-10-23 RX ADMIN — EZETIMIBE 10 MG: 10 TABLET ORAL at 09:40

## 2020-10-23 RX ADMIN — LIRAGLUTIDE 1.8 MG: 6 INJECTION SUBCUTANEOUS at 09:59

## 2020-10-23 ASSESSMENT — MIFFLIN-ST. JEOR: SCORE: 1813.15

## 2020-10-23 NOTE — DISCHARGE INSTRUCTIONS
Follow Up Appointments    - Primary Care  Please follow up with your primary care provider or a TCU provider within 2 weeks. If you wish to schedule an appointment with your primary care, please call [phone].    Address  [facility]                          [address] [place] [address]  Phone   [phone]    - Neurology  Please follow up with neurology in 4-6 weeks. You may call one of the following neurology clinics for an appointment or a clinic of your choice. Tell them you were recently hospitalized and need follow up as recommended at discharge.     1.            [facility]                 [address] [place] [address], [address]                 [place], [place] [address] [phone]  OR  2.            [facility]                 [address], [address]                 [place], [place] [address] [phone]    - PM&R  Please follow up with Dr. Fuentes in PM&R within 8 weeks. Please call the care coordinator Sandra at [phone] to schedule an appointment.    Address  [facility] [place]                          Physical Medicine and Rehabilitation Clinic                          [address] [place] [address]  Phone   [phone]    -------------------------    PEG tube placed on 9/22  If questions or problems arise regarding tube function (e.g. leaking, dislodges, etc.) Contact Interventional Radiology department 24 hours a day.   For procedures that were done at [facility]:  8 AM - 4 PM Monday through Friday Contact   [phone]  For afterhours and weekends: [phone]  -Ask for the Interventional Radiologist on call.       [place] [facility] [place] [facility] [employer]  Additional resources and contact information online   [email or web address]  PH: [phone]    To reduce the risk of subsequent stroke there are several important factors  "including optimal management of anticoagulants, blood pressure, cholesterol, diabetes and smoking abstinence.    Anticoagulation:  You are on Aspirin    Blood Pressure:  Keeping your blood pressures less than 130/80 has been shown to reduce risk of recurrent stroke. Recording your blood pressure and heart rate twice daily in a log book can help you and your providers make decisions on optimal management. You are encouraged to bring your log book with you to your primary physician and/or cardiology doctor visits.    You are currently on amlodipine, valsartan, lasix to help control your blood pressure. Several lifestyle modifications have been associated with blood pressure reduction and are an important part of a comprehensive plan. These include: weight loss (if over-weight); a diet low in salt and cholesterol and rich in fruits and vegetables; regular aerobic physical activity and limited alcohol consumption.    Diabetes:  Optimal management of diabetes not only reduces risk of another stroke, it can help with healing process from your recent stroke. Blood glucose levels measure how well you're controlling your diabetes. An additional test \"hemoglobin A1C\" reflects how you have been overall with glucose control in the prior few months. This should be less than 7 though even lower below 6 is considered normal. Your recent Hgb A1C was [insert A1C]. This should be followed up with your primary provider and/or endocrinologist.    Your present plan is to check blood glucose consistently Before Meals and at Bedtime. These should be recorded along with date/time and any relevant notes such as food consumed, insulin/medication taken etc. This helps you and your providers make decisions on optimal management. You're encouraged to bring you log book with to your primary and/or endocrinology doctor visits.  Your current medications include Metformin (Glucophage), victoza, . Specific doses and frequencies listed elsewhere. " "    Diet:  Diet and exercise are very important for diabetes regardless of being on medication or not. Be aware of and moderate your carbohydrate intake as instructed by doctor, dietitian or nurse.  Regular physical activity may be more difficult since your stroke though doing what you can on a daily basis is helpful.     Cholesterol:  Traditional target levels for LDL cholesterol or \"bad cholesterol\" is less than 130 however once you have had a stroke, your target LDL level is now less than 70. Additional [place]s such as increasing your HDL or \"good\" cholesterol and lowering your triglyceride level can also be important.    Your most recent lipid panel was   Lab Results   Component Value Date    CHOL 119 09/10/2020     Lab Results   Component Value Date    HDL 45 09/10/2020     Lab Results   Component Value Date    LDL 53 09/10/2020     Lab Results   Component Value Date    TRIG 105 09/10/2020     This should be followed up in 2-3 months with your primary provider.    Smoking:  Finally one of the most important modifiable risk factors is to not smoke. This includes cigarettes, pipes, cigars, chewing tobacco and second hand smoke. Support through counseling, nicotine replacement, and oral smoking-cessation medications may all be helpful. Often people have been able to quit during their hospitalization but once returning to their familiar environment, the urges can be stronger. If this is the case, we encourage you to get support. There are numerous options, start by talking with your doctor.      "

## 2020-10-23 NOTE — PLAN OF CARE
Oriented to self, can be disoriented to all else. Continent and incontinent of bladder, continent of bowel. LBM 10/22. Voided twice in urinal, but still retaining urine. Pt requested we wait to straight cath initially, but allowed this writer to cath when bladder scan reached 313mL. 250mL of urine per straight cath at 2112. Transfers with Geoff lift. Blood glucose checks. Pt ate about 1/4 of dinner. No tube feeds but free water flushes are scheduled. Meds given in G-tube, as pt dislikes the taste of crushed medications. G-tube site cleaned and re-dressed. Had some pain in buttocks and L shoulder, but both improved with repositioning. Bed alarm on for safety, call light within reach, Ok to continue with care plan.

## 2020-10-23 NOTE — PLAN OF CARE
FOCUS/GOAL  Bladder management and Mobility    ASSESSMENT, INTERVENTIONS AND CONTINUING PLAN FOR GOAL:  A/O to person/place/situation, aware of month and year but not sure of exact day. Liko lift with ao2 for transfers, was up in chair this morning for breakfast. Pt was unable to swallow enteric coated ASA this morning, remaining meds crushed and Dr Gonzalez notified; ordered ASA suspension to be given, but medication did not arrive prior to discharge and was not given. Orders received for discharge today to [facility]. Pt was incontinent of urine x1 this morning, random bladder scan just prior to discharge was >200ml (actual reading on machine). Dr Gonzalez notified, reported that pt wouldn't need ISC prior to discharge and to communicate to TCU. Pt left ARU via [facility] transport by stretcher, telephone report given to Vianney, nurse at TCU (also informed her that ASA had not been administered today). Had all personal belongings when he left the unit.

## 2020-10-23 NOTE — PROGRESS NOTES
[facility]     Medicine Progress Note - Hospitalist Service       Date of Admission:  9/24/2020  Assessment & Plan         Jorje Teran is a 70 year old  hand dominant male with Stroke Ischemic with (L) Body Involvement (R) Brain Stroke; L sided weakness due to MCA infarct, R MCA occlusion, and R carotid atherosclerosis.    Changes today 10/23/2020:     Increase valsartan 80 mg daily  Changed Lasix to 40 mg daily   Patient still requiring intermittent straight cath for high PVR. Keep encouraging double void. May place albarado for 3-4 days, if PVR remains above 300 after double void.   Ct treatment for UTI.   No other changes.   Discharge med updated.   dw PMR provider.     Issues:     # VEENA: VEENA secondary to hypovolemia and  hypotension (secondary to decreased intake with discontinuation tube feedings, concurrent use of furosemide, Entresto, valsartan, amlodipine), now resolved with IV fluids,  resumption of scheduled TF, holding of furosemide, entresto, diovan and reduction in Metoprolol and Amlodipine.  Renal function now back to baseline.     # Decreased level of consciousness, likely secondary to acute kidney injury, relatively low blood pressure and recent initiation of baclofen.  Baclofen is metabolized by the kidneys.  Baclofen currently on hold.   Mental status improved, appears at baseline  - Monitor     # Cough with Temp 99.3 on 10/12: Oxygen sat is normal. 02 sats on RA this am.  At risk for atelectasis, aspiration pneumonia. Cough may also be related to volume overload, secondary to IV fluids, temporarily holding of lasix  Remains afebrile. No cough.   - Encourage IS     # Recent right hemispheric CVA with left-sided weakness, on aspirin and statin  - Continue Aspirin, Statin  -  PT/OT     # History of hypertension and diastolic CHF,  controlled in outpatient setting on  Furosemide 40 mg bid, Amlodipine 20 mg daily, Metoprolol XL 25 mg bid (changed to short  acting Metoprolol 50 mg bid during recent hospitalization), Entresto 49/51, 1 tab daily, Valsartan 320 mg daily.  Goal -140/ in post CVA setting, history of diastolic CHF.   Has been restarted on Metoprolol 50 mg bid, amlodipine 5 mg bid  BP better.     - Continue Amlodipine 5 mg BID, Metoprolol 50 mg BID  - Continue Furosemide 40 mg daily (home dose 40 mg BID)  - Continue to monitor on titrate  - Entresto/ valsartan, was on Hold  - Restarted Valsartan at 40 mg daily on 10/18  10/23/2020: increase to 80 mg daily.      * Titrate bp medications as needed.     # History of CAD:  On ASA and statin, B Blocker  - Continue Aspirin, Metoprolol, Statin     # Hyponatremia, mild, likely secondary to resumed TF and continued water flushes (initiated when TF recently discontinued and Pt was started on nectar thick liquids), hyperglycemia, resolved with decrease in free water administration  Resolved.   - Continue to monitor.     # Diabetes mellitus type 2, labile, in setting of decreased intake, now resumption of TF  On Insulin Isophane 18 units in AM, 22 units in evening, Insulin Aspart pre meal 1:10 CHO, Insulin Aspart correction 1:25, Liraglutide 0.6 daily, and Metformin 500 mg BID  Glucose fairly controlled.   - Diabetes team following. Appreciate management    Recent Labs   Lab 10/23/20  0747 10/23/20  0235 10/22/20  2109 10/22/20  1713 10/22/20  1146 10/22/20  0814 10/19/20  0606 10/19/20  0606   GLC  --   --   --   --   --   --   --  151*   * 125* 137* 110* 113* 135*   < >  --     < > = values in this interval not displayed.        # Anemia: Hg 10.3 gm/dl. Likely has anemia of chronic disease  - Follow up with primary care for further workup      # Urinary retention: Intermittent straight catheterization prn. Check UA. UC.   - Urology consult prn.   - UA c.w UTI. Started on Nitrofurantoin.   - 10/22/2020: UC Klebsiella Pneumonia, hypermucoviscous. Antibiotics changed to Cipro 500 bid x 5 more days.   - PVR  improving. Ct to monitor. Encourage double void. If PVR >300 after double void. Consider ISC, if persistent, consider albarado catheterization.      Rest per primary.     The patient's care was discussed with the Patient and RN. .  jam[facility] PMR team.    Hernandez Gonzalez MD  Hospitalist Service  [facility]   Contact information available via [facility] Paging/Directory    ______________________________________________________________________    Interval History   Urinary retention+ improving. However still with high PVR requiring frequent ISC.  No other new concern.   Denies fever or chills. No cough or cp or sob. No LH or dizziness. No NV.      Data reviewed today: I reviewed all medications, new labs and imaging results over the last 24 hours. I personally reviewed no images or EKG's today.    Physical Exam   Vital Signs: Temp: 97.2  F (36.2  C) Temp src: Oral BP: (!) 158/68 Pulse: 95   Resp: 18 SpO2: 96 % O2 Device: None (Room air)    Weight: 235 lbs 6.4 oz  General Appearance: Alert. nad  Respiratory: Non labored.   Cardiovascular: RRR  GI: soft. NT. PEG+.    Skin: no acute rash  Other: Distally wwp. bl pedal edema 1+       Data   Recent Labs   Lab 10/21/20  0648 10/19/20  0606 10/18/20  0639   WBC  --  8.1  --    HGB  --  10.3*  --    MCV  --  94  --     225 232   NA  --  137  --    POTASSIUM  --  3.8  --    CHLORIDE  --  102  --    CO2  --  25  --    BUN  --  22  --    CR  --  0.76  --    ANIONGAP  --  10  --    SELENA  --  8.4*  --    GLC  --  151*  --    ALBUMIN  --  2.7*  --    PROTTOTAL  --  6.9  --    BILITOTAL  --  0.9  --    ALKPHOS  --  76  --    ALT  --  29  --    AST  --  19  --      No results found for this or any previous visit (from the past 24 hour(s)).  Medications     dextrose       - MEDICATION INSTRUCTIONS -         amLODIPine  5 mg Oral BID     aspirin  80 mg Oral Daily     atorvastatin  40 mg Oral Daily     ciprofloxacin  500 mg Oral Q12H TORI     ezetimibe   10 mg Oral Daily     fexofenadine  60 mg Oral Daily     furosemide  20 mg Oral or Feeding Tube BID     insulin aspart  1-7 Units Subcutaneous BID     insulin aspart  1-10 Units Subcutaneous TID AC     levothyroxine  50 mcg Oral Daily     liraglutide  1.8 mg Subcutaneous Daily     metFORMIN  1,000 mg Oral BID w/meals     metoprolol tartrate  50 mg Oral BID     multivitamins w/minerals  15 mL Oral Daily     pantoprazole  40 mg Oral BID AC     sodium chloride  1 spray Both Nostrils 4x daily     valsartan  40 mg Oral Daily

## 2020-10-23 NOTE — PLAN OF CARE
FOCUS/GOAL  Bowel/Bladder management, Medication/Pain management, Mobility, Skin integrity, and Safety management     ASSESSMENT, INTERVENTIONS AND CONTINUING PLAN FOR GOAL:  A/O. VSS. PEG flushed 160 ml. L hemiparesis. Incontinent of bladder/bowel, last BM 10/22. Straight cath output 450 ml after 371 PVR. Ax2, turn and reposition. Liko lift for transfers. No c/o pain, n/v/d, cp. Occasional cough- nonproductive.  Takes pills crushed through PEG. AM nurse noted check pocketing. Continue w/ POC. Discharge to [facility] today 10/23/20.

## 2020-10-23 NOTE — DISCHARGE SUMMARY
[facility]   Acute Rehabilitation Unit  Discharge summary     Date of Admission: 9/24/2020  Date of Discharge: 10/23/2020   Disposition: TCU  Primary Care Physician: Susana Esparza  Attending physician: Sudarshan Fuentes MD  Other significant physician provider(s): Hernandez Gonzalez MD Internist; Vale Funes PA-C Endocrine;       discharge diagnosis  Stroke Ischemic 01.1 (L) Body Involvement (R) Brain Stroke; L sided weakness due to MCA infarct, R MCA occlusion, and R carotid atherosclerosis    CVA with dense left hemiparesis     Dysphagia    CAD s/p CABG x 3, x 1 in 2015    HTN     HLD    DM II     SHELDON    Hypothyroidism     Morbid obesity  BMI noted  7/202 at ~38. Will need to encourage healthy lifestyle and weight loss with recovery    brief summary    Jorje Teran is a 69 y/o male with past medical history of HTN, HLD, DM II, SHELDON, hypothyroidism, and morbid obesity who was found down by his son after not hearing from his father throughout the day. The patient was unable to move his L side, and EMS was activated. Pt was found to have a R MCA infarct and R carotid atherosclerosis. The patient was not appropriate for tPA or endovascular treatment as the infarct was already established. His hospital course is notable for dysphagia, BP management needs, hypernatremia (resolved), DM II management, and extensive therapies. He had PEG placed on 9/22/2020, and is now medically ready for discharge to [facility] for intensive therapies, ongoing medical management, and rehabilitative nursing care.  Patient requires an intensive inpatient rehab program to address the following acute impairments:impaired ROM, impaired strength, impaired activity tolerance, impaired tone, impaired balance, impaired coordination, impaired cognition, impaired judgement and safety awareness, impulsiveness, impaired weight shifting, dysphagia, aphasia and dysarthria     During his acute  hospitalization, patient was seen and evaluated by  PT, OT, and SLP.  All specialties collectively recommended that patient would benefit from ongoing therapies in the acute inpatient rehabilitation setting.      The patient currently performs bed mobility with Max A x 2 and bed rail. Once sitting at EOB the pt is able to sit with anywhere from SBA/CGA, to Mod A x 2 depending on fatigue and task. The patient is able to use Gretchen Stedy with Max A x 2, however unable to achieve a full stand. The patient is currently dependent on mechanical lift for bed to and from chair. The patient's alertness and participation is improving and he will tolerate intensive therapies in the [facility] setting. He is able to follow 2 step directions with 85%, and intelligibility with short 2-3 word phrases needs mod/max cues to increase breath support      rehabilitaiton course    Discharged to transitional care facility.     Progress towards therapy goal(s). See goals on Care Plan in [employer] electronic health record for goal details.        Therapy [place](s):    Continued therapy is recommended.  Rationale/Recommendations:  Patient making slow, steady progress w/ recovery from (R) CVA. Has dense hemiplegia w/ shoulder subluxation, field cut, and contralateral pushing. Admit PASS 1/36, current 8/36. Shoulder management w/ lap tray, pillow for elevation, tubigrip for edema, and daily NMES. Sitting posture w/ pelvis skewed to (L) and (R) trunk shortening. Has been using [employer] cushion and positioning back. Unable to engage trunk to assist w/ repositioning. Stands next to a wall w/ skilled assist of 3, video on his son's phone. Family has script and protocol for NMES and will purchase for ongoing use.  Anticipate progress to include transfer assist w/ one and w/c based mobility.    Discharged to transitional care facility.     Progress towards therapy goal(s). See goals on Care Plan in [employer] electronic health record for goal  "details.  Goals partially met.  Barriers to achieving goals:   limited tolerance for therapy.  Pt needing longer stay to increase I with adls  Therapy [place](s):    Continued therapy is recommended.  Rationale/Recommendations:  to increase g/h ub and l/b dressing, bed mobility transfers and increase awareness to l side and increase strength decrease pain  l u/e as able.    Discharged to transitional care facility.     Progress towards therapy goal(s). See goals on Care Plan in [facility] electronic health record for goal details.  Goals partially met.  Barriers to achieving goals:   discharge from facility.     Therapy [place](s):    Continued therapy is recommended.  Rationale/Recommendations:  address dysphagia, cognition and dysarthria related goals. Patient has been able to show steady progress over course of rehab stay. Tolerating NDD-3 food textures and thin liquids. Good potential to advance to regular as patient improving in level of independence in his meals. Initially full assisted feeding required but now just general reminders being provided. Left side neglect evident but significantly improving. Dysarthria also evident but benefits from cue to use \"loud and proud\" speech. Significantly improved intelligibility resulting. Some difficulties noted with initiation and reasoning/problem solving though addressed minimally due to initial severity of swallowing and dysarthria impairments. Did complete most recent VFSS on 10/19 which showed no aspiration. Mild increased risk when taking larger boluses or in quicker succession. Does have difficulties with managing secretions at times which results in aspiration. Per family report, this particular component is not new to patient and previously treated with allergy medications.       mEDICAL COURSE    Jorje eTran is a 70 year old  hand dominant male with Stroke Ischemic with (L) Body Involvement (R) Brain Stroke; L sided weakness due to MCA infarct, R " MCA occlusion, and R carotid atherosclerosis.    CVA with dense left hemiparesis  CT with filling defect within the distal R ICA extending into the R MCA and occlusion of the R MCA at the M1 segment. CTA with severe plaquing at R carotid bifurcation with filling defect in the prox R ICA suggestive of thrombus contributing to approximately 80% stenosis. 60% stenosis of the proximal L ICA. TTE with bubble -> Normal left ventricular size and function. Left ventricular ejection fraction of 55-60%. No segmental wall motion abnormalities noted.   - Lipids 7/29/20- LDL 78, HDL 56, , T chol 164, hemoglobin A1c 7.1% H, TSH is 2.05.  - remains with significant dysarthria, facial droop and dense left sided hemiparesis  Spasticity: Stretching, ice + Off Baclofen as was noted to become drowsy. Better now. Some strength returning!  Add shoulder support to reduce subluxation. NMES etc.  - ASA 81 mg daily PO  - atorvastatin 40 mg daily, Zetia 10 mg po daily     # VEENA: VEENA secondary to hypovolemia and  hypotension (secondary to decreased intake with discontinuation tube feedings, concurrent use of furosemide, Entresto, valsartan, amlodipine), now resolved with IV fluids,  resumption of scheduled TF, holding of furosemide, entresto, diovan and reduction in Metoprolol and Amlodipine.  Renal function now back to baseline.     # Decreased level of consciousness, likely secondary to acute kidney injury, relatively low blood pressure and recent initiation of baclofen.  Baclofen is metabolized by the kidneys.  Baclofen currently on hold.   Mental status improved, appears at baseline  - Monitor     # Cough with Temp 99.3 on 10/12: Oxygen sat is normal. 02 sats on RA this am.  At risk for atelectasis, aspiration pneumonia. Cough may also be related to volume overload, secondary to IV fluids, temporarily holding of lasix  Remains afebrile.   - Continue to monitor  - Low threshold for X ray chest     # Recent right hemispheric CVA with  left-sided weakness, on aspirin and statin  - Continue Aspirin, Statin  -  PT/OT     # History of hypertension and diastolic CHF,  controlled in outpatient setting on  Furosemide 40 mg bid, Amlodipine 20 mg daily, Metoprolol XL 25 mg bid (changed to short acting Metoprolol 50 mg bid during recent hospitalization), Entresto 49/51, 1 tab daily, Valsartan 320 mg daily.  Goal -140/ in post CVA setting, history of diastolic CHF.   Has been restarted on Metoprolol 50 mg bid, amlodipine 5 mg bid  BP better.      - Continue Amlodipine 5 mg BID, Metoprolol 50 mg BID  - Continue Furosemide 20 mg BID (home dose 40 mg BID)  - Continue to monitor on titrate  - Entresto/ valsartan, was on Hold  - Restarted Valsartan at 40 mg daily on 10/18      * Titrate bp medications as needed.          Wt Readings from Last 5 Encounters:   10/22/20 108.9 kg (240 lb)   09/23/20 109.1 kg (240 lb 8 oz)      # History of CAD:  On ASA and statin, B Blocker  - Continue Aspirin, Metoprolol, Statin     # Hyponatremia, mild, likely secondary to resumed TF and continued water flushes (initiated when TF recently discontinued and Pt was started on nectar thick liquids), hyperglycemia, resolved with decrease in free water administration  Na level 137 on 10/19.   - Continue to monitor.     # Diabetes mellitus type 2, labile, in setting of decreased intake, now resumption of TF  On Insulin Isophane 18 units in AM, 22 units in evening, Insulin Aspart pre meal 1:10 CHO, Insulin Aspart correction 1:25, Liraglutide 0.6 daily, and Metformin 500 mg BID  Glucose fairly controlled.   - Diabetes team following. Appreciate management                  Recent Labs   Lab 10/22/20  0814 10/22/20  0134 10/21/20  2307 10/21/20  1827 10/21/20  1131 10/21/20  0811 10/19/20  0606 10/19/20  0606 10/16/20  0621 10/16/20  0621   GLC  --   --   --   --   --   --   --  151*  --  151*   * 124* 126* 91 156* 184*   < >  --    < >  --     < > = values in this interval not  displayed.         # Anemia: Hg 10.3 gm/dl. Likely has anemia of chronic disease  - Follow up with primary care for further workup        # Urinary retention: Intermittent straight catheterization prn. Check UA. UC.   - Urology consult prn.   - UA c.w UTI. Started on Nitrofurantoin.   - 10/22/2020: UC Klebsiella Pneumonia, hypermucoviscous. Antibiotics changed to Cipro 500 bid x 5 more days.   - PVR improving. Ct to monitor. Encourage double void. If PVR >300 after double void. Consider ISC, if persistent, consider albarado catheterization.       dISCHARGE MEDICATIONS  Current Discharge Medication List      START taking these medications    Details   acetaminophen (TYLENOL) 325 MG tablet Take 2 tablets (650 mg) by mouth every 4 hours as needed for mild pain or fever  Qty:      Associated Diagnoses: Right middle cerebral artery stroke (H)      aspirin 10 mg/mL SUSP Take 8 mLs (80 mg) by mouth daily  Qty:      Associated Diagnoses: 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 (H)      ciprofloxacin (CIPRO) 500 MG tablet Take 1 tablet (500 mg) by mouth every 12 hours for 4 days    Associated Diagnoses: UTI (urinary tract infection), uncomplicated      fexofenadine (ALLEGRA) 60 MG tablet Take 1 tablet (60 mg) by mouth daily  Qty:      Associated Diagnoses: Post-nasal drip      liraglutide (VICTOZA) 18 MG/3ML solution Inject 1.8 mg Subcutaneous daily  Qty:      Associated Diagnoses: Controlled type 2 diabetes mellitus without complication, without long-term current use of insulin (H)      metFORMIN (GLUCOPHAGE) 1000 MG tablet Take 1 tablet (1,000 mg) by mouth 2 times daily (with meals)  Qty:      Associated Diagnoses: Controlled type 2 diabetes mellitus without complication, without long-term current use of insulin (H)      pantoprazole (PROTONIX) 2 mg/mL SUSP suspension Take 20 mLs (40 mg) by mouth 2 times daily (before meals)  Qty:      Associated Diagnoses: Gastroesophageal reflux disease  without esophagitis         CONTINUE these medications which have CHANGED    Details   amLODIPine (NORVASC) 5 MG tablet Take 1 tablet (5 mg) by mouth 2 times daily  Qty:      Associated Diagnoses: Essential hypertension      furosemide (LASIX) 20 MG tablet 2 tablets (40 mg) by Oral or Feeding Tube route daily    Comments: Hold for sbp<110  Associated Diagnoses: Essential hypertension      valsartan (DIOVAN) 40 MG tablet Take 2 tablets (80 mg) by mouth daily    Comments: Hold for sbp<110  Associated Diagnoses: Essential hypertension         CONTINUE these medications which have NOT CHANGED    Details   atorvastatin (LIPITOR) 80 MG tablet Take 40 mg by mouth daily       bisacodyl (DULCOLAX) 10 MG suppository Place 1 suppository (10 mg) rectally daily as needed for constipation  Qty:      Associated Diagnoses: 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 (H)      carboxymethylcellulose PF (REFRESH PLUS) 0.5 % ophthalmic solution Apply 2 drops to eye every hour as needed for dry eyes  Qty:      Associated Diagnoses: 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 (H)      ezetimibe (ZETIA) 10 MG tablet Take 10 mg by mouth daily       levothyroxine (SYNTHROID/LEVOTHROID) 50 MCG tablet Take 50 mcg by mouth daily       metoprolol tartrate (LOPRESSOR) 50 MG tablet Take 1 tablet (50 mg) by mouth 2 times daily  Qty:      Associated Diagnoses: 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 (H)      miconazole (MICATIN) 2 % external powder Apply topically every hour as needed for other (topical candidiasis)  Qty:      Associated Diagnoses: 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 (H)      multivitamin (CENTRUM SILVER) tablet Take 1 tablet by mouth daily      nitroGLYcerin (NITROSTAT) 0.4 MG sublingual tablet Place 0.4 mg under the tongue every 5 minutes as needed       polyethylene glycol (MIRALAX) 17 g packet Take 17  g by mouth 2 times daily as needed (constipation)  Qty:      Associated Diagnoses: 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 (H)         STOP taking these medications       aspirin 81 MG EC tablet Comments:   Reason for Stopping:         bacitracin 500 UNIT/GM OINT Comments:   Reason for Stopping:         insulin aspart (NOVOLOG PEN) 100 UNIT/ML pen Comments:   Reason for Stopping:         insulin aspart (NOVOLOG PEN) 100 UNIT/ML pen Comments:   Reason for Stopping:         insulin glargine (LANTUS PEN) 100 UNIT/ML pen Comments:   Reason for Stopping:         insulin glargine (LANTUS PEN) 100 UNIT/ML pen Comments:   Reason for Stopping:         nystatin (MYCOSTATIN) 093677 UNIT/ML suspension Comments:   Reason for Stopping:         sacubitril-valsartan (ENTRESTO) 49-51 MG per tablet Comments:   Reason for Stopping:         traZODone (DESYREL) 50 MG tablet Comments:   Reason for Stopping:                 DISCHARGE INSTRUCTIONS AND FOLLOW UP  Discharge Procedure Orders   General info for SNF   Order Comments: Length of Stay Estimate: Short Term Care: Estimated # of Days <30  Condition at Discharge: Stable  Level of care:skilled   Rehabilitation Potential: Fair  Admission H&P remains valid and up-to-date: Yes  Recent Chemotherapy: N/A  Use Nursing Home Standing Orders: Yes     Mantoux instructions   Order Comments: Give two-step Mantoux (PPD) Per Facility Policy Yes     Reason for your hospital stay   Order Comments: Admitted for rehabilitation following stroke.     Glucose monitor nursing POCT   Order Comments: Before meals and at bedtime     Bladder scan   Order Comments: X 2 for post void residual     Activity - Up with nursing assistance     Order Specific Question Answer Comments   Is discharge order? Yes      Follow Up and recommended labs and tests   Order Comments: Primary care as scheduled. Neurology 4-6 weeks follow up stroke. PM&R follow up stroke recovery.     Gastrostomy care      Free water   Order Comments: Flush water Per G tube 160 ml 4 times daily. (5 am, 11 am 4 pm, 9 pm)     Follow Up   Order Comments: -PEG tube  placed on 9/22,   Contact Interventional Radiology department at [facility]:  8 AM - 4 PM Monday through Friday Contact   [phone]  For afterhours and weekends: [phone]   discontinue PEG 8 weeks post placement, as no longer on TF, and eating well.     Full Code     Order Specific Question Answer Comments   Code status determined by: Discussion with patient/ legal decision maker      Physical Therapy Adult Consult   Order Comments: Evaluate and treat as clinically indicated.    Reason:  Impaired strength, mobility     Occupational Therapy Adult Consult   Order Comments: Evaluate and treat as clinically indicated.    Reason:   dense left sided hemiparesis     Speech Language Path Adult Consult   Order Comments: Evaluate and treat as clinically indicated.    Reason:  Dysarthria dysphagia     Nutrition Services Adult IP Consult   Order Comments: Reason:  Impaired oral intake. Previously on TF for nutrition, now diet advanced to DD3 : recommend calorie counts with supplements and restart TF if indicated     Fall precautions     Advance Diet as Tolerated   Order Comments: Follow this diet upon discharge:  Dysphagia Diet Level 3: Advanced; Thin Liquids (water, ice chips, juice, milk gelatin, ice cream, etc)     Order Specific Question Answer Comments   Is discharge order? Yes           physical examination    Most recent Vital Signs:   Vitals:    10/22/20 2030 10/23/20 0600 10/23/20 0749 10/23/20 0940   BP: (!) 145/68  (!) 155/74 (!) 158/68   BP Location: Right arm  Right arm    Pulse: 89  98 95   Resp:   18    Temp:   97.2  F (36.2  C)    TempSrc:   Oral    SpO2:   96%    Weight:  106.8 kg (235 lb 6.4 oz)     Height:           Alert. Sitting better. Verbalizing at baseline now.   Respiratory: Clear to auscultation bilaterally, no crackles or  wheezing  Cardiovascular: S1 and S2, RRR, and no murmur noted  GI: soft, non-distended, non-tender. G tube intact + BS +  Skin/Integumen: No rashes, trace lower extremity edema noted  Neuro : Left lower Facial droop + Left Field cut +  Dense left-sided hemiparesis with neglect noted. He is able to adduct the UE partly 2-/5, able to bring knees together, 2-  MSK: Left shoulder subluxation +,  Tone +. Reflexes +     Vocalizes, low volume   Extremities. Moves right side well. Edema +      Discharge summary was forwarded to Susana Esparza (PCP) at the time of discharge, so as to bridge from hospital to outpatient care.     It was our pleasure to care for Jorje Teran during this hospitalization. Please do not hesitate to contact me should there be questions regarding the hospital course or discharge plan.      Sudarshan Fuentes MD   Physical Medicine & Rehabilitation      I, Sudarshan Fuentes MD, saw and evaluated this patient prior to discharge. 35 minutes spent in discharge, including >50% in counseling and coordination of care, medication review and plan of care recommended on follow up.

## 2020-10-28 ENCOUNTER — DOCUMENTATION ONLY (OUTPATIENT)
Dept: CARE COORDINATION | Facility: CLINIC | Age: 70
End: 2020-10-28

## 2020-10-29 ENCOUNTER — HOSPITAL ENCOUNTER (OUTPATIENT)
Facility: CLINIC | Age: 70
End: 2020-10-29
Admitting: RADIOLOGY
Payer: COMMERCIAL

## 2021-01-01 NOTE — PROGRESS NOTES
Pt placed on CPAP + 5 and 21% FiO2. Appropriate volumes noted. Will re-assess as needed.     Amador Riddle, RT    Male

## 2022-06-20 ENCOUNTER — LAB REQUISITION (OUTPATIENT)
Dept: LAB | Facility: CLINIC | Age: 72
End: 2022-06-20
Payer: COMMERCIAL

## 2022-06-20 DIAGNOSIS — E78.2 MIXED HYPERLIPIDEMIA: ICD-10-CM

## 2022-06-20 DIAGNOSIS — R60.0 LOCALIZED EDEMA: ICD-10-CM

## 2022-06-20 DIAGNOSIS — I10 ESSENTIAL (PRIMARY) HYPERTENSION: ICD-10-CM

## 2022-06-20 DIAGNOSIS — E03.8 OTHER SPECIFIED HYPOTHYROIDISM: ICD-10-CM

## 2022-06-20 LAB
ALBUMIN SERPL-MCNC: 2.8 G/DL (ref 3.5–5)
ALP SERPL-CCNC: 60 U/L (ref 45–120)
ALT SERPL W P-5'-P-CCNC: 12 U/L (ref 0–45)
AST SERPL W P-5'-P-CCNC: 18 U/L (ref 0–40)
BASOPHILS # BLD AUTO: 0.1 10E3/UL (ref 0–0.2)
BASOPHILS NFR BLD AUTO: 1 %
BILIRUB DIRECT SERPL-MCNC: 0.3 MG/DL
BILIRUB SERPL-MCNC: 0.7 MG/DL (ref 0–1)
BNP SERPL-MCNC: 407 PG/ML (ref 0–72)
CHOLEST SERPL-MCNC: 100 MG/DL
CHOLEST SERPL-MCNC: 100 MG/DL
EOSINOPHIL # BLD AUTO: 0.2 10E3/UL (ref 0–0.7)
EOSINOPHIL NFR BLD AUTO: 3 %
ERYTHROCYTE [DISTWIDTH] IN BLOOD BY AUTOMATED COUNT: 14.2 % (ref 10–15)
FASTING STATUS PATIENT QL REPORTED: ABNORMAL
FASTING STATUS PATIENT QL REPORTED: ABNORMAL
HCT VFR BLD AUTO: 27.3 % (ref 40–53)
HDLC SERPL-MCNC: 25 MG/DL
HDLC SERPL-MCNC: 25 MG/DL
HGB BLD-MCNC: 8.6 G/DL (ref 13.3–17.7)
IMM GRANULOCYTES # BLD: 0 10E3/UL
IMM GRANULOCYTES NFR BLD: 0 %
LDLC SERPL CALC-MCNC: 59 MG/DL
LDLC SERPL CALC-MCNC: 59 MG/DL
LYMPHOCYTES # BLD AUTO: 1 10E3/UL (ref 0.8–5.3)
LYMPHOCYTES NFR BLD AUTO: 16 %
MCH RBC QN AUTO: 28.5 PG (ref 26.5–33)
MCHC RBC AUTO-ENTMCNC: 31.5 G/DL (ref 31.5–36.5)
MCV RBC AUTO: 90 FL (ref 78–100)
MONOCYTES # BLD AUTO: 0.3 10E3/UL (ref 0–1.3)
MONOCYTES NFR BLD AUTO: 6 %
NEUTROPHILS # BLD AUTO: 4.4 10E3/UL (ref 1.6–8.3)
NEUTROPHILS NFR BLD AUTO: 74 %
NRBC # BLD AUTO: 0 10E3/UL
NRBC BLD AUTO-RTO: 0 /100
PLATELET # BLD AUTO: 219 10E3/UL (ref 150–450)
PROT SERPL-MCNC: 6.2 G/DL (ref 6–8)
RBC # BLD AUTO: 3.02 10E6/UL (ref 4.4–5.9)
RETICS # AUTO: 0.03 10E6/UL (ref 0.01–0.11)
RETICS/RBC NFR AUTO: 0.8 % (ref 0.8–2.7)
TRIGL SERPL-MCNC: 78 MG/DL
TRIGL SERPL-MCNC: 78 MG/DL
TSH SERPL DL<=0.005 MIU/L-ACNC: 1.2 UIU/ML (ref 0.3–5)
WBC # BLD AUTO: 6 10E3/UL (ref 4–11)

## 2022-06-20 PROCEDURE — 84443 ASSAY THYROID STIM HORMONE: CPT | Mod: ORL

## 2022-06-20 PROCEDURE — 85045 AUTOMATED RETICULOCYTE COUNT: CPT | Mod: ORL

## 2022-06-20 PROCEDURE — 80061 LIPID PANEL: CPT | Mod: ORL

## 2022-06-20 PROCEDURE — 85060 BLOOD SMEAR INTERPRETATION: CPT | Performed by: PATHOLOGY

## 2022-06-20 PROCEDURE — 36415 COLL VENOUS BLD VENIPUNCTURE: CPT | Mod: ORL

## 2022-06-20 PROCEDURE — 85025 COMPLETE CBC W/AUTO DIFF WBC: CPT | Mod: ORL

## 2022-06-20 PROCEDURE — P9604 ONE-WAY ALLOW PRORATED TRIP: HCPCS | Mod: ORL

## 2022-06-20 PROCEDURE — 80076 HEPATIC FUNCTION PANEL: CPT | Mod: ORL

## 2022-06-20 PROCEDURE — 83880 ASSAY OF NATRIURETIC PEPTIDE: CPT | Mod: ORL

## 2022-06-21 LAB
PATH REPORT.COMMENTS IMP SPEC: NORMAL
PATH REPORT.COMMENTS IMP SPEC: NORMAL
PATH REPORT.FINAL DX SPEC: NORMAL
PATH REPORT.RELEVANT HX SPEC: NORMAL

## 2022-06-24 ENCOUNTER — LAB REQUISITION (OUTPATIENT)
Dept: LAB | Facility: CLINIC | Age: 72
End: 2022-06-24
Payer: COMMERCIAL

## 2022-06-24 DIAGNOSIS — I50.22 CHRONIC SYSTOLIC (CONGESTIVE) HEART FAILURE (H): ICD-10-CM

## 2022-06-24 DIAGNOSIS — D64.89 OTHER SPECIFIED ANEMIAS: ICD-10-CM

## 2022-06-27 LAB
BASOPHILS # BLD AUTO: 0 10E3/UL (ref 0–0.2)
BASOPHILS NFR BLD AUTO: 1 %
EOSINOPHIL # BLD AUTO: 0.1 10E3/UL (ref 0–0.7)
EOSINOPHIL NFR BLD AUTO: 3 %
ERYTHROCYTE [DISTWIDTH] IN BLOOD BY AUTOMATED COUNT: 14.6 % (ref 10–15)
FERRITIN SERPL-MCNC: 440 NG/ML (ref 27–300)
FOLATE SERPL-MCNC: 11.3 NG/ML
HCT VFR BLD AUTO: 30.6 % (ref 40–53)
HGB BLD-MCNC: 9.4 G/DL (ref 13.3–17.7)
IMM GRANULOCYTES # BLD: 0 10E3/UL
IMM GRANULOCYTES NFR BLD: 0 %
IRON SATN MFR SERPL: 18 % (ref 20–50)
IRON SERPL-MCNC: 36 UG/DL (ref 42–175)
LYMPHOCYTES # BLD AUTO: 1 10E3/UL (ref 0.8–5.3)
LYMPHOCYTES NFR BLD AUTO: 20 %
MCH RBC QN AUTO: 27.9 PG (ref 26.5–33)
MCHC RBC AUTO-ENTMCNC: 30.7 G/DL (ref 31.5–36.5)
MCV RBC AUTO: 91 FL (ref 78–100)
MONOCYTES # BLD AUTO: 0.3 10E3/UL (ref 0–1.3)
MONOCYTES NFR BLD AUTO: 6 %
NEUTROPHILS # BLD AUTO: 3.3 10E3/UL (ref 1.6–8.3)
NEUTROPHILS NFR BLD AUTO: 70 %
NRBC # BLD AUTO: 0 10E3/UL
NRBC BLD AUTO-RTO: 0 /100
PLATELET # BLD AUTO: 204 10E3/UL (ref 150–450)
RBC # BLD AUTO: 3.37 10E6/UL (ref 4.4–5.9)
RETICS # AUTO: 0.03 10E6/UL (ref 0.01–0.11)
RETICS/RBC NFR AUTO: 0.9 % (ref 0.8–2.7)
TIBC SERPL-MCNC: 198 UG/DL (ref 313–563)
TRANSFERRIN SERPL-MCNC: 158 MG/DL (ref 212–360)
VIT B12 SERPL-MCNC: 433 PG/ML (ref 213–816)
WBC # BLD AUTO: 4.7 10E3/UL (ref 4–11)

## 2022-06-27 PROCEDURE — 82607 VITAMIN B-12: CPT | Mod: ORL

## 2022-06-27 PROCEDURE — 85025 COMPLETE CBC W/AUTO DIFF WBC: CPT | Mod: ORL

## 2022-06-27 PROCEDURE — 82728 ASSAY OF FERRITIN: CPT | Mod: ORL

## 2022-06-27 PROCEDURE — P9603 ONE-WAY ALLOW PRORATED MILES: HCPCS | Mod: ORL

## 2022-06-27 PROCEDURE — 85045 AUTOMATED RETICULOCYTE COUNT: CPT | Mod: ORL

## 2022-06-27 PROCEDURE — 84466 ASSAY OF TRANSFERRIN: CPT | Mod: ORL

## 2022-06-27 PROCEDURE — 36415 COLL VENOUS BLD VENIPUNCTURE: CPT | Mod: ORL

## 2022-06-27 PROCEDURE — 82746 ASSAY OF FOLIC ACID SERUM: CPT | Mod: ORL

## 2022-06-27 PROCEDURE — 85060 BLOOD SMEAR INTERPRETATION: CPT | Performed by: PATHOLOGY

## 2022-07-14 ENCOUNTER — LAB REQUISITION (OUTPATIENT)
Dept: LAB | Facility: CLINIC | Age: 72
End: 2022-07-14
Payer: COMMERCIAL

## 2022-07-14 DIAGNOSIS — I50.22 CHRONIC SYSTOLIC (CONGESTIVE) HEART FAILURE (H): ICD-10-CM

## 2022-07-14 DIAGNOSIS — I10 ESSENTIAL (PRIMARY) HYPERTENSION: ICD-10-CM

## 2022-07-18 LAB
ANION GAP SERPL CALCULATED.3IONS-SCNC: 12 MMOL/L (ref 7–15)
BUN SERPL-MCNC: 19.1 MG/DL (ref 8–23)
CALCIUM SERPL-MCNC: 8.9 MG/DL (ref 8.8–10.2)
CHLORIDE SERPL-SCNC: 107 MMOL/L (ref 98–107)
CREAT SERPL-MCNC: 1.05 MG/DL (ref 0.67–1.17)
DEPRECATED HCO3 PLAS-SCNC: 26 MMOL/L (ref 22–29)
GFR SERPL CREATININE-BSD FRML MDRD: 75 ML/MIN/1.73M2
GLUCOSE SERPL-MCNC: 141 MG/DL (ref 70–99)
POTASSIUM SERPL-SCNC: 3.4 MMOL/L (ref 3.4–5.3)
SODIUM SERPL-SCNC: 145 MMOL/L (ref 136–145)

## 2022-07-18 PROCEDURE — P9604 ONE-WAY ALLOW PRORATED TRIP: HCPCS | Mod: ORL

## 2022-07-18 PROCEDURE — 36415 COLL VENOUS BLD VENIPUNCTURE: CPT | Mod: ORL

## 2022-07-18 PROCEDURE — 80048 BASIC METABOLIC PNL TOTAL CA: CPT | Mod: ORL

## 2022-07-21 ENCOUNTER — LAB REQUISITION (OUTPATIENT)
Dept: LAB | Facility: CLINIC | Age: 72
End: 2022-07-21
Payer: COMMERCIAL

## 2022-07-25 PROCEDURE — P9603 ONE-WAY ALLOW PRORATED MILES: HCPCS | Mod: ORL

## 2022-07-25 PROCEDURE — 36415 COLL VENOUS BLD VENIPUNCTURE: CPT | Mod: ORL

## 2022-07-25 PROCEDURE — 84132 ASSAY OF SERUM POTASSIUM: CPT | Mod: ORL

## 2022-07-26 LAB — POTASSIUM SERPL-SCNC: 3.5 MMOL/L (ref 3.4–5.3)

## 2022-08-03 ENCOUNTER — MEDICAL CORRESPONDENCE (OUTPATIENT)
Dept: HEALTH INFORMATION MANAGEMENT | Facility: CLINIC | Age: 72
End: 2022-08-03

## 2022-08-30 ENCOUNTER — APPOINTMENT (OUTPATIENT)
Dept: CT IMAGING | Facility: CLINIC | Age: 72
DRG: 193 | End: 2022-08-30
Attending: STUDENT IN AN ORGANIZED HEALTH CARE EDUCATION/TRAINING PROGRAM
Payer: COMMERCIAL

## 2022-08-30 ENCOUNTER — HOSPITAL ENCOUNTER (INPATIENT)
Facility: CLINIC | Age: 72
LOS: 7 days | Discharge: HOME HEALTH | DRG: 193 | End: 2022-09-06
Attending: EMERGENCY MEDICINE | Admitting: INTERNAL MEDICINE
Payer: COMMERCIAL

## 2022-08-30 ENCOUNTER — APPOINTMENT (OUTPATIENT)
Dept: GENERAL RADIOLOGY | Facility: CLINIC | Age: 72
DRG: 193 | End: 2022-08-30
Attending: STUDENT IN AN ORGANIZED HEALTH CARE EDUCATION/TRAINING PROGRAM
Payer: COMMERCIAL

## 2022-08-30 DIAGNOSIS — R53.1 WEAKNESS: ICD-10-CM

## 2022-08-30 DIAGNOSIS — J15.9 COMMUNITY ACQUIRED BACTERIAL PNEUMONIA: Primary | ICD-10-CM

## 2022-08-30 DIAGNOSIS — I63.031 CEREBROVASCULAR ACCIDENT (CVA) DUE TO THROMBOSIS OF RIGHT CAROTID ARTERY (H): ICD-10-CM

## 2022-08-30 DIAGNOSIS — R05.9 COUGH: ICD-10-CM

## 2022-08-30 DIAGNOSIS — R50.9 FEVER IN ADULT: ICD-10-CM

## 2022-08-30 DIAGNOSIS — N39.0 URINARY TRACT INFECTION WITHOUT HEMATURIA, SITE UNSPECIFIED: ICD-10-CM

## 2022-08-30 DIAGNOSIS — J18.9 MULTIFOCAL PNEUMONIA: ICD-10-CM

## 2022-08-30 LAB
ALBUMIN SERPL BCG-MCNC: 3.6 G/DL (ref 3.5–5.2)
ALBUMIN UR-MCNC: 20 MG/DL
ALP SERPL-CCNC: 80 U/L (ref 40–129)
ALT SERPL W P-5'-P-CCNC: 21 U/L (ref 10–50)
ANION GAP SERPL CALCULATED.3IONS-SCNC: 8 MMOL/L (ref 7–15)
APPEARANCE UR: ABNORMAL
AST SERPL W P-5'-P-CCNC: 23 U/L (ref 10–50)
BACTERIA #/AREA URNS HPF: ABNORMAL /HPF
BASOPHILS # BLD AUTO: 0 10E3/UL (ref 0–0.2)
BASOPHILS NFR BLD AUTO: 0 %
BILIRUB SERPL-MCNC: 0.7 MG/DL
BILIRUB UR QL STRIP: NEGATIVE
BUN SERPL-MCNC: 22.4 MG/DL (ref 8–23)
CALCIUM SERPL-MCNC: 8.7 MG/DL (ref 8.8–10.2)
CHLORIDE SERPL-SCNC: 100 MMOL/L (ref 98–107)
COLOR UR AUTO: YELLOW
CREAT SERPL-MCNC: 1.19 MG/DL (ref 0.67–1.17)
DEPRECATED HCO3 PLAS-SCNC: 31 MMOL/L (ref 22–29)
EOSINOPHIL # BLD AUTO: 0.3 10E3/UL (ref 0–0.7)
EOSINOPHIL NFR BLD AUTO: 3 %
ERYTHROCYTE [DISTWIDTH] IN BLOOD BY AUTOMATED COUNT: 15.6 % (ref 10–15)
FLUAV RNA SPEC QL NAA+PROBE: NEGATIVE
FLUBV RNA RESP QL NAA+PROBE: NEGATIVE
GFR SERPL CREATININE-BSD FRML MDRD: 64 ML/MIN/1.73M2
GLUCOSE SERPL-MCNC: 106 MG/DL (ref 70–99)
GLUCOSE UR STRIP-MCNC: NEGATIVE MG/DL
HCO3 BLDV-SCNC: 32 MMOL/L (ref 21–28)
HCT VFR BLD AUTO: 33.8 % (ref 40–53)
HGB BLD-MCNC: 10.2 G/DL (ref 13.3–17.7)
HGB UR QL STRIP: ABNORMAL
HOLD SPECIMEN: NORMAL
HOLD SPECIMEN: NORMAL
IMM GRANULOCYTES # BLD: 0 10E3/UL
IMM GRANULOCYTES NFR BLD: 0 %
INR PPP: 1.17 (ref 0.85–1.15)
KETONES UR STRIP-MCNC: NEGATIVE MG/DL
LACTATE BLD-SCNC: 1.3 MMOL/L
LEUKOCYTE ESTERASE UR QL STRIP: ABNORMAL
LYMPHOCYTES # BLD AUTO: 0.4 10E3/UL (ref 0.8–5.3)
LYMPHOCYTES NFR BLD AUTO: 5 %
MCH RBC QN AUTO: 28.2 PG (ref 26.5–33)
MCHC RBC AUTO-ENTMCNC: 30.2 G/DL (ref 31.5–36.5)
MCV RBC AUTO: 93 FL (ref 78–100)
MONOCYTES # BLD AUTO: 0.3 10E3/UL (ref 0–1.3)
MONOCYTES NFR BLD AUTO: 4 %
NEUTROPHILS # BLD AUTO: 7 10E3/UL (ref 1.6–8.3)
NEUTROPHILS NFR BLD AUTO: 88 %
NITRATE UR QL: NEGATIVE
NRBC # BLD AUTO: 0 10E3/UL
NRBC BLD AUTO-RTO: 0 /100
NT-PROBNP SERPL-MCNC: 2471 PG/ML (ref 0–900)
PCO2 BLDV: 58 MM HG (ref 40–50)
PH BLDV: 7.35 [PH] (ref 7.32–7.43)
PH UR STRIP: 6.5 [PH] (ref 5–7)
PLATELET # BLD AUTO: 188 10E3/UL (ref 150–450)
PO2 BLDV: 23 MM HG (ref 25–47)
POTASSIUM SERPL-SCNC: 3.3 MMOL/L (ref 3.4–5.3)
PROCALCITONIN SERPL IA-MCNC: 0.1 NG/ML
PROT SERPL-MCNC: 6.9 G/DL (ref 6.4–8.3)
RBC # BLD AUTO: 3.62 10E6/UL (ref 4.4–5.9)
RBC URINE: 10 /HPF
RSV RNA SPEC NAA+PROBE: NEGATIVE
SAO2 % BLDV: 36 % (ref 94–100)
SARS-COV-2 RNA RESP QL NAA+PROBE: NEGATIVE
SODIUM SERPL-SCNC: 139 MMOL/L (ref 136–145)
SP GR UR STRIP: 1.01 (ref 1–1.03)
SQUAMOUS EPITHELIAL: <1 /HPF
TROPONIN T SERPL HS-MCNC: 49 NG/L
TSH SERPL DL<=0.005 MIU/L-ACNC: 1.85 UIU/ML (ref 0.3–4.2)
UROBILINOGEN UR STRIP-MCNC: 2 MG/DL
WBC # BLD AUTO: 8 10E3/UL (ref 4–11)
WBC URINE: >182 /HPF

## 2022-08-30 PROCEDURE — 99223 1ST HOSP IP/OBS HIGH 75: CPT | Mod: AI | Performed by: INTERNAL MEDICINE

## 2022-08-30 PROCEDURE — 87077 CULTURE AEROBIC IDENTIFY: CPT | Performed by: EMERGENCY MEDICINE

## 2022-08-30 PROCEDURE — 250N000013 HC RX MED GY IP 250 OP 250 PS 637: Performed by: STUDENT IN AN ORGANIZED HEALTH CARE EDUCATION/TRAINING PROGRAM

## 2022-08-30 PROCEDURE — 99285 EMERGENCY DEPT VISIT HI MDM: CPT | Mod: 25

## 2022-08-30 PROCEDURE — 87086 URINE CULTURE/COLONY COUNT: CPT | Performed by: STUDENT IN AN ORGANIZED HEALTH CARE EDUCATION/TRAINING PROGRAM

## 2022-08-30 PROCEDURE — 84145 PROCALCITONIN (PCT): CPT | Performed by: INTERNAL MEDICINE

## 2022-08-30 PROCEDURE — 250N000011 HC RX IP 250 OP 636: Performed by: INTERNAL MEDICINE

## 2022-08-30 PROCEDURE — 250N000011 HC RX IP 250 OP 636: Performed by: STUDENT IN AN ORGANIZED HEALTH CARE EDUCATION/TRAINING PROGRAM

## 2022-08-30 PROCEDURE — 36415 COLL VENOUS BLD VENIPUNCTURE: CPT | Performed by: STUDENT IN AN ORGANIZED HEALTH CARE EDUCATION/TRAINING PROGRAM

## 2022-08-30 PROCEDURE — 87637 SARSCOV2&INF A&B&RSV AMP PRB: CPT | Performed by: STUDENT IN AN ORGANIZED HEALTH CARE EDUCATION/TRAINING PROGRAM

## 2022-08-30 PROCEDURE — 96365 THER/PROPH/DIAG IV INF INIT: CPT

## 2022-08-30 PROCEDURE — 36415 COLL VENOUS BLD VENIPUNCTURE: CPT | Performed by: EMERGENCY MEDICINE

## 2022-08-30 PROCEDURE — 120N000001 HC R&B MED SURG/OB

## 2022-08-30 PROCEDURE — 81001 URINALYSIS AUTO W/SCOPE: CPT | Performed by: STUDENT IN AN ORGANIZED HEALTH CARE EDUCATION/TRAINING PROGRAM

## 2022-08-30 PROCEDURE — 80053 COMPREHEN METABOLIC PANEL: CPT | Performed by: STUDENT IN AN ORGANIZED HEALTH CARE EDUCATION/TRAINING PROGRAM

## 2022-08-30 PROCEDURE — 87040 BLOOD CULTURE FOR BACTERIA: CPT | Performed by: STUDENT IN AN ORGANIZED HEALTH CARE EDUCATION/TRAINING PROGRAM

## 2022-08-30 PROCEDURE — 70450 CT HEAD/BRAIN W/O DYE: CPT

## 2022-08-30 PROCEDURE — 83605 ASSAY OF LACTIC ACID: CPT

## 2022-08-30 PROCEDURE — 71045 X-RAY EXAM CHEST 1 VIEW: CPT

## 2022-08-30 PROCEDURE — 96367 TX/PROPH/DG ADDL SEQ IV INF: CPT

## 2022-08-30 PROCEDURE — C9803 HOPD COVID-19 SPEC COLLECT: HCPCS

## 2022-08-30 PROCEDURE — 87149 DNA/RNA DIRECT PROBE: CPT | Performed by: EMERGENCY MEDICINE

## 2022-08-30 PROCEDURE — 258N000003 HC RX IP 258 OP 636: Performed by: STUDENT IN AN ORGANIZED HEALTH CARE EDUCATION/TRAINING PROGRAM

## 2022-08-30 PROCEDURE — 93005 ELECTROCARDIOGRAM TRACING: CPT

## 2022-08-30 PROCEDURE — 84443 ASSAY THYROID STIM HORMONE: CPT | Performed by: STUDENT IN AN ORGANIZED HEALTH CARE EDUCATION/TRAINING PROGRAM

## 2022-08-30 PROCEDURE — 85025 COMPLETE CBC W/AUTO DIFF WBC: CPT | Performed by: STUDENT IN AN ORGANIZED HEALTH CARE EDUCATION/TRAINING PROGRAM

## 2022-08-30 PROCEDURE — 84484 ASSAY OF TROPONIN QUANT: CPT | Performed by: STUDENT IN AN ORGANIZED HEALTH CARE EDUCATION/TRAINING PROGRAM

## 2022-08-30 PROCEDURE — 85610 PROTHROMBIN TIME: CPT | Performed by: INTERNAL MEDICINE

## 2022-08-30 PROCEDURE — 83880 ASSAY OF NATRIURETIC PEPTIDE: CPT | Performed by: INTERNAL MEDICINE

## 2022-08-30 PROCEDURE — 82803 BLOOD GASES ANY COMBINATION: CPT

## 2022-08-30 PROCEDURE — 250N000013 HC RX MED GY IP 250 OP 250 PS 637: Performed by: INTERNAL MEDICINE

## 2022-08-30 PROCEDURE — 96361 HYDRATE IV INFUSION ADD-ON: CPT

## 2022-08-30 RX ORDER — ENOXAPARIN SODIUM 100 MG/ML
40 INJECTION SUBCUTANEOUS EVERY 24 HOURS
Status: DISCONTINUED | OUTPATIENT
Start: 2022-08-30 | End: 2022-09-06 | Stop reason: HOSPADM

## 2022-08-30 RX ORDER — ACETAMINOPHEN 500 MG
1000 TABLET ORAL ONCE
Status: COMPLETED | OUTPATIENT
Start: 2022-08-30 | End: 2022-08-30

## 2022-08-30 RX ORDER — NALOXONE HYDROCHLORIDE 0.4 MG/ML
0.4 INJECTION, SOLUTION INTRAMUSCULAR; INTRAVENOUS; SUBCUTANEOUS
Status: DISCONTINUED | OUTPATIENT
Start: 2022-08-30 | End: 2022-09-06 | Stop reason: HOSPADM

## 2022-08-30 RX ORDER — CEFTRIAXONE 2 G/1
2 INJECTION, POWDER, FOR SOLUTION INTRAMUSCULAR; INTRAVENOUS EVERY 24 HOURS
Status: DISCONTINUED | OUTPATIENT
Start: 2022-08-31 | End: 2022-09-02

## 2022-08-30 RX ORDER — ONDANSETRON 2 MG/ML
4 INJECTION INTRAMUSCULAR; INTRAVENOUS EVERY 6 HOURS PRN
Status: DISCONTINUED | OUTPATIENT
Start: 2022-08-30 | End: 2022-09-06 | Stop reason: HOSPADM

## 2022-08-30 RX ORDER — OXYCODONE HYDROCHLORIDE 5 MG/1
5 TABLET ORAL EVERY 4 HOURS PRN
Status: DISCONTINUED | OUTPATIENT
Start: 2022-08-30 | End: 2022-09-02

## 2022-08-30 RX ORDER — ASPIRIN 81 MG/1
81 TABLET, CHEWABLE ORAL DAILY
Status: DISCONTINUED | OUTPATIENT
Start: 2022-08-31 | End: 2022-09-06 | Stop reason: HOSPADM

## 2022-08-30 RX ORDER — ACETAMINOPHEN 325 MG/1
650 TABLET ORAL EVERY 6 HOURS PRN
Status: DISCONTINUED | OUTPATIENT
Start: 2022-08-30 | End: 2022-09-06 | Stop reason: HOSPADM

## 2022-08-30 RX ORDER — ATORVASTATIN CALCIUM 40 MG/1
40 TABLET, FILM COATED ORAL EVERY EVENING
Status: DISCONTINUED | OUTPATIENT
Start: 2022-08-30 | End: 2022-09-06 | Stop reason: HOSPADM

## 2022-08-30 RX ORDER — AZITHROMYCIN 500 MG/5ML
500 INJECTION, POWDER, LYOPHILIZED, FOR SOLUTION INTRAVENOUS ONCE
Status: COMPLETED | OUTPATIENT
Start: 2022-08-30 | End: 2022-08-30

## 2022-08-30 RX ORDER — NALOXONE HYDROCHLORIDE 0.4 MG/ML
0.2 INJECTION, SOLUTION INTRAMUSCULAR; INTRAVENOUS; SUBCUTANEOUS
Status: DISCONTINUED | OUTPATIENT
Start: 2022-08-30 | End: 2022-09-06 | Stop reason: HOSPADM

## 2022-08-30 RX ORDER — FUROSEMIDE 10 MG/ML
20 INJECTION INTRAMUSCULAR; INTRAVENOUS EVERY 12 HOURS
Status: DISCONTINUED | OUTPATIENT
Start: 2022-08-31 | End: 2022-09-02

## 2022-08-30 RX ORDER — AMLODIPINE BESYLATE 10 MG/1
10 TABLET ORAL DAILY
Status: DISCONTINUED | OUTPATIENT
Start: 2022-08-31 | End: 2022-09-06 | Stop reason: HOSPADM

## 2022-08-30 RX ORDER — CEFTRIAXONE 2 G/1
2 INJECTION, POWDER, FOR SOLUTION INTRAMUSCULAR; INTRAVENOUS ONCE
Status: COMPLETED | OUTPATIENT
Start: 2022-08-30 | End: 2022-08-30

## 2022-08-30 RX ORDER — ONDANSETRON 4 MG/1
4 TABLET, ORALLY DISINTEGRATING ORAL EVERY 6 HOURS PRN
Status: DISCONTINUED | OUTPATIENT
Start: 2022-08-30 | End: 2022-09-06 | Stop reason: HOSPADM

## 2022-08-30 RX ORDER — AZITHROMYCIN 500 MG/5ML
500 INJECTION, POWDER, LYOPHILIZED, FOR SOLUTION INTRAVENOUS EVERY 24 HOURS
Status: DISCONTINUED | OUTPATIENT
Start: 2022-08-31 | End: 2022-09-02

## 2022-08-30 RX ORDER — ACETAMINOPHEN 650 MG/1
650 SUPPOSITORY RECTAL EVERY 6 HOURS PRN
Status: DISCONTINUED | OUTPATIENT
Start: 2022-08-30 | End: 2022-09-06 | Stop reason: HOSPADM

## 2022-08-30 RX ADMIN — AZITHROMYCIN MONOHYDRATE 500 MG: 500 INJECTION, POWDER, LYOPHILIZED, FOR SOLUTION INTRAVENOUS at 21:48

## 2022-08-30 RX ADMIN — ENOXAPARIN SODIUM 40 MG: 40 INJECTION SUBCUTANEOUS at 23:55

## 2022-08-30 RX ADMIN — ATORVASTATIN CALCIUM 40 MG: 40 TABLET, FILM COATED ORAL at 23:55

## 2022-08-30 RX ADMIN — SODIUM CHLORIDE 1000 ML: 9 INJECTION, SOLUTION INTRAVENOUS at 19:18

## 2022-08-30 RX ADMIN — CEFTRIAXONE 2 G: 2 INJECTION, POWDER, FOR SOLUTION INTRAMUSCULAR; INTRAVENOUS at 20:48

## 2022-08-30 RX ADMIN — ACETAMINOPHEN 1000 MG: 500 TABLET, FILM COATED ORAL at 19:56

## 2022-08-30 ASSESSMENT — ENCOUNTER SYMPTOMS
COUGH: 1
CHILLS: 1
SORE THROAT: 0
PHOTOPHOBIA: 1
SHORTNESS OF BREATH: 0
NAUSEA: 0
VOMITING: 0
ABDOMINAL PAIN: 0
HEADACHES: 0

## 2022-08-30 ASSESSMENT — ACTIVITIES OF DAILY LIVING (ADL)
FALL_HISTORY_WITHIN_LAST_SIX_MONTHS: NO
DOING_ERRANDS_INDEPENDENTLY_DIFFICULTY: YES
EQUIPMENT_CURRENTLY_USED_AT_HOME: WALKER, ROLLING;WHEELCHAIR, POWER
EATING/SWALLOWING: SWALLOWING LIQUIDS;SWALLOWING SOLID FOOD
WALKING_OR_CLIMBING_STAIRS: AMBULATION DIFFICULTY, REQUIRES EQUIPMENT;STAIR CLIMBING DIFFICULTY, REQUIRES EQUIPMENT;TRANSFERRING DIFFICULTY, REQUIRES EQUIPMENT
ADLS_ACUITY_SCORE: 45
TOILETING_ASSISTANCE: TOILETING DIFFICULTY, ASSISTANCE 1 PERSON
DIFFICULTY_EATING/SWALLOWING: YES
ADLS_ACUITY_SCORE: 45
WALKING_OR_CLIMBING_STAIRS_DIFFICULTY: YES
CHANGE_IN_FUNCTIONAL_STATUS_SINCE_ONSET_OF_CURRENT_ILLNESS/INJURY: NO
DRESSING/BATHING_DIFFICULTY: YES
CONCENTRATING,_REMEMBERING_OR_MAKING_DECISIONS_DIFFICULTY: NO
TOILETING_ISSUES: YES
ADLS_ACUITY_SCORE: 45
DRESSING/BATHING: BATHING DIFFICULTY, ASSISTANCE 1 PERSON;DRESSING DIFFICULTY, ASSISTANCE 1 PERSON
WEAR_GLASSES_OR_BLIND: YES

## 2022-08-30 NOTE — LETTER
NESSA Emmanuel, W  Inpatient Care Coordination  MS3, [facility]  [phone]

## 2022-08-31 ENCOUNTER — APPOINTMENT (OUTPATIENT)
Dept: SPEECH THERAPY | Facility: CLINIC | Age: 72
DRG: 193 | End: 2022-08-31
Payer: COMMERCIAL

## 2022-08-31 ENCOUNTER — APPOINTMENT (OUTPATIENT)
Dept: GENERAL RADIOLOGY | Facility: CLINIC | Age: 72
DRG: 193 | End: 2022-08-31
Attending: HOSPITALIST
Payer: COMMERCIAL

## 2022-08-31 ENCOUNTER — APPOINTMENT (OUTPATIENT)
Dept: SPEECH THERAPY | Facility: CLINIC | Age: 72
DRG: 193 | End: 2022-08-31
Attending: INTERNAL MEDICINE
Payer: COMMERCIAL

## 2022-08-31 LAB
ANION GAP SERPL CALCULATED.3IONS-SCNC: 8 MMOL/L (ref 7–15)
ATRIAL RATE - MUSE: 117 BPM
BUN SERPL-MCNC: 24.4 MG/DL (ref 8–23)
CALCIUM SERPL-MCNC: 8.2 MG/DL (ref 8.8–10.2)
CHLORIDE SERPL-SCNC: 105 MMOL/L (ref 98–107)
CREAT SERPL-MCNC: 1.14 MG/DL (ref 0.67–1.17)
DEPRECATED HCO3 PLAS-SCNC: 29 MMOL/L (ref 22–29)
DIASTOLIC BLOOD PRESSURE - MUSE: NORMAL MMHG
ENTEROCOCCUS FAECALIS: NOT DETECTED
ENTEROCOCCUS FAECIUM: NOT DETECTED
ERYTHROCYTE [DISTWIDTH] IN BLOOD BY AUTOMATED COUNT: 15.9 % (ref 10–15)
GFR SERPL CREATININE-BSD FRML MDRD: 68 ML/MIN/1.73M2
GLUCOSE SERPL-MCNC: 94 MG/DL (ref 70–99)
HCT VFR BLD AUTO: 26.9 % (ref 40–53)
HGB BLD-MCNC: 8.1 G/DL (ref 13.3–17.7)
INTERPRETATION ECG - MUSE: NORMAL
LACTATE SERPL-SCNC: 0.7 MMOL/L (ref 0.7–2)
LISTERIA SPECIES (DETECTED/NOT DETECTED): NOT DETECTED
MAGNESIUM SERPL-MCNC: 2 MG/DL (ref 1.7–2.3)
MCH RBC QN AUTO: 27.9 PG (ref 26.5–33)
MCHC RBC AUTO-ENTMCNC: 30.1 G/DL (ref 31.5–36.5)
MCV RBC AUTO: 93 FL (ref 78–100)
P AXIS - MUSE: 17 DEGREES
PLATELET # BLD AUTO: 149 10E3/UL (ref 150–450)
POTASSIUM SERPL-SCNC: 3.6 MMOL/L (ref 3.4–5.3)
PR INTERVAL - MUSE: 148 MS
QRS DURATION - MUSE: 100 MS
QT - MUSE: 334 MS
QTC - MUSE: 465 MS
R AXIS - MUSE: -4 DEGREES
RBC # BLD AUTO: 2.9 10E6/UL (ref 4.4–5.9)
SODIUM SERPL-SCNC: 142 MMOL/L (ref 136–145)
STAPHYLOCOCCUS AUREUS: NOT DETECTED
STAPHYLOCOCCUS EPIDERMIDIS: NOT DETECTED
STAPHYLOCOCCUS LUGDUNENSIS: NOT DETECTED
STAPHYLOCOCCUS SPECIES: DETECTED
STREPTOCOCCUS AGALACTIAE: NOT DETECTED
STREPTOCOCCUS ANGINOSUS GROUP: NOT DETECTED
STREPTOCOCCUS PNEUMONIAE: NOT DETECTED
STREPTOCOCCUS PYOGENES: NOT DETECTED
STREPTOCOCCUS SPECIES: NOT DETECTED
SYSTOLIC BLOOD PRESSURE - MUSE: NORMAL MMHG
T AXIS - MUSE: 6 DEGREES
VENTRICULAR RATE- MUSE: 117 BPM
WBC # BLD AUTO: 12 10E3/UL (ref 4–11)

## 2022-08-31 PROCEDURE — 87040 BLOOD CULTURE FOR BACTERIA: CPT | Performed by: HOSPITALIST

## 2022-08-31 PROCEDURE — 250N000013 HC RX MED GY IP 250 OP 250 PS 637: Performed by: INTERNAL MEDICINE

## 2022-08-31 PROCEDURE — 92610 EVALUATE SWALLOWING FUNCTION: CPT | Mod: GN

## 2022-08-31 PROCEDURE — 92611 MOTION FLUOROSCOPY/SWALLOW: CPT | Mod: GN

## 2022-08-31 PROCEDURE — 36415 COLL VENOUS BLD VENIPUNCTURE: CPT | Performed by: INTERNAL MEDICINE

## 2022-08-31 PROCEDURE — 80048 BASIC METABOLIC PNL TOTAL CA: CPT | Performed by: INTERNAL MEDICINE

## 2022-08-31 PROCEDURE — 92526 ORAL FUNCTION THERAPY: CPT | Mod: GN

## 2022-08-31 PROCEDURE — 258N000003 HC RX IP 258 OP 636: Performed by: INTERNAL MEDICINE

## 2022-08-31 PROCEDURE — 120N000001 HC R&B MED SURG/OB

## 2022-08-31 PROCEDURE — 74230 X-RAY XM SWLNG FUNCJ C+: CPT

## 2022-08-31 PROCEDURE — 99233 SBSQ HOSP IP/OBS HIGH 50: CPT | Performed by: HOSPITALIST

## 2022-08-31 PROCEDURE — 250N000011 HC RX IP 250 OP 636: Performed by: INTERNAL MEDICINE

## 2022-08-31 PROCEDURE — 83605 ASSAY OF LACTIC ACID: CPT | Performed by: HOSPITALIST

## 2022-08-31 PROCEDURE — 36415 COLL VENOUS BLD VENIPUNCTURE: CPT | Performed by: HOSPITALIST

## 2022-08-31 PROCEDURE — 83735 ASSAY OF MAGNESIUM: CPT | Performed by: INTERNAL MEDICINE

## 2022-08-31 PROCEDURE — 85027 COMPLETE CBC AUTOMATED: CPT | Performed by: INTERNAL MEDICINE

## 2022-08-31 RX ORDER — VALSARTAN 80 MG/1
160 TABLET ORAL 2 TIMES DAILY
COMMUNITY

## 2022-08-31 RX ORDER — NITROGLYCERIN 0.4 MG/1
0.4 TABLET SUBLINGUAL EVERY 5 MIN PRN
COMMUNITY

## 2022-08-31 RX ORDER — ASPIRIN 81 MG/1
81 TABLET ORAL DAILY
COMMUNITY

## 2022-08-31 RX ORDER — SERTRALINE HYDROCHLORIDE 25 MG/1
50 TABLET, FILM COATED ORAL DAILY
COMMUNITY

## 2022-08-31 RX ORDER — DOCUSATE SODIUM 100 MG/1
100 CAPSULE, LIQUID FILLED ORAL DAILY
COMMUNITY

## 2022-08-31 RX ORDER — AMLODIPINE BESYLATE 10 MG/1
10 TABLET ORAL DAILY
COMMUNITY

## 2022-08-31 RX ORDER — COVID-19 ANTIGEN TEST
220 KIT MISCELLANEOUS 2 TIMES DAILY WITH MEALS
COMMUNITY

## 2022-08-31 RX ORDER — GABAPENTIN 300 MG/1
300 CAPSULE ORAL 3 TIMES DAILY
COMMUNITY

## 2022-08-31 RX ORDER — LIDOCAINE 4 G/G
1 PATCH TOPICAL EVERY 24 HOURS
COMMUNITY

## 2022-08-31 RX ORDER — ATORVASTATIN CALCIUM 40 MG/1
40 TABLET, FILM COATED ORAL DAILY
COMMUNITY

## 2022-08-31 RX ORDER — PANTOPRAZOLE SODIUM 40 MG/1
40 TABLET, DELAYED RELEASE ORAL DAILY
COMMUNITY

## 2022-08-31 RX ORDER — LEVETIRACETAM 750 MG/1
750 TABLET ORAL 2 TIMES DAILY
COMMUNITY

## 2022-08-31 RX ORDER — OXYCODONE HYDROCHLORIDE 5 MG/1
5 TABLET ORAL EVERY 6 HOURS PRN
COMMUNITY

## 2022-08-31 RX ORDER — ACETAMINOPHEN 500 MG
1000 TABLET ORAL DAILY PRN
COMMUNITY

## 2022-08-31 RX ADMIN — OXYCODONE HYDROCHLORIDE 5 MG: 5 TABLET ORAL at 00:16

## 2022-08-31 RX ADMIN — ATORVASTATIN CALCIUM 40 MG: 40 TABLET, FILM COATED ORAL at 20:54

## 2022-08-31 RX ADMIN — AMLODIPINE BESYLATE 10 MG: 10 TABLET ORAL at 10:24

## 2022-08-31 RX ADMIN — LEVETIRACETAM 750 MG: 250 TABLET, FILM COATED ORAL at 00:09

## 2022-08-31 RX ADMIN — LEVETIRACETAM 750 MG: 250 TABLET, FILM COATED ORAL at 20:54

## 2022-08-31 RX ADMIN — ASPIRIN 81 MG CHEWABLE TABLET 81 MG: 81 TABLET CHEWABLE at 10:18

## 2022-08-31 RX ADMIN — FUROSEMIDE 20 MG: 10 INJECTION, SOLUTION INTRAMUSCULAR; INTRAVENOUS at 20:55

## 2022-08-31 RX ADMIN — OXYCODONE HYDROCHLORIDE 5 MG: 5 TABLET ORAL at 16:24

## 2022-08-31 RX ADMIN — AZITHROMYCIN MONOHYDRATE 500 MG: 500 INJECTION, POWDER, LYOPHILIZED, FOR SOLUTION INTRAVENOUS at 11:10

## 2022-08-31 RX ADMIN — FUROSEMIDE 20 MG: 10 INJECTION, SOLUTION INTRAMUSCULAR; INTRAVENOUS at 11:06

## 2022-08-31 RX ADMIN — LEVETIRACETAM 750 MG: 250 TABLET, FILM COATED ORAL at 11:04

## 2022-08-31 RX ADMIN — ENOXAPARIN SODIUM 40 MG: 40 INJECTION SUBCUTANEOUS at 21:05

## 2022-08-31 RX ADMIN — CEFTRIAXONE 2 G: 2 INJECTION, POWDER, FOR SOLUTION INTRAMUSCULAR; INTRAVENOUS at 10:11

## 2022-08-31 ASSESSMENT — ACTIVITIES OF DAILY LIVING (ADL)
ADLS_ACUITY_SCORE: 61
ADLS_ACUITY_SCORE: 64
ADLS_ACUITY_SCORE: 57
ADLS_ACUITY_SCORE: 56
ADLS_ACUITY_SCORE: 61
ADLS_ACUITY_SCORE: 57
ADLS_ACUITY_SCORE: 64
ADLS_ACUITY_SCORE: 64
ADLS_ACUITY_SCORE: 61
ADLS_ACUITY_SCORE: 64

## 2022-08-31 NOTE — ED PROVIDER NOTES
Emergency Department Attending Supervision Note    8/30/2022  7:52 PM      I evaluated this patient in conjunction with Cindi Matias PA-C    Briefly, the patient presented with hypertension, chills, and cough. Patient's at home aides reported that the patient was more hypertensive today, and had cough and chills. He reports no current pain. The patient denies any dysuria, hematuria chest pain, shortness of breath, abdominal pain, nausea, vomiting, diarrhea, and headache.     My exam:  BP (!) 178/85   Pulse 117   Temp (!) 102.2  F (39  C) (Oral)   Resp 16   SpO2 91%   General: Alert, appears frail and elderly. Cooperative.     In mild distress  HEENT:  Head:  Atraumatic  Ears:  External ears are normal  Mouth/Throat:  Oropharynx is without erythema or exudate and mucous membranes are moist.   Eyes:   Conjunctivae normal and EOM are normal. No scleral icterus.  CV:  Tachycardic rate, regular rhythm, normal heart sounds and radial pulses are 2+ and symmetric.  No murmur.  Resp:  Breath sounds are coarse bilaterally with crackles to bases.     Non-labored, no retractions or accessory muscle use.  On 2-3LPM O2 by NC.   GI:  Abdomen is soft, no distension, no tenderness. No rebound or guarding.  No CVA tenderness bilaterally  MS:  Normal range of motion.     Back atraumatic.  Skin:  Warm and dry.   Neuro: Alert. Globally weak.  GCS: 15  Psych:  Normal mood and affect.     XR Chest 1 View   Final Result   IMPRESSION: There is consolidative opacity within both lung bases, greatest on the right. Mild diffuse interstitial opacities are additionally noted. Findings may reflect a combination of multifocal pneumonia and pulmonary edema. Continued imaging    follow-up to resolution recommended.       No pleural effusion or pneumothorax.      Unchanged moderate cardiomegaly. Postsurgical changes of median sternotomy and coronary artery bypass grafting. Atheromatous calcifications of the thoracic aorta.        Labs Ordered and  Resulted from Time of ED Arrival to Time of ED Departure   COMPREHENSIVE METABOLIC PANEL - Abnormal       Result Value    Sodium 139      Potassium 3.3 (*)     Creatinine 1.19 (*)     Urea Nitrogen 22.4      Chloride 100      Carbon Dioxide (CO2) 31 (*)     Anion Gap 8      Glucose 106 (*)     Calcium 8.7 (*)     Protein Total 6.9      Albumin 3.6      Bilirubin Total 0.7      Alkaline Phosphatase 80      AST 23      ALT 21      GFR Estimate 64     ROUTINE UA WITH MICROSCOPIC REFLEX TO CULTURE - Abnormal    Color Urine Yellow      Appearance Urine Slightly Cloudy (*)     Glucose Urine Negative      Bilirubin Urine Negative      Ketones Urine Negative      Specific Gravity Urine 1.014      Blood Urine Small (*)     pH Urine 6.5      Protein Albumin Urine 20  (*)     Urobilinogen Urine 2.0      Nitrite Urine Negative      Leukocyte Esterase Urine Large (*)     Bacteria Urine Many (*)     RBC Urine 10 (*)     WBC Urine >182 (*)     Squamous Epithelials Urine <1     CBC WITH PLATELETS AND DIFFERENTIAL - Abnormal    WBC Count 8.0      RBC Count 3.62 (*)     Hemoglobin 10.2 (*)     Hematocrit 33.8 (*)     MCV 93      MCH 28.2      MCHC 30.2 (*)     RDW 15.6 (*)     Platelet Count 188      % Neutrophils 88      % Lymphocytes 5      % Monocytes 4      % Eosinophils 3      % Basophils 0      % Immature Granulocytes 0      NRBCs per 100 WBC 0      Absolute Neutrophils 7.0      Absolute Lymphocytes 0.4 (*)     Absolute Monocytes 0.3      Absolute Eosinophils 0.3      Absolute Basophils 0.0      Absolute Immature Granulocytes 0.0      Absolute NRBCs 0.0     TROPONIN T, HIGH SENSITIVITY - Abnormal    Troponin T, High Sensitivity 49 (*)    ISTAT GASES LACTATE VENOUS POCT - Abnormal    Lactic Acid POCT 1.3      Bicarbonate Venous POCT 32 (*)     O2 Sat, Venous POCT 36 (*)     pCO2V Venous POCT 58 (*)     pH Venous POCT 7.35      pO2 Venous POCT 23 (*)    TSH WITH FREE T4 REFLEX - Normal    TSH 1.85     INFLUENZA A/B & SARS-COV2  PCR MULTIPLEX   BLOOD CULTURE   BLOOD CULTURE   URINE CULTURE       My impression/diagnosis is:  Patient is a 72-year-old male with a history of CVA, hypertension, CAD, diabetes, and sleep apnea who presents with cough, fever, and weakness.  A broad septic work-up was pursued.  Reassuringly lactate came back within normal range at 1.3.  CBC unremarkable.  Urinalysis highly suspicious for potential UTI.  Patient also having ongoing cough and hypoxia and chest x-ray suspicious for multifocal pneumonia.  Thankfully patient is only requiring 2 to 3 L/min O2 by nasal cannula.  He will receive antibiotics with ceftriaxone and azithromycin.  COVID swab in process at time of admission.  EKG showed sinus tachycardia with no concerning ischemic changes.  Low concern for ACS.  This patient has evidence of pneumonia and fever and infectious processes lower concern for pulmonary embolism at this point.  Urine culture and blood cultures in process at time of admission.  Hospitalist service to admit patient.      ICD-10-CM    1. Multifocal pneumonia  J18.9    2. Fever in adult  R50.9    3. Cough  R05.9    4. Weakness  R53.1        Scribe Disclosure:  I, Melissa Diaz, am serving as a scribe at 7:57 PM on 8/30/2022 to document services personally performed by Gagandeep Stewart MD based on my observations and the provider's statements to me.      Gagandeep Stewart MD  Emergency Physician             Gagandeep Stewart MD  08/30/22 2039

## 2022-08-31 NOTE — PHARMACY-ADMISSION MEDICATION HISTORY
Admission medication history interview status for this patient is complete. See [facility] admission navigator for allergy information, prior to admission medications and immunization status.     Medication history interview done, indicate source(s): Patient and Caregiver  Medication history resources (including written lists, pill bottles, clinic record):MAR from [facility]  Pharmacy: None listed on MAR    Changes made to PTA medication list:  Added: Protonix, Voltaren gel, sertraline, valsartan, Marissa cream, gabapentin, levetiracetam, colace, Aleve, amlodipine, lidocaine 4% patch, ASA, oxycodone, nitroglycerin SL tab, Biotene mouth spray  Changed: NA  Reported as Not Taking: NA  Removed: Refresh plus, Dulcolax suppository, Victoza, Miralax    Actions taken by pharmacist (provider contacted, etc):Left stick note to Provider and message sent to MD     Additional medication history information:None    Medication reconciliation/reorder completed by provider prior to medication history?  N         Prior to Admission medications    Medication Sig Last Dose Taking? Auth Provider Long Term End Date   acetaminophen (TYLENOL) 500 MG tablet Take 1,000 mg by mouth daily as needed for mild pain Unknown at Unknown time Yes Unknown, Entered By History     amLODIPine (NORVASC) 10 MG tablet Take 10 mg by mouth daily 8/30/2022 at Unknown time Yes Unknown, Entered By History No    artificial saliva (BIOTENE MT) AERS spray Take 1 spray by mouth 3 times daily as needed for dry mouth Unknown at Unknown time Yes Unknown, Entered By History     aspirin 81 MG EC tablet Take 81 mg by mouth daily 8/30/2022 at Unknown time Yes Unknown, Entered By History     atorvastatin (LIPITOR) 40 MG tablet Take 40 mg by mouth daily 8/30/2022 at Unknown time Yes Unknown, Entered By History No    carboxymethylcellulose PF (REFRESH PLUS) 0.5 % ophthalmic solution Apply 2 drops to eye every hour as needed for dry eyes 8/29/2022 at Unknown time Yes Gato  MD Bianca     diclofenac (VOLTAREN) 1 % topical gel Apply 2 g topically 4 times daily as needed for moderate pain Apply to back and right hip 8/30/2022 at AM Yes Unknown, Entered By History     docusate sodium (COLACE) 100 MG capsule Take 100 mg by mouth daily 8/30/2022 at Unknown time Yes Unknown, Entered By History     ezetimibe (ZETIA) 10 MG tablet Take 10 mg by mouth daily  8/30/2022 at Unknown time Yes Reported, Patient Yes    gabapentin (NEURONTIN) 300 MG capsule Take 300 mg by mouth 3 times daily 8/30/2022 at Unknown time Yes Unknown, Entered By History Yes    levETIRAcetam (KEPPRA) 750 MG tablet Take 750 mg by mouth 2 times daily 8/30/2022 at Unknown time Yes Unknown, Entered By History     levothyroxine (SYNTHROID/LEVOTHROID) 50 MCG tablet Take 50 mcg by mouth daily  8/30/2022 at AM Yes Reported, Patient Yes    Lidocaine (LIDOCARE) 4 % Patch Place 1 patch onto the skin every 24 hours Apply to back    To prevent lidocaine toxicity, patient should be patch free for 12 hrs daily. 8/30/2022 at Unknown time Yes Unknown, Entered By History     metoprolol tartrate (LOPRESSOR) 50 MG tablet Take 1 tablet (50 mg) by mouth 2 times daily 8/30/2022 at Unknown time Yes Bianca Hoskins MD Yes    multivitamin (CENTRUM SILVER) tablet Take 1 tablet by mouth daily 8/30/2022 at Unknown time Yes Unknown, Entered By History     naproxen sodium 220 MG capsule Take 220 mg by mouth 2 times daily (with meals) 8/30/2022 at Unknown time Yes Unknown, Entered By History     nitroGLYcerin (NITROSTAT) 0.4 MG sublingual tablet Place 0.4 mg under the tongue every 5 minutes as needed for chest pain For chest pain place 1 tablet under the tongue every 5 minutes for 3 doses. If symptoms persist 5 minutes after 1st dose call 911. Unknown at Unknown time Yes Unknown, Entered By History No    oxyCODONE (ROXICODONE) 5 MG tablet Take 5 mg by mouth every 6 hours as needed for pain Unknown at Unknown time Yes Unknown, Entered By History      pantoprazole (PROTONIX) 40 MG EC tablet Take 40 mg by mouth daily 8/30/2022 at AM Yes Unknown, Entered By History     sertraline (ZOLOFT) 25 MG tablet Take 50 mg by mouth daily 8/30/2022 at AM Yes Unknown, Entered By History Yes    Soap & Cleansers (DIONE CLEANSING CREAM EX) Externally apply topically 2 times daily Apply topically to coccyx Unknown at Unknown time Yes Unknown, Entered By History     valsartan (DIOVAN) 80 MG tablet Take 160 mg by mouth 2 times daily 8/30/2022 at Unknown time Yes Unknown, Entered By History No

## 2022-08-31 NOTE — PLAN OF CARE
VSS, Disorientated to situation. LS coarse crackles BL in bases, on 2L NC. Denies pain, has red blanchable spot on sacrum, mepilex in place. Incontinent of bowel and bladder. IV lasix, Zosyn and azithromycin given. K+ and mag AM recheck. Had swallow study performed, on soft bite sized foods with thin liquid diet. Assist 2 lift. Continue to monitor.

## 2022-08-31 NOTE — H&P
[facility]  Hospitalist History & Physical     Assessment & Plan     ASSESSMENT:    72M with hx of stroke resulting in chronic left-sided paraplegia, HTN, seizure disorder, and HFpEF presents with sepsis and respiratory failure 2/2 multifocal pneumonia.    PLAN:    -Sepsis 2/2 Community Acquired vs Aspiration Pneumonia:     Ceftriaxone + Azithromycin   F/u blood cultures   Very well could be aspiration related but anaerobe coverage no longer recommended per updated PNA guidelines   SLP consult    -Acute Hypoxic Respiratory Failure: Suspect 2/2 PNA + AE CHF. Tx of these conditions. Wean O2 as able.    -AE HFpEF: Will start lasix 20mg IV bid starting tomorrow after sepsis physiology resolves.    -Acute Cystitis vs Asymptomatic Bacteruria: Ceftriaxone. F/u cx.    -Chronic Stroke: Home ASA + Statin. Of note, ED note says patient is on warfarin - verified w/ family and chart that this is not the case    -Essential HTN: Continue home medications once verified.    -Seizure Disorder: Home Keppra.    -DVT Prophy: LMWH.    -Disposition: Med-Surg.      Nacho Graff MD    History of Present Illness     Jorje Teran is a 72 year old with hx of stroke resulting in chronic left-sided paraplegia, HTN, seizure disorder, and HFpEF presents with fevers. Patient is largely bed/wheelchair-bound due to left-sided paraplegia stroke in 2020. Lives at home with healthcare aids. Also has left facial droop and photosensitivity. Had dysphagia issues after stroke but per patient and son at bedside these have completely resolved - says he is very careful eating and denies choking on food.    Patient was in his normal state of health until earlier today when his healthcare aids noted him to have less energy, poor oral intake, and fevers. Sent him to the ED. Currently patient endorses mild SOB and infrequent non-productive cough. Does have worsening swelling in his LEs. Denies n/v, abdominal pain, or diarrhea/constipation  issues. In the ED, 's ST. Temp of 102.2. BNP 2k. Procal .1. WBC 8.0. COVID-19 neg. CXR with multifocal infiltrates. Given antibiotics and admitted to medicine.    Review of Systems     A Comprehensive greater than 10 system review of systems was carried out.  Pertinent positives and negatives are noted above.  Otherwise negative for contributory information.     Past Medical History     Past Medical History:   Diagnosis Date     Cerebrovascular accident (CVA) due to occlusion of right middle cerebral artery (H)      Essential hypertension      Mixed hyperlipidemia      Medications   Med Reconciliation has not been completed as of the time of this note     Past Surgical History     Past Surgical History:   Procedure Laterality Date     IR GASTROSTOMY TUBE PERCUTANEOUS PLCMNT  9/22/2020     Family History     Family History   Problem Relation Age of Onset     Cerebrovascular Disease No family hx of      Allergies   No Known Allergies    Social History     Social History     Tobacco Use     Smoking status: Not on file     Smokeless tobacco: Not on file   Substance Use Topics     Alcohol use: Yes     Physical Exam   Blood pressure 135/68, pulse 112, temperature 98.6  F (37  C), temperature source Oral, resp. rate 13, SpO2 90 %.    General: Ill appearing, cooperative with exam, in NAD.  HEENT: Atraumatic. No erythema in posterior pharynx.  Lymph: No cervical or inguinal lymphadenopathy.  Cardiac: RRR. No murmurs.  Lungs: Coarse breath sounds throughout. Nl WOB.  Abd: Non-tender. No rebound or gaurding. Nl bowel sounds.  Ext: 2+ pitting edema. 2+ pulses.  Skin: No rashes, abrasions, or contusions.  Psych: A&Ox3. Nl affect.  Neuro: Dense paraplegia in LUE and LLE. Facial droop on left. Other CNs intact.     Labs & Imaging     Reviewed and Pertinent results discussed in assessment and plan.

## 2022-08-31 NOTE — PROGRESS NOTES
"Video Fluoroscopic Swallow Study (VFSS):    Mild oropharyngeal dysphagia on VFSS.     Recommendations:   - Soft/bite sized solids (IDDSI 6), thin liquids (IDDSI 0) with 1:1 assist given cues needed for strategies.   - Swallow Strategies: 1) small single bites 2) small single sips 3) swallow 2 times for every bite/sip to clear residue from throat 4) alternate between solids/liquids (take a sip of liquid after every bite) 5) periodic cough and re-swallow to clear airway 6) add extra moisture to solids (sauces, gravies, condiments) 7) excellent oral hygeine     08/31/22 1403   General Information   Onset of Illness/Injury or Date of Surgery 08/30/22   Referring Physician Dr. Ledbteter   Patient/Family Therapy Goal Statement (SLP) To get better and go home   Pertinent History of Current Problem   Per hospitalist \"72M with hx of stroke resulting in chronic left-sided paraplegia, HTN, seizure disorder, and HFpEF presents with sepsis and respiratory failure 2/2 multifocal pneumonia\" SLP consulted given c/o dysphagia/aspiration - VFSS pursued for further assessment     General Observations Pt alert, upright in VFSS chair, 2 L 02 via NC   General Swallowing Observations   Swallowing Evaluation Videofluoroscopic swallow study (VFSS)   VFSS Evaluation   Radiologist Dr. Klein   Views Taken left lateral   Physical Location of Procedure [facility], radiology dept, fluoroscopy suite   VFSS Textures Trialed thin liquids;pureed;solid foods   VFSS Eval: Thin Liquid Texture Trial   Mode of Presentation, Thin Liquid cup;straw   Order of Presentation 1, 2. 3, 4, 5, 7, 9   Preparatory Phase Poor bolus control   Oral Phase, Thin Liquid Premature pharyngeal entry   Pharyngeal Phase, Thin Liquid Delayed swallow reflex;Residue in valleculae;Residue in pyriform sinus;UES dysfunction   Rosenbek's Penetration Aspiration Scale: Thin Liquid Trial Results 3 - contrast remains above the vocal cords, visible residue remains " (penetration)   Diagnostic Statement   Pt with varrying degrees of laryngeal penetration with thin liquids: No laryngeal penetration with small-normal size single sips of thin liquids via cup/straw in isolation; trace before/during flash penetration with small-normal size single sip of thin liquid as a liquid wash following solids 2/2 pharyngeal residuals/increased spillage ; trace before/during penetration with residue above the cords with large single sip of thin liquids as a liquid wash following solids 2/2 pharyngeal residuals/increased spillage and consecutive sips in a row given increased spillage/pyriform sinus residuals. Pt able to follow commands to cough following penetration with residue, cough was strong encough to clear supraglottic residuals.     VFSS Evaluation: Puree Solid Texture Trial   Mode of Presentation, Puree spoon   Order of Presentation 6   Preparatory Phase WFL   Oral Phase, Puree Delayed AP movement   Pharyngeal Phase, Puree Residue in valleculae;Residue in pyriform sinus;UES dysfunction   Rosenbek's Penetration Aspiration Scale: Puree Food Trial Results 1 - no aspiration, contrast does not enter airway   Diagnostic Statement Mild vallecular and pyriform sinus residuals   VFSS Evaluation: Solid Food Texture Trial   Order of Presentation 8   Preparatory Phase WFL   Oral Phase, Solid Delayed AP movement   Pharyngeal Phase, Solid Residue in valleculae;Residue in pyriform sinus;UES dysfunction   Rosenbek's Penetration Aspiration Scale: Solid Food Trial Results 1 - no aspiration, contrast does not enter airway   Diagnostic Statement Moderate vallecular and mild pyriform sinus residuals   Swallowing Recommendations   Diet Consistency Recommendations soft & bite-sized (level 6);thin liquids (level 0)   Supervision Level for Intake 1:1 supervision needed   Mode of Delivery Recommendations bolus size, small;food moistened;slow rate of intake   Swallowing Maneuver Recommendations alternate food and  liquid intake;double dry swallow;supraglottic swallow   Monitoring/Assistance Required (Eating/Swallowing) stop eating activities when fatigue is present;monitor for cough or change in vocal quality with intake   Recommended Feeding/Eating Techniques (Swallow Eval) maintain upright sitting position for eating;maintain upright posture during/after eating for 30 minutes;minimize distractions during oral intake;moisten oral mucosa prior to intake   Medication Administration Recommendations, Swallowing (SLP) whole as tolerated   General Therapy Interventions   Planned Therapy Interventions Dysphagia Treatment   Dysphagia treatment Oropharyngeal exercise training;Modified diet education;Instruction of safe swallow strategies;Compensatory strategies for swallowing   Clinical Impression   Criteria for Skilled Therapeutic Interventions Met (SLP Eval) Yes, treatment indicated   SLP Diagnosis mild oropharyngeal dysphagia   Risks & Benefits of therapy have been explained evaluation/treatment results reviewed;care plan/treatment goals reviewed;risks/benefits reviewed;current/potential barriers reviewed;participants voiced agreement with care plan;participants included;patient;son   Clinical Impression Comments   Video fluoroscopic swallow study completed with thin liquids, puree solids, regular solids. Pt currently presents with mild oropharyngeal dysphagia. Oral phase notable for mildly prolonged mastication and oral propulsion with solids, reduced posterior oral containment with thin liquids resulting in premature bolus spillage to the pyriform sinuses + occassional mild delay. Base of tongue retraction (BOTR) mild-mod reduced, hyolaryngeal elevation/excursion mild reduced, epiglottic inversion complete, mild reduced upper esophageal sphincter (UES) relaxation during the swallow.     Residuals: Mild (liquids/puree) to moderate (regular solids) valleclar residuals present 2/2 reduced BOTR and pharyngeal constriction. Mild  pyriform sinus residuals across all consistencies 2/2 reduced UES function. Pt inconsistently able to complete extra dry swallow on command, was beneficial when able to complete. Liquid wash reduces but doesn't fully clear solid residuals.     Laryngeal penetration/aspiration scale: Pt with varrying degrees of laryngeal penetration with thin liquids:   - No laryngeal penetration with small-normal size single sips of thin liquids via cup/straw in isolation  - Trace before/during flash penetration with small-normal size single sip of thin liquid as a liquid wash following solids 2/2 pharyngeal residuals/increased spillage   Trace before/during penetration with residue above the cords with large single sip of thin liquids as a liquid wash following solids 2/2 pharyngeal residuals/increased spillage and consecutive sips in a row given increased spillage/pyriform sinus residuals.   - Pt able to follow commands to cough following penetration with residue, cough was strong encough to clear supraglottic residuals.  - No laryngeal penetration with solids.    Dysphagia tx completed after assessment in pt's room. Education provided to pt/son re: current swallow function/deficits, strategies recommended with rationale, diet modifications recommended with rationale. Trained in swallow strategies. Written handouts provided for reinforcement.      SLP Discharge Planning   SLP Discharge Recommendation home;home with home care speech therapy   SLP Rationale for DC Rec Pt near baseline with chronic mild dysphagia s/p CVA - could consider  SLP for strategy reinforcement, oropharyngeal exercise program re-introduction.   SLP Brief overview of current status  Mild oropharyngeal dysphagia on VFSS. Recommendations: soft/bite sized solids (IDDSI 6), thin liquids (IDDSI 0) with 1:1 assist given cues needed for strategies. Swallow Strategies: 1) small single bites 2) small single sips 3) swallow 2 times for every bite/sip to clear residue  from throat 4) alternate between solids/liquids (take a sip of liquid after every bite) 5) periodic cough and re-swallow to clear airway 6) add extra moisture to solids (sauces, gravies, condiments) 7) excellent oral hygeine    Total Evaluation Time   Total Evaluation Time (Minutes) 45   SLP Goals   Therapy Frequency (SLP Eval) 5 times/wk   SLP Predicted Duration/Target Date for Goal Attainment 09/07/22   SLP Goals Swallow   SLP: Safely tolerate diet without signs/symptoms of aspiration Regular diet;Thin liquids;With use of swallow precautions;Independently

## 2022-08-31 NOTE — PLAN OF CARE
Goal Outcome Evaluation:      Pt A&O x 2. Disoriented to time and place. LS Coarse crackles. Assist x 2 with lift device. On 2L O2 via NC, O2 Sat 94-95%. Has Left facial droop. Left sided weakness at baseline. Wheelchair/bed bound at baseline to left-sided paraplegia stroke in 2020. Lives at home with healthcare aids. Edema BLE 1-2+. On Zithromax and Rocephin for PNA. on Lovenox, ASA, Keppra and Lasix 20mg. On K+ and Mg protocol. K+ 3.3  Discharge Disposition: TBD  Discharge Time: TBD

## 2022-08-31 NOTE — PROGRESS NOTES
[facility]    Medicine Progress Note - Hospitalist Service    Date of Admission:  8/30/2022    Assessment & Plan        72M with hx of stroke resulting in chronic left-sided paraplegia, HTN, seizure disorder, and HFpEF presents with sepsis and respiratory failure 2/2 multifocal pneumonia.     Sepsis   Community Acquired vs Aspiration Pneumonia  -CXR with consolidative opacity both lung bases but R>L, mild leukocytosis with procal 0.10. Lactic ok  -Ceftriaxone + Azithromycin and f/u blood cultures  -SLP consulted, await video swallow to rule out dysphagia/aspiration     Acute Hypoxic Respiratory Failure  -Suspect 2/2 PNA and posisble AE CHF  -cont antibiotics, lasix and wean O2 as able.     Acute on chronic diastolic HF  -ECHO 9/7/202 w/EF 55-60%, difficult study, no significant valve dx  -cont IV lasix w/strict I/O's     Abnormal UA  - Ceftriaxone. F/u cx.     HX CVA w/chronic L hemiplegia  - Home ASA + Statin  -pt not on warfarin     Essential HTN  -Continue home medications once verified.     Seizure Disorder  -Home Keppra.     Diet: Combination Diet Regular Diet Adult; Soft and Bite Sized Diet (level 6); Thin Liquids (level 0)    DVT Prophylaxis: Enoxaparin (Lovenox) SQ  Hayes Catheter: Not present  Central Lines: None  Cardiac Monitoring: None  Code Status: No CPR- Do NOT Intubate      Disposition Plan   Possibly next 24-48 hours, keep overnight             The patient's care was discussed with the Bedside Nurse and Patient.    Josiah Ledbetter DO  Hospitalist Service  [facility]  Securely message with the [employer] Web Console (learn more here)  Text page via [employer] Paging/Directory   ______________________________________________________________________    Interval History   Denies any difficulty swallowing or coughing after eating but was seen by ST and recommend video swallow. Has been afebrile and is feeling better today, cough mostly non productive    Data reviewed  today: I reviewed all medications, new labs and imaging results over the last 24 hours.    Physical Exam   Vital Signs: Temp: 97.8  F (36.6  C) Temp src: Axillary BP: 139/68 Pulse: 61   Resp: 18 SpO2: 99 % O2 Device: Nasal cannula Oxygen Delivery: 2 LPM  Weight: 172 lbs 12.8 oz  Constitutional: awake, alert, cooperative and no apparent distress  Eyes: pupils equal, round and reactive to light and conjunctiva normal  ENT: normocepalic, without obvious abnormality, atramatic  Respiratory: no increased work of breathing, good air exchange, ronchi on R side  Cardiovascular: regular rate and rhythm and no murmur noted  GI: normal bowel sounds, soft, non-distended and non-tender  Skin: no bruising or bleeding  Neurologic: alert, oriented x4, chronic L hemiparesis    Data   Recent Labs   Lab 08/31/22  0620 08/30/22  1917   WBC 12.0* 8.0   HGB 8.1* 10.2*   MCV 93 93   * 188   INR  --  1.17*    139   POTASSIUM 3.6 3.3*   CHLORIDE 105 100   CO2 29 31*   BUN 24.4* 22.4   CR 1.14 1.19*   ANIONGAP 8 8   SELENA 8.2* 8.7*   GLC 94 106*   ALBUMIN  --  3.6   PROTTOTAL  --  6.9   BILITOTAL  --  0.7   ALKPHOS  --  80   ALT  --  21   AST  --  23     Recent Results (from the past 24 hour(s))   XR Chest 1 View    Narrative    EXAM: XR CHEST 1 VIEW  LOCATION: [facility]  DATE/TIME: 8/30/2022 8:21 PM    INDICATION: Cough and fever.  COMPARISON: None.      Impression    IMPRESSION: There is consolidative opacity within both lung bases, greatest on the right. Mild diffuse interstitial opacities are additionally noted. Findings may reflect a combination of multifocal pneumonia and pulmonary edema. Continued imaging   follow-up to resolution recommended.     No pleural effusion or pneumothorax.    Unchanged moderate cardiomegaly. Postsurgical changes of median sternotomy and coronary artery bypass grafting. Atheromatous calcifications of the thoracic aorta.   CT Head w/o Contrast    Narrative    EXAM: CT HEAD  W/O CONTRAST  LOCATION: [facility]  DATE/TIME: 8/30/2022 9:47 PM    INDICATION: Tremor.  COMPARISON: 09/10/2020.  TECHNIQUE: Routine CT Head without IV contrast. Multiplanar reformats. Dose reduction techniques were used.    FINDINGS:  INTRACRANIAL CONTENTS: There is no acute hemorrhage, extra-axial fluid collection or mass/mass effect. There is a larger region of encephalomalacia involving the majority of the right MCA distribution. There is associated ex vacuo dilatation of the right   lateral ventricle. No acute large territory infarction. Mild presumed chronic small vessel ischemic changes. Mild generalized volume loss. No hydrocephalus.     VISUALIZED ORBITS/SINUSES/MASTOIDS: Prior bilateral cataract surgery. Visualized portions of the orbits are otherwise unremarkable. No paranasal sinus mucosal disease. No middle ear or mastoid effusion.    BONES/SOFT TISSUES: No acute abnormality.      Impression    IMPRESSION:  1.  No CT evidence for acute intracranial process.  2.  Large chronic right MCA territory infarct.  3.  Brain atrophy and presumed chronic microvascular ischemic changes as above.   XR Video Swallow with SLP or OT    Narrative    VIDEO SWALLOW WITH SLP OR OT   8/31/2022 1:58 PM     HISTORY: Dysphagia, complains of aspiration, Past medical history of  CVA.    COMPARISON: None.    FINDINGS:  A swallow study was performed in coordination with a member  from the speech pathology service. Total fluoroscopy time was 1.1  minutes. 9 spot fluoroscopic images, and/or cine clips were obtained.  1 view in lateral projection. Various consistencies of barium were  given to the patient to swallow, and the swallowing mechanism was  observed using fluoroscopy.    Penetration when swallowing thin liquids rapidly. No convincing  aspiration. Moderate residual material after the primary swallow.      Impression    IMPRESSION:  1. Penetration when swallowing thin liquids. No convincing  aspiration.  2. Please refer to the speech pathology report for further details.    This study includes only the cervical esophagus.     Medications       amLODIPine  10 mg Oral Daily     aspirin  81 mg Oral Daily     atorvastatin  40 mg Oral QPM     azithromycin  500 mg Intravenous Q24H     cefTRIAXone  2 g Intravenous Q24H     enoxaparin ANTICOAGULANT  40 mg Subcutaneous Q24H     furosemide  20 mg Intravenous Q12H     levETIRAcetam  750 mg Oral BID

## 2022-08-31 NOTE — ED NOTES
[facility]  ED Nurse Handoff Report    Jorje Teran is a 72 year old male   ED Chief complaint: Hypertension, Pain, and Tachycardia  . ED Diagnosis:   Final diagnoses:   Multifocal pneumonia   Fever in adult   Cough   Weakness     Allergies: No Known Allergies    Code Status: Full Code  Activity level - Baseline/Home:  Total Care. Activity Level - Current:   Total Care. Lift room needed: No. Bariatric: No   Needed: No   Isolation: No. Infection: Not Applicable.     Vital Signs:   Vitals:    08/30/22 2000 08/30/22 2042 08/30/22 2045 08/30/22 2100   BP: (!) 178/85  (!) 141/71 135/68   Pulse: 117  112 112   Resp: 16  12 13   Temp:  98.6  F (37  C)     TempSrc:  Oral     SpO2:   95% 90%       Cardiac Rhythm:  ,      Pain level:    Patient confused: Yes. Patient Falls Risk: Yes.   Elimination Status: Has voided   Patient Report - Initial Complaint: Hypertension, tachycardia. Focused Assessment: Pt found to be hypertensive, tachycardic with fever. Pt AxOx1 with weakness to left side, at baseline. Pt lung sounds coarse crackles throughout, denies swallowing difficulties and swallowed medications and water without issue. Some tremors noted to right side, unclear if this is new or not, patient is a poor historian. Fever relieved with Tylenol.  Pt placed on O2 upon arrival, maintaining O2 mid 90's on 2lpm via NC   Tests Performed: EKG, Labs, CT. Abnormal Results:   Labs Ordered and Resulted from Time of ED Arrival to Time of ED Departure   COMPREHENSIVE METABOLIC PANEL - Abnormal       Result Value    Sodium 139      Potassium 3.3 (*)     Creatinine 1.19 (*)     Urea Nitrogen 22.4      Chloride 100      Carbon Dioxide (CO2) 31 (*)     Anion Gap 8      Glucose 106 (*)     Calcium 8.7 (*)     Protein Total 6.9      Albumin 3.6      Bilirubin Total 0.7      Alkaline Phosphatase 80      AST 23      ALT 21      GFR Estimate 64     ROUTINE UA WITH MICROSCOPIC REFLEX TO CULTURE - Abnormal    Color Urine  Yellow      Appearance Urine Slightly Cloudy (*)     Glucose Urine Negative      Bilirubin Urine Negative      Ketones Urine Negative      Specific Gravity Urine 1.014      Blood Urine Small (*)     pH Urine 6.5      Protein Albumin Urine 20  (*)     Urobilinogen Urine 2.0      Nitrite Urine Negative      Leukocyte Esterase Urine Large (*)     Bacteria Urine Many (*)     RBC Urine 10 (*)     WBC Urine >182 (*)     Squamous Epithelials Urine <1     CBC WITH PLATELETS AND DIFFERENTIAL - Abnormal    WBC Count 8.0      RBC Count 3.62 (*)     Hemoglobin 10.2 (*)     Hematocrit 33.8 (*)     MCV 93      MCH 28.2      MCHC 30.2 (*)     RDW 15.6 (*)     Platelet Count 188      % Neutrophils 88      % Lymphocytes 5      % Monocytes 4      % Eosinophils 3      % Basophils 0      % Immature Granulocytes 0      NRBCs per 100 WBC 0      Absolute Neutrophils 7.0      Absolute Lymphocytes 0.4 (*)     Absolute Monocytes 0.3      Absolute Eosinophils 0.3      Absolute Basophils 0.0      Absolute Immature Granulocytes 0.0      Absolute NRBCs 0.0     TROPONIN T, HIGH SENSITIVITY - Abnormal    Troponin T, High Sensitivity 49 (*)    ISTAT GASES LACTATE VENOUS POCT - Abnormal    Lactic Acid POCT 1.3      Bicarbonate Venous POCT 32 (*)     O2 Sat, Venous POCT 36 (*)     pCO2V Venous POCT 58 (*)     pH Venous POCT 7.35      pO2 Venous POCT 23 (*)    PROCALCITONIN - Abnormal    Procalcitonin 0.10 (*)    NT PROBNP INPATIENT - Abnormal    N terminal Pro BNP Inpatient 2,471 (*)    INR - Abnormal    INR 1.17 (*)    INFLUENZA A/B & SARS-COV2 PCR MULTIPLEX - Normal    Influenza A PCR Negative      Influenza B PCR Negative      RSV PCR Negative      SARS CoV2 PCR Negative     TSH WITH FREE T4 REFLEX - Normal    TSH 1.85     BLOOD CULTURE   BLOOD CULTURE   URINE CULTURE     XR Chest 1 View   Final Result   IMPRESSION: There is consolidative opacity within both lung bases, greatest on the right. Mild diffuse interstitial opacities are additionally  noted. Findings may reflect a combination of multifocal pneumonia and pulmonary edema. Continued imaging    follow-up to resolution recommended.       No pleural effusion or pneumothorax.      Unchanged moderate cardiomegaly. Postsurgical changes of median sternotomy and coronary artery bypass grafting. Atheromatous calcifications of the thoracic aorta.      CT Head w/o Contrast    (Results Pending)        Treatments provided: Medications, labs, oxygen   Family Comments: Family at bedside  OBS brochure/video discussed/provided to patient:  No  ED Medications:   Medications   azithromycin 500 mg (ZITHROMAX) in 0.9% NaCl 250 mL intermittent infusion 500 mg (has no administration in time range)   0.9% sodium chloride BOLUS (0 mLs Intravenous Stopped 8/30/22 2042)   acetaminophen (TYLENOL) tablet 1,000 mg (1,000 mg Oral Given 8/30/22 1956)   cefTRIAXone (ROCEPHIN) 2 g vial to attach to  ml bag for ADULTS or NS 50 ml bag for PEDS (2 g Intravenous New Bag 8/30/22 2048)     Drips infusing:  Yes  For the majority of the shift, the patient's behavior Green. Interventions performed were NA.    Sepsis treatment initiated: No     Patient tested for COVID 19 prior to admission: YES    ED Nurse Name/Phone Number: Joceline Caldera RN,   9:24 PM    RECEIVING UNIT ED HANDOFF REVIEW    Above ED Nurse Handoff Report was reviewed: Yes  Reviewed by: Mariely Snu RN on August 30, 2022 at 10:27 PM

## 2022-08-31 NOTE — ED TRIAGE NOTES
Pt arrives via EMS from home where he has caretakers. EMS was called due to hypertension and tachycardic. PT only complaints were pain in the right calf and neck. PT altered at baseline due to previous stroke. PT ABC's intact. AxOx2 Pt tachycardic and hypertensive. EMS also reports that pt was in the 80's on room air. PT on RA upon arrival and at 91%

## 2022-08-31 NOTE — ED PROVIDER NOTES
History   Chief Complaint:  Hypertension, Pain, and Tachycardia       HPI   Jorje Teran is a 72 year old male anticoagulated on Warfarin with history of right MCA CVA, type 2 diabetes, and CAD, who presents with hypertension and tachycardia. The patient lives at home with 24/7 caretaker aides. Today they reported to son that the patient was more hypertensive, had cough starting yesterday and had chills. EMS reports that he was hypertensive and tachycardia en route and was reporting some neck pain and leg pain. The patient on arrival reports that he is feeling good and has no pain currently. The patient denies any dysuria, hematuria, chest pain, shortness of breath, abdominal pain, nausea, vomiting, diarrhea, and headache. The patient's son reports that his blood pressure has been higher the past couple weeks so they have been increasing his medications. The patient at baseline has some left sided deficits post stroke. The patient is not on any home oxygen, and per EMS report, he was in the 80s on room air. Patient also notes photophobia, but he denies headache.       Review of Systems   Constitutional: Positive for chills.   HENT: Negative for sore throat.    Eyes: Positive for photophobia.   Respiratory: Positive for cough. Negative for shortness of breath.    Cardiovascular: Negative for chest pain.   Gastrointestinal: Negative for abdominal pain, nausea and vomiting.   Neurological: Negative for headaches.   All other systems reviewed and are negative.      Allergies:  The patient has no known allergies.     Medications:  Norvasc   Aspirin   Lipitor   Zetia   Allegra   Synthroid   Victoza   Glucophage   Lopressor   Nitrostat   Protonix   Diovan   Warfarin  Oxycodone     Past Medical History:     CVA   Right middle cerebral artery stroke   Obesity   Type 2 diabetes   Coronary arthrosclerosis   Hypertension   Hyperlipidemia   Hypothyroidism   SHELDON   Tubular adenoma colon     Past Surgical History:    Cardiac  Bypass     Social History:  Lives at home with 24/7 aides.  PCP: Susana Esparza       Physical Exam     Patient Vitals for the past 24 hrs:   BP Temp Temp src Pulse Resp SpO2   08/30/22 2042 -- 98.6  F (37  C) Oral -- -- --   08/30/22 2000 (!) 178/85 -- -- 117 16 --   08/30/22 1908 -- (!) 102.2  F (39  C) Oral -- -- --   08/30/22 1906 (!) 183/99 -- -- (!) 121 26 91 %       Physical Exam  Vitals: Reviewed, as above.  Notable for fever, tachycardia, and hypertension.  General: Alert and oriented, in moderate distress.  Elderly male resting on bed.  Appears uncomfortable and flushed.  Skin: Warm and well-perfused. No rashes.   HEENT: Head: Normocephalic, atraumatic. Facial features symmetric. Eyes: Conjunctiva pink, sclera white. EOMs grossly intact. Ears: Auricles without lesion, erythema, or edema. Mouth and throat: Lips are moist with no chapping, lesions, or edema, Buccal mucosa is pink and dry without lesions. Oropharyngeal mucosa is pink and dry with no erythema, edema, or exudate.   Neck: Supple with no lymphadenopathy. Full ROM.   Pulmonary: Chest wall expansion symmetric with no increased work of breathing. Lungs with soft rhonchi on auscultation bilaterally.   Cardiovascular: Heart RRR with soft systolic murmur. 2+ radial and tibialis posterior pulses bilaterally. Non-pitting edema to bilateral lower extremities.  Abdominal: No hernias or distension. Bowel sounds present and physiologic. Abdomen is soft and nontender to light and deep palpation in all 4 quadrants with no guarding or rebound. No masses or organomegaly.   Musculoskeletal: Moves all extremities spontaneously.  Psych: Affect appropriate.  Answers questions appropriately. Patient appears calm.       Emergency Department Course   ECG  ECG results from 08/30/22   EKG 12-lead, tracing only     Value    Systolic Blood Pressure     Diastolic Blood Pressure     Ventricular Rate 117    Atrial Rate 117    CO Interval 148    QRS Duration 100    QT  334    QTc 465    P Axis 17    R AXIS -4    T Axis 6    Interpretation ECG      Sinus tachycardia  Septal infarct , age undetermined  Inferior infarct , age undetermined  Abnormal ECG  When compared with ECG of 11-SEP-2020 08:23,  Incomplete right bundle branch block is no longer Present  Septal infarct is now Present  Inferior infarct is now Present         Imaging:  XR Chest 1 View   Final Result   IMPRESSION: There is consolidative opacity within both lung bases, greatest on the right. Mild diffuse interstitial opacities are additionally noted. Findings may reflect a combination of multifocal pneumonia and pulmonary edema. Continued imaging    follow-up to resolution recommended.       No pleural effusion or pneumothorax.      Unchanged moderate cardiomegaly. Postsurgical changes of median sternotomy and coronary artery bypass grafting. Atheromatous calcifications of the thoracic aorta.      Reading per radiology.     Laboratory:  Labs Ordered and Resulted from Time of ED Arrival to Time of ED Departure   COMPREHENSIVE METABOLIC PANEL - Abnormal       Result Value    Sodium 139      Potassium 3.3 (*)     Creatinine 1.19 (*)     Urea Nitrogen 22.4      Chloride 100      Carbon Dioxide (CO2) 31 (*)     Anion Gap 8      Glucose 106 (*)     Calcium 8.7 (*)     Protein Total 6.9      Albumin 3.6      Bilirubin Total 0.7      Alkaline Phosphatase 80      AST 23      ALT 21      GFR Estimate 64     ROUTINE UA WITH MICROSCOPIC REFLEX TO CULTURE - Abnormal    Color Urine Yellow      Appearance Urine Slightly Cloudy (*)     Glucose Urine Negative      Bilirubin Urine Negative      Ketones Urine Negative      Specific Gravity Urine 1.014      Blood Urine Small (*)     pH Urine 6.5      Protein Albumin Urine 20  (*)     Urobilinogen Urine 2.0      Nitrite Urine Negative      Leukocyte Esterase Urine Large (*)     Bacteria Urine Many (*)     RBC Urine 10 (*)     WBC Urine >182 (*)     Squamous Epithelials Urine <1     CBC  WITH PLATELETS AND DIFFERENTIAL - Abnormal    WBC Count 8.0      RBC Count 3.62 (*)     Hemoglobin 10.2 (*)     Hematocrit 33.8 (*)     MCV 93      MCH 28.2      MCHC 30.2 (*)     RDW 15.6 (*)     Platelet Count 188      % Neutrophils 88      % Lymphocytes 5      % Monocytes 4      % Eosinophils 3      % Basophils 0      % Immature Granulocytes 0      NRBCs per 100 WBC 0      Absolute Neutrophils 7.0      Absolute Lymphocytes 0.4 (*)     Absolute Monocytes 0.3      Absolute Eosinophils 0.3      Absolute Basophils 0.0      Absolute Immature Granulocytes 0.0      Absolute NRBCs 0.0     TROPONIN T, HIGH SENSITIVITY - Abnormal    Troponin T, High Sensitivity 49 (*)    ISTAT GASES LACTATE VENOUS POCT - Abnormal    Lactic Acid POCT 1.3      Bicarbonate Venous POCT 32 (*)     O2 Sat, Venous POCT 36 (*)     pCO2V Venous POCT 58 (*)     pH Venous POCT 7.35      pO2 Venous POCT 23 (*)    INFLUENZA A/B & SARS-COV2 PCR MULTIPLEX - Normal    Influenza A PCR Negative      Influenza B PCR Negative      RSV PCR Negative      SARS CoV2 PCR Negative     TSH WITH FREE T4 REFLEX - Normal    TSH 1.85     BLOOD CULTURE   BLOOD CULTURE   URINE CULTURE      Emergency Department Course:       Reviewed:  I reviewed nursing notes, vitals and past medical history    Assessments:  ED Course as of 08/30/22 2050   Tue Aug 30, 2022   1903 I obtained history and examined the patient, as noted above.   2036 I rechecked the patient and explained findings.   2043 I spoke with Dr. Graff, hospitalist, regarding the patient.  He will accept the patient for admission.        Interventions:  Medications   cefTRIAXone (ROCEPHIN) 2 g vial to attach to  ml bag for ADULTS or NS 50 ml bag for PEDS (has no administration in time range)   azithromycin 500 mg (ZITHROMAX) in 0.9% NaCl 250 mL intermittent infusion 500 mg (has no administration in time range)   0.9% sodium chloride BOLUS (0 mLs Intravenous Stopped 8/30/22 2042)   acetaminophen (TYLENOL)  tablet 1,000 mg (1,000 mg Oral Given 8/30/22 1956)     Disposition:  The patient was admitted to the hospital under the care of Dr. Graff.     Impression & Plan     CMS Diagnoses: None    Medical Decision Making:  Jorje is a 72 year old male anticoagulated on Warfarin with history of right MCA CVA, type 2 diabetes, and CAD, who presents with hypertension and tachycardia.  Please see HPI and physical exam for full details.  Differential diagnosis includes COVID, flu, pneumonia, intra-abdominal infection, UTI, cellulitis, thyroid storm, among others.  Patient has a physical exam concerning for infection with high fever and tachycardia on initial evaluation.  His fever responded well to Tylenol.  Chest x-ray reveals multifocal pneumonia.  I spoke with patient's son regarding aspiration concerns, and he eats a normal diet, and there is little concern for aspiration.  He does not drink alcohol.  Lactic acid is thankfully not elevated at 1.3, and there is no leukocytosis.  TSH is within normal limits.  Urinalysis reveals findings consistent with infection.  He was initiated on Rocephin and azithromycin for community-acquired pneumonia and rehydrated with 1 L of normal saline.  Patient will benefit from hospital admission for ongoing evaluation and care.  I discussed with Dr. Graff, of the hospitalist service, who graciously accepts the patient for admission.  Patient is agreeable with plan for admission.     Patient developed a right-sided tremor in his hand and leg.  CT was ordered for further evaluation.        Diagnosis:    ICD-10-CM    1. Multifocal pneumonia  J18.9    2. Fever in adult  R50.9    3. Cough  R05.9    4. Weakness  R53.1          Scribe Disclosure:  I, Melissa Diaz, am serving as a scribe at 7:05 PM on 8/30/2022 to document services personally performed by Cindi Matias PA-C based on my observations and the provider's statements to me.                Cindi Matias PA-C  08/30/22 [id number]

## 2022-08-31 NOTE — PROGRESS NOTES
"Clinical Swallow Evaluation (CSE):     08/31/22 0923   General Information   Onset of Illness/Injury or Date of Surgery 08/30/22   Referring Physician Dr. Mcmanus   Patient/Family Therapy Goal Statement (SLP) To get better and go home   Pertinent History of Current Problem   Per hospitalist \"72M with hx of stroke resulting in chronic left-sided paraplegia, HTN, seizure disorder, and HFpEF presents with sepsis and respiratory failure 2/2 multifocal pneumonia\" SLP consulted given c/o dysphagia/aspiration     General Observations   Pt alert, pleasant, able to recall prior SLP services, upright in bed self-feeding breakfast tray     Past History of Dysphagia   PMHx oropharyngeal dysphagia following CVA in 2020. Most recent VFSS 10/19/2020 at ARU: laryngeal penetration with larger and/or consecutive sips though no penetration or aspiration with single sips. He was d/c'd from ARU on DD3/thin liquids. Has been on regular/thin liquids at home since. Pt reports some sticking sensation wtih dryer solids (e.g., meats + breads) though he adds extra moisture (A1 sauce on steak, ketchup on burgers, jelly on bread) and drinks liquids throughout a meal independently. He denies any difficulty with liquids when asked.     Pain Assessment   Patient Currently in Pain No   Type of Evaluation   Type of Evaluation Swallow Evaluation   Oral Motor   Oral Musculature anomalies present  (baseline chronic L sided mild oromotor weakness)   Structural Abnormalities none present   Mucosal Quality good   Dentition (Oral Motor)   Dentition (Oral Motor) some missing teeth;adequate dentition   Cough/Swallow/Gag Reflex (Oral Motor)   Volitional Throat Clear/Cough (Oral Motor) reduced strength   Volitional Swallow (Oral Motor) WNL   Vocal Quality/Secretion Management (Oral Motor)   Vocal Quality (Oral Motor) WNL   Secretion Management (Oral Motor) WNL   General Swallowing Observations   Respiratory Support (General Swallowing Observations) " nasal cannula  (2L)   Current Diet/Method of Nutritional Intake (General Swallowing Observations, NIS) regular diet;thin liquids (level 0)   Swallowing Evaluation Clinical swallow evaluation   Clinical Swallow Evaluation   Feeding Assistance no assistance needed   Additional evaluation(s) completed today Yes;Recommended   Rationale for completing additional evaluation Pt here with PNA, C/O aspiration, hx of dysphagia/CVA   Clinical Swallow Evaluation Textures Trialed thin liquids;solid foods   Clinical Swallow Eval: Thin Liquid Texture Trial   Mode of Presentation, Thin Liquids cup;straw;self-fed   Volume of Liquid or Food Presented ~ 3 oz   Oral Phase of Swallow WFL   Pharyngeal Phase of Swallow intact   Diagnostic Statement no overt clinical signs/sx aspiration noted; cued volitional cough x1 was clear in quality   Clinical Swallow Evaluation: Solid Food Texture Trial   Mode of Presentation self-fed   Volume Presented hard boiled eggs, grapes (pt already finished banerjee  + jellied toast)   Oral Phase WFL  (mild prolonged but WFL)   Pharyngeal Phase intact   Diagnostic Statement no overt clinical signs/sx aspiration noted; cued volitional cough x1 was clear in quality   Swallowing Recommendations   Diet Consistency Recommendations regular diet;thin liquids (level 0)   Supervision Level for Intake close supervision needed   Mode of Delivery Recommendations bolus size, small   Swallowing Maneuver Recommendations alternate food and liquid intake   Monitoring/Assistance Required (Eating/Swallowing) monitor for cough or change in vocal quality with intake;stop eating activities when fatigue is present   Recommended Feeding/Eating Techniques (Swallow Eval) maintain upright sitting position for eating;maintain upright posture during/after eating for 30 minutes   Medication Administration Recommendations, Swallowing (SLP) whole as tolerated   Instrumental Assessment Recommendations VFSS (videofluoroscopic swallowing study)    General Therapy Interventions   Planned Therapy Interventions   (TBD pending VFSS)   Clinical Impression   Criteria for Skilled Therapeutic Interventions Met (SLP Eval)   (TBD pending VFSS)   SLP Diagnosis potential pharyngeal dysphagia   Risks & Benefits of therapy have been explained evaluation/treatment results reviewed;care plan/treatment goals reviewed;risks/benefits reviewed;current/potential barriers reviewed;participants voiced agreement with care plan;participants included;patient   Clinical Impression Comments   Clinical swallow evaluation completed. Hospitalist is c/o aspiration in the setting of PMHx CVA, L paraplegia, brief dysphagia following CVA. Mild L sided oromotor weakness present (chronic), weak but present volitional cough, present volitional swallow. Mildly prolonged oral phases: mastication, oral transit but appeared overall complete and functional. Consistent laryngeal elevation to palpation. Pt reports mild sticking sensation with dryer solids, IND with liquid wash. No overt clinical signs/sx aspiration noted. Unable to assess pharyngeal swallow + aspiration risk at bedside, educated pt on concerns and pt in agreement with VFSS for further assessment. Planned for 1300 today.     SLP Discharge Planning   SLP Discharge Recommendation   (TBD Pending VFSS)   SLP Brief overview of current status  Regular/thin wt VFSS at 1300    Total Evaluation Time   Total Evaluation Time (Minutes) 20   SLP Goals   Therapy Frequency (SLP Eval)   (TBD pending VFSS)

## 2022-09-01 ENCOUNTER — APPOINTMENT (OUTPATIENT)
Dept: PHYSICAL THERAPY | Facility: CLINIC | Age: 72
DRG: 193 | End: 2022-09-01
Attending: HOSPITALIST
Payer: COMMERCIAL

## 2022-09-01 ENCOUNTER — APPOINTMENT (OUTPATIENT)
Dept: SPEECH THERAPY | Facility: CLINIC | Age: 72
DRG: 193 | End: 2022-09-01
Payer: COMMERCIAL

## 2022-09-01 LAB
ANION GAP SERPL CALCULATED.3IONS-SCNC: 9 MMOL/L (ref 7–15)
BACTERIA UR CULT: ABNORMAL
BUN SERPL-MCNC: 23.7 MG/DL (ref 8–23)
CALCIUM SERPL-MCNC: 8.5 MG/DL (ref 8.8–10.2)
CHLORIDE SERPL-SCNC: 103 MMOL/L (ref 98–107)
CREAT SERPL-MCNC: 1.15 MG/DL (ref 0.67–1.17)
DEPRECATED HCO3 PLAS-SCNC: 30 MMOL/L (ref 22–29)
ERYTHROCYTE [DISTWIDTH] IN BLOOD BY AUTOMATED COUNT: 15.7 % (ref 10–15)
GFR SERPL CREATININE-BSD FRML MDRD: 67 ML/MIN/1.73M2
GLUCOSE SERPL-MCNC: 86 MG/DL (ref 70–99)
HCT VFR BLD AUTO: 25.3 % (ref 40–53)
HGB BLD-MCNC: 7.7 G/DL (ref 13.3–17.7)
MAGNESIUM SERPL-MCNC: 1.9 MG/DL (ref 1.7–2.3)
MCH RBC QN AUTO: 28.2 PG (ref 26.5–33)
MCHC RBC AUTO-ENTMCNC: 30.4 G/DL (ref 31.5–36.5)
MCV RBC AUTO: 93 FL (ref 78–100)
PLATELET # BLD AUTO: 142 10E3/UL (ref 150–450)
POTASSIUM SERPL-SCNC: 3.4 MMOL/L (ref 3.4–5.3)
POTASSIUM SERPL-SCNC: 3.9 MMOL/L (ref 3.4–5.3)
RBC # BLD AUTO: 2.73 10E6/UL (ref 4.4–5.9)
SODIUM SERPL-SCNC: 142 MMOL/L (ref 136–145)
WBC # BLD AUTO: 6.4 10E3/UL (ref 4–11)

## 2022-09-01 PROCEDURE — 250N000013 HC RX MED GY IP 250 OP 250 PS 637: Performed by: HOSPITALIST

## 2022-09-01 PROCEDURE — 97162 PT EVAL MOD COMPLEX 30 MIN: CPT | Mod: GP | Performed by: PHYSICAL THERAPIST

## 2022-09-01 PROCEDURE — 999N000111 HC STATISTIC OT IP EVAL DEFER

## 2022-09-01 PROCEDURE — 92526 ORAL FUNCTION THERAPY: CPT | Mod: GN

## 2022-09-01 PROCEDURE — 258N000003 HC RX IP 258 OP 636: Performed by: INTERNAL MEDICINE

## 2022-09-01 PROCEDURE — 99233 SBSQ HOSP IP/OBS HIGH 50: CPT | Performed by: HOSPITALIST

## 2022-09-01 PROCEDURE — 97530 THERAPEUTIC ACTIVITIES: CPT | Mod: GP | Performed by: PHYSICAL THERAPIST

## 2022-09-01 PROCEDURE — 80048 BASIC METABOLIC PNL TOTAL CA: CPT | Performed by: HOSPITALIST

## 2022-09-01 PROCEDURE — 250N000013 HC RX MED GY IP 250 OP 250 PS 637: Performed by: INTERNAL MEDICINE

## 2022-09-01 PROCEDURE — 83735 ASSAY OF MAGNESIUM: CPT | Performed by: HOSPITALIST

## 2022-09-01 PROCEDURE — 250N000011 HC RX IP 250 OP 636: Performed by: INTERNAL MEDICINE

## 2022-09-01 PROCEDURE — 36415 COLL VENOUS BLD VENIPUNCTURE: CPT | Performed by: HOSPITALIST

## 2022-09-01 PROCEDURE — 120N000001 HC R&B MED SURG/OB

## 2022-09-01 PROCEDURE — 84132 ASSAY OF SERUM POTASSIUM: CPT | Performed by: HOSPITALIST

## 2022-09-01 PROCEDURE — 85027 COMPLETE CBC AUTOMATED: CPT | Performed by: HOSPITALIST

## 2022-09-01 RX ORDER — POTASSIUM CHLORIDE 20MEQ/15ML
40 LIQUID (ML) ORAL ONCE
Status: COMPLETED | OUTPATIENT
Start: 2022-09-01 | End: 2022-09-01

## 2022-09-01 RX ADMIN — AZITHROMYCIN MONOHYDRATE 500 MG: 500 INJECTION, POWDER, LYOPHILIZED, FOR SOLUTION INTRAVENOUS at 10:43

## 2022-09-01 RX ADMIN — OXYCODONE HYDROCHLORIDE 5 MG: 5 TABLET ORAL at 06:00

## 2022-09-01 RX ADMIN — OXYCODONE HYDROCHLORIDE 5 MG: 5 TABLET ORAL at 00:56

## 2022-09-01 RX ADMIN — FUROSEMIDE 20 MG: 10 INJECTION, SOLUTION INTRAMUSCULAR; INTRAVENOUS at 09:31

## 2022-09-01 RX ADMIN — POTASSIUM CHLORIDE 40 MEQ: 20 SOLUTION ORAL at 10:42

## 2022-09-01 RX ADMIN — OXYCODONE HYDROCHLORIDE 5 MG: 5 TABLET ORAL at 16:40

## 2022-09-01 RX ADMIN — ENOXAPARIN SODIUM 40 MG: 40 INJECTION SUBCUTANEOUS at 20:31

## 2022-09-01 RX ADMIN — FUROSEMIDE 20 MG: 10 INJECTION, SOLUTION INTRAMUSCULAR; INTRAVENOUS at 20:36

## 2022-09-01 RX ADMIN — ATORVASTATIN CALCIUM 40 MG: 40 TABLET, FILM COATED ORAL at 20:29

## 2022-09-01 RX ADMIN — CEFTRIAXONE 2 G: 2 INJECTION, POWDER, FOR SOLUTION INTRAMUSCULAR; INTRAVENOUS at 09:34

## 2022-09-01 RX ADMIN — AMLODIPINE BESYLATE 10 MG: 10 TABLET ORAL at 09:39

## 2022-09-01 RX ADMIN — LEVETIRACETAM 750 MG: 250 TABLET, FILM COATED ORAL at 20:29

## 2022-09-01 RX ADMIN — LEVETIRACETAM 750 MG: 250 TABLET, FILM COATED ORAL at 09:39

## 2022-09-01 RX ADMIN — ASPIRIN 81 MG CHEWABLE TABLET 81 MG: 81 TABLET CHEWABLE at 09:39

## 2022-09-01 ASSESSMENT — ACTIVITIES OF DAILY LIVING (ADL)
ADLS_ACUITY_SCORE: 70
ADLS_ACUITY_SCORE: 65
ADLS_ACUITY_SCORE: 70
ADLS_ACUITY_SCORE: 71
ADLS_ACUITY_SCORE: 65
ADLS_ACUITY_SCORE: 70
ADLS_ACUITY_SCORE: 71
ADLS_ACUITY_SCORE: 70
ADLS_ACUITY_SCORE: 65
DEPENDENT_IADLS:: CLEANING;COOKING;LAUNDRY;SHOPPING;MEAL PREPARATION;MEDICATION MANAGEMENT;MONEY MANAGEMENT;TRANSPORTATION
ADLS_ACUITY_SCORE: 70

## 2022-09-01 NOTE — PROGRESS NOTES
09/01/22 0800   Quick Adds   Type of Visit Initial PT Evaluation   Living Environment   People in Home other (see comments)   Current Living Arrangements house   Home Accessibility wheelchair accessible   Transportation Anticipated health plan transportation   Living Environment Comments Pt lives in a rambler with ramp to enter.  Pt has 2 full time caregivers that live with him.   Self-Care   Usual Activity Tolerance moderate   Current Activity Tolerance fair   Equipment Currently Used at Home grab bar, toilet;grab bar, tub/shower;hospital bed;shower chair;wheelchair, power;wheelchair, manual;commode chair;walker, shannon   Fall history within last six months no   Activity/Exercise/Self-Care Comment Pt reports having A x 2 for bed mobiity and transfers to  with a hemiwalker.  Pt states that he uses a urinal and bedside commode for toileting.  Pt is given a sponge bath and also has a walk in shower with shower chair and grab bars.  Aides assist with all ADLs and housekeeping, cooking, laundry.  Pt has had HHPT and OT in the past (per report)   General Information   Onset of Illness/Injury or Date of Surgery 08/30/22   Referring Physician Josiah Ledbetter   Patient/Family Therapy Goals Statement (PT) To return home   Pertinent History of Current Problem (include personal factors and/or comorbidities that impact the POC) Jorje Teran is a 72 year old with hx of stroke resulting in chronic left-sided paraplegia, HTN, seizure disorder, and HFpEF presents with fevers. Patient is largely bed/wheelchair-bound due to left-sided paraplegia stroke in 2020. Lives at home with healthcare aids. Also has left facial droop and photosensitivity.  Pt presents to hospital with sepsis and respiratory failure secondary to pna.   Existing Precautions/Restrictions fall   General Observations Pt lying in bed on 2L O2. SpO2 100%, HR 70s.  Pt wearing darkened glasses due to photosensitivity   Cognition   Cognitive Status Comments Pt  pleaseant and cooperative.   Pain Assessment   Patient Currently in Pain   (L UE and LE pain with mobility)   Posture    Posture Forward head position;Protracted shoulders   Range of Motion (ROM)   ROM Comment Pt declining movement of L UE and LE due to reports of pain with mobility.  Pt able to achieve 90 deg L hip flex in sitting.  Pt presents with tight B hamstrings and gastrocs   Strength (Manual Muscle Testing)   Strength Comments L hemiparesis   Bed Mobility   Bed Mobility supine-sit   Supine-Sit Levy (Bed Mobility) verbal cues;maximum assist (25% patient effort);2 person assist   Bed Mobility Limitations decreased ability to use arms for pushing/pulling;decreased ability to use legs for bridging/pushing   Comment, (Bed Mobility) sup > sit with Max A x 2 and HOB elevated   Transfers   Transfers bed-chair transfer   Bed-Chair Transfer   Assistive Device (Bed-Chair Transfers) walker, shannon   Bed-Chair Levy (Transfers) verbal cues;moderate assist (50% patient effort);2 person assist   Comment, (Bed-Chair Transfer) Stand pivot transfer bed > WC to the R with R hemiwalker and Mod A x 2   Gait/Stairs (Locomotion)   Distance in Feet (Required for LE Total Joints) 1'   Balance   Balance Comments Pt able to sit on EOB with SBA and maintain midline   Sensory Examination   Sensory Perception Comments TBA   Coordination   Coordination other (see comments)   Coordination Comments No active movement of L UE or LE   Muscle Tone   Muscle Tone Comments Increased tone noted in L UE. L elbow and wrist flexed   Clinical Impression   Criteria for Skilled Therapeutic Intervention Yes, treatment indicated   PT Diagnosis (PT) Decreased functional mobility   Influenced by the following impairments L hemiparesis, impaired balance, photosensitivity, decreased ROM of L UE and B LEs   Functional limitations due to impairments Assisted mobility and ADLs, nonambulatory, increased falls risk   Clinical Presentation (PT  Evaluation Complexity) Stable/Uncomplicated   Clinical Presentation Rationale Pt with multiple comorbidities and WC bound at baseline   Clinical Decision Making (Complexity) moderate complexity   Planned Therapy Interventions (PT) balance training;bed mobility training;neuromuscular re-education;patient/family education;ROM (range of motion);strengthening;stretching;transfer training;wheelchair management/propulsion training;progressive activity/exercise;risk factor education;home program guidelines   Risk & Benefits of therapy have been explained evaluation/treatment results reviewed;care plan/treatment goals reviewed;risks/benefits reviewed;current/potential barriers reviewed;participants voiced agreement with care plan;participants included;patient   PT Discharge Planning   PT Discharge Recommendation (DC Rec) home with assist;home with home care physical therapy   PT Rationale for DC Rec At baseline pt is WC bound and has 2 full time home health aides that assist with all mobility and ADLs.  Pt appears to be close to baseline, requiring A x 2 for bed mobility and stand pivot transfer bed > WC with hemiwalker.  I recommend that pt return home with 2 aids present for all mobility with HHPT to increase safety and independence with bed mobility and transfers, to decrease burden of care on caregivers and decrease falls risk.   PT Brief overview of current status A x 2 stand pivot transfer bed <> WC to the R with hemiwalker   Total Evaluation Time   Total Evaluation Time (Minutes) 15   Physical Therapy Goals   PT Frequency Daily   PT Predicted Duration/Target Date for Goal Attainment 09/04/22   PT Goals Transfers;Wheelchair Mobility;PT Goal 1   PT: Transfers Bed to/from chair;Assistive device;Minimal assist;Moderate assist  (Min/Mod A x 2 SPT bed <> WC to R with hemiwalker.)   PT: Wheelchair Mobility 50 feet;manual wheelchair;Caregiver SBA   PT: Goal 1 Pt will tolerated PROM to L UE and LE to maintain joint mobility  and prevent contractures for increased ease with mobility.

## 2022-09-01 NOTE — PLAN OF CARE
VSS. Disorientated to time. LS dim with fine crackles, on 2L NC. Incontinent of bowel x 2, bladder x 3. Bladder scan 289 & 354 mL after incontinence. Receiving IV zosyn and azithromycin. PT and OT following. On soft bite sized foods with thin liquids diet. Assist of 2 with lift. Continue to monitor.

## 2022-09-01 NOTE — PLAN OF CARE
"Plan of care: 7795-2937  Presentation/Diagnosis: pt admitted with pneumonia  Orientation: A&O  Labs/Protocols: K, Mg protocol. K-3.6 Mg-2.0  Vitals: /65 (BP Location: Right arm)   Pulse 70   Temp 97.4  F (36.3  C) (Oral)   Resp 18   Ht 1.727 m (5' 8\")   Wt 78.4 kg (172 lb 12.8 oz)   SpO2 97%   BMI 26.27 kg/m     Pain: pt rates pain 5-10/10, oxy x2 given for relief  LDAs: 2 LPM O2 via NC. PIV SL.   Diet: soft/bite size level 6, thin liquids  Activity: Ax2 lift, Q2 repo  Consults/following: speech  Plan: Q4 bladder scans, Bladder scan of 441, urinated after 250. Will continue to monitor and provide supportive measures.   "

## 2022-09-01 NOTE — PLAN OF CARE
OT: Orders received. Chart reviewed and discussed with evaluating physical therapist and patient. Acute care OT not indicated due to pt has baseline 24 hour caregivers; assist with dressing, showering via sponge bath, toileting. Pt has needed self-care adaptive equipment such as commode. Physical therapy can meet pt's therapy needs while he is hospitalized. Recommend home OT for a caregiver and LUE self-ROM program.  Will complete orders.

## 2022-09-01 NOTE — PROGRESS NOTES
[facility]    Medicine Progress Note - Hospitalist Service    Date of Admission:  8/30/2022    Assessment & Plan        72M with hx of stroke resulting in chronic left-sided paraplegia, HTN, seizure disorder, and HFpEF presents with sepsis and respiratory failure 2/2 multifocal pneumonia.    Now found to have bacteremia, on Rocephin and clinically improving     Sepsis w/bacteremia  Community Acquired vs Aspiration Pneumonia  -CXR with consolidative opacity both lung bases but R>L, mild leukocytosis with procal 0.10. Lactic ok  -cont Ceftriaxone + Azithromycin   -8/31 blood cultures positive 2/2 for GPC in clusters, await I&D and repeat blood cultures ordered. Suspect contamination  -seen by ST and video swallow negative for aspiration although does have mild dysphagia is near baseline     Acute Hypoxic Respiratory Failure  -Suspect 2/2 PNA and posisble AE CHF  -cont antibiotics, lasix. Remains on 2L NC and cont wean O2 as able.     Acute on chronic diastolic HF  -ECHO 9/7/202 w/EF 55-60%, difficult study, no significant valve dx  -cont IV lasix w/strict I/O's     E coli UTI  - Cont Ceftriaxone. F/u cx.     HX CVA w/chronic L hemiplegia, dysphagia  - Home ASA + Statin. Pt not on warfarin  -seen by PT/OT, has extensive help at home with multiple caregivers and will discharge home with them  -sen by ST, mild oropharyngeal dysphagia noted on VFSS, cont modified diet     Essential HTN  -Continue home medications once verified.     Seizure Disorder  -Home Keppra.     Diet: Combination Diet Easy to Chew (level 7); Thin Liquids (level 0) (no straws); No Straws    DVT Prophylaxis: Enoxaparin (Lovenox) SQ  Hayes Catheter: Not present  Central Lines: None  Cardiac Monitoring: None  Code Status: No CPR- Do NOT Intubate      Disposition Plan   Another 2-3 days at least, discharge home back into care of long term nursing team     The patient's care was discussed with the Patient.    Josiah Ledbetter,  DO  Hospitalist Service  [facility]  Securely message with the [employer] Web Console (learn more here)  Text page via [employer] Paging/Directory   ______________________________________________________________________    Interval History   Noted to have positive blood cultures overnight but afebrile and leukocytosis has resolved.     Data reviewed today: I reviewed all medications, new labs and imaging results over the last 24 hours.     Physical Exam   Vital Signs: Temp: 97.4  F (36.3  C) Temp src: Oral BP: (!) 166/81 Pulse: 60   Resp: 18 SpO2: 100 % O2 Device: Nasal cannula Oxygen Delivery: 2 LPM  Weight: 172 lbs 12.8 oz  Constitutional: awake, alert, cooperative and no apparent distress  Eyes: pupils equal, round and reactive to light and conjunctiva normal  ENT: normocepalic, without obvious abnormality, atramatic  Respiratory: no increased work of breathing, good air exchange, ronchi on R side  Cardiovascular: regular rate and rhythm and no murmur noted  GI: normal bowel sounds, soft, non-distended and non-tender  Skin: no bruising or bleeding  Neurologic: alert, oriented x4, chronic L hemiparesis    Data   Recent Labs   Lab 09/01/22  0655 08/31/22  0620 08/30/22  1917   WBC 6.4 12.0* 8.0   HGB 7.7* 8.1* 10.2*   MCV 93 93 93   * 149* 188   INR  --   --  1.17*    142 139   POTASSIUM 3.4 3.6 3.3*   CHLORIDE 103 105 100   CO2 30* 29 31*   BUN 23.7* 24.4* 22.4   CR 1.15 1.14 1.19*   ANIONGAP 9 8 8   SELENA 8.5* 8.2* 8.7*   GLC 86 94 106*   ALBUMIN  --   --  3.6   PROTTOTAL  --   --  6.9   BILITOTAL  --   --  0.7   ALKPHOS  --   --  80   ALT  --   --  21   AST  --   --  23     No results found for this or any previous visit (from the past 24 hour(s)).  Medications       amLODIPine  10 mg Oral Daily     aspirin  81 mg Oral Daily     atorvastatin  40 mg Oral QPM     azithromycin  500 mg Intravenous Q24H     cefTRIAXone  2 g Intravenous Q24H     enoxaparin ANTICOAGULANT  40 mg Subcutaneous  Q24H     furosemide  20 mg Intravenous Q12H     levETIRAcetam  750 mg Oral BID

## 2022-09-01 NOTE — CONSULTS
Care Management Initial Consult    General Information  Assessment completed with: Ron Arroyo  Type of CM/SW Visit: Initial Assessment    Primary Care Provider verified and updated as needed: Yes   Readmission within the last 30 days: no previous admission in last 30 days   Return Category: New Diagnosis     Advance Care Planning:            Communication Assessment  Patient's communication style: spoken language (English or Bilingual)    Hearing Difficulty or Deaf: no   Wear Glasses or Blind: yes    Cognitive  Cognitive/Neuro/Behavioral: .WDL except  Level of Consciousness: alert  Arousal Level: opens eyes spontaneously  Orientation: disoriented to, time  Mood/Behavior: cooperative, calm  Best Language: 1 - Mild to moderate  Speech: slow, clear, spontaneous    Living Environment:   People in home: other (see comments)     Current living Arrangements: house      Able to return to prior arrangements: yes       Family/Social Support:  Care provided by: homecare agency  Provides care for: no one, unable/limited ability to care for self  Marital Status:   Children, PCA          Description of Support System: Supportive, Involved    Support Assessment: Adequate family and caregiver support, Adequate social supports    Current Resources:   Patient receiving home care services: No     Community Resources: PCA ([facility] [phone] fax [phone])  Equipment currently used at home: grab bar, toilet, grab bar, tub/shower, hospital bed, shower chair, wheelchair, power, wheelchair, manual, commode chair, walker, shannon  Supplies currently used at home: None    Employment/Financial:  Employment Status: retired        Financial Concerns:     Referral to Financial Worker: No       Lifestyle & Psychosocial Needs:  Social Determinants of Health     Tobacco Use: Not on file   Alcohol Use: Not on file   Financial Resource Strain: Not on file   Food Insecurity: Not on file   Transportation Needs: Not on file    Physical Activity: Not on file   Stress: Not on file   Social Connections: Not on file   Intimate Partner Violence: Not on file   Depression: Not on file   Housing Stability: Not on file       Functional Status:  Prior to admission patient needed assistance:   Dependent ADLs:: Wheelchair-independent, Wheelchair-with assist  Dependent IADLs:: Cleaning, Cooking, Laundry, Shopping, Meal Preparation, Medication Management, Money Management, Transportation       Mental Health Status:          Chemical Dependency Status:                Values/Beliefs:  Spiritual, Cultural Beliefs, Yazidi Practices, Values that affect care:                 Additional Information:  PT Marissa reports pt needs home care. Writer met with pt at bedside. Pt prefers to be called 'Ron'. Pt lives in a 1 level home with a ramp to enter. Pt has 2 live-in caregivers through [employer]. Discussed home care [place]. Pt declined home care stating he receives PT at home from his care givers. Pt reports he has 3 sons that live near by and also assist as needed (Nacho, Jeyson, Donte). Pt states his children will provide transportation at discharge.   Pt declines having a .     With pt's permission SW called [employer] [phone] fax [phone]. Spoke with pt's nurse Qian who confirmed they provide live-in care 24/7 for the pt. She states they provide physical therapy 2-3 times per week and ambulate the pt 3x a day. She states pt's LTC insurance covers the live-in cost.     She states whenever pt return home from the hospital, they need to have an nurse complete an assessment. They 'prefer' not to do assessments on the weekends but do have an on-call nurse.     ALEYDA Zapata

## 2022-09-01 NOTE — PROGRESS NOTES
Paged re: positive blood cultures.  1 of 2 blood cultures from admission showing gram-positive cocci in clusters.  Patient is on ceftriaxone and azithromycin.  Procalcitonin mildly elevated.  He has symptomatically gotten better and has been afebrile over the last 24 hours.    -Continue ceftriaxone at 2 g daily.  Continue azithromycin.  -Repeat blood cultures.  -Follow speciation from blood cultures on admission.  If MRSA then would start vancomycin.    Gagandeep Ibarra MD

## 2022-09-01 NOTE — PLAN OF CARE
Goal Outcome Evaluation:    Temp: 98  F (36.7  C) Temp src: Axillary BP: 117/57 Pulse: 68   Resp: 18 SpO2: 98 % O2 Device: Nasal cannula Oxygen Delivery: 2 LPM     A/O4. Up Lift. Turns. Pain 5/10 bottom, Prn oxy given. K mag Am redraws. IV lasix. Uses urinal. No SOB. Bladder scan 247. BC positive for gram + cocci, provider notified. On IV azithromycin. Continue POC.

## 2022-09-02 ENCOUNTER — APPOINTMENT (OUTPATIENT)
Dept: SPEECH THERAPY | Facility: CLINIC | Age: 72
DRG: 193 | End: 2022-09-02
Payer: COMMERCIAL

## 2022-09-02 ENCOUNTER — APPOINTMENT (OUTPATIENT)
Dept: PHYSICAL THERAPY | Facility: CLINIC | Age: 72
DRG: 193 | End: 2022-09-02
Payer: COMMERCIAL

## 2022-09-02 LAB
ANION GAP SERPL CALCULATED.3IONS-SCNC: 10 MMOL/L (ref 7–15)
BACTERIA BLD CULT: ABNORMAL
BACTERIA BLD CULT: ABNORMAL
BUN SERPL-MCNC: 17.5 MG/DL (ref 8–23)
CALCIUM SERPL-MCNC: 9.1 MG/DL (ref 8.8–10.2)
CHLORIDE SERPL-SCNC: 101 MMOL/L (ref 98–107)
CREAT SERPL-MCNC: 0.99 MG/DL (ref 0.67–1.17)
DEPRECATED HCO3 PLAS-SCNC: 30 MMOL/L (ref 22–29)
ERYTHROCYTE [DISTWIDTH] IN BLOOD BY AUTOMATED COUNT: 14.9 % (ref 10–15)
GFR SERPL CREATININE-BSD FRML MDRD: 80 ML/MIN/1.73M2
GLUCOSE SERPL-MCNC: 72 MG/DL (ref 70–99)
HCT VFR BLD AUTO: 28 % (ref 40–53)
HGB BLD-MCNC: 8.5 G/DL (ref 13.3–17.7)
MAGNESIUM SERPL-MCNC: 1.9 MG/DL (ref 1.7–2.3)
MCH RBC QN AUTO: 28.4 PG (ref 26.5–33)
MCHC RBC AUTO-ENTMCNC: 30.4 G/DL (ref 31.5–36.5)
MCV RBC AUTO: 94 FL (ref 78–100)
PLATELET # BLD AUTO: 155 10E3/UL (ref 150–450)
POTASSIUM SERPL-SCNC: 3.5 MMOL/L (ref 3.4–5.3)
RBC # BLD AUTO: 2.99 10E6/UL (ref 4.4–5.9)
SODIUM SERPL-SCNC: 141 MMOL/L (ref 136–145)
WBC # BLD AUTO: 6 10E3/UL (ref 4–11)

## 2022-09-02 PROCEDURE — 250N000013 HC RX MED GY IP 250 OP 250 PS 637: Performed by: INTERNAL MEDICINE

## 2022-09-02 PROCEDURE — 92526 ORAL FUNCTION THERAPY: CPT | Mod: GN

## 2022-09-02 PROCEDURE — 85027 COMPLETE CBC AUTOMATED: CPT | Performed by: HOSPITALIST

## 2022-09-02 PROCEDURE — 80048 BASIC METABOLIC PNL TOTAL CA: CPT | Performed by: HOSPITALIST

## 2022-09-02 PROCEDURE — 258N000003 HC RX IP 258 OP 636: Performed by: INTERNAL MEDICINE

## 2022-09-02 PROCEDURE — 120N000001 HC R&B MED SURG/OB

## 2022-09-02 PROCEDURE — 36415 COLL VENOUS BLD VENIPUNCTURE: CPT | Performed by: HOSPITALIST

## 2022-09-02 PROCEDURE — 97530 THERAPEUTIC ACTIVITIES: CPT | Mod: GP

## 2022-09-02 PROCEDURE — 83735 ASSAY OF MAGNESIUM: CPT | Performed by: HOSPITALIST

## 2022-09-02 PROCEDURE — 250N000011 HC RX IP 250 OP 636: Performed by: INTERNAL MEDICINE

## 2022-09-02 PROCEDURE — 99233 SBSQ HOSP IP/OBS HIGH 50: CPT | Performed by: INTERNAL MEDICINE

## 2022-09-02 RX ORDER — LEVOTHYROXINE SODIUM 50 UG/1
50 TABLET ORAL DAILY
Status: DISCONTINUED | OUTPATIENT
Start: 2022-09-02 | End: 2022-09-06 | Stop reason: HOSPADM

## 2022-09-02 RX ORDER — OXYCODONE HYDROCHLORIDE 5 MG/1
5 TABLET ORAL EVERY 6 HOURS PRN
Status: DISCONTINUED | OUTPATIENT
Start: 2022-09-02 | End: 2022-09-06 | Stop reason: HOSPADM

## 2022-09-02 RX ORDER — MULTIPLE VITAMINS W/ MINERALS TAB 9MG-400MCG
1 TAB ORAL DAILY
Status: DISCONTINUED | OUTPATIENT
Start: 2022-09-02 | End: 2022-09-06 | Stop reason: HOSPADM

## 2022-09-02 RX ORDER — CEFPODOXIME PROXETIL 200 MG/1
200 TABLET, FILM COATED ORAL 2 TIMES DAILY
Qty: 6 TABLET | Refills: 0 | Status: SHIPPED | OUTPATIENT
Start: 2022-09-02 | End: 2022-09-06

## 2022-09-02 RX ORDER — CEFDINIR 300 MG/1
300 CAPSULE ORAL EVERY 12 HOURS SCHEDULED
Status: COMPLETED | OUTPATIENT
Start: 2022-09-02 | End: 2022-09-05

## 2022-09-02 RX ORDER — VALSARTAN 160 MG/1
160 TABLET ORAL 2 TIMES DAILY
Status: DISCONTINUED | OUTPATIENT
Start: 2022-09-02 | End: 2022-09-06 | Stop reason: HOSPADM

## 2022-09-02 RX ORDER — CARBOXYMETHYLCELLULOSE SODIUM 5 MG/ML
1 SOLUTION/ DROPS OPHTHALMIC
Status: DISCONTINUED | OUTPATIENT
Start: 2022-09-02 | End: 2022-09-06 | Stop reason: HOSPADM

## 2022-09-02 RX ORDER — AZITHROMYCIN 250 MG/1
250 TABLET, FILM COATED ORAL DAILY
Qty: 3 TABLET | Refills: 0 | Status: SHIPPED | OUTPATIENT
Start: 2022-09-02 | End: 2022-09-06

## 2022-09-02 RX ORDER — EZETIMIBE 10 MG/1
10 TABLET ORAL DAILY
Status: DISCONTINUED | OUTPATIENT
Start: 2022-09-02 | End: 2022-09-06 | Stop reason: HOSPADM

## 2022-09-02 RX ORDER — DOCUSATE SODIUM 100 MG/1
100 CAPSULE, LIQUID FILLED ORAL DAILY
Status: DISCONTINUED | OUTPATIENT
Start: 2022-09-02 | End: 2022-09-06 | Stop reason: HOSPADM

## 2022-09-02 RX ORDER — GABAPENTIN 300 MG/1
300 CAPSULE ORAL 3 TIMES DAILY
Status: DISCONTINUED | OUTPATIENT
Start: 2022-09-02 | End: 2022-09-06 | Stop reason: HOSPADM

## 2022-09-02 RX ORDER — AZITHROMYCIN 250 MG/1
250 TABLET, FILM COATED ORAL DAILY
Status: COMPLETED | OUTPATIENT
Start: 2022-09-03 | End: 2022-09-04

## 2022-09-02 RX ORDER — PANTOPRAZOLE SODIUM 40 MG/1
40 TABLET, DELAYED RELEASE ORAL DAILY
Status: DISCONTINUED | OUTPATIENT
Start: 2022-09-02 | End: 2022-09-06 | Stop reason: HOSPADM

## 2022-09-02 RX ORDER — METOPROLOL TARTRATE 50 MG
50 TABLET ORAL 2 TIMES DAILY
Status: DISCONTINUED | OUTPATIENT
Start: 2022-09-02 | End: 2022-09-06 | Stop reason: HOSPADM

## 2022-09-02 RX ADMIN — VALSARTAN 160 MG: 160 TABLET, FILM COATED ORAL at 13:22

## 2022-09-02 RX ADMIN — AMLODIPINE BESYLATE 10 MG: 10 TABLET ORAL at 09:50

## 2022-09-02 RX ADMIN — PANTOPRAZOLE SODIUM 40 MG: 40 TABLET, DELAYED RELEASE ORAL at 13:22

## 2022-09-02 RX ADMIN — LEVETIRACETAM 750 MG: 250 TABLET, FILM COATED ORAL at 09:51

## 2022-09-02 RX ADMIN — GABAPENTIN 300 MG: 300 CAPSULE ORAL at 22:50

## 2022-09-02 RX ADMIN — CEFDINIR 300 MG: 300 CAPSULE ORAL at 19:43

## 2022-09-02 RX ADMIN — EZETIMIBE 10 MG: 10 TABLET ORAL at 13:23

## 2022-09-02 RX ADMIN — ASPIRIN 81 MG CHEWABLE TABLET 81 MG: 81 TABLET CHEWABLE at 09:51

## 2022-09-02 RX ADMIN — GABAPENTIN 300 MG: 300 CAPSULE ORAL at 16:17

## 2022-09-02 RX ADMIN — OXYCODONE HYDROCHLORIDE 5 MG: 5 TABLET ORAL at 16:16

## 2022-09-02 RX ADMIN — LEVETIRACETAM 750 MG: 250 TABLET, FILM COATED ORAL at 22:50

## 2022-09-02 RX ADMIN — ATORVASTATIN CALCIUM 40 MG: 40 TABLET, FILM COATED ORAL at 19:43

## 2022-09-02 RX ADMIN — METOPROLOL TARTRATE 50 MG: 50 TABLET, FILM COATED ORAL at 22:51

## 2022-09-02 RX ADMIN — MULTIPLE VITAMINS W/ MINERALS TAB 1 TABLET: TAB at 13:22

## 2022-09-02 RX ADMIN — OXYCODONE HYDROCHLORIDE 5 MG: 5 TABLET ORAL at 09:51

## 2022-09-02 RX ADMIN — Medication 1 DROP: at 17:02

## 2022-09-02 RX ADMIN — FUROSEMIDE 20 MG: 10 INJECTION, SOLUTION INTRAMUSCULAR; INTRAVENOUS at 09:51

## 2022-09-02 RX ADMIN — METOPROLOL TARTRATE 50 MG: 50 TABLET, FILM COATED ORAL at 13:24

## 2022-09-02 RX ADMIN — CEFTRIAXONE 2 G: 2 INJECTION, POWDER, FOR SOLUTION INTRAMUSCULAR; INTRAVENOUS at 09:51

## 2022-09-02 RX ADMIN — SERTRALINE HYDROCHLORIDE 50 MG: 50 TABLET ORAL at 13:22

## 2022-09-02 RX ADMIN — AZITHROMYCIN MONOHYDRATE 500 MG: 500 INJECTION, POWDER, LYOPHILIZED, FOR SOLUTION INTRAVENOUS at 12:19

## 2022-09-02 RX ADMIN — LEVOTHYROXINE SODIUM 50 MCG: 50 TABLET ORAL at 13:22

## 2022-09-02 RX ADMIN — DOCUSATE SODIUM 100 MG: 100 CAPSULE, LIQUID FILLED ORAL at 13:22

## 2022-09-02 RX ADMIN — VALSARTAN 160 MG: 160 TABLET, FILM COATED ORAL at 22:51

## 2022-09-02 RX ADMIN — ENOXAPARIN SODIUM 40 MG: 40 INJECTION SUBCUTANEOUS at 22:51

## 2022-09-02 ASSESSMENT — ACTIVITIES OF DAILY LIVING (ADL)
ADLS_ACUITY_SCORE: 66
ADLS_ACUITY_SCORE: 70
ADLS_ACUITY_SCORE: 66
ADLS_ACUITY_SCORE: 70
ADLS_ACUITY_SCORE: 66

## 2022-09-02 NOTE — PLAN OF CARE
Temp: 97.4  F (36.3  C) Temp src: Oral BP: 137/67 Pulse: 79   Resp: 18 SpO2: 96 % O2 Device: None (Room air)     Orientation:  x4, disoriented to time, family at bedside.   VS: stable   Pain:  only when being moved/turned. Turned q2h due to bedsores on sacrum area. Mepilex applied   Activity:  in bed this shift, q2h. Left side hemiplegia.   Diet: swallows without difficulty, takes meds whole with water, one by one.   Resp:   LS clear but diminished, no coughing. O2 stable on RA. MD stated that home O2 check is not needed as he is on RA now    GI:  soft unformed BM 2x today, incontinent. Cleaned him up with assist 2  :  Incontinent, external cath placed, urine is yellow, good output   Skin:  bedsores on sacrum, Mepilex applied. Lower extremity Edema +1.   Lines: PIV painful, no redness, upstream occlusion due to location and bending arm, some drainage.  Paged MD for need for PIV - see MD note: removed PIV   Other:  Pt is ready for discharge. No home care nurses available until Tuesdays.   Plan:   Continue with plan of care until discharge on Tuesday.

## 2022-09-02 NOTE — PROVIDER NOTIFICATION
it looks like pt will stay here until Tuesday, right? I was wondering if he still needs the PIV. If so, I will need to place a new one. Thanks     Response: he will be here until Tuesday. Its ok to remove PIV

## 2022-09-02 NOTE — PROGRESS NOTES
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  In the emergency department, the patient was found to have a blood pressure of 135/68, heart rate 112, temperature 98.6  F, respiratory rate 13, SPO2 90% on room air.  Initial lab work showed potassium 3.3, bicarb 31, creatinine 1.19, proBNP 2471, procalcitonin 0.10, high-sensitivity troponin 49, hemoglobin 10.2, WBC 8.0.  Urinalysis showed small blood, large leukocyte esterase, greater than 182 WBC, 10 RBC, many bacteria.  Chest x-ray showed consolidative opacity within both lung bases greatest on the right, mild diffuse interstitial opacities additionally noted possibly representing multifocal pneumonia versus pulmonary edema.  The patient was started on ceftriaxone and azithromycin for the treatment of urinary tract infection as well as community-acquired pneumonia versus aspiration pneumonia.  Speech therapy was consulted to see the patient who recommended dysphagia level 6 with thin liquids and one-to-one assist.  The patient did require supplemental oxygen which was weaned off on 9/2/2022.  Blood cultures were obtained upon admission.  1 out of 2 returned positive for Staph hominis, likely a contaminant.  Urine cultures returned positive for E. coli resistant to fluoroquinolones and Bactrim.    TODAY'S PLAN:  Pt off oxygen.  Labs stable.  At this point pt would be medically stable for discharge home with home care.  To complicate matters, pt with 24/7 home care nurse.  They may not have coverage to provide care for the  patient at home until after the weekend.  Pt would be able to be discharged if/when home care can be arranged.  Appreciate Social Work assistance with discharge planning.  Updated son at bedside.  All questions answered.    Problem List:   Acute Hypoxic Respiratory Failure  Community Acquired Pneumonia vs Aspiration Pneumonia  Dysphagia  - Weaned off oxygen on 9/2  - Continue Azithromycin and Ceftriaxone - plan for 7 days total of abx  - SLP [place]s appreciated - recommending Soft/bite sized solids (IDDSI 6), thin liquids (IDDSI 0) with 1:1 assist given cues needed for strategies    E. Coli Urinary Tract Infection  1/2 Blood Cultures Positive for Staph Hominis  - Suspect blood culture is contaminant  - Urine culture from 9/30 positive for e. Coli resistant to ciprofloxacin, levofloxacin, and bactrim    Acute Exacerbation of Diastolic CHF  - Lasix 20 mg IV BID - now discontinued  - Echo in 9/2020 showed EF 55-60%    Coronary Artery Disease s/p CABG x3, x1 in 2015  Hypertension  Hyperlipidemia  - Continue Atorvastatin, Ezetimibe, ASA, Amlodipine, Valsartan, Metoprolol    Hx of CVA with Residual L Hemiparesis and Dysphagia  - Continue ASA and statin  - PT/OT  - SLP [place]s appreciated - as above    Seizure Disorder  - Continue PTA Keppra    DVT Prophylaxis: Enoxaparin (Lovenox) SQ  Code Status: No CPR- Do NOT Intubate  Diet: Combination Diet Regular Diet; No Straws    Hayes Catheter: Not present  Disposition: Expected discharge today to home with home care. Goals prior to discharge include home care arranged.   Family updated today: Yes      Interval History   Pt seen and examined.  Pt denies cp, sob.  Appetite is good.    -Data reviewed today: I personally reviewed all new labs and imaging results over the last 24 hours.     Physical Exam   Temp: 97.4  F (36.3  C) Temp src: Oral BP: (!) 161/68 Pulse: 66   Resp: 18 SpO2: 99 % O2 Device: Nasal cannula Oxygen Delivery: 2 LPM  Vitals:    08/31/22 0332    Weight: 78.4 kg (172 lb 12.8 oz)     Vital Signs with Ranges  Temp:  [97.4  F (36.3  C)-97.6  F (36.4  C)] 97.4  F (36.3  C)  Pulse:  [65-69] 66  Resp:  [16-18] 18  BP: (136-168)/(68-80) 161/68  SpO2:  [96 %-100 %] 99 %  I/O last 3 completed shifts:  In: -   Out: 600 [Urine:600]    GENERAL: No apparent distress. Awake, alert, and fully oriented.  HEENT: Normocephalic, atraumatic. Extraocular movements intact.  CARDIOVASCULAR: Regular rate and rhythm without murmurs or rubs. No S3.  PULMONARY: Clear bilaterally.  GASTROINTESTINAL: Soft, non-tender, non-distended. Bowel sounds normoactive.   EXTREMITIES: No cyanosis or clubbing. No edema.  NEUROLOGICAL: L hemiparesis  DERMATOLOGICAL: No rash, ulcer, bruising, nor jaundice.    Medications       amLODIPine  10 mg Oral Daily     aspirin  81 mg Oral Daily     atorvastatin  40 mg Oral QPM     azithromycin  500 mg Intravenous Q24H     cefTRIAXone  2 g Intravenous Q24H     enoxaparin ANTICOAGULANT  40 mg Subcutaneous Q24H     furosemide  20 mg Intravenous Q12H     levETIRAcetam  750 mg Oral BID     Data     Laboratory:  Recent Labs   Lab 09/02/22  0630 09/01/22  0655 08/31/22  0620   WBC 6.0 6.4 12.0*   HGB 8.5* 7.7* 8.1*   HCT 28.0* 25.3* 26.9*   MCV 94 93 93    142* 149*     Recent Labs   Lab 09/02/22  0630 09/01/22  1451 09/01/22  0655 08/31/22  0620     --  142 142   POTASSIUM 3.5 3.9 3.4 3.6   CHLORIDE 101  --  103 105   CO2 30*  --  30* 29   ANIONGAP 10  --  9 8   GLC 72  --  86 94   BUN 17.5  --  23.7* 24.4*   CR 0.99  --  1.15 1.14   GFRESTIMATED 80  --  67 68   SELENA 9.1  --  8.5* 8.2*     No results for input(s): CULT in the last 168 hours.    Imaging:  No results found for this or any previous visit (from the past 24 hour(s)).      Reid Cohen DO  [facility] Hospitalist  [address] [place] [address]  09/02/2022

## 2022-09-02 NOTE — PROGRESS NOTES
Care Management Discharge Note    Discharge Date: 09/03/2022    Discharge Disposition:      Discharge Services:      Discharge DME:      Discharge Transportation: health plan transportation    Private pay costs discussed: Not applicable    PAS Confirmation Code:    Patient/family educated on Medicare website which has current facility and service quality ratings:      Education Provided on the Discharge Plan:    Persons Notified of Discharge Plans:   Patient/Family in Agreement with the Plan:      Handoff Referral Completed: Yes    Additional Information:  Writer spoke with [facility] [phone] fax [phone] to inform him that pt is medically ready to discharge today. She reports the son Jeyson Teran told them the pt wasn't going to be discharged till Tuesday. She apologizes and stated she should have gotten their information through the hospital and not family.She will find out if they have staff over the weekend and call this writer back.     ADDENDUM 9/2/2022 12:03 PM:  Per [facility] they do not have staff till Tuesday 9/6. She recommends pt either discharge home with the care of pt's family or stay in the hospital till Tue. Updated MD Selene Gomes, MSW

## 2022-09-02 NOTE — PLAN OF CARE
"    Patient is alert, Oriented x3. Disoriented to time. Left sided paraplegia. Afibrile.    Receiving IV zosyn and azithromycin  PT and OT following  Assist of 2 with lift.  Reposition q2 hours  K 3.9 and Mag 1.9  E coli UTI, bladder scan after voids for urine retention.          BP (!) 151/76 (BP Location: Right arm)   Pulse 69   Temp 97.6  F (36.4  C) (Oral)   Resp 18   Ht 1.727 m (5' 8\")   Wt 78.4 kg (172 lb 12.8 oz)   SpO2 100%   BMI 26.27 kg/m          Problem: Plan of Care - These are the overarching goals to be used throughout the patient stay.    Goal: Plan of Care Review/Shift Note  Description: The Plan of Care Review/Shift note should be completed every shift.  The Outcome Evaluation is a brief statement about your assessment that the patient is improving, declining, or no change.  This information will be displayed automatically on your shift note.  9/1/2022 2242 by Rubén Osorio RN  Outcome: Ongoing, Not Progressing  9/1/2022 1928 by Rubén Osorio RN  Outcome: Ongoing, Not Progressing  Goal: Patient-Specific Goal (Individualized)  Description: You can add care plan individualizations to a care plan. Examples of Individualization might be:  \"Parent requests to be called daily at 9am for status\", \"I have a hard time hearing out of my right ear\", or \"Do not touch me to wake me up as it startles me\".  9/1/2022 2242 by Rubén Osorio RN  Outcome: Ongoing, Not Progressing  9/1/2022 1928 by Rubén Osorio RN  Outcome: Ongoing, Not Progressing  Goal: Absence of Hospital-Acquired Illness or Injury  9/1/2022 2242 by Rubén Osorio RN  Outcome: Ongoing, Not Progressing  9/1/2022 1928 by Rubén Osorio RN  Outcome: Ongoing, Not Progressing  Intervention: Identify and Manage Fall Risk  Recent Flowsheet Documentation  Taken 9/1/2022 1640 by Rubén Osorio, RN  Safety Promotion/Fall Prevention:    clutter free environment maintained    bed alarm on    safety round/check completed  Intervention: Prevent and Manage VTE " (Venous Thromboembolism) Risk  Recent Flowsheet Documentation  Taken 9/1/2022 1640 by Rubén Osorio RN  VTE Prevention/Management: SCDs (sequential compression devices) off  Goal: Optimal Comfort and Wellbeing  9/1/2022 2242 by Rubén Osorio RN  Outcome: Ongoing, Not Progressing  9/1/2022 1928 by Rubén Osorio RN  Outcome: Ongoing, Not Progressing  Goal: Readiness for Transition of Care  9/1/2022 2242 by Rubén Osorio RN  Outcome: Ongoing, Not Progressing  9/1/2022 1928 by Rubén Osorio RN  Outcome: Ongoing, Not Progressing     Problem: Pain Acute  Goal: Acceptable Pain Control and Functional Ability  9/1/2022 2242 by Rubén Osorio RN  Outcome: Ongoing, Not Progressing  9/1/2022 1928 by Rubén Osorio RN  Outcome: Ongoing, Not Progressing  Intervention: Prevent or Manage Pain  Recent Flowsheet Documentation  Taken 9/1/2022 1640 by Rubén Osorio RN  Medication Review/Management: medications reviewed     Problem: Fall Injury Risk  Goal: Absence of Fall and Fall-Related Injury  9/1/2022 2242 by Rubén Osorio RN  Outcome: Ongoing, Not Progressing  9/1/2022 1928 by Rubén Osorio RN  Outcome: Ongoing, Not Progressing  Intervention: Identify and Manage Contributors  Recent Flowsheet Documentation  Taken 9/1/2022 1640 by Rubén Osorio RN  Medication Review/Management: medications reviewed  Intervention: Promote Injury-Free Environment  Recent Flowsheet Documentation  Taken 9/1/2022 1640 by Rubén Osorio RN  Safety Promotion/Fall Prevention:    clutter free environment maintained    bed alarm on    safety round/check completed     Problem: Fluid Imbalance (Pneumonia)  Goal: Fluid Balance  9/1/2022 2242 by Rubén Osorio RN  Outcome: Ongoing, Not Progressing  9/1/2022 1928 by Rubén Osorio RN  Outcome: Ongoing, Not Progressing     Problem: Infection (Pneumonia)  Goal: Resolution of Infection Signs and Symptoms  9/1/2022 2242 by Rubén Osorio RN  Outcome: Ongoing, Not Progressing  9/1/2022 1928 by Rubén Osorio RN  Outcome: Ongoing, Not  Progressing     Problem: Respiratory Compromise (Pneumonia)  Goal: Effective Oxygenation and Ventilation  9/1/2022 2242 by Rubén Osorio RN  Outcome: Ongoing, Not Progressing  9/1/2022 1928 by Rubén Osorio RN  Outcome: Ongoing, Not Progressing     Problem: Airway Clearance Ineffective  Goal: Effective Airway Clearance  9/1/2022 2242 by Rubén Osorio RN  Outcome: Ongoing, Not Progressing  9/1/2022 1928 by Rubén Osorio RN  Outcome: Ongoing, Not Progressing

## 2022-09-02 NOTE — PLAN OF CARE
"Goal Outcome Evaluation:    Plan of Care Reviewed With: patient      Pt A&O x 3. Disoriented to time. Assist x 2 with lift device. VSS. On 2L O2 via NC. LS diminished. Denies pain. Inc B&B. Edema BLE. 2+. Has L sided paraplegia. Turn and reposition Q2h. Has UTI - E-Coli.   On IV Zosyn and Zithromax. K+ 3.9 Mg 1.9 AM draw. Buttock red.   Consult: PT/Speech. WC bound at baseline. On Keppra    Plan to discharge in 2-3 days    /80 (BP Location: Right arm)   Pulse 65   Temp 97.4  F (36.3  C) (Axillary)   Resp 16   Ht 1.727 m (5' 8\")   Wt 78.4 kg (172 lb 12.8 oz)   SpO2 96%   BMI 26.27 kg/m               "

## 2022-09-03 ENCOUNTER — APPOINTMENT (OUTPATIENT)
Dept: PHYSICAL THERAPY | Facility: CLINIC | Age: 72
DRG: 193 | End: 2022-09-03
Payer: COMMERCIAL

## 2022-09-03 LAB
ANION GAP SERPL CALCULATED.3IONS-SCNC: 8 MMOL/L (ref 7–15)
BUN SERPL-MCNC: 15.5 MG/DL (ref 8–23)
CALCIUM SERPL-MCNC: 9.5 MG/DL (ref 8.8–10.2)
CHLORIDE SERPL-SCNC: 100 MMOL/L (ref 98–107)
CREAT SERPL-MCNC: 1.03 MG/DL (ref 0.67–1.17)
DEPRECATED HCO3 PLAS-SCNC: 33 MMOL/L (ref 22–29)
ERYTHROCYTE [DISTWIDTH] IN BLOOD BY AUTOMATED COUNT: 14.8 % (ref 10–15)
GFR SERPL CREATININE-BSD FRML MDRD: 77 ML/MIN/1.73M2
GLUCOSE SERPL-MCNC: 91 MG/DL (ref 70–99)
HCT VFR BLD AUTO: 30.3 % (ref 40–53)
HGB BLD-MCNC: 9.3 G/DL (ref 13.3–17.7)
MAGNESIUM SERPL-MCNC: 2 MG/DL (ref 1.7–2.3)
MCH RBC QN AUTO: 27.9 PG (ref 26.5–33)
MCHC RBC AUTO-ENTMCNC: 30.7 G/DL (ref 31.5–36.5)
MCV RBC AUTO: 91 FL (ref 78–100)
PLATELET # BLD AUTO: 178 10E3/UL (ref 150–450)
POTASSIUM SERPL-SCNC: 3.6 MMOL/L (ref 3.4–5.3)
RBC # BLD AUTO: 3.33 10E6/UL (ref 4.4–5.9)
SODIUM SERPL-SCNC: 141 MMOL/L (ref 136–145)
WBC # BLD AUTO: 5.2 10E3/UL (ref 4–11)

## 2022-09-03 PROCEDURE — 36415 COLL VENOUS BLD VENIPUNCTURE: CPT | Performed by: HOSPITALIST

## 2022-09-03 PROCEDURE — 85014 HEMATOCRIT: CPT | Performed by: HOSPITALIST

## 2022-09-03 PROCEDURE — 99232 SBSQ HOSP IP/OBS MODERATE 35: CPT | Performed by: INTERNAL MEDICINE

## 2022-09-03 PROCEDURE — 250N000013 HC RX MED GY IP 250 OP 250 PS 637: Performed by: INTERNAL MEDICINE

## 2022-09-03 PROCEDURE — 120N000001 HC R&B MED SURG/OB

## 2022-09-03 PROCEDURE — 83735 ASSAY OF MAGNESIUM: CPT | Performed by: INTERNAL MEDICINE

## 2022-09-03 PROCEDURE — 80048 BASIC METABOLIC PNL TOTAL CA: CPT | Performed by: HOSPITALIST

## 2022-09-03 PROCEDURE — 97530 THERAPEUTIC ACTIVITIES: CPT | Mod: GP

## 2022-09-03 PROCEDURE — 250N000011 HC RX IP 250 OP 636: Performed by: INTERNAL MEDICINE

## 2022-09-03 RX ADMIN — VALSARTAN 160 MG: 160 TABLET, FILM COATED ORAL at 09:59

## 2022-09-03 RX ADMIN — METOPROLOL TARTRATE 50 MG: 50 TABLET, FILM COATED ORAL at 10:00

## 2022-09-03 RX ADMIN — MULTIPLE VITAMINS W/ MINERALS TAB 1 TABLET: TAB at 10:00

## 2022-09-03 RX ADMIN — OXYCODONE HYDROCHLORIDE 5 MG: 5 TABLET ORAL at 10:00

## 2022-09-03 RX ADMIN — LEVETIRACETAM 750 MG: 250 TABLET, FILM COATED ORAL at 10:00

## 2022-09-03 RX ADMIN — LEVETIRACETAM 750 MG: 250 TABLET, FILM COATED ORAL at 20:43

## 2022-09-03 RX ADMIN — CEFDINIR 300 MG: 300 CAPSULE ORAL at 20:49

## 2022-09-03 RX ADMIN — GABAPENTIN 300 MG: 300 CAPSULE ORAL at 20:49

## 2022-09-03 RX ADMIN — ENOXAPARIN SODIUM 40 MG: 40 INJECTION SUBCUTANEOUS at 20:53

## 2022-09-03 RX ADMIN — ASPIRIN 81 MG CHEWABLE TABLET 81 MG: 81 TABLET CHEWABLE at 10:01

## 2022-09-03 RX ADMIN — SERTRALINE HYDROCHLORIDE 50 MG: 50 TABLET ORAL at 10:00

## 2022-09-03 RX ADMIN — LEVOTHYROXINE SODIUM 50 MCG: 50 TABLET ORAL at 10:01

## 2022-09-03 RX ADMIN — AZITHROMYCIN MONOHYDRATE 250 MG: 250 TABLET ORAL at 10:01

## 2022-09-03 RX ADMIN — OXYCODONE HYDROCHLORIDE 5 MG: 5 TABLET ORAL at 19:38

## 2022-09-03 RX ADMIN — GABAPENTIN 300 MG: 300 CAPSULE ORAL at 16:43

## 2022-09-03 RX ADMIN — PANTOPRAZOLE SODIUM 40 MG: 40 TABLET, DELAYED RELEASE ORAL at 10:01

## 2022-09-03 RX ADMIN — EZETIMIBE 10 MG: 10 TABLET ORAL at 09:59

## 2022-09-03 RX ADMIN — ATORVASTATIN CALCIUM 40 MG: 40 TABLET, FILM COATED ORAL at 20:42

## 2022-09-03 RX ADMIN — GABAPENTIN 300 MG: 300 CAPSULE ORAL at 10:01

## 2022-09-03 RX ADMIN — VALSARTAN 160 MG: 160 TABLET, FILM COATED ORAL at 20:42

## 2022-09-03 RX ADMIN — DOCUSATE SODIUM 100 MG: 100 CAPSULE, LIQUID FILLED ORAL at 10:00

## 2022-09-03 RX ADMIN — METOPROLOL TARTRATE 50 MG: 50 TABLET, FILM COATED ORAL at 20:50

## 2022-09-03 RX ADMIN — CEFDINIR 300 MG: 300 CAPSULE ORAL at 10:01

## 2022-09-03 RX ADMIN — AMLODIPINE BESYLATE 10 MG: 10 TABLET ORAL at 10:00

## 2022-09-03 ASSESSMENT — ACTIVITIES OF DAILY LIVING (ADL)
ADLS_ACUITY_SCORE: 70
ADLS_ACUITY_SCORE: 66
ADLS_ACUITY_SCORE: 66
ADLS_ACUITY_SCORE: 70
ADLS_ACUITY_SCORE: 66
ADLS_ACUITY_SCORE: 70

## 2022-09-03 NOTE — PLAN OF CARE
Temp: 98.4  F (36.9  C) Temp src: Oral BP: (!) 171/78 Pulse: 60   Resp: 20 SpO2: 97 % O2 Device: None (Room air)       Orientation:  disoriented to time, speech sometimes slow. Very grateful for care received   VS: BP elevated, otherwise stable   Pain:  back and left leg, oxycodone 5mg requested and received   Activity:  used lift to sit in wheelchair. But missing head rest was to difficult, moved him into recliner. Pt wanted to stay in recliner for visitors this afternoon   Resp:   LS clear but diminished. No cough  GI:  BM incontinent, changed.   : external cath placed. Great urine output.  Notable Labs:  Hb steady going up  Skin:   Mepilex changed on sacrum due to pressure sore  Lines: no MD IRAJ ok with that   Other:    Plan:  pt ready for discharge, but not home care staff available until Tuesday. Discharge planned for Tuesday, continue with plan of care.

## 2022-09-03 NOTE — PROGRESS NOTES
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  In the emergency department, the patient was found to have a blood pressure of 135/68, heart rate 112, temperature 98.6  F, respiratory rate 13, SPO2 90% on room air.  Initial lab work showed potassium 3.3, bicarb 31, creatinine 1.19, proBNP 2471, procalcitonin 0.10, high-sensitivity troponin 49, hemoglobin 10.2, WBC 8.0.  Urinalysis showed small blood, large leukocyte esterase, greater than 182 WBC, 10 RBC, many bacteria.  Chest x-ray showed consolidative opacity within both lung bases greatest on the right, mild diffuse interstitial opacities additionally noted possibly representing multifocal pneumonia versus pulmonary edema.  The patient was started on ceftriaxone and azithromycin for the treatment of urinary tract infection as well as community-acquired pneumonia versus aspiration pneumonia.  Speech therapy was consulted to see the patient who recommended dysphagia level 6 with thin liquids and one-to-one assist.  The patient did require supplemental oxygen which was weaned off on 9/2/2022.  Blood cultures were obtained upon admission.  1 out of 2 returned positive for Staph hominis, likely a contaminant.  Urine cultures returned positive for E. coli resistant to fluoroquinolones and Bactrim.  The patient's antibiotics were adjusted to azithromycin and cefpodoxime.  On 9/2/2022, the patient was stable for discharge, however the patient's home care was unable to be arranged and it was unsafe for the patient to be discharged  home.  Plan was to discharge home once home care was able to be arranged.    TODAY'S PLAN: Patient remains medically stable for discharge.  Complicating factors include no available home care which would be unsafe for patient to discharge home.  Continue cefpodoxime and azithromycin.  Suspect patient will be in the hospital until Tuesday and when home care can be arranged.    Problem List:   Acute Hypoxic Respiratory Failure  Community Acquired Pneumonia vs Aspiration Pneumonia  Dysphagia  - Weaned off oxygen on 9/2  - Continue Azithromycin and Ceftriaxone - plan for 7 days total of abx  - SLP [place]s appreciated - recommending Soft/bite sized solids (IDDSI 6), thin liquids (IDDSI 0) with 1:1 assist given cues needed for strategies     E. Coli Urinary Tract Infection  1/2 Blood Cultures Positive for Staph Hominis  - Suspect blood culture is contaminant  - Urine culture from 9/30 positive for e. Coli resistant to ciprofloxacin, levofloxacin, and bactrim     Acute Exacerbation of Diastolic CHF  - Lasix 20 mg IV BID - now discontinued  - Echo in 9/2020 showed EF 55-60%     Coronary Artery Disease s/p CABG x3, x1 in 2015  Hypertension  Hyperlipidemia  - Continue Atorvastatin, Ezetimibe, ASA, Amlodipine, Valsartan, Metoprolol     Hx of CVA with Residual L Hemiparesis and Dysphagia  - Continue ASA and statin  - PT/OT  - SLP [place]s appreciated - as above     Seizure Disorder  - Continue PTA Keppra    Sacral Pressure Wound - POA  - Continue wound cares    DVT Prophylaxis: Pneumatic Compression Devices  Code Status: No CPR- Do NOT Intubate  Diet: Combination Diet Regular Diet; No Straws  Diet    Hayes Catheter: Not present  Disposition: Expected discharge home with home care once home care available.  Family updated today: No     Interval History   Pt seen and examined.  Pt denies cp, sob.  Appetite is good.    -Data reviewed today: I personally reviewed all new labs and imaging results over the last 24  hours.     Physical Exam   Temp: 98.4  F (36.9  C) Temp src: Oral BP: (!) 154/78 Pulse: 58   Resp: 20 SpO2: 93 % O2 Device: None (Room air)    Vitals:    08/31/22 0332   Weight: 78.4 kg (172 lb 12.8 oz)     Vital Signs with Ranges  Temp:  [97.5  F (36.4  C)-98.4  F (36.9  C)] 98.4  F (36.9  C)  Pulse:  [55-79] 58  Resp:  [16-20] 20  BP: (137-175)/(67-79) 154/78  SpO2:  [93 %-96 %] 93 %  I/O last 3 completed shifts:  In: 240 [P.O.:240]  Out: 1220 [Urine:1220]    GENERAL: No apparent distress. Awake, alert, and fully oriented.  HEENT: Normocephalic, atraumatic. Extraocular movements intact.  CARDIOVASCULAR: Regular rate and rhythm without murmurs or rubs. No S3.  PULMONARY: Clear bilaterally.  GASTROINTESTINAL: Soft, non-tender, non-distended. Bowel sounds normoactive.   EXTREMITIES: No cyanosis or clubbing. No edema.  NEUROLOGICAL: L hemiparesis  DERMATOLOGICAL: No rash, ulcer, bruising, nor jaundice.    Medications       amLODIPine  10 mg Oral Daily     aspirin  81 mg Oral Daily     atorvastatin  40 mg Oral QPM     azithromycin  250 mg Oral Daily     cefdinir  300 mg Oral Q12H [facility] (08/20)     docusate sodium  100 mg Oral Daily     enoxaparin ANTICOAGULANT  40 mg Subcutaneous Q24H     ezetimibe  10 mg Oral Daily     gabapentin  300 mg Oral TID     levETIRAcetam  750 mg Oral BID     levothyroxine  50 mcg Oral Daily     metoprolol tartrate  50 mg Oral BID     multivitamin w/minerals  1 tablet Oral Daily     pantoprazole  40 mg Oral Daily     sertraline  50 mg Oral Daily     valsartan  160 mg Oral BID     Data     Laboratory:  Recent Labs   Lab 09/03/22  0611 09/02/22  0630 09/01/22  0655   WBC 5.2 6.0 6.4   HGB 9.3* 8.5* 7.7*   HCT 30.3* 28.0* 25.3*   MCV 91 94 93    155 142*     Recent Labs   Lab 09/03/22  0611 09/02/22  0630 09/01/22  1451 09/01/22  0655    141  --  142   POTASSIUM 3.6 3.5 3.9 3.4   CHLORIDE 100 101  --  103   CO2 33* 30*  --  30*   ANIONGAP 8 10  --  9   GLC 91 72  --  86   BUN 15.5  17.5  --  23.7*   CR 1.03 0.99  --  1.15   GFRESTIMATED 77 80  --  67   SELENA 9.5 9.1  --  8.5*     No results for input(s): CULT in the last 168 hours.    Imaging:  No results found for this or any previous visit (from the past 24 hour(s)).      Reid Cohen DO  [facility] Hospitalist  [address] [place] [address]  09/03/2022

## 2022-09-03 NOTE — PROGRESS NOTES
Request sent to [facility] Patient requesting to receive communion tomorrow (Sunday, Sept 4). He is Buddhist.

## 2022-09-03 NOTE — PLAN OF CARE
Assumed care of pt @ 1500.   A&Ox4. VSS on room air. Denies pain/SOB. Up w/ lift assist. External catheter in place. PO abx. LS clear. Edema to BLE; L > R. Mepilex to sacrum. Possible discharge Tuesday once pt is able to safely resume home care services.

## 2022-09-04 ENCOUNTER — APPOINTMENT (OUTPATIENT)
Dept: PHYSICAL THERAPY | Facility: CLINIC | Age: 72
DRG: 193 | End: 2022-09-04
Payer: COMMERCIAL

## 2022-09-04 LAB
ANION GAP SERPL CALCULATED.3IONS-SCNC: 7 MMOL/L (ref 7–15)
BACTERIA BLD CULT: NO GROWTH
BUN SERPL-MCNC: 16.8 MG/DL (ref 8–23)
CALCIUM SERPL-MCNC: 9.2 MG/DL (ref 8.8–10.2)
CHLORIDE SERPL-SCNC: 102 MMOL/L (ref 98–107)
CREAT SERPL-MCNC: 1.01 MG/DL (ref 0.67–1.17)
DEPRECATED HCO3 PLAS-SCNC: 33 MMOL/L (ref 22–29)
ERYTHROCYTE [DISTWIDTH] IN BLOOD BY AUTOMATED COUNT: 14.6 % (ref 10–15)
GFR SERPL CREATININE-BSD FRML MDRD: 79 ML/MIN/1.73M2
GLUCOSE SERPL-MCNC: 98 MG/DL (ref 70–99)
HCT VFR BLD AUTO: 29.2 % (ref 40–53)
HGB BLD-MCNC: 9 G/DL (ref 13.3–17.7)
MAGNESIUM SERPL-MCNC: 2 MG/DL (ref 1.7–2.3)
MCH RBC QN AUTO: 27.8 PG (ref 26.5–33)
MCHC RBC AUTO-ENTMCNC: 30.8 G/DL (ref 31.5–36.5)
MCV RBC AUTO: 90 FL (ref 78–100)
PLATELET # BLD AUTO: 165 10E3/UL (ref 150–450)
POTASSIUM SERPL-SCNC: 3.3 MMOL/L (ref 3.4–5.3)
POTASSIUM SERPL-SCNC: 3.9 MMOL/L (ref 3.4–5.3)
RBC # BLD AUTO: 3.24 10E6/UL (ref 4.4–5.9)
SODIUM SERPL-SCNC: 142 MMOL/L (ref 136–145)
WBC # BLD AUTO: 5 10E3/UL (ref 4–11)

## 2022-09-04 PROCEDURE — 250N000013 HC RX MED GY IP 250 OP 250 PS 637: Performed by: INTERNAL MEDICINE

## 2022-09-04 PROCEDURE — 84132 ASSAY OF SERUM POTASSIUM: CPT | Performed by: INTERNAL MEDICINE

## 2022-09-04 PROCEDURE — 36415 COLL VENOUS BLD VENIPUNCTURE: CPT | Performed by: HOSPITALIST

## 2022-09-04 PROCEDURE — 99233 SBSQ HOSP IP/OBS HIGH 50: CPT | Performed by: INTERNAL MEDICINE

## 2022-09-04 PROCEDURE — 80048 BASIC METABOLIC PNL TOTAL CA: CPT | Performed by: HOSPITALIST

## 2022-09-04 PROCEDURE — 120N000001 HC R&B MED SURG/OB

## 2022-09-04 PROCEDURE — 250N000011 HC RX IP 250 OP 636: Performed by: INTERNAL MEDICINE

## 2022-09-04 PROCEDURE — 97530 THERAPEUTIC ACTIVITIES: CPT | Mod: GP | Performed by: PHYSICAL THERAPIST

## 2022-09-04 PROCEDURE — 36415 COLL VENOUS BLD VENIPUNCTURE: CPT | Performed by: INTERNAL MEDICINE

## 2022-09-04 PROCEDURE — 85014 HEMATOCRIT: CPT | Performed by: HOSPITALIST

## 2022-09-04 PROCEDURE — 83735 ASSAY OF MAGNESIUM: CPT | Performed by: INTERNAL MEDICINE

## 2022-09-04 RX ORDER — FUROSEMIDE 40 MG
40 TABLET ORAL DAILY
Status: DISCONTINUED | OUTPATIENT
Start: 2022-09-04 | End: 2022-09-06 | Stop reason: HOSPADM

## 2022-09-04 RX ORDER — FUROSEMIDE 10 MG/ML
20 INJECTION INTRAMUSCULAR; INTRAVENOUS
Status: DISCONTINUED | OUTPATIENT
Start: 2022-09-04 | End: 2022-09-04

## 2022-09-04 RX ORDER — POTASSIUM CHLORIDE 1500 MG/1
40 TABLET, EXTENDED RELEASE ORAL ONCE
Status: COMPLETED | OUTPATIENT
Start: 2022-09-04 | End: 2022-09-04

## 2022-09-04 RX ADMIN — GABAPENTIN 300 MG: 300 CAPSULE ORAL at 10:36

## 2022-09-04 RX ADMIN — EZETIMIBE 10 MG: 10 TABLET ORAL at 10:38

## 2022-09-04 RX ADMIN — CEFDINIR 300 MG: 300 CAPSULE ORAL at 20:31

## 2022-09-04 RX ADMIN — GABAPENTIN 300 MG: 300 CAPSULE ORAL at 15:58

## 2022-09-04 RX ADMIN — ENOXAPARIN SODIUM 40 MG: 40 INJECTION SUBCUTANEOUS at 20:32

## 2022-09-04 RX ADMIN — POTASSIUM CHLORIDE 40 MEQ: 1500 TABLET, EXTENDED RELEASE ORAL at 10:36

## 2022-09-04 RX ADMIN — FUROSEMIDE 40 MG: 40 TABLET ORAL at 12:36

## 2022-09-04 RX ADMIN — LEVETIRACETAM 750 MG: 250 TABLET, FILM COATED ORAL at 10:36

## 2022-09-04 RX ADMIN — OXYCODONE HYDROCHLORIDE 5 MG: 5 TABLET ORAL at 20:30

## 2022-09-04 RX ADMIN — MULTIPLE VITAMINS W/ MINERALS TAB 1 TABLET: TAB at 10:38

## 2022-09-04 RX ADMIN — OXYCODONE HYDROCHLORIDE 5 MG: 5 TABLET ORAL at 12:48

## 2022-09-04 RX ADMIN — CEFDINIR 300 MG: 300 CAPSULE ORAL at 10:38

## 2022-09-04 RX ADMIN — VALSARTAN 160 MG: 160 TABLET, FILM COATED ORAL at 20:31

## 2022-09-04 RX ADMIN — METOPROLOL TARTRATE 50 MG: 50 TABLET, FILM COATED ORAL at 10:39

## 2022-09-04 RX ADMIN — DOCUSATE SODIUM 100 MG: 100 CAPSULE, LIQUID FILLED ORAL at 10:37

## 2022-09-04 RX ADMIN — GABAPENTIN 300 MG: 300 CAPSULE ORAL at 20:31

## 2022-09-04 RX ADMIN — Medication 1 DROP: at 12:44

## 2022-09-04 RX ADMIN — ACETAMINOPHEN 650 MG: 325 TABLET, FILM COATED ORAL at 20:29

## 2022-09-04 RX ADMIN — ASPIRIN 81 MG CHEWABLE TABLET 81 MG: 81 TABLET CHEWABLE at 10:38

## 2022-09-04 RX ADMIN — SERTRALINE HYDROCHLORIDE 50 MG: 50 TABLET ORAL at 10:37

## 2022-09-04 RX ADMIN — AZITHROMYCIN MONOHYDRATE 250 MG: 250 TABLET ORAL at 10:36

## 2022-09-04 RX ADMIN — VALSARTAN 160 MG: 160 TABLET, FILM COATED ORAL at 10:37

## 2022-09-04 RX ADMIN — LEVOTHYROXINE SODIUM 50 MCG: 50 TABLET ORAL at 10:39

## 2022-09-04 RX ADMIN — AMLODIPINE BESYLATE 10 MG: 10 TABLET ORAL at 10:38

## 2022-09-04 RX ADMIN — PANTOPRAZOLE SODIUM 40 MG: 40 TABLET, DELAYED RELEASE ORAL at 10:38

## 2022-09-04 RX ADMIN — LEVETIRACETAM 750 MG: 250 TABLET, FILM COATED ORAL at 20:30

## 2022-09-04 RX ADMIN — ATORVASTATIN CALCIUM 40 MG: 40 TABLET, FILM COATED ORAL at 20:31

## 2022-09-04 ASSESSMENT — ACTIVITIES OF DAILY LIVING (ADL)
ADLS_ACUITY_SCORE: 70
ADLS_ACUITY_SCORE: 68
ADLS_ACUITY_SCORE: 70
ADLS_ACUITY_SCORE: 68

## 2022-09-04 NOTE — PROGRESS NOTES
SPIRITUAL HEALTH SERVICES Progress Note  [facility]    Referral Source: Pt request for [employer] communion.     Pt alert in bedside chair with family at bedside.  Pt is active member of [employer] in [place].  Pt understands eucharistic ministers are not providing sacrament at this time to to Covid restriction.  Family will arrange for [place]  to visit at home when discharged.  Oriented to Spiritual Health Services for support during hospital stay.    Plan: Spirit[facility] Services remains available for additional emotional/spiritual support.    Russell Herrera MA  Staff   Pager: [phone]  *[phone]

## 2022-09-04 NOTE — PLAN OF CARE
"A&Ox4. Elevated BP otherwise VSS. On BP meds. Pain to buttocks relieved w/ repositioning and PRN oxy. LS diminished. Replaced potassium per protocol. Ax2 pivot transfer w/ shannon walker. Edema L > R - resume PO lasix today. External catheter in place. Repo as pt allows to offload pressure sore to bottom. Anticipate discharge and resumption of home care services Tuesday.     Addendum:  1700 - MD notified: \"Pt has not voided since 0900. Bladder scanned for 249 around 1545 w/ no urge to void. Do you want me to straight cath him or continue to monitor?\"    MD advised to continue to monitor. PVR ordered.                     "

## 2022-09-04 NOTE — PROGRESS NOTES
Care Management Follow Up    Length of Stay (days): 5    Expected Discharge Date: 09/06/2022     Concerns to be Addressed:     Home care.   Patient plan of care discussed at interdisciplinary rounds: Yes    Anticipated Discharge Disposition: Home     Anticipated Discharge Services:  Private pay nursing and Home Care through [facility]  Anticipated Discharge DME:  TBD    Additional Information:  Writer received v/m from [facility] requesting call back re: dispo plan for pt.  Writer spoke with Cathy from [facility] and she reports pt was receiving PT/OT services from [facility] prior to his hospitalization.    Writer spoke with Ron (pt) who confirmed he was receiving [facility] services in additional to his private pay services through [facility]. Ron WANTS services through both providers.     Currently, it appears PT, OT, and SLP are recommending home care orders. MD will need to place orders for this services, if appropriate at time of discharge.  Fax orders to [facility] at [phone] at discharge.       Meenu Funes [facility], Critical access hospital   Inpatient Care Coordination  [facility]  [phone]

## 2022-09-04 NOTE — PROGRESS NOTES
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  In the emergency department, the patient was found to have a blood pressure of 135/68, heart rate 112, temperature 98.6  F, respiratory rate 13, SPO2 90% on room air.  Initial lab work showed potassium 3.3, bicarb 31, creatinine 1.19, proBNP 2471, procalcitonin 0.10, high-sensitivity troponin 49, hemoglobin 10.2, WBC 8.0.  Urinalysis showed small blood, large leukocyte esterase, greater than 182 WBC, 10 RBC, many bacteria.  Chest x-ray showed consolidative opacity within both lung bases greatest on the right, mild diffuse interstitial opacities additionally noted possibly representing multifocal pneumonia versus pulmonary edema.  The patient was started on ceftriaxone and azithromycin for the treatment of urinary tract infection as well as community-acquired pneumonia versus aspiration pneumonia.  Speech therapy was consulted to see the patient who recommended dysphagia level 6 with thin liquids and one-to-one assist.  The patient did require supplemental oxygen which was weaned off on 9/2/2022.  Blood cultures were obtained upon admission.  1 out of 2 returned positive for Staph hominis, likely a contaminant.  Urine cultures returned positive for E. coli resistant to fluoroquinolones and Bactrim.  The patient's antibiotics were adjusted to azithromycin and cefpodoxime.  On 9/2/2022, the patient was stable for discharge, however the patient's home care was unable to be arranged and it was unsafe for the patient to be discharged  home.  Plan was to discharge home once home care was able to be arranged.    TODAY'S PLAN:  Pt with some BLE edema.  Recommend elevation of legs.  Will resume lasix 20 mg IV BID for a short course.  Continue azithro and cefdinir.  Last dose of azithro is today and last dose of cefdinir is tomorrow.  Awaiting coordination of home care, which should be on Tuesday.  Son at bedside.  All questions answered.  ADDENDUM:  IV was removed.  Will start lasix 40 mg PO daily instead.    Problem List:   Acute Hypoxic Respiratory Failure  Community Acquired Pneumonia vs Aspiration Pneumonia  Dysphagia  - Weaned off oxygen on 9/2  - Continue Azithromycin and Ceftriaxone - plan for 7 days total of abx  - SLP [place]s appreciated - recommending Soft/bite sized solids (IDDSI 6), thin liquids (IDDSI 0) with 1:1 assist given cues needed for strategies     E. Coli Urinary Tract Infection  1/2 Blood Cultures Positive for Staph Hominis  - Suspect blood culture is contaminant  - Urine culture from 9/30 positive for e. Coli resistant to ciprofloxacin, levofloxacin, and bactrim     Acute Exacerbation of Diastolic CHF  - Resume lasix 20 mg IV BID for short course  - Echo in 9/2020 showed EF 55-60%    Mild Hypokalemia  - Electrolyte replacement protocol     Coronary Artery Disease s/p CABG x3, x1 in 2015  Hypertension  Hyperlipidemia  - Continue Atorvastatin, Ezetimibe, ASA, Amlodipine, Valsartan, Metoprolol     Hx of CVA with Residual L Hemiparesis and Dysphagia  - Continue ASA and statin  - PT/OT  - SLP [place]s appreciated - as above     Seizure Disorder  - Continue PTA Keppra     Sacral Pressure Wound - POA  - Continue wound cares    DVT Prophylaxis: Enoxaparin (Lovenox) SQ  Code Status: No CPR- Do NOT Intubate  Diet: Combination Diet Regular Diet; No Straws  Diet    Hayes Catheter: Not present  Disposition: Expected discharge on Tuesday to home with home care. Goals prior to discharge include home care arranged.   Family  updated today: Yes      Interval History   Pt seen and examined.  Pt has no complaints.    -Data reviewed today: I personally reviewed all new labs and imaging results over the last 24 hours.     Physical Exam   Temp: 97.6  F (36.4  C) Temp src: Axillary BP: (!) 141/73 Pulse: 61   Resp: 20 SpO2: 96 % O2 Device: None (Room air)    Vitals:    08/31/22 0332   Weight: 78.4 kg (172 lb 12.8 oz)     Vital Signs with Ranges  Temp:  [96.9  F (36.1  C)-97.6  F (36.4  C)] 97.6  F (36.4  C)  Pulse:  [52-61] 61  Resp:  [18-20] 20  BP: (141-169)/(63-75) 141/73  SpO2:  [95 %-97 %] 96 %  I/O last 3 completed shifts:  In: 240 [P.O.:240]  Out: 400 [Urine:400]    GENERAL: No apparent distress. Awake, alert, and fully oriented.  HEENT: Normocephalic, atraumatic. Extraocular movements intact.  CARDIOVASCULAR: Regular rate and rhythm without murmurs or rubs. No S3.  PULMONARY: Clear bilaterally.  GASTROINTESTINAL: Soft, non-tender, non-distended. Bowel sounds normoactive.   EXTREMITIES: No cyanosis or clubbing. No edema.  NEUROLOGICAL: L hemiparesis  DERMATOLOGICAL: No rash, ulcer, bruising, nor jaundice.    Medications       amLODIPine  10 mg Oral Daily     aspirin  81 mg Oral Daily     atorvastatin  40 mg Oral QPM     cefdinir  300 mg Oral Q12H [facility] (08/20)     docusate sodium  100 mg Oral Daily     enoxaparin ANTICOAGULANT  40 mg Subcutaneous Q24H     ezetimibe  10 mg Oral Daily     gabapentin  300 mg Oral TID     levETIRAcetam  750 mg Oral BID     levothyroxine  50 mcg Oral Daily     metoprolol tartrate  50 mg Oral BID     multivitamin w/minerals  1 tablet Oral Daily     pantoprazole  40 mg Oral Daily     sertraline  50 mg Oral Daily     valsartan  160 mg Oral BID     Data     Laboratory:  Recent Labs   Lab 09/04/22  0618 09/03/22  0611 09/02/22  0630   WBC 5.0 5.2 6.0   HGB 9.0* 9.3* 8.5*   HCT 29.2* 30.3* 28.0*   MCV 90 91 94    178 155     Recent Labs   Lab 09/04/22  0618 09/03/22  0611 09/02/22  0630    141 141    POTASSIUM 3.3* 3.6 3.5   CHLORIDE 102 100 101   CO2 33* 33* 30*   ANIONGAP 7 8 10   GLC 98 91 72   BUN 16.8 15.5 17.5   CR 1.01 1.03 0.99   GFRESTIMATED 79 77 80   SELENA 9.2 9.5 9.1     No results for input(s): CULT in the last 168 hours.    Imaging:  No results found for this or any previous visit (from the past 24 hour(s)).      Reid Cohen DO  [facility] Hospitalist  [address] [place] [address]  09/04/2022

## 2022-09-04 NOTE — PLAN OF CARE
Goal Outcome Evaluation:     VS stable. Diminished LS. +1 edema to RLE. +2 edema to LLE. Complained of pain in coccyx and pain med's given and repositioned. Slept well throughout the night.

## 2022-09-05 LAB
ANION GAP SERPL CALCULATED.3IONS-SCNC: 6 MMOL/L (ref 7–15)
BUN SERPL-MCNC: 15 MG/DL (ref 8–23)
CALCIUM SERPL-MCNC: 9.2 MG/DL (ref 8.8–10.2)
CHLORIDE SERPL-SCNC: 105 MMOL/L (ref 98–107)
CREAT SERPL-MCNC: 1.11 MG/DL (ref 0.67–1.17)
DEPRECATED HCO3 PLAS-SCNC: 32 MMOL/L (ref 22–29)
ERYTHROCYTE [DISTWIDTH] IN BLOOD BY AUTOMATED COUNT: 14.6 % (ref 10–15)
GFR SERPL CREATININE-BSD FRML MDRD: 70 ML/MIN/1.73M2
GLUCOSE SERPL-MCNC: 89 MG/DL (ref 70–99)
HCT VFR BLD AUTO: 29.4 % (ref 40–53)
HGB BLD-MCNC: 9 G/DL (ref 13.3–17.7)
MAGNESIUM SERPL-MCNC: 2 MG/DL (ref 1.7–2.3)
MCH RBC QN AUTO: 28.1 PG (ref 26.5–33)
MCHC RBC AUTO-ENTMCNC: 30.6 G/DL (ref 31.5–36.5)
MCV RBC AUTO: 92 FL (ref 78–100)
PLATELET # BLD AUTO: 157 10E3/UL (ref 150–450)
POTASSIUM SERPL-SCNC: 3.6 MMOL/L (ref 3.4–5.3)
RBC # BLD AUTO: 3.2 10E6/UL (ref 4.4–5.9)
SODIUM SERPL-SCNC: 143 MMOL/L (ref 136–145)
WBC # BLD AUTO: 4.3 10E3/UL (ref 4–11)

## 2022-09-05 PROCEDURE — 250N000013 HC RX MED GY IP 250 OP 250 PS 637: Performed by: INTERNAL MEDICINE

## 2022-09-05 PROCEDURE — 120N000001 HC R&B MED SURG/OB

## 2022-09-05 PROCEDURE — 82310 ASSAY OF CALCIUM: CPT | Performed by: HOSPITALIST

## 2022-09-05 PROCEDURE — 250N000011 HC RX IP 250 OP 636: Performed by: INTERNAL MEDICINE

## 2022-09-05 PROCEDURE — 83735 ASSAY OF MAGNESIUM: CPT | Performed by: INTERNAL MEDICINE

## 2022-09-05 PROCEDURE — 99232 SBSQ HOSP IP/OBS MODERATE 35: CPT | Performed by: INTERNAL MEDICINE

## 2022-09-05 PROCEDURE — 36415 COLL VENOUS BLD VENIPUNCTURE: CPT | Performed by: HOSPITALIST

## 2022-09-05 PROCEDURE — 85027 COMPLETE CBC AUTOMATED: CPT | Performed by: HOSPITALIST

## 2022-09-05 RX ORDER — HYDRALAZINE HYDROCHLORIDE 20 MG/ML
10 INJECTION INTRAMUSCULAR; INTRAVENOUS EVERY 4 HOURS PRN
Status: DISCONTINUED | OUTPATIENT
Start: 2022-09-05 | End: 2022-09-06 | Stop reason: HOSPADM

## 2022-09-05 RX ADMIN — FUROSEMIDE 40 MG: 40 TABLET ORAL at 08:37

## 2022-09-05 RX ADMIN — METOPROLOL TARTRATE 50 MG: 50 TABLET, FILM COATED ORAL at 21:40

## 2022-09-05 RX ADMIN — DOCUSATE SODIUM 100 MG: 100 CAPSULE, LIQUID FILLED ORAL at 08:38

## 2022-09-05 RX ADMIN — LEVOTHYROXINE SODIUM 50 MCG: 50 TABLET ORAL at 08:37

## 2022-09-05 RX ADMIN — GABAPENTIN 300 MG: 300 CAPSULE ORAL at 08:37

## 2022-09-05 RX ADMIN — LEVETIRACETAM 750 MG: 250 TABLET, FILM COATED ORAL at 08:38

## 2022-09-05 RX ADMIN — EZETIMIBE 10 MG: 10 TABLET ORAL at 08:38

## 2022-09-05 RX ADMIN — ATORVASTATIN CALCIUM 40 MG: 40 TABLET, FILM COATED ORAL at 21:35

## 2022-09-05 RX ADMIN — AMLODIPINE BESYLATE 10 MG: 10 TABLET ORAL at 08:37

## 2022-09-05 RX ADMIN — GABAPENTIN 300 MG: 300 CAPSULE ORAL at 15:18

## 2022-09-05 RX ADMIN — MULTIPLE VITAMINS W/ MINERALS TAB 1 TABLET: TAB at 08:37

## 2022-09-05 RX ADMIN — METOPROLOL TARTRATE 50 MG: 50 TABLET, FILM COATED ORAL at 08:37

## 2022-09-05 RX ADMIN — ASPIRIN 81 MG CHEWABLE TABLET 81 MG: 81 TABLET CHEWABLE at 08:38

## 2022-09-05 RX ADMIN — PANTOPRAZOLE SODIUM 40 MG: 40 TABLET, DELAYED RELEASE ORAL at 08:37

## 2022-09-05 RX ADMIN — LEVETIRACETAM 750 MG: 250 TABLET, FILM COATED ORAL at 21:42

## 2022-09-05 RX ADMIN — VALSARTAN 160 MG: 160 TABLET, FILM COATED ORAL at 21:38

## 2022-09-05 RX ADMIN — ENOXAPARIN SODIUM 40 MG: 40 INJECTION SUBCUTANEOUS at 21:43

## 2022-09-05 RX ADMIN — CEFDINIR 300 MG: 300 CAPSULE ORAL at 08:38

## 2022-09-05 RX ADMIN — SERTRALINE HYDROCHLORIDE 50 MG: 50 TABLET ORAL at 08:37

## 2022-09-05 RX ADMIN — VALSARTAN 160 MG: 160 TABLET, FILM COATED ORAL at 08:37

## 2022-09-05 RX ADMIN — OXYCODONE HYDROCHLORIDE 5 MG: 5 TABLET ORAL at 08:50

## 2022-09-05 RX ADMIN — GABAPENTIN 300 MG: 300 CAPSULE ORAL at 21:41

## 2022-09-05 RX ADMIN — OXYCODONE HYDROCHLORIDE 5 MG: 5 TABLET ORAL at 21:46

## 2022-09-05 ASSESSMENT — ACTIVITIES OF DAILY LIVING (ADL)
ADLS_ACUITY_SCORE: 68
ADLS_ACUITY_SCORE: 64
ADLS_ACUITY_SCORE: 68
ADLS_ACUITY_SCORE: 70
ADLS_ACUITY_SCORE: 64
ADLS_ACUITY_SCORE: 64
ADLS_ACUITY_SCORE: 68
ADLS_ACUITY_SCORE: 68
ADLS_ACUITY_SCORE: 70
ADLS_ACUITY_SCORE: 64

## 2022-09-05 NOTE — PLAN OF CARE
"Goal Outcome Evaluation:    Plan of Care Reviewed With: patient     Overall Patient Progress: improving    BP (!) 140/66 (BP Location: Right arm)   Pulse 50   Temp 97  F (36.1  C) (Axillary)   Resp 18   Ht 1.727 m (5' 8\")   Wt 78.4 kg (172 lb 12.8 oz)   SpO2 95%   BMI 26.27 kg/m     Physical assessment WDL w/exceptions as noted of left sided hemiparesis, inc of B&B, up w/2A, BG, & hemiwalker, blanchable redness w/scabbing to coccyx/sacrum, Meplix CDI.    Refer to VS, I/O, Adult PCS for full assessment & shift details.  Continue w/current POC  Disposition plan pt wishing to discharge home before 11:00 tomorrow.  Kathy Pitts, GEORGEN, RN  Medical/Telemetry - 3           "

## 2022-09-05 NOTE — PROGRESS NOTES
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  In the emergency department, the patient was found to have a blood pressure of 135/68, heart rate 112, temperature 98.6  F, respiratory rate 13, SPO2 90% on room air.  Initial lab work showed potassium 3.3, bicarb 31, creatinine 1.19, proBNP 2471, procalcitonin 0.10, high-sensitivity troponin 49, hemoglobin 10.2, WBC 8.0.  Urinalysis showed small blood, large leukocyte esterase, greater than 182 WBC, 10 RBC, many bacteria.  Chest x-ray showed consolidative opacity within both lung bases greatest on the right, mild diffuse interstitial opacities additionally noted possibly representing multifocal pneumonia versus pulmonary edema.  The patient was started on ceftriaxone and azithromycin for the treatment of urinary tract infection as well as community-acquired pneumonia versus aspiration pneumonia.  Speech therapy was consulted to see the patient who recommended dysphagia level 6 with thin liquids and one-to-one assist.  The patient did require supplemental oxygen which was weaned off on 9/2/2022.  Blood cultures were obtained upon admission.  1 out of 2 returned positive for Staph hominis, likely a contaminant.  Urine cultures returned positive for E. coli resistant to fluoroquinolones and Bactrim.  The patient's antibiotics were adjusted to azithromycin and cefpodoxime.  On 9/2/2022, the patient was stable for discharge, however the patient's home care was unable to be arranged and it was unsafe for the patient to be discharged home.  Plan was to discharge home once  home care was able to be arranged.        Problem List:   Acute Hypoxic Respiratory Failure  Community Acquired Pneumonia vs Aspiration Pneumonia  Dysphagia  - Weaned off oxygen on 9/2  -Completed course of azithromycin and cephalosporins.  - SLP [place]s appreciated   -Continue dysphagia diet.     E. Coli Urinary Tract Infection  1/2 Blood Cultures Positive for Staph Hominis  - Suspect blood culture is contaminant  - Urine culture from 9/30 positive for e. Coli resistant to ciprofloxacin, levofloxacin, and bactrim  -Completed course of cephalosporins.     Acute Exacerbation of Diastolic CHF  - Continue furosemide 40 mg a day  -Weigh daily.  -Intake and output monitoring.  -Recheck metabolic panel tomorrow.  - Echo in 9/2020 showed EF 55-60%     Mild Hypokalemia  - Electrolyte replacement protocol  -Recheck metabolic panel tomorrow.     Coronary Artery Disease s/p CABG x3, x1 in 2015  Hypertension  Hyperlipidemia  - Continue Atorvastatin, Ezetimibe, ASA, Amlodipine, Valsartan, Metoprolol     Hx of CVA with Residual L Hemiparesis and Dysphagia  - Continue ASA and statin  - PT/OT  - SLP [place]s appreciated - as above     Seizure Disorder  - Continue PTA Keppra     Sacral Pressure Wound - POA  - Continue wound cares    Hypothyroidism.  -Continue levothyroxine.    GERD.  -Continue pantoprazole.    Depression.  -Continue sertraline.       Diet: Combination Diet Regular Diet; No Straws  Diet    DVT Prophylaxis: Enoxaparin (Lovenox) SQ  Hayes Catheter: Not present  Central Lines: None  Cardiac Monitoring: None  Code Status: No CPR- Do NOT Intubate      Disposition Plan      Expected Discharge Date: 09/06/2022    Discharge Delays: *Medically Ready for Discharge  Other (Add Comment)                Km Kennedy DO  Hospitalist Service  [facility]  Securely message with the Vocera Web Console (learn more here)  Text page via [employer] Paging/Directory         Clinically Significant  Risk Factors Present on Admission                      ______________________________________________________________________    Interval History   Short of breath at times.  Improved from previous.  Denies chest pain, fevers, chills, nausea, or vomiting.    Data reviewed today: I reviewed all medications, new labs and imaging results over the last 24 hours.     Physical Exam   Vital Signs: Temp: 97.5  F (36.4  C) Temp src: Axillary BP: (!) 169/72 Pulse: 53   Resp: 18 SpO2: 99 % O2 Device: None (Room air)    Weight: 172 lbs 12.8 oz  Gen:  NAD, A&Ox3.  Eyes:  PERRL, sclera anicteric.  OP:  MMM, no lesions.  Neck:  Supple.  CV:  Regular, no murmurs.  Lung:  CTA b/l, normal effort.  Ab:  +BS, soft.  Skin:  Warm, dry to touch.  No rash.  Ext:  1+ pitting edema LE b/l.      Data   Recent Labs   Lab 09/05/22  0646 09/04/22  1401 09/04/22  0618 09/03/22  0611 08/31/22  0620 08/30/22  1917   WBC 4.3  --  5.0 5.2   < > 8.0   HGB 9.0*  --  9.0* 9.3*   < > 10.2*   MCV 92  --  90 91   < > 93     --  165 178   < > 188   INR  --   --   --   --   --  1.17*     --  142 141   < > 139   POTASSIUM 3.6 3.9 3.3* 3.6   < > 3.3*   CHLORIDE 105  --  102 100   < > 100   CO2 32*  --  33* 33*   < > 31*   BUN 15.0  --  16.8 15.5   < > 22.4   CR 1.11  --  1.01 1.03   < > 1.19*   ANIONGAP 6*  --  7 8   < > 8   SELENA 9.2  --  9.2 9.5   < > 8.7*   GLC 89  --  98 91   < > 106*   ALBUMIN  --   --   --   --   --  3.6   PROTTOTAL  --   --   --   --   --  6.9   BILITOTAL  --   --   --   --   --  0.7   ALKPHOS  --   --   --   --   --  80   ALT  --   --   --   --   --  21   AST  --   --   --   --   --  23    < > = values in this interval not displayed.

## 2022-09-06 VITALS
HEART RATE: 53 BPM | HEIGHT: 68 IN | TEMPERATURE: 97.1 F | RESPIRATION RATE: 20 BRPM | DIASTOLIC BLOOD PRESSURE: 74 MMHG | OXYGEN SATURATION: 97 % | SYSTOLIC BLOOD PRESSURE: 174 MMHG | BODY MASS INDEX: 26.19 KG/M2 | WEIGHT: 172.8 LBS

## 2022-09-06 LAB
ANION GAP SERPL CALCULATED.3IONS-SCNC: 7 MMOL/L (ref 7–15)
BACTERIA BLD CULT: NO GROWTH
BACTERIA BLD CULT: NO GROWTH
BUN SERPL-MCNC: 14.1 MG/DL (ref 8–23)
CALCIUM SERPL-MCNC: 9 MG/DL (ref 8.8–10.2)
CHLORIDE SERPL-SCNC: 104 MMOL/L (ref 98–107)
CREAT SERPL-MCNC: 1 MG/DL (ref 0.67–1.17)
DEPRECATED HCO3 PLAS-SCNC: 32 MMOL/L (ref 22–29)
ERYTHROCYTE [DISTWIDTH] IN BLOOD BY AUTOMATED COUNT: 14.6 % (ref 10–15)
GFR SERPL CREATININE-BSD FRML MDRD: 79 ML/MIN/1.73M2
GLUCOSE SERPL-MCNC: 84 MG/DL (ref 70–99)
HCT VFR BLD AUTO: 29.8 % (ref 40–53)
HGB BLD-MCNC: 9.2 G/DL (ref 13.3–17.7)
MAGNESIUM SERPL-MCNC: 1.9 MG/DL (ref 1.7–2.3)
MCH RBC QN AUTO: 27.9 PG (ref 26.5–33)
MCHC RBC AUTO-ENTMCNC: 30.9 G/DL (ref 31.5–36.5)
MCV RBC AUTO: 90 FL (ref 78–100)
PLATELET # BLD AUTO: 182 10E3/UL (ref 150–450)
POTASSIUM SERPL-SCNC: 3.5 MMOL/L (ref 3.4–5.3)
RBC # BLD AUTO: 3.3 10E6/UL (ref 4.4–5.9)
SODIUM SERPL-SCNC: 143 MMOL/L (ref 136–145)
WBC # BLD AUTO: 4.3 10E3/UL (ref 4–11)

## 2022-09-06 PROCEDURE — 36415 COLL VENOUS BLD VENIPUNCTURE: CPT | Performed by: HOSPITALIST

## 2022-09-06 PROCEDURE — 99239 HOSP IP/OBS DSCHRG MGMT >30: CPT | Performed by: INTERNAL MEDICINE

## 2022-09-06 PROCEDURE — 85027 COMPLETE CBC AUTOMATED: CPT | Performed by: HOSPITALIST

## 2022-09-06 PROCEDURE — 250N000013 HC RX MED GY IP 250 OP 250 PS 637: Performed by: INTERNAL MEDICINE

## 2022-09-06 PROCEDURE — 82310 ASSAY OF CALCIUM: CPT | Performed by: HOSPITALIST

## 2022-09-06 PROCEDURE — 83735 ASSAY OF MAGNESIUM: CPT | Performed by: INTERNAL MEDICINE

## 2022-09-06 RX ADMIN — LEVETIRACETAM 750 MG: 250 TABLET, FILM COATED ORAL at 08:05

## 2022-09-06 RX ADMIN — SERTRALINE HYDROCHLORIDE 50 MG: 50 TABLET ORAL at 08:05

## 2022-09-06 RX ADMIN — AMLODIPINE BESYLATE 10 MG: 10 TABLET ORAL at 08:05

## 2022-09-06 RX ADMIN — GABAPENTIN 300 MG: 300 CAPSULE ORAL at 08:05

## 2022-09-06 RX ADMIN — METOPROLOL TARTRATE 50 MG: 50 TABLET, FILM COATED ORAL at 08:06

## 2022-09-06 RX ADMIN — LEVOTHYROXINE SODIUM 50 MCG: 50 TABLET ORAL at 08:05

## 2022-09-06 RX ADMIN — DOCUSATE SODIUM 100 MG: 100 CAPSULE, LIQUID FILLED ORAL at 08:05

## 2022-09-06 RX ADMIN — VALSARTAN 160 MG: 160 TABLET, FILM COATED ORAL at 08:05

## 2022-09-06 RX ADMIN — MULTIPLE VITAMINS W/ MINERALS TAB 1 TABLET: TAB at 08:06

## 2022-09-06 RX ADMIN — PANTOPRAZOLE SODIUM 40 MG: 40 TABLET, DELAYED RELEASE ORAL at 08:05

## 2022-09-06 RX ADMIN — EZETIMIBE 10 MG: 10 TABLET ORAL at 08:05

## 2022-09-06 RX ADMIN — ASPIRIN 81 MG CHEWABLE TABLET 81 MG: 81 TABLET CHEWABLE at 08:05

## 2022-09-06 RX ADMIN — FUROSEMIDE 40 MG: 40 TABLET ORAL at 08:05

## 2022-09-06 ASSESSMENT — ACTIVITIES OF DAILY LIVING (ADL)
ADLS_ACUITY_SCORE: 70

## 2022-09-06 NOTE — PLAN OF CARE
"Goal Outcome Evaluation:    Plan of Care Reviewed With: patient     Overall Patient Progress: improving    BP (!) 152/69 (BP Location: Right arm)   Pulse 51   Temp 97.6  F (36.4  C) (Axillary)   Resp 18   Ht 1.727 m (5' 8\")   Wt 78.4 kg (172 lb 12.8 oz)   SpO2 98%   BMI 26.27 kg/m     Physical assessment WDL w/exceptions as noted of dim lungs throughout, LLE edema, inc of B&B, left hemiparesis from prior CVA, A&O Xs 4 w/slightly forgetful.    Refer to VS, I/O, Adult PCS for full assessment & shift details.  Continue w/current POC  Disposition plan for later today to return to home w/resumption of HHC service for 24/7 HHA, pt will also have home ST/OT/PT.   Kathy Pitts, GEORGEN, RN  Medical/Telemetry - 3          "

## 2022-09-06 NOTE — DISCHARGE SUMMARY
[facility]  Hospitalist Discharge Summary      Date of Admission:  8/30/2022  Date of Discharge:  9/6/2022  Discharging Provider: Km Kennedy DO  Discharge Service: Hospitalist Service    Discharge Diagnoses   1.  Community-acquired pneumonia.  2.  Acute hypoxic respiratory failure.  3.  Dysphagia.  4.  E. coli urinary tract infection.  5.  Positive blood culture due to suspected contaminant.  6.  Acute exacerbation of chronic heart failure with preserved ejection fraction.  7.  Hypokalemia.  8.  Hyperlipidemia.  9.  Hypertension.  10.  Coronary artery disease.  11.  Previous stroke.  12.  Seizure disorder.  13.  Hypothyroidism.  14.  GERD.  15.  Depression.  16.  Sacral pressure wound present at time of admission.    Follow-ups Needed After Discharge   Follow-up Appointments     Follow-up and recommended labs and tests       Follow up with primary care provider, Susana Esparza, within 7 days   for hospital follow- up.  The following labs/tests are recommended: CBC,   BMP.    Recommend you get a repeat chest x-ray in 4-6 weeks with your primary care   doctor to ensure complete resolution of your pneumonia.             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  In the emergency department, the patient was found to have a blood pressure of 135/68, heart rate 112, temperature 98.6  F, respiratory rate 13, SPO2 90% on room air.  Initial lab work showed potassium 3.3, bicarb 31, creatinine 1.19, proBNP 2471, procalcitonin 0.10, high-sensitivity troponin 49, hemoglobin 10.2, WBC 8.0.  Urinalysis showed small blood, large  leukocyte esterase, greater than 182 WBC, 10 RBC, many bacteria.  Chest x-ray showed consolidative opacity within both lung bases greatest on the right, mild diffuse interstitial opacities additionally noted possibly representing multifocal pneumonia versus pulmonary edema.  The patient was started on ceftriaxone and azithromycin for the treatment of urinary tract infection as well as community-acquired pneumonia versus aspiration pneumonia.  Speech therapy was consulted to see the patient who recommended dysphagia level 6 with thin liquids and one-to-one assist.  The patient did require supplemental oxygen which was weaned off on 9/2/2022.  Blood cultures were obtained upon admission.  1 out of 2 returned positive for Staph hominis, likely a contaminant.  Urine cultures returned positive for E. coli resistant to fluoroquinolones and Bactrim.  The patient's antibiotics were adjusted to azithromycin and cefpodoxime.  He has completed antibiotics during his time in the hospital.  He is no longer requiring supplemental oxygen.  He is able to discharge on 9/6.  Follow-up with primary care provider within the next 1 week.    Consultations This Hospital Stay   SPEECH LANGUAGE PATH ADULT IP CONSULT  PHYSICAL THERAPY ADULT IP CONSULT  OCCUPATIONAL THERAPY ADULT IP CONSULT  CARE MANAGEMENT / SOCIAL WORK IP CONSULT  [facility] [place] [facility] IP CONSULT    Code Status   No CPR- Do NOT Intubate    Time Spent on this Encounter   I spent 35 minutes with Mr. Teran and working on discharge on 9/6/2022.       Km Kennedy, [place] [address] MEDICAL SURGICAL  [address] [place] [address]  Phone: [phone]  Fax: [phone]  ______________________________________________________________________    Physical Exam   Vital Signs: Temp: 97.1  F (36.2  C) Temp src: Axillary BP: (!) 174/74 Pulse: 53   Resp: 20 SpO2: 97 % O2 Device: None (Room air)    Weight: 172 lbs 12.8 oz  Gen:  NAD, A&Ox3.  Eyes:   PERRL, sclera anicteric.  OP:  MMM, no lesions.  Neck:  Supple.  CV:  Regular, no murmurs.  Lung:  CTA b/l, normal effort.  Ab:  +BS, soft.  Skin:  Warm, dry to touch.  No rash.  Ext:  No pitting edema LE b/l.         Primary Care Physician   Susana Esparza    Discharge Orders      Basic metabolic panel     Home Care Referral      Reason for your hospital stay    Community Acquired Pneumonia vs Aspiration Pneumonia, Urinary Tract Infection     Follow-up and recommended labs and tests     Follow up with primary care provider, Susana Esparza, within 7 days for hospital follow- up.  The following labs/tests are recommended: CBC, BMP.    Recommend you get a repeat chest x-ray in 4-6 weeks with your primary care doctor to ensure complete resolution of your pneumonia.     Activity    Your activity upon discharge: activity as tolerated     Diet    Follow this diet upon discharge: Orders Placed This Encounter      Combination Diet Regular Diet; No Straws         Discharge Medications   Current Discharge Medication List      CONTINUE these medications which have NOT CHANGED    Details   acetaminophen (TYLENOL) 500 MG tablet Take 1,000 mg by mouth daily as needed for mild pain      amLODIPine (NORVASC) 10 MG tablet Take 10 mg by mouth daily      artificial saliva (BIOTENE MT) AERS spray Take 1 spray by mouth 3 times daily as needed for dry mouth      aspirin 81 MG EC tablet Take 81 mg by mouth daily      atorvastatin (LIPITOR) 40 MG tablet Take 40 mg by mouth daily      carboxymethylcellulose PF (REFRESH PLUS) 0.5 % ophthalmic solution Apply 2 drops to eye every hour as needed for dry eyes  Qty:      Associated Diagnoses: 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 (H)      diclofenac (VOLTAREN) 1 % topical gel Apply 2 g topically 4 times daily as needed for moderate pain Apply to back and right hip      docusate sodium (COLACE) 100 MG capsule Take 100 mg by mouth daily      ezetimibe  (ZETIA) 10 MG tablet Take 10 mg by mouth daily       gabapentin (NEURONTIN) 300 MG capsule Take 300 mg by mouth 3 times daily      levETIRAcetam (KEPPRA) 750 MG tablet Take 750 mg by mouth 2 times daily      levothyroxine (SYNTHROID/LEVOTHROID) 50 MCG tablet Take 50 mcg by mouth daily       Lidocaine (LIDOCARE) 4 % Patch Place 1 patch onto the skin every 24 hours Apply to back    To prevent lidocaine toxicity, patient should be patch free for 12 hrs daily.      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 If symptoms persist 5 minutes after 1st dose call 911.      oxyCODONE (ROXICODONE) 5 MG tablet Take 5 mg by mouth every 6 hours as needed for pain      pantoprazole (PROTONIX) 40 MG EC tablet Take 40 mg by mouth daily      sertraline (ZOLOFT) 25 MG tablet Take 50 mg by mouth daily      Soap & Cleansers (DIONE CLEANSING CREAM EX) Externally apply topically 2 times daily Apply topically to coccyx      valsartan (DIOVAN) 80 MG tablet Take 160 mg by mouth 2 times daily           Allergies   No Known Allergies

## 2022-09-06 NOTE — PROGRESS NOTES
Care Management Discharge Note    Discharge Date: 09/06/2022    Discharge Disposition: Home    Discharge Services: Resume [facility] - PT/OT and private pay RN through [facility]    Discharge DME: n/a    Discharge Transportation: Pt's son    Private pay costs discussed: Not applicable    Education Provided on the Discharge Plan: yes  Persons Notified of Discharge Plans: pt/pt's son, nursing, MD, [facility]  Patient/Family in Agreement with the Plan: yes    Handoff Referral Completed: No    Additional Information:  Noted pt has discharge orders to go home today and resume home services. Home care ordered for RN only. PETER paged MD to update home care orders for PT/OT only.     Call placed to [facility] p: [phone] f: [phone] to provide update on pt's discharge today.     Met with pt this date and introduced self and social work role. Pt was accompanied by one of his sons who is ready to transport him home. Both in agreement with discharge plan for today and stated [facility] is already aware and heading out to his home this afternoon.     Will await updated orders and fax them to [facility].     ADDENDUM @ 1255:    PETER faxed updated HC orders to [facility] p: [phone].    NESSA Emmanuel, LSW  Inpatient Care Coordination  MS[facility], [facility]  [phone]    NESSA Pete

## 2022-09-06 NOTE — PLAN OF CARE
Physical Therapy Discharge Summary    Reason for therapy discharge:    Discharged to home with home therapy.    Progress towards therapy goal(s). See goals on Care Plan in [facility] health record for goal details.  Goals partially met.  Barriers to achieving goals:   discharge from facility.    Therapy [place](s):    Continued therapy is recommended.  Rationale/Recommendations:  At baseline pt is WC bound and has 2 full time home health aides that assist with all mobility and ADLs.  Pt appears to be close to baseline, requiring A x 2 for bed mobility and stand pivot transfer bed > WC with hemiwalker.  I recommend that pt return home with 2 aids present for all mobility with HHPT to increase safety and independence with bed mobility and transfers, to decrease burden of care on caregivers and decrease falls risk.

## 2022-09-06 NOTE — PLAN OF CARE
"Speech Language Therapy Discharge Summary    Reason for therapy discharge:    Discharged to home with home therapy.    Progress towards therapy goal(s). See goals on Care Plan in [facility] electronic health record for goal details.  Goals partially met.  Barriers to achieving goals:   discharge from facility.    Therapy [place](s):    Continued therapy is recommended.  Rationale/Recommendations:  Pt near baseline with chronic mild dysphagia s/p CVA - could consider  SLP for strategy reinforcement, oropharyngeal exercise program re-introduction. At time of discharge: \"Mild oropharyngeal dysphagia on VFSS. Recommendations: regular solids (IDDSI 7), thin liquids (IDDSI 0- NO straws) with 1:1 assist given cues needed for strategies. Swallow Strategies: 1) small single bites 2) small single sips 3) swallow 2 times for every bite/sip to clear residue from throat 4) alternate between solids/liquids (take a sip of liquid after every bite) 5) periodic cough and re-swallow to clear airway 6) add extra moisture to solids (sauces, gravies, condiments) 7) excellent oral hygeine\"        "

## 2022-09-06 NOTE — PLAN OF CARE
Goal Outcome Evaluation:    Plan of Care Reviewed With: patient     Overall Patient Progress: improving  Patient admitted with resp failure. Room air. Eating well. Voiding/BM today. Generalized edema. Worse as the day went on. +2 legs to feet. Repositioned. Mepalex on coccyx for redness. Barrier to the area as well. Plan is home with home care tomorrow.

## 2022-09-06 NOTE — PROGRESS NOTES
Pt discharged at 11:30 With Jeyson Teran  Writer went over discharge summary with pt. All concerns addressed. Pt verbalized understanding of discharge summary. Pt discharge with all belong. Discharge paper signed and filed in chart.

## 2022-09-08 ENCOUNTER — PATIENT OUTREACH (OUTPATIENT)
Dept: CARE COORDINATION | Facility: CLINIC | Age: 72
End: 2022-09-08

## 2022-09-08 NOTE — PROGRESS NOTES
Connected Care Resource Center Contact  [facility]/Voicemail     Clinical Data: Transitional Care Management Outreach     Outreach attempted x 2.  Left message on patient's voicemail, providing [facility]'s 24/7 scheduling and nurse triage phone number [phone] ([phone]) for questions/concerns and/or to schedule an appt with an [facility] provider, if they do not have a PCP.      Plan:  [facility] will do no further outreaches at this time.       Effie Browning  Connected Care Resource Center, [facility]    *Connected Care Resource Team does NOT follow patient ongoing. Referrals are identified based on internal discharge reports and the outreach is to ensure patient has an understanding of their discharge instructions.

## 2022-10-18 NOTE — PROVIDER NOTIFICATION
PATIENT: Jame Pressley  MRN: [id number]  Pt did not void during the night. Bladder scan volume is 536 at 1558. please advise. Thank you   ([facility] – Tulsa Milagros Pimentel MD;  Location: [facility];  Service: Ophthalmology;  Laterality: Left;    CATARACT EXTRACTION W/  INTRAOCULAR LENS IMPLANT Right 2/13/2020    Procedure: EXTRACTION, CATARACT, WITH IOL INSERTION;  Surgeon: Milagros Pimentel MD;   Location: [facility] Andry Connor MD;  Location: [facility] 10/18/2022 Automated   Final    Sodium 10/18/[facility]

## 2022-11-07 ENCOUNTER — ANCILLARY PROCEDURE (OUTPATIENT)
Dept: GENERAL RADIOLOGY | Facility: CLINIC | Age: 72
End: 2022-11-07
Attending: PHYSICIAN ASSISTANT
Payer: COMMERCIAL

## 2022-11-07 ENCOUNTER — OFFICE VISIT (OUTPATIENT)
Dept: URGENT CARE | Facility: URGENT CARE | Age: 72
End: 2022-11-07
Payer: COMMERCIAL

## 2022-11-07 VITALS
TEMPERATURE: 97.7 F | DIASTOLIC BLOOD PRESSURE: 78 MMHG | WEIGHT: 175 LBS | RESPIRATION RATE: 14 BRPM | OXYGEN SATURATION: 97 % | BODY MASS INDEX: 26.61 KG/M2 | HEART RATE: 60 BPM | SYSTOLIC BLOOD PRESSURE: 144 MMHG

## 2022-11-07 DIAGNOSIS — R10.84 ABDOMINAL PAIN, GENERALIZED: ICD-10-CM

## 2022-11-07 DIAGNOSIS — K59.01 SLOW TRANSIT CONSTIPATION: Primary | ICD-10-CM

## 2022-11-07 LAB
ALBUMIN SERPL BCG-MCNC: 3.9 G/DL (ref 3.5–5.2)
ALP SERPL-CCNC: 88 U/L (ref 40–129)
ALT SERPL W P-5'-P-CCNC: 15 U/L (ref 10–50)
ANION GAP SERPL CALCULATED.3IONS-SCNC: 10 MMOL/L (ref 7–15)
AST SERPL W P-5'-P-CCNC: 22 U/L (ref 10–50)
BASOPHILS # BLD AUTO: 0 10E3/UL (ref 0–0.2)
BASOPHILS NFR BLD AUTO: 0 %
BILIRUB SERPL-MCNC: 0.4 MG/DL
BUN SERPL-MCNC: 20.3 MG/DL (ref 8–23)
CALCIUM SERPL-MCNC: 8.8 MG/DL (ref 8.8–10.2)
CHLORIDE SERPL-SCNC: 101 MMOL/L (ref 98–107)
CREAT SERPL-MCNC: 1.48 MG/DL (ref 0.67–1.17)
DEPRECATED HCO3 PLAS-SCNC: 31 MMOL/L (ref 22–29)
EOSINOPHIL # BLD AUTO: 0.3 10E3/UL (ref 0–0.7)
EOSINOPHIL NFR BLD AUTO: 4 %
ERYTHROCYTE [DISTWIDTH] IN BLOOD BY AUTOMATED COUNT: 13.5 % (ref 10–15)
GFR SERPL CREATININE-BSD FRML MDRD: 50 ML/MIN/1.73M2
GLUCOSE SERPL-MCNC: 109 MG/DL (ref 70–99)
HCT VFR BLD AUTO: 32.1 % (ref 40–53)
HGB BLD-MCNC: 10.4 G/DL (ref 13.3–17.7)
LYMPHOCYTES # BLD AUTO: 1.2 10E3/UL (ref 0.8–5.3)
LYMPHOCYTES NFR BLD AUTO: 18 %
MCH RBC QN AUTO: 29.6 PG (ref 26.5–33)
MCHC RBC AUTO-ENTMCNC: 32.4 G/DL (ref 31.5–36.5)
MCV RBC AUTO: 92 FL (ref 78–100)
MONOCYTES # BLD AUTO: 0.5 10E3/UL (ref 0–1.3)
MONOCYTES NFR BLD AUTO: 8 %
NEUTROPHILS # BLD AUTO: 4.8 10E3/UL (ref 1.6–8.3)
NEUTROPHILS NFR BLD AUTO: 70 %
PLATELET # BLD AUTO: 183 10E3/UL (ref 150–450)
POTASSIUM SERPL-SCNC: 4.3 MMOL/L (ref 3.4–5.3)
PROT SERPL-MCNC: 7.5 G/DL (ref 6.4–8.3)
RBC # BLD AUTO: 3.51 10E6/UL (ref 4.4–5.9)
SODIUM SERPL-SCNC: 142 MMOL/L (ref 136–145)
WBC # BLD AUTO: 6.8 10E3/UL (ref 4–11)

## 2022-11-07 PROCEDURE — 99204 OFFICE O/P NEW MOD 45 MIN: CPT | Performed by: PHYSICIAN ASSISTANT

## 2022-11-07 PROCEDURE — 74018 RADEX ABDOMEN 1 VIEW: CPT | Mod: TC | Performed by: RADIOLOGY

## 2022-11-07 PROCEDURE — 80053 COMPREHEN METABOLIC PANEL: CPT | Performed by: PHYSICIAN ASSISTANT

## 2022-11-07 PROCEDURE — 85025 COMPLETE CBC W/AUTO DIFF WBC: CPT | Performed by: PHYSICIAN ASSISTANT

## 2022-11-07 PROCEDURE — 36415 COLL VENOUS BLD VENIPUNCTURE: CPT | Performed by: PHYSICIAN ASSISTANT

## 2022-11-07 RX ORDER — BISACODYL 5 MG/1
10 TABLET, DELAYED RELEASE ORAL DAILY PRN
Qty: 6 TABLET | Refills: 0 | Status: SHIPPED | OUTPATIENT
Start: 2022-11-07 | End: 2022-11-10

## 2022-11-07 NOTE — PROGRESS NOTES
URGENT CARE VISIT:    SUBJECTIVE:   Jorje Teran is a 72 year old male who presents with abdominal pain for 3 days. Abdominal pain is located over lower abdominal pain and is described as cramping. Pain timing/severity is described as intermittent and mild and moderate.  Pain is improved by nothing and worsened by nothing. Associated symptoms include constipation. He denies nausea, vomiting, fever and chills. He has tried prune juice with no relief of symptoms.  Appetite is normal. Risk factors include none. Abdominal surgical history includes none.    PMH:   Past Medical History:   Diagnosis Date     Cerebrovascular accident (CVA) due to occlusion of right middle cerebral artery (H)      Essential hypertension      Mixed hyperlipidemia      Allergies: Patient has no known allergies.  Medications:   Current Outpatient Medications   Medication Sig Dispense Refill     amLODIPine (NORVASC) 10 MG tablet Take 10 mg by mouth daily       artificial saliva (BIOTENE MT) AERS spray Take 1 spray by mouth 3 times daily as needed for dry mouth       aspirin 81 MG EC tablet Take 81 mg by mouth daily       atorvastatin (LIPITOR) 40 MG tablet Take 40 mg by mouth daily       bisacodyl (DULCOLAX) 5 MG EC tablet Take 2 tablets (10 mg) by mouth daily as needed for constipation 6 tablet 0     carboxymethylcellulose PF (REFRESH PLUS) 0.5 % ophthalmic solution Apply 2 drops to eye every hour as needed for dry eyes       diclofenac (VOLTAREN) 1 % topical gel Apply 2 g topically 4 times daily as needed for moderate pain Apply to back and right hip       docusate sodium (COLACE) 100 MG capsule Take 100 mg by mouth daily       ezetimibe (ZETIA) 10 MG tablet Take 10 mg by mouth daily        gabapentin (NEURONTIN) 300 MG capsule Take 300 mg by mouth 3 times daily       levETIRAcetam (KEPPRA) 750 MG tablet Take 750 mg by mouth 2 times daily       levothyroxine (SYNTHROID/LEVOTHROID) 50 MCG tablet Take 50 mcg by mouth daily        Lidocaine  (LIDOCARE) 4 % Patch Place 1 patch onto the skin every 24 hours Apply to back    To prevent lidocaine toxicity, patient should be patch free for 12 hrs daily.       metoprolol tartrate (LOPRESSOR) 50 MG tablet Take 1 tablet (50 mg) by mouth 2 times daily       multivitamin (CENTRUM SILVER) tablet Take 1 tablet by mouth daily       naproxen sodium 220 MG capsule Take 220 mg by mouth 2 times daily (with meals)       nitroGLYcerin (NITROSTAT) 0.4 MG sublingual tablet Place 0.4 mg under the tongue every 5 minutes as needed for chest pain For chest pain place 1 tablet under the tongue every 5 minutes for 3 doses. If symptoms persist 5 minutes after 1st dose call 911.       oxyCODONE (ROXICODONE) 5 MG tablet Take 5 mg by mouth every 6 hours as needed for pain       pantoprazole (PROTONIX) 40 MG EC tablet Take 40 mg by mouth daily       sertraline (ZOLOFT) 25 MG tablet Take 50 mg by mouth daily       Soap & Cleansers (DIONE CLEANSING CREAM EX) Externally apply topically 2 times daily Apply topically to coccyx       valsartan (DIOVAN) 80 MG tablet Take 160 mg by mouth 2 times daily       acetaminophen (TYLENOL) 500 MG tablet Take 1,000 mg by mouth daily as needed for mild pain (Patient not taking: Reported on 11/7/2022)       Social History:   Social History     Socioeconomic History     Marital status:      Spouse name: Not on file     Number of children: Not on file     Years of education: Not on file     Highest education level: Not on file   Occupational History     Not on file   Tobacco Use     Smoking status: Never     Smokeless tobacco: Never   Vaping Use     Vaping Use: Never used   Substance and Sexual Activity     Alcohol use: Yes     Drug use: Not Currently     Sexual activity: Not on file   Other Topics Concern     Not on file   Social History Narrative     Not on file     Social Determinants of Health     Financial Resource Strain: Not on file   Food Insecurity: Not on file   Transportation Needs: Not on  file   Physical Activity: Not on file   Stress: Not on file   Social Connections: Not on file   Intimate Partner Violence: Not on file   Housing Stability: Not on file       ROS: ROS otherwise found to be negative except as noted above.     OBJECTIVE:  BP (!) 144/78 (BP Location: Right arm, Patient Position: Chair, Cuff Size: Adult Regular)   Pulse 60   Temp 97.7  F (36.5  C) (Oral)   Resp 14   Wt 79.4 kg (175 lb)   SpO2 97%   BMI 26.61 kg/m    GENERAL APPEARANCE: healthy, alert and no distress  EYES: EOMI,  PERRL, conjunctiva clear  RESP: lungs clear to auscultation - no rales, rhonchi or wheezes  CV: regular rates and rhythm, normal S1 S2, no murmur noted  ABDOMEN: soft, normal bowel sounds, tenderness mild lower abdominal pain  SKIN: no suspicious lesions or rashes    LABS:  Results for orders placed or performed in visit on 11/07/22   XR Abdomen 1 View     Status: None    Narrative    XR ABDOMEN 1 VIEW 11/7/2022 1:10 PM    HISTORY: Abdominal pain, generalized    COMPARISON: 9/8/2020      Impression    IMPRESSION: Interval placement of gastrostomy tube with visible T  tacks. Bowel gas pattern is nonobstructed. Large amount of stool in  the rectum.    LINNETTE PEREZ MD         SYSTEM ID:  [id number]   Results for orders placed or performed in visit on 11/07/22   Comprehensive metabolic panel     Status: Abnormal   Result Value Ref Range    Sodium 142 136 - 145 mmol/L    Potassium 4.3 3.4 - 5.3 mmol/L    Chloride 101 98 - 107 mmol/L    Carbon Dioxide (CO2) 31 (H) 22 - 29 mmol/L    Anion Gap 10 7 - 15 mmol/L    Urea Nitrogen 20.3 8.0 - 23.0 mg/dL    Creatinine 1.48 (H) 0.67 - 1.17 mg/dL    Calcium 8.8 8.8 - 10.2 mg/dL    Glucose 109 (H) 70 - 99 mg/dL    Alkaline Phosphatase 88 40 - 129 U/L    AST 22 10 - 50 U/L    ALT 15 10 - 50 U/L    Protein Total 7.5 6.4 - 8.3 g/dL    Albumin 3.9 3.5 - 5.2 g/dL    Bilirubin Total 0.4 <=1.2 mg/dL    GFR Estimate 50 (L) >60 mL/min/1.73m2   CBC with platelets and differential      Status: Abnormal   Result Value Ref Range    WBC Count 6.8 4.0 - 11.0 10e3/uL    RBC Count 3.51 (L) 4.40 - 5.90 10e6/uL    Hemoglobin 10.4 (L) 13.3 - 17.7 g/dL    Hematocrit 32.1 (L) 40.0 - 53.0 %    MCV 92 78 - 100 fL    MCH 29.6 26.5 - 33.0 pg    MCHC 32.4 31.5 - 36.5 g/dL    RDW 13.5 10.0 - 15.0 %    Platelet Count 183 150 - 450 10e3/uL    % Neutrophils 70 %    % Lymphocytes 18 %    % Monocytes 8 %    % Eosinophils 4 %    % Basophils 0 %    Absolute Neutrophils 4.8 1.6 - 8.3 10e3/uL    Absolute Lymphocytes 1.2 0.8 - 5.3 10e3/uL    Absolute Monocytes 0.5 0.0 - 1.3 10e3/uL    Absolute Eosinophils 0.3 0.0 - 0.7 10e3/uL    Absolute Basophils 0.0 0.0 - 0.2 10e3/uL   CBC with platelets and differential     Status: Abnormal    Narrative    The following orders were created for panel order CBC with platelets and differential.  Procedure                               Abnormality         Status                     ---------                               -----------         ------                     CBC with platelets and d...[[id number]]  Abnormal            Final result                 Please view results for these tests on the individual orders.        ASSESSMENT:     ICD-10-CM    1. Slow transit constipation  K59.01 UA Macro with Reflex to Micro and Culture - lab collect     UA Macro with Reflex to Micro and Culture - lab collect     CBC with platelets and differential     Comprehensive metabolic panel     CBC with platelets and differential     Comprehensive metabolic panel     bisacodyl (DULCOLAX) 5 MG EC tablet     CANCELED: XR Abdomen 2 Views           PLAN:  45 minutes spent on the date of the encounter doing chart review, review of outside records, review of test results, interpretation of tests, patient visit, documentation and discussion with caregivers   Patient Instructions   Patient was educated on the natural course of condition. CBC and CMP are unremarkable. CMP shows decrease in kidney function which is  likely due to dehydration. Unable to get UA however, I have less concern for UTI. His x-ray shows large amount of stool near rectum which is consistent with lower abdominal pain. Less likely appendicitis as he does not have localized TTP on right side and has a good appetite. Conservative measures discussed including increased water intake, high fiber diet, stool softeners (Miralax), and analgesics (Tylenol) for pain.  Start dulcolax. Food such as plums, prunes, and pears will also help relieve constipation.  See your primary care provider if symptoms worsen or do not improve in 3 days. Seek emergency care if you develop worsening abdominal pain or severe abdominal distention.       Patient verbalized understanding and is agreeable to plan. The patient was discharged ambulatory and in stable condition.    Bell Caban PA-C ....................  11/7/2022   3:53 PM

## 2022-11-07 NOTE — PATIENT INSTRUCTIONS
Patient was educated on the natural course of condition. CBC and CMP are unremarkable. CMP shows decrease in kidney function which is likely due to dehydration. Unable to get UA however, I have less concern for UTI. His x-ray shows large amount of stool near rectum which is consistent with lower abdominal pain. Less likely appendicitis as he does not have localized TTP on right side and has a good appetite. Conservative measures discussed including increased water intake, high fiber diet, stool softeners (Miralax), and analgesics (Tylenol) for pain.  Start dulcolax. Food such as plums, prunes, and pears will also help relieve constipation.  See your primary care provider if symptoms worsen or do not improve in 3 days. Seek emergency care if you develop worsening abdominal pain or severe abdominal distention.

## 2022-11-21 ENCOUNTER — MEDICAL CORRESPONDENCE (OUTPATIENT)
Dept: HEALTH INFORMATION MANAGEMENT | Facility: CLINIC | Age: 72
End: 2022-11-21

## 2022-12-30 NOTE — PLAN OF CARE
FOCUS/GOAL  Bowel management, Bladder management, and Nutrition/Feeding/Swallowing precautions    ASSESSMENT, INTERVENTIONS AND CONTINUING PLAN FOR GOAL:  Alert and oriented x4 with some word-finding ability. Pt has slow speech and can also be slow to respond to questions or follow commands. Pt was assisted/ supervised for both meals this shift. Pt ate 75% of breakfast and 50% of lunch. Pt on calorie counts. See BG levels in chart, insulin given as needed. G-tube dressing changed, area is red, copious discharge and pt was wincing in pain when cleaning area. Anchoring device replaced to prevent tension and note left for MD to assess. Cyclic TF decreased, now 6pm-6am. Pt was incont of BM and bladder x1 this shift. Mepalex to coccyx replaced, skin intact and blanchable. Condom catheter replaced; [place] Acute (flower petal) catheter not in stock on the floor, ordered from [facility] but will need to come from [facility] so regular freedom condom catheter placed instead. Mepalex applied to left inner thigh to prevent pressure injury from catheter. Pt does not use call light, frequent safety checks completed. Continue POC.      DISPLAY PLAN FREE TEXT

## 2023-01-05 NOTE — PLAN OF CARE
Discharge Planner SLP   Patient plan for discharge: Rehab  Current status: Patient seen for speech/language and swallow treatment. Patient seen for treatment while up in the chair. Focued on auditory comprehension to follow 1 step directions with 100% verbal language to complete sentences/phrases with 90% responsive naming with 90%. needed max cue to implement compensatory strategies to improve breath support for increased volume with overall speech intelligibility at approximately 50%. Improved endurance and tolerance for a session.   Given trials of ice chips with improved mastication and bolus control. Premature entry and breath holding noted. Delayed swallow and suspect posible penetration but no overt Sx of aspiration. Tolerate trials of nectar thick liquids by the cup with delay without overt Sx of aspiration.   Recommend: 1 continue DDL 1 with nectar thick liquids. SLP will trial ice chips and semi-soft textures as tolerated. Continue BID treatment for both swallow and speech/language treatment.   Barriers to return to prior living situation: Dysphagia, dysarthria, and asphasia.   Recommendations for discharge: [facility]  Rationale for [place]s: Patient needs intense ST needs for both dysphagia and communication. He has been demonstrating improvement with endurance and tolerance for therapy. Anticipate that he will be able to tolerate 3 hours of therapy. Patient significantly below his baseline.        Entered by: Jaky Egan 09/23/2020 10:29 AM       declines

## 2023-10-23 NOTE — PLAN OF CARE
"SLP--Pt seen for communication tx. PM session focused on communication using \"Big and Loud\" dysarthria technique as well as visual tracking. SLP placed herself to the pt's left side (visual cut side) so that pt needed to turn his head to view sticky notes with bold color words posted on the back of a clip board.  Pt read each word then matched the word with the cooresponding color card using loud vocal intensity to label each color.      SLP noted improved inteligibilty across speaking tasks 2/2 son's visit and consistent encouragement to increase vocal intensity during casual conversation.  The power of laughter also helped in this regard.      Pt improved his visual tracking across the task compared to yesterday's session.     See AM note regarding swallowing.   " Ricci Tamara was seen and treated in our emergency department on 10/23/2023.  He may return to work on 10/24/2023.       If you have any questions or concerns, please don't hesitate to call.      Elsie Madsen MD

## 2024-08-10 NOTE — PROVIDER NOTIFICATION
"Patient endorses previous \"not serious\" thoughts of suicide as a direct result of uncontrolled pain at night.  He reports to me at this time he is not having any suicidal ideation and feels his life is overall good when his pain is under control  Unfortunately he is not a good candidate to continue Cymbalta due to worsened hyponatremia  May need to consider an alternative agent  Possibly Remeron which could help with appetite versus Pamelor which could help with pain  " "Pt aspirated when taking sip of coffee. Dr. dahl aware. Ok to continue diet. Slow/small bites. Needs total assistance d/t impulsiveness. Will monitor respiratory status. If pt coughs during food again, will have SLP reevaluate diet.     Paged SLP therapist, \"Pt aspirated on his coffee. He grabbed a sip of it before I could get it. He's tolerating PO prior. MD said ok to still have him eat. If possible will you reassess this afternoon. Thanks\"    Tolerated rest of lunch after 30mins resting after aspiration episode. Will watch closely.   "

## (undated) RX ORDER — LIDOCAINE HYDROCHLORIDE 10 MG/ML
INJECTION, SOLUTION INFILTRATION; PERINEURAL
Status: DISPENSED
Start: 2020-09-22

## (undated) RX ORDER — FENTANYL CITRATE 50 UG/ML
INJECTION, SOLUTION INTRAMUSCULAR; INTRAVENOUS
Status: DISPENSED
Start: 2020-09-22